# Patient Record
Sex: FEMALE | Race: BLACK OR AFRICAN AMERICAN | NOT HISPANIC OR LATINO | Employment: UNEMPLOYED | URBAN - METROPOLITAN AREA
[De-identification: names, ages, dates, MRNs, and addresses within clinical notes are randomized per-mention and may not be internally consistent; named-entity substitution may affect disease eponyms.]

---

## 2017-08-02 ENCOUNTER — HOSPITAL ENCOUNTER (EMERGENCY)
Facility: OTHER | Age: 52
Discharge: HOME | End: 2017-08-02
Attending: EMERGENCY MEDICINE
Payer: MEDICAID

## 2017-08-02 VITALS
BODY MASS INDEX: 24.75 KG/M2 | WEIGHT: 154 LBS | HEIGHT: 66 IN | TEMPERATURE: 99 F | OXYGEN SATURATION: 99 % | HEART RATE: 98 BPM | SYSTOLIC BLOOD PRESSURE: 134 MMHG | DIASTOLIC BLOOD PRESSURE: 68 MMHG | RESPIRATION RATE: 16 BRPM

## 2017-08-02 DIAGNOSIS — M54.2 CHRONIC NECK PAIN: Primary | ICD-10-CM

## 2017-08-02 DIAGNOSIS — M54.12 CERVICAL RADICULOPATHY: ICD-10-CM

## 2017-08-02 DIAGNOSIS — G89.29 CHRONIC NECK PAIN: Primary | ICD-10-CM

## 2017-08-02 PROCEDURE — 63600175 PHARM REV CODE 636 W HCPCS: Performed by: PHYSICIAN ASSISTANT

## 2017-08-02 PROCEDURE — 99283 EMERGENCY DEPT VISIT LOW MDM: CPT | Mod: 25

## 2017-08-02 PROCEDURE — 96372 THER/PROPH/DIAG INJ SC/IM: CPT

## 2017-08-02 RX ORDER — KETOROLAC TROMETHAMINE 30 MG/ML
30 INJECTION, SOLUTION INTRAMUSCULAR; INTRAVENOUS
Status: COMPLETED | OUTPATIENT
Start: 2017-08-02 | End: 2017-08-02

## 2017-08-02 RX ORDER — MELOXICAM 7.5 MG/1
7.5 TABLET ORAL DAILY
Qty: 10 TABLET | Refills: 0 | Status: SHIPPED | OUTPATIENT
Start: 2017-08-02 | End: 2017-08-12

## 2017-08-02 RX ADMIN — KETOROLAC TROMETHAMINE 30 MG: 30 INJECTION, SOLUTION INTRAMUSCULAR at 11:08

## 2017-08-03 NOTE — ED PROVIDER NOTES
"Encounter Date: 8/2/2017       History     Chief Complaint   Patient presents with    Neck Pain     Pt presents to ED with c/o neck pain that radiates down left arm that has been there for "a long time".      Patient is a 52 y.o. female with a past medical history of chronic pain, presenting to the emergency department with complaints of acute on chronic pain.  The patient reports that she has significant pain to the left side of her neck that radiates into her left arm.  She states that she's had this pain for a very long time.  She states as a 10/10.  She admits that she sees pain management regularly, and she takes Percocet for her symptoms.  She reports that she was supposed to have an appointment on 8/4/17, but that it was back to 8/16/17.  She reports that she does not feel that she can wait that long.  She states that she needs something for her pain now.  She denies new injury or trauma.      The history is provided by the patient.     Review of patient's allergies indicates:   Allergen Reactions    Orange juice Hives    Tomato (solanum lycopersicum) Hives     Past Medical History:   Diagnosis Date    Diabetes mellitus     Heart attack     Hypertension      Past Surgical History:   Procedure Laterality Date    APPENDECTOMY      stents      TUBAL LIGATION       History reviewed. No pertinent family history.  Social History   Substance Use Topics    Smoking status: Current Every Day Smoker     Packs/day: 0.50     Types: Cigarettes    Smokeless tobacco: Never Used    Alcohol use No     Review of Systems   Constitutional: Negative for activity change, appetite change, chills, fatigue and fever.   HENT: Negative for congestion, rhinorrhea and sore throat.    Eyes: Negative for photophobia and visual disturbance.   Respiratory: Negative for cough, shortness of breath and wheezing.    Cardiovascular: Negative for chest pain.   Gastrointestinal: Negative for abdominal pain, diarrhea, nausea and vomiting. "   Genitourinary: Negative for dysuria, hematuria and urgency.   Musculoskeletal: Positive for myalgias and neck pain. Negative for back pain.   Skin: Negative for color change and wound.   Neurological: Negative for weakness and headaches.   Psychiatric/Behavioral: Negative for agitation and confusion.       Physical Exam     Initial Vitals [08/02/17 2230]   BP Pulse Resp Temp SpO2   126/72 95 16 98.6 °F (37 °C) 99 %      MAP       90         Physical Exam    Nursing note and vitals reviewed.  Constitutional: Vital signs are normal. She appears well-developed and well-nourished. She is not diaphoretic. She is cooperative.  Non-toxic appearance. She does not have a sickly appearance. She does not appear ill. No distress.   Unaccompanied, -American female speaking in clear and full sentences, sitting comfortably in the recliner.  She is in no acute distress.  She ambulates without difficulty.   HENT:   Head: Normocephalic and atraumatic.   Right Ear: External ear normal.   Left Ear: External ear normal.   Nose: Nose normal.   Mouth/Throat: Oropharynx is clear and moist.   Eyes: Conjunctivae and EOM are normal.   Neck: Normal range of motion. Neck supple.   Cardiovascular: Normal rate, regular rhythm and normal heart sounds.   Pulmonary/Chest: Breath sounds normal. No respiratory distress. She has no wheezes.   Abdominal: Soft. Bowel sounds are normal. She exhibits no distension. There is no tenderness. There is no rebound and no guarding.   Musculoskeletal: Normal range of motion.   Tenderness to palpation of the left sided paraspinal muscles of cervical spine and trapezius and shoulder.  No midline tenderness, crepitus, step-off.  Normal range of motion, strength, sensation.  Normal sensation of bilateral upper extremities.   Neurological: She is alert and oriented to person, place, and time. GCS eye subscore is 4. GCS verbal subscore is 5. GCS motor subscore is 6.   Skin: Skin is warm.   Psychiatric: She has  a normal mood and affect. Her behavior is normal. Judgment and thought content normal.         ED Course   Procedures  Labs Reviewed - No data to display          Medical Decision Making:   Initial Assessment:   Urgent evaluation of a 52-year-old female with a past medical history of chronic pain, presenting to the emergency department with complaints of acute on chronic pain.  Patient is afebrile, nontoxic, hemodynamically stable, and neurovascularly intact.  Physical exam reveals tenderness to palpation left sided paraspinal muscles of the cervical spine with no midline tenderness, crepitus, step-off.  Ambulatory and weightbearing.  No focal neurological deficits or weaknesses.  ED Management:  Given the patient's signs and symptoms, I do not feel that further testing or imaging is warranted.  Patient's signs and symptoms are likely consistent with her chronic pain.  Will plan to administer Toradol.  I explained to the patient that I could not prescribe further narcotics for her chronic pain.  She'll be given a prescription for Motrin.  She stable for discharge home. The patient was instructed to follow up with a primary care provider in 2 days or to return to the emergency department for worsening symptoms. The treatment plan was discussed with the patient who demonstrated understanding and comfort with plan. The patient's history, physical exam, and plan of care was discussed with and agreed upon with my supervising physician.    Other:   I have discussed this case with another health care provider.       <> Summary of the Discussion: Dr. Gibbons  This note was created using Dragon Medical Dictation. There may be typographical errors secondary to dictation.                    ED Course     Clinical Impression:     1. Chronic neck pain    2. Cervical radiculopathy         Disposition:   Disposition: Discharged  Condition: Stable                        Theodora Mccoy PA-C  08/02/17 3485

## 2017-08-03 NOTE — ED TRIAGE NOTES
"Casie Salvador, a 52 y.o. female presents to the ED with complaints of two herniated discs in her neck that pt lives with chronic pain but tonight the pain is "worse" and the pt is unable to sleep and having no relief with home pain meds. Pt states pain starts in her neck and moves down the left side of her arm. Pt denies numbness or tingling, pulses palpable, pt denies NVD, denies fever or chills.      Chief Complaint   Patient presents with    Neck Pain     Pt presents to ED with c/o neck pain that radiates down left arm that has been there for "a long time".      Review of patient's allergies indicates:   Allergen Reactions    Orange juice Hives    Tomato (solanum lycopersicum) Hives     Past Medical History:   Diagnosis Date    Diabetes mellitus     Heart attack     Hypertension        "

## 2017-08-03 NOTE — ED NOTES
Patient escorted to registration desk with family present. Discharge paperwork discussed. Discussed changes in medications, new prescriptions were given and discussed. Patient instructed to follow up per physician recommendations. Patient and family verbalized understanding.

## 2018-02-03 ENCOUNTER — HOSPITAL ENCOUNTER (EMERGENCY)
Facility: OTHER | Age: 53
Discharge: HOME | End: 2018-02-03
Attending: EMERGENCY MEDICINE
Payer: MEDICAID

## 2018-02-03 VITALS
SYSTOLIC BLOOD PRESSURE: 139 MMHG | HEART RATE: 88 BPM | BODY MASS INDEX: 24.75 KG/M2 | RESPIRATION RATE: 14 BRPM | TEMPERATURE: 99 F | DIASTOLIC BLOOD PRESSURE: 72 MMHG | OXYGEN SATURATION: 99 % | WEIGHT: 154 LBS | HEIGHT: 66 IN

## 2018-02-03 DIAGNOSIS — R07.9 CHEST PAIN WITH MINIMAL RISK FOR CARDIAC ETIOLOGY: ICD-10-CM

## 2018-02-03 DIAGNOSIS — M79.602 LEFT ARM PAIN: ICD-10-CM

## 2018-02-03 DIAGNOSIS — L08.9 SKIN PUSTULE: ICD-10-CM

## 2018-02-03 DIAGNOSIS — M25.512 LEFT SHOULDER PAIN, UNSPECIFIED CHRONICITY: Primary | ICD-10-CM

## 2018-02-03 PROCEDURE — 93010 ELECTROCARDIOGRAM REPORT: CPT | Mod: ,,, | Performed by: INTERNAL MEDICINE

## 2018-02-03 PROCEDURE — 96372 THER/PROPH/DIAG INJ SC/IM: CPT | Mod: 59

## 2018-02-03 PROCEDURE — 63600175 PHARM REV CODE 636 W HCPCS: Performed by: EMERGENCY MEDICINE

## 2018-02-03 PROCEDURE — 10060 I&D ABSCESS SIMPLE/SINGLE: CPT

## 2018-02-03 PROCEDURE — 99284 EMERGENCY DEPT VISIT MOD MDM: CPT | Mod: 25

## 2018-02-03 PROCEDURE — 10021 FNA BX W/O IMG GDN 1ST LES: CPT

## 2018-02-03 PROCEDURE — 93005 ELECTROCARDIOGRAM TRACING: CPT

## 2018-02-03 RX ORDER — IBUPROFEN 600 MG/1
600 TABLET ORAL EVERY 6 HOURS PRN
Qty: 20 TABLET | Refills: 0 | OUTPATIENT
Start: 2018-02-03 | End: 2018-10-15

## 2018-02-03 RX ORDER — SULFAMETHOXAZOLE AND TRIMETHOPRIM 800; 160 MG/1; MG/1
1 TABLET ORAL 2 TIMES DAILY
Qty: 14 TABLET | Refills: 0 | Status: SHIPPED | OUTPATIENT
Start: 2018-02-03 | End: 2018-02-10

## 2018-02-03 RX ORDER — KETOROLAC TROMETHAMINE 30 MG/ML
15 INJECTION, SOLUTION INTRAMUSCULAR; INTRAVENOUS
Status: COMPLETED | OUTPATIENT
Start: 2018-02-03 | End: 2018-02-03

## 2018-02-03 RX ADMIN — KETOROLAC TROMETHAMINE 15 MG: 30 INJECTION, SOLUTION INTRAMUSCULAR at 11:02

## 2018-02-04 NOTE — ED TRIAGE NOTES
"Patient presents to ED with c/o non traumatic L shoulder pain x 1 month. Abscess to lower L leg. She also c/o R side chest pain that she describes as "something jumping in my chest".   "

## 2018-02-04 NOTE — ED PROVIDER NOTES
"Encounter Date: 2/3/2018    SCRIBE #1 NOTE: ITrina, am scribing for, and in the presence of, Dr. Whitt.       History     Chief Complaint   Patient presents with    multiple complaints     Patient c/o a "muscle twitching" to right chest wall;  Patient c/o a insect bite to left calf; and left shoulder pain that radiates down her arm.      Time seen by provider: 10:36 PM    This is a 53 y.o. female with DM, HTN, and hx of MI who presents with multiple complaints. She has a hx of chronic pain and reports intermittent L shoulder pain, beyond baseline. Pt reports that it is similar to a toothache and does not radiate down the arm. Pain is keeping her from sleeping and is not mitigated with any sleep position. No relief with Tylenol, Advil, or heat pad. She was last seen on 8/2/17 with similar symptoms. Pt has attempted to f/u with pain management but has not been able to yet. She denies any hx of injection for pain relief. Pt has two herniated discs in the neck. She denies any numbness, tingling, or weakness. No rash or wound to shoulder.    She reports intermittent "jumping"/"twitching" to the R chest wall x 4 days. Episodes last about 2 to 3 minutes. Pt denies any chest pain, palpitations, or leg swelling. No headache, dizziness, or weakness.     Pt also c/o bug bite to the L medial calf with pain. She denies any numbness, weakness, or tingling to the area. No fever, chills, or fatigue.      The history is provided by the patient.     Review of patient's allergies indicates:   Allergen Reactions    Orange juice Hives    Tomato (solanum lycopersicum) Hives     Past Medical History:   Diagnosis Date    Diabetes mellitus     Heart attack     Hypertension      Past Surgical History:   Procedure Laterality Date    APPENDECTOMY      stents      TUBAL LIGATION       History reviewed. No pertinent family history.  Social History   Substance Use Topics    Smoking status: Current Every Day Smoker     " Packs/day: 0.50     Types: Cigarettes    Smokeless tobacco: Never Used    Alcohol use No     Review of Systems   Constitutional: Negative for chills and fever.   HENT: Negative for congestion and sore throat.    Eyes: Negative for visual disturbance.   Respiratory: Negative for cough and shortness of breath.    Cardiovascular: Negative for chest pain, palpitations and leg swelling.   Gastrointestinal: Negative for abdominal pain, diarrhea and vomiting.   Genitourinary: Negative for decreased urine volume, dysuria and vaginal discharge.   Musculoskeletal: Negative for joint swelling, neck pain and neck stiffness.        Twitching sensation to R chest wall. L shoulder pain.   Skin: Negative for pallor.        Bug bite to L medial calf.   Neurological: Negative for weakness, numbness and headaches.   Psychiatric/Behavioral: Negative for confusion.       Physical Exam     Initial Vitals [02/03/18 2204]   BP Pulse Resp Temp SpO2   139/72 88 14 98.5 °F (36.9 °C) 99 %      MAP       94.33         Physical Exam    Nursing note and vitals reviewed.  Constitutional: She appears well-developed and well-nourished. No distress.   HENT:   Head: Normocephalic and atraumatic.   Mouth/Throat: Oropharynx is clear and moist.   Eyes: Conjunctivae and EOM are normal. Pupils are equal, round, and reactive to light.   Neck: Normal range of motion. Neck supple.   Cardiovascular: Normal rate, regular rhythm and normal heart sounds.   No murmur heard.  Pulmonary/Chest: Breath sounds normal. No respiratory distress. She has no wheezes. She has no rhonchi. She has no rales.   Abdominal: Soft. Bowel sounds are normal. There is no tenderness.   Musculoskeletal: Normal range of motion.   TTP of the L anterior shoulder. No erythema or warmth.   All other joints were aggressively palpated and ranged without tenderness or decreased ROM except as otherwise mentioned.  There is no midline tenderness of the cervical spine.    There is no midline  tenderness of the thoracic spine.    There is no midline tenderness of the lumbar spine.    There is no tenderness over the sacrum.   Neurological: She is alert and oriented to person, place, and time. She has normal strength. No cranial nerve deficit or sensory deficit.   Skin: Skin is warm and dry.   L medial calf there is a 1cm, circular area of erythema with a small central punctum. There is a small amount of induration.   Psychiatric: She has a normal mood and affect. Her behavior is normal.         ED Course   Abscess Aspiration  Date/Time: 2/5/2018 5:00 PM  Performed by: RICARDO DYSON  Authorized by: RICARDO DYSON     A time out verifies correct patient, procedure, equipment, support staff and site/side marked as required:   Procedure Details:     Site prepped with:  Alcohol    Location of Abscess #1:  Left medial calf/ankle    Size of needle #1:  18  Post-procedure:     Patient tolerance:  Patient tolerated the procedure well with no immediate complications     Pustule was unroofed with trace of pus released.      Labs Reviewed - No data to display  EKG Readings: (Independently Interpreted)   NSR at a rate of 80. No STEMI. No abnormal T waves.       X-Rays:   Independently Interpreted Readings:   Chest X-Ray: Normal heart size.  No acute abnormalities. No infiltrate or effusions.      Imaging Results          X-Ray Chest PA And Lateral (Final result)  Result time 02/03/18 23:08:08    Final result by Santhosh Pelaez MD (02/03/18 23:08:08)                 Impression:         No acute findings in the chest.  No significant change from prior.        Electronically signed by: SANTHOSH PELAEZ MD  Date:     02/03/18  Time:    23:08              Narrative:    Chest PA and Lateral    Indication:.    Comparison:December 10, 2012.    Findings:     Heart and lungs unchanged when allowing for differences in technique and positioning.                              Medical Decision Making:   Independently  "Interpreted Test(s):   I have ordered and independently interpreted X-rays - see prior notes.  I have ordered and independently interpreted EKG Reading(s) - see prior notes  Clinical Tests:   Radiological Study: Ordered and Reviewed  Medical Tests: Ordered and Reviewed  ED Management:  Emergent evaluation a 53-year-old female with multiple complaints.  She reported right sided "chest wall jumping".  This was evaluated with an EKG and chest x-ray which are both normal.  Additionally, she reports chronic left shoulder pain.  On exam there is no evidence of a septic joint.  I reviewed medical record and she had an x-ray last year, I do not think repeat imaging is indicated at this time.  I suspect arthritis.  Additional complaint was of a pustule to the left leg.  This was unroofed with a needle and patient tolerated it well.  Since there is some surrounding erythema we'll treat with antibiotics.  She is discharged in good condition and encouraged close follow-up with PCP or return for new or worsening symptoms.            Scribe Attestation:   Scribe #1: I performed the above scribed service and the documentation accurately describes the services I performed. I attest to the accuracy of the note.    Attending Attestation:           Physician Attestation for Scribe:  Physician Attestation Statement for Scribe #1: I, Dr. Whitt, reviewed documentation, as scribed by Trina Hough in my presence, and it is both accurate and complete.                 ED Course      Clinical Impression:     1. Left shoulder pain, unspecified chronicity    2. Left arm pain    3. Chest pain with minimal risk for cardiac etiology    4. Skin pustule                               Randi Whitt MD  02/05/18 3802    "

## 2018-10-09 ENCOUNTER — HOSPITAL ENCOUNTER (EMERGENCY)
Facility: OTHER | Age: 53
Discharge: HOME | End: 2018-10-09
Attending: EMERGENCY MEDICINE
Payer: MEDICAID

## 2018-10-09 VITALS
OXYGEN SATURATION: 99 % | DIASTOLIC BLOOD PRESSURE: 84 MMHG | WEIGHT: 174 LBS | TEMPERATURE: 99 F | HEART RATE: 83 BPM | BODY MASS INDEX: 27.97 KG/M2 | SYSTOLIC BLOOD PRESSURE: 182 MMHG | HEIGHT: 66 IN | RESPIRATION RATE: 18 BRPM

## 2018-10-09 DIAGNOSIS — S92.514A CLOSED NONDISPLACED FRACTURE OF PROXIMAL PHALANX OF LESSER TOE OF RIGHT FOOT, INITIAL ENCOUNTER: Primary | ICD-10-CM

## 2018-10-09 DIAGNOSIS — M79.674 TOE PAIN, RIGHT: ICD-10-CM

## 2018-10-09 PROCEDURE — 96372 THER/PROPH/DIAG INJ SC/IM: CPT

## 2018-10-09 PROCEDURE — 25000003 PHARM REV CODE 250: Performed by: PHYSICIAN ASSISTANT

## 2018-10-09 PROCEDURE — 63600175 PHARM REV CODE 636 W HCPCS: Performed by: PHYSICIAN ASSISTANT

## 2018-10-09 PROCEDURE — 99284 EMERGENCY DEPT VISIT MOD MDM: CPT | Mod: 25

## 2018-10-09 RX ORDER — KETOROLAC TROMETHAMINE 30 MG/ML
15 INJECTION, SOLUTION INTRAMUSCULAR; INTRAVENOUS
Status: COMPLETED | OUTPATIENT
Start: 2018-10-09 | End: 2018-10-09

## 2018-10-09 RX ORDER — DICLOFENAC SODIUM 75 MG/1
75 TABLET, DELAYED RELEASE ORAL 2 TIMES DAILY
Qty: 14 TABLET | Refills: 0 | OUTPATIENT
Start: 2018-10-09 | End: 2018-10-15

## 2018-10-09 RX ORDER — ACETAMINOPHEN 325 MG/1
650 TABLET ORAL
Status: COMPLETED | OUTPATIENT
Start: 2018-10-09 | End: 2018-10-09

## 2018-10-09 RX ORDER — IBUPROFEN 600 MG/1
600 TABLET ORAL
Status: DISCONTINUED | OUTPATIENT
Start: 2018-10-09 | End: 2018-10-09

## 2018-10-09 RX ORDER — DICLOFENAC SODIUM 75 MG/1
75 TABLET, DELAYED RELEASE ORAL 2 TIMES DAILY
Qty: 14 TABLET | Refills: 0 | Status: SHIPPED | OUTPATIENT
Start: 2018-10-09 | End: 2018-10-09 | Stop reason: SDUPTHER

## 2018-10-09 RX ADMIN — KETOROLAC TROMETHAMINE 15 MG: 30 INJECTION, SOLUTION INTRAMUSCULAR at 04:10

## 2018-10-09 RX ADMIN — ACETAMINOPHEN 650 MG: 325 TABLET ORAL at 04:10

## 2018-10-09 NOTE — ED PROVIDER NOTES
Encounter Date: 10/9/2018       History     Chief Complaint   Patient presents with    Toe Injury     Pt c/o right second toe pain after tripping on friday night.      52 y/o female with history of DM, HTN, CAD, presents to the ER with chief complaint of right second toe pain x 2 days.  Patient states that she tripped while walking out of a door 2 nights ago and hit her toe.  The pain did not start until the following day.  She has taken Aleve and ibuprofen without resolution of her pain. She notes swelling in the right 2nd toe and increased pain with walking.  She denies additional injuries or other complaints at this time.          Review of patient's allergies indicates:   Allergen Reactions    Orange juice Hives    Tomato (solanum lycopersicum) Hives     Past Medical History:   Diagnosis Date    Diabetes mellitus     Heart attack     Hypertension      Past Surgical History:   Procedure Laterality Date    APPENDECTOMY      stents      TUBAL LIGATION       History reviewed. No pertinent family history.  Social History     Tobacco Use    Smoking status: Current Every Day Smoker     Packs/day: 0.50     Types: Cigarettes    Smokeless tobacco: Never Used   Substance Use Topics    Alcohol use: No    Drug use: No     Review of Systems   Constitutional: Negative for chills and fever.   Respiratory: Negative for shortness of breath.    Cardiovascular: Negative for chest pain.   Gastrointestinal: Negative for nausea and vomiting.   Musculoskeletal: Positive for arthralgias and joint swelling. Negative for back pain.   Skin: Negative for color change and rash.   Neurological: Negative for weakness.   Hematological: Does not bruise/bleed easily.       Physical Exam     Initial Vitals [10/09/18 1440]   BP Pulse Resp Temp SpO2   (!) 167/79 90 18 99.1 °F (37.3 °C) 99 %      MAP       --         Physical Exam    Nursing note and vitals reviewed.  Constitutional: She appears well-developed and well-nourished.   HENT:    Head: Atraumatic.   Mouth/Throat: Oropharynx is clear and moist.   Eyes: Conjunctivae and EOM are normal. Pupils are equal, round, and reactive to light.   Neck: Normal range of motion. Neck supple.   Cardiovascular: Normal rate and regular rhythm.   Pulmonary/Chest: Breath sounds normal. No respiratory distress. She has no wheezes. She has no rhonchi. She has no rales.   Musculoskeletal:        Right foot: There is decreased range of motion (of 2nd toe), tenderness and swelling (mild to moderate swelling of entire second toe.  tenderness to distal end of toe.). There is normal capillary refill, no deformity and no laceration.   Neurological: She is alert and oriented to person, place, and time.   Skin: No rash noted.   Psychiatric: She has a normal mood and affect.         ED Course   Procedures  Labs Reviewed - No data to display       Imaging Results          X-Ray Toe 2 or More Views Right (Final result)  Result time 10/09/18 15:01:17    Final result by Lennox Sheppard MD (10/09/18 15:01:17)                 Impression:      2nd proximal phalanx acute, nondisplaced fracture as above.      Electronically signed by: Lennox Sheppard MD  Date:    10/09/2018  Time:    15:01             Narrative:    EXAMINATION:  XR TOE 2 OR MORE VIEWS RIGHT    CLINICAL HISTORY:  trauma;    TECHNIQUE:  AP, lateral and oblique views right 2nd digit    COMPARISON:  None    FINDINGS:  Bones are well mineralized.  There is suspected acute, nondisplaced fracture involving the distal metadiaphyseal junction of the 2nd proximal phalanx.  No dislocation or destructive osseous process.  Minimal degenerative change of the 1st MTP joint.  No subcutaneous emphysema or radiodense retained foreign body.                                       APC / Resident Notes:   Patient presents to the ER with chief complaint of right 2nd toe pain x2 days.  The patient reports that she tripped and injured her toe 3 days ago.  Patient is taking  NSAIDs 1-2 times  per day without resolution of her pain.  X-ray in the ER reveals 2nd proximal phalanx acute nondisplaced fracture without additional fracture.  Patient has no additional evidence of injury on exam.  I have helio taped the toe and provided a hard sole shoe for comfort.   I have prescribed Voltaren and she is instructed on R.I.C.E therapy.  Patient is advised on close follow-up with PCP within 1 week and is given ER return precautions.                   Clinical Impression:   The primary encounter diagnosis was Closed nondisplaced fracture of proximal phalanx of lesser toe of right foot, initial encounter. A diagnosis of Toe pain, right was also pertinent to this visit.                             MARISSA Olsen  10/09/18 [id number]

## 2018-10-09 NOTE — ED TRIAGE NOTES
"+toe pain. Pt states " I hit my second toe on my right foot Friday. It hurts so bad I can barley move it." mild swelling noted to right second toe.   "

## 2018-10-09 NOTE — DISCHARGE INSTRUCTIONS
Take tylenol 1000 mg every 8 hours for pain in addition to prescription for antiinflammatories (voltaren)

## 2018-10-15 ENCOUNTER — HOSPITAL ENCOUNTER (EMERGENCY)
Facility: OTHER | Age: 53
Discharge: HOME | End: 2018-10-15
Attending: EMERGENCY MEDICINE
Payer: MEDICAID

## 2018-10-15 VITALS
OXYGEN SATURATION: 99 % | RESPIRATION RATE: 18 BRPM | BODY MASS INDEX: 27.97 KG/M2 | HEART RATE: 82 BPM | TEMPERATURE: 99 F | HEIGHT: 66 IN | WEIGHT: 174 LBS | SYSTOLIC BLOOD PRESSURE: 164 MMHG | DIASTOLIC BLOOD PRESSURE: 74 MMHG

## 2018-10-15 DIAGNOSIS — M79.674 PAIN OF TOE OF RIGHT FOOT: Primary | ICD-10-CM

## 2018-10-15 PROCEDURE — 99283 EMERGENCY DEPT VISIT LOW MDM: CPT | Mod: 25

## 2018-10-15 RX ORDER — OXAPROZIN 600 MG/1
600 TABLET, FILM COATED ORAL 2 TIMES DAILY PRN
Qty: 20 TABLET | Refills: 0 | Status: SHIPPED | OUTPATIENT
Start: 2018-10-15 | End: 2022-02-20

## 2018-10-15 NOTE — ED PROVIDER NOTES
Encounter Date: 10/15/2018       History     Chief Complaint   Patient presents with    Toe Pain     Pt c/o pain to her right fourth and fifth toe since this am. She states she was seen last week and diagnosed with a fracture to the right second toe.      53-year-old female with history of diabetes, mi and hypertension presents to the emergency department with complaints of 4th and 5th toe pain on the right foot.  She states that she was seen here approximately 1 week ago for fracture of her right 2nd toe after a trip and injury. She reports pain currently a 9/10.  She admits that she was taking the diclofenac however she is currently out of this medication.  She states that now she is experiencing pain to the 4th and 5th digits which she was not initially experiencing.  She denies any redness, warmth, new trauma or injury. She states that she has been wearing the postop shoe for comfort measures.      The history is provided by the patient.     Review of patient's allergies indicates:   Allergen Reactions    Orange juice Hives    Tomato (solanum lycopersicum) Hives     Past Medical History:   Diagnosis Date    Diabetes mellitus     Heart attack     Hypertension      Past Surgical History:   Procedure Laterality Date    APPENDECTOMY      stents      TUBAL LIGATION       History reviewed. No pertinent family history.  Social History     Tobacco Use    Smoking status: Current Every Day Smoker     Packs/day: 0.50     Types: Cigarettes    Smokeless tobacco: Never Used   Substance Use Topics    Alcohol use: No    Drug use: No     Review of Systems   Constitutional: Negative for chills and fever.   Musculoskeletal: Positive for arthralgias and myalgias. Negative for back pain and joint swelling.   Skin: Negative for rash.   Neurological: Negative for weakness and numbness.   Hematological: Does not bruise/bleed easily.       Physical Exam     Initial Vitals [10/15/18 1043]   BP Pulse Resp Temp SpO2   (!)  164/74 82 18 98.8 °F (37.1 °C) 99 %      MAP       --         Physical Exam    Nursing note and vitals reviewed.  Constitutional: Vital signs are normal. She appears well-developed and well-nourished.  Non-toxic appearance. No distress.   HENT:   Head: Normocephalic and atraumatic.   Right Ear: External ear normal.   Left Ear: External ear normal.   Nose: Nose normal.   Eyes: Conjunctivae and lids are normal. No scleral icterus.   Neck: Phonation normal.   Cardiovascular: Intact distal pulses.   Abdominal: Normal appearance.   Musculoskeletal: Normal range of motion.        Feet:    No obvious deformities, moving all extremities, normal gait  Right foot-pain with reported tenderness palpation to the 4th and 5th digits as well as the 2nd digit.  No edema, ecchymosis, erythema or warmth.  No skin lesions. Patient does have on onychodystrophy to the nails concerning for onychomycosis.  Intact distal pulses with no sensory deficits.  Capillary refill less than 3 sec.    Neurological: She is alert and oriented to person, place, and time. She has normal strength. No sensory deficit.   Skin: Skin is warm, dry and intact. Capillary refill takes less than 2 seconds. No abrasion, no bruising, no ecchymosis, no laceration, no lesion, no rash and no abscess noted. No erythema.   Psychiatric: She has a normal mood and affect. Her speech is normal and behavior is normal. Judgment normal. Cognition and memory are normal.         ED Course   Procedures  Labs Reviewed - No data to display       Imaging Results          X-Ray Toe 2 or More Views Right (Final result)  Result time 10/15/18 11:29:07    Final result by Lynette Myrick MD (10/15/18 11:29:07)                 Impression:      Normal exam.      Electronically signed by: Lynette Myrick MD  Date:    10/15/2018  Time:    11:29             Narrative:    EXAMINATION:  XR TOE 2 OR MORE VIEWS RIGHT    CLINICAL HISTORY:  toe pain to 4th and 5th digits;    TECHNIQUE:  PA,  lateral and oblique radiographs of the 4th and 5th toes were submitted.    COMPARISON:  10/09/2018    FINDINGS:  The osseous structures, soft tissues and joint spaces are normal.                                 Medical Decision Making:   History:   Old Medical Records: I decided to obtain old medical records.  Initial Assessment:   53-year-old female with complaints consistent with toe pain on the right foot.  Afebrile neurovascularly intact.  She is alert and healthy nontoxic appearing.  She is in no apparent distress. No focal motor deficits.  Patient was seen here approximately 1 week ago and diagnosed with a fracture of the right 2nd toe.  She reports that she is now experiencing pain to the 4th and 5th toes.  There is no deformity, ecchymoses, edema or erythema.  No evidence of serious bacterial infection.  Low suspicion for fracture dislocation.  I did review old x-ray and the unable to visualize the 5th digit.  On the images that did show the 4th digit, there was no obvious evidence of acute fracture or dislocation.  Clinical Tests:   Radiological Study: Ordered and Reviewed  ED Management:  X-rays obtained with no evidence of fracture dislocation of the 4th or 5th digit.  Will discharge home with oxaprozin care instructions.  She is to follow up with her doctor in the next 48 hr or return for any worsening signs or symptoms.  Given information for [facility] if she does not have a primary care physician to follow up with.  She states understanding agrees this plan.  This is the extent of patient's complaints today.  Patient requesting helio-taped.  I do not think she will benefit from this but she is more than welcome to helio-tape if felt necessary/improved her comfort  This note was created using [employer] dictation.  There may be typographical errors secondary to dictation.                        Clinical Impression:     1. Pain of toe of right foot            Disposition:   Disposition:  Discharged  Condition: Stable                        Kelli Beasley PA-C  10/15/18 1148

## 2018-10-15 NOTE — ED NOTES
"Pt reports being here last week with fractured right 2nd toe. Back today with new onset pain 9/10 "it hurts bad" to right 4th and 5th toes that started this AM. NAD. No s/s infection. Ambulatory. Will cont to monitor.  "

## 2022-02-05 ENCOUNTER — HOSPITAL ENCOUNTER (EMERGENCY)
Facility: OTHER | Age: 57
Discharge: HOME | End: 2022-02-05
Attending: EMERGENCY MEDICINE
Payer: MEDICAID

## 2022-02-05 VITALS
HEART RATE: 76 BPM | DIASTOLIC BLOOD PRESSURE: 97 MMHG | SYSTOLIC BLOOD PRESSURE: 138 MMHG | BODY MASS INDEX: 30.05 KG/M2 | HEIGHT: 66 IN | OXYGEN SATURATION: 99 % | TEMPERATURE: 98 F | WEIGHT: 187 LBS | RESPIRATION RATE: 16 BRPM

## 2022-02-05 DIAGNOSIS — K59.00 CONSTIPATION, UNSPECIFIED CONSTIPATION TYPE: ICD-10-CM

## 2022-02-05 DIAGNOSIS — R10.9 ABDOMINAL PAIN: ICD-10-CM

## 2022-02-05 DIAGNOSIS — R10.84 GENERALIZED ABDOMINAL PAIN: Primary | ICD-10-CM

## 2022-02-05 LAB
ALBUMIN SERPL BCP-MCNC: 3.8 G/DL (ref 3.5–5.2)
ALP SERPL-CCNC: 148 U/L (ref 55–135)
ALT SERPL W/O P-5'-P-CCNC: 23 U/L (ref 10–44)
ANION GAP SERPL CALC-SCNC: 11 MMOL/L (ref 8–16)
AST SERPL-CCNC: 22 U/L (ref 10–40)
BASOPHILS # BLD AUTO: 0.07 K/UL (ref 0–0.2)
BASOPHILS NFR BLD: 0.4 % (ref 0–1.9)
BILIRUB SERPL-MCNC: 0.2 MG/DL (ref 0.1–1)
BUN SERPL-MCNC: 19 MG/DL (ref 6–20)
CALCIUM SERPL-MCNC: 9.3 MG/DL (ref 8.7–10.5)
CHLORIDE SERPL-SCNC: 103 MMOL/L (ref 95–110)
CO2 SERPL-SCNC: 21 MMOL/L (ref 23–29)
CREAT SERPL-MCNC: 1.4 MG/DL (ref 0.5–1.4)
DIFFERENTIAL METHOD: ABNORMAL
EOSINOPHIL # BLD AUTO: 0.7 K/UL (ref 0–0.5)
EOSINOPHIL NFR BLD: 3.8 % (ref 0–8)
ERYTHROCYTE [DISTWIDTH] IN BLOOD BY AUTOMATED COUNT: 15.6 % (ref 11.5–14.5)
EST. GFR  (AFRICAN AMERICAN): 48 ML/MIN/1.73 M^2
EST. GFR  (NON AFRICAN AMERICAN): 42 ML/MIN/1.73 M^2
GLUCOSE SERPL-MCNC: 98 MG/DL (ref 70–110)
HCT VFR BLD AUTO: 35.2 % (ref 37–48.5)
HGB BLD-MCNC: 11.2 G/DL (ref 12–16)
IMM GRANULOCYTES # BLD AUTO: 0.09 K/UL (ref 0–0.04)
IMM GRANULOCYTES NFR BLD AUTO: 0.5 % (ref 0–0.5)
LIPASE SERPL-CCNC: 15 U/L (ref 4–60)
LYMPHOCYTES # BLD AUTO: 3.8 K/UL (ref 1–4.8)
LYMPHOCYTES NFR BLD: 21 % (ref 18–48)
MCH RBC QN AUTO: 22.8 PG (ref 27–31)
MCHC RBC AUTO-ENTMCNC: 31.8 G/DL (ref 32–36)
MCV RBC AUTO: 72 FL (ref 82–98)
MONOCYTES # BLD AUTO: 1.1 K/UL (ref 0.3–1)
MONOCYTES NFR BLD: 6.3 % (ref 4–15)
NEUTROPHILS # BLD AUTO: 12.2 K/UL (ref 1.8–7.7)
NEUTROPHILS NFR BLD: 68 % (ref 38–73)
NRBC BLD-RTO: 0 /100 WBC
PLATELET # BLD AUTO: 386 K/UL (ref 150–450)
PMV BLD AUTO: 10 FL (ref 9.2–12.9)
POTASSIUM SERPL-SCNC: 5 MMOL/L (ref 3.5–5.1)
PROT SERPL-MCNC: 8 G/DL (ref 6–8.4)
RBC # BLD AUTO: 4.92 M/UL (ref 4–5.4)
SODIUM SERPL-SCNC: 135 MMOL/L (ref 136–145)
WBC # BLD AUTO: 17.92 K/UL (ref 3.9–12.7)

## 2022-02-05 PROCEDURE — 80053 COMPREHEN METABOLIC PANEL: CPT | Performed by: EMERGENCY MEDICINE

## 2022-02-05 PROCEDURE — 83690 ASSAY OF LIPASE: CPT | Performed by: EMERGENCY MEDICINE

## 2022-02-05 PROCEDURE — 63600175 PHARM REV CODE 636 W HCPCS: Performed by: EMERGENCY MEDICINE

## 2022-02-05 PROCEDURE — 96372 THER/PROPH/DIAG INJ SC/IM: CPT

## 2022-02-05 PROCEDURE — 99284 EMERGENCY DEPT VISIT MOD MDM: CPT | Mod: 25

## 2022-02-05 PROCEDURE — 85025 COMPLETE CBC W/AUTO DIFF WBC: CPT | Performed by: EMERGENCY MEDICINE

## 2022-02-05 RX ORDER — DOCUSATE SODIUM 100 MG/1
100 CAPSULE, LIQUID FILLED ORAL 2 TIMES DAILY
Qty: 60 CAPSULE | Refills: 0 | Status: ON HOLD | OUTPATIENT
Start: 2022-02-05

## 2022-02-05 RX ORDER — OXYCODONE AND ACETAMINOPHEN 5; 325 MG/1; MG/1
1 TABLET ORAL EVERY 4 HOURS PRN
Qty: 6 TABLET | Refills: 0 | Status: ON HOLD | OUTPATIENT
Start: 2022-02-05 | End: 2022-03-23 | Stop reason: HOSPADM

## 2022-02-05 RX ORDER — POLYETHYLENE GLYCOL 3350 17 G/17G
17 POWDER, FOR SOLUTION ORAL DAILY PRN
Qty: 119 G | Refills: 0 | Status: SHIPPED | OUTPATIENT
Start: 2022-02-05 | End: 2022-04-02 | Stop reason: HOSPADM

## 2022-02-05 RX ORDER — DICYCLOMINE HYDROCHLORIDE 10 MG/ML
20 INJECTION INTRAMUSCULAR
Status: COMPLETED | OUTPATIENT
Start: 2022-02-05 | End: 2022-02-05

## 2022-02-05 RX ADMIN — DICYCLOMINE HYDROCHLORIDE 20 MG: 20 INJECTION, SOLUTION INTRAMUSCULAR at 02:02

## 2022-02-05 NOTE — ED TRIAGE NOTES
Patient presents to ED with c/o intermittent mid abdominal pain x 2 days. She describes the pain as a stabbing pain that radiates to back. Denies N/V/D, constipation, dysuria or fever.

## 2022-02-05 NOTE — ED PROVIDER NOTES
Encounter Date: 2/5/2022    SCRIBE #1 NOTE: I, Esther Damian, am scribing for, and in the presence of, Dr. Urias.       History     Chief Complaint   Patient presents with    Abdominal Pain     Mid abdominal pain. Denies N/V/D, dysuria.      Time seen by provider: 1:26 AM    This is a 57 y.o. female with a hx of DM, HTN, and heart attack. SGHx stents and appendectomy. She presents with complaint of intermittent abdominal pain onset yesterday morning. The patient reports she can't sleep due to pain that occurs multiple times per day. No gallbladder, liver, or pancreas issues. She denies bleeding ulcers, fever, chills, nausea, vomiting, or urinary symptoms. She states having regular bowel movements. The patient has not taken anything for the pain. She mentions back pain and foot pain that is unrelated. She ran out of her pain pills and says she cannot wait for her next appointment to get them. She has an appointment scheduled with her PCP in 3 days at [facility].       The history is provided by the patient.     Review of patient's allergies indicates:   Allergen Reactions    Orange juice Hives    Tomato (solanum lycopersicum) Hives     Past Medical History:   Diagnosis Date    Diabetes mellitus     Heart attack     Hypertension      Past Surgical History:   Procedure Laterality Date    APPENDECTOMY      stents      TUBAL LIGATION       History reviewed. No pertinent family history.  Social History     Tobacco Use    Smoking status: Current Every Day Smoker     Packs/day: 0.50     Types: Cigarettes    Smokeless tobacco: Never Used   Substance Use Topics    Alcohol use: No    Drug use: No     Review of Systems   Constitutional: Negative for chills and fever.   HENT: Negative for sore throat.    Respiratory: Negative for shortness of breath.    Cardiovascular: Negative for chest pain.   Gastrointestinal: Positive for abdominal pain. Negative for blood in stool, diarrhea, nausea and vomiting.   Genitourinary:  Negative for decreased urine volume, difficulty urinating, dysuria, frequency and urgency.   Musculoskeletal: Positive for arthralgias and back pain.   Skin: Negative for rash.   Neurological: Negative for weakness.   Hematological: Does not bruise/bleed easily.       Physical Exam     Initial Vitals [02/05/22 0105]   BP Pulse Resp Temp SpO2   (!) 194/88 83 16 98.2 °F (36.8 °C) 98 %      MAP       --         Physical Exam    Nursing note and vitals reviewed.  Constitutional: She appears well-developed and well-nourished. She is cooperative.  Non-toxic appearance. No distress.   Overweight. Uncomfortable appearing.   HENT:   Head: Normocephalic and atraumatic.   Mouth/Throat: Oropharynx is clear and moist.   Moist mucous membranes.     Eyes: Conjunctivae and EOM are normal. Pupils are equal, round, and reactive to light.   No pallor or icterus.     Neck: Neck supple. No thyromegaly present.   Normal range of motion.   Full passive range of motion without pain.     Cardiovascular: Normal rate, regular rhythm, normal heart sounds and normal pulses.   Pulmonary/Chest: Effort normal and breath sounds normal. No respiratory distress.   Abdominal: Abdomen is soft. Bowel sounds are normal. She exhibits no distension. There is abdominal tenderness (mild, between epigastrium and umbilicus). There is no rebound, no guarding and negative Delcid's sign.   Musculoskeletal:         General: Normal range of motion.      Cervical back: Full passive range of motion without pain, normal range of motion and neck supple.     Neurological: She is alert and oriented to person, place, and time. She has normal strength. No cranial nerve deficit or sensory deficit. GCS score is 15. GCS eye subscore is 4. GCS verbal subscore is 5. GCS motor subscore is 6.   Skin: Skin is warm, dry and intact. No rash noted.   Psychiatric: She has a normal mood and affect. Her speech is normal and behavior is normal. Judgment and thought content normal.          ED Course   Procedures  Labs Reviewed   CBC W/ AUTO DIFFERENTIAL - Abnormal; Notable for the following components:       Result Value    WBC 17.92 (*)     Hemoglobin 11.2 (*)     Hematocrit 35.2 (*)     MCV 72 (*)     MCH 22.8 (*)     MCHC 31.8 (*)     RDW 15.6 (*)     Gran # (ANC) 12.2 (*)     Immature Grans (Abs) 0.09 (*)     Mono # 1.1 (*)     Eos # 0.7 (*)     All other components within normal limits   COMPREHENSIVE METABOLIC PANEL - Abnormal; Notable for the following components:    Sodium 135 (*)     CO2 21 (*)     Alkaline Phosphatase 148 (*)     eGFR if  48 (*)     eGFR if non  42 (*)     All other components within normal limits   LIPASE          Imaging Results          X-Ray Abdomen Flat And Erect (Final result)  Result time 02/05/22 02:51:22    Final result by Angus Stearns MD (02/05/22 02:51:22)                 Impression:      Abdominal findings as above.      Electronically signed by: Angus Stearns  Date:    02/05/2022  Time:    02:51             Narrative:    EXAMINATION:  XR ABDOMEN FLAT AND ERECT    CLINICAL HISTORY:  Unspecified abdominal pain    TECHNIQUE:  Flat and erect AP views of the abdomen were performed.    COMPARISON:  None    FINDINGS:  Abdominal radiographic examination is submitted, 3 radiographs are submitted.  Limited imaging of the lung bases demonstrates appearance that may relate to atelectasis and superimposed infiltrate.    There is appearance of mild prominence of the colon with stool, correlation for constipation is needed.  The bowel gas pattern is otherwise nonspecific.  Abnormal bowel distention is not otherwise seen.  Vascular stents are noted.  There are postoperative changes at the left groin noted.  Calcifications of the pelvis may represent phleboliths.  The osseous structures demonstrate chronic change.                              X-Rays:   Independently Interpreted Readings:   Abdomen:   Flat and Erect of Abdomen - No  free air under diaphragm.  No air fluid levels or signs of obstruction. Copious collated stool.     Medications   dicyclomine injection 20 mg (20 mg Intramuscular Given 2/5/22 0218)     Medical Decision Making:   History:   Old Medical Records: I decided to obtain old medical records.  Independently Interpreted Test(s):   I have ordered and independently interpreted X-rays - see prior notes.  Clinical Tests:   Radiological Study: Ordered and Reviewed  Patient presents complaining of abdominal pain since yesterday.  Denies prior similar episodes.  On exam she initially appeared uncomfortable, but did not have an acute abdomen.  Given Bentyl for pain.  Laboratory studies show nonspecific leukocytosis however patient is afebrile, clinical picture does not suggest an acute infectious process.  Laboratory studies show slight hyponatremia, otherwise normal electrolytes.  No laboratory evidence of biliary, pancreatic, hepatic disease.  Abdominal x-ray however shows significant constipation.  The patient does take chronic narcotics for foot pain.  States she did take 2 doses of a laxative earlier today with production of a bowel movement.  Advised patient to continue p.r.n. laxatives, to start daily stool softeners as long she is taking the narcotics.  Patient states she does not see her doctor for a few days and is requesting refill of narcotics, and review of the [place] pharmacy database shows it has been greater than a month since she last filled her 30 day prescription and therefore will give her a few day supply until she sees her physician early this week.  She is aware that narcotics are likely contributing to the constipation.  Understands return precautions.        Scribe Attestation:   Scribe #1: I performed the above scribed service and the documentation accurately describes the services I performed. I attest to the accuracy of the note.               Physician Attestation for scribe, I SASHA, reviewed  documentation as scribed in my presence, which is both accurate and complete.    Clinical Impression:     1. Generalized abdominal pain    2. Abdominal pain    3. Constipation, unspecified constipation type           ED Disposition Condition    Discharge Stable        ED Prescriptions     Medication Sig Dispense Start Date End Date Auth. Provider    polyethylene glycol (GLYCOLAX) 17 gram/dose powder Take 17 g by mouth daily as needed. 119 g 2/5/2022  Yasir Urias II, MD    docusate sodium (COLACE) 100 MG capsule Take 1 capsule (100 mg total) by mouth 2 (two) times daily. 60 capsule 2/5/2022  Yasir Urias II, MD    oxyCODONE-acetaminophen (PERCOCET) 5-325 mg per tablet Take 1 tablet by mouth every 4 (four) hours as needed for Pain. 6 tablet 2/5/2022  Yasir Urias II, MD        Follow-up Information     Follow up With Specialties Details Why Contact Info    Primary Care Clinic  Schedule an appointment as soon as possible for a visit in 5 days             Yaisr Urias II, MD  02/05/22 0644

## 2022-02-07 ENCOUNTER — HOSPITAL ENCOUNTER (EMERGENCY)
Facility: OTHER | Age: 57
Discharge: HOME | End: 2022-02-07
Attending: EMERGENCY MEDICINE
Payer: MEDICAID

## 2022-02-07 VITALS
DIASTOLIC BLOOD PRESSURE: 65 MMHG | RESPIRATION RATE: 18 BRPM | WEIGHT: 180 LBS | TEMPERATURE: 99 F | OXYGEN SATURATION: 95 % | BODY MASS INDEX: 28.93 KG/M2 | HEART RATE: 75 BPM | HEIGHT: 66 IN | SYSTOLIC BLOOD PRESSURE: 131 MMHG

## 2022-02-07 DIAGNOSIS — K86.2 PANCREATIC CYST: ICD-10-CM

## 2022-02-07 DIAGNOSIS — R19.7 DIARRHEA, UNSPECIFIED TYPE: ICD-10-CM

## 2022-02-07 DIAGNOSIS — Z01.84 COVID-19 VIRUS ANTIBODY NEGATIVE: ICD-10-CM

## 2022-02-07 DIAGNOSIS — R11.2 NON-INTRACTABLE VOMITING WITH NAUSEA, UNSPECIFIED VOMITING TYPE: ICD-10-CM

## 2022-02-07 DIAGNOSIS — N30.01 ACUTE CYSTITIS WITH HEMATURIA: ICD-10-CM

## 2022-02-07 DIAGNOSIS — N28.1 RENAL CYST: ICD-10-CM

## 2022-02-07 DIAGNOSIS — R10.84 GENERALIZED ABDOMINAL PAIN: Primary | ICD-10-CM

## 2022-02-07 DIAGNOSIS — R93.5 ABNORMAL CT OF THE ABDOMEN: ICD-10-CM

## 2022-02-07 LAB
ALBUMIN SERPL BCP-MCNC: 4.1 G/DL (ref 3.5–5.2)
ALP SERPL-CCNC: 148 U/L (ref 55–135)
ALT SERPL W/O P-5'-P-CCNC: 14 U/L (ref 10–44)
ANION GAP SERPL CALC-SCNC: 19 MMOL/L (ref 8–16)
ANION GAP SERPL CALC-SCNC: 9 MMOL/L (ref 8–16)
AST SERPL-CCNC: 28 U/L (ref 10–40)
BACTERIA #/AREA URNS HPF: ABNORMAL /HPF
BASOPHILS # BLD AUTO: 0.06 K/UL (ref 0–0.2)
BASOPHILS NFR BLD: 0.4 % (ref 0–1.9)
BILIRUB SERPL-MCNC: 0.4 MG/DL (ref 0.1–1)
BILIRUB UR QL STRIP: NEGATIVE
BUN SERPL-MCNC: 12 MG/DL (ref 6–20)
BUN SERPL-MCNC: 16 MG/DL (ref 6–20)
CALCIUM SERPL-MCNC: 8.1 MG/DL (ref 8.7–10.5)
CALCIUM SERPL-MCNC: 9.9 MG/DL (ref 8.7–10.5)
CHLORIDE SERPL-SCNC: 102 MMOL/L (ref 95–110)
CHLORIDE SERPL-SCNC: 98 MMOL/L (ref 95–110)
CLARITY UR: ABNORMAL
CO2 SERPL-SCNC: 17 MMOL/L (ref 23–29)
CO2 SERPL-SCNC: 22 MMOL/L (ref 23–29)
COLOR UR: YELLOW
CREAT SERPL-MCNC: 1 MG/DL (ref 0.5–1.4)
CREAT SERPL-MCNC: 1.2 MG/DL (ref 0.5–1.4)
CTP QC/QA: YES
DIFFERENTIAL METHOD: ABNORMAL
EOSINOPHIL # BLD AUTO: 0.2 K/UL (ref 0–0.5)
EOSINOPHIL NFR BLD: 0.9 % (ref 0–8)
ERYTHROCYTE [DISTWIDTH] IN BLOOD BY AUTOMATED COUNT: 14.5 % (ref 11.5–14.5)
EST. GFR  (AFRICAN AMERICAN): 58 ML/MIN/1.73 M^2
EST. GFR  (AFRICAN AMERICAN): >60 ML/MIN/1.73 M^2
EST. GFR  (NON AFRICAN AMERICAN): 51 ML/MIN/1.73 M^2
EST. GFR  (NON AFRICAN AMERICAN): >60 ML/MIN/1.73 M^2
GLUCOSE SERPL-MCNC: 140 MG/DL (ref 70–110)
GLUCOSE SERPL-MCNC: 164 MG/DL (ref 70–110)
GLUCOSE UR QL STRIP: NEGATIVE
HCT VFR BLD AUTO: 37.5 % (ref 37–48.5)
HCV AB SERPL QL IA: NEGATIVE
HGB BLD-MCNC: 12.1 G/DL (ref 12–16)
HGB UR QL STRIP: ABNORMAL
HIV 1+2 AB+HIV1 P24 AG SERPL QL IA: NEGATIVE
HYALINE CASTS #/AREA URNS LPF: 0 /LPF
IMM GRANULOCYTES # BLD AUTO: 0.06 K/UL (ref 0–0.04)
IMM GRANULOCYTES NFR BLD AUTO: 0.4 % (ref 0–0.5)
KETONES UR QL STRIP: NEGATIVE
LEUKOCYTE ESTERASE UR QL STRIP: ABNORMAL
LIPASE SERPL-CCNC: 8 U/L (ref 4–60)
LYMPHOCYTES # BLD AUTO: 3.5 K/UL (ref 1–4.8)
LYMPHOCYTES NFR BLD: 21.9 % (ref 18–48)
MCH RBC QN AUTO: 22.7 PG (ref 27–31)
MCHC RBC AUTO-ENTMCNC: 32.3 G/DL (ref 32–36)
MCV RBC AUTO: 70 FL (ref 82–98)
MICROSCOPIC COMMENT: ABNORMAL
MONOCYTES # BLD AUTO: 0.9 K/UL (ref 0.3–1)
MONOCYTES NFR BLD: 5.6 % (ref 4–15)
NEUTROPHILS # BLD AUTO: 11.3 K/UL (ref 1.8–7.7)
NEUTROPHILS NFR BLD: 70.8 % (ref 38–73)
NITRITE UR QL STRIP: POSITIVE
NRBC BLD-RTO: 0 /100 WBC
PH UR STRIP: 7 [PH] (ref 5–8)
PLATELET # BLD AUTO: 434 K/UL (ref 150–450)
PMV BLD AUTO: 11.2 FL (ref 9.2–12.9)
POCT GLUCOSE: 145 MG/DL (ref 70–110)
POTASSIUM SERPL-SCNC: 4.6 MMOL/L (ref 3.5–5.1)
POTASSIUM SERPL-SCNC: 5.7 MMOL/L (ref 3.5–5.1)
PROT SERPL-MCNC: 9.2 G/DL (ref 6–8.4)
PROT UR QL STRIP: ABNORMAL
RBC # BLD AUTO: 5.33 M/UL (ref 4–5.4)
RBC #/AREA URNS HPF: 15 /HPF (ref 0–4)
SARS-COV-2 RDRP RESP QL NAA+PROBE: NEGATIVE
SODIUM SERPL-SCNC: 133 MMOL/L (ref 136–145)
SODIUM SERPL-SCNC: 134 MMOL/L (ref 136–145)
SP GR UR STRIP: 1.02 (ref 1–1.03)
SQUAMOUS #/AREA URNS HPF: ABNORMAL /HPF
URN SPEC COLLECT METH UR: ABNORMAL
UROBILINOGEN UR STRIP-ACNC: NEGATIVE EU/DL
WBC # BLD AUTO: 15.97 K/UL (ref 3.9–12.7)
WBC #/AREA URNS HPF: 65 /HPF (ref 0–5)

## 2022-02-07 PROCEDURE — 87077 CULTURE AEROBIC IDENTIFY: CPT | Performed by: NURSE PRACTITIONER

## 2022-02-07 PROCEDURE — 96361 HYDRATE IV INFUSION ADD-ON: CPT

## 2022-02-07 PROCEDURE — 80048 BASIC METABOLIC PNL TOTAL CA: CPT | Performed by: NURSE PRACTITIONER

## 2022-02-07 PROCEDURE — 87186 SC STD MICRODIL/AGAR DIL: CPT | Performed by: NURSE PRACTITIONER

## 2022-02-07 PROCEDURE — 99285 EMERGENCY DEPT VISIT HI MDM: CPT | Mod: 25

## 2022-02-07 PROCEDURE — 87086 URINE CULTURE/COLONY COUNT: CPT | Performed by: NURSE PRACTITIONER

## 2022-02-07 PROCEDURE — 87088 URINE BACTERIA CULTURE: CPT | Performed by: NURSE PRACTITIONER

## 2022-02-07 PROCEDURE — 63600175 PHARM REV CODE 636 W HCPCS: Performed by: NURSE PRACTITIONER

## 2022-02-07 PROCEDURE — 81000 URINALYSIS NONAUTO W/SCOPE: CPT | Performed by: NURSE PRACTITIONER

## 2022-02-07 PROCEDURE — 96375 TX/PRO/DX INJ NEW DRUG ADDON: CPT

## 2022-02-07 PROCEDURE — 25000003 PHARM REV CODE 250: Performed by: NURSE PRACTITIONER

## 2022-02-07 PROCEDURE — 25500020 PHARM REV CODE 255: Performed by: EMERGENCY MEDICINE

## 2022-02-07 PROCEDURE — 96365 THER/PROPH/DIAG IV INF INIT: CPT | Mod: 59

## 2022-02-07 PROCEDURE — 86803 HEPATITIS C AB TEST: CPT | Performed by: NURSE PRACTITIONER

## 2022-02-07 PROCEDURE — 80053 COMPREHEN METABOLIC PANEL: CPT | Performed by: NURSE PRACTITIONER

## 2022-02-07 PROCEDURE — 83690 ASSAY OF LIPASE: CPT | Performed by: NURSE PRACTITIONER

## 2022-02-07 PROCEDURE — 82962 GLUCOSE BLOOD TEST: CPT

## 2022-02-07 PROCEDURE — U0002 COVID-19 LAB TEST NON-CDC: HCPCS | Performed by: NURSE PRACTITIONER

## 2022-02-07 PROCEDURE — 87389 HIV-1 AG W/HIV-1&-2 AB AG IA: CPT | Performed by: NURSE PRACTITIONER

## 2022-02-07 PROCEDURE — 85025 COMPLETE CBC W/AUTO DIFF WBC: CPT | Performed by: NURSE PRACTITIONER

## 2022-02-07 RX ORDER — HYOSCYAMINE SULFATE 0.5 MG/ML
0.5 INJECTION, SOLUTION SUBCUTANEOUS
Status: COMPLETED | OUTPATIENT
Start: 2022-02-07 | End: 2022-02-07

## 2022-02-07 RX ORDER — HYOSCYAMINE SULFATE 0.125 MG
125 TABLET ORAL EVERY 6 HOURS PRN
Qty: 30 TABLET | Refills: 0 | Status: SHIPPED | OUTPATIENT
Start: 2022-02-07 | End: 2022-04-16

## 2022-02-07 RX ORDER — ONDANSETRON 2 MG/ML
4 INJECTION INTRAMUSCULAR; INTRAVENOUS
Status: COMPLETED | OUTPATIENT
Start: 2022-02-07 | End: 2022-02-07

## 2022-02-07 RX ORDER — ONDANSETRON 4 MG/1
4 TABLET, ORALLY DISINTEGRATING ORAL EVERY 8 HOURS PRN
Qty: 20 TABLET | Refills: 0 | Status: SHIPPED | OUTPATIENT
Start: 2022-02-07 | End: 2022-10-27

## 2022-02-07 RX ORDER — CIPROFLOXACIN 500 MG/1
500 TABLET ORAL 2 TIMES DAILY
Qty: 14 TABLET | Refills: 0 | Status: SHIPPED | OUTPATIENT
Start: 2022-02-07 | End: 2022-02-14

## 2022-02-07 RX ORDER — LOPERAMIDE HYDROCHLORIDE 2 MG/1
2 CAPSULE ORAL 4 TIMES DAILY PRN
Qty: 12 CAPSULE | Refills: 0 | Status: SHIPPED | OUTPATIENT
Start: 2022-02-07 | End: 2022-02-17

## 2022-02-07 RX ORDER — FAMOTIDINE 10 MG/ML
20 INJECTION INTRAVENOUS
Status: COMPLETED | OUTPATIENT
Start: 2022-02-07 | End: 2022-02-07

## 2022-02-07 RX ORDER — OXYCODONE AND ACETAMINOPHEN 5; 325 MG/1; MG/1
1 TABLET ORAL
Status: COMPLETED | OUTPATIENT
Start: 2022-02-07 | End: 2022-02-07

## 2022-02-07 RX ADMIN — IOHEXOL 100 ML: 350 INJECTION, SOLUTION INTRAVENOUS at 12:02

## 2022-02-07 RX ADMIN — SODIUM CHLORIDE, SODIUM LACTATE, POTASSIUM CHLORIDE, AND CALCIUM CHLORIDE 1000 ML: .6; .31; .03; .02 INJECTION, SOLUTION INTRAVENOUS at 02:02

## 2022-02-07 RX ADMIN — HYOSCYAMINE SULFATE 0.5 MG: 0.5 INJECTION, SOLUTION SUBCUTANEOUS at 12:02

## 2022-02-07 RX ADMIN — CEFTRIAXONE 1 G: 1 INJECTION, SOLUTION INTRAVENOUS at 01:02

## 2022-02-07 RX ADMIN — ONDANSETRON 4 MG: 2 INJECTION INTRAMUSCULAR; INTRAVENOUS at 10:02

## 2022-02-07 RX ADMIN — OXYCODONE HYDROCHLORIDE AND ACETAMINOPHEN 1 TABLET: 5; 325 TABLET ORAL at 02:02

## 2022-02-07 RX ADMIN — SODIUM CHLORIDE 1000 ML: 0.9 INJECTION, SOLUTION INTRAVENOUS at 10:02

## 2022-02-07 RX ADMIN — FAMOTIDINE 20 MG: 10 INJECTION INTRAVENOUS at 10:02

## 2022-02-07 NOTE — ED PROVIDER NOTES
Source of History:  Patient    Chief complaint:  Abdominal Pain (Pt to ER with c/o abd pain with N/V. Pt seen of Saturday for same complaint )      HPI:  Casie Salvador is a 57 y.o. female with PMH of DM2, HTN, chronic pain, presenting with abdominal pain, nausea, vomiting and diarrhea.  Patient was seen 02/05/2022 and diagnosed with opioid induced constipation.  She was discharged with MiraLax and Colace and states that she has had 20 bowel movements from time of discharge until this morning.  She also reports nausea with vomiting and states she is not tolerating fluids or solids.  She denies urinary and vaginal symptoms.  Denies fever, chills, chest pain shortness of breath.    This is the extent to the patients complaints today here in the emergency department.    ROS: As per HPI and below:  General: No fever.  No chills.  Eyes: No visual changes.  ENT: No sore throat. No ear pain  Head: No headache.    Chest: No shortness of breath.  Cardiovascular: No chest pain.  Abdomen: +abdominal pain +nausea +vomiting +diarrhea  Genito-Urinary: No abnormal urination.  Neurologic: No focal weakness.  No numbness.  MSK: no back pain.  Integument: No rashes or lesions.  Hematologic: No easy bruising.  Endocrine: No excessive thirst or urination.    Review of patient's allergies indicates:   Allergen Reactions    Orange juice Hives    Tomato (solanum lycopersicum) Hives       PMH:  As per HPI and below:  Past Medical History:   Diagnosis Date    Diabetes mellitus     Heart attack     Hypertension      Past Surgical History:   Procedure Laterality Date    APPENDECTOMY      stents      TUBAL LIGATION         Social History     Tobacco Use    Smoking status: Current Every Day Smoker     Packs/day: 0.50     Types: Cigarettes    Smokeless tobacco: Never Used   Substance Use Topics    Alcohol use: No    Drug use: No       Physical Exam:    /65 (BP Location: Left arm, Patient Position: Lying)   Pulse  "75   Temp 98.5 °F (36.9 °C) (Oral)   Resp 18   Ht 5' 6" (1.676 m)   Wt 81.6 kg (180 lb)   SpO2 95%   Breastfeeding No   BMI 29.05 kg/m²   Nursing note and vital signs reviewed.  Appearance: No acute distress.  Eyes: No conjunctival injection.  ENT: Oropharynx clear.    Chest/ Respiratory: Clear to auscultation bilaterally.  Good air movement.  No wheezes.  No rhonchi. No rales. No accessory muscle use.  Cardiovascular: Regular rate and rhythm.  No murmurs. No gallops. No rubs.  Abdomen: Soft.  Mild generalized abdominal tenderness.  No focal findings.  Negative Delcid's sign.  No tenderness at McBurney's point.  No guarding.  No rebound.  Non peritoneal.  Musculoskeletal: Good range of motion all joints.  No deformities.  Neck supple.  No meningismus.  Skin: No rashes seen.  Good turgor.  No abrasions.  No ecchymoses.  Neurologic: Motor intact.  Sensation intact.  Cerebellar intact.  Cranial nerves intact.  Mental Status:  Alert and oriented x 3.  Appropriate, conversant    Labs that have been ordered have been independently reviewed and interpreted by myself.        Initial Impression/ Differential Dx:  Evaluation of 57-year-old female presenting with abdominal pain, nausea, vomiting and diarrhea.  Patient is afebrile not toxic appearing hypertensive but otherwise hemodynamically stable.  On exam she has mild generalized abdominal tenderness no focal findings.  I suspect patient is likely dehydrated secondary to gastrointestinal losses.  Plan for labs, IV fluids, antiemetics.  Will get CT abd/pelvis given multiple visits for this same complaint.       MDM:        ED Course as of 02/07/22 1715   Mon Feb 07, 2022   1007 POCT Glucose(!): 145 [CU]   1104 CBC W/ AUTO DIFFERENTIAL(!)  Leukocytosis noted, decreased from 2 days ago, stable H&H [CU]   1133 Lipase: 8 [CU]   1133 Comp. Metabolic Panel(!)  Mild hyponatremia, hyperkalemia, however specimen moderately hemolyzed falsely elevating potassium.  No blunted " chest pain shortness of breath.  Will obtain EKG.  CO2 of 17 with anion gap of 19, likely secondary to dehydration from gastrointestinal losses.  Decreased GFR, normal creatinine and BUN.  Mild elevation of alkaline phos [CU]   1313 Urinalysis, Reflex to Urine Culture Urine, Clean Catch(!)  Consistent with UTI, will treat. [CU]   1324 CT Abdomen Pelvis With Contrast(!)  Incidental finding of 6 mm hypodense lesion in the pancreatic tail, which could represent a cystic lesion or interdigitating fat and 1.4 cm intermediate density lesion in the lower pole right kidney, which could represent a hemorrhagic or proteinaceous cyst, however a solid renal neoplasm cannot be excluded.  Upper abdominal lymphadenopathy, of uncertain etiology. Will obtain rapid HIV and Hep C. Patchy ground-glass opacities in the left lower lobe. Patient has no respiratory complains. Will test for COVID and fluoroquinolone will cover for developing pneumonia as well as UTI.  1 g of ceftriaxone given here in the ED.  Will  patient to have MRI abdomen with and without intravenous contrast for further evaluation of the pancreatic and right renal lesions. [CU]   1328 At bedside to reassess patient and inform her of her workup.  She reports complete resolution of her abdominal pain.  She states she is hungry and she would like to eat.  Will p.o. challenge patient. [CU]   1417 SARS-CoV-2 RNA, Amplification, Qual: Negative [CU]   1454 2nd L of IVF infusing currently. Patient is tolerating PO fluids and solids and states that she wants to go home. Counseled patient that plan to repeat labs after 2nd liter finishing infusing.  [CU]   1700 Basic metabolic panel(!)  Electrolyte abnormalities have resolved.  Gap is closed.  CO2 improved. Will discharge patient home with PCP and GI follow-up.  Patient is amenable to this plan and discharged in good condition. Patient educated on on signs and symptoms to monitor for and when to return to ED. Patient  verbalized understanding agrees with treatment plan. All questions and concerns addressed.    [CU]      ED Course User Index  [CU] Brynn Quintero NP               Diagnostic Impression:    1. Generalized abdominal pain    2. Non-intractable vomiting with nausea, unspecified vomiting type    3. Diarrhea, unspecified type    4. Abnormal CT of the abdomen    5. Pancreatic cyst    6. Renal cyst    7. Acute cystitis with hematuria    8. COVID-19 virus antibody negative         ED Disposition Condition    Discharge Good          ED Prescriptions     Medication Sig Dispense Start Date End Date Auth. Provider    hyoscyamine (ANASPAZ,LEVSIN) 0.125 mg Tab Take 1 tablet (125 mcg total) by mouth every 6 (six) hours as needed (abdominal cramping). 30 tablet 2/7/2022 3/9/2022 Brynn Quintero NP    loperamide (IMODIUM) 2 mg capsule Take 1 capsule (2 mg total) by mouth 4 (four) times daily as needed for Diarrhea. 12 capsule 2/7/2022 2/17/2022 Brynn Quintero NP    ondansetron (ZOFRAN-ODT) 4 MG TbDL Take 1 tablet (4 mg total) by mouth every 8 (eight) hours as needed. 20 tablet 2/7/2022  Brynn Quintero NP    ciprofloxacin HCl (CIPRO) 500 MG tablet Take 1 tablet (500 mg total) by mouth 2 (two) times daily. for 7 days 14 tablet 2/7/2022 2/14/2022 Brynn Quintero NP        Follow-up Information     Follow up With Specialties Details Why Contact Info    [facility]-All Locations Gastroenterology Schedule an appointment as soon as possible for a visit   [address]  [address]  [facility] [address]  [phone]      [facility] Gastro[facility]ology Gastroenterology   [address] [facility] [address]  [phone]             Brynn Quintero NP  02/07/22 6510

## 2022-02-07 NOTE — ED NOTES
PO challenge completed. Pt tolerated the anita crackers and apple juice well. No nausea or vomiting noted.

## 2022-02-07 NOTE — ED NOTES
Pt unable to eat turkey sandwich, given anita crackers and apple juice. Will reassess in 15 minutes.

## 2022-02-07 NOTE — Clinical Note
"Casie Atkins "Casie Sheumer" [place] was seen and treated in our emergency department on 2/7/2022.  She may return to work on 02/09/2022.       If you have any questions or concerns, please don't hesitate to call.      Brynn Quintero NP"

## 2022-02-07 NOTE — DISCHARGE INSTRUCTIONS
RECOMMENDATIONS:  Given incidental finding of pancreatic and renal lesions, follow-up with MRI abdomen with and without intravenous contrast for further evaluation of the pancreatic and right renal lesions on nonemergent basis is recommended.  Please bring this up to your PCP appointment this week.

## 2022-02-10 LAB — BACTERIA UR CULT: ABNORMAL

## 2022-02-20 ENCOUNTER — HOSPITAL ENCOUNTER (INPATIENT)
Facility: HOSPITAL | Age: 57
LOS: 4 days | Discharge: HOME HEALTH | DRG: 377 | End: 2022-02-24
Attending: EMERGENCY MEDICINE | Admitting: EMERGENCY MEDICINE
Payer: MEDICAID

## 2022-02-20 DIAGNOSIS — I50.22 CHRONIC SYSTOLIC CONGESTIVE HEART FAILURE: ICD-10-CM

## 2022-02-20 DIAGNOSIS — M79.606 CHRONIC PAIN OF LOWER EXTREMITY, UNSPECIFIED LATERALITY: ICD-10-CM

## 2022-02-20 DIAGNOSIS — R07.9 CHEST PAIN: ICD-10-CM

## 2022-02-20 DIAGNOSIS — D64.9 SEVERE ANEMIA: Primary | ICD-10-CM

## 2022-02-20 DIAGNOSIS — R73.9 HYPERGLYCEMIA: ICD-10-CM

## 2022-02-20 DIAGNOSIS — D64.9 ANEMIA, UNSPECIFIED TYPE: ICD-10-CM

## 2022-02-20 DIAGNOSIS — D64.9 ANEMIA: ICD-10-CM

## 2022-02-20 DIAGNOSIS — K92.2 GASTROINTESTINAL HEMORRHAGE, UNSPECIFIED GASTROINTESTINAL HEMORRHAGE TYPE: ICD-10-CM

## 2022-02-20 DIAGNOSIS — I51.7 CARDIOMEGALY: ICD-10-CM

## 2022-02-20 DIAGNOSIS — R10.9 ABDOMINAL PAIN, UNSPECIFIED ABDOMINAL LOCATION: ICD-10-CM

## 2022-02-20 DIAGNOSIS — G89.29 CHRONIC PAIN OF LOWER EXTREMITY, UNSPECIFIED LATERALITY: ICD-10-CM

## 2022-02-20 DIAGNOSIS — I25.10 CORONARY ARTERY DISEASE, UNSPECIFIED VESSEL OR LESION TYPE, UNSPECIFIED WHETHER ANGINA PRESENT, UNSPECIFIED WHETHER NATIVE OR TRANSPLANTED HEART: ICD-10-CM

## 2022-02-20 DIAGNOSIS — R79.89 ELEVATED TROPONIN: ICD-10-CM

## 2022-02-20 PROBLEM — G47.00 INSOMNIA: Status: ACTIVE | Noted: 2022-02-20

## 2022-02-20 PROBLEM — D72.829 LEUKOCYTOSIS: Status: ACTIVE | Noted: 2022-02-20

## 2022-02-20 PROBLEM — T40.2X5A CONSTIPATION DUE TO OPIOID THERAPY: Status: ACTIVE | Noted: 2022-02-20

## 2022-02-20 PROBLEM — E11.9 DM (DIABETES MELLITUS), TYPE 2: Status: ACTIVE | Noted: 2022-02-20

## 2022-02-20 PROBLEM — K59.03 CONSTIPATION DUE TO OPIOID THERAPY: Status: ACTIVE | Noted: 2022-02-20

## 2022-02-20 PROBLEM — I10 HYPERTENSION: Status: ACTIVE | Noted: 2022-02-20

## 2022-02-20 PROBLEM — F32.A DEPRESSION: Status: ACTIVE | Noted: 2022-02-20

## 2022-02-20 LAB
ABO + RH BLD: NORMAL
ALBUMIN SERPL BCP-MCNC: 2.5 G/DL (ref 3.5–5.2)
ALP SERPL-CCNC: 69 U/L (ref 55–135)
ALT SERPL W/O P-5'-P-CCNC: 8 U/L (ref 10–44)
ANION GAP SERPL CALC-SCNC: 13 MMOL/L (ref 8–16)
ANISOCYTOSIS BLD QL SMEAR: ABNORMAL
ANISOCYTOSIS BLD QL SMEAR: SLIGHT
AST SERPL-CCNC: 8 U/L (ref 10–40)
BASO STIPL BLD QL SMEAR: ABNORMAL
BASOPHILS # BLD AUTO: 0.04 K/UL (ref 0–0.2)
BASOPHILS # BLD AUTO: 0.05 K/UL (ref 0–0.2)
BASOPHILS # BLD AUTO: 0.06 K/UL (ref 0–0.2)
BASOPHILS NFR BLD: 0.2 % (ref 0–1.9)
BILIRUB SERPL-MCNC: 0.2 MG/DL (ref 0.1–1)
BILIRUB UR QL STRIP: NEGATIVE
BLD GP AB SCN CELLS X3 SERPL QL: NORMAL
BLD PROD TYP BPU: NORMAL
BLOOD UNIT EXPIRATION DATE: NORMAL
BLOOD UNIT TYPE CODE: 9500
BLOOD UNIT TYPE: NORMAL
BNP SERPL-MCNC: 322 PG/ML (ref 0–99)
BUN SERPL-MCNC: 36 MG/DL (ref 6–20)
BURR CELLS BLD QL SMEAR: ABNORMAL
CALCIUM SERPL-MCNC: 8 MG/DL (ref 8.7–10.5)
CHLORIDE SERPL-SCNC: 106 MMOL/L (ref 95–110)
CLARITY UR REFRACT.AUTO: CLEAR
CO2 SERPL-SCNC: 17 MMOL/L (ref 23–29)
CODING SYSTEM: NORMAL
COLOR UR AUTO: YELLOW
CREAT SERPL-MCNC: 1.3 MG/DL (ref 0.5–1.4)
CRP SERPL-MCNC: 13.5 MG/L (ref 0–8.2)
CTP QC/QA: YES
DACRYOCYTES BLD QL SMEAR: ABNORMAL
DIFFERENTIAL METHOD: ABNORMAL
DISPENSE STATUS: NORMAL
EOSINOPHIL # BLD AUTO: 0 K/UL (ref 0–0.5)
EOSINOPHIL # BLD AUTO: 0.1 K/UL (ref 0–0.5)
EOSINOPHIL # BLD AUTO: 0.1 K/UL (ref 0–0.5)
EOSINOPHIL NFR BLD: 0.1 % (ref 0–8)
EOSINOPHIL NFR BLD: 0.3 % (ref 0–8)
EOSINOPHIL NFR BLD: 0.5 % (ref 0–8)
ERYTHROCYTE [DISTWIDTH] IN BLOOD BY AUTOMATED COUNT: 14.9 % (ref 11.5–14.5)
ERYTHROCYTE [DISTWIDTH] IN BLOOD BY AUTOMATED COUNT: 15.1 % (ref 11.5–14.5)
ERYTHROCYTE [DISTWIDTH] IN BLOOD BY AUTOMATED COUNT: 18.3 % (ref 11.5–14.5)
ERYTHROCYTE [SEDIMENTATION RATE] IN BLOOD BY WESTERGREN METHOD: 25 MM/HR (ref 0–36)
EST. GFR  (AFRICAN AMERICAN): 53 ML/MIN/1.73 M^2
EST. GFR  (NON AFRICAN AMERICAN): 46 ML/MIN/1.73 M^2
ESTIMATED AVG GLUCOSE: 120 MG/DL (ref 68–131)
FERRITIN SERPL-MCNC: 68 NG/ML (ref 20–300)
FOLATE SERPL-MCNC: 5.4 NG/ML (ref 4–24)
GLUCOSE SERPL-MCNC: 211 MG/DL (ref 70–110)
GLUCOSE UR QL STRIP: NEGATIVE
HAPTOGLOB SERPL-MCNC: 237 MG/DL (ref 30–250)
HBA1C MFR BLD: 5.8 % (ref 4–5.6)
HCT VFR BLD AUTO: 12 % (ref 37–48.5)
HCT VFR BLD AUTO: 12.2 % (ref 37–48.5)
HCT VFR BLD AUTO: 21.1 % (ref 37–48.5)
HGB BLD-MCNC: 3.7 G/DL (ref 12–16)
HGB BLD-MCNC: 3.8 G/DL (ref 12–16)
HGB BLD-MCNC: 6.6 G/DL (ref 12–16)
HGB UR QL STRIP: NEGATIVE
HYPOCHROMIA BLD QL SMEAR: ABNORMAL
HYPOCHROMIA BLD QL SMEAR: ABNORMAL
IMM GRANULOCYTES # BLD AUTO: 0.22 K/UL (ref 0–0.04)
IMM GRANULOCYTES # BLD AUTO: 0.29 K/UL (ref 0–0.04)
IMM GRANULOCYTES # BLD AUTO: 0.29 K/UL (ref 0–0.04)
IMM GRANULOCYTES NFR BLD AUTO: 0.9 % (ref 0–0.5)
IMM GRANULOCYTES NFR BLD AUTO: 1.1 % (ref 0–0.5)
IMM GRANULOCYTES NFR BLD AUTO: 1.2 % (ref 0–0.5)
INR PPP: 1 (ref 0.8–1.2)
IRON SERPL-MCNC: 104 UG/DL (ref 30–160)
KETONES UR QL STRIP: NEGATIVE
LACTATE SERPL-SCNC: 2.1 MMOL/L (ref 0.5–2.2)
LACTATE SERPL-SCNC: 2.9 MMOL/L (ref 0.5–2.2)
LDH SERPL L TO P-CCNC: 207 U/L (ref 110–260)
LEUKOCYTE ESTERASE UR QL STRIP: ABNORMAL
LIPASE SERPL-CCNC: 8 U/L (ref 4–60)
LYMPHOCYTES # BLD AUTO: 2.4 K/UL (ref 1–4.8)
LYMPHOCYTES # BLD AUTO: 2.7 K/UL (ref 1–4.8)
LYMPHOCYTES # BLD AUTO: 3 K/UL (ref 1–4.8)
LYMPHOCYTES NFR BLD: 10.7 % (ref 18–48)
LYMPHOCYTES NFR BLD: 11.6 % (ref 18–48)
LYMPHOCYTES NFR BLD: 9.6 % (ref 18–48)
MCH RBC QN AUTO: 23.6 PG (ref 27–31)
MCH RBC QN AUTO: 23.8 PG (ref 27–31)
MCH RBC QN AUTO: 25.3 PG (ref 27–31)
MCHC RBC AUTO-ENTMCNC: 30.8 G/DL (ref 32–36)
MCHC RBC AUTO-ENTMCNC: 31.1 G/DL (ref 32–36)
MCHC RBC AUTO-ENTMCNC: 31.3 G/DL (ref 32–36)
MCV RBC AUTO: 76 FL (ref 82–98)
MCV RBC AUTO: 76 FL (ref 82–98)
MCV RBC AUTO: 81 FL (ref 82–98)
MICROSCOPIC COMMENT: ABNORMAL
MONOCYTES # BLD AUTO: 0.8 K/UL (ref 0.3–1)
MONOCYTES # BLD AUTO: 0.9 K/UL (ref 0.3–1)
MONOCYTES # BLD AUTO: 1.4 K/UL (ref 0.3–1)
MONOCYTES NFR BLD: 3.8 % (ref 4–15)
MONOCYTES NFR BLD: 3.8 % (ref 4–15)
MONOCYTES NFR BLD: 4.5 % (ref 4–15)
NEUTROPHILS # BLD AUTO: 17.1 K/UL (ref 1.8–7.7)
NEUTROPHILS # BLD AUTO: 20.8 K/UL (ref 1.8–7.7)
NEUTROPHILS # BLD AUTO: 26.1 K/UL (ref 1.8–7.7)
NEUTROPHILS NFR BLD: 83 % (ref 38–73)
NEUTROPHILS NFR BLD: 83.6 % (ref 38–73)
NEUTROPHILS NFR BLD: 84.7 % (ref 38–73)
NITRITE UR QL STRIP: NEGATIVE
NRBC BLD-RTO: 0 /100 WBC
NUM UNITS TRANS PACKED RBC: NORMAL
OVALOCYTES BLD QL SMEAR: ABNORMAL
OVALOCYTES BLD QL SMEAR: ABNORMAL
PH UR STRIP: 5 [PH] (ref 5–8)
PLATELET # BLD AUTO: 348 K/UL (ref 150–450)
PLATELET # BLD AUTO: 368 K/UL (ref 150–450)
PLATELET # BLD AUTO: 382 K/UL (ref 150–450)
PLATELET BLD QL SMEAR: ABNORMAL
PLATELET BLD QL SMEAR: ABNORMAL
PMV BLD AUTO: 10.3 FL (ref 9.2–12.9)
PMV BLD AUTO: 10.4 FL (ref 9.2–12.9)
PMV BLD AUTO: 10.5 FL (ref 9.2–12.9)
POCT GLUCOSE: 222 MG/DL (ref 70–110)
POCT GLUCOSE: 244 MG/DL (ref 70–110)
POIKILOCYTOSIS BLD QL SMEAR: SLIGHT
POLYCHROMASIA BLD QL SMEAR: ABNORMAL
POLYCHROMASIA BLD QL SMEAR: ABNORMAL
POTASSIUM SERPL-SCNC: 4.5 MMOL/L (ref 3.5–5.1)
PROT SERPL-MCNC: 5.8 G/DL (ref 6–8.4)
PROT UR QL STRIP: NEGATIVE
PROTHROMBIN TIME: 10.7 SEC (ref 9–12.5)
RBC # BLD AUTO: 1.57 M/UL (ref 4–5.4)
RBC # BLD AUTO: 1.6 M/UL (ref 4–5.4)
RBC # BLD AUTO: 2.61 M/UL (ref 4–5.4)
RBC #/AREA URNS AUTO: 9 /HPF (ref 0–4)
RETICS/RBC NFR AUTO: 4.4 % (ref 0.5–2.5)
SARS-COV-2 RDRP RESP QL NAA+PROBE: NEGATIVE
SATURATED IRON: 46 % (ref 20–50)
SCHISTOCYTES BLD QL SMEAR: ABNORMAL
SCHISTOCYTES BLD QL SMEAR: PRESENT
SODIUM SERPL-SCNC: 136 MMOL/L (ref 136–145)
SP GR UR STRIP: >=1.03 (ref 1–1.03)
SQUAMOUS #/AREA URNS AUTO: 6 /HPF
TARGETS BLD QL SMEAR: ABNORMAL
TOTAL IRON BINDING CAPACITY: 228 UG/DL (ref 250–450)
TOXIC GRANULES BLD QL SMEAR: PRESENT
TRANS ERYTHROCYTES VOL PATIENT: NORMAL ML
TRANS ERYTHROCYTES VOL PATIENT: NORMAL ML
TRANSFERRIN SERPL-MCNC: 154 MG/DL (ref 200–375)
TROPONIN I SERPL DL<=0.01 NG/ML-MCNC: 0.07 NG/ML (ref 0–0.03)
TROPONIN I SERPL DL<=0.01 NG/ML-MCNC: 0.7 NG/ML (ref 0–0.03)
TSH SERPL DL<=0.005 MIU/L-ACNC: 2.9 UIU/ML (ref 0.4–4)
URN SPEC COLLECT METH UR: ABNORMAL
VIT B12 SERPL-MCNC: 247 PG/ML (ref 210–950)
WBC # BLD AUTO: 20.63 K/UL (ref 3.9–12.7)
WBC # BLD AUTO: 24.87 K/UL (ref 3.9–12.7)
WBC # BLD AUTO: 30.8 K/UL (ref 3.9–12.7)
WBC #/AREA URNS AUTO: 3 /HPF (ref 0–5)
YEAST UR QL AUTO: ABNORMAL

## 2022-02-20 PROCEDURE — 99291 CRITICAL CARE FIRST HOUR: CPT | Mod: 25

## 2022-02-20 PROCEDURE — 99223 PR INITIAL HOSPITAL CARE,LEVL III: ICD-10-PCS | Mod: ,,, | Performed by: STUDENT IN AN ORGANIZED HEALTH CARE EDUCATION/TRAINING PROGRAM

## 2022-02-20 PROCEDURE — 82728 ASSAY OF FERRITIN: CPT | Performed by: EMERGENCY MEDICINE

## 2022-02-20 PROCEDURE — U0002 COVID-19 LAB TEST NON-CDC: HCPCS | Performed by: EMERGENCY MEDICINE

## 2022-02-20 PROCEDURE — 84443 ASSAY THYROID STIM HORMONE: CPT | Performed by: STUDENT IN AN ORGANIZED HEALTH CARE EDUCATION/TRAINING PROGRAM

## 2022-02-20 PROCEDURE — 83036 HEMOGLOBIN GLYCOSYLATED A1C: CPT | Performed by: EMERGENCY MEDICINE

## 2022-02-20 PROCEDURE — 86850 RBC ANTIBODY SCREEN: CPT | Performed by: EMERGENCY MEDICINE

## 2022-02-20 PROCEDURE — P9016 RBC LEUKOCYTES REDUCED: HCPCS | Performed by: STUDENT IN AN ORGANIZED HEALTH CARE EDUCATION/TRAINING PROGRAM

## 2022-02-20 PROCEDURE — 93010 EKG 12-LEAD: ICD-10-PCS | Mod: ,,, | Performed by: INTERNAL MEDICINE

## 2022-02-20 PROCEDURE — 83615 LACTATE (LD) (LDH) ENZYME: CPT | Performed by: EMERGENCY MEDICINE

## 2022-02-20 PROCEDURE — 36415 COLL VENOUS BLD VENIPUNCTURE: CPT | Performed by: STUDENT IN AN ORGANIZED HEALTH CARE EDUCATION/TRAINING PROGRAM

## 2022-02-20 PROCEDURE — 83010 ASSAY OF HAPTOGLOBIN QUANT: CPT | Performed by: EMERGENCY MEDICINE

## 2022-02-20 PROCEDURE — C9113 INJ PANTOPRAZOLE SODIUM, VIA: HCPCS | Performed by: STUDENT IN AN ORGANIZED HEALTH CARE EDUCATION/TRAINING PROGRAM

## 2022-02-20 PROCEDURE — P9021 RED BLOOD CELLS UNIT: HCPCS | Performed by: EMERGENCY MEDICINE

## 2022-02-20 PROCEDURE — 80053 COMPREHEN METABOLIC PANEL: CPT | Performed by: EMERGENCY MEDICINE

## 2022-02-20 PROCEDURE — 25000003 PHARM REV CODE 250: Performed by: STUDENT IN AN ORGANIZED HEALTH CARE EDUCATION/TRAINING PROGRAM

## 2022-02-20 PROCEDURE — 99291 PR CRITICAL CARE, E/M 30-74 MINUTES: ICD-10-PCS | Mod: CS,,, | Performed by: EMERGENCY MEDICINE

## 2022-02-20 PROCEDURE — 36430 TRANSFUSION BLD/BLD COMPNT: CPT

## 2022-02-20 PROCEDURE — 99291 CRITICAL CARE FIRST HOUR: CPT | Mod: CS,,, | Performed by: EMERGENCY MEDICINE

## 2022-02-20 PROCEDURE — 85025 COMPLETE CBC W/AUTO DIFF WBC: CPT | Mod: 91 | Performed by: EMERGENCY MEDICINE

## 2022-02-20 PROCEDURE — 93010 ELECTROCARDIOGRAM REPORT: CPT | Mod: ,,, | Performed by: INTERNAL MEDICINE

## 2022-02-20 PROCEDURE — 84484 ASSAY OF TROPONIN QUANT: CPT | Mod: 91 | Performed by: STUDENT IN AN ORGANIZED HEALTH CARE EDUCATION/TRAINING PROGRAM

## 2022-02-20 PROCEDURE — 83605 ASSAY OF LACTIC ACID: CPT | Performed by: EMERGENCY MEDICINE

## 2022-02-20 PROCEDURE — 81001 URINALYSIS AUTO W/SCOPE: CPT | Performed by: STUDENT IN AN ORGANIZED HEALTH CARE EDUCATION/TRAINING PROGRAM

## 2022-02-20 PROCEDURE — 93005 ELECTROCARDIOGRAM TRACING: CPT

## 2022-02-20 PROCEDURE — 20600001 HC STEP DOWN PRIVATE ROOM

## 2022-02-20 PROCEDURE — 85610 PROTHROMBIN TIME: CPT | Performed by: STUDENT IN AN ORGANIZED HEALTH CARE EDUCATION/TRAINING PROGRAM

## 2022-02-20 PROCEDURE — 99223 1ST HOSP IP/OBS HIGH 75: CPT | Mod: ,,, | Performed by: STUDENT IN AN ORGANIZED HEALTH CARE EDUCATION/TRAINING PROGRAM

## 2022-02-20 PROCEDURE — C9399 UNCLASSIFIED DRUGS OR BIOLOG: HCPCS | Performed by: STUDENT IN AN ORGANIZED HEALTH CARE EDUCATION/TRAINING PROGRAM

## 2022-02-20 PROCEDURE — 86920 COMPATIBILITY TEST SPIN: CPT | Performed by: EMERGENCY MEDICINE

## 2022-02-20 PROCEDURE — 63600175 PHARM REV CODE 636 W HCPCS: Performed by: STUDENT IN AN ORGANIZED HEALTH CARE EDUCATION/TRAINING PROGRAM

## 2022-02-20 PROCEDURE — 86920 COMPATIBILITY TEST SPIN: CPT | Performed by: STUDENT IN AN ORGANIZED HEALTH CARE EDUCATION/TRAINING PROGRAM

## 2022-02-20 PROCEDURE — 85652 RBC SED RATE AUTOMATED: CPT | Performed by: EMERGENCY MEDICINE

## 2022-02-20 PROCEDURE — 25500020 PHARM REV CODE 255: Performed by: EMERGENCY MEDICINE

## 2022-02-20 PROCEDURE — 83690 ASSAY OF LIPASE: CPT | Performed by: EMERGENCY MEDICINE

## 2022-02-20 PROCEDURE — 80047 BASIC METABLC PNL IONIZED CA: CPT

## 2022-02-20 PROCEDURE — 87040 BLOOD CULTURE FOR BACTERIA: CPT | Performed by: EMERGENCY MEDICINE

## 2022-02-20 PROCEDURE — 85025 COMPLETE CBC W/AUTO DIFF WBC: CPT | Mod: 91 | Performed by: STUDENT IN AN ORGANIZED HEALTH CARE EDUCATION/TRAINING PROGRAM

## 2022-02-20 PROCEDURE — 82607 VITAMIN B-12: CPT | Performed by: EMERGENCY MEDICINE

## 2022-02-20 PROCEDURE — 82746 ASSAY OF FOLIC ACID SERUM: CPT | Performed by: EMERGENCY MEDICINE

## 2022-02-20 PROCEDURE — 84466 ASSAY OF TRANSFERRIN: CPT | Performed by: EMERGENCY MEDICINE

## 2022-02-20 PROCEDURE — 86140 C-REACTIVE PROTEIN: CPT | Performed by: EMERGENCY MEDICINE

## 2022-02-20 PROCEDURE — 85045 AUTOMATED RETICULOCYTE COUNT: CPT | Performed by: EMERGENCY MEDICINE

## 2022-02-20 PROCEDURE — 84484 ASSAY OF TROPONIN QUANT: CPT | Performed by: EMERGENCY MEDICINE

## 2022-02-20 PROCEDURE — 83880 ASSAY OF NATRIURETIC PEPTIDE: CPT | Performed by: EMERGENCY MEDICINE

## 2022-02-20 RX ORDER — LOPERAMIDE HCL 2 MG
2 TABLET ORAL 4 TIMES DAILY PRN
COMMUNITY
End: 2022-04-02 | Stop reason: HOSPADM

## 2022-02-20 RX ORDER — IBUPROFEN 200 MG
16 TABLET ORAL
Status: DISCONTINUED | OUTPATIENT
Start: 2022-02-20 | End: 2022-02-24 | Stop reason: HOSPADM

## 2022-02-20 RX ORDER — IBUPROFEN 200 MG
24 TABLET ORAL
Status: DISCONTINUED | OUTPATIENT
Start: 2022-02-20 | End: 2022-02-24 | Stop reason: HOSPADM

## 2022-02-20 RX ORDER — CITALOPRAM 20 MG/1
20 TABLET, FILM COATED ORAL DAILY
Status: DISCONTINUED | OUTPATIENT
Start: 2022-02-20 | End: 2022-02-24 | Stop reason: HOSPADM

## 2022-02-20 RX ORDER — OXYCODONE HYDROCHLORIDE 5 MG/1
5 TABLET ORAL EVERY 6 HOURS PRN
Status: DISCONTINUED | OUTPATIENT
Start: 2022-02-20 | End: 2022-02-24 | Stop reason: HOSPADM

## 2022-02-20 RX ORDER — ACETAMINOPHEN 325 MG/1
650 TABLET ORAL EVERY 4 HOURS PRN
Status: DISCONTINUED | OUTPATIENT
Start: 2022-02-20 | End: 2022-02-24 | Stop reason: HOSPADM

## 2022-02-20 RX ORDER — PROMETHAZINE HYDROCHLORIDE 25 MG/1
25 TABLET ORAL EVERY 6 HOURS PRN
Status: DISCONTINUED | OUTPATIENT
Start: 2022-02-20 | End: 2022-02-24 | Stop reason: HOSPADM

## 2022-02-20 RX ORDER — HYDRALAZINE HYDROCHLORIDE 50 MG/1
50 TABLET, FILM COATED ORAL DAILY PRN
Status: ON HOLD | COMMUNITY
End: 2022-04-03 | Stop reason: SDUPTHER

## 2022-02-20 RX ORDER — TALC
6 POWDER (GRAM) TOPICAL NIGHTLY PRN
Status: DISCONTINUED | OUTPATIENT
Start: 2022-02-20 | End: 2022-02-20

## 2022-02-20 RX ORDER — DOCUSATE SODIUM 100 MG/1
100 CAPSULE, LIQUID FILLED ORAL 2 TIMES DAILY
Status: DISCONTINUED | OUTPATIENT
Start: 2022-02-20 | End: 2022-02-21

## 2022-02-20 RX ORDER — GABAPENTIN 600 MG/1
600 TABLET ORAL 2 TIMES DAILY
Status: ON HOLD | COMMUNITY

## 2022-02-20 RX ORDER — FUROSEMIDE 20 MG/1
20 TABLET ORAL 2 TIMES DAILY
Status: ON HOLD | COMMUNITY
End: 2022-04-03 | Stop reason: SDUPTHER

## 2022-02-20 RX ORDER — MONTELUKAST SODIUM 10 MG/1
10 TABLET ORAL NIGHTLY
Status: ON HOLD | COMMUNITY
End: 2022-10-28 | Stop reason: HOSPADM

## 2022-02-20 RX ORDER — POLYETHYLENE GLYCOL 3350 17 G/17G
17 POWDER, FOR SOLUTION ORAL DAILY
Status: DISCONTINUED | OUTPATIENT
Start: 2022-02-20 | End: 2022-02-20

## 2022-02-20 RX ORDER — AMLODIPINE BESYLATE 10 MG/1
10 TABLET ORAL DAILY
Status: ON HOLD | COMMUNITY
End: 2022-04-03 | Stop reason: SDUPTHER

## 2022-02-20 RX ORDER — OMEPRAZOLE 20 MG/1
20 CAPSULE, DELAYED RELEASE ORAL DAILY
COMMUNITY
End: 2022-04-02

## 2022-02-20 RX ORDER — GLIPIZIDE 10 MG/1
10 TABLET ORAL
Status: ON HOLD | COMMUNITY
End: 2022-04-03 | Stop reason: SDUPTHER

## 2022-02-20 RX ORDER — BUSPIRONE HYDROCHLORIDE 10 MG/1
10 TABLET ORAL 2 TIMES DAILY
Status: ON HOLD | COMMUNITY
End: 2022-10-28 | Stop reason: HOSPADM

## 2022-02-20 RX ORDER — HYDRALAZINE HYDROCHLORIDE 25 MG/1
25 TABLET, FILM COATED ORAL EVERY 8 HOURS PRN
Status: DISCONTINUED | OUTPATIENT
Start: 2022-02-20 | End: 2022-02-24 | Stop reason: HOSPADM

## 2022-02-20 RX ORDER — ZOLPIDEM TARTRATE 10 MG/1
10 TABLET ORAL NIGHTLY PRN
Status: ON HOLD | COMMUNITY

## 2022-02-20 RX ORDER — DOXYCYCLINE 50 MG/1
100 CAPSULE ORAL
COMMUNITY
End: 2022-04-02 | Stop reason: ALTCHOICE

## 2022-02-20 RX ORDER — CITALOPRAM 20 MG/1
20 TABLET, FILM COATED ORAL DAILY
Status: ON HOLD | COMMUNITY
End: 2023-10-27 | Stop reason: HOSPADM

## 2022-02-20 RX ORDER — POTASSIUM CHLORIDE 750 MG/1
10 CAPSULE, EXTENDED RELEASE ORAL DAILY
Status: ON HOLD | COMMUNITY
End: 2022-04-03 | Stop reason: SDUPTHER

## 2022-02-20 RX ORDER — ATORVASTATIN CALCIUM 20 MG/1
40 TABLET, FILM COATED ORAL DAILY
Status: DISCONTINUED | OUTPATIENT
Start: 2022-02-20 | End: 2022-02-21

## 2022-02-20 RX ORDER — INSULIN ASPART 100 [IU]/ML
1-10 INJECTION, SOLUTION INTRAVENOUS; SUBCUTANEOUS
Status: DISCONTINUED | OUTPATIENT
Start: 2022-02-20 | End: 2022-02-24 | Stop reason: HOSPADM

## 2022-02-20 RX ORDER — SUCRALFATE 1 G/10ML
1 SUSPENSION ORAL EVERY 6 HOURS
Status: DISCONTINUED | OUTPATIENT
Start: 2022-02-20 | End: 2022-02-20

## 2022-02-20 RX ORDER — ONDANSETRON 8 MG/1
8 TABLET, ORALLY DISINTEGRATING ORAL EVERY 8 HOURS PRN
Status: DISCONTINUED | OUTPATIENT
Start: 2022-02-20 | End: 2022-02-24 | Stop reason: HOSPADM

## 2022-02-20 RX ORDER — MAG HYDROX/ALUMINUM HYD/SIMETH 200-200-20
30 SUSPENSION, ORAL (FINAL DOSE FORM) ORAL
Status: DISCONTINUED | OUTPATIENT
Start: 2022-02-20 | End: 2022-02-24 | Stop reason: HOSPADM

## 2022-02-20 RX ORDER — PANTOPRAZOLE SODIUM 40 MG/10ML
40 INJECTION, POWDER, LYOPHILIZED, FOR SOLUTION INTRAVENOUS 2 TIMES DAILY
Status: DISCONTINUED | OUTPATIENT
Start: 2022-02-20 | End: 2022-02-24

## 2022-02-20 RX ORDER — ZOLPIDEM TARTRATE 5 MG/1
5 TABLET ORAL NIGHTLY PRN
Status: DISCONTINUED | OUTPATIENT
Start: 2022-02-20 | End: 2022-02-24 | Stop reason: HOSPADM

## 2022-02-20 RX ORDER — BUSPIRONE HYDROCHLORIDE 5 MG/1
10 TABLET ORAL 2 TIMES DAILY
Status: DISCONTINUED | OUTPATIENT
Start: 2022-02-20 | End: 2022-02-24 | Stop reason: HOSPADM

## 2022-02-20 RX ORDER — SODIUM CHLORIDE 0.9 % (FLUSH) 0.9 %
3 SYRINGE (ML) INJECTION EVERY 12 HOURS PRN
Status: DISCONTINUED | OUTPATIENT
Start: 2022-02-20 | End: 2022-02-24 | Stop reason: HOSPADM

## 2022-02-20 RX ORDER — METOCLOPRAMIDE 10 MG/1
10 TABLET ORAL 2 TIMES DAILY
Status: ON HOLD | COMMUNITY
End: 2022-10-28 | Stop reason: HOSPADM

## 2022-02-20 RX ORDER — SENNOSIDES 8.6 MG/1
8.6 TABLET ORAL DAILY
Status: DISCONTINUED | OUTPATIENT
Start: 2022-02-20 | End: 2022-02-24 | Stop reason: HOSPADM

## 2022-02-20 RX ORDER — GABAPENTIN 300 MG/1
600 CAPSULE ORAL 2 TIMES DAILY
Status: DISCONTINUED | OUTPATIENT
Start: 2022-02-20 | End: 2022-02-24 | Stop reason: HOSPADM

## 2022-02-20 RX ORDER — POLYETHYLENE GLYCOL 3350 17 G/17G
17 POWDER, FOR SOLUTION ORAL 2 TIMES DAILY
Status: DISCONTINUED | OUTPATIENT
Start: 2022-02-20 | End: 2022-02-22

## 2022-02-20 RX ORDER — GLUCAGON 1 MG
1 KIT INJECTION
Status: DISCONTINUED | OUTPATIENT
Start: 2022-02-20 | End: 2022-02-24 | Stop reason: HOSPADM

## 2022-02-20 RX ORDER — HYDROCODONE BITARTRATE AND ACETAMINOPHEN 500; 5 MG/1; MG/1
TABLET ORAL
Status: DISCONTINUED | OUTPATIENT
Start: 2022-02-20 | End: 2022-02-24 | Stop reason: HOSPADM

## 2022-02-20 RX ORDER — METOPROLOL TARTRATE 50 MG/1
50 TABLET ORAL 2 TIMES DAILY
Status: ON HOLD | COMMUNITY
End: 2022-04-03 | Stop reason: SDUPTHER

## 2022-02-20 RX ADMIN — GABAPENTIN 600 MG: 300 CAPSULE ORAL at 08:02

## 2022-02-20 RX ADMIN — OXYCODONE 5 MG: 5 TABLET ORAL at 12:02

## 2022-02-20 RX ADMIN — DOCUSATE SODIUM 100 MG: 100 CAPSULE, LIQUID FILLED ORAL at 08:02

## 2022-02-20 RX ADMIN — PIPERACILLIN AND TAZOBACTAM 4.5 G: 4; .5 INJECTION, POWDER, LYOPHILIZED, FOR SOLUTION INTRAVENOUS; PARENTERAL at 04:02

## 2022-02-20 RX ADMIN — OXYCODONE 5 MG: 5 TABLET ORAL at 07:02

## 2022-02-20 RX ADMIN — SENNOSIDES 8.6 MG: 8.6 TABLET ORAL at 02:02

## 2022-02-20 RX ADMIN — BUSPIRONE HYDROCHLORIDE 10 MG: 5 TABLET ORAL at 08:02

## 2022-02-20 RX ADMIN — INSULIN ASPART 2 UNITS: 100 INJECTION, SOLUTION INTRAVENOUS; SUBCUTANEOUS at 10:02

## 2022-02-20 RX ADMIN — PIPERACILLIN AND TAZOBACTAM 4.5 G: 4; .5 INJECTION, POWDER, LYOPHILIZED, FOR SOLUTION INTRAVENOUS; PARENTERAL at 11:02

## 2022-02-20 RX ADMIN — ZOLPIDEM TARTRATE 5 MG: 5 TABLET ORAL at 07:02

## 2022-02-20 RX ADMIN — INSULIN DETEMIR 15 UNITS: 100 INJECTION, SOLUTION SUBCUTANEOUS at 08:02

## 2022-02-20 RX ADMIN — INSULIN ASPART 4 UNITS: 100 INJECTION, SOLUTION INTRAVENOUS; SUBCUTANEOUS at 05:02

## 2022-02-20 RX ADMIN — PANTOPRAZOLE SODIUM 40 MG: 40 INJECTION, POWDER, FOR SOLUTION INTRAVENOUS at 12:02

## 2022-02-20 RX ADMIN — IOHEXOL 100 ML: 350 INJECTION, SOLUTION INTRAVENOUS at 08:02

## 2022-02-20 RX ADMIN — PANTOPRAZOLE SODIUM 40 MG: 40 INJECTION, POWDER, FOR SOLUTION INTRAVENOUS at 08:02

## 2022-02-20 RX ADMIN — ATORVASTATIN CALCIUM 40 MG: 20 TABLET, FILM COATED ORAL at 12:02

## 2022-02-20 RX ADMIN — ONDANSETRON 8 MG: 8 TABLET, ORALLY DISINTEGRATING ORAL at 08:02

## 2022-02-20 RX ADMIN — CITALOPRAM HYDROBROMIDE 20 MG: 20 TABLET ORAL at 12:02

## 2022-02-20 RX ADMIN — ONDANSETRON 8 MG: 8 TABLET, ORALLY DISINTEGRATING ORAL at 12:02

## 2022-02-20 NOTE — ED TRIAGE NOTES
Casie Salvador, an 57 y.o. female presents to the ED via EMS from home.  C/o LUQ stabbing stomach pain and upper left back pain at 5/10 with onset at 1530 yesterday.      Chief Complaint   Patient presents with    Chest Pain     Patient reports chest pain for the past 12 hours. Patient has history of MI.      Review of patient's allergies indicates:   Allergen Reactions    Orange juice Hives    Tomato (solanum lycopersicum) Hives     Past Medical History:   Diagnosis Date    Diabetes mellitus     Heart attack     Hypertension

## 2022-02-20 NOTE — H&P
[facility] ([facility])  [facility] Medicine  History & Physical    Patient Name: Casie Salvador  MRN: [id number]  Patient Class: IP- Inpatient  Admission Date: 2/20/2022  Attending Physician: Aryan Roberts MD   Primary Care Provider: Primary Doctor No         Patient information was obtained from patient and ER records.     Subjective:     Principal Problem:Anemia    Chief Complaint:   Chief Complaint   Patient presents with    Chest Pain     Patient reports chest pain for the past 12 hours. Patient has history of MI.         HPI: Ms. Salvador is a 56 yo F with a h/o DM II, hypertension, chronic pain, CAD s/p PCI, and opioid induced constipation presents with abdominal pain found to severe anemia. Patient baseline Hgb is 12 on labs two weeks ago. She denies hematochezia, melena, hemoptysis, hematemesis, prior episodes of anemia, lightheadedness, dizziness, dyspnea, and visions changes. She does note chronic abdominal pain in LUQ that is dull. States pain has worsened and last BM over one week ago. Denies NSAID use, fever, chills, and sick contacts.    In the ED, PRIETO 88/51, HR 88, RR 21, and afebrile. Labs notable for Hgb 3.7, WBC 20, Lactate 2.9. CT A/P with contrast with no significant interval change since prior CT of the abdomen and pelvis 02/07/2022 or new acute intra-abdominal process. CXR with new cardiomegaly from prior in 2018. Patient was transfused 2 units of blood. Latrer in the ED, nursing reported coffee ground emesis shortly after patient shortly after drinking soda.      Past Medical History:   Diagnosis Date    Diabetes mellitus     Heart attack     Hypertension        Past Surgical History:   Procedure Laterality Date    APPENDECTOMY      stents      TUBAL LIGATION         Review of patient's allergies indicates:   Allergen Reactions    Orange juice Hives    Tomato (solanum lycopersicum) Hives       No current facility-administered medications on file prior to encounter.  "    Current Outpatient Medications on File Prior to Encounter   Medication Sig    amLODIPine (NORVASC) 10 MG tablet Take 10 mg by mouth once daily.    aspirin (ECOTRIN) 81 MG EC tablet Take 81 mg by mouth once daily.    atorvastatin (LIPITOR) 80 MG tablet Take 40 mg by mouth once daily.    blood sugar diagnostic Strp by Misc.(Non-Drug; Combo Route) route.    busPIRone (BUSPAR) 10 MG tablet Take 10 mg by mouth 2 (two) times daily.    citalopram (CELEXA) 20 MG tablet Take 20 mg by mouth once daily.    docusate sodium (COLACE) 100 MG capsule Take 1 capsule (100 mg total) by mouth 2 (two) times daily.    doxycycline (MONODOX) 50 MG Cap Take 100 mg by mouth every 12 (twelve) hours.    furosemide (LASIX) 20 MG tablet Take 20 mg by mouth 2 (two) times daily.    gabapentin (NEURONTIN) 600 MG tablet Take 600 mg by mouth 2 (two) times daily.    glipiZIDE (GLUCOTROL) 10 MG tablet Take 10 mg by mouth 2 (two) times daily before meals.    hydrALAZINE (APRESOLINE) 50 MG tablet Take 50 mg by mouth daily as needed.    hyoscyamine (ANASPAZ,LEVSIN) 0.125 mg Tab Take 1 tablet (125 mcg total) by mouth every 6 (six) hours as needed (abdominal cramping).    insulin glargine (LANTUS) 100 unit/mL injection Inject 50 Units into the skin every evening.    insulin needles, disposable, 31 x 3/16 " Ndle by Misc.(Non-Drug; Combo Route) route.    lancets 28 gauge Misc by Misc.(Non-Drug; Combo Route) route.    loperamide (IMODIUM A-D) 2 mg Tab Take 2 mg by mouth 4 (four) times daily as needed.    metformin (GLUCOPHAGE) 1000 MG tablet Take 1,000 mg by mouth 2 (two) times daily with meals.    metoclopramide HCl (REGLAN) 10 MG tablet Take 10 mg by mouth 2 (two) times a day.    metoprolol tartrate (LOPRESSOR) 50 MG tablet Take 50 mg by mouth 2 (two) times daily.    montelukast (SINGULAIR) 10 mg tablet Take 10 mg by mouth every evening.    omeprazole (PRILOSEC) 20 MG capsule Take 20 mg by mouth once daily.    ondansetron " (ZOFRAN-ODT) 4 MG TbDL Take 1 tablet (4 mg total) by mouth every 8 (eight) hours as needed.    oxyCODONE-acetaminophen (PERCOCET) 5-325 mg per tablet Take 1 tablet by mouth every 4 (four) hours as needed for Pain.    polyethylene glycol (GLYCOLAX) 17 gram/dose powder Take 17 g by mouth daily as needed.    potassium chloride (MICRO-K) 10 MEQ CpSR Take 10 mEq by mouth once daily.    zolpidem (AMBIEN) 10 mg Tab Take 5 mg by mouth nightly as needed.    [DISCONTINUED] clopidogrel (PLAVIX) 75 mg tablet Take 75 mg by mouth once daily.    [DISCONTINUED] lisinopril (PRINIVIL,ZESTRIL) 2.5 MG tablet Take 2.5 mg by mouth once daily.    [DISCONTINUED] oxaprozin (DAYPRO) 600 mg tablet Take 1 tablet (600 mg total) by mouth 2 (two) times daily as needed (pain).     Family History    None       Tobacco Use    Smoking status: Current Every Day Smoker     Packs/day: 0.50     Types: Cigarettes    Smokeless tobacco: Never Used   Substance and Sexual Activity    Alcohol use: No    Drug use: No    Sexual activity: Not on file     Review of Systems   Constitutional:  Negative for appetite change, chills, diaphoresis, fatigue and fever.   HENT:  Negative for rhinorrhea and sore throat.    Respiratory:  Negative for cough, chest tightness and shortness of breath.    Cardiovascular:  Negative for chest pain and palpitations.   Gastrointestinal:  Positive for abdominal distention, constipation and vomiting. Negative for abdominal pain, blood in stool, diarrhea and nausea.   Endocrine: Negative for cold intolerance.   Genitourinary:  Negative for dysuria and hematuria.   Musculoskeletal:  Positive for back pain. Negative for arthralgias and myalgias.   Skin:  Negative for rash and wound.   Neurological:  Negative for dizziness, weakness, numbness and headaches.   Psychiatric/Behavioral:  Negative for agitation.    Objective:     Vital Signs (Most Recent):  Temp: 99.6 °F (37.6 °C) (02/20/22 1150)  Pulse: 85 (02/20/22 1150)  Resp: 16  (02/20/22 1250)  BP: (!) 104/55 (02/20/22 1150)  SpO2: 95 % (02/20/22 1150)   Vital Signs (24h Range):  Temp:  [98 °F (36.7 °C)-99.6 °F (37.6 °C)] 99.6 °F (37.6 °C)  Pulse:  [] 85  Resp:  [15-26] 16  SpO2:  [95 %-100 %] 95 %  BP: ()/(40-57) 104/55     Weight: 84.5 kg (186 lb 4.6 oz)  Body mass index is 25.98 kg/m².    Physical Exam  Constitutional:       General: She is not in acute distress.     Appearance: Normal appearance.   HENT:      Head: Normocephalic and atraumatic.      Mouth/Throat:      Mouth: Mucous membranes are moist.   Eyes:      General: No scleral icterus.     Extraocular Movements: Extraocular movements intact.      Conjunctiva/sclera: Conjunctivae normal.   Cardiovascular:      Rate and Rhythm: Normal rate and regular rhythm.      Heart sounds: No murmur heard.    No gallop.   Pulmonary:      Effort: Pulmonary effort is normal. No respiratory distress.      Breath sounds: Normal breath sounds. No wheezing or rales.   Abdominal:      General: Abdomen is flat. Bowel sounds are normal.      Palpations: Abdomen is soft.      Tenderness: There is abdominal tenderness (LUQ). There is no guarding or rebound.   Musculoskeletal:         General: No swelling or tenderness.      Comments: Left BKA well healed.  Right TMA well healed.   Skin:     Findings: No rash.   Neurological:      General: No focal deficit present.      Mental Status: She is alert and oriented to person, place, and time. Mental status is at baseline.   Psychiatric:         Mood and Affect: Mood is anxious.         Behavior: Behavior normal.           Significant Labs: CBC:   Recent Labs   Lab 02/20/22  0410 02/20/22  0453   WBC 24.87* 20.63*   HGB 3.8* 3.7*   HCT 12.2* 12.0*    368     CMP:   Recent Labs   Lab 02/20/22  0410      K 4.5      CO2 17*   *   BUN 36*   CREATININE 1.3   CALCIUM 8.0*   PROT 5.8*   ALBUMIN 2.5*   BILITOT 0.2   ALKPHOS 69   AST 8*   ALT 8*   ANIONGAP 13   EGFRNONAA 46.0*      Cardiac Markers:   Recent Labs   Lab 02/20/22  0410   *     Coagulation: No results for input(s): PT, INR, APTT in the last 48 hours.  Lactic Acid:   Recent Labs   Lab 02/20/22  0625 02/20/22  0923   LACTATE 2.9* 2.1     Lipase:   Recent Labs   Lab 02/20/22  0410   LIPASE 8     Troponin:   Recent Labs   Lab 02/20/22  0410   TROPONINI 0.065*       Significant Imaging: I have reviewed all pertinent imaging results/findings within the past 24 hours.    Assessment/Plan:     * Anemia  - No evidence of overt blood loss PTA, but with coffee ground emesis in the ED and LUQ pain.  - Concern for blood loss given acute time period, but will evaluate other etiologies.   - Baseline Hgb 12  - CT A/P without bleeding noted  - Admission Hgb 3.7, MCV 76  - s/p 2U  RBCs  - No evidence of hemolysis on labs, or schistocytes   - Iron panel without overt ROGER. Normal B12/folate.  - Retic 4.4%, suggestive of hypoproliferation  - GI consult for concern for coffee ground emesis  - IV PPI  - Trend Hgb  - NPO     Leukocytosis  - WBC 20 -> 30 on admissions. No s/s of infection other than abdominal pain with unremarkable CTA A/P and CXR.  - Hypotension on admission  - Follow up blood culture  - UA  - Empiric Zosyn      Insomnia  - Continue home Ambien      Depression  - Continue home Buspar, Citalopram      Constipation due to opioid therapy  - Senna, Docusate    Chronic pain  - On Percocet 5 at home for back pain  - Oxycodone PRN    DM (diabetes mellitus), type 2  - Home: Glargine 50U qhs, Glipizide 10mg, Metformin 1000mg BID  - A1c 5.8%  - Currently NPO  - Determir 15U qhs  - SSI  - Hypoglycemia protocol  - Accuchecks   - Continue home gabapentin      CAD (coronary artery disease)  - h/o of CAD s/p PCI. Stated last PCI over 1 year ago.  - Holding ASA given concern for GIB  - New cardiomegaly on CXR  - Continue atorvastatin  - Holding metoprolol given anemia. Restart as anemia improves.      Hypertension  - Holding home Amlodipine  10mg, Lasix 20mg daily, Hydralazine 50mg BID  - PRN for SBP >160      VTE Risk Mitigation (From admission, onward)         Ordered     IP VTE LOW RISK PATIENT  Once         02/20/22 1022     Place sequential compression device  Until discontinued         02/20/22 1022                   Aryan Roberts MD  Department of Hospital Medicine   [facility] ([facility])

## 2022-02-20 NOTE — SUBJECTIVE & OBJECTIVE
"Past Medical History:   Diagnosis Date    Diabetes mellitus     Heart attack     Hypertension        Past Surgical History:   Procedure Laterality Date    APPENDECTOMY      stents      TUBAL LIGATION         Review of patient's allergies indicates:   Allergen Reactions    Orange juice Hives    Tomato (solanum lycopersicum) Hives       No current facility-administered medications on file prior to encounter.     Current Outpatient Medications on File Prior to Encounter   Medication Sig    amLODIPine (NORVASC) 10 MG tablet Take 10 mg by mouth once daily.    aspirin (ECOTRIN) 81 MG EC tablet Take 81 mg by mouth once daily.    atorvastatin (LIPITOR) 80 MG tablet Take 40 mg by mouth once daily.    blood sugar diagnostic Strp by Misc.(Non-Drug; Combo Route) route.    busPIRone (BUSPAR) 10 MG tablet Take 10 mg by mouth 2 (two) times daily.    citalopram (CELEXA) 20 MG tablet Take 20 mg by mouth once daily.    docusate sodium (COLACE) 100 MG capsule Take 1 capsule (100 mg total) by mouth 2 (two) times daily.    doxycycline (MONODOX) 50 MG Cap Take 100 mg by mouth every 12 (twelve) hours.    furosemide (LASIX) 20 MG tablet Take 20 mg by mouth 2 (two) times daily.    gabapentin (NEURONTIN) 600 MG tablet Take 600 mg by mouth 2 (two) times daily.    glipiZIDE (GLUCOTROL) 10 MG tablet Take 10 mg by mouth 2 (two) times daily before meals.    hydrALAZINE (APRESOLINE) 50 MG tablet Take 50 mg by mouth daily as needed.    hyoscyamine (ANASPAZ,LEVSIN) 0.125 mg Tab Take 1 tablet (125 mcg total) by mouth every 6 (six) hours as needed (abdominal cramping).    insulin glargine (LANTUS) 100 unit/mL injection Inject 50 Units into the skin every evening.    insulin needles, disposable, 31 x 3/16 " Ndle by Misc.(Non-Drug; Combo Route) route.    lancets 28 gauge Misc by Misc.(Non-Drug; Combo Route) route.    loperamide (IMODIUM A-D) 2 mg Tab Take 2 mg by mouth 4 (four) times daily as needed.    metformin (GLUCOPHAGE) 1000 MG tablet Take 1,000 " mg by mouth 2 (two) times daily with meals.    metoclopramide HCl (REGLAN) 10 MG tablet Take 10 mg by mouth 2 (two) times a day.    metoprolol tartrate (LOPRESSOR) 50 MG tablet Take 50 mg by mouth 2 (two) times daily.    montelukast (SINGULAIR) 10 mg tablet Take 10 mg by mouth every evening.    omeprazole (PRILOSEC) 20 MG capsule Take 20 mg by mouth once daily.    ondansetron (ZOFRAN-ODT) 4 MG TbDL Take 1 tablet (4 mg total) by mouth every 8 (eight) hours as needed.    oxyCODONE-acetaminophen (PERCOCET) 5-325 mg per tablet Take 1 tablet by mouth every 4 (four) hours as needed for Pain.    polyethylene glycol (GLYCOLAX) 17 gram/dose powder Take 17 g by mouth daily as needed.    potassium chloride (MICRO-K) 10 MEQ CpSR Take 10 mEq by mouth once daily.    zolpidem (AMBIEN) 10 mg Tab Take 5 mg by mouth nightly as needed.    [DISCONTINUED] clopidogrel (PLAVIX) 75 mg tablet Take 75 mg by mouth once daily.    [DISCONTINUED] lisinopril (PRINIVIL,ZESTRIL) 2.5 MG tablet Take 2.5 mg by mouth once daily.    [DISCONTINUED] oxaprozin (DAYPRO) 600 mg tablet Take 1 tablet (600 mg total) by mouth 2 (two) times daily as needed (pain).     Family History    None       Tobacco Use    Smoking status: Current Every Day Smoker     Packs/day: 0.50     Types: Cigarettes    Smokeless tobacco: Never Used   Substance and Sexual Activity    Alcohol use: No    Drug use: No    Sexual activity: Not on file     Review of Systems   Constitutional:  Negative for appetite change, chills, diaphoresis, fatigue and fever.   HENT:  Negative for rhinorrhea and sore throat.    Respiratory:  Negative for cough, chest tightness and shortness of breath.    Cardiovascular:  Negative for chest pain and palpitations.   Gastrointestinal:  Positive for abdominal distention, constipation and vomiting. Negative for abdominal pain, blood in stool, diarrhea and nausea.   Endocrine: Negative for cold intolerance.   Genitourinary:  Negative for dysuria and hematuria.    Musculoskeletal:  Positive for back pain. Negative for arthralgias and myalgias.   Skin:  Negative for rash and wound.   Neurological:  Negative for dizziness, weakness, numbness and headaches.   Psychiatric/Behavioral:  Negative for agitation.    Objective:     Vital Signs (Most Recent):  Temp: 99.6 °F (37.6 °C) (02/20/22 1150)  Pulse: 85 (02/20/22 1150)  Resp: 16 (02/20/22 1250)  BP: (!) 104/55 (02/20/22 1150)  SpO2: 95 % (02/20/22 1150)   Vital Signs (24h Range):  Temp:  [98 °F (36.7 °C)-99.6 °F (37.6 °C)] 99.6 °F (37.6 °C)  Pulse:  [] 85  Resp:  [15-26] 16  SpO2:  [95 %-100 %] 95 %  BP: ()/(40-57) 104/55     Weight: 84.5 kg (186 lb 4.6 oz)  Body mass index is 25.98 kg/m².    Physical Exam  Constitutional:       General: She is not in acute distress.     Appearance: Normal appearance.   HENT:      Head: Normocephalic and atraumatic.      Mouth/Throat:      Mouth: Mucous membranes are moist.   Eyes:      General: No scleral icterus.     Extraocular Movements: Extraocular movements intact.      Conjunctiva/sclera: Conjunctivae normal.   Cardiovascular:      Rate and Rhythm: Normal rate and regular rhythm.      Heart sounds: No murmur heard.    No gallop.   Pulmonary:      Effort: Pulmonary effort is normal. No respiratory distress.      Breath sounds: Normal breath sounds. No wheezing or rales.   Abdominal:      General: Abdomen is flat. Bowel sounds are normal.      Palpations: Abdomen is soft.      Tenderness: There is abdominal tenderness (LUQ). There is no guarding or rebound.   Musculoskeletal:         General: No swelling or tenderness.      Comments: Left BKA well healed.  Right TMA well healed.   Skin:     Findings: No rash.   Neurological:      General: No focal deficit present.      Mental Status: She is alert and oriented to person, place, and time. Mental status is at baseline.   Psychiatric:         Mood and Affect: Mood is anxious.         Behavior: Behavior normal.           Significant  Labs: CBC:   Recent Labs   Lab 02/20/22  0410 02/20/22  0453   WBC 24.87* 20.63*   HGB 3.8* 3.7*   HCT 12.2* 12.0*    368     CMP:   Recent Labs   Lab 02/20/22  0410      K 4.5      CO2 17*   *   BUN 36*   CREATININE 1.3   CALCIUM 8.0*   PROT 5.8*   ALBUMIN 2.5*   BILITOT 0.2   ALKPHOS 69   AST 8*   ALT 8*   ANIONGAP 13   EGFRNONAA 46.0*     Cardiac Markers:   Recent Labs   Lab 02/20/22  0410   *     Coagulation: No results for input(s): PT, INR, APTT in the last 48 hours.  Lactic Acid:   Recent Labs   Lab 02/20/22  0625 02/20/22  0923   LACTATE 2.9* 2.1     Lipase:   Recent Labs   Lab 02/20/22  0410   LIPASE 8     Troponin:   Recent Labs   Lab 02/20/22  0410   TROPONINI 0.065*       Significant Imaging: I have reviewed all pertinent imaging results/findings within the past 24 hours.

## 2022-02-20 NOTE — ED PROVIDER NOTES
History:  Casie Salvador is a 57 y.o. female who presents to the ED with Chest Pain (Patient reports chest pain for the past 12 hours. Patient has history of MI. )    Described as 57-year-old female with a history of insulin-dependent diabetes, hypertension, chronic pain, opioid induced constipation, CAD presenting to the emergency department with abdominal pain.  She reports she was seen for the same 2 days ago at [facility], but does not know what they said or did for her.  She reports she has had bowel movements 2 days ago, though still feels constipated with a decreased appetite.  She endorses a left upper quadrant tight type pain in her abdomen, constant, nonradiating, unchanged for the past 2 days.  She denies any nausea or vomiting.  She denies any chest pain.  She denies any fevers or chills.  She is requesting pain medication for her chronic leg pain because she just wants to go to sleep.    On chart review, patient had similar symptoms on 02/07/2022 due to her opioid induced constipation, generalized abdominal pain, and dehydration in the setting of vomiting and diarrhea.    Review of Systems: All systems reviewed and are negative except as per history of present illness.  Constitutional: Negative for fever.   HENT: Negative for congestion.    Respiratory: Negative for shortness of breath.    Cardiovascular: Negative for chest pain.   Gastrointestinal: + for abdominal pain, decreased appetite  Genitourinary: Negative for dysuria.   Musculoskeletal: Negative for myalgias. +chronic leg pain  Skin: Negative for rash.   Neurological: Negative for focal weakness.   Psychiatric/Behavioral: Negative for memory loss.     Medications:   Previous Medications    ASPIRIN (ECOTRIN) 81 MG EC TABLET    Take 81 mg by mouth once daily.    ATORVASTATIN (LIPITOR) 80 MG TABLET    Take 80 mg by mouth once daily.    BLOOD SUGAR DIAGNOSTIC STRP    by Misc.(Non-Drug; Combo Route) route.    CLOPIDOGREL (PLAVIX) 75 MG TABLET   "  Take 75 mg by mouth once daily.    DOCUSATE SODIUM (COLACE) 100 MG CAPSULE    Take 1 capsule (100 mg total) by mouth 2 (two) times daily.    HYOSCYAMINE (ANASPAZ,LEVSIN) 0.125 MG TAB    Take 1 tablet (125 mcg total) by mouth every 6 (six) hours as needed (abdominal cramping).    INSULIN GLARGINE (LANTUS) 100 UNIT/ML INJECTION    Inject 34 Units into the skin every evening.    INSULIN NEEDLES, DISPOSABLE, 31 X 3/16 " NDLE    by Misc.(Non-Drug; Combo Route) route.    LANCETS 28 GAUGE MISC    by Misc.(Non-Drug; Combo Route) route.    LISINOPRIL (PRINIVIL,ZESTRIL) 2.5 MG TABLET    Take 2.5 mg by mouth once daily.    METFORMIN (GLUCOPHAGE) 1000 MG TABLET    Take 1,000 mg by mouth 2 (two) times daily with meals.    ONDANSETRON (ZOFRAN-ODT) 4 MG TBDL    Take 1 tablet (4 mg total) by mouth every 8 (eight) hours as needed.    OXAPROZIN (DAYPRO) 600 MG TABLET    Take 1 tablet (600 mg total) by mouth 2 (two) times daily as needed (pain).    OXYCODONE-ACETAMINOPHEN (PERCOCET) 5-325 MG PER TABLET    Take 1 tablet by mouth every 4 (four) hours as needed for Pain.    POLYETHYLENE GLYCOL (GLYCOLAX) 17 GRAM/DOSE POWDER    Take 17 g by mouth daily as needed.       PMH:   Past Medical History:   Diagnosis Date    Diabetes mellitus     Heart attack     Hypertension      PSH:   Past Surgical History:   Procedure Laterality Date    APPENDECTOMY      stents      TUBAL LIGATION       Allergies: She is allergic to orange juice and tomato (solanum lycopersicum).  Social History: Marital Status: . She  reports that she has been smoking cigarettes. She has been smoking about 0.50 packs per day. She has never used smokeless tobacco.. She  reports no history of alcohol use..       Exam:  VITAL SIGNS:   Vitals:    02/20/22 0343 02/20/22 0405   BP: (!) 98/40 (!) 101/57   BP Location:  Left arm   Patient Position:  Lying   Pulse: 106    Resp: (!) 26    Temp: 98 °F (36.7 °C)    SpO2: 97%    Weight: 81.6 kg (180 lb)      Const: Awake " and alert, NAD, lying comfortably in bed  Head: Atraumatic  Eyes: Normal Conjunctiva  ENT: Normal External Ears, Nose and Mouth.  Neck: Full range of motion. No meningismus.  Resp: Normal respiratory effort, No distress  Cardio: Equal and intact distal pulses  Abd: Soft, non tender, non distended. No rebound or guarding, no masses  Skin: No petechiae or rashes  Ext: s/p R BKA, L partial foot amputation. No cyanosis, or edema  Neur: Awake and alert  Psych: Normal Mood and Affect    Data:  Results for orders placed or performed during the hospital encounter of 02/20/22   CBC auto differential   Result Value Ref Range    WBC 24.87 (H) 3.90 - 12.70 K/uL    RBC 1.60 (L) 4.00 - 5.40 M/uL    Hemoglobin 3.8 (LL) 12.0 - 16.0 g/dL    Hematocrit 12.2 (LL) 37.0 - 48.5 %    MCV 76 (L) 82 - 98 fL    MCH 23.8 (L) 27.0 - 31.0 pg    MCHC 31.1 (L) 32.0 - 36.0 g/dL    RDW 14.9 (H) 11.5 - 14.5 %    Platelets 382 150 - 450 K/uL    MPV 10.5 9.2 - 12.9 fL    Immature Granulocytes 1.2 (H) 0.0 - 0.5 %    Gran # (ANC) 20.8 (H) 1.8 - 7.7 K/uL    Immature Grans (Abs) 0.29 (H) 0.00 - 0.04 K/uL    Lymph # 2.7 1.0 - 4.8 K/uL    Mono # 0.9 0.3 - 1.0 K/uL    Eos # 0.1 0.0 - 0.5 K/uL    Baso # 0.04 0.00 - 0.20 K/uL    nRBC 0 0 /100 WBC    Gran % 83.6 (H) 38.0 - 73.0 %    Lymph % 10.7 (L) 18.0 - 48.0 %    Mono % 3.8 (L) 4.0 - 15.0 %    Eosinophil % 0.5 0.0 - 8.0 %    Basophil % 0.2 0.0 - 1.9 %    Platelet Estimate Increased (A)     Aniso Moderate     Poly Occasional     Hypo Occasional     Ovalocytes Occasional     [place] Cells Occasional     Basophilic Stippling Occasional     Differential Method Automated    Comprehensive metabolic panel   Result Value Ref Range    Sodium 136 136 - 145 mmol/L    Potassium 4.5 3.5 - 5.1 mmol/L    Chloride 106 95 - 110 mmol/L    CO2 17 (L) 23 - 29 mmol/L    Glucose 211 (H) 70 - 110 mg/dL    BUN 36 (H) 6 - 20 mg/dL    Creatinine 1.3 0.5 - 1.4 mg/dL    Calcium 8.0 (L) 8.7 - 10.5 mg/dL    Total Protein 5.8 (L) 6.0 - 8.4  g/dL    Albumin 2.5 (L) 3.5 - 5.2 g/dL    Total Bilirubin 0.2 0.1 - 1.0 mg/dL    Alkaline Phosphatase 69 55 - 135 U/L    AST 8 (L) 10 - 40 U/L    ALT 8 (L) 10 - 44 U/L    Anion Gap 13 8 - 16 mmol/L    eGFR if African American 53.0 (A) >60 mL/min/1.73 m^2    eGFR if non  46.0 (A) >60 mL/min/1.73 m^2   Troponin I #1   Result Value Ref Range    Troponin I 0.065 (H) 0.000 - 0.026 ng/mL   B-Type natriuretic peptide (BNP)   Result Value Ref Range     (H) 0 - 99 pg/mL   Lipase   Result Value Ref Range    Lipase 8 4 - 60 U/L   CBC auto differential   Result Value Ref Range    WBC 20.63 (H) 3.90 - 12.70 K/uL    RBC 1.57 (L) 4.00 - 5.40 M/uL    Hemoglobin 3.7 (LL) 12.0 - 16.0 g/dL    Hematocrit 12.0 (LL) 37.0 - 48.5 %    MCV 76 (L) 82 - 98 fL    MCH 23.6 (L) 27.0 - 31.0 pg    MCHC 30.8 (L) 32.0 - 36.0 g/dL    RDW 15.1 (H) 11.5 - 14.5 %    Platelets 368 150 - 450 K/uL    MPV 10.4 9.2 - 12.9 fL    Immature Granulocytes 1.1 (H) 0.0 - 0.5 %    Gran # (ANC) 17.1 (H) 1.8 - 7.7 K/uL    Immature Grans (Abs) 0.22 (H) 0.00 - 0.04 K/uL    Lymph # 2.4 1.0 - 4.8 K/uL    Mono # 0.8 0.3 - 1.0 K/uL    Eos # 0.1 0.0 - 0.5 K/uL    Baso # 0.05 0.00 - 0.20 K/uL    nRBC 0 0 /100 WBC    Gran % 83.0 (H) 38.0 - 73.0 %    Lymph % 11.6 (L) 18.0 - 48.0 %    Mono % 3.8 (L) 4.0 - 15.0 %    Eosinophil % 0.3 0.0 - 8.0 %    Basophil % 0.2 0.0 - 1.9 %    Differential Method Automated    POCT COVID-19 Rapid Screening   Result Value Ref Range    POC Rapid COVID Negative Negative     Acceptable Yes      Imaging Results           X-Ray Chest AP Portable (Final result)  Result time 02/20/22 04:33:53    Final result by Aleksey Harper MD (02/20/22 04:33:53)                 Impression:      No obvious evidence of an acute pulmonary process.    Development of cardiomegaly.    This report was flagged in Epic as abnormal.      Electronically signed by: Aleksey Harper  Date:    02/20/2022  Time:    04:33             Narrative:     EXAMINATION:  XR CHEST AP PORTABLE    CLINICAL HISTORY:  Chest Pain;    TECHNIQUE:  Single frontal view of the chest was performed.    COMPARISON:  February 3, 2018    FINDINGS:  Single view of the chest reveals cardiomegaly.  This represents a change from the previous examination.  The lungs however appear to be clear.  No indication of a consolidative process or pleural effusion.                              12-LEAD EKG INTERPRETATION BY ME:  Rate/Rhythm: Normal Sinus Rhythm with rate of 97 beats per minute  QRS, ST, T-waves: Mild ST depression inferior and lateral leads, no ST elevation  Impression: NSR, nonspecific ST changes     REPEAT 12-LEAD EKG INTERPRETATION BY ME:  Rate/Rhythm: Normal Sinus Rhythm with rate of 99 beats per minute  QRS, ST, T-waves: Mild ST depression inferior and lateral leads, no ST elevation  Impression: NSR, nonspecific ST changes, unchanged from prior    Labs & Imaging studies were reviewed independently by me.     Medical Decision Makin-year-old female presenting to the emergency department initially complained EMS about chest pain, though then denying this, reporting chronic abdominal pain and chronic leg pain, requesting pain medication.  Laboratory studies were obtained showing severe anemia, baseline hemoglobin 12, now 3.8.  Patient denies any bleeding episodes while at home, denies any GI bleed.  FOBT performed in the emergency department was negative.  Because of her severe acute anemia is unknown at this time. Her abdominal examination is relatively benign and she was sleeping comfortably throughout her stay in the emergency room.  Given her leukocytosis, CT imaging was ordered to evaluate for possible intra-abdominal pathology, though my suspicion is low.  Given her leukocytosis as well, septic protocol was initiated.  Lactic acid is pending.  She has no signs of acute infection.  Her tachycardia resolved and blood pressure remained stable in the ER.  I suspect her ST  depressions and slightly elevated troponin is related to her severe anemia, likely demand ischemia, less likely ACS.  She was given aspirin by EMS prior to arrival.  BNP is slightly elevated, and chest x-ray shows a new cardiomegaly, though she has no signs of respiratory distress at this time.  Lipase is normal, doubt pancreatitis.  UA is pending. Fast and aorta US negative. Patient signed out to oncoming provider pending CT abd/pelvis and admission.    Critical Care Time: 45 minutes  Treatments/Evaluations: Close monitoring and treatment of unstable vital signs, cardiorespiratory, and neurologic status, while maintaining tight balance of fluid, respiratory, and cardiac interventions. This time includes discussing the case with the patient and the patients family. This time does not include all procedures stated elsewhere in this record. This time also includes reviewing old records, labs and radiological studies. This time includes examining and re-examining the patient. Additionally, this time also includes arranging care with admitting and consulting physicians.     Clinical Impression:  1. Severe anemia    2. Chest pain    3. Elevated troponin    4. Abdominal pain, unspecified abdominal location    5. Chronic pain of lower extremity, unspecified laterality    6. Hyperglycemia                 Dionne Viveros MD  02/20/22 4371

## 2022-02-20 NOTE — NURSING
Patient admitedto room 804 . Oriented to room. Hosp med R notified of patients arrival. VSs and afebrile. Patein arrived with pure wick in place . Purewick in place . Physic an arrived at bedside to see the patient

## 2022-02-20 NOTE — ASSESSMENT & PLAN NOTE
- Home: Glargine 50U qhs, Glipizide 10mg, Metformin 1000mg BID  - A1c 5.8%  - Currently NPO  - Determir 15U qhs  - SSI  - Hypoglycemia protocol  - Accuchecks   - Continue home gabapentin

## 2022-02-20 NOTE — HPI
Ms. Salvador is a 56 yo F with a h/o DM II, hypertension, chronic pain, CAD s/p PCI, and opioid induced constipation presents with abdominal pain found to severe anemia. Patient baseline Hgb is 12 on labs two weeks ago. She denies hematochezia, melena, hemoptysis, hematemesis, prior episodes of anemia, lightheadedness, dizziness, dyspnea, and visions changes. She does note chronic abdominal pain in LUQ that is dull. States pain has worsened and last BM over one week ago. Denies NSAID use, fever, chills, and sick contacts.    In the ED, PRIETO 88/51, HR 88, RR 21, and afebrile. Labs notable for Hgb 3.7, WBC 20, Lactate 2.9. CT A/P with contrast with no significant interval change since prior CT of the abdomen and pelvis 02/07/2022 or new acute intra-abdominal process. CXR with new cardiomegaly from prior in 2018. Patient was transfused 2 units of blood. Latrer in the ED, nursing reported coffee ground emesis shortly after patient shortly after drinking soda.

## 2022-02-20 NOTE — ED NOTES
I-STAT Chem-8+ Results:   Value Reference Range   Sodium 136 136-145 mmol/L   Potassium  4.6 3.5-5.1 mmol/L   Chloride 106  mmol/L   Ionized Calcium 0.99 1.06-1.42 mmol/L   CO2 (measured) 17 23-29 mmol/L   Glucose 234  mg/dL   BUN 36 6-30 mg/dL   Creatinine 1.2 0.5-1.4 mg/dL   Hematocrit <15 36-54%

## 2022-02-20 NOTE — CONSULTS
[facility]  Gastroenterology  Consult Note    Patient Name: Casie Salvador  MRN: [id number]  Admission Date: 2/20/2022  Hospital Length of Stay: 0 days  Code Status: Full Code   Attending Provider: Aryan Roberts MD   Consulting Provider: Lisa Ga MD  Primary Care Physician: Primary Doctor No  Principal Problem:Anemia    Inpatient consult to Gastroenterology  Consult performed by: Lisa Ga MD  Consult ordered by: Aryan Roberts MD        Subjective:     HPI: Casie Salvador is a 57 y.o. female with history of DM II, hypertension, CAD s/p PCI who presents for chest and abdominal pain.  GI consulted for anemia.  The patient reports that she currently presents for left upper quadrant abdominal pain that recurred since her discharge from [place], as well as chest pain.  She describes the left upper quadrant pain as dull, nonradiating, started to get worse over the past few days since she has not had a bowel movement since last Saturday.  She was recently admitted at [place] for similar symptoms at which time it appears that she was diagnosed with constipation and had a bowel movement on Saturday after which she was discharged.  On admission, she was afebrile, tachycardic and hypotensive, Hgb noted to be 3.4, down from 12 2 weeks ago.  Leukocytosis up to 30,000. Trop rising to 0.66. Denies any melena, hematochezia, hematemesis.  Her last bowel movement was a week ago and was brown.  Has never had an EGD or colonoscopy.  She underwent a FOBT test in the ED that was negative.  CT abdomen pelvis with no acute findings.        Past Medical History:   Diagnosis Date    Coronary artery disease     Diabetes mellitus     Encounter for blood transfusion     Heart attack     Hypertension        Past Surgical History:   Procedure Laterality Date    APPENDECTOMY      stents      TUBAL LIGATION         History reviewed. No pertinent family history.    Social History  "    Socioeconomic History    Marital status:    Tobacco Use    Smoking status: Current Every Day Smoker     Packs/day: 0.50     Types: Cigarettes    Smokeless tobacco: Never Used   Substance and Sexual Activity    Alcohol use: No    Drug use: No       No current facility-administered medications on file prior to encounter.     Current Outpatient Medications on File Prior to Encounter   Medication Sig Dispense Refill    amLODIPine (NORVASC) 10 MG tablet Take 10 mg by mouth once daily.      aspirin (ECOTRIN) 81 MG EC tablet Take 81 mg by mouth once daily.      atorvastatin (LIPITOR) 80 MG tablet Take 40 mg by mouth once daily.      blood sugar diagnostic Strp by Misc.(Non-Drug; Combo Route) route.      busPIRone (BUSPAR) 10 MG tablet Take 10 mg by mouth 2 (two) times daily.      citalopram (CELEXA) 20 MG tablet Take 20 mg by mouth once daily.      docusate sodium (COLACE) 100 MG capsule Take 1 capsule (100 mg total) by mouth 2 (two) times daily. 60 capsule 0    doxycycline (MONODOX) 50 MG Cap Take 100 mg by mouth every 12 (twelve) hours.      furosemide (LASIX) 20 MG tablet Take 20 mg by mouth 2 (two) times daily.      gabapentin (NEURONTIN) 600 MG tablet Take 600 mg by mouth 2 (two) times daily.      glipiZIDE (GLUCOTROL) 10 MG tablet Take 10 mg by mouth 2 (two) times daily before meals.      hydrALAZINE (APRESOLINE) 50 MG tablet Take 50 mg by mouth daily as needed.      hyoscyamine (ANASPAZ,LEVSIN) 0.125 mg Tab Take 1 tablet (125 mcg total) by mouth every 6 (six) hours as needed (abdominal cramping). 30 tablet 0    insulin glargine (LANTUS) 100 unit/mL injection Inject 50 Units into the skin every evening.      insulin needles, disposable, 31 x 3/16 " Ndle by Misc.(Non-Drug; Combo Route) route.      lancets 28 gauge Misc by Misc.(Non-Drug; Combo Route) route.      loperamide (IMODIUM A-D) 2 mg Tab Take 2 mg by mouth 4 (four) times daily as needed.      metformin (GLUCOPHAGE) 1000 MG " tablet Take 1,000 mg by mouth 2 (two) times daily with meals.      metoclopramide HCl (REGLAN) 10 MG tablet Take 10 mg by mouth 2 (two) times a day.      metoprolol tartrate (LOPRESSOR) 50 MG tablet Take 50 mg by mouth 2 (two) times daily.      montelukast (SINGULAIR) 10 mg tablet Take 10 mg by mouth every evening.      omeprazole (PRILOSEC) 20 MG capsule Take 20 mg by mouth once daily.      ondansetron (ZOFRAN-ODT) 4 MG TbDL Take 1 tablet (4 mg total) by mouth every 8 (eight) hours as needed. 20 tablet 0    oxyCODONE-acetaminophen (PERCOCET) 5-325 mg per tablet Take 1 tablet by mouth every 4 (four) hours as needed for Pain. 6 tablet 0    polyethylene glycol (GLYCOLAX) 17 gram/dose powder Take 17 g by mouth daily as needed. 119 g 0    potassium chloride (MICRO-K) 10 MEQ CpSR Take 10 mEq by mouth once daily.      zolpidem (AMBIEN) 10 mg Tab Take 5 mg by mouth nightly as needed.      [DISCONTINUED] clopidogrel (PLAVIX) 75 mg tablet Take 75 mg by mouth once daily.      [DISCONTINUED] lisinopril (PRINIVIL,ZESTRIL) 2.5 MG tablet Take 2.5 mg by mouth once daily.      [DISCONTINUED] oxaprozin (DAYPRO) 600 mg tablet Take 1 tablet (600 mg total) by mouth 2 (two) times daily as needed (pain). 20 tablet 0       Review of patient's allergies indicates:   Allergen Reactions    Orange juice Hives    Tomato (solanum lycopersicum) Hives       Review of Systems   Constitutional: Negative.    HENT: Negative.    Eyes: Negative.    Respiratory: Negative.    Cardiovascular: Negative.    Gastrointestinal: Positive for abdominal pain and constipation. Negative for blood in stool, melena, nausea and vomiting.   Genitourinary: Negative.    Musculoskeletal: Negative.    Neurological: Negative.    Psychiatric/Behavioral: Negative.         Objective:     Vitals:    02/20/22 1430   BP: 110/60   Pulse: 80   Resp: 15   Temp: 98.7 °F (37.1 °C)         Constitutional:  not in acute distress and well developed  HENT: Head: Normal,  normocephalic, atraumatic.  Eyes: conjunctiva clear and sclera nonicteric  Cardiovascular: regular rate and rhythm  Respiratory: normal chest expansion & respiratory effort   and no accessory muscle use  GI: soft, non-tender, without masses or organomegaly  Musculoskeletal: no muscular tenderness noted  Skin: normal color  Neurological: alert, oriented x3  Psychiatric: mood and affect are within normal limits, pt is a good historian; no memory problems were noted    Significant Labs:  Recent Labs   Lab 02/20/22  0410 02/20/22  0453 02/20/22  1329   HGB 3.8* 3.7* 6.6*       Lab Results   Component Value Date    WBC 30.80 (H) 02/20/2022    HGB 6.6 (L) 02/20/2022    HCT 21.1 (L) 02/20/2022    MCV 81 (L) 02/20/2022     02/20/2022       Lab Results   Component Value Date     02/20/2022    K 4.5 02/20/2022     02/20/2022    CO2 17 (L) 02/20/2022    BUN 36 (H) 02/20/2022    CREATININE 1.3 02/20/2022    CALCIUM 8.0 (L) 02/20/2022    ANIONGAP 13 02/20/2022    ESTGFRAFRICA 53.0 (A) 02/20/2022    EGFRNONAA 46.0 (A) 02/20/2022       Lab Results   Component Value Date    ALT 8 (L) 02/20/2022    AST 8 (L) 02/20/2022    ALKPHOS 69 02/20/2022    BILITOT 0.2 02/20/2022       Lab Results   Component Value Date    INR 1.0 02/20/2022    INR 1.0 12/10/2012       Significant Imaging:  Reviewed pertinent radiology findings.       Assessment/Plan:     Casie Salvador is a 57 y.o. female with history of  DM II, hypertension, CAD s/p PCI who presents for chest and abdominal pain.  GI consulted for anemia.    Problem List:  1. Microcytic anemia  2. Abdominal pain  3. Leukocytosis    The patient has acute anemia with no iron deficiency and no overt GI bleeding.  Low suspicion for GI source of blood loss in the absence of overt bleeding, however we can investigate with EGD and colonoscopy once her leukocytosis and hypotension is investigated.  She would need extended bowel prep due to large stool burden, therefore  would recommend full liquid diet for now and MiraLax b.i.d.    Recommendations:  - transfuse to keep Hgb >7  - monitor for signs of over bleeding  - workup of leukocytosis and hypotension  - Full liquid diet, miralax bid  - workup of non-GI blood loss causes of anemia  - will consider EGD/colonoscopy sometime this week for anemia workup  - avoid narcotics    Thank you for involving us in the care of Casie Salvador. Please call with any additional questions, concerns or changes in the patient's clinical status. We will continue to follow.    Lisa Ga MD  Gastroenterology Fellow PGY V  [facility]

## 2022-02-20 NOTE — ED NOTES
Pt took a sip of her Coke Zero when she vomited. RN at bedside to assess. Pt reports feeling fine, just did not feel good after sipping. Dark brown vomitus with specks noted in. [facility] R paged.

## 2022-02-20 NOTE — ASSESSMENT & PLAN NOTE
- No evidence of overt blood loss PTA, but with coffee ground emesis in the ED and LUQ pain.  - Concern for blood loss given acute time period, but will evaluate other etiologies.   - Baseline Hgb 12  - CT A/P without bleeding noted  - Admission Hgb 3.7, MCV 76  - s/p 2U  RBCs  - No evidence of hemolysis on labs, or schistocytes   - Iron panel without overt ROGER. Normal B12/folate.  - Retic 4.4%, suggestive of hypoproliferation  - GI consult for concern for coffee ground emesis  - IV PPI  - Trend Hgb  - NPO

## 2022-02-20 NOTE — ASSESSMENT & PLAN NOTE
- h/o of CAD s/p PCI. Stated last PCI over 1 year ago.  - Holding ASA given concern for GIB  - New cardiomegaly on CXR  - Continue atorvastatin  - Holding metoprolol given anemia. Restart as anemia improves.

## 2022-02-20 NOTE — ED NOTES
Patient turned over to me by Dr. Viveros at 0615    Briefly:  57-year-old female presents with anemia without source.  Given 2 units of blood in the ED.  Admitted to Hospital Medicine pending further workup.  Blood pressure improved.  No acute distress or altered mental status.     Kishor Clements MD  02/20/22 0901

## 2022-02-20 NOTE — ASSESSMENT & PLAN NOTE
- WBC 20 -> 30 on admissions. No s/s of infection other than abdominal pain with unremarkable CTA A/P and CXR.  - Hypotension on admission  - Follow up blood culture  - UA  - Empiric Zosyn

## 2022-02-21 PROBLEM — I50.22 CHRONIC SYSTOLIC CONGESTIVE HEART FAILURE: Status: ACTIVE | Noted: 2022-02-21

## 2022-02-21 LAB
ALBUMIN SERPL BCP-MCNC: 2.8 G/DL (ref 3.5–5.2)
ALP SERPL-CCNC: 88 U/L (ref 55–135)
ALT SERPL W/O P-5'-P-CCNC: 12 U/L (ref 10–44)
ANION GAP SERPL CALC-SCNC: 11 MMOL/L (ref 8–16)
ASCENDING AORTA: 2.71 CM
AST SERPL-CCNC: 20 U/L (ref 10–40)
AV INDEX (PROSTH): 0.64
AV MEAN GRADIENT: 3 MMHG
AV PEAK GRADIENT: 4 MMHG
AV VALVE AREA: 1.94 CM2
AV VELOCITY RATIO: 0.67
BASOPHILS # BLD AUTO: 0.07 K/UL (ref 0–0.2)
BASOPHILS # BLD AUTO: 0.1 K/UL (ref 0–0.2)
BASOPHILS NFR BLD: 0.4 % (ref 0–1.9)
BASOPHILS NFR BLD: 0.5 % (ref 0–1.9)
BILIRUB SERPL-MCNC: 0.5 MG/DL (ref 0.1–1)
BSA FOR ECHO PROCEDURE: 2.06 M2
BUN SERPL-MCNC: 29 MG/DL (ref 6–20)
BUN SERPL-MCNC: 36 MG/DL (ref 6–30)
CALCIUM SERPL-MCNC: 8.5 MG/DL (ref 8.7–10.5)
CHLORIDE SERPL-SCNC: 106 MMOL/L (ref 95–110)
CHLORIDE SERPL-SCNC: 108 MMOL/L (ref 95–110)
CO2 SERPL-SCNC: 20 MMOL/L (ref 23–29)
CREAT SERPL-MCNC: 1.2 MG/DL (ref 0.5–1.4)
CREAT SERPL-MCNC: 1.3 MG/DL (ref 0.5–1.4)
CV ECHO LV RWT: 0.3 CM
DIFFERENTIAL METHOD: ABNORMAL
DIFFERENTIAL METHOD: ABNORMAL
DOP CALC AO PEAK VEL: 1.05 M/S
DOP CALC AO VTI: 21.67 CM
DOP CALC LVOT AREA: 3 CM2
DOP CALC LVOT DIAMETER: 1.97 CM
DOP CALC LVOT PEAK VEL: 0.7 M/S
DOP CALC LVOT STROKE VOLUME: 42.07 CM3
DOP CALCLVOT PEAK VEL VTI: 13.81 CM
E WAVE DECELERATION TIME: 187.15 MSEC
E/A RATIO: 1.07
E/E' RATIO: 18.36 M/S
ECHO LV POSTERIOR WALL: 0.76 CM (ref 0.6–1.1)
EJECTION FRACTION: 20 %
EOSINOPHIL # BLD AUTO: 0.1 K/UL (ref 0–0.5)
EOSINOPHIL # BLD AUTO: 0.2 K/UL (ref 0–0.5)
EOSINOPHIL NFR BLD: 0.5 % (ref 0–8)
EOSINOPHIL NFR BLD: 1.3 % (ref 0–8)
ERYTHROCYTE [DISTWIDTH] IN BLOOD BY AUTOMATED COUNT: 17.6 % (ref 11.5–14.5)
ERYTHROCYTE [DISTWIDTH] IN BLOOD BY AUTOMATED COUNT: 18.2 % (ref 11.5–14.5)
EST. GFR  (AFRICAN AMERICAN): 53 ML/MIN/1.73 M^2
EST. GFR  (NON AFRICAN AMERICAN): 46 ML/MIN/1.73 M^2
FRACTIONAL SHORTENING: 17 % (ref 28–44)
GLUCOSE SERPL-MCNC: 129 MG/DL (ref 70–110)
GLUCOSE SERPL-MCNC: 234 MG/DL (ref 70–110)
HCT VFR BLD AUTO: 22.2 % (ref 37–48.5)
HCT VFR BLD AUTO: 23.6 % (ref 37–48.5)
HCT VFR BLD CALC: <15 %PCV (ref 36–54)
HGB BLD-MCNC: 7.4 G/DL (ref 12–16)
HGB BLD-MCNC: 7.4 G/DL (ref 12–16)
IMM GRANULOCYTES # BLD AUTO: 0.15 K/UL (ref 0–0.04)
IMM GRANULOCYTES # BLD AUTO: 0.21 K/UL (ref 0–0.04)
IMM GRANULOCYTES NFR BLD AUTO: 0.9 % (ref 0–0.5)
IMM GRANULOCYTES NFR BLD AUTO: 1 % (ref 0–0.5)
INTERVENTRICULAR SEPTUM: 1.31 CM (ref 0.6–1.1)
LA MAJOR: 5.74 CM
LA MINOR: 5.31 CM
LA WIDTH: 3.93 CM
LEFT ATRIUM SIZE: 3.99 CM
LEFT ATRIUM VOLUME INDEX: 36 ML/M2
LEFT ATRIUM VOLUME: 73.53 CM3
LEFT INTERNAL DIMENSION IN SYSTOLE: 4.14 CM (ref 2.1–4)
LEFT VENTRICLE DIASTOLIC VOLUME INDEX: 57.7 ML/M2
LEFT VENTRICLE DIASTOLIC VOLUME: 117.71 ML
LEFT VENTRICLE MASS INDEX: 93 G/M2
LEFT VENTRICLE SYSTOLIC VOLUME INDEX: 37.2 ML/M2
LEFT VENTRICLE SYSTOLIC VOLUME: 75.8 ML
LEFT VENTRICULAR INTERNAL DIMENSION IN DIASTOLE: 4.99 CM (ref 3.5–6)
LEFT VENTRICULAR MASS: 190 G
LV LATERAL E/E' RATIO: 16.83 M/S
LV SEPTAL E/E' RATIO: 20.2 M/S
LYMPHOCYTES # BLD AUTO: 2.6 K/UL (ref 1–4.8)
LYMPHOCYTES # BLD AUTO: 3.2 K/UL (ref 1–4.8)
LYMPHOCYTES NFR BLD: 14.4 % (ref 18–48)
LYMPHOCYTES NFR BLD: 15.1 % (ref 18–48)
MAGNESIUM SERPL-MCNC: 2 MG/DL (ref 1.6–2.6)
MCH RBC QN AUTO: 26.1 PG (ref 27–31)
MCH RBC QN AUTO: 26.8 PG (ref 27–31)
MCHC RBC AUTO-ENTMCNC: 31.4 G/DL (ref 32–36)
MCHC RBC AUTO-ENTMCNC: 33.3 G/DL (ref 32–36)
MCV RBC AUTO: 80 FL (ref 82–98)
MCV RBC AUTO: 83 FL (ref 82–98)
MONOCYTES # BLD AUTO: 1.1 K/UL (ref 0.3–1)
MONOCYTES # BLD AUTO: 1.3 K/UL (ref 0.3–1)
MONOCYTES NFR BLD: 5.8 % (ref 4–15)
MONOCYTES NFR BLD: 6.5 % (ref 4–15)
MV PEAK A VEL: 0.94 M/S
MV PEAK E VEL: 1.01 M/S
MV STENOSIS PRESSURE HALF TIME: 54.27 MS
MV VALVE AREA P 1/2 METHOD: 4.05 CM2
NEUTROPHILS # BLD AUTO: 12.9 K/UL (ref 1.8–7.7)
NEUTROPHILS # BLD AUTO: 17.2 K/UL (ref 1.8–7.7)
NEUTROPHILS NFR BLD: 75.8 % (ref 38–73)
NEUTROPHILS NFR BLD: 77.8 % (ref 38–73)
NRBC BLD-RTO: 1 /100 WBC
NRBC BLD-RTO: 1 /100 WBC
PHOSPHATE SERPL-MCNC: 2.8 MG/DL (ref 2.7–4.5)
PLATELET # BLD AUTO: 322 K/UL (ref 150–450)
PLATELET # BLD AUTO: 347 K/UL (ref 150–450)
PMV BLD AUTO: 10.7 FL (ref 9.2–12.9)
PMV BLD AUTO: 9.8 FL (ref 9.2–12.9)
POC IONIZED CALCIUM: 0.99 MMOL/L (ref 1.06–1.42)
POC TCO2 (MEASURED): 17 MMOL/L (ref 23–29)
POCT GLUCOSE: 163 MG/DL (ref 70–110)
POCT GLUCOSE: 227 MG/DL (ref 70–110)
POCT GLUCOSE: 243 MG/DL (ref 70–110)
POCT GLUCOSE: 93 MG/DL (ref 70–110)
POTASSIUM BLD-SCNC: 4.6 MMOL/L (ref 3.5–5.1)
POTASSIUM SERPL-SCNC: 4.3 MMOL/L (ref 3.5–5.1)
PROT SERPL-MCNC: 6.3 G/DL (ref 6–8.4)
RA MAJOR: 4.81 CM
RA PRESSURE: 15 MMHG
RA WIDTH: 3.63 CM
RBC # BLD AUTO: 2.76 M/UL (ref 4–5.4)
RBC # BLD AUTO: 2.83 M/UL (ref 4–5.4)
RIGHT VENTRICULAR END-DIASTOLIC DIMENSION: 3.9 CM
RV TISSUE DOPPLER FREE WALL SYSTOLIC VELOCITY 1 (APICAL 4 CHAMBER VIEW): 10 CM/S
SAMPLE: ABNORMAL
SINUS: 2.98 CM
SODIUM BLD-SCNC: 136 MMOL/L (ref 136–145)
SODIUM SERPL-SCNC: 139 MMOL/L (ref 136–145)
STJ: 2.73 CM
TDI LATERAL: 0.06 M/S
TDI SEPTAL: 0.05 M/S
TDI: 0.06 M/S
TRICUSPID ANNULAR PLANE SYSTOLIC EXCURSION: 2.66 CM
TROPONIN I SERPL DL<=0.01 NG/ML-MCNC: 2.65 NG/ML (ref 0–0.03)
TROPONIN I SERPL DL<=0.01 NG/ML-MCNC: 2.8 NG/ML (ref 0–0.03)
TROPONIN I SERPL DL<=0.01 NG/ML-MCNC: 2.92 NG/ML (ref 0–0.03)
WBC # BLD AUTO: 16.96 K/UL (ref 3.9–12.7)
WBC # BLD AUTO: 22.06 K/UL (ref 3.9–12.7)

## 2022-02-21 PROCEDURE — 99233 PR SUBSEQUENT HOSPITAL CARE,LEVL III: ICD-10-PCS | Mod: ,,, | Performed by: STUDENT IN AN ORGANIZED HEALTH CARE EDUCATION/TRAINING PROGRAM

## 2022-02-21 PROCEDURE — 80053 COMPREHEN METABOLIC PANEL: CPT | Performed by: STUDENT IN AN ORGANIZED HEALTH CARE EDUCATION/TRAINING PROGRAM

## 2022-02-21 PROCEDURE — 85025 COMPLETE CBC W/AUTO DIFF WBC: CPT | Mod: 91 | Performed by: NURSE PRACTITIONER

## 2022-02-21 PROCEDURE — 85025 COMPLETE CBC W/AUTO DIFF WBC: CPT | Performed by: STUDENT IN AN ORGANIZED HEALTH CARE EDUCATION/TRAINING PROGRAM

## 2022-02-21 PROCEDURE — 36415 COLL VENOUS BLD VENIPUNCTURE: CPT | Performed by: NURSE PRACTITIONER

## 2022-02-21 PROCEDURE — 20600001 HC STEP DOWN PRIVATE ROOM

## 2022-02-21 PROCEDURE — 36415 COLL VENOUS BLD VENIPUNCTURE: CPT | Performed by: STUDENT IN AN ORGANIZED HEALTH CARE EDUCATION/TRAINING PROGRAM

## 2022-02-21 PROCEDURE — 25500020 PHARM REV CODE 255: Performed by: STUDENT IN AN ORGANIZED HEALTH CARE EDUCATION/TRAINING PROGRAM

## 2022-02-21 PROCEDURE — 83735 ASSAY OF MAGNESIUM: CPT | Performed by: STUDENT IN AN ORGANIZED HEALTH CARE EDUCATION/TRAINING PROGRAM

## 2022-02-21 PROCEDURE — 84100 ASSAY OF PHOSPHORUS: CPT | Performed by: STUDENT IN AN ORGANIZED HEALTH CARE EDUCATION/TRAINING PROGRAM

## 2022-02-21 PROCEDURE — 99222 1ST HOSP IP/OBS MODERATE 55: CPT | Mod: ,,, | Performed by: INTERNAL MEDICINE

## 2022-02-21 PROCEDURE — 84484 ASSAY OF TROPONIN QUANT: CPT | Performed by: STUDENT IN AN ORGANIZED HEALTH CARE EDUCATION/TRAINING PROGRAM

## 2022-02-21 PROCEDURE — 94761 N-INVAS EAR/PLS OXIMETRY MLT: CPT

## 2022-02-21 PROCEDURE — 93010 EKG 12-LEAD: ICD-10-PCS | Mod: ,,, | Performed by: INTERNAL MEDICINE

## 2022-02-21 PROCEDURE — 93005 ELECTROCARDIOGRAM TRACING: CPT

## 2022-02-21 PROCEDURE — 25000003 PHARM REV CODE 250: Performed by: STUDENT IN AN ORGANIZED HEALTH CARE EDUCATION/TRAINING PROGRAM

## 2022-02-21 PROCEDURE — 93010 ELECTROCARDIOGRAM REPORT: CPT | Mod: ,,, | Performed by: INTERNAL MEDICINE

## 2022-02-21 PROCEDURE — C9113 INJ PANTOPRAZOLE SODIUM, VIA: HCPCS | Performed by: STUDENT IN AN ORGANIZED HEALTH CARE EDUCATION/TRAINING PROGRAM

## 2022-02-21 PROCEDURE — 99222 PR INITIAL HOSPITAL CARE,LEVL II: ICD-10-PCS | Mod: ,,, | Performed by: INTERNAL MEDICINE

## 2022-02-21 PROCEDURE — 63600175 PHARM REV CODE 636 W HCPCS: Performed by: STUDENT IN AN ORGANIZED HEALTH CARE EDUCATION/TRAINING PROGRAM

## 2022-02-21 PROCEDURE — 99233 SBSQ HOSP IP/OBS HIGH 50: CPT | Mod: ,,, | Performed by: STUDENT IN AN ORGANIZED HEALTH CARE EDUCATION/TRAINING PROGRAM

## 2022-02-21 RX ORDER — FUROSEMIDE 10 MG/ML
40 INJECTION INTRAMUSCULAR; INTRAVENOUS DAILY
Status: DISCONTINUED | OUTPATIENT
Start: 2022-02-22 | End: 2022-02-24 | Stop reason: HOSPADM

## 2022-02-21 RX ORDER — METOPROLOL TARTRATE 25 MG/1
12.5 TABLET ORAL 2 TIMES DAILY
Status: DISCONTINUED | OUTPATIENT
Start: 2022-02-21 | End: 2022-02-24 | Stop reason: HOSPADM

## 2022-02-21 RX ORDER — LISINOPRIL 2.5 MG/1
2.5 TABLET ORAL DAILY
Status: DISCONTINUED | OUTPATIENT
Start: 2022-02-21 | End: 2022-02-24 | Stop reason: HOSPADM

## 2022-02-21 RX ORDER — DOCUSATE SODIUM 100 MG/1
200 CAPSULE, LIQUID FILLED ORAL 2 TIMES DAILY
Status: DISCONTINUED | OUTPATIENT
Start: 2022-02-21 | End: 2022-02-24 | Stop reason: HOSPADM

## 2022-02-21 RX ORDER — ATORVASTATIN CALCIUM 20 MG/1
80 TABLET, FILM COATED ORAL DAILY
Status: DISCONTINUED | OUTPATIENT
Start: 2022-02-22 | End: 2022-02-24 | Stop reason: HOSPADM

## 2022-02-21 RX ADMIN — ATORVASTATIN CALCIUM 40 MG: 20 TABLET, FILM COATED ORAL at 09:02

## 2022-02-21 RX ADMIN — METOPROLOL TARTRATE 12.5 MG: 25 TABLET, FILM COATED ORAL at 08:02

## 2022-02-21 RX ADMIN — CITALOPRAM HYDROBROMIDE 20 MG: 20 TABLET ORAL at 09:02

## 2022-02-21 RX ADMIN — PIPERACILLIN AND TAZOBACTAM 4.5 G: 4; .5 INJECTION, POWDER, LYOPHILIZED, FOR SOLUTION INTRAVENOUS; PARENTERAL at 09:02

## 2022-02-21 RX ADMIN — OXYCODONE 5 MG: 5 TABLET ORAL at 02:02

## 2022-02-21 RX ADMIN — ZOLPIDEM TARTRATE 5 MG: 5 TABLET ORAL at 08:02

## 2022-02-21 RX ADMIN — PIPERACILLIN AND TAZOBACTAM 4.5 G: 4; .5 INJECTION, POWDER, LYOPHILIZED, FOR SOLUTION INTRAVENOUS; PARENTERAL at 06:02

## 2022-02-21 RX ADMIN — DOCUSATE SODIUM 100 MG: 100 CAPSULE, LIQUID FILLED ORAL at 09:02

## 2022-02-21 RX ADMIN — PANTOPRAZOLE SODIUM 40 MG: 40 INJECTION, POWDER, FOR SOLUTION INTRAVENOUS at 09:02

## 2022-02-21 RX ADMIN — OXYCODONE 5 MG: 5 TABLET ORAL at 04:02

## 2022-02-21 RX ADMIN — LISINOPRIL 2.5 MG: 2.5 TABLET ORAL at 03:02

## 2022-02-21 RX ADMIN — DOCUSATE SODIUM 200 MG: 100 CAPSULE, LIQUID FILLED ORAL at 08:02

## 2022-02-21 RX ADMIN — PIPERACILLIN AND TAZOBACTAM 4.5 G: 4; .5 INJECTION, POWDER, LYOPHILIZED, FOR SOLUTION INTRAVENOUS; PARENTERAL at 03:02

## 2022-02-21 RX ADMIN — POLYETHYLENE GLYCOL 3350 17 G: 17 POWDER, FOR SOLUTION ORAL at 09:02

## 2022-02-21 RX ADMIN — INSULIN ASPART 4 UNITS: 100 INJECTION, SOLUTION INTRAVENOUS; SUBCUTANEOUS at 04:02

## 2022-02-21 RX ADMIN — HUMAN ALBUMIN MICROSPHERES AND PERFLUTREN 0.66 MG: 10; .22 INJECTION, SOLUTION INTRAVENOUS at 08:02

## 2022-02-21 RX ADMIN — PROMETHAZINE HYDROCHLORIDE 25 MG: 25 TABLET ORAL at 01:02

## 2022-02-21 RX ADMIN — GABAPENTIN 600 MG: 300 CAPSULE ORAL at 09:02

## 2022-02-21 RX ADMIN — GABAPENTIN 600 MG: 300 CAPSULE ORAL at 08:02

## 2022-02-21 RX ADMIN — PANTOPRAZOLE SODIUM 40 MG: 40 INJECTION, POWDER, FOR SOLUTION INTRAVENOUS at 08:02

## 2022-02-21 RX ADMIN — INSULIN DETEMIR 15 UNITS: 100 INJECTION, SOLUTION SUBCUTANEOUS at 09:02

## 2022-02-21 RX ADMIN — BUSPIRONE HYDROCHLORIDE 10 MG: 5 TABLET ORAL at 08:02

## 2022-02-21 RX ADMIN — SENNOSIDES 8.6 MG: 8.6 TABLET ORAL at 09:02

## 2022-02-21 NOTE — CARE UPDATE
Patient admitted with Hgb 3.7 and WBC 30K.     Troponin elevated to 2.9 and now downtrending. ECHO shows EF reduced to 20%, no prior for comparison. Current chest pain free.       Recommend IV diuresis and evaluation for elevated WBC. Patient will need anemia work up prior to angiogram. Currently on GDMT for CHF.  Will consider ischemic evaluation after infectious and anemia evaluation. Please call with questions.

## 2022-02-21 NOTE — PLAN OF CARE
Problem: Adult Inpatient Plan of Care  Goal: Plan of Care Review  Outcome: Ongoing, Progressing POC reviewed with patient and Aunt at bedside . Patient remains stable with VSS and afebrile this shift. She remains on Pipercillin for elevated WBC . Echo performed at bedside see note.  Heme sable today. No SS  of acute bleed. No BM so far today docusate in creased to BID and senna in place. Patietn refuses liquid medications . Physician aware .  Accu checks  ACHS . Bed remains in low locked position call light in place Continuing to monitor

## 2022-02-21 NOTE — PLAN OF CARE
Problem: Adult Inpatient Plan of Care  Goal: Plan of Care Review  Outcome: Ongoing, Progressing harley admitted from ED this shift . Vss and afebrile. Harley started on piperacillin and received one unit of PRBC and will receive second unit on pm shift . Harley has pure wick in place and working well. BSC in place and stool softeners given . Patient  refusing liquid medications. Bed is in low locked position call light is in place and personal belongings within reach

## 2022-02-21 NOTE — PLAN OF CARE
[facility] ([facility])  Initial Discharge Assessment       Primary Care Provider: Adonis Carey MD    Admission Diagnosis: Hyperglycemia [R73.9]  Elevated troponin [R77.8]  Chest pain [R07.9]  Severe anemia [D64.9]  Abdominal pain, unspecified abdominal location [R10.9]  Chronic pain of lower extremity, unspecified laterality [M79.606, G89.29]    Admission Date: 2/20/2022  Expected Discharge Date: 2/23/2022    Discharge Barriers Identified: None    Payor: MEDICAID / Plan: [employer] [facility] / Product Type: Managed Medicaid /     Extended Emergency Contact Information  Primary Emergency Contact: Mario Salvador  Address: [address]  Home Phone: [phone]  Mobile Phone: [phone]  Relation: Spouse  Secondary Emergency Contact: PerezLeonila  Mobile Phone: [phone]  Relation: Relative    Discharge Plan A: Home     SW met with pt to complete dc assessment. Pt reports living alone, was independent with mobility but required some assistance with ADLs. Pt receives PCS services, 22 hours per week. She has a wheelchair, BSC and shower chair. Pt denies HH/HD/Coumadin. Pts family should be able to provide transportation home at d/c.    Initial Assessment (most recent)     Adult Discharge Assessment - 02/21/22 1308        Discharge Assessment    Assessment Type Discharge Planning Assessment     Confirmed/corrected address, phone number and insurance Yes     Confirmed Demographics Correct on Facesheet     Source of Information patient     Communicated SOCO with patient/caregiver No     Lives With alone     Do you expect to return to your current living situation? Yes     Do you have help at home or someone to help you manage your care at home? Yes     Who are your caregiver(s) and their phone number(s)? Pt receives PCA services 22 hours per week     Prior to hospitilization cognitive status: Alert/Oriented     Current cognitive  status: Alert/Oriented     Walking or Climbing Stairs Difficulty none     Dressing/Bathing Difficulty bathing difficulty, assistance 1 person     Equipment Currently Used at Home bedside commode;shower chair;wheelchair     Readmission within 30 days? Yes     Patient currently being followed by outpatient case management? No     Do you currently have service(s) that help you manage your care at home? Yes     How Many hours does patient receive services 22     Name and Contact number of agency PCA services through [place] Medicaid     Is the pt/caregiver preference to resume services with current agency Yes     Do you take prescription medications? Yes     Do you have prescription coverage? Yes     Do you have any problems affording any of your prescribed medications? TBD     Is the patient taking medications as prescribed? yes     Who is going to help you get home at discharge? family     Are you on dialysis? No     Do you take coumadin? No     Discharge Plan A Home     DME Needed Upon Discharge  other (see comments)   TBD    Discharge Plan discussed with: Patient     Discharge Barriers Identified None               Kristal Campos LCSW  PRN

## 2022-02-22 ENCOUNTER — ANESTHESIA EVENT (OUTPATIENT)
Dept: ENDOSCOPY | Facility: HOSPITAL | Age: 57
DRG: 377 | End: 2022-02-22
Payer: MEDICAID

## 2022-02-22 LAB
ALBUMIN SERPL BCP-MCNC: 2.8 G/DL (ref 3.5–5.2)
ALP SERPL-CCNC: 112 U/L (ref 55–135)
ALT SERPL W/O P-5'-P-CCNC: 29 U/L (ref 10–44)
ANION GAP SERPL CALC-SCNC: 11 MMOL/L (ref 8–16)
ANISOCYTOSIS BLD QL SMEAR: SLIGHT
AST SERPL-CCNC: 33 U/L (ref 10–40)
BASO STIPL BLD QL SMEAR: ABNORMAL
BASOPHILS # BLD AUTO: 0.06 K/UL (ref 0–0.2)
BASOPHILS # BLD AUTO: 0.07 K/UL (ref 0–0.2)
BASOPHILS NFR BLD: 0.4 % (ref 0–1.9)
BASOPHILS NFR BLD: 0.4 % (ref 0–1.9)
BILIRUB SERPL-MCNC: 0.5 MG/DL (ref 0.1–1)
BLD PROD TYP BPU: NORMAL
BLOOD UNIT EXPIRATION DATE: NORMAL
BLOOD UNIT TYPE CODE: 9500
BLOOD UNIT TYPE: NORMAL
BUN SERPL-MCNC: 26 MG/DL (ref 6–20)
CALCIUM SERPL-MCNC: 8.2 MG/DL (ref 8.7–10.5)
CHLORIDE SERPL-SCNC: 106 MMOL/L (ref 95–110)
CO2 SERPL-SCNC: 22 MMOL/L (ref 23–29)
CODING SYSTEM: NORMAL
CREAT SERPL-MCNC: 1.6 MG/DL (ref 0.5–1.4)
DIFFERENTIAL METHOD: ABNORMAL
DIFFERENTIAL METHOD: ABNORMAL
DISPENSE STATUS: NORMAL
EOSINOPHIL # BLD AUTO: 0.1 K/UL (ref 0–0.5)
EOSINOPHIL # BLD AUTO: 0.2 K/UL (ref 0–0.5)
EOSINOPHIL NFR BLD: 0.8 % (ref 0–8)
EOSINOPHIL NFR BLD: 1.3 % (ref 0–8)
ERYTHROCYTE [DISTWIDTH] IN BLOOD BY AUTOMATED COUNT: 18.2 % (ref 11.5–14.5)
ERYTHROCYTE [DISTWIDTH] IN BLOOD BY AUTOMATED COUNT: 18.3 % (ref 11.5–14.5)
EST. GFR  (AFRICAN AMERICAN): 41.2 ML/MIN/1.73 M^2
EST. GFR  (NON AFRICAN AMERICAN): 35.8 ML/MIN/1.73 M^2
GIANT PLATELETS BLD QL SMEAR: PRESENT
GLUCOSE SERPL-MCNC: 128 MG/DL (ref 70–110)
HCT VFR BLD AUTO: 21.5 % (ref 37–48.5)
HCT VFR BLD AUTO: 21.7 % (ref 37–48.5)
HGB BLD-MCNC: 6.8 G/DL (ref 12–16)
HGB BLD-MCNC: 7.2 G/DL (ref 12–16)
HOWELL-JOLLY BOD BLD QL SMEAR: ABNORMAL
HYPOCHROMIA BLD QL SMEAR: ABNORMAL
IMM GRANULOCYTES # BLD AUTO: 0.15 K/UL (ref 0–0.04)
IMM GRANULOCYTES # BLD AUTO: 0.15 K/UL (ref 0–0.04)
IMM GRANULOCYTES NFR BLD AUTO: 0.9 % (ref 0–0.5)
IMM GRANULOCYTES NFR BLD AUTO: 1 % (ref 0–0.5)
LYMPHOCYTES # BLD AUTO: 2.1 K/UL (ref 1–4.8)
LYMPHOCYTES # BLD AUTO: 3.1 K/UL (ref 1–4.8)
LYMPHOCYTES NFR BLD: 13.4 % (ref 18–48)
LYMPHOCYTES NFR BLD: 20.8 % (ref 18–48)
MAGNESIUM SERPL-MCNC: 2.1 MG/DL (ref 1.6–2.6)
MCH RBC QN AUTO: 26.2 PG (ref 27–31)
MCH RBC QN AUTO: 26.2 PG (ref 27–31)
MCHC RBC AUTO-ENTMCNC: 31.6 G/DL (ref 32–36)
MCHC RBC AUTO-ENTMCNC: 33.2 G/DL (ref 32–36)
MCV RBC AUTO: 79 FL (ref 82–98)
MCV RBC AUTO: 83 FL (ref 82–98)
MONOCYTES # BLD AUTO: 1 K/UL (ref 0.3–1)
MONOCYTES # BLD AUTO: 1 K/UL (ref 0.3–1)
MONOCYTES NFR BLD: 6 % (ref 4–15)
MONOCYTES NFR BLD: 6.9 % (ref 4–15)
NEUTROPHILS # BLD AUTO: 10.5 K/UL (ref 1.8–7.7)
NEUTROPHILS # BLD AUTO: 12.5 K/UL (ref 1.8–7.7)
NEUTROPHILS NFR BLD: 69.6 % (ref 38–73)
NEUTROPHILS NFR BLD: 78.5 % (ref 38–73)
NRBC BLD-RTO: 1 /100 WBC
NRBC BLD-RTO: 1 /100 WBC
OVALOCYTES BLD QL SMEAR: ABNORMAL
PHOSPHATE SERPL-MCNC: 3.4 MG/DL (ref 2.7–4.5)
PLATELET # BLD AUTO: 300 K/UL (ref 150–450)
PLATELET # BLD AUTO: 311 K/UL (ref 150–450)
PLATELET BLD QL SMEAR: ABNORMAL
PMV BLD AUTO: 10.7 FL (ref 9.2–12.9)
PMV BLD AUTO: 11.1 FL (ref 9.2–12.9)
POCT GLUCOSE: 101 MG/DL (ref 70–110)
POCT GLUCOSE: 108 MG/DL (ref 70–110)
POCT GLUCOSE: <20 MG/DL (ref 70–110)
POIKILOCYTOSIS BLD QL SMEAR: SLIGHT
POLYCHROMASIA BLD QL SMEAR: ABNORMAL
POTASSIUM SERPL-SCNC: 4.2 MMOL/L (ref 3.5–5.1)
PROT SERPL-MCNC: 6.3 G/DL (ref 6–8.4)
RBC # BLD AUTO: 2.6 M/UL (ref 4–5.4)
RBC # BLD AUTO: 2.75 M/UL (ref 4–5.4)
SCHISTOCYTES BLD QL SMEAR: ABNORMAL
SODIUM SERPL-SCNC: 139 MMOL/L (ref 136–145)
TRANS ERYTHROCYTES VOL PATIENT: NORMAL ML
WBC # BLD AUTO: 15.08 K/UL (ref 3.9–12.7)
WBC # BLD AUTO: 15.95 K/UL (ref 3.9–12.7)

## 2022-02-22 PROCEDURE — 63600175 PHARM REV CODE 636 W HCPCS: Performed by: STUDENT IN AN ORGANIZED HEALTH CARE EDUCATION/TRAINING PROGRAM

## 2022-02-22 PROCEDURE — 99233 SBSQ HOSP IP/OBS HIGH 50: CPT | Mod: ,,, | Performed by: STUDENT IN AN ORGANIZED HEALTH CARE EDUCATION/TRAINING PROGRAM

## 2022-02-22 PROCEDURE — P9021 RED BLOOD CELLS UNIT: HCPCS | Performed by: STUDENT IN AN ORGANIZED HEALTH CARE EDUCATION/TRAINING PROGRAM

## 2022-02-22 PROCEDURE — C9113 INJ PANTOPRAZOLE SODIUM, VIA: HCPCS | Performed by: STUDENT IN AN ORGANIZED HEALTH CARE EDUCATION/TRAINING PROGRAM

## 2022-02-22 PROCEDURE — 80053 COMPREHEN METABOLIC PANEL: CPT | Performed by: STUDENT IN AN ORGANIZED HEALTH CARE EDUCATION/TRAINING PROGRAM

## 2022-02-22 PROCEDURE — 97165 OT EVAL LOW COMPLEX 30 MIN: CPT

## 2022-02-22 PROCEDURE — 84100 ASSAY OF PHOSPHORUS: CPT | Performed by: STUDENT IN AN ORGANIZED HEALTH CARE EDUCATION/TRAINING PROGRAM

## 2022-02-22 PROCEDURE — 97161 PT EVAL LOW COMPLEX 20 MIN: CPT

## 2022-02-22 PROCEDURE — 94761 N-INVAS EAR/PLS OXIMETRY MLT: CPT

## 2022-02-22 PROCEDURE — 83735 ASSAY OF MAGNESIUM: CPT | Performed by: STUDENT IN AN ORGANIZED HEALTH CARE EDUCATION/TRAINING PROGRAM

## 2022-02-22 PROCEDURE — 36430 TRANSFUSION BLD/BLD COMPNT: CPT

## 2022-02-22 PROCEDURE — 36415 COLL VENOUS BLD VENIPUNCTURE: CPT | Performed by: STUDENT IN AN ORGANIZED HEALTH CARE EDUCATION/TRAINING PROGRAM

## 2022-02-22 PROCEDURE — 85025 COMPLETE CBC W/AUTO DIFF WBC: CPT | Mod: 91 | Performed by: STUDENT IN AN ORGANIZED HEALTH CARE EDUCATION/TRAINING PROGRAM

## 2022-02-22 PROCEDURE — 20600001 HC STEP DOWN PRIVATE ROOM

## 2022-02-22 PROCEDURE — 99233 PR SUBSEQUENT HOSPITAL CARE,LEVL III: ICD-10-PCS | Mod: ,,, | Performed by: STUDENT IN AN ORGANIZED HEALTH CARE EDUCATION/TRAINING PROGRAM

## 2022-02-22 PROCEDURE — 25000003 PHARM REV CODE 250: Performed by: STUDENT IN AN ORGANIZED HEALTH CARE EDUCATION/TRAINING PROGRAM

## 2022-02-22 PROCEDURE — 99232 PR SUBSEQUENT HOSPITAL CARE,LEVL II: ICD-10-PCS | Mod: ,,, | Performed by: FAMILY MEDICINE

## 2022-02-22 PROCEDURE — 99232 SBSQ HOSP IP/OBS MODERATE 35: CPT | Mod: ,,, | Performed by: FAMILY MEDICINE

## 2022-02-22 PROCEDURE — 25000003 PHARM REV CODE 250: Performed by: FAMILY MEDICINE

## 2022-02-22 RX ORDER — POLYETHYLENE GLYCOL 3350 17 G/17G
119 POWDER, FOR SOLUTION ORAL ONCE
Status: COMPLETED | OUTPATIENT
Start: 2022-02-22 | End: 2022-02-22

## 2022-02-22 RX ADMIN — FUROSEMIDE 40 MG: 10 INJECTION INTRAMUSCULAR; INTRAVENOUS at 10:02

## 2022-02-22 RX ADMIN — PANTOPRAZOLE SODIUM 40 MG: 40 INJECTION, POWDER, FOR SOLUTION INTRAVENOUS at 09:02

## 2022-02-22 RX ADMIN — GABAPENTIN 600 MG: 300 CAPSULE ORAL at 07:02

## 2022-02-22 RX ADMIN — POLYETHYLENE GLYCOL 3350 119 G: 17 POWDER, FOR SOLUTION ORAL at 04:02

## 2022-02-22 RX ADMIN — METOPROLOL TARTRATE 12.5 MG: 25 TABLET, FILM COATED ORAL at 09:02

## 2022-02-22 RX ADMIN — PIPERACILLIN AND TAZOBACTAM 4.5 G: 4; .5 INJECTION, POWDER, LYOPHILIZED, FOR SOLUTION INTRAVENOUS; PARENTERAL at 10:02

## 2022-02-22 RX ADMIN — ATORVASTATIN CALCIUM 80 MG: 20 TABLET, FILM COATED ORAL at 09:02

## 2022-02-22 RX ADMIN — GABAPENTIN 600 MG: 300 CAPSULE ORAL at 09:02

## 2022-02-22 RX ADMIN — DOCUSATE SODIUM 200 MG: 100 CAPSULE, LIQUID FILLED ORAL at 09:02

## 2022-02-22 RX ADMIN — CITALOPRAM HYDROBROMIDE 20 MG: 20 TABLET ORAL at 09:02

## 2022-02-22 RX ADMIN — METOPROLOL TARTRATE 12.5 MG: 25 TABLET, FILM COATED ORAL at 07:02

## 2022-02-22 RX ADMIN — PANTOPRAZOLE SODIUM 40 MG: 40 INJECTION, POWDER, FOR SOLUTION INTRAVENOUS at 07:02

## 2022-02-22 RX ADMIN — LISINOPRIL 2.5 MG: 2.5 TABLET ORAL at 09:02

## 2022-02-22 RX ADMIN — OXYCODONE 5 MG: 5 TABLET ORAL at 10:02

## 2022-02-22 RX ADMIN — SENNOSIDES 8.6 MG: 8.6 TABLET ORAL at 09:02

## 2022-02-22 RX ADMIN — BUSPIRONE HYDROCHLORIDE 10 MG: 5 TABLET ORAL at 07:02

## 2022-02-22 RX ADMIN — DOCUSATE SODIUM 200 MG: 100 CAPSULE, LIQUID FILLED ORAL at 07:02

## 2022-02-22 RX ADMIN — OXYCODONE 5 MG: 5 TABLET ORAL at 07:02

## 2022-02-22 NOTE — PT/OT/SLP EVAL
Occupational Therapy  Co- Evaluation    Name: Casie Salvador  MRN: [id number]  Admitting Diagnosis:  Anemia  Recent Surgery: * No surgery found *    Co-treatment performed due to patient's multiple deficits requiring two skilled therapists  to appropriately and safely assess patient's strength and endurance while facilitating functional tasks in addition to accommodating for patient's activity tolerance.     Recommendations:     Discharge Recommendations: home health OT  Discharge Equipment Recommendations:  none  Barriers to discharge:  None    Assessment:     Casie Salvador is a 57 y.o. female with a medical diagnosis of Anemia.  She presents with deficits in mobility and self-care tasks on this date. Hb noted to be decreased on this date therefore pt. Limited to sitting EOB only. Pt. Cooperative and appears anxious to return to home environment. Performance deficits affecting function: weakness, impaired endurance, impaired self care skills, impaired functional mobilty, pain, decreased lower extremity function. Pt. Tolerated evaluation fairly and would benefit from continued OT services to maximize safety and I with ADL tasks.      Rehab Prognosis: Good; patient would benefit from acute skilled OT services to address these deficits and reach maximum level of function.       Plan:     Patient to be seen 3 x/week to address the above listed problems via self-care/home management, therapeutic activities, therapeutic exercises, neuromuscular re-education  · Plan of Care Expires: 03/24/22  · Plan of Care Reviewed with: patient    Subjective     Chief Complaint: Being early in the morning as well as wanting to go home today.   Patient/Family Comments/goals: to get back home     Occupational Profile:  Living Environment: Pt. Resides with her spouse in  house with no steps.  Pt. Has a tub shower with a seat.    Previous level of function: Pt. Reported on some days she doesn't need help and at other times  she does. Spouse does assist her as needed  Roles and Routines: caretaker of self at times, spouse   Equipment Used at Home:  bedside commode, wheelchair, shower chair  Assistance upon Discharge: spouse    Pain/Comfort:  Pain Rating 1: 7/10  Location - Side 1: Left  Location 1: foot  Pain Addressed 1: Reposition, Distraction  Pain Rating Post-Intervention 1: 7/10    Patients cultural, spiritual, Hindu conflicts given the current situation: no    Objective:     Communicated with: nurse prior to session.  Patient found supine with peripheral IV, PureWick, oxygen upon OT entry to room.    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)   Orthopedic Precautions:N/A   Braces:  (has prosthetic for LLE)  Respiratory Status: Nasal cannula, flow 2 L/min    Occupational Performance:    Bed Mobility:    · Patient completed Supine to Sit with stand by assistance   · Sit to supine : SBA    Functional Mobility/Transfers:  · Not tested on this date    Activities of Daily Living:  · Not tested    Cognitive/Visual Perceptual:  Cognitive/Psychosocial Skills:     -       Oriented to: Person, Place, Time and Situation   -       Follows Commands/attention:Follows multistep  commands  -       Communication: clear/fluent  -       Memory: No Deficits noted  -       Safety awareness/insight to disability: intact   -       Mood/Affect/Coping skills/emotional control: appeared to be a little frustrated  Visual/Perceptual:      -Intact .    Physical Exam:  Balance: -       sit: good  Postural examination/scapula alignment:    -       Rounded shoulders  -       Posterior pelvic tilt  Skin integrity: Visible skin intact  Upper Extremity Range of Motion:     -       Right Upper Extremity: WFL  -       Left Upper Extremity: WFL  Upper Extremity Strength:    -       Right Upper Extremity: WFL  -       Left Upper Extremity: WFL   Strength:    -       Right Upper Extremity: WFL  -       Left Upper Extremity: WFL    [facility] 6 Click ADL:  [facility]  Total Score: 21    Treatment & Education:  Pt. Educated on role of OT and POC  Education:    Patient left supine with all lines intact and call button in reach    GOALS:   Multidisciplinary Problems     Occupational Therapy Goals        Problem: Occupational Therapy Goal    Goal Priority Disciplines Outcome Interventions   Occupational Therapy Goal     OT, PT/OT     Description: Goals to be met by: 02-28-22     Patient will increase functional independence with ADLs by performing:    UE Dressing with Set-up Assistance.  LE Dressing with Supervision.  Grooming while seated with Set-up Assistance.  Supine to sit with Bland.  Stand pivot transfers with Contact Guard Assistance with prosthetic in place and RW  Toilet transfer to bedside commode with Contact Guard Assistance.                     History:     Past Medical History:   Diagnosis Date    Coronary artery disease     Diabetes mellitus     Encounter for blood transfusion     Heart attack     Hypertension          Past Surgical History:   Procedure Laterality Date    APPENDECTOMY      stents      TUBAL LIGATION         Time Tracking:     OT Date of Treatment: 02/22/22  OT Start Time: 0909  OT Stop Time: 0930  OT Total Time (min): 21 min    Billable Minutes:Evaluation 21 2/22/2022

## 2022-02-22 NOTE — PROGRESS NOTES
[facility] ([facility])  [facility] Medicine  Progress Note    Patient Name: Casie Salvador  MRN: [id number]  Patient Class: IP- Inpatient   Admission Date: 2/20/2022  Length of Stay: 2 days  Attending Physician: Aryan Roberts MD  Primary Care Provider: Adonis Carey MD        Subjective:     Principal Problem:Anemia        HPI:  Ms. Salvador is a 58 yo F with a h/o DM II, hypertension, chronic pain, CAD s/p PCI, and opioid induced constipation presents with abdominal pain found to severe anemia. Patient baseline Hgb is 12 on labs two weeks ago. She denies hematochezia, melena, hemoptysis, hematemesis, prior episodes of anemia, lightheadedness, dizziness, dyspnea, and visions changes. She does note chronic abdominal pain in LUQ that is dull. States pain has worsened and last BM over one week ago. Denies NSAID use, fever, chills, and sick contacts.    In the ED, PRIETO 88/51, HR 88, RR 21, and afebrile. Labs notable for Hgb 3.7, WBC 20, Lactate 2.9. CT A/P with contrast with no significant interval change since prior CT of the abdomen and pelvis 02/07/2022 or new acute intra-abdominal process. CXR with new cardiomegaly from prior in 2018. Patient was transfused 2 units of blood. Latrer in the ED, nursing reported coffee ground emesis shortly after patient shortly after drinking soda.      Overview/Hospital Course:  No notes on file    Interval History:   Overnight patient with melenic bowel movement. Hgb this morning 6.8 and transfused 1 unit RBC. Patient denies chest pain, abdominal pain, lightheadedness, and dizziness.       Review of Systems   Constitutional:  Negative for appetite change, chills, diaphoresis, fatigue and fever.   HENT:  Negative for rhinorrhea and sore throat.    Respiratory:  Negative for cough, chest tightness and shortness of breath.    Cardiovascular:  Negative for chest pain and palpitations.   Gastrointestinal:  Positive for blood in stool (melena). Negative for abdominal  distention, abdominal pain, constipation, diarrhea, nausea and vomiting.   Endocrine: Negative for cold intolerance.   Genitourinary:  Negative for dysuria and hematuria.   Musculoskeletal:  Positive for back pain. Negative for arthralgias and myalgias.   Skin:  Negative for rash and wound.   Neurological:  Negative for dizziness, weakness, numbness and headaches.   Psychiatric/Behavioral:  Negative for agitation.    Objective:     Vital Signs (Most Recent):  Temp: 98.3 °F (36.8 °C) (02/22/22 1151)  Pulse: (!) 58 (02/22/22 1401)  Resp: 18 (02/22/22 1151)  BP: (!) 101/59 (02/22/22 1151)  SpO2: 95 % (02/22/22 1151)   Vital Signs (24h Range):  Temp:  [98.3 °F (36.8 °C)-99.2 °F (37.3 °C)] 98.3 °F (36.8 °C)  Pulse:  [58-79] 58  Resp:  [14-19] 18  SpO2:  [68 %-97 %] 95 %  BP: (101-126)/(58-80) 101/59     Weight: 84.4 kg (186 lb)  Body mass index is 25.94 kg/m².    Intake/Output Summary (Last 24 hours) at 2/22/2022 1443  Last data filed at 2/22/2022 1400  Gross per 24 hour   Intake 360 ml   Output 1500 ml   Net -1140 ml      Physical Exam  Constitutional:       General: She is not in acute distress.     Appearance: Normal appearance. She is obese.   HENT:      Head: Normocephalic and atraumatic.      Mouth/Throat:      Mouth: Mucous membranes are moist.   Eyes:      General: No scleral icterus.     Extraocular Movements: Extraocular movements intact.      Conjunctiva/sclera: Conjunctivae normal.   Cardiovascular:      Rate and Rhythm: Normal rate and regular rhythm.      Heart sounds: No murmur heard.    No gallop.   Pulmonary:      Effort: Pulmonary effort is normal. No respiratory distress.      Breath sounds: Normal breath sounds. No wheezing or rales.   Abdominal:      General: Abdomen is flat. Bowel sounds are normal.      Palpations: Abdomen is soft.      Tenderness: There is no abdominal tenderness. There is no guarding or rebound.   Musculoskeletal:         General: No swelling or tenderness.      Comments: Left  BKA well healed.  Right TMA well healed.   Skin:     Findings: No rash.   Neurological:      General: No focal deficit present.      Mental Status: She is alert and oriented to person, place, and time. Mental status is at baseline.   Psychiatric:         Mood and Affect: Mood normal.         Behavior: Behavior normal.       Significant Labs: BMP:   Recent Labs   Lab 02/22/22  0445   *      K 4.2      CO2 22*   BUN 26*   CREATININE 1.6*   CALCIUM 8.2*   MG 2.1     CBC:   Recent Labs   Lab 02/21/22  0400 02/21/22  2103 02/22/22  0445   WBC 22.06* 16.96* 15.95*   HGB 7.4* 7.4* 6.8*   HCT 22.2* 23.6* 21.5*    322 311       Significant Imaging: I have reviewed all pertinent imaging results/findings within the past 24 hours.      Assessment/Plan:      * Anemia  - No evidence of overt blood loss PTA, but with coffee ground emesis in the ED and LUQ pain.  - Concern for blood loss given acute time period, but will evaluate other etiologies.   - Baseline Hgb 12  - CT A/P without bleeding noted  - Admission Hgb 3.7, MCV 76 (transfused 3 units)  - No evidence of hemolysis on labs, or schistocytes   - Iron panel without overt ROGER. Normal B12/folate.  - Retic 4.4%, suggestive of hypoproliferation  - Melena overnight on 2/21, Hgb 6.8. Transfused 1 unit  - GI consulted  -- Planning for endoscopy on 2/23  -- Bowel prep ordered  - IV PPI  - Trend Hgb    Leukocytosis  - WBC 20 -> 30 on admission. Improving.  - No s/s of infection other than abdominal pain with unremarkable CTA A/P and CXR.  - Hypotension on admission, now improved  - Blood culture NGTD  - UA unremarkable  - Empiric Zosyn, plan for 5 day course  - Leokocytosis improving      Chronic systolic congestive heart failure  - TTE: EF 20%, Grade II LV DD, elevated CVP [no prior]  - No c/o dyspnea  - Continue metoprolol, lisinopril  - Continue lasix 40mg IV daily    CAD (coronary artery disease)  - h/o of CAD s/p PCI. [facility]  - Holding ASA given concern for GIB  - Troponin peak at 2.925  - Cardiology consulted for ischemic evaluation in preparation for endoscopy with GI  -- Patient will need management of anemia prior to potential LHC  - Continue atorvastatin  - Continue metoprolol 12.5mg      Insomnia  - Continue home Ambien      Depression  - Continue home Buspar, Citalopram    Constipation due to opioid therapy  - Senna, Docusate    Chronic pain  - On Percocet 5 at home for back pain  - Oxycodone PRN    DM (diabetes mellitus), type 2  - Home: Glargine 50U qhs, Glipizide 10mg, Metformin 1000mg BID  - A1c 5.8%  - Determir 10U qhs  - SSI  - Hypoglycemia protocol  - Accuchecks   - Continue home gabapentin      Hypertension  - Holding home Amlodipine 10mg, Hydralazine 50mg BID  - PRN for SBP >160      VTE Risk Mitigation (From admission, onward)         Ordered     IP VTE LOW RISK PATIENT  Once         02/20/22 1022     Place sequential compression device  Until discontinued         02/20/22 1022                Discharge Planning   SOCO: 2/25/2022     Code Status: Full Code   Is the patient medically ready for discharge?: No    Reason for patient still in hospital (select all that apply): Patient trending condition, Laboratory test, Treatment, Consult recommendations, PT / OT recommendations and Pending disposition  Discharge Plan A: Home                  Aryan Roberts MD  Department of Hospital Medicine   [facility] ([facility])

## 2022-02-22 NOTE — ASSESSMENT & PLAN NOTE
Casie Salvador is a 57 y.o. female with history of  DM II, hypertension, CAD s/p PCI who presents for chest and abdominal pain.  GI consulted for anemia.  The patient has acute anemia with no iron deficiency and no overt GI bleeding.  Low suspicion for GI source of blood loss in the absence of overt bleeding, however we can investigate with EGD and colonoscopy once her leukocytosis and hypotension is investigated.  She would need extended bowel prep due to large stool burden, therefore would recommend full liquid diet for now and MiraLax b.i.d.    2/22: melena overnight.  Hgb drop to 6.8, plans to transfuse 1 unit PRBC.  After further discussion, said she is willing to attempt gatorade+miralax prep for tentative EGD/Colonoscopy tomorrow, 2/23. On BSA, leukocytosis improving.     Recommendations:  - clear liquid diet today, 2/22  - NPO at midnight  - plan for EGD/Colonoscopy tentatively tomorrow, 2/23  - Gatorade+ miralax bowel prep  - transfuse to keep Hgb >7  - monitor for signs of over bleeding  - avoid narcotics  - please notify GI of any acute changes in the patient's clinical status

## 2022-02-22 NOTE — SUBJECTIVE & OBJECTIVE
Interval History:   Overnight patient with melenic bowel movement. Hgb this morning 6.8 and transfused 1 unit RBC. Patient denies chest pain, abdominal pain, lightheadedness, and dizziness.       Review of Systems   Constitutional:  Negative for appetite change, chills, diaphoresis, fatigue and fever.   HENT:  Negative for rhinorrhea and sore throat.    Respiratory:  Negative for cough, chest tightness and shortness of breath.    Cardiovascular:  Negative for chest pain and palpitations.   Gastrointestinal:  Positive for blood in stool (melena). Negative for abdominal distention, abdominal pain, constipation, diarrhea, nausea and vomiting.   Endocrine: Negative for cold intolerance.   Genitourinary:  Negative for dysuria and hematuria.   Musculoskeletal:  Positive for back pain. Negative for arthralgias and myalgias.   Skin:  Negative for rash and wound.   Neurological:  Negative for dizziness, weakness, numbness and headaches.   Psychiatric/Behavioral:  Negative for agitation.    Objective:     Vital Signs (Most Recent):  Temp: 98.3 °F (36.8 °C) (02/22/22 1151)  Pulse: (!) 58 (02/22/22 1401)  Resp: 18 (02/22/22 1151)  BP: (!) 101/59 (02/22/22 1151)  SpO2: 95 % (02/22/22 1151)   Vital Signs (24h Range):  Temp:  [98.3 °F (36.8 °C)-99.2 °F (37.3 °C)] 98.3 °F (36.8 °C)  Pulse:  [58-79] 58  Resp:  [14-19] 18  SpO2:  [68 %-97 %] 95 %  BP: (101-126)/(58-80) 101/59     Weight: 84.4 kg (186 lb)  Body mass index is 25.94 kg/m².    Intake/Output Summary (Last 24 hours) at 2/22/2022 1443  Last data filed at 2/22/2022 1400  Gross per 24 hour   Intake 360 ml   Output 1500 ml   Net -1140 ml      Physical Exam  Constitutional:       General: She is not in acute distress.     Appearance: Normal appearance. She is obese.   HENT:      Head: Normocephalic and atraumatic.      Mouth/Throat:      Mouth: Mucous membranes are moist.   Eyes:      General: No scleral icterus.     Extraocular Movements: Extraocular movements intact.       Conjunctiva/sclera: Conjunctivae normal.   Cardiovascular:      Rate and Rhythm: Normal rate and regular rhythm.      Heart sounds: No murmur heard.    No gallop.   Pulmonary:      Effort: Pulmonary effort is normal. No respiratory distress.      Breath sounds: Normal breath sounds. No wheezing or rales.   Abdominal:      General: Abdomen is flat. Bowel sounds are normal.      Palpations: Abdomen is soft.      Tenderness: There is no abdominal tenderness. There is no guarding or rebound.   Musculoskeletal:         General: No swelling or tenderness.      Comments: Left BKA well healed.  Right TMA well healed.   Skin:     Findings: No rash.   Neurological:      General: No focal deficit present.      Mental Status: She is alert and oriented to person, place, and time. Mental status is at baseline.   Psychiatric:         Mood and Affect: Mood normal.         Behavior: Behavior normal.       Significant Labs: BMP:   Recent Labs   Lab 02/22/22  0445   *      K 4.2      CO2 22*   BUN 26*   CREATININE 1.6*   CALCIUM 8.2*   MG 2.1     CBC:   Recent Labs   Lab 02/21/22  0400 02/21/22  2103 02/22/22  0445   WBC 22.06* 16.96* 15.95*   HGB 7.4* 7.4* 6.8*   HCT 22.2* 23.6* 21.5*    322 311       Significant Imaging: I have reviewed all pertinent imaging results/findings within the past 24 hours.

## 2022-02-22 NOTE — PROGRESS NOTES
[facility] - Oncology ([facility])  Gastroenterology  Progress Note    Patient Name: Casie Salvador  MRN: [id number]  Admission Date: 2/20/2022  Hospital Length of Stay: 2 days  Code Status: Full Code   Attending Provider: Aryan Roberts MD  Consulting Provider: Radha Montoya DNP  Primary Care Physician: Adonis Carey MD  Principal Problem: Anemia      Subjective:   GI initially consulted on 2/20 for profound anemia in setting of chest pain.     Interval History: denies chest pain, denies abdominal pain.  Reports 1 black bowel movement overnight.  She states she wants to go home.  Hgb 6.8 this AM.  Last received 3 units PRBCs upon admission, planning for 1 more today.  Echo with EF 20%.  Cardiology not planning to proceed with intervention until anemia has been investigated.  Last troponin 2.803 on 2/21 @ 1556.  We discussed recommendations for EGD/colonoscopy and she states she is unable to tolerate liquid medications without vomiting.  Does not want to attempt GoLytely prep.    Review of Systems   Respiratory:  Negative for shortness of breath.    Cardiovascular:  Negative for chest pain.   Gastrointestinal:  Positive for blood in stool (black BM overnight). Negative for abdominal pain.   Objective:     Vital Signs (Most Recent):  Temp: 98.7 °F (37.1 °C) (02/22/22 0812)  Pulse: 67 (02/22/22 0812)  Resp: 17 (02/22/22 1013)  BP: (!) 121/58 (02/22/22 0812)  SpO2: (!) 92 % (02/22/22 0812)   Vital Signs (24h Range):  Temp:  [98.7 °F (37.1 °C)-99.7 °F (37.6 °C)] 98.7 °F (37.1 °C)  Pulse:  [67-79] 67  Resp:  [14-19] 17  SpO2:  [68 %-97 %] 92 %  BP: (115-126)/(58-80) 121/58     Weight: 84.4 kg (186 lb) (02/21/22 1847)  Body mass index is 25.94 kg/m².      Intake/Output Summary (Last 24 hours) at 2/22/2022 1122  Last data filed at 2/22/2022 1013  Gross per 24 hour   Intake --   Output 800 ml   Net -800 ml       Lines/Drains/Airways       Drain  Duration             Female External Urinary Catheter 02/20/22  1200 1 day              Peripheral Intravenous Line  Duration                  Peripheral IV - Single Lumen 02/22/22 0955 20 G;1 3/4 in Left Forearm <1 day                    Physical Exam  Vitals reviewed.   Constitutional:       General: She is not in acute distress.     Appearance: She is not ill-appearing.   HENT:      Head: Normocephalic and atraumatic.   Eyes:      Extraocular Movements: Extraocular movements intact.   Pulmonary:      Effort: Pulmonary effort is normal. No respiratory distress.   Skin:     General: Skin is warm and dry.      Capillary Refill: Capillary refill takes less than 2 seconds.   Neurological:      General: No focal deficit present.      Mental Status: She is alert and oriented to person, place, and time. Mental status is at baseline.   Psychiatric:         Mood and Affect: Mood normal.         Behavior: Behavior normal.         Thought Content: Thought content normal.       Significant Labs:  CBC:   Recent Labs   Lab 02/21/22  0400 02/21/22  2103 02/22/22  0445   WBC 22.06* 16.96* 15.95*   HGB 7.4* 7.4* 6.8*   HCT 22.2* 23.6* 21.5*    322 311     CMP:   Recent Labs   Lab 02/22/22  0445   *   CALCIUM 8.2*   ALBUMIN 2.8*   PROT 6.3      K 4.2   CO2 22*      BUN 26*   CREATININE 1.6*   ALKPHOS 112   ALT 29   AST 33   BILITOT 0.5     Coagulation:   Recent Labs   Lab 02/20/22  1329   INR 1.0     All pertinent lab results from the last 24 hours have been reviewed.      Significant Imaging:  Imaging results within the past 24 hours have been reviewed.    Assessment/Plan:     * Anemia  Casie Salvador is a 57 y.o. female with history of  DM II, hypertension, CAD s/p PCI who presents for chest and abdominal pain.  GI consulted for anemia.  The patient has acute anemia with no iron deficiency and no overt GI bleeding.  Low suspicion for GI source of blood loss in the absence of overt bleeding, however we can investigate with EGD and colonoscopy once her  leukocytosis and hypotension is investigated.  She would need extended bowel prep due to large stool burden, therefore would recommend full liquid diet for now and MiraLax b.i.d.    2/22: melena overnight.  Hgb drop to 6.8, plans to transfuse 1 unit PRBC.  After further discussion, said she is willing to attempt gatorade+miralax prep for tentative EGD/Colonoscopy tomorrow, 2/23. On BSA, leukocytosis improving.     Recommendations:  - clear liquid diet today, 2/22  - NPO at midnight  - plan for EGD/Colonoscopy tentatively tomorrow, 2/23  - Gatorade+ miralax bowel prep  - transfuse to keep Hgb >7  - monitor for signs of over bleeding  - avoid narcotics  - please notify GI of any acute changes in the patient's clinical status        Thank you for your consult. I will follow-up with patient. Please contact us if you have any additional questions.    Radha Montoya DNP  Gastroenterology  [facility] - Oncology ([facility])

## 2022-02-22 NOTE — HPI
Casie Salvador is a 57 y.o. female with history of DM II, hypertension, CAD s/p PCI who presents for chest and abdominal pain.  GI consulted for anemia.  The patient reports that she currently presents for left upper quadrant abdominal pain that recurred since her discharge from [facility], as well as chest pain.  She describes the left upper quadrant pain as dull, nonradiating, started to get worse over the past few days since she has not had a bowel movement since last Saturday.  She was recently admitted at [facility] for similar symptoms at which time it appears that she was diagnosed with constipation and had a bowel movement on Saturday after which she was discharged.  On admission, she was afebrile, tachycardic and hypotensive, Hgb noted to be 3.4, down from 12 2 weeks ago.  Leukocytosis up to 30,000. Trop rising to 0.66. Denies any melena, hematochezia, hematemesis.  Her last bowel movement was a week ago and was brown.  Has never had an EGD or colonoscopy.  She underwent a FOBT test in the ED that was negative.  CT abdomen pelvis with no acute findings.

## 2022-02-22 NOTE — ASSESSMENT & PLAN NOTE
- h/o of CAD s/p PCI. Stated last PCI over 3 years ago at [facility].  - Holding ASA given concern for GIB  - Troponin peak at 2.925  - Cardiology consulted for ischemic evaluation in preparation for endoscopy with GI  -- Patient will need management of anemia prior to potential LHC  - Continue atorvastatin  - Continue metoprolol 12.5mg

## 2022-02-22 NOTE — PT/OT/SLP EVAL
"Physical Therapy Evaluation    Patient Name:  Casie Salvador   MRN:  [id number]    Recommendations:     Discharge Recommendations:  home health PT   Discharge Equipment Recommendations: none   Barriers to discharge: None    Assessment:     Casie Salvador is a 57 y.o. female admitted with a medical diagnosis of Anemia.  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 Patient presented with increased drowsiness and decreased hemoglobin levels throughout session, leading to difficulty with participation in today's evaluation. She demonstrates ability to complete bed mobility with SBA, and sat EOB with supervision.    Rehab Prognosis: Good; patient would benefit from acute skilled PT services to address these deficits and reach maximum level of function.    Recent Surgery: * No surgery found *      Plan:     During this hospitalization, patient to be seen 3 x/week to address the identified rehab impairments via gait training, therapeutic activities and progress toward the following goals:    · Plan of Care Expires:  03/24/22    Subjective     Chief Complaint: "I don't always wear my prosthetic all day long."  Patient/Family Comments/goals: return home to [place]  Pain/Comfort:  · Pain Rating 1: 7/10  · Location - Side 1: Left  · Location - Orientation 1: generalized  · Location 1: foot  · Pain Addressed 1: Reposition, Distraction, Cessation of Activity  · Pain Rating Post-Intervention 1: 7/10    Patients cultural, spiritual, Confucianist conflicts given the current situation: no    Living Environment:  Patient lives in a [facility] with her  with 0 JULIA. There is a tub-shower in the bathroom with a shower chair and " grab bars inside.   Prior to admission, patients level of function was occasionally requiring assistance for ADLs. She ambulates with use of RW and R prosthetic for household distances. For longer distances, patient uses w/c for mobility. Equipment used at home: wheelchair, shower chair, bedside commode, walker, rolling.  DME owned (not currently used): none.  Upon discharge, patient will have assistance from .    Objective:   Therapy evaluation completed with Occupational Therapist to best establish plan of care for acute setting and to provide skilled treatment.  Communicated with nurse prior to session.  Patient found HOB elevated with bed alarm, PureWick, telemetry, oxygen, peripheral IV  upon PT entry to room.    General Precautions: Standard, fall (R BKA, L midfoot amputation)   Orthopedic Precautions:N/A   Braces:  (RLE prosthetic device)  Respiratory Status: Nasal cannula, flow 2 L/min    Exams:  · Cognitive Exam:  Patient is oriented to Person, Place and Situation  · Gross Motor Coordination:  WFL  · RLE ROM: WFL except inability to assess ankle PF/DF d/t R BKA  · RLE Strength: WFL except inability to assess ankle d/t R BKA  · LLE ROM: WFL except ankle not assessed d/t L midfoot amputation and increased pain  · LLE Strength: WFL except ankle not assessed d/t L midfoot amputation and increased pain    Functional Mobility:  · Bed Mobility:     · Scooting: stand by assistance and anteriorly toward EOB in seated, total x2 toward HOB using drawsheet method  · Supine to Sit: stand by assistance  · Sit to Supine: stand by assistance  · Transfers: not assessed d/t patient's increased drowsiness and low hemoglobin    · Gait: not assessed d/t patient's increased drowsiness and low hemoglobin  · Patient's prosthetic is in room on sofa, shoes are in closet  · Balance: sitting: supervision, UE support required at EOB    Therapeutic Activities and Exercises:   Patient educated on PT POC, call for assistance, bed  mobility, importance of EOB activity, safety awareness    AM-PAC 6 CLICK MOBILITY  Total Score:17     Patient left HOB elevated with all lines intact, call button in reach, bed alarm on and nurse notified.    GOALS:   Multidisciplinary Problems     Physical Therapy Goals        Problem: Physical Therapy Goal    Goal Priority Disciplines Outcome Goal Variances Interventions   Physical Therapy Goal     PT, PT/OT Ongoing, Progressing     Description: Goals to be met by: 2022     Patient will increase functional independence with mobility by performin. Supine to sit with Modified [place]  2. Sit to supine with Modified [place]  3. Sit to stand transfer with Modified [place] and using RW or LRAD  4. Bed to chair transfer with SBA using RW or LRAD  5. Gait  x 50 feet with Stand-by Assistance using Rolling Walker or LRAD.   6. Lower extremity exercise program x15 reps per handout, with assistance as needed                     History:     Past Medical History:   Diagnosis Date    Coronary artery disease     Diabetes mellitus     Encounter for blood transfusion     Heart attack     Hypertension        Past Surgical History:   Procedure Laterality Date    APPENDECTOMY      stents      TUBAL LIGATION         Time Tracking:     PT Received On: 22  PT Start Time: 909     PT Stop Time: 930  PT Total Time (min): 21 min     Billable Minutes: Evaluation 21      2022

## 2022-02-22 NOTE — SUBJECTIVE & OBJECTIVE
Subjective:   GI initially consulted on 2/20 for profound anemia in setting of chest pain.     Interval History: denies chest pain, denies abdominal pain.  Reports 1 black bowel movement overnight.  She states she wants to go home.  Hgb 6.8 this AM.  Last received 3 units PRBCs upon admission, planning for 1 more today.  Echo with EF 20%.  Cardiology not planning to proceed with intervention until anemia has been investigated.  Last troponin 2.803 on 2/21 @ 1556.  We discussed recommendations for EGD/colonoscopy and she states she is unable to tolerate liquid medications without vomiting.  Does not want to attempt GoLytely prep.    Review of Systems   Respiratory:  Negative for shortness of breath.    Cardiovascular:  Negative for chest pain.   Gastrointestinal:  Positive for blood in stool (black BM overnight). Negative for abdominal pain.   Objective:     Vital Signs (Most Recent):  Temp: 98.7 °F (37.1 °C) (02/22/22 0812)  Pulse: 67 (02/22/22 0812)  Resp: 17 (02/22/22 1013)  BP: (!) 121/58 (02/22/22 0812)  SpO2: (!) 92 % (02/22/22 0812)   Vital Signs (24h Range):  Temp:  [98.7 °F (37.1 °C)-99.7 °F (37.6 °C)] 98.7 °F (37.1 °C)  Pulse:  [67-79] 67  Resp:  [14-19] 17  SpO2:  [68 %-97 %] 92 %  BP: (115-126)/(58-80) 121/58     Weight: 84.4 kg (186 lb) (02/21/22 1847)  Body mass index is 25.94 kg/m².      Intake/Output Summary (Last 24 hours) at 2/22/2022 1122  Last data filed at 2/22/2022 1013  Gross per 24 hour   Intake --   Output 800 ml   Net -800 ml       Lines/Drains/Airways       Drain  Duration             Female External Urinary Catheter 02/20/22 1200 1 day              Peripheral Intravenous Line  Duration                  Peripheral IV - Single Lumen 02/22/22 0955 20 G;1 3/4 in Left Forearm <1 day                    Physical Exam  Vitals reviewed.   Constitutional:       General: She is not in acute distress.     Appearance: She is not ill-appearing.   HENT:      Head: Normocephalic and atraumatic.   Eyes:       Extraocular Movements: Extraocular movements intact.   Pulmonary:      Effort: Pulmonary effort is normal. No respiratory distress.   Skin:     General: Skin is warm and dry.      Capillary Refill: Capillary refill takes less than 2 seconds.   Neurological:      General: No focal deficit present.      Mental Status: She is alert and oriented to person, place, and time. Mental status is at baseline.   Psychiatric:         Mood and Affect: Mood normal.         Behavior: Behavior normal.         Thought Content: Thought content normal.       Significant Labs:  CBC:   Recent Labs   Lab 02/21/22  0400 02/21/22  2103 02/22/22  0445   WBC 22.06* 16.96* 15.95*   HGB 7.4* 7.4* 6.8*   HCT 22.2* 23.6* 21.5*    322 311     CMP:   Recent Labs   Lab 02/22/22  0445   *   CALCIUM 8.2*   ALBUMIN 2.8*   PROT 6.3      K 4.2   CO2 22*      BUN 26*   CREATININE 1.6*   ALKPHOS 112   ALT 29   AST 33   BILITOT 0.5     Coagulation:   Recent Labs   Lab 02/20/22  1329   INR 1.0     All pertinent lab results from the last 24 hours have been reviewed.      Significant Imaging:  Imaging results within the past 24 hours have been reviewed.

## 2022-02-22 NOTE — ASSESSMENT & PLAN NOTE
- h/o of CAD s/p PCI. Stated last PCI over 3 years ago at [facility].  - Holding ASA given concern for GIB  - Troponin peak at 2.925  - Cardiology consulted for ischemic evaluation in preparation for endoscopy with GI  -- Patient will need management of anemia prior to potential LHC  - Continue atorvastatin  - Continue metoprolol 12.5mg

## 2022-02-22 NOTE — ASSESSMENT & PLAN NOTE
- WBC 20 -> 30 on admission. Improving.  - No s/s of infection other than abdominal pain with unremarkable CTA A/P and CXR.  - Hypotension on admission, now improved  - Blood culture NGTD  - UA unremarkable  - Empiric Zosyn

## 2022-02-22 NOTE — ASSESSMENT & PLAN NOTE
- No evidence of overt blood loss PTA, but with coffee ground emesis in the ED and LUQ pain.  - Concern for blood loss given acute time period, but will evaluate other etiologies.   - Baseline Hgb 12  - CT A/P without bleeding noted  - Admission Hgb 3.7, MCV 76 (transfused 3 units)  - No evidence of hemolysis on labs, or schistocytes   - Iron panel without overt ROGER. Normal B12/folate.  - Retic 4.4%, suggestive of hypoproliferation  - Melena overnight on 2/21, Hgb 6.8. Transfused 1 unit  - GI consulted  -- Planning for endoscopy on 2/23  -- Bowel prep ordered  - IV PPI  - Trend Hgb

## 2022-02-22 NOTE — ASSESSMENT & PLAN NOTE
- No evidence of overt blood loss PTA, but with coffee ground emesis in the ED and LUQ pain.  - Concern for blood loss given acute time period, but will evaluate other etiologies.   - Baseline Hgb 12  - CT A/P without bleeding noted  - Admission Hgb 3.7, MCV 76  - s/p 2U  RBCs  - No evidence of hemolysis on labs, or schistocytes   - Iron panel without overt ROGER. Normal B12/folate.  - Retic 4.4%, suggestive of hypoproliferation  - GI consulted  -- Planning for endoscopy, but pt needing cardiac clearance  - IV PPI  - Trend Hgb

## 2022-02-22 NOTE — ASSESSMENT & PLAN NOTE
- Home: Glargine 50U qhs, Glipizide 10mg, Metformin 1000mg BID  - A1c 5.8%  - Determir 10U qhs  - SSI  - Hypoglycemia protocol  - Accuchecks   - Continue home gabapentin

## 2022-02-22 NOTE — PLAN OF CARE
Pt in bed resting. VSS and WNL. Pt HR remains in the 60-70 range. Pt alert and oriented x3 with some confusion and occasional delusions. Pt cooperative at times and others not cooperative with verbal aggression. Pt refused all liquid medications.States they will make her throw up she will not take them. Pt had a Large black tarry BM early in the shift roughly 9:00pm. Notified MD of findings MD ordered CBC in AM. Bed in lowest position, call bell within reach, Will continue to monitor.

## 2022-02-22 NOTE — ANESTHESIA PREPROCEDURE EVALUATION
[facility]  Anesthesia Pre-Operative Evaluation         Patient Name/: Casie Salvador, 1965  MRN: [id number]    SUBJECTIVE:     Pre-operative evaluation for Procedure(s) (LRB):  EGD (ESOPHAGOGASTRODUODENOSCOPY) (N/A)  COLONOSCOPY (N/A)     2022    Casie Salvador is a 57 y.o. female w/ a significant PMHx of hypertension, T2DM, depression, CAD s/p PCI, HFrEF (20%) and acute blood loss anemia.    Troponin elevated to 2.9 on admission and now downtrending. ECHO shows EF reduced to 20%, no prior for comparison. Current chest pain free. Ischemic workup to be performed after GI intervention.     Patient now presents for the above procedure(s).    Prev airway:   ETT 14; 5; Oral Standard; 7.5 Fr.; Cuffed; Pink tape; MAC; 3; Grade 1 View       LDA:        Peripheral IV - Single Lumen 22 0955 20 G;1 3/4 in Left Forearm (Active)   Site Assessment Clean;Dry;Intact;No redness;No swelling 22 1200   Line Status Blood return noted;Flushed;Saline locked 22 1013   Dressing Status Clean;Dry;Intact 22 1013   Dressing Intervention First dressing 22 1013   Site Change Due 22 1013   Number of days: 0       Female External Urinary Catheter 22 1200 (Active)   Skin no redness;no breakdown 22 1013   Tolerance no signs/symptoms of discomfort 22 1013   Suction Continuous suction at 40 mmHg 22 1013   Date of last wick change 22 1013   Time of last wick change 0800 22 1013   Output (mL) 700 mL 22 1400   Number of days: 2       Drips: None documented.      Patient Active Problem List   Diagnosis    Anemia    Hypertension    CAD (coronary artery disease)    DM (diabetes mellitus), type 2    Chronic pain    Constipation due to opioid therapy    Depression    Insomnia    Leukocytosis    Chronic systolic congestive heart failure       Review of patient's allergies indicates:   Allergen  Reactions    Orange juice Hives    Tomato (solanum lycopersicum) Hives       Current Inpatient Medications:    aluminum-magnesium hydroxide-simethicone  30 mL Oral QID (AC & HS)    atorvastatin  80 mg Oral Daily    busPIRone  10 mg Oral BID    citalopram  20 mg Oral Daily    docusate sodium  200 mg Oral BID    furosemide (LASIX) injection  40 mg Intravenous Daily    gabapentin  600 mg Oral BID    insulin detemir U-100  15 Units Subcutaneous QHS    lisinopriL  2.5 mg Oral Daily    metoprolol tartrate  12.5 mg Oral BID    pantoprazole  40 mg Intravenous BID    piperacillin-tazobactam (ZOSYN) IVPB  4.5 g Intravenous Q8H    polyethylene glycol  119 g Oral Once    polyethylene glycol  17 g Oral BID    senna  8.6 mg Oral Daily       No current facility-administered medications on file prior to encounter.     Current Outpatient Medications on File Prior to Encounter   Medication Sig Dispense Refill    amLODIPine (NORVASC) 10 MG tablet Take 10 mg by mouth once daily.      aspirin (ECOTRIN) 81 MG EC tablet Take 81 mg by mouth once daily.      atorvastatin (LIPITOR) 80 MG tablet Take 40 mg by mouth once daily.      blood sugar diagnostic Strp by Misc.(Non-Drug; Combo Route) route.      busPIRone (BUSPAR) 10 MG tablet Take 10 mg by mouth 2 (two) times daily.      citalopram (CELEXA) 20 MG tablet Take 20 mg by mouth once daily.      docusate sodium (COLACE) 100 MG capsule Take 1 capsule (100 mg total) by mouth 2 (two) times daily. 60 capsule 0    doxycycline (MONODOX) 50 MG Cap Take 100 mg by mouth every 12 (twelve) hours.      furosemide (LASIX) 20 MG tablet Take 20 mg by mouth 2 (two) times daily.      gabapentin (NEURONTIN) 600 MG tablet Take 600 mg by mouth 2 (two) times daily.      glipiZIDE (GLUCOTROL) 10 MG tablet Take 10 mg by mouth 2 (two) times daily before meals.      hydrALAZINE (APRESOLINE) 50 MG tablet Take 50 mg by mouth daily as needed.      hyoscyamine (ANASPAZ,LEVSIN) 0.125 mg Tab  "Take 1 tablet (125 mcg total) by mouth every 6 (six) hours as needed (abdominal cramping). 30 tablet 0    insulin glargine (LANTUS) 100 unit/mL injection Inject 50 Units into the skin every evening.      insulin needles, disposable, 31 x 3/16 " Ndle by Misc.(Non-Drug; Combo Route) route.      lancets 28 gauge Misc by Misc.(Non-Drug; Combo Route) route.      loperamide (IMODIUM A-D) 2 mg Tab Take 2 mg by mouth 4 (four) times daily as needed.      metformin (GLUCOPHAGE) 1000 MG tablet Take 1,000 mg by mouth 2 (two) times daily with meals.      metoclopramide HCl (REGLAN) 10 MG tablet Take 10 mg by mouth 2 (two) times a day.      metoprolol tartrate (LOPRESSOR) 50 MG tablet Take 50 mg by mouth 2 (two) times daily.      montelukast (SINGULAIR) 10 mg tablet Take 10 mg by mouth every evening.      omeprazole (PRILOSEC) 20 MG capsule Take 20 mg by mouth once daily.      ondansetron (ZOFRAN-ODT) 4 MG TbDL Take 1 tablet (4 mg total) by mouth every 8 (eight) hours as needed. 20 tablet 0    oxyCODONE-acetaminophen (PERCOCET) 5-325 mg per tablet Take 1 tablet by mouth every 4 (four) hours as needed for Pain. 6 tablet 0    polyethylene glycol (GLYCOLAX) 17 gram/dose powder Take 17 g by mouth daily as needed. 119 g 0    potassium chloride (MICRO-K) 10 MEQ CpSR Take 10 mEq by mouth once daily.      zolpidem (AMBIEN) 10 mg Tab Take 5 mg by mouth nightly as needed.         Past Surgical History:   Procedure Laterality Date    APPENDECTOMY      stents      TUBAL LIGATION         Social History:  Tobacco Use: High Risk    Smoking Tobacco Use: Current Every Day Smoker    Smokeless Tobacco Use: Never Used       Alcohol Use: Not on file       OBJECTIVE:     Vital Signs Range:  BMI Readings from Last 1 Encounters:   02/21/22 25.94 kg/m²       Temp:  [36.8 °C (98.3 °F)-37.1 °C (98.7 °F)]   Pulse:  [58-69]   Resp:  [14-18]   BP: (101-121)/(58-59)   SpO2:  [68 %-97 %]        Significant Labs:        Component Value " Date/Time    WBC 15.95 (H) 02/22/2022 0445    HGB 6.8 (L) 02/22/2022 0445    HCT 21.5 (L) 02/22/2022 0445    HCT <15 (L) 02/20/2022 0459     02/22/2022 0445     02/22/2022 0445    K 4.2 02/22/2022 0445     02/22/2022 0445    CO2 22 (L) 02/22/2022 0445     (H) 02/22/2022 0445    BUN 26 (H) 02/22/2022 0445    CREATININE 1.6 (H) 02/22/2022 0445    MG 2.1 02/22/2022 0445    PHOS 3.4 02/22/2022 0445    CALCIUM 8.2 (L) 02/22/2022 0445    ALBUMIN 2.8 (L) 02/22/2022 0445    PROT 6.3 02/22/2022 0445    ALKPHOS 112 02/22/2022 0445    BILITOT 0.5 02/22/2022 0445    AST 33 02/22/2022 0445    ALT 29 02/22/2022 0445    INR 1.0 02/20/2022 1329    HGBA1C 5.8 (H) 02/20/2022 0453        Please see Results Review for additional labs.     Diagnostic Studies: No relevant studies.    EKG:   Results for orders placed or performed during the hospital encounter of 02/20/22   EKG 12-lead    Collection Time: 02/21/22  7:37 AM    Narrative    Test Reason : R07.9,    Vent. Rate : 075 BPM     Atrial Rate : 075 BPM     P-R Int : 180 ms          QRS Dur : 102 ms      QT Int : 422 ms       P-R-T Axes : 057 -19 039 degrees     QTc Int : 471 ms    Normal sinus rhythm  Minimal voltage criteria for LVH, may be normal variant ( [place] product )  Nonspecific T wave abnormality  Prolonged QT  Abnormal ECG  When compared with ECG of 20-FEB-2022 04:00,  T wave inversion no longer evident in Inferior leads  Nonspecific T wave abnormality, worse in Anterior leads  Nonspecific T wave abnormality has replaced inverted T waves in Lateral  leads  Confirmed by Willie RAZA MD ([id number]) on 2/21/2022 10:23:20 AM    Referred By: [email or web address]   SELF           Confirmed By:Willie RAZA MD       ECHO:  Results for orders placed during the hospital encounter of 02/20/22    Echo Saline Bubble? No    Interpretation Summary  · The left ventricle is normal in size with severely decreased systolic function. The estimated ejection fraction is 20%.  · Normal  right ventricular size with normal right ventricular systolic function.  · Grade II left ventricular diastolic dysfunction.  · Mild left atrial enlargement.  · Elevated central venous pressure (15 mmHg).        ASSESSMENT/PLAN:         Pre-op Assessment    I have reviewed the Patient Summary Reports.     I have reviewed the Nursing Notes.    I have reviewed the Medications.     Review of Systems  Anesthesia Hx:  No problems with previous Anesthesia  Neg history of prior surgery. Denies Family Hx of Anesthesia complications.    Social:  Smoker    Hematology/Oncology:  Hematology Normal   Oncology Normal     EENT/Dental:EENT/Dental Normal   Cardiovascular:   Hypertension CAD   CHF    Pulmonary:  Pulmonary Normal  Denies COPD.  Denies Asthma.    Renal/:  Renal/ Normal  Denies Chronic Renal Disease.     Hepatic/GI:  Hepatic/GI Normal Denies Liver Disease.    Musculoskeletal:  Musculoskeletal Normal    Neurological:  Neurology Normal  Denies CVA. Denies Seizures.    Endocrine:   Diabetes    Psych:   Psychiatric History depression          Physical Exam  General: Well nourished and Cooperative    Airway:  Mallampati: II   Mouth Opening: Normal  TM Distance: Normal  Tongue: Normal  Neck ROM: Normal ROM    Dental:  Partial Dentures, Periodontal disease    Chest/Lungs:  Clear to auscultation, Normal Respiratory Rate    Heart:  Rate: Normal  Rhythm: Regular Rhythm        Anesthesia Plan  Type of Anesthesia, risks & benefits discussed:    Anesthesia Type: Gen ETT, MAC  Intra-op Monitoring Plan: Standard ASA Monitors  Post Op Pain Control Plan: multimodal analgesia and IV/PO Opioids PRN  Induction:  IV  Airway Plan: Direct, Post-Induction  Informed Consent: Informed consent signed with the Patient and all parties understand the risks and agree with anesthesia plan.  All questions answered.   ASA Score: 4  Day of Surgery Review of History & Physical: I have interviewed and examined the patient. I have reviewed the patient's  H&P dated: There are no significant changes.     Ready For Surgery From Anesthesia Perspective.     .

## 2022-02-22 NOTE — ASSESSMENT & PLAN NOTE
- TTE: EF 20%, Grade II LV DD, elevated CVP [no prior]  - No c/o dyspnea  - Continue metoprolol, lisinopril  - Starting lasix 40mg IV daily

## 2022-02-22 NOTE — PROGRESS NOTES
[facility] ([facility])  [facility]  Progress Note    Patient Name: Casie Salvador  MRN: [id number]  Patient Class: IP- Inpatient   Admission Date: 2/20/2022  Length of Stay: 1 days  Attending Physician: Aryan Roberts MD  Primary Care Provider: Adonis Carey MD        Subjective:     Principal Problem:Anemia        HPI:  Ms. Salvador is a 58 yo F with a h/o DM II, hypertension, chronic pain, CAD s/p PCI, and opioid induced constipation presents with abdominal pain found to severe anemia. Patient baseline Hgb is 12 on labs two weeks ago. She denies hematochezia, melena, hemoptysis, hematemesis, prior episodes of anemia, lightheadedness, dizziness, dyspnea, and visions changes. She does note chronic abdominal pain in LUQ that is dull. States pain has worsened and last BM over one week ago. Denies NSAID use, fever, chills, and sick contacts.    In the ED, PRIETO 88/51, HR 88, RR 21, and afebrile. Labs notable for Hgb 3.7, WBC 20, Lactate 2.9. CT A/P with contrast with no significant interval change since prior CT of the abdomen and pelvis 02/07/2022 or new acute intra-abdominal process. CXR with new cardiomegaly from prior in 2018. Patient was transfused 2 units of blood. Latrer in the ED, nursing reported coffee ground emesis shortly after patient shortly after drinking soda.      Overview/Hospital Course:  No notes on file    Interval History:   No events overnight. Hgb improved to 7.4 after 3 RBC transfusions on yesterday. Continues to deny chest pain, lightheadedness, melena, hematochezia, dyspnea, fevers, and hemoptysis. Yet to have BM during admission.        Review of Systems   Constitutional:  Negative for appetite change, chills, diaphoresis, fatigue and fever.   HENT:  Negative for rhinorrhea and sore throat.    Respiratory:  Negative for cough, chest tightness and shortness of breath.    Cardiovascular:  Negative for chest pain and palpitations.   Gastrointestinal:  Positive for abdominal  distention and constipation. Negative for abdominal pain, blood in stool, diarrhea, nausea and vomiting.   Endocrine: Negative for cold intolerance.   Genitourinary:  Negative for dysuria and hematuria.   Musculoskeletal:  Positive for back pain. Negative for arthralgias and myalgias.   Skin:  Negative for rash and wound.   Neurological:  Negative for dizziness, weakness, numbness and headaches.   Psychiatric/Behavioral:  Negative for agitation.    Objective:     Vital Signs (Most Recent):  Temp: 99.2 °F (37.3 °C) (02/21/22 1551)  Pulse: 78 (02/21/22 1551)  Resp: 16 (02/21/22 1649)  BP: 126/62 (02/21/22 1551)  SpO2: 96 % (02/21/22 1551) Vital Signs (24h Range):  Temp:  [98.4 °F (36.9 °C)-99.7 °F (37.6 °C)] 99.2 °F (37.3 °C)  Pulse:  [71-82] 78  Resp:  [16-20] 16  SpO2:  [93 %-98 %] 96 %  BP: (116-134)/(57-63) 126/62     Weight: 84.4 kg (186 lb)  Body mass index is 25.94 kg/m².    Intake/Output Summary (Last 24 hours) at 2/21/2022 1826  Last data filed at 2/20/2022 2342  Gross per 24 hour   Intake --   Output 400 ml   Net -400 ml      Physical Exam  Constitutional:       General: She is not in acute distress.     Appearance: Normal appearance. She is obese.   HENT:      Head: Normocephalic and atraumatic.      Mouth/Throat:      Mouth: Mucous membranes are moist.   Eyes:      General: No scleral icterus.     Extraocular Movements: Extraocular movements intact.      Conjunctiva/sclera: Conjunctivae normal.   Cardiovascular:      Rate and Rhythm: Normal rate and regular rhythm.      Heart sounds: No murmur heard.    No gallop.   Pulmonary:      Effort: Pulmonary effort is normal. No respiratory distress.      Breath sounds: Normal breath sounds. No wheezing or rales.   Abdominal:      General: Abdomen is flat. Bowel sounds are normal.      Palpations: Abdomen is soft.      Tenderness: There is no abdominal tenderness. There is no guarding or rebound.   Musculoskeletal:         General: No swelling or tenderness.       Comments: Left BKA well healed.  Right TMA well healed.   Skin:     Findings: No rash.   Neurological:      General: No focal deficit present.      Mental Status: She is alert and oriented to person, place, and time. Mental status is at baseline.   Psychiatric:         Mood and Affect: Mood normal.         Behavior: Behavior normal.       Significant Labs: BMP:   Recent Labs   Lab 02/21/22  0400   *      K 4.3      CO2 20*   BUN 29*   CREATININE 1.3   CALCIUM 8.5*   MG 2.0     CBC:   Recent Labs   Lab 02/20/22  0453 02/20/22  0459 02/20/22  1329 02/21/22  0400   WBC 20.63*  --  30.80* 22.06*   HGB 3.7*  --  6.6* 7.4*   HCT 12.0* <15* 21.1* 22.2*     --  348 347       Significant Imaging: I have reviewed all pertinent imaging results/findings within the past 24 hours.      Assessment/Plan:      * Anemia  - No evidence of overt blood loss PTA, but with coffee ground emesis in the ED and LUQ pain.  - Concern for blood loss given acute time period, but will evaluate other etiologies.   - Baseline Hgb 12  - CT A/P without bleeding noted  - Admission Hgb 3.7, MCV 76  - s/p 2U  RBCs  - No evidence of hemolysis on labs, or schistocytes   - Iron panel without overt ROGER. Normal B12/folate.  - Retic 4.4%, suggestive of hypoproliferation  - GI consulted  -- Planning for endoscopy, but pt needing cardiac clearance  - IV PPI  - Trend Hgb    Leukocytosis  - WBC 20 -> 30 on admission. Improving.  - No s/s of infection other than abdominal pain with unremarkable CTA A/P and CXR.  - Hypotension on admission, now improved  - Blood culture NGTD  - UA unremarkable  - Empiric Zosyn      Chronic systolic congestive heart failure  - TTE: EF 20%, Grade II LV DD, elevated CVP [no prior]  - No c/o dyspnea  - Continue metoprolol, lisinopril  - Starting lasix 40mg IV daily    CAD (coronary artery disease)  - h/o of CAD s/p PCI. Stated last PCI over 3 years ago at [facility].  - Holding ASA given concern for  GIB  - Troponin peak at 2.925  - Cardiology consulted for ischemic evaluation in preparation for endoscopy with GI  -- Patient will need management of anemia prior to potential LHC  - Continue atorvastatin  - Continue metoprolol 12.5mg      Insomnia  - Continue home Ambien      Depression  - Continue home Buspar, Citalopram      Constipation due to opioid therapy  - Senna, Docusate    Chronic pain  - On Percocet 5 at home for back pain  - Oxycodone PRN    DM (diabetes mellitus), type 2  - Home: Glargine 50U qhs, Glipizide 10mg, Metformin 1000mg BID  - A1c 5.8%  - Determir 15U qhs  - SSI  - Hypoglycemia protocol  - Accuchecks   - Continue home gabapentin      Hypertension  - Holding home Amlodipine 10mg, Hydralazine 50mg BID  - PRN for SBP >160      VTE Risk Mitigation (From admission, onward)         Ordered     IP VTE LOW RISK PATIENT  Once         02/20/22 1022     Place sequential compression device  Until discontinued         02/20/22 1022                Discharge Planning   SOCO: 2/23/2022     Code Status: Full Code   Is the patient medically ready for discharge?: No    Reason for patient still in hospital (select all that apply): Patient trending condition, Laboratory test, Treatment, Consult recommendations and Pending disposition  Discharge Plan A: Home                  Aryan Roberts MD  Department of Hospital Medicine   [facility] ([facility])

## 2022-02-22 NOTE — ASSESSMENT & PLAN NOTE
- TTE: EF 20%, Grade II LV DD, elevated CVP [no prior]  - No c/o dyspnea  - Continue metoprolol, lisinopril  - Continue lasix 40mg IV daily

## 2022-02-22 NOTE — SUBJECTIVE & OBJECTIVE
Interval History:   No events overnight. Hgb improved to 7.4 after 3 RBC transfusions on yesterday. Continues to deny chest pain, lightheadedness, melena, hematochezia, dyspnea, fevers, and hemoptysis. Yet to have BM during admission.        Review of Systems   Constitutional:  Negative for appetite change, chills, diaphoresis, fatigue and fever.   HENT:  Negative for rhinorrhea and sore throat.    Respiratory:  Negative for cough, chest tightness and shortness of breath.    Cardiovascular:  Negative for chest pain and palpitations.   Gastrointestinal:  Positive for abdominal distention and constipation. Negative for abdominal pain, blood in stool, diarrhea, nausea and vomiting.   Endocrine: Negative for cold intolerance.   Genitourinary:  Negative for dysuria and hematuria.   Musculoskeletal:  Positive for back pain. Negative for arthralgias and myalgias.   Skin:  Negative for rash and wound.   Neurological:  Negative for dizziness, weakness, numbness and headaches.   Psychiatric/Behavioral:  Negative for agitation.    Objective:     Vital Signs (Most Recent):  Temp: 99.2 °F (37.3 °C) (02/21/22 1551)  Pulse: 78 (02/21/22 1551)  Resp: 16 (02/21/22 1649)  BP: 126/62 (02/21/22 1551)  SpO2: 96 % (02/21/22 1551) Vital Signs (24h Range):  Temp:  [98.4 °F (36.9 °C)-99.7 °F (37.6 °C)] 99.2 °F (37.3 °C)  Pulse:  [71-82] 78  Resp:  [16-20] 16  SpO2:  [93 %-98 %] 96 %  BP: (116-134)/(57-63) 126/62     Weight: 84.4 kg (186 lb)  Body mass index is 25.94 kg/m².    Intake/Output Summary (Last 24 hours) at 2/21/2022 1826  Last data filed at 2/20/2022 2342  Gross per 24 hour   Intake --   Output 400 ml   Net -400 ml      Physical Exam  Constitutional:       General: She is not in acute distress.     Appearance: Normal appearance. She is obese.   HENT:      Head: Normocephalic and atraumatic.      Mouth/Throat:      Mouth: Mucous membranes are moist.   Eyes:      General: No scleral icterus.     Extraocular Movements: Extraocular  movements intact.      Conjunctiva/sclera: Conjunctivae normal.   Cardiovascular:      Rate and Rhythm: Normal rate and regular rhythm.      Heart sounds: No murmur heard.    No gallop.   Pulmonary:      Effort: Pulmonary effort is normal. No respiratory distress.      Breath sounds: Normal breath sounds. No wheezing or rales.   Abdominal:      General: Abdomen is flat. Bowel sounds are normal.      Palpations: Abdomen is soft.      Tenderness: There is no abdominal tenderness. There is no guarding or rebound.   Musculoskeletal:         General: No swelling or tenderness.      Comments: Left BKA well healed.  Right TMA well healed.   Skin:     Findings: No rash.   Neurological:      General: No focal deficit present.      Mental Status: She is alert and oriented to person, place, and time. Mental status is at baseline.   Psychiatric:         Mood and Affect: Mood normal.         Behavior: Behavior normal.       Significant Labs: BMP:   Recent Labs   Lab 02/21/22  0400   *      K 4.3      CO2 20*   BUN 29*   CREATININE 1.3   CALCIUM 8.5*   MG 2.0     CBC:   Recent Labs   Lab 02/20/22  0453 02/20/22  0459 02/20/22  1329 02/21/22  0400   WBC 20.63*  --  30.80* 22.06*   HGB 3.7*  --  6.6* 7.4*   HCT 12.0* <15* 21.1* 22.2*     --  348 347       Significant Imaging: I have reviewed all pertinent imaging results/findings within the past 24 hours.

## 2022-02-22 NOTE — ASSESSMENT & PLAN NOTE
- Home: Glargine 50U qhs, Glipizide 10mg, Metformin 1000mg BID  - A1c 5.8%  - Determir 15U qhs  - SSI  - Hypoglycemia protocol  - Accuchecks   - Continue home gabapentin

## 2022-02-22 NOTE — PLAN OF CARE
Problem: Physical Therapy Goal  Goal: Physical Therapy Goal  Description: Goals to be met by: 2022     Patient will increase functional independence with mobility by performin. Supine to sit with Modified Lea  2. Sit to supine with Modified Lea  3. Sit to stand transfer with Modified Lea and using RW or LRAD  4. Bed to chair transfer with SBA using RW or LRAD  5. Gait  x 50 feet with Stand-by Assistance using Rolling Walker or LRAD.   6. Lower extremity exercise program x15 reps per handout, with assistance as needed    PT EVAL: 2022

## 2022-02-22 NOTE — ASSESSMENT & PLAN NOTE
- WBC 20 -> 30 on admission. Improving.  - No s/s of infection other than abdominal pain with unremarkable CTA A/P and CXR.  - Hypotension on admission, now improved  - Blood culture NGTD  - UA unremarkable  - Empiric Zosyn, plan for 5 day course  - Leokocytosis improving

## 2022-02-23 ENCOUNTER — ANESTHESIA (OUTPATIENT)
Dept: ENDOSCOPY | Facility: HOSPITAL | Age: 57
DRG: 377 | End: 2022-02-23
Payer: MEDICAID

## 2022-02-23 DIAGNOSIS — K25.9 GASTRIC ULCER, UNSPECIFIED CHRONICITY, UNSPECIFIED WHETHER GASTRIC ULCER HEMORRHAGE OR PERFORATION PRESENT: Primary | ICD-10-CM

## 2022-02-23 LAB
ALBUMIN SERPL BCP-MCNC: 2.7 G/DL (ref 3.5–5.2)
ALP SERPL-CCNC: 113 U/L (ref 55–135)
ALT SERPL W/O P-5'-P-CCNC: 28 U/L (ref 10–44)
ANION GAP SERPL CALC-SCNC: 9 MMOL/L (ref 8–16)
AST SERPL-CCNC: 18 U/L (ref 10–40)
BASOPHILS # BLD AUTO: 0.05 K/UL (ref 0–0.2)
BASOPHILS # BLD AUTO: 0.05 K/UL (ref 0–0.2)
BASOPHILS NFR BLD: 0.3 % (ref 0–1.9)
BASOPHILS NFR BLD: 0.4 % (ref 0–1.9)
BILIRUB SERPL-MCNC: 0.7 MG/DL (ref 0.1–1)
BUN SERPL-MCNC: 20 MG/DL (ref 6–20)
CALCIUM SERPL-MCNC: 8.3 MG/DL (ref 8.7–10.5)
CHLORIDE SERPL-SCNC: 103 MMOL/L (ref 95–110)
CO2 SERPL-SCNC: 25 MMOL/L (ref 23–29)
CREAT SERPL-MCNC: 1.3 MG/DL (ref 0.5–1.4)
DIFFERENTIAL METHOD: ABNORMAL
DIFFERENTIAL METHOD: ABNORMAL
EOSINOPHIL # BLD AUTO: 0.3 K/UL (ref 0–0.5)
EOSINOPHIL # BLD AUTO: 0.3 K/UL (ref 0–0.5)
EOSINOPHIL NFR BLD: 1.6 % (ref 0–8)
EOSINOPHIL NFR BLD: 1.8 % (ref 0–8)
ERYTHROCYTE [DISTWIDTH] IN BLOOD BY AUTOMATED COUNT: 18.6 % (ref 11.5–14.5)
ERYTHROCYTE [DISTWIDTH] IN BLOOD BY AUTOMATED COUNT: 18.9 % (ref 11.5–14.5)
EST. GFR  (AFRICAN AMERICAN): 53 ML/MIN/1.73 M^2
EST. GFR  (NON AFRICAN AMERICAN): 46 ML/MIN/1.73 M^2
GLUCOSE SERPL-MCNC: 135 MG/DL (ref 70–110)
HCT VFR BLD AUTO: 24 % (ref 37–48.5)
HCT VFR BLD AUTO: 25.2 % (ref 37–48.5)
HGB BLD-MCNC: 7.6 G/DL (ref 12–16)
HGB BLD-MCNC: 7.7 G/DL (ref 12–16)
IMM GRANULOCYTES # BLD AUTO: 0.09 K/UL (ref 0–0.04)
IMM GRANULOCYTES # BLD AUTO: 0.11 K/UL (ref 0–0.04)
IMM GRANULOCYTES NFR BLD AUTO: 0.6 % (ref 0–0.5)
IMM GRANULOCYTES NFR BLD AUTO: 0.6 % (ref 0–0.5)
LYMPHOCYTES # BLD AUTO: 2.6 K/UL (ref 1–4.8)
LYMPHOCYTES # BLD AUTO: 3.5 K/UL (ref 1–4.8)
LYMPHOCYTES NFR BLD: 18.2 % (ref 18–48)
LYMPHOCYTES NFR BLD: 19 % (ref 18–48)
MAGNESIUM SERPL-MCNC: 2 MG/DL (ref 1.6–2.6)
MCH RBC QN AUTO: 26.3 PG (ref 27–31)
MCH RBC QN AUTO: 27 PG (ref 27–31)
MCHC RBC AUTO-ENTMCNC: 30.6 G/DL (ref 32–36)
MCHC RBC AUTO-ENTMCNC: 31.7 G/DL (ref 32–36)
MCV RBC AUTO: 85 FL (ref 82–98)
MCV RBC AUTO: 86 FL (ref 82–98)
MONOCYTES # BLD AUTO: 0.9 K/UL (ref 0.3–1)
MONOCYTES # BLD AUTO: 1.1 K/UL (ref 0.3–1)
MONOCYTES NFR BLD: 6 % (ref 4–15)
MONOCYTES NFR BLD: 6.6 % (ref 4–15)
NEUTROPHILS # BLD AUTO: 10.2 K/UL (ref 1.8–7.7)
NEUTROPHILS # BLD AUTO: 13.4 K/UL (ref 1.8–7.7)
NEUTROPHILS NFR BLD: 72.4 % (ref 38–73)
NEUTROPHILS NFR BLD: 72.5 % (ref 38–73)
NRBC BLD-RTO: 1 /100 WBC
NRBC BLD-RTO: 1 /100 WBC
PHOSPHATE SERPL-MCNC: 3.5 MG/DL (ref 2.7–4.5)
PLATELET # BLD AUTO: 279 K/UL (ref 150–450)
PLATELET # BLD AUTO: 315 K/UL (ref 150–450)
PMV BLD AUTO: 10.3 FL (ref 9.2–12.9)
PMV BLD AUTO: 9.8 FL (ref 9.2–12.9)
POCT GLUCOSE: 141 MG/DL (ref 70–110)
POCT GLUCOSE: 148 MG/DL (ref 70–110)
POCT GLUCOSE: 226 MG/DL (ref 70–110)
POCT GLUCOSE: 274 MG/DL (ref 70–110)
POCT GLUCOSE: >500 MG/DL (ref 70–110)
POTASSIUM SERPL-SCNC: 3.8 MMOL/L (ref 3.5–5.1)
PROT SERPL-MCNC: 6.4 G/DL (ref 6–8.4)
RBC # BLD AUTO: 2.81 M/UL (ref 4–5.4)
RBC # BLD AUTO: 2.93 M/UL (ref 4–5.4)
SODIUM SERPL-SCNC: 137 MMOL/L (ref 136–145)
WBC # BLD AUTO: 14.14 K/UL (ref 3.9–12.7)
WBC # BLD AUTO: 18.4 K/UL (ref 3.9–12.7)

## 2022-02-23 PROCEDURE — 85025 COMPLETE CBC W/AUTO DIFF WBC: CPT | Performed by: NURSE PRACTITIONER

## 2022-02-23 PROCEDURE — 36415 COLL VENOUS BLD VENIPUNCTURE: CPT | Performed by: STUDENT IN AN ORGANIZED HEALTH CARE EDUCATION/TRAINING PROGRAM

## 2022-02-23 PROCEDURE — D9220A PRA ANESTHESIA: ICD-10-PCS | Mod: ANES,,, | Performed by: ANESTHESIOLOGY

## 2022-02-23 PROCEDURE — 83735 ASSAY OF MAGNESIUM: CPT | Performed by: STUDENT IN AN ORGANIZED HEALTH CARE EDUCATION/TRAINING PROGRAM

## 2022-02-23 PROCEDURE — 97110 THERAPEUTIC EXERCISES: CPT

## 2022-02-23 PROCEDURE — 43239 PR EGD, FLEX, W/BIOPSY, SGL/MULTI: ICD-10-PCS | Mod: 51,,, | Performed by: INTERNAL MEDICINE

## 2022-02-23 PROCEDURE — 43239 EGD BIOPSY SINGLE/MULTIPLE: CPT | Mod: 51,,, | Performed by: INTERNAL MEDICINE

## 2022-02-23 PROCEDURE — 88342 IMHCHEM/IMCYTCHM 1ST ANTB: CPT | Mod: 26,,, | Performed by: PATHOLOGY

## 2022-02-23 PROCEDURE — D9220A PRA ANESTHESIA: Mod: ANES,,, | Performed by: ANESTHESIOLOGY

## 2022-02-23 PROCEDURE — 43239 EGD BIOPSY SINGLE/MULTIPLE: CPT | Performed by: INTERNAL MEDICINE

## 2022-02-23 PROCEDURE — 36415 COLL VENOUS BLD VENIPUNCTURE: CPT | Performed by: NURSE PRACTITIONER

## 2022-02-23 PROCEDURE — 45378 DIAGNOSTIC COLONOSCOPY: CPT | Mod: 74 | Performed by: INTERNAL MEDICINE

## 2022-02-23 PROCEDURE — 20600001 HC STEP DOWN PRIVATE ROOM

## 2022-02-23 PROCEDURE — 45378 PR COLONOSCOPY,DIAGNOSTIC: ICD-10-PCS | Mod: 52,,, | Performed by: INTERNAL MEDICINE

## 2022-02-23 PROCEDURE — 80053 COMPREHEN METABOLIC PANEL: CPT | Performed by: STUDENT IN AN ORGANIZED HEALTH CARE EDUCATION/TRAINING PROGRAM

## 2022-02-23 PROCEDURE — D9220A PRA ANESTHESIA: ICD-10-PCS | Mod: CRNA,,, | Performed by: NURSE ANESTHETIST, CERTIFIED REGISTERED

## 2022-02-23 PROCEDURE — 88341 IMHCHEM/IMCYTCHM EA ADD ANTB: CPT | Mod: 26,,, | Performed by: PATHOLOGY

## 2022-02-23 PROCEDURE — 88341 IMHCHEM/IMCYTCHM EA ADD ANTB: CPT | Performed by: PATHOLOGY

## 2022-02-23 PROCEDURE — 88341 PR IHC OR ICC EACH ADD'L SINGLE ANTIBODY  STAINPR: ICD-10-PCS | Mod: 26,,, | Performed by: PATHOLOGY

## 2022-02-23 PROCEDURE — 88342 IMHCHEM/IMCYTCHM 1ST ANTB: CPT | Performed by: PATHOLOGY

## 2022-02-23 PROCEDURE — 37000009 HC ANESTHESIA EA ADD 15 MINS: Performed by: INTERNAL MEDICINE

## 2022-02-23 PROCEDURE — 88342 CHG IMMUNOCYTOCHEMISTRY: ICD-10-PCS | Mod: 26,,, | Performed by: PATHOLOGY

## 2022-02-23 PROCEDURE — D9220A PRA ANESTHESIA: Mod: CRNA,,, | Performed by: NURSE ANESTHETIST, CERTIFIED REGISTERED

## 2022-02-23 PROCEDURE — 85025 COMPLETE CBC W/AUTO DIFF WBC: CPT | Mod: 91 | Performed by: STUDENT IN AN ORGANIZED HEALTH CARE EDUCATION/TRAINING PROGRAM

## 2022-02-23 PROCEDURE — 88305 TISSUE EXAM BY PATHOLOGIST: ICD-10-PCS | Mod: 26,,, | Performed by: PATHOLOGY

## 2022-02-23 PROCEDURE — 63600175 PHARM REV CODE 636 W HCPCS: Performed by: NURSE ANESTHETIST, CERTIFIED REGISTERED

## 2022-02-23 PROCEDURE — 88305 TISSUE EXAM BY PATHOLOGIST: CPT | Performed by: PATHOLOGY

## 2022-02-23 PROCEDURE — 94760 N-INVAS EAR/PLS OXIMETRY 1: CPT

## 2022-02-23 PROCEDURE — 88305 TISSUE EXAM BY PATHOLOGIST: CPT | Mod: 26,,, | Performed by: PATHOLOGY

## 2022-02-23 PROCEDURE — 63600175 PHARM REV CODE 636 W HCPCS: Performed by: STUDENT IN AN ORGANIZED HEALTH CARE EDUCATION/TRAINING PROGRAM

## 2022-02-23 PROCEDURE — 37000008 HC ANESTHESIA 1ST 15 MINUTES: Performed by: INTERNAL MEDICINE

## 2022-02-23 PROCEDURE — 99900035 HC TECH TIME PER 15 MIN (STAT)

## 2022-02-23 PROCEDURE — 99232 PR SUBSEQUENT HOSPITAL CARE,LEVL II: ICD-10-PCS | Mod: ,,, | Performed by: STUDENT IN AN ORGANIZED HEALTH CARE EDUCATION/TRAINING PROGRAM

## 2022-02-23 PROCEDURE — 45378 DIAGNOSTIC COLONOSCOPY: CPT | Mod: 52,,, | Performed by: INTERNAL MEDICINE

## 2022-02-23 PROCEDURE — 27201012 HC FORCEPS, HOT/COLD, DISP: Performed by: INTERNAL MEDICINE

## 2022-02-23 PROCEDURE — C9113 INJ PANTOPRAZOLE SODIUM, VIA: HCPCS | Performed by: STUDENT IN AN ORGANIZED HEALTH CARE EDUCATION/TRAINING PROGRAM

## 2022-02-23 PROCEDURE — 25000003 PHARM REV CODE 250: Performed by: STUDENT IN AN ORGANIZED HEALTH CARE EDUCATION/TRAINING PROGRAM

## 2022-02-23 PROCEDURE — 99232 SBSQ HOSP IP/OBS MODERATE 35: CPT | Mod: ,,, | Performed by: STUDENT IN AN ORGANIZED HEALTH CARE EDUCATION/TRAINING PROGRAM

## 2022-02-23 PROCEDURE — 25000003 PHARM REV CODE 250: Performed by: NURSE ANESTHETIST, CERTIFIED REGISTERED

## 2022-02-23 PROCEDURE — 82962 GLUCOSE BLOOD TEST: CPT | Performed by: INTERNAL MEDICINE

## 2022-02-23 PROCEDURE — 84100 ASSAY OF PHOSPHORUS: CPT | Performed by: STUDENT IN AN ORGANIZED HEALTH CARE EDUCATION/TRAINING PROGRAM

## 2022-02-23 PROCEDURE — 94761 N-INVAS EAR/PLS OXIMETRY MLT: CPT

## 2022-02-23 RX ORDER — FENTANYL CITRATE 50 UG/ML
INJECTION, SOLUTION INTRAMUSCULAR; INTRAVENOUS
Status: DISCONTINUED | OUTPATIENT
Start: 2022-02-23 | End: 2022-02-23

## 2022-02-23 RX ORDER — ETOMIDATE 2 MG/ML
INJECTION INTRAVENOUS
Status: DISCONTINUED | OUTPATIENT
Start: 2022-02-23 | End: 2022-02-23

## 2022-02-23 RX ORDER — PROPOFOL 10 MG/ML
VIAL (ML) INTRAVENOUS
Status: DISCONTINUED | OUTPATIENT
Start: 2022-02-23 | End: 2022-02-23

## 2022-02-23 RX ORDER — PROPOFOL 10 MG/ML
VIAL (ML) INTRAVENOUS CONTINUOUS PRN
Status: DISCONTINUED | OUTPATIENT
Start: 2022-02-23 | End: 2022-02-23

## 2022-02-23 RX ORDER — LIDOCAINE HYDROCHLORIDE 20 MG/ML
INJECTION, SOLUTION EPIDURAL; INFILTRATION; INTRACAUDAL; PERINEURAL
Status: DISCONTINUED | OUTPATIENT
Start: 2022-02-23 | End: 2022-02-23

## 2022-02-23 RX ORDER — SODIUM CHLORIDE 0.9 % (FLUSH) 0.9 %
3 SYRINGE (ML) INJECTION
Status: DISCONTINUED | OUTPATIENT
Start: 2022-02-23 | End: 2022-02-24 | Stop reason: HOSPADM

## 2022-02-23 RX ADMIN — CITALOPRAM HYDROBROMIDE 20 MG: 20 TABLET ORAL at 01:02

## 2022-02-23 RX ADMIN — LISINOPRIL 2.5 MG: 2.5 TABLET ORAL at 01:02

## 2022-02-23 RX ADMIN — BUSPIRONE HYDROCHLORIDE 10 MG: 5 TABLET ORAL at 01:02

## 2022-02-23 RX ADMIN — INSULIN ASPART 6 UNITS: 100 INJECTION, SOLUTION INTRAVENOUS; SUBCUTANEOUS at 05:02

## 2022-02-23 RX ADMIN — ZOLPIDEM TARTRATE 5 MG: 5 TABLET ORAL at 08:02

## 2022-02-23 RX ADMIN — PROPOFOL 50 MCG/KG/MIN: 10 INJECTION, EMULSION INTRAVENOUS at 11:02

## 2022-02-23 RX ADMIN — OXYCODONE 5 MG: 5 TABLET ORAL at 06:02

## 2022-02-23 RX ADMIN — PIPERACILLIN AND TAZOBACTAM 4.5 G: 4; .5 INJECTION, POWDER, LYOPHILIZED, FOR SOLUTION INTRAVENOUS; PARENTERAL at 09:02

## 2022-02-23 RX ADMIN — ETOMIDATE 6 MG: 2 INJECTION INTRAVENOUS at 11:02

## 2022-02-23 RX ADMIN — Medication 20 MG: at 11:02

## 2022-02-23 RX ADMIN — OXYCODONE 5 MG: 5 TABLET ORAL at 01:02

## 2022-02-23 RX ADMIN — GABAPENTIN 600 MG: 300 CAPSULE ORAL at 01:02

## 2022-02-23 RX ADMIN — GABAPENTIN 600 MG: 300 CAPSULE ORAL at 08:02

## 2022-02-23 RX ADMIN — ATORVASTATIN CALCIUM 80 MG: 20 TABLET, FILM COATED ORAL at 01:02

## 2022-02-23 RX ADMIN — PIPERACILLIN AND TAZOBACTAM 4.5 G: 4; .5 INJECTION, POWDER, LYOPHILIZED, FOR SOLUTION INTRAVENOUS; PARENTERAL at 06:02

## 2022-02-23 RX ADMIN — PIPERACILLIN AND TAZOBACTAM 4.5 G: 4; .5 INJECTION, POWDER, LYOPHILIZED, FOR SOLUTION INTRAVENOUS; PARENTERAL at 01:02

## 2022-02-23 RX ADMIN — PANTOPRAZOLE SODIUM 40 MG: 40 INJECTION, POWDER, FOR SOLUTION INTRAVENOUS at 09:02

## 2022-02-23 RX ADMIN — ETOMIDATE 10 MG: 2 INJECTION INTRAVENOUS at 11:02

## 2022-02-23 RX ADMIN — PANTOPRAZOLE SODIUM 40 MG: 40 INJECTION, POWDER, FOR SOLUTION INTRAVENOUS at 08:02

## 2022-02-23 RX ADMIN — LIDOCAINE HYDROCHLORIDE 50 MG: 20 INJECTION, SOLUTION EPIDURAL; INFILTRATION; INTRACAUDAL at 11:02

## 2022-02-23 RX ADMIN — OXYCODONE 5 MG: 5 TABLET ORAL at 08:02

## 2022-02-23 RX ADMIN — FENTANYL CITRATE 25 MCG: 50 INJECTION INTRAMUSCULAR; INTRAVENOUS at 11:02

## 2022-02-23 NOTE — HOSPITAL COURSE
GI consulted given degree on acute anemia. Patient with an episode of melena the night after admission with drop in hemoglobin requiring 1 unit RBC. Patient with upper and lower endoscopy on 2/23. EGD with two ulcers with stigmata of recent bleeding. Colonoscopy unable to be performed due to poor prep. Patient to continue on BID PPI for 12 weeks.    Cardiology consulted given troponin rising to 2.647, and peaking at 2.925. Cardiology recommended GI intervention before any potential LHC which would require procedural doses of heparin. TTE obtained revealed EF of 20%.     Patient with significant leukocytosis to 30 at admission with no obvious evidence of infection. UA unremarkable. Blood culture with no growth. CT A/P with no significant evidence of infection. CXR with no acute findings. Patient started on empiric Zosyn.   Report given to GINO Vazquez for transfer at this time.

## 2022-02-23 NOTE — PROVATION PATIENT INSTRUCTIONS
Discharge Summary/Instructions after an Endoscopic Procedure  Patient Name: Casie Salvador  Patient MRN: [id number]  Patient   YOB: 1965 Wednesday, February 23, 2022  Estefania Bryant MD  Dear patient,  As a result of recent federal legislation (The Federal Cures Act), you may   receive lab or pathology results from your procedure in your [facility]   account before your physician is able to contact you. Your physician or   their representative will relay the results to you with their   recommendations at their soonest availability.  Thank you,  RESTRICTIONS:  During your procedure today, you received medications for sedation.  These   medications may affect your judgment, balance and coordination.  Therefore,   for 24 hours, you have the following restrictions:   - DO NOT drive a car, operate machinery, make legal/financial decisions,   sign important papers or drink alcohol.    ACTIVITY:  Today: no heavy lifting, straining or running due to procedural   sedation/anesthesia.  The following day: return to full activity including work.  DIET:  Eat and drink normally unless instructed otherwise.     TREATMENT FOR COMMON SIDE EFFECTS:  - Mild abdominal pain, nausea, belching, bloating or excessive gas:  rest,   eat lightly and use a heating pad.  - Sore Throat: treat with throat lozenges and/or gargle with warm salt   water.  - Because air was used during the procedure, expelling large amounts of air   from your rectum or belching is normal.  - If a bowel prep was taken, you may not have a bowel movement for 1-3 days.    This is normal.  SYMPTOMS TO WATCH FOR AND REPORT TO YOUR PHYSICIAN:  1. Abdominal pain or bloating, other than gas cramps.  2. Chest pain.  3. Back pain.  4. Signs of infection such as: chills or fever occurring within 24 hours   after the procedure.  5. Rectal bleeding, which would show as bright red, maroon, or black stools.   (A tablespoon of blood from the rectum is not serious,  especially if   hemorrhoids are present.)  6. Vomiting.  7. Weakness or dizziness.  GO DIRECTLY TO THE NEAREST EMERGENCY ROOM IF YOU HAVE ANY OF THE FOLLOWING:      Difficulty breathing              Chills and/or fever over 101 F   Persistent vomiting and/or vomiting blood   Severe abdominal pain   Severe chest pain   Black, tarry stools   Bleeding- more than one tablespoon   Any other symptom or condition that you feel may need urgent attention  Your doctor recommends these additional instructions:  If any biopsies were taken, your doctors clinic will contact you in 1 to 2   weeks with any results.  - Return patient to [facility] for [facility] care.   - Resume previous diet.   - Continue present medications.   - Repeat colonoscopy in 8 weeks because the bowel preparation was   suboptimal.  Can be done at same time as repeat upper endoscopy.  Recommend   extended prep for her next colonoscopy.  - The findings and recommendations were discussed with the patient.  For questions, problems or results please call your physician - Estefania Bryant MD at Work:  [phone].  [facility], EMERGENCY ROOM PHONE NUMBER: [phone]  IF A COMPLICATION OR EMERGENCY SITUATION ARISES AND YOU ARE UNABLE TO REACH   YOUR PHYSICIAN - GO DIRECTLY TO THE EMERGENCY ROOM.  Estefania Bryant MD  2/23/2022 11:51:55 AM  This report has been verified and signed electronically.  Dear patient,  As a result of recent federal legislation (The Federal Cures Act), you may   receive lab or pathology results from your procedure in your [facility]   account before your physician is able to contact you. Your physician or   their representative will relay the results to you with their   recommendations at their soonest availability.  Thank you,  PROVATION

## 2022-02-23 NOTE — PT/OT/SLP PROGRESS
Physical Therapy Treatment    Patient Name:  Casie Salvador   MRN:  [id number]    Recommendations:     Discharge Recommendations:  home health PT   Discharge Equipment Recommendations: none   Barriers to discharge: None    Assessment:     Casie Salvador is a 57 y.o. female admitted with a medical diagnosis of Anemia.  She presents with the following impairments/functional limitations:  weakness, impaired functional mobilty, gait instability, impaired balance, decreased lower extremity function, pain, impaired endurance Patient returned from EGD. She c/o fatigue and declined functional mobility. She reports she is eager to return home and feels confident she will be able to perform functional mobility w/ use of RLE prosthesis and DME as prior. Her  can assist. Patient pleasant, performed BLE exercises, assisted w/ some self care..    Rehab Prognosis: Good; patient would benefit from acute skilled PT services to address these deficits and reach maximum level of function.    Recent Surgery: Procedure(s) (LRB):  EGD (ESOPHAGOGASTRODUODENOSCOPY) (N/A)  COLONOSCOPY (N/A) Day of Surgery    Plan:     During this hospitalization, patient to be seen 3 x/week to address the identified rehab impairments via gait training, therapeutic activities, therapeutic exercises and progress toward the following goals:    · Plan of Care Expires:  03/24/22    Subjective     Chief Complaint: fatigue after returning from EGD; I haven't eaten in days.  Patient/Family Comments/goals: return homr to PLOF as soon as possible  Pain/Comfort:  · Pain Rating 1:  (not rated)  · Location - Side 1: Left  · Location 1: foot  · Pain Addressed 1: Distraction  · Pain Rating Post-Intervention 1:  (not rated)      Objective:     Communicated with nurse prior to session.  Patient found supine with oxygen, peripheral IV, telemetry, PureWick upon PT entry to room. PCT assisting w/ wash up in bed.    General Precautions: Standard, fall    Orthopedic Precautions:N/A   Braces:  (RLE prosthesis)  Respiratory Status: Room air at time of entry to room, nurse arrived and added 2 LPM oxygen via nc and SpO2 91% (patient had recently returned from EGD)     Functional Mobility:  · Patient declined functional mobility; declined to sit EOB d/t c/o she was tired and cold. Declined to jerilyn RLE prosthesis for BKA and shoes d/t c/o fatigue      AM-PAC 6 CLICK MOBILITY  Turning over in bed (including adjusting bedclothes, sheets and blankets)?: 4  Sitting down on and standing up from a chair with arms (e.g., wheelchair, bedside commode, etc.): 3 (w/ prosthetic use and walker)  Moving from lying on back to sitting on the side of the bed?: 3  Moving to and from a bed to a chair (including a wheelchair)?: 3  Need to walk in hospital room?: 2  Climbing 3-5 steps with a railing?: 2  Basic Mobility Total Score: 17       Therapeutic Activities and Exercises:   patient performs 20-30x QS, GS, hip and knee flexon and exptension  Educated in importance of OOB activity; educated on LE exercises, PT POC  Patient assisted w/ self care and placement of purewick; rinsing dentures for patient to use.    Patient left HOB elevated with all lines intact, call button in reach, bed alarm on and nurse notified..    GOALS:   Multidisciplinary Problems     Physical Therapy Goals        Problem: Physical Therapy Goal    Goal Priority Disciplines Outcome Goal Variances Interventions   Physical Therapy Goal     PT, PT/OT Ongoing, Progressing     Description: Goals to be met by: 2022     Patient will increase functional independence with mobility by performin. Supine to sit with Modified [place]  2. Sit to supine with Modified [place]  3. Sit to stand transfer with Modified [place] and using RW or LRAD  4. Bed to chair transfer with SBA using RW or LRAD  5. Gait  x 50 feet with Stand-by Assistance using Rolling Walker or LRAD.   6. Lower extremity exercise program  x15 reps per handout, with assistance as needed                     Time Tracking:     PT Received On: 02/23/22  PT Start Time: 1447     PT Stop Time: 1507  PT Total Time (min): 20 min     Billable Minutes: Therapeutic Exercise 15    Treatment Type: Treatment  PT/PTA: PT     PTA Visit Number: 0     02/23/2022

## 2022-02-23 NOTE — PROGRESS NOTES
GI Post Procedure Treatment Plan    Interval history:  EGD and Colonoscopy completed.   EGD with large non-obstructing oozing gastric ulcer with a clean base at the incisura. Biopsied.  - Non-bleeding gastric ulcer with a clean ulcer base (Christopher Class III).  Colonoscopy with poor prep, procedure aborted.    Plan:  - advance diet as tolerated  - PPI p.o. b.i.d. for 12 weeks  - Await pathology results  - avoid NSAIDs  - repeat EGD in 8 weeks to check for healing  - repeat colonoscopy in 8 weeks for screening purposes  - if angiography is indicated urgently, can proceed but weighing risks and benefits since she might rebleed from the ulcer. Otherwise, would recommend waiting a month prior to cath to allow ulcer healing. Pathology from the ulcer would also help to rule out malignancy.  - we will sign off, please call with questions.    Lisa Ga MD  GI Fellow, PGY-5

## 2022-02-23 NOTE — H&P
Short Stay Endoscopy History and Physical    PCP - Adonis Carey MD    Procedure - EGD/Colonoscopy  ASA - per anesthesia  Mallampati - per anesthesia  History of Anesthesia problems - no  Family history Anesthesia problems -  no   Plan of anesthesia - MAC    HPI:  This is a 57 y.o. female here for evaluation of : anemia  ROS:  Constitutional: No fevers, chills, No weight loss  CV: No chest pain  Pulm: No cough, No shortness of breath  Ophtho: No vision changes  GI: see HPI  Derm: No rash    Medical History:  has a past medical history of Coronary artery disease, Diabetes mellitus, Encounter for blood transfusion, Heart attack, and Hypertension.    Surgical History:  has a past surgical history that includes stents; Tubal ligation; and Appendectomy.    Family History: family history is not on file.. Otherwise no colon cancer, inflammatory bowel disease, or GI malignancies.    Social History:  reports that she has been smoking cigarettes. She has been smoking about 0.50 packs per day. She has never used smokeless tobacco. She reports that she does not drink alcohol and does not use drugs.    Review of patient's allergies indicates:   Allergen Reactions    Orange juice Hives    Tomato (solanum lycopersicum) Hives       Medications:   Medications Prior to Admission   Medication Sig Dispense Refill Last Dose    amLODIPine (NORVASC) 10 MG tablet Take 10 mg by mouth once daily.       aspirin (ECOTRIN) 81 MG EC tablet Take 81 mg by mouth once daily.       atorvastatin (LIPITOR) 80 MG tablet Take 40 mg by mouth once daily.       blood sugar diagnostic Strp by Misc.(Non-Drug; Combo Route) route.       busPIRone (BUSPAR) 10 MG tablet Take 10 mg by mouth 2 (two) times daily.       citalopram (CELEXA) 20 MG tablet Take 20 mg by mouth once daily.       docusate sodium (COLACE) 100 MG capsule Take 1 capsule (100 mg total) by mouth 2 (two) times daily. 60 capsule 0     doxycycline (MONODOX) 50 MG Cap Take 100 mg by  "mouth every 12 (twelve) hours.       furosemide (LASIX) 20 MG tablet Take 20 mg by mouth 2 (two) times daily.       gabapentin (NEURONTIN) 600 MG tablet Take 600 mg by mouth 2 (two) times daily.       glipiZIDE (GLUCOTROL) 10 MG tablet Take 10 mg by mouth 2 (two) times daily before meals.       hydrALAZINE (APRESOLINE) 50 MG tablet Take 50 mg by mouth daily as needed.       hyoscyamine (ANASPAZ,LEVSIN) 0.125 mg Tab Take 1 tablet (125 mcg total) by mouth every 6 (six) hours as needed (abdominal cramping). 30 tablet 0     insulin glargine (LANTUS) 100 unit/mL injection Inject 50 Units into the skin every evening.       insulin needles, disposable, 31 x 3/16 " Ndle by Misc.(Non-Drug; Combo Route) route.       lancets 28 gauge Misc by Misc.(Non-Drug; Combo Route) route.       loperamide (IMODIUM A-D) 2 mg Tab Take 2 mg by mouth 4 (four) times daily as needed.       metformin (GLUCOPHAGE) 1000 MG tablet Take 1,000 mg by mouth 2 (two) times daily with meals.       metoclopramide HCl (REGLAN) 10 MG tablet Take 10 mg by mouth 2 (two) times a day.       metoprolol tartrate (LOPRESSOR) 50 MG tablet Take 50 mg by mouth 2 (two) times daily.       montelukast (SINGULAIR) 10 mg tablet Take 10 mg by mouth every evening.       omeprazole (PRILOSEC) 20 MG capsule Take 20 mg by mouth once daily.       ondansetron (ZOFRAN-ODT) 4 MG TbDL Take 1 tablet (4 mg total) by mouth every 8 (eight) hours as needed. 20 tablet 0     oxyCODONE-acetaminophen (PERCOCET) 5-325 mg per tablet Take 1 tablet by mouth every 4 (four) hours as needed for Pain. 6 tablet 0     polyethylene glycol (GLYCOLAX) 17 gram/dose powder Take 17 g by mouth daily as needed. 119 g 0     potassium chloride (MICRO-K) 10 MEQ CpSR Take 10 mEq by mouth once daily.       zolpidem (AMBIEN) 10 mg Tab Take 5 mg by mouth nightly as needed.          Physical Exam:    Vital Signs:   Vitals:    02/23/22 1020   BP: 130/64   Pulse: (!) 58   Resp: 16   Temp: 98.6 °F (37 " °C)       Gen: NAD, lying comfortably  HENT: NCAT, oropharynx clear  Eyes: anicteric sclerae, EOMI grossly  Neck: supple, no visible masses/goiter  Cardiac: RRR  Lungs: non-labored breathing  Abd: soft, NT/ND, normoactive BS  Ext: no LE edema, warm, well perfused  Skin: skin intact on exposed body parts, no visible rashes, lesions  Neuro: A&Ox4, neuro exam grossly intact, moves all extremities  Psych: appropriate mood, affect      Labs:  Lab Results   Component Value Date    WBC 14.14 (H) 02/23/2022    HGB 7.7 (L) 02/23/2022    HCT 25.2 (L) 02/23/2022     02/23/2022    ALT 28 02/23/2022    AST 18 02/23/2022     02/23/2022    K 3.8 02/23/2022     02/23/2022    CREATININE 1.3 02/23/2022    BUN 20 02/23/2022    CO2 25 02/23/2022    TSH 2.899 02/20/2022    INR 1.0 02/20/2022    HGBA1C 5.8 (H) 02/20/2022       Plan:  EGD and colonoscopy for anemia.    I have explained the risks and benefits of endoscopy procedures to the patient including but not limited to bleeding, perforation, infection, and death.  The patient was asked if they understand and allowed to ask any further questions to their satisfaction.    Estefnaia Bryant MD

## 2022-02-23 NOTE — TRANSFER OF CARE
"Anesthesia Transfer of Care Note    Patient: Casie Salvador    Procedure(s) Performed: Procedure(s) (LRB):  EGD (ESOPHAGOGASTRODUODENOSCOPY) (N/A)  COLONOSCOPY (N/A)    Patient location: GI    Anesthesia Type: general    Transport from OR: Transported from OR on room air with adequate spontaneous ventilation    Post pain: adequate analgesia    Post assessment: no apparent anesthetic complications and tolerated procedure well    Post vital signs: stable    Level of consciousness: awake, alert and oriented    Nausea/Vomiting: no nausea/vomiting    Complications: none    Transfer of care protocol was followed      Last vitals:   Visit Vitals  /64 (BP Location: Left arm, Patient Position: Sitting)   Pulse (!) 58   Temp 37 °C (98.6 °F) (Temporal)   Resp 16   Ht 5' 11" (1.803 m)   Wt 84.4 kg (186 lb)   SpO2 97%   Breastfeeding No   BMI 25.94 kg/m²     "

## 2022-02-23 NOTE — PLAN OF CARE
Pt in bed resting. VSS and WNL. Pt HR remains in the 50-60 range. Pt alert and oriented x3 with some confusion and occasional delusions. Pt cooperative at times and others not cooperative with verbal aggression. Pt refused all liquid medications.States they will make her throw up she will not take them. Pt had a Large black tarry BM early in the shift roughly 8:00pm. MD ordered STAT CBC. Bed in lowest position, call bell within reach, Will continue to monitor.

## 2022-02-23 NOTE — PROVATION PATIENT INSTRUCTIONS
Discharge Summary/Instructions after an Endoscopic Procedure  Patient Name: Casie Salvador  Patient MRN: [id number]  Patient   YOB: 1965 Wednesday, February 23, 2022  Estefania Bryant MD  Dear patient,  As a result of recent federal legislation (The Federal Cures Act), you may   receive lab or pathology results from your procedure in your [facility]   account before your physician is able to contact you. Your physician or   their representative will relay the results to you with their   recommendations at their soonest availability.  Thank you,  RESTRICTIONS:  During your procedure today, you received medications for sedation.  These   medications may affect your judgment, balance and coordination.  Therefore,   for 24 hours, you have the following restrictions:   - DO NOT drive a car, operate machinery, make legal/financial decisions,   sign important papers or drink alcohol.    ACTIVITY:  Today: no heavy lifting, straining or running due to procedural   sedation/anesthesia.  The following day: return to full activity including work.  DIET:  Eat and drink normally unless instructed otherwise.     TREATMENT FOR COMMON SIDE EFFECTS:  - Mild abdominal pain, nausea, belching, bloating or excessive gas:  rest,   eat lightly and use a heating pad.  - Sore Throat: treat with throat lozenges and/or gargle with warm salt   water.  - Because air was used during the procedure, expelling large amounts of air   from your rectum or belching is normal.  - If a bowel prep was taken, you may not have a bowel movement for 1-3 days.    This is normal.  SYMPTOMS TO WATCH FOR AND REPORT TO YOUR PHYSICIAN:  1. Abdominal pain or bloating, other than gas cramps.  2. Chest pain.  3. Back pain.  4. Signs of infection such as: chills or fever occurring within 24 hours   after the procedure.  5. Rectal bleeding, which would show as bright red, maroon, or black stools.   (A tablespoon of blood from the rectum is not serious,  especially if   hemorrhoids are present.)  6. Vomiting.  7. Weakness or dizziness.  GO DIRECTLY TO THE NEAREST EMERGENCY ROOM IF YOU HAVE ANY OF THE FOLLOWING:      Difficulty breathing              Chills and/or fever over 101 F   Persistent vomiting and/or vomiting blood   Severe abdominal pain   Severe chest pain   Black, tarry stools   Bleeding- more than one tablespoon   Any other symptom or condition that you feel may need urgent attention  Your doctor recommends these additional instructions:  If any biopsies were taken, your doctors clinic will contact you in 1 to 2   weeks with any results.  - Proceed to colonoscopy.  - Resume previous diet.   - Continue present medications.   - Use a proton pump inhibitor PO BID for 12 weeks.   - Await pathology results.   - Repeat upper endoscopy in 8 weeks to check healing.   - The findings and recommendations were discussed with the patient.  For questions, problems or results please call your physician - Estefania Bryant MD at Work:  [phone].  [facility], EMERGENCY ROOM PHONE NUMBER: [phone]  IF A COMPLICATION OR EMERGENCY SITUATION ARISES AND YOU ARE UNABLE TO REACH   YOUR PHYSICIAN - GO DIRECTLY TO THE EMERGENCY ROOM.  Estefania Bryant MD  2/23/2022 11:50:59 AM  This report has been verified and signed electronically.  Dear patient,  As a result of recent federal legislation (The Federal Cures Act), you may   receive lab or pathology results from your procedure in your [facility]   account before your physician is able to contact you. Your physician or   their representative will relay the results to you with their   recommendations at their soonest availability.  Thank you,  PROVATION

## 2022-02-23 NOTE — NURSING TRANSFER
Nursing Transfer Note      2/23/2022     Reason patient is being transferred: post procedure     Transfer To: 804    Transfer via stretcher    Transfer with cardiac monitoring    Transported by [email or web address]    Medicines sent: none     Any special needs or follow-up needed: routine     Chart send with patient: Yes    Notified: no family listed in chart     Patient reassessed at: 1200 on 2/23

## 2022-02-24 VITALS
WEIGHT: 186 LBS | TEMPERATURE: 99 F | OXYGEN SATURATION: 95 % | RESPIRATION RATE: 20 BRPM | DIASTOLIC BLOOD PRESSURE: 65 MMHG | SYSTOLIC BLOOD PRESSURE: 147 MMHG | HEIGHT: 71 IN | BODY MASS INDEX: 26.04 KG/M2 | HEART RATE: 63 BPM

## 2022-02-24 LAB
ALBUMIN SERPL BCP-MCNC: 2.6 G/DL (ref 3.5–5.2)
ALP SERPL-CCNC: 108 U/L (ref 55–135)
ALT SERPL W/O P-5'-P-CCNC: 21 U/L (ref 10–44)
ANION GAP SERPL CALC-SCNC: 8 MMOL/L (ref 8–16)
AST SERPL-CCNC: 12 U/L (ref 10–40)
BASOPHILS # BLD AUTO: 0.04 K/UL (ref 0–0.2)
BASOPHILS NFR BLD: 0.4 % (ref 0–1.9)
BILIRUB SERPL-MCNC: 0.6 MG/DL (ref 0.1–1)
BUN SERPL-MCNC: 13 MG/DL (ref 6–20)
CALCIUM SERPL-MCNC: 8.3 MG/DL (ref 8.7–10.5)
CHLORIDE SERPL-SCNC: 109 MMOL/L (ref 95–110)
CO2 SERPL-SCNC: 23 MMOL/L (ref 23–29)
CREAT SERPL-MCNC: 1.2 MG/DL (ref 0.5–1.4)
DIFFERENTIAL METHOD: ABNORMAL
EOSINOPHIL # BLD AUTO: 0.4 K/UL (ref 0–0.5)
EOSINOPHIL NFR BLD: 3.6 % (ref 0–8)
ERYTHROCYTE [DISTWIDTH] IN BLOOD BY AUTOMATED COUNT: 19 % (ref 11.5–14.5)
EST. GFR  (AFRICAN AMERICAN): 58.4 ML/MIN/1.73 M^2
EST. GFR  (NON AFRICAN AMERICAN): 50.6 ML/MIN/1.73 M^2
GLUCOSE SERPL-MCNC: 155 MG/DL (ref 70–110)
HCT VFR BLD AUTO: 26.4 % (ref 37–48.5)
HGB BLD-MCNC: 8.4 G/DL (ref 12–16)
IMM GRANULOCYTES # BLD AUTO: 0.06 K/UL (ref 0–0.04)
IMM GRANULOCYTES NFR BLD AUTO: 0.6 % (ref 0–0.5)
LYMPHOCYTES # BLD AUTO: 2.7 K/UL (ref 1–4.8)
LYMPHOCYTES NFR BLD: 24.9 % (ref 18–48)
MAGNESIUM SERPL-MCNC: 2 MG/DL (ref 1.6–2.6)
MCH RBC QN AUTO: 26.6 PG (ref 27–31)
MCHC RBC AUTO-ENTMCNC: 31.8 G/DL (ref 32–36)
MCV RBC AUTO: 84 FL (ref 82–98)
MONOCYTES # BLD AUTO: 0.7 K/UL (ref 0.3–1)
MONOCYTES NFR BLD: 6.7 % (ref 4–15)
NEUTROPHILS # BLD AUTO: 6.8 K/UL (ref 1.8–7.7)
NEUTROPHILS NFR BLD: 63.8 % (ref 38–73)
NRBC BLD-RTO: 0 /100 WBC
PHOSPHATE SERPL-MCNC: 3 MG/DL (ref 2.7–4.5)
PLATELET # BLD AUTO: 344 K/UL (ref 150–450)
PMV BLD AUTO: 10.2 FL (ref 9.2–12.9)
POCT GLUCOSE: 145 MG/DL (ref 70–110)
POTASSIUM SERPL-SCNC: 4 MMOL/L (ref 3.5–5.1)
PROT SERPL-MCNC: 6.1 G/DL (ref 6–8.4)
RBC # BLD AUTO: 3.16 M/UL (ref 4–5.4)
SODIUM SERPL-SCNC: 140 MMOL/L (ref 136–145)
WBC # BLD AUTO: 10.65 K/UL (ref 3.9–12.7)

## 2022-02-24 PROCEDURE — 99239 HOSP IP/OBS DSCHRG MGMT >30: CPT | Mod: ,,, | Performed by: STUDENT IN AN ORGANIZED HEALTH CARE EDUCATION/TRAINING PROGRAM

## 2022-02-24 PROCEDURE — 84100 ASSAY OF PHOSPHORUS: CPT | Performed by: STUDENT IN AN ORGANIZED HEALTH CARE EDUCATION/TRAINING PROGRAM

## 2022-02-24 PROCEDURE — 85025 COMPLETE CBC W/AUTO DIFF WBC: CPT | Performed by: STUDENT IN AN ORGANIZED HEALTH CARE EDUCATION/TRAINING PROGRAM

## 2022-02-24 PROCEDURE — 99239 PR HOSPITAL DISCHARGE DAY,>30 MIN: ICD-10-PCS | Mod: ,,, | Performed by: STUDENT IN AN ORGANIZED HEALTH CARE EDUCATION/TRAINING PROGRAM

## 2022-02-24 PROCEDURE — 25000003 PHARM REV CODE 250: Performed by: STUDENT IN AN ORGANIZED HEALTH CARE EDUCATION/TRAINING PROGRAM

## 2022-02-24 PROCEDURE — 36415 COLL VENOUS BLD VENIPUNCTURE: CPT | Performed by: STUDENT IN AN ORGANIZED HEALTH CARE EDUCATION/TRAINING PROGRAM

## 2022-02-24 PROCEDURE — 80053 COMPREHEN METABOLIC PANEL: CPT | Performed by: STUDENT IN AN ORGANIZED HEALTH CARE EDUCATION/TRAINING PROGRAM

## 2022-02-24 PROCEDURE — 63600175 PHARM REV CODE 636 W HCPCS: Performed by: STUDENT IN AN ORGANIZED HEALTH CARE EDUCATION/TRAINING PROGRAM

## 2022-02-24 PROCEDURE — 83735 ASSAY OF MAGNESIUM: CPT | Performed by: STUDENT IN AN ORGANIZED HEALTH CARE EDUCATION/TRAINING PROGRAM

## 2022-02-24 PROCEDURE — C9113 INJ PANTOPRAZOLE SODIUM, VIA: HCPCS | Performed by: STUDENT IN AN ORGANIZED HEALTH CARE EDUCATION/TRAINING PROGRAM

## 2022-02-24 RX ORDER — PANTOPRAZOLE SODIUM 40 MG/1
40 TABLET, DELAYED RELEASE ORAL
Status: DISCONTINUED | OUTPATIENT
Start: 2022-02-24 | End: 2022-02-24 | Stop reason: HOSPADM

## 2022-02-24 RX ORDER — LISINOPRIL 2.5 MG/1
2.5 TABLET ORAL DAILY
Qty: 90 TABLET | Refills: 3 | Status: ON HOLD | OUTPATIENT
Start: 2022-02-24 | End: 2022-04-03 | Stop reason: HOSPADM

## 2022-02-24 RX ORDER — CYANOCOBALAMIN 1000 UG/ML
1000 INJECTION, SOLUTION INTRAMUSCULAR; SUBCUTANEOUS ONCE
Status: DISCONTINUED | OUTPATIENT
Start: 2022-02-24 | End: 2022-02-24 | Stop reason: HOSPADM

## 2022-02-24 RX ADMIN — OXYCODONE 5 MG: 5 TABLET ORAL at 09:02

## 2022-02-24 RX ADMIN — CITALOPRAM HYDROBROMIDE 20 MG: 20 TABLET ORAL at 09:02

## 2022-02-24 RX ADMIN — PANTOPRAZOLE SODIUM 40 MG: 40 INJECTION, POWDER, FOR SOLUTION INTRAVENOUS at 09:02

## 2022-02-24 RX ADMIN — OXYCODONE 5 MG: 5 TABLET ORAL at 02:02

## 2022-02-24 RX ADMIN — SENNOSIDES 8.6 MG: 8.6 TABLET ORAL at 09:02

## 2022-02-24 RX ADMIN — ATORVASTATIN CALCIUM 80 MG: 20 TABLET, FILM COATED ORAL at 09:02

## 2022-02-24 RX ADMIN — DOCUSATE SODIUM 200 MG: 100 CAPSULE, LIQUID FILLED ORAL at 09:02

## 2022-02-24 RX ADMIN — PIPERACILLIN AND TAZOBACTAM 4.5 G: 4; .5 INJECTION, POWDER, LYOPHILIZED, FOR SOLUTION INTRAVENOUS; PARENTERAL at 06:02

## 2022-02-24 RX ADMIN — GABAPENTIN 600 MG: 300 CAPSULE ORAL at 09:02

## 2022-02-24 RX ADMIN — BUSPIRONE HYDROCHLORIDE 10 MG: 5 TABLET ORAL at 09:02

## 2022-02-24 RX ADMIN — FUROSEMIDE 40 MG: 10 INJECTION INTRAMUSCULAR; INTRAVENOUS at 09:02

## 2022-02-24 NOTE — PROGRESS NOTES
Pharmacist Intervention IV to PO Note    Casie Salvador is a 57 y.o. female being treated with IV medication pantoprazole    Patient Data:    Vital Signs (Most Recent):  Temp: 98.4 °F (36.9 °C) (02/24/22 0501)  Pulse: 83 (02/24/22 0900)  Resp: 18 (02/24/22 0901)  BP: (!) 105/58 (02/24/22 0900)  SpO2: 98 % (02/24/22 0613)   Vital Signs (72h Range):  Temp:  [97 °F (36.1 °C)-99.7 °F (37.6 °C)]   Pulse:  [55-83]   Resp:  [14-27]   BP: ()/(53-80)   SpO2:  [68 %-100 %]      CBC:  Recent Labs   Lab 02/23/22  0024 02/23/22  0539 02/24/22  0447   WBC 18.40* 14.14* 10.65   RBC 2.81* 2.93* 3.16*   HGB 7.6* 7.7* 8.4*   HCT 24.0* 25.2* 26.4*    315 344   MCV 85 86 84   MCH 27.0 26.3* 26.6*   MCHC 31.7* 30.6* 31.8*     CMP:     Recent Labs   Lab 02/22/22  0445 02/23/22  0539 02/24/22  0447   * 135* 155*   CALCIUM 8.2* 8.3* 8.3*   ALBUMIN 2.8* 2.7* 2.6*   PROT 6.3 6.4 6.1    137 140   K 4.2 3.8 4.0   CO2 22* 25 23    103 109   BUN 26* 20 13   CREATININE 1.6* 1.3 1.2   ALKPHOS 112 113 108   ALT 29 28 21   AST 33 18 12   BILITOT 0.5 0.7 0.6       Dietary Orders:  Diet Orders  Report           Diet diabetic [facility]; 1500 Calorie: Diabetic starting at 02/23 1245            Based on the following criteria, this patient qualifies for intravenous to oral conversion:  [x] The patients gastrointestinal tract is functioning (tolerating medications via oral or enteral route for 24 hours and tolerating food or enteral feeds for 24 hours).  [x] The patient is hemodynamically stable for 24 hours (heart rate <100 beats per minute, systolic blood pressure >99 mm Hg, and respiratory rate <20 breaths per minute).  [x] The patient shows clinical improvement (afebrile for at least 24 hours and white blood cell count downtrending or normalized). Additionally, the patient must be non-neutropenic (absolute neutrophil count >500 cells/mm3).  [x] For antimicrobials, the patient has received IV therapy for  at least 24 hours.    IV medication pantoprazole will be changed to oral medication pantoprazole    Pharmacist's Name: May Luna  Pharmacist's Extension: [phone]

## 2022-02-24 NOTE — SUBJECTIVE & OBJECTIVE
Interval History:   Prepped for colonoscopy overnight with dark colored stools. Hgb stable at 7.7 this AM. No complaints of chest pain, abdominal pain, and lightheadedness.      Review of Systems   Constitutional:  Negative for appetite change, chills, diaphoresis, fatigue and fever.   HENT:  Negative for rhinorrhea and sore throat.    Respiratory:  Negative for cough, chest tightness and shortness of breath.    Cardiovascular:  Negative for chest pain and palpitations.   Gastrointestinal:  Positive for blood in stool (melena). Negative for abdominal distention, abdominal pain, constipation, diarrhea, nausea and vomiting.   Endocrine: Negative for cold intolerance.   Genitourinary:  Negative for dysuria and hematuria.   Musculoskeletal:  Positive for back pain. Negative for arthralgias and myalgias.   Skin:  Negative for rash and wound.   Neurological:  Negative for dizziness, weakness, numbness and headaches.   Psychiatric/Behavioral:  Negative for agitation.    Objective:     Vital Signs (Most Recent):  Temp: 98.5 °F (36.9 °C) (02/23/22 1453)  Pulse: 64 (02/23/22 1858)  Resp: 18 (02/23/22 1453)  BP: (!) 116/58 (02/23/22 1453)  SpO2: (!) 94 % (02/23/22 1500)   Vital Signs (24h Range):  Temp:  [97 °F (36.1 °C)-98.6 °F (37 °C)] 98.5 °F (36.9 °C)  Pulse:  [55-73] 64  Resp:  [16-27] 18  SpO2:  [87 %-100 %] 94 %  BP: ()/(54-75) 116/58     Weight: 84.4 kg (186 lb)  Body mass index is 25.94 kg/m².    Intake/Output Summary (Last 24 hours) at 2/23/2022 1958  Last data filed at 2/23/2022 1809  Gross per 24 hour   Intake 560 ml   Output 600 ml   Net -40 ml      Physical Exam  Constitutional:       General: She is not in acute distress.     Appearance: Normal appearance. She is obese.   HENT:      Head: Normocephalic and atraumatic.      Mouth/Throat:      Mouth: Mucous membranes are moist.   Eyes:      General: No scleral icterus.     Extraocular Movements: Extraocular movements intact.      Conjunctiva/sclera:  Conjunctivae normal.   Cardiovascular:      Rate and Rhythm: Normal rate and regular rhythm.      Heart sounds: No murmur heard.    No gallop.   Pulmonary:      Effort: Pulmonary effort is normal. No respiratory distress.      Breath sounds: Normal breath sounds. No wheezing or rales.   Abdominal:      General: Abdomen is flat. Bowel sounds are normal.      Palpations: Abdomen is soft.      Tenderness: There is no abdominal tenderness. There is no guarding or rebound.   Musculoskeletal:         General: No swelling or tenderness.      Comments: Left BKA well healed.  Right TMA well healed.   Skin:     Findings: No rash.   Neurological:      General: No focal deficit present.      Mental Status: She is alert and oriented to person, place, and time. Mental status is at baseline.   Psychiatric:         Mood and Affect: Mood normal.         Behavior: Behavior normal.       Significant Labs: All pertinent labs within the past 24 hours have been reviewed.  BMP:   Recent Labs   Lab 02/23/22  0539   *      K 3.8      CO2 25   BUN 20   CREATININE 1.3   CALCIUM 8.3*   MG 2.0     CBC:   Recent Labs   Lab 02/22/22  1626 02/23/22  0024 02/23/22  0539   WBC 15.08* 18.40* 14.14*   HGB 7.2* 7.6* 7.7*   HCT 21.7* 24.0* 25.2*    279 315       Significant Imaging: I have reviewed all pertinent imaging results/findings within the past 24 hours.

## 2022-02-24 NOTE — ASSESSMENT & PLAN NOTE
- No evidence of overt blood loss PTA, but with coffee ground emesis in the ED and LUQ pain.  - Concern for blood loss given acute time period, but will evaluate other etiologies.   - Baseline Hgb 12  - CT A/P without bleeding noted  - Admission Hgb 3.7, MCV 76 (transfused 3 units)  - No evidence of hemolysis on labs, or schistocytes   - Iron panel without overt ROGER. Normal B12/folate.  - Retic 4.4%, suggestive of hypoproliferation  - Melena overnight on 2/21, Hgb 6.8. Transfused 1 unit  - GI consulted  -- EGD with two Christopher Class III ulcers with biopsies taken  -- Colonoscopy not performed due to poor prep  -- PPI BID for 12 weeks  -- Repeat EGD in 8 weeks along with reattempt of colonoscopy  -- Follow up pathology   - IV PPI BID  - Trend Hgb

## 2022-02-24 NOTE — PLAN OF CARE
Patient resting quietly in bed when CM rounded. No family present. Patient in agreement with plan to discharge home today, denied the need for assistance with transportation at time of discharge, & verbalized understanding regarding the hospital follow up appointment scheduled with NP Rika Carrillo on 3/7/2022 at 1030.    HH orders noted however patient does not have HH benefits with her tna Medicaid.     CM informed nurse Garrett ([id number]) of the discharge status.

## 2022-02-24 NOTE — PLAN OF CARE
SW faxed HH referrals via [employer] for review. SW will continue to follow.       02/24/22 1353   Post-Acute Status   Post-Acute Authorization Home Health   Home Health Status Referrals Sent     Yesenia Townsend LMSW   - [facility]  Ext. [phone]

## 2022-02-24 NOTE — NURSING
PT WITH DC TO HOME ORDERS, PIV DCED, PRESSURE DRSG APPLIED, DC INSTRUCTIONS REVIEWED WITH PT, VERB + UNDERSTANDING, PHARMACY DELIVERED MEDS TO ROOM, PT AWAITING FAMILY FOR TRANSPORT HOME

## 2022-02-24 NOTE — ASSESSMENT & PLAN NOTE
- h/o of CAD s/p PCI. Stated last PCI over 3 years ago at [facility].  - Holding ASA given concern for GIB  - Troponin peak at 2.925  - Cardiology consulted for ischemic evaluation in preparation for endoscopy with GI  -- Patient will need management of anemia prior to potential LHC  -- Reach out to cards for any additional management prior to discharge  - Continue atorvastatin  - Continue metoprolol 12.5mg

## 2022-02-24 NOTE — DISCHARGE SUMMARY
Den [facility] - Oncology ([facility])  [facility] Medicine  Discharge Summary      Patient Name: Casie Salvador  MRN: [id number]  Patient Class: IP- Inpatient  Admission Date: 2/20/2022  Hospital Length of Stay: 4 days  Discharge Date and Time:  02/24/2022 10:52 AM  Attending Physician: Chet Baca MD   Discharging Provider: Chet Baca MD  Primary Care Provider: Adonis Carey MD  [facility] Medicine Team: [facility] [place] HOSP MED R Chet Baca MD    HPI:   Ms. Salvador is a 56 yo F with a h/o DM II, hypertension, chronic pain, CAD s/p PCI, and opioid induced constipation presents with abdominal pain found to severe anemia. Patient baseline Hgb is 12 on labs two weeks ago. She denies hematochezia, melena, hemoptysis, hematemesis, prior episodes of anemia, lightheadedness, dizziness, dyspnea, and visions changes. She does note chronic abdominal pain in LUQ that is dull. States pain has worsened and last BM over one week ago. Denies NSAID use, fever, chills, and sick contacts.    In the ED, PRIETO 88/51, HR 88, RR 21, and afebrile. Labs notable for Hgb 3.7, WBC 20, Lactate 2.9. CT A/P with contrast with no significant interval change since prior CT of the abdomen and pelvis 02/07/2022 or new acute intra-abdominal process. CXR with new cardiomegaly from prior in 2018. Patient was transfused 2 units of blood. Latrer in the ED, nursing reported coffee ground emesis shortly after patient shortly after drinking soda.      Procedure(s) (LRB):  EGD (ESOPHAGOGASTRODUODENOSCOPY) (N/A)  COLONOSCOPY (N/A)      Hospital Course:   GI consulted given degree on acute anemia. Patient with an episode of melena the night after admission with drop in hemoglobin requiring 1 unit RBC. Patient with upper and lower endoscopy on 2/23. EGD with two ulcers with stigmata of recent bleeding. Colonoscopy unable to be performed due to poor prep. Patient to continue on BID PPI for 12 weeks.    Cardiology consulted given troponin rising to 2.647, and peaking at 2.925.  Cardiology recommended GI intervention before any potential LHC which would require procedural doses of heparin. TTE obtained revealed EF of 20%.     Patient with significant leukocytosis to 30 at admission with no obvious evidence of infection. UA unremarkable. Blood culture with no growth. CT A/P with no significant evidence of infection. CXR with no acute findings. Patient started on empiric Zosyn.     WBC# normalized, likely reactive from ulcer/bleed, will d/c abx. Hb went up today. Will give shot of B12, as appears deficient as well as lower end of Folate, will prescribe combined pill. Will request f/u with cardiology this coming Monday, as well as PCP and GI.        General: Negative for syncope, fatigue, fevers or chills.  HEENT: Negative for headaches, change in vision, difficulty swallowing.  Cardiac: Negative for chest pain, palpitations, orthopnea, or PND.  Pulmonary: Negative for dyspnea, cough, hemoptysis, or wheezing.  GI: Negative for abdominal distention, or pain. No N/V/C/D.  Heme/Lymphatic: Negative for night sweats, easy bruising, or LAD.  Endocrine: Negative for polyuria or Polydipsia.   MSK: Negative for claudication, arthralgias, or myalgias.  Vascular: Negative for claudication, calf pain with walking, or leg cramping.  Skin: Negative for cyanosis or rash.  Neuro: Negative for focal weakness or numbness.  Psych: Negative for depression, no abnormal thinking.    Vitals:    02/24/22 0613 02/24/22 0812 02/24/22 0900 02/24/22 0901   BP:   (!) 105/58    BP Location:       Patient Position:       Pulse:  (!) 56 83    Resp:    18   Temp:       TempSrc:       SpO2: 98%      Weight:       Height:         Physical Exam  Constitutional:       General: She is not in acute distress.     Appearance: Normal appearance. She is obese.   HENT:      Head: Normocephalic and atraumatic.      Mouth/Throat:      Mouth: Mucous membranes are moist.   Eyes:      General: No scleral icterus.     Extraocular Movements:  Extraocular movements intact.      Conjunctiva/sclera: Conjunctivae normal.   Cardiovascular:      Rate and Rhythm: Normal rate and regular rhythm.      Heart sounds: No murmur heard.    No gallop.   Pulmonary:      Effort: Pulmonary effort is normal. No respiratory distress.      Breath sounds: Normal breath sounds. No wheezing or rales.   Abdominal:      General: Abdomen is flat. Bowel sounds are normal.      Palpations: Abdomen is soft.      Tenderness: There is no abdominal tenderness. There is no guarding or rebound.   Musculoskeletal:         General: No swelling or tenderness.      Comments: Left BKA well healed.  Right TMA well healed.   Skin:     Findings: No rash.   Neurological:      General: No focal deficit present.      Mental Status: She is alert and oriented to person, place, and time. Mental status is at baseline.   Psychiatric:         Mood and Affect: Mood normal.         Behavior: Behavior normal.        Goals of Care Treatment Preferences:  Code Status: Full Code      Consults:   Consults (From admission, onward)        Status Ordering Provider     Inpatient consult to Midline team  Once        Provider:  (Not yet assigned)    Completed KELLEE MORALES     IP consult to case management  Once        Provider:  (Not yet assigned)    Acknowledged KELLEE MORALES     Inpatient consult to Gastroenterology  Once        Provider:  (Not yet assigned)    Completed KELLEE MORALES          No new Assessment & Plan notes have been filed under this hospital service since the last note was generated.  Service: Hospital Medicine    Final Active Diagnoses:    Diagnosis Date Noted POA    PRINCIPAL PROBLEM:  Anemia [D64.9] 02/20/2022 Yes    Chronic systolic congestive heart failure [I50.22] 02/21/2022 Yes    Hypertension [I10] 02/20/2022 Yes    CAD (coronary artery disease) [I25.10] 02/20/2022 Yes    DM (diabetes mellitus), type 2 [E11.9] 02/20/2022 Yes    Chronic pain [G89.29] 02/20/2022 Yes     Constipation due to opioid therapy [K59.03, T40.2X5A] 02/20/2022 Yes    Depression [F32.A] 02/20/2022 Yes    Insomnia [G47.00] 02/20/2022 Yes    Leukocytosis [D72.829] 02/20/2022 Yes      Problems Resolved During this Admission:       Discharged Condition: good    Disposition:     Follow Up:   Follow-up Information     Adonis Carey MD Follow up.    Specialty: Internal Medicine  Contact information:  [address] [place] [address] [id number] [phone]                       Patient Instructions:      Ambulatory referral/consult to Cardiology   Standing Status: Future   Referral Priority: Routine Referral Type: Consultation   Referral Reason: Specialty Services Required   Requested Specialty: Cardiology     Ambulatory referral/consult to Internal Medicine   Standing Status: Future   Referral Priority: Routine Referral Type: Consultation   Referral Reason: Specialty Services Required   Requested Specialty: Internal Medicine   Number of Visits Requested: 1     Ambulatory referral/consult to Gastroenterology   Standing Status: Future   Referral Priority: Routine Referral Type: Consultation   Referral Reason: Specialty Services Required   Requested Specialty: Gastroenterology       Significant Diagnostic Studies:     Recent Results (from the past 100 hour(s))   Lactic acid, plasma #2    Collection Time: 02/20/22  9:23 AM   Result Value Ref Range    Lactate (Lactic Acid) 2.1 0.5 - 2.2 mmol/L   CBC auto differential    Collection Time: 02/20/22  1:29 PM   Result Value Ref Range    WBC 30.80 (H) 3.90 - 12.70 K/uL    RBC 2.61 (L) 4.00 - 5.40 M/uL    Hemoglobin 6.6 (L) 12.0 - 16.0 g/dL    Hematocrit 21.1 (L) 37.0 - 48.5 %    MCV 81 (L) 82 - 98 fL    MCH 25.3 (L) 27.0 - 31.0 pg    MCHC 31.3 (L) 32.0 - 36.0 g/dL    RDW 18.3 (H) 11.5 - 14.5 %    Platelets 348 150 - 450 K/uL    MPV 10.3 9.2 - 12.9 fL    Immature Granulocytes 0.9 (H) 0.0 - 0.5 %    Gran # (ANC) 26.1 (H) 1.8 - 7.7 K/uL    Immature Grans (Abs) 0.29 (H) 0.00 -  0.04 K/uL    Lymph # 3.0 1.0 - 4.8 K/uL    Mono # 1.4 (H) 0.3 - 1.0 K/uL    Eos # 0.0 0.0 - 0.5 K/uL    Baso # 0.06 0.00 - 0.20 K/uL    nRBC 0 0 /100 WBC    Gran % 84.7 (H) 38.0 - 73.0 %    Lymph % 9.6 (L) 18.0 - 48.0 %    Mono % 4.5 4.0 - 15.0 %    Eosinophil % 0.1 0.0 - 8.0 %    Basophil % 0.2 0.0 - 1.9 %    Platelet Estimate Appears normal     Aniso Slight     Poik Slight     Poly Occasional     Hypo Occasional     Ovalocytes Occasional     Target Cells CANCELED     Tear Drop Cells Occasional     Schistocytes Present     Toxic Granulation Present     Fragmented Cells Occasional     Differential Method Automated    TSH    Collection Time: 02/20/22  1:29 PM   Result Value Ref Range    TSH 2.899 0.400 - 4.000 uIU/mL   Troponin I    Collection Time: 02/20/22  1:29 PM   Result Value Ref Range    Troponin I 0.699 (H) 0.000 - 0.026 ng/mL   Protime-INR    Collection Time: 02/20/22  1:29 PM   Result Value Ref Range    Prothrombin Time 10.7 9.0 - 12.5 sec    INR 1.0 0.8 - 1.2   Urinalysis, Reflex to Urine Culture Urine, Clean Catch    Collection Time: 02/20/22  2:15 PM    Specimen: Urine   Result Value Ref Range    Specimen UA Urine, Clean Catch     Color, UA Yellow Yellow, Straw, Cristy    Appearance, UA Clear Clear    pH, UA 5.0 5.0 - 8.0    Specific Gravity, UA >=1.030 (A) 1.005 - 1.030    Protein, UA Negative Negative    Glucose, UA Negative Negative    Ketones, UA Negative Negative    Bilirubin (UA) Negative Negative    Occult Blood UA Negative Negative    Nitrite, UA Negative Negative    Leukocytes, UA Trace (A) Negative   Urinalysis Microscopic    Collection Time: 02/20/22  2:15 PM   Result Value Ref Range    RBC, UA 9 (H) 0 - 4 /hpf    WBC, UA 3 0 - 5 /hpf    Yeast, UA Occasional (A) None    Squam Epithel, UA 6 /hpf    Microscopic Comment SEE COMMENT    POCT glucose    Collection Time: 02/20/22  4:58 PM   Result Value Ref Range    POCT Glucose 222 (H) 70 - 110 mg/dL   POCT glucose    Collection Time: 02/20/22 10:51  PM   Result Value Ref Range    POCT Glucose 244 (H) 70 - 110 mg/dL   Comprehensive Metabolic Panel (CMP)    Collection Time: 02/21/22  4:00 AM   Result Value Ref Range    Sodium 139 136 - 145 mmol/L    Potassium 4.3 3.5 - 5.1 mmol/L    Chloride 108 95 - 110 mmol/L    CO2 20 (L) 23 - 29 mmol/L    Glucose 129 (H) 70 - 110 mg/dL    BUN 29 (H) 6 - 20 mg/dL    Creatinine 1.3 0.5 - 1.4 mg/dL    Calcium 8.5 (L) 8.7 - 10.5 mg/dL    Total Protein 6.3 6.0 - 8.4 g/dL    Albumin 2.8 (L) 3.5 - 5.2 g/dL    Total Bilirubin 0.5 0.1 - 1.0 mg/dL    Alkaline Phosphatase 88 55 - 135 U/L    AST 20 10 - 40 U/L    ALT 12 10 - 44 U/L    Anion Gap 11 8 - 16 mmol/L    eGFR if African American 53.0 (A) >60 mL/min/1.73 m^2    eGFR if non  46.0 (A) >60 mL/min/1.73 m^2   Magnesium    Collection Time: 02/21/22  4:00 AM   Result Value Ref Range    Magnesium 2.0 1.6 - 2.6 mg/dL   Phosphorus    Collection Time: 02/21/22  4:00 AM   Result Value Ref Range    Phosphorus 2.8 2.7 - 4.5 mg/dL   CBC with Automated Differential    Collection Time: 02/21/22  4:00 AM   Result Value Ref Range    WBC 22.06 (H) 3.90 - 12.70 K/uL    RBC 2.76 (L) 4.00 - 5.40 M/uL    Hemoglobin 7.4 (L) 12.0 - 16.0 g/dL    Hematocrit 22.2 (L) 37.0 - 48.5 %    MCV 80 (L) 82 - 98 fL    MCH 26.8 (L) 27.0 - 31.0 pg    MCHC 33.3 32.0 - 36.0 g/dL    RDW 17.6 (H) 11.5 - 14.5 %    Platelets 347 150 - 450 K/uL    MPV 10.7 9.2 - 12.9 fL    Immature Granulocytes 1.0 (H) 0.0 - 0.5 %    Gran # (ANC) 17.2 (H) 1.8 - 7.7 K/uL    Immature Grans (Abs) 0.21 (H) 0.00 - 0.04 K/uL    Lymph # 3.2 1.0 - 4.8 K/uL    Mono # 1.3 (H) 0.3 - 1.0 K/uL    Eos # 0.1 0.0 - 0.5 K/uL    Baso # 0.10 0.00 - 0.20 K/uL    nRBC 1 (A) 0 /100 WBC    Gran % 77.8 (H) 38.0 - 73.0 %    Lymph % 14.4 (L) 18.0 - 48.0 %    Mono % 5.8 4.0 - 15.0 %    Eosinophil % 0.5 0.0 - 8.0 %    Basophil % 0.5 0.0 - 1.9 %    Differential Method Automated    Troponin I    Collection Time: 02/21/22  4:00 AM   Result Value Ref Range     Troponin I 2.647 (H) 0.000 - 0.026 ng/mL   POCT glucose    Collection Time: 02/21/22  8:22 AM   Result Value Ref Range    POCT Glucose 93 70 - 110 mg/dL   Echo Saline Bubble? No    Collection Time: 02/21/22  9:30 AM   Result Value Ref Range    Ascending aorta 2.71 cm    STJ 2.73 cm    AV mean gradient 3 mmHg    Ao peak danny 1.05 m/s    Ao VTI 21.67 cm    IVS 1.31 (A) 0.6 - 1.1 cm    LA size 3.99 cm    Left Atrium Major Axis 5.74 cm    Left Atrium Minor Axis 5.31 cm    LVIDd 4.99 3.5 - 6.0 cm    LVIDs 4.14 (A) 2.1 - 4.0 cm    LVOT diameter 1.97 cm    LVOT peak VTI 13.81 cm    Posterior Wall 0.76 0.6 - 1.1 cm    MV Peak A Danny 0.94 m/s    E wave deceleration time 187.15 msec    MV Peak E Danny 1.01 m/s    RA Major Axis 4.81 cm    RA Width 3.63 cm    RVDD 3.90 cm    Sinus 2.98 cm    TAPSE 2.66 cm    TDI LATERAL 0.06 m/s    TDI SEPTAL 0.05 m/s    LA WIDTH 3.93 cm    MV stenosis pressure 1/2 time 54.27 ms    LV Diastolic Volume 117.71 mL    LV Systolic Volume 75.80 mL    LVOT peak danny 0.70 m/s    LV LATERAL E/E' RATIO 16.83 m/s    LV SEPTAL E/E' RATIO 20.20 m/s    FS 17 %    LA volume 73.53 cm3    LV mass 190.00 g    Left Ventricle Relative Wall Thickness 0.30 cm    AV valve area 1.94 cm2    AV Velocity Ratio 0.67     AV index (prosthetic) 0.64     MV valve area p 1/2 method 4.05 cm2    E/A ratio 1.07     Mean e' 0.06 m/s    LVOT area 3.0 cm2    LVOT stroke volume 42.07 cm3    AV peak gradient 4 mmHg    E/E' ratio 18.36 m/s    LV Systolic Volume Index 37.2 mL/m2    LV Diastolic Volume Index 57.70 mL/m2    LA Volume Index 36.0 mL/m2    LV Mass Index 93 g/m2    BSA 2.06 m2    Right Atrial Pressure (from IVC) 15 mmHg    EF 20 %    RV S' 10 cm/s   Troponin I    Collection Time: 02/21/22 10:02 AM   Result Value Ref Range    Troponin I 2.925 (H) 0.000 - 0.026 ng/mL   POCT glucose    Collection Time: 02/21/22 12:23 PM   Result Value Ref Range    POCT Glucose 163 (H) 70 - 110 mg/dL   Troponin I    Collection Time: 02/21/22  4:15  PM   Result Value Ref Range    Troponin I 2.803 (H) 0.000 - 0.026 ng/mL   POCT glucose    Collection Time: 02/21/22  4:37 PM   Result Value Ref Range    POCT Glucose 243 (H) 70 - 110 mg/dL   CBC Auto Differential    Collection Time: 02/21/22  9:03 PM   Result Value Ref Range    WBC 16.96 (H) 3.90 - 12.70 K/uL    RBC 2.83 (L) 4.00 - 5.40 M/uL    Hemoglobin 7.4 (L) 12.0 - 16.0 g/dL    Hematocrit 23.6 (L) 37.0 - 48.5 %    MCV 83 82 - 98 fL    MCH 26.1 (L) 27.0 - 31.0 pg    MCHC 31.4 (L) 32.0 - 36.0 g/dL    RDW 18.2 (H) 11.5 - 14.5 %    Platelets 322 150 - 450 K/uL    MPV 9.8 9.2 - 12.9 fL    Immature Granulocytes 0.9 (H) 0.0 - 0.5 %    Gran # (ANC) 12.9 (H) 1.8 - 7.7 K/uL    Immature Grans (Abs) 0.15 (H) 0.00 - 0.04 K/uL    Lymph # 2.6 1.0 - 4.8 K/uL    Mono # 1.1 (H) 0.3 - 1.0 K/uL    Eos # 0.2 0.0 - 0.5 K/uL    Baso # 0.07 0.00 - 0.20 K/uL    nRBC 1 (A) 0 /100 WBC    Gran % 75.8 (H) 38.0 - 73.0 %    Lymph % 15.1 (L) 18.0 - 48.0 %    Mono % 6.5 4.0 - 15.0 %    Eosinophil % 1.3 0.0 - 8.0 %    Basophil % 0.4 0.0 - 1.9 %    Differential Method Automated    POCT glucose    Collection Time: 02/21/22  9:13 PM   Result Value Ref Range    POCT Glucose 227 (H) 70 - 110 mg/dL   Comprehensive Metabolic Panel (CMP)    Collection Time: 02/22/22  4:45 AM   Result Value Ref Range    Sodium 139 136 - 145 mmol/L    Potassium 4.2 3.5 - 5.1 mmol/L    Chloride 106 95 - 110 mmol/L    CO2 22 (L) 23 - 29 mmol/L    Glucose 128 (H) 70 - 110 mg/dL    BUN 26 (H) 6 - 20 mg/dL    Creatinine 1.6 (H) 0.5 - 1.4 mg/dL    Calcium 8.2 (L) 8.7 - 10.5 mg/dL    Total Protein 6.3 6.0 - 8.4 g/dL    Albumin 2.8 (L) 3.5 - 5.2 g/dL    Total Bilirubin 0.5 0.1 - 1.0 mg/dL    Alkaline Phosphatase 112 55 - 135 U/L    AST 33 10 - 40 U/L    ALT 29 10 - 44 U/L    Anion Gap 11 8 - 16 mmol/L    eGFR if African American 41.2 (A) >60 mL/min/1.73 m^2    eGFR if non  35.8 (A) >60 mL/min/1.73 m^2   Magnesium    Collection Time: 02/22/22  4:45 AM   Result  Value Ref Range    Magnesium 2.1 1.6 - 2.6 mg/dL   Phosphorus    Collection Time: 02/22/22  4:45 AM   Result Value Ref Range    Phosphorus 3.4 2.7 - 4.5 mg/dL   CBC with Automated Differential    Collection Time: 02/22/22  4:45 AM   Result Value Ref Range    WBC 15.95 (H) 3.90 - 12.70 K/uL    RBC 2.60 (L) 4.00 - 5.40 M/uL    Hemoglobin 6.8 (L) 12.0 - 16.0 g/dL    Hematocrit 21.5 (L) 37.0 - 48.5 %    MCV 83 82 - 98 fL    MCH 26.2 (L) 27.0 - 31.0 pg    MCHC 31.6 (L) 32.0 - 36.0 g/dL    RDW 18.3 (H) 11.5 - 14.5 %    Platelets 311 150 - 450 K/uL    MPV 10.7 9.2 - 12.9 fL    Immature Granulocytes 0.9 (H) 0.0 - 0.5 %    Gran # (ANC) 12.5 (H) 1.8 - 7.7 K/uL    Immature Grans (Abs) 0.15 (H) 0.00 - 0.04 K/uL    Lymph # 2.1 1.0 - 4.8 K/uL    Mono # 1.0 0.3 - 1.0 K/uL    Eos # 0.1 0.0 - 0.5 K/uL    Baso # 0.07 0.00 - 0.20 K/uL    nRBC 1 (A) 0 /100 WBC    Gran % 78.5 (H) 38.0 - 73.0 %    Lymph % 13.4 (L) 18.0 - 48.0 %    Mono % 6.0 4.0 - 15.0 %    Eosinophil % 0.8 0.0 - 8.0 %    Basophil % 0.4 0.0 - 1.9 %    Differential Method Automated    POCT glucose    Collection Time: 02/22/22  8:01 AM   Result Value Ref Range    POCT Glucose <20 (L) 70 - 110 mg/dL   POCT glucose    Collection Time: 02/22/22  8:02 AM   Result Value Ref Range    POCT Glucose 101 70 - 110 mg/dL   POCT glucose    Collection Time: 02/22/22 12:09 PM   Result Value Ref Range    POCT Glucose 108 70 - 110 mg/dL   CBC auto differential    Collection Time: 02/22/22  4:26 PM   Result Value Ref Range    WBC 15.08 (H) 3.90 - 12.70 K/uL    RBC 2.75 (L) 4.00 - 5.40 M/uL    Hemoglobin 7.2 (L) 12.0 - 16.0 g/dL    Hematocrit 21.7 (L) 37.0 - 48.5 %    MCV 79 (L) 82 - 98 fL    MCH 26.2 (L) 27.0 - 31.0 pg    MCHC 33.2 32.0 - 36.0 g/dL    RDW 18.2 (H) 11.5 - 14.5 %    Platelets 300 150 - 450 K/uL    MPV 11.1 9.2 - 12.9 fL    Immature Granulocytes 1.0 (H) 0.0 - 0.5 %    Gran # (ANC) 10.5 (H) 1.8 - 7.7 K/uL    Immature Grans (Abs) 0.15 (H) 0.00 - 0.04 K/uL    Lymph # 3.1 1.0 -  4.8 K/uL    Mono # 1.0 0.3 - 1.0 K/uL    Eos # 0.2 0.0 - 0.5 K/uL    Baso # 0.06 0.00 - 0.20 K/uL    nRBC 1 (A) 0 /100 WBC    Gran % 69.6 38.0 - 73.0 %    Lymph % 20.8 18.0 - 48.0 %    Mono % 6.9 4.0 - 15.0 %    Eosinophil % 1.3 0.0 - 8.0 %    Basophil % 0.4 0.0 - 1.9 %    Platelet Estimate SEE COMMENT     Aniso Slight     Poik Slight     Poly Occasional     Hypo Occasional     Ovalocytes Occasional     Basophilic Stippling Occasional     Castañeda-Jolly Bodies Occasional     Large/Giant Platelets Present     Fragmented Cells Occasional     Differential Method Automated    CBC Auto Differential    Collection Time: 02/23/22 12:24 AM   Result Value Ref Range    WBC 18.40 (H) 3.90 - 12.70 K/uL    RBC 2.81 (L) 4.00 - 5.40 M/uL    Hemoglobin 7.6 (L) 12.0 - 16.0 g/dL    Hematocrit 24.0 (L) 37.0 - 48.5 %    MCV 85 82 - 98 fL    MCH 27.0 27.0 - 31.0 pg    MCHC 31.7 (L) 32.0 - 36.0 g/dL    RDW 18.6 (H) 11.5 - 14.5 %    Platelets 279 150 - 450 K/uL    MPV 9.8 9.2 - 12.9 fL    Immature Granulocytes 0.6 (H) 0.0 - 0.5 %    Gran # (ANC) 13.4 (H) 1.8 - 7.7 K/uL    Immature Grans (Abs) 0.11 (H) 0.00 - 0.04 K/uL    Lymph # 3.5 1.0 - 4.8 K/uL    Mono # 1.1 (H) 0.3 - 1.0 K/uL    Eos # 0.3 0.0 - 0.5 K/uL    Baso # 0.05 0.00 - 0.20 K/uL    nRBC 1 (A) 0 /100 WBC    Gran % 72.5 38.0 - 73.0 %    Lymph % 19.0 18.0 - 48.0 %    Mono % 6.0 4.0 - 15.0 %    Eosinophil % 1.6 0.0 - 8.0 %    Basophil % 0.3 0.0 - 1.9 %    Differential Method Automated    Comprehensive Metabolic Panel (CMP)    Collection Time: 02/23/22  5:39 AM   Result Value Ref Range    Sodium 137 136 - 145 mmol/L    Potassium 3.8 3.5 - 5.1 mmol/L    Chloride 103 95 - 110 mmol/L    CO2 25 23 - 29 mmol/L    Glucose 135 (H) 70 - 110 mg/dL    BUN 20 6 - 20 mg/dL    Creatinine 1.3 0.5 - 1.4 mg/dL    Calcium 8.3 (L) 8.7 - 10.5 mg/dL    Total Protein 6.4 6.0 - 8.4 g/dL    Albumin 2.7 (L) 3.5 - 5.2 g/dL    Total Bilirubin 0.7 0.1 - 1.0 mg/dL    Alkaline Phosphatase 113 55 - 135 U/L    AST 18  10 - 40 U/L    ALT 28 10 - 44 U/L    Anion Gap 9 8 - 16 mmol/L    eGFR if African American 53.0 (A) >60 mL/min/1.73 m^2    eGFR if non  46.0 (A) >60 mL/min/1.73 m^2   Magnesium    Collection Time: 02/23/22  5:39 AM   Result Value Ref Range    Magnesium 2.0 1.6 - 2.6 mg/dL   Phosphorus    Collection Time: 02/23/22  5:39 AM   Result Value Ref Range    Phosphorus 3.5 2.7 - 4.5 mg/dL   CBC with Automated Differential    Collection Time: 02/23/22  5:39 AM   Result Value Ref Range    WBC 14.14 (H) 3.90 - 12.70 K/uL    RBC 2.93 (L) 4.00 - 5.40 M/uL    Hemoglobin 7.7 (L) 12.0 - 16.0 g/dL    Hematocrit 25.2 (L) 37.0 - 48.5 %    MCV 86 82 - 98 fL    MCH 26.3 (L) 27.0 - 31.0 pg    MCHC 30.6 (L) 32.0 - 36.0 g/dL    RDW 18.9 (H) 11.5 - 14.5 %    Platelets 315 150 - 450 K/uL    MPV 10.3 9.2 - 12.9 fL    Immature Granulocytes 0.6 (H) 0.0 - 0.5 %    Gran # (ANC) 10.2 (H) 1.8 - 7.7 K/uL    Immature Grans (Abs) 0.09 (H) 0.00 - 0.04 K/uL    Lymph # 2.6 1.0 - 4.8 K/uL    Mono # 0.9 0.3 - 1.0 K/uL    Eos # 0.3 0.0 - 0.5 K/uL    Baso # 0.05 0.00 - 0.20 K/uL    nRBC 1 (A) 0 /100 WBC    Gran % 72.4 38.0 - 73.0 %    Lymph % 18.2 18.0 - 48.0 %    Mono % 6.6 4.0 - 15.0 %    Eosinophil % 1.8 0.0 - 8.0 %    Basophil % 0.4 0.0 - 1.9 %    Differential Method Automated    POCT glucose    Collection Time: 02/23/22 10:25 AM   Result Value Ref Range    POCT Glucose 141 (H) 70 - 110 mg/dL   POCT glucose    Collection Time: 02/23/22 12:05 PM   Result Value Ref Range    POCT Glucose 148 (H) 70 - 110 mg/dL   POCT glucose    Collection Time: 02/23/22  5:24 PM   Result Value Ref Range    POCT Glucose >500 (H) 70 - 110 mg/dL   POCT glucose    Collection Time: 02/23/22  5:27 PM   Result Value Ref Range    POCT Glucose 274 (H) 70 - 110 mg/dL   POCT glucose    Collection Time: 02/23/22  8:46 PM   Result Value Ref Range    POCT Glucose 226 (H) 70 - 110 mg/dL   Comprehensive Metabolic Panel (CMP)    Collection Time: 02/24/22  4:47 AM   Result  Value Ref Range    Sodium 140 136 - 145 mmol/L    Potassium 4.0 3.5 - 5.1 mmol/L    Chloride 109 95 - 110 mmol/L    CO2 23 23 - 29 mmol/L    Glucose 155 (H) 70 - 110 mg/dL    BUN 13 6 - 20 mg/dL    Creatinine 1.2 0.5 - 1.4 mg/dL    Calcium 8.3 (L) 8.7 - 10.5 mg/dL    Total Protein 6.1 6.0 - 8.4 g/dL    Albumin 2.6 (L) 3.5 - 5.2 g/dL    Total Bilirubin 0.6 0.1 - 1.0 mg/dL    Alkaline Phosphatase 108 55 - 135 U/L    AST 12 10 - 40 U/L    ALT 21 10 - 44 U/L    Anion Gap 8 8 - 16 mmol/L    eGFR if African American 58.4 (A) >60 mL/min/1.73 m^2    eGFR if non African American 50.6 (A) >60 mL/min/1.73 m^2   Magnesium    Collection Time: 02/24/22  4:47 AM   Result Value Ref Range    Magnesium 2.0 1.6 - 2.6 mg/dL   Phosphorus    Collection Time: 02/24/22  4:47 AM   Result Value Ref Range    Phosphorus 3.0 2.7 - 4.5 mg/dL   CBC with Automated Differential    Collection Time: 02/24/22  4:47 AM   Result Value Ref Range    WBC 10.65 3.90 - 12.70 K/uL    RBC 3.16 (L) 4.00 - 5.40 M/uL    Hemoglobin 8.4 (L) 12.0 - 16.0 g/dL    Hematocrit 26.4 (L) 37.0 - 48.5 %    MCV 84 82 - 98 fL    MCH 26.6 (L) 27.0 - 31.0 pg    MCHC 31.8 (L) 32.0 - 36.0 g/dL    RDW 19.0 (H) 11.5 - 14.5 %    Platelets 344 150 - 450 K/uL    MPV 10.2 9.2 - 12.9 fL    Immature Granulocytes 0.6 (H) 0.0 - 0.5 %    Gran # (ANC) 6.8 1.8 - 7.7 K/uL    Immature Grans (Abs) 0.06 (H) 0.00 - 0.04 K/uL    Lymph # 2.7 1.0 - 4.8 K/uL    Mono # 0.7 0.3 - 1.0 K/uL    Eos # 0.4 0.0 - 0.5 K/uL    Baso # 0.04 0.00 - 0.20 K/uL    nRBC 0 0 /100 WBC    Gran % 63.8 38.0 - 73.0 %    Lymph % 24.9 18.0 - 48.0 %    Mono % 6.7 4.0 - 15.0 %    Eosinophil % 3.6 0.0 - 8.0 %    Basophil % 0.4 0.0 - 1.9 %    Differential Method Automated    POCT glucose    Collection Time: 02/24/22  8:26 AM   Result Value Ref Range    POCT Glucose 145 (H) 70 - 110 mg/dL       Microbiology Results (last 7 days)     Procedure Component Value Units Date/Time    Blood culture x two cultures. Draw prior to  antibiotics. [[id number]] Collected: 02/20/22 0625    Order Status: Completed Specimen: Blood from Peripheral, Hand, Left Updated: 02/23/22 1212     Blood Culture, Routine No Growth to date      No Growth to date      No Growth to date      No Growth to date    Narrative:      Aerobic and anaerobic    Blood culture x two cultures. Draw prior to antibiotics. [[id number]] Collected: 02/20/22 0626    Order Status: Completed Specimen: Blood from Peripheral, Antecubital, Left Updated: 02/23/22 1212     Blood Culture, Routine No Growth to date      No Growth to date      No Growth to date      No Growth to date    Narrative:      Aerobic and anaerobic          Imaging Results          CT Abdomen Pelvis With Contrast (Final result)  Result time 02/20/22 08:48:14    Final result by Tiki Ding MD (02/20/22 08:48:14)                 Impression:      1.  No significant interval change since prior CT of the abdomen and pelvis 02/07/2022. no new acute intra-abdominal process.  2.  Other stable findings as described above.      Electronically signed by: Tiki Ding  Date:    02/20/2022  Time:    08:48             Narrative:    EXAMINATION:  CT ABDOMEN PELVIS WITH CONTRAST    CLINICAL HISTORY:  Abdominal pain, acute, nonlocalized;    TECHNIQUE:  Low dose axial images, sagittal and coronal reformations were obtained from the lung bases to the pubic symphysis following the IV administration of 100 mL of Omnipaque 350 no oral    COMPARISON:  CT of the abdomen and pelvis with intravenous contrast 02/07/2022    FINDINGS:  There is partial imaging of the lung bases.  There is a mildly dilated left ventricular chamber.  There is calcific disease of the coronary arteries, and there appears to be a stent in the proximal left anterior descending coronary artery.  There is calcific and non calcific plaquing of the thoracic aorta.  Patchy granular opacities in the lower lung zones may reflect subsegmental atelectasis/compressive  changes.    The liver, spleen, gallbladder, and adrenal glands demonstrate no new abnormalities.    There is mild atrophy of the pancreas, but no duct dilatation.  The previously described subcentimeter hypodense lesion in the pancreatic tail is not as well seen on the current examination due to increased motion.    There is symmetric renal enhancement.  A few bilateral renal low-density lesions are not changed and have been previously described.  No calcifications are seen in either kidney, either ureter or within the urinary bladder.  There is mild right renal cortical scarring.    There is atheromatous disease of the abdominal aorta.  There is moderate stenosis of this proximal superimposed mesenteric artery due to noncalcified atheroma.  The origin of the right renal artery is not well seen due to atheromatous, however the distal vessel opacifies normally and renal enhancement appears symmetric.  There is moderate stenosis of the infrarenal abdominal aorta due to calcified noncalcified atheroma.    There are postsurgical changes in the left iliac region, possibly related to previous vascular access.    The uterus and adnexa appear atrophic, but commensurate with the age of patient.  Previous small presumed fibroid is not as well seen.  The urinary bladder is well distended and otherwise appears unremarkable.    There is stool seen throughout the ascending, transverse, and descending colon, correlate with possible constipation.    There is a mildly enlarged periportal lymph node, to 1.5 cm in short axis, which is been previously described.    Bones appear unremarkable for age of patient.    No evidence of free air, organized fluid collection, or ascites.                                X-Ray Chest AP Portable (Final result)  Result time 02/20/22 04:33:53    Final result by Aleksey Harper MD (02/20/22 04:33:53)                 Impression:      No obvious evidence of an acute pulmonary process.    Development of  cardiomegaly.    This report was flagged in Epic as abnormal.      Electronically signed by: Aleksey Harper  Date:    02/20/2022  Time:    04:33             Narrative:    EXAMINATION:  XR CHEST AP PORTABLE    CLINICAL HISTORY:  Chest Pain;    TECHNIQUE:  Single frontal view of the chest was performed.    COMPARISON:  February 3, 2018    FINDINGS:  Single view of the chest reveals cardiomegaly.  This represents a change from the previous examination.  The lungs however appear to be clear.  No indication of a consolidative process or pleural effusion.                                    Pending Diagnostic Studies:     Procedure Component Value Units Date/Time    Specimen to Pathology, Surgery Gastrointestinal tract [[id number]] Collected: 02/23/22 1155    Order Status: Sent Lab Status: In process Updated: 02/23/22 1720    Specimen to Pathology, Surgery Gastrointestinal tract [[id number]] Collected: 02/23/22 1141    Order Status: Sent Lab Status: In process Updated: 02/23/22 1142    Urinalysis, Reflex to Urine Culture Urine, Clean Catch [[id number]] Collected: 02/20/22 0611    Order Status: Sent Lab Status: In process Updated: 02/20/22 0612    Specimen: Urine          Medications:  Reconciled Home Medications:      Medication List      START taking these medications    folic acid-vit B6-vit B12 2.5-25-2 mg 2.5-25-2 mg Tab  Commonly known as: FOLBIC or Equiv  Take 1 tablet by mouth once daily.        CONTINUE taking these medications    amLODIPine 10 MG tablet  Commonly known as: NORVASC  Take 10 mg by mouth once daily.     aspirin 81 MG EC tablet  Commonly known as: ECOTRIN  Take 81 mg by mouth once daily.     atorvastatin 80 MG tablet  Commonly known as: LIPITOR  Take 40 mg by mouth once daily.     blood sugar diagnostic Strp  by Misc.(Non-Drug; Combo Route) route.     busPIRone 10 MG tablet  Commonly known as: BUSPAR  Take 10 mg by mouth 2 (two) times daily.     citalopram 20 MG tablet  Commonly known as: CeleXA  Take 20  mg by mouth once daily.     docusate sodium 100 MG capsule  Commonly known as: COLACE  Take 1 capsule (100 mg total) by mouth 2 (two) times daily.     doxycycline 50 MG Cap  Commonly known as: MONODOX  Take 100 mg by mouth every 12 (twelve) hours.     furosemide 20 MG tablet  Commonly known as: LASIX  Take 20 mg by mouth 2 (two) times daily.     gabapentin 600 MG tablet  Commonly known as: NEURONTIN  Take 600 mg by mouth 2 (two) times daily.     glipiZIDE 10 MG tablet  Commonly known as: GLUCOTROL  Take 10 mg by mouth 2 (two) times daily before meals.     hydrALAZINE 50 MG tablet  Commonly known as: APRESOLINE  Take 50 mg by mouth daily as needed.     hyoscyamine 0.125 mg Tab  Commonly known as: ANASPAZ,LEVSIN  Take 1 tablet (125 mcg total) by mouth every 6 (six) hours as needed (abdominal cramping).     insulin glargine 100 unit/mL injection  Commonly known as: Lantus  Inject 50 Units into the skin every evening.     lancets 28 gauge Misc  by Misc.(Non-Drug; Combo Route) route.     lisinopriL 2.5 MG tablet  Commonly known as: PRINIVIL,ZESTRIL  Take 1 tablet (2.5 mg total) by mouth once daily.     loperamide 2 mg Tab  Commonly known as: IMODIUM A-D  Take 2 mg by mouth 4 (four) times daily as needed.     metFORMIN 1000 MG tablet  Commonly known as: GLUCOPHAGE  Take 1,000 mg by mouth 2 (two) times daily with meals.     metoclopramide HCl 10 MG tablet  Commonly known as: REGLAN  Take 10 mg by mouth 2 (two) times a day.     metoprolol tartrate 50 MG tablet  Commonly known as: LOPRESSOR  Take 50 mg by mouth 2 (two) times daily.     montelukast 10 mg tablet  Commonly known as: SINGULAIR  Take 10 mg by mouth every evening.     omeprazole 20 MG capsule  Commonly known as: PRILOSEC  Take 20 mg by mouth once daily.     ondansetron 4 MG Tbdl  Commonly known as: ZOFRAN-ODT  Take 1 tablet (4 mg total) by mouth every 8 (eight) hours as needed.     oxyCODONE-acetaminophen 5-325 mg per tablet  Commonly known as: PERCOCET  Take 1  "tablet by mouth every 4 (four) hours as needed for Pain.     pen needle, diabetic 31 gauge x 3/16" Ndle  by Misc.(Non-Drug; Combo Route) route.     polyethylene glycol 17 gram/dose powder  Commonly known as: GLYCOLAX  Take 17 g by mouth daily as needed.     potassium chloride 10 MEQ Cpsr  Commonly known as: MICRO-K  Take 10 mEq by mouth once daily.     zolpidem 10 mg Tab  Commonly known as: AMBIEN  Take 5 mg by mouth nightly as needed.        STOP taking these medications    clopidogreL 75 mg tablet  Commonly known as: PLAVIX     oxaprozin 600 mg tablet  Commonly known as: DAYPRO            Indwelling Lines/Drains at time of discharge:   Lines/Drains/Airways     None                 Time spent on the discharge of patient: 35 minutes         Chet Baca MD  Department of Hospital Medicine  [facility] ([facility])  "

## 2022-02-24 NOTE — PLAN OF CARE
Side rails up x2; call bell in place; bed in lowest, locked position; skid proof socks on; no evidence of skin breakdown; care plan explained to patient; pt remains free of injury.  Pt returned from endoscopy without incident. Tolerated po, voids per purwick, liquid black BM x 2, pt turns self and repositions self frequently. Telemetry monitoring in progress HR SR in afternoon. Early am HR was bradycardia, metoprolol held. CBG monitored Insulin coverage given. Zosyn given as scheduled. C/o pain oxy IR given x 1, 2L NC in progress, VSS and afebrile.

## 2022-02-24 NOTE — PLAN OF CARE
[facility])  Discharge Final Note    Primary Care Provider: EILEEN Guillaume    Expected Discharge Date: 2/24/2022    Final Discharge Note (most recent)       Final Note - 02/24/22 1515          Final Note    Assessment Type Final Discharge Note (P)      Anticipated Discharge Disposition Home or Self Care (P)                      Important Message from Medicare             Contact Info       EILEEN Guillaume   Specialty: Family Medicine   Relationship: PCP - General    [address] [facility] [address]   Phone: [phone]       Next Steps: Follow up on 3/7/2022    Instructions: at 10:30 AM; hospital follow up appointment

## 2022-02-24 NOTE — PLAN OF CARE
[facility])      HOME HEALTH ORDERS  FACE TO FACE ENCOUNTER    Patient Name: Casie Salvador  YOB: 1965    PCP: Adonis Carey MD   PCP Address: [address] [place] [address]  PCP Phone Number: [phone]  PCP Fax: [phone]    Encounter Date: 2/20/22    Admit to Home Health    Diagnoses:  Active Hospital Problems    Diagnosis  POA    *Anemia [D64.9]  Yes     Priority: 1 - High    Leukocytosis [D72.829]  Yes     Priority: 2     Chronic systolic congestive heart failure [I50.22]  Unknown     Priority: 3     CAD (coronary artery disease) [I25.10]  Yes     Priority: 3     DM (diabetes mellitus), type 2 [E11.9]  Yes     Priority: 5     Hypertension [I10]  Yes     Priority: 6     Chronic pain [G89.29]  Yes    Constipation due to opioid therapy [K59.03, T40.2X5A]  Yes    Depression [F32.A]  Yes    Insomnia [G47.00]  Yes      Resolved Hospital Problems   No resolved problems to display.       Follow Up Appointments:  No future appointments.    Allergies:  Review of patient's allergies indicates:   Allergen Reactions    Orange juice Hives    Tomato (solanum lycopersicum) Hives       Medications: Review discharge medications with patient and family and provide education.    Current Facility-Administered Medications   Medication Dose Route Frequency Provider Last Rate Last Admin    0.9%  NaCl infusion (for blood administration)   Intravenous Q24H PRN Dionne Viveros MD        acetaminophen tablet 650 mg  650 mg Oral Q4H PRN Aryan Roberts MD        aluminum-magnesium hydroxide-simethicone 200-200-20 mg/5 mL suspension 30 mL  30 mL Oral QID (AC & HS) Aryan Roberts MD        atorvastatin tablet 80 mg  80 mg Oral Daily Aryan Roberts MD   80 mg at 02/23/22 1302    busPIRone tablet 10 mg  10 mg Oral BID Aryan Roberts MD   10 mg at 02/23/22 1302    citalopram tablet 20 mg  20 mg Oral Daily Aryan Roberts MD   20 mg at 02/23/22  1301    dextrose 10% bolus 125 mL  12.5 g Intravenous PRN Aryan Roberts MD        dextrose 10% bolus 250 mL  25 g Intravenous PRN Aryan Roberts MD        docusate sodium capsule 200 mg  200 mg Oral BID Aryan Roberts MD   200 mg at 02/22/22 1922    furosemide injection 40 mg  40 mg Intravenous Daily Aryan Roberts MD   40 mg at 02/22/22 1000    gabapentin capsule 600 mg  600 mg Oral BID Aryan Roberts MD   600 mg at 02/23/22 1302    glucagon (human recombinant) injection 1 mg  1 mg Intramuscular PRN Aryan Roberts MD        glucose chewable tablet 16 g  16 g Oral PRN Aryan Roberts MD        glucose chewable tablet 24 g  24 g Oral PRN Aryan Roberts MD        hydrALAZINE tablet 25 mg  25 mg Oral Q8H PRN Aryan Roberts MD        insulin aspart U-100 pen 1-10 Units  1-10 Units Subcutaneous QID (AC + HS) PRN Aryan Roberts MD   6 Units at 02/23/22 1729    insulin detemir U-100 pen 10 Units  10 Units Subcutaneous QHS Aryan Roberts MD        lisinopriL tablet 2.5 mg  2.5 mg Oral Daily Aryan Roberts MD   2.5 mg at 02/23/22 1310    metoprolol tartrate (LOPRESSOR) split tablet 12.5 mg  12.5 mg Oral BID Aryan Roberts MD   12.5 mg at 02/22/22 1922    ondansetron disintegrating tablet 8 mg  8 mg Oral Q8H PRN Aryan Roberts MD   8 mg at 02/20/22 2007    oxyCODONE immediate release tablet 5 mg  5 mg Oral Q6H PRN Aryan Roberts MD   5 mg at 02/23/22 1301    pantoprazole injection 40 mg  40 mg Intravenous BID Aryan Roberts MD   40 mg at 02/23/22 0915    piperacillin-tazobactam 4.5 g in sodium chloride 0.9% 100 mL IVPB (ready to mix system)  4.5 g Intravenous Q8H Aryan Roberts MD   Stopped at 02/23/22 1716    promethazine tablet 25 mg  25 mg Oral Q6H PRN Aryan Roberts MD   25 mg at 02/21/22 0109    senna tablet 8.6 mg  8.6 mg Oral Daily Aryan Roberts MD   8.6 mg at 02/22/22 0914    sodium chloride 0.9% flush 3 mL  3 mL  Intravenous Q12H PRN Aryna Roberts MD        sodium chloride 0.9% flush 3 mL  3 mL Intravenous PRN Santiago Mcgarry MD        zolpidem tablet 5 mg  5 mg Oral Nightly PRN Aryan Roberts MD   5 mg at 02/21/22 2043     Current Discharge Medication List      START taking these medications    Details   folic acid-vit B6-vit B12 2.5-25-2 mg (FOLBIC OR EQUIV) 2.5-25-2 mg Tab Take 1 tablet by mouth once daily.  Qty: 90 tablet, Refills: 3         CONTINUE these medications which have CHANGED    Details   lisinopriL (PRINIVIL,ZESTRIL) 2.5 MG tablet Take 1 tablet (2.5 mg total) by mouth once daily.  Qty: 90 tablet, Refills: 3    Comments: .         CONTINUE these medications which have NOT CHANGED    Details   amLODIPine (NORVASC) 10 MG tablet Take 10 mg by mouth once daily.      aspirin (ECOTRIN) 81 MG EC tablet Take 81 mg by mouth once daily.      atorvastatin (LIPITOR) 80 MG tablet Take 40 mg by mouth once daily.      blood sugar diagnostic Strp by Misc.(Non-Drug; Combo Route) route.      busPIRone (BUSPAR) 10 MG tablet Take 10 mg by mouth 2 (two) times daily.      citalopram (CELEXA) 20 MG tablet Take 20 mg by mouth once daily.      docusate sodium (COLACE) 100 MG capsule Take 1 capsule (100 mg total) by mouth 2 (two) times daily.  Qty: 60 capsule, Refills: 0      doxycycline (MONODOX) 50 MG Cap Take 100 mg by mouth every 12 (twelve) hours.      furosemide (LASIX) 20 MG tablet Take 20 mg by mouth 2 (two) times daily.      gabapentin (NEURONTIN) 600 MG tablet Take 600 mg by mouth 2 (two) times daily.      glipiZIDE (GLUCOTROL) 10 MG tablet Take 10 mg by mouth 2 (two) times daily before meals.      hydrALAZINE (APRESOLINE) 50 MG tablet Take 50 mg by mouth daily as needed.      hyoscyamine (ANASPAZ,LEVSIN) 0.125 mg Tab Take 1 tablet (125 mcg total) by mouth every 6 (six) hours as needed (abdominal cramping).  Qty: 30 tablet, Refills: 0      insulin glargine (LANTUS) 100 unit/mL injection Inject 50 Units into the  "skin every evening.      insulin needles, disposable, 31 x 3/16 " Ndle by Misc.(Non-Drug; Combo Route) route.      lancets 28 gauge Misc by Misc.(Non-Drug; Combo Route) route.      loperamide (IMODIUM A-D) 2 mg Tab Take 2 mg by mouth 4 (four) times daily as needed.      metformin (GLUCOPHAGE) 1000 MG tablet Take 1,000 mg by mouth 2 (two) times daily with meals.      metoclopramide HCl (REGLAN) 10 MG tablet Take 10 mg by mouth 2 (two) times a day.      metoprolol tartrate (LOPRESSOR) 50 MG tablet Take 50 mg by mouth 2 (two) times daily.      montelukast (SINGULAIR) 10 mg tablet Take 10 mg by mouth every evening.      omeprazole (PRILOSEC) 20 MG capsule Take 20 mg by mouth once daily.      ondansetron (ZOFRAN-ODT) 4 MG TbDL Take 1 tablet (4 mg total) by mouth every 8 (eight) hours as needed.  Qty: 20 tablet, Refills: 0      oxyCODONE-acetaminophen (PERCOCET) 5-325 mg per tablet Take 1 tablet by mouth every 4 (four) hours as needed for Pain.  Qty: 6 tablet, Refills: 0    Comments: Quantity prescribed more than 7 day supply? No      polyethylene glycol (GLYCOLAX) 17 gram/dose powder Take 17 g by mouth daily as needed.  Qty: 119 g, Refills: 0      potassium chloride (MICRO-K) 10 MEQ CpSR Take 10 mEq by mouth once daily.      zolpidem (AMBIEN) 10 mg Tab Take 5 mg by mouth nightly as needed.         STOP taking these medications       clopidogrel (PLAVIX) 75 mg tablet Comments:   Reason for Stopping:         oxaprozin (DAYPRO) 600 mg tablet Comments:   Reason for Stopping:                 I have seen and examined this patient within the last 30 days. My clinical findings that support the need for the home health skilled services and home bound status are the following:no   Weakness/numbness causing balance and gait disturbance due to Heart Failure and Weakness/Debility making it taxing to leave home.     Diet:   cardiac diet    Labs:  Report Lab results to PCP.    Referrals/ Consults  Physical Therapy to evaluate and " treat. Evaluate for home safety and equipment needs; Establish/upgrade home exercise program. Perform / instruct on therapeutic exercises, gait training, transfer training, and Range of Motion.  Occupational Therapy to evaluate and treat. Evaluate home environment for safety and equipment needs. Perform/Instruct on transfers, ADL training, ROM, and therapeutic exercises.    Activities:   activity as tolerated    Nursing:   Agency to admit patient within 24 hours of hospital discharge unless specified on physician order or at patient request    SN to complete comprehensive assessment including routine vital signs. Instruct on disease process and s/s of complications to report to MD. Review/verify medication list sent home with the patient at time of discharge  and instruct patient/caregiver as needed. Frequency may be adjusted depending on start of care date.     Skilled nurse to perform up to 3 visits PRN for symptoms related to diagnosis    Notify MD if SBP > 160 or < 90; DBP > 90 or < 50; HR > 120 or < 50; Temp > 101; O2 < 88%; Other:   n/a    Ok to schedule additional visits based on staff availability and patient request on consecutive days within the home health episode.    When multiple disciplines ordered:    Start of Care occurs on Sunday - Wednesday schedule remaining discipline evaluations as ordered on separate consecutive days following the start of care.    Thursday SOC -schedule subsequent evaluations Friday and Monday the following week.     Friday - Saturday SOC - schedule subsequent discipline evaluations on consecutive days starting Monday of the following week.    For all post-discharge communication and subsequent orders please contact patient's primary care physician. If unable to reach primary care physician or do not receive response within 30 minutes, please contact [facility] for clinical staff order clarification    Miscellaneous   Heart Failure:      SN to instruct on the following:    Instruct  on the definition of CHF.   Instruct on the signs/sympoms of CHF to be reported.   Instruct on and monitor daily weights.   Instruct on factors that cause exacerbation.   Instruct on action, dose, schedule, and side effects of medications.   Instruct on diet as prescribed.   Instruct on activity allowed.   Instruct on life-style modifications for life long management of CHF   SN to assess compliance with daily weights, diet, medications, fluid retention,    safety precautions, activities permitted and life-style modifications.   Additional 1-2 SN visits per week as needed for signs and symptoms     of CHF exacerbation.      For Weight Gain > 2-3 lbs in 1 day or 4-6 lbs over 1 week notify PCP:       Home Health Aide:  Physical Therapy Three times weekly and Occupational Therapy Three times weekly    Wound Care Orders  no    I certify that this patient is confined to her home and needs physical therapy and occupational therapy.

## 2022-02-24 NOTE — PROGRESS NOTES
[facility] - Oncology ([facility])  [facility] Medicine  Progress Note    Patient Name: Casie Salvador  MRN: [id number]  Patient Class: IP- Inpatient   Admission Date: 2/20/2022  Length of Stay: 3 days  Attending Physician: Aryan Roberts MD  Primary Care Provider: Adonis Carey MD        Subjective:     Principal Problem:Anemia        HPI:  Ms. Salvador is a 56 yo F with a h/o DM II, hypertension, chronic pain, CAD s/p PCI, and opioid induced constipation presents with abdominal pain found to severe anemia. Patient baseline Hgb is 12 on labs two weeks ago. She denies hematochezia, melena, hemoptysis, hematemesis, prior episodes of anemia, lightheadedness, dizziness, dyspnea, and visions changes. She does note chronic abdominal pain in LUQ that is dull. States pain has worsened and last BM over one week ago. Denies NSAID use, fever, chills, and sick contacts.    In the ED, PRIETO 88/51, HR 88, RR 21, and afebrile. Labs notable for Hgb 3.7, WBC 20, Lactate 2.9. CT A/P with contrast with no significant interval change since prior CT of the abdomen and pelvis 02/07/2022 or new acute intra-abdominal process. CXR with new cardiomegaly from prior in 2018. Patient was transfused 2 units of blood. Latrer in the ED, nursing reported coffee ground emesis shortly after patient shortly after drinking soda.      Overview/Hospital Course:  GI consulted given degree on acute anemia. Patient with an episode of melena the night after admission with drop in hemoglobin requiring 1 unit RBC. Patient with upper and lower endoscopy on 2/23    Cardiology consulted given troponin rising to 2.647, and peaking at 2.925. Cardiology recommended GI intervention before any potential LHC which would require procedural doses of heparin. TTE obtained revealed EF of 20%.    Patient with significant leukocytosis to 30 at admission with no obvious evidence of infection. UA unremarkable. Blood culture with no growth. CT A/P with no significant evidence  of infection. CXR with no acute findings. Patient started on empiric Zosyn.      Interval History:   Prepped for colonoscopy overnight with dark colored stools. Hgb stable at 7.7 this AM. No complaints of chest pain, abdominal pain, and lightheadedness.      Review of Systems   Constitutional:  Negative for appetite change, chills, diaphoresis, fatigue and fever.   HENT:  Negative for rhinorrhea and sore throat.    Respiratory:  Negative for cough, chest tightness and shortness of breath.    Cardiovascular:  Negative for chest pain and palpitations.   Gastrointestinal:  Positive for blood in stool (melena). Negative for abdominal distention, abdominal pain, constipation, diarrhea, nausea and vomiting.   Endocrine: Negative for cold intolerance.   Genitourinary:  Negative for dysuria and hematuria.   Musculoskeletal:  Positive for back pain. Negative for arthralgias and myalgias.   Skin:  Negative for rash and wound.   Neurological:  Negative for dizziness, weakness, numbness and headaches.   Psychiatric/Behavioral:  Negative for agitation.    Objective:     Vital Signs (Most Recent):  Temp: 98.5 °F (36.9 °C) (02/23/22 1453)  Pulse: 64 (02/23/22 1858)  Resp: 18 (02/23/22 1453)  BP: (!) 116/58 (02/23/22 1453)  SpO2: (!) 94 % (02/23/22 1500)   Vital Signs (24h Range):  Temp:  [97 °F (36.1 °C)-98.6 °F (37 °C)] 98.5 °F (36.9 °C)  Pulse:  [55-73] 64  Resp:  [16-27] 18  SpO2:  [87 %-100 %] 94 %  BP: ()/(54-75) 116/58     Weight: 84.4 kg (186 lb)  Body mass index is 25.94 kg/m².    Intake/Output Summary (Last 24 hours) at 2/23/2022 1958  Last data filed at 2/23/2022 1809  Gross per 24 hour   Intake 560 ml   Output 600 ml   Net -40 ml      Physical Exam  Constitutional:       General: She is not in acute distress.     Appearance: Normal appearance. She is obese.   HENT:      Head: Normocephalic and atraumatic.      Mouth/Throat:      Mouth: Mucous membranes are moist.   Eyes:      General: No scleral icterus.      Extraocular Movements: Extraocular movements intact.      Conjunctiva/sclera: Conjunctivae normal.   Cardiovascular:      Rate and Rhythm: Normal rate and regular rhythm.      Heart sounds: No murmur heard.    No gallop.   Pulmonary:      Effort: Pulmonary effort is normal. No respiratory distress.      Breath sounds: Normal breath sounds. No wheezing or rales.   Abdominal:      General: Abdomen is flat. Bowel sounds are normal.      Palpations: Abdomen is soft.      Tenderness: There is no abdominal tenderness. There is no guarding or rebound.   Musculoskeletal:         General: No swelling or tenderness.      Comments: Left BKA well healed.  Right TMA well healed.   Skin:     Findings: No rash.   Neurological:      General: No focal deficit present.      Mental Status: She is alert and oriented to person, place, and time. Mental status is at baseline.   Psychiatric:         Mood and Affect: Mood normal.         Behavior: Behavior normal.       Significant Labs: All pertinent labs within the past 24 hours have been reviewed.  BMP:   Recent Labs   Lab 02/23/22  0539   *      K 3.8      CO2 25   BUN 20   CREATININE 1.3   CALCIUM 8.3*   MG 2.0     CBC:   Recent Labs   Lab 02/22/22  1626 02/23/22  0024 02/23/22  0539   WBC 15.08* 18.40* 14.14*   HGB 7.2* 7.6* 7.7*   HCT 21.7* 24.0* 25.2*    279 315       Significant Imaging: I have reviewed all pertinent imaging results/findings within the past 24 hours.      Assessment/Plan:      * Anemia  - No evidence of overt blood loss PTA, but with coffee ground emesis in the ED and LUQ pain.  - Concern for blood loss given acute time period, but will evaluate other etiologies.   - Baseline Hgb 12  - CT A/P without bleeding noted  - Admission Hgb 3.7, MCV 76 (transfused 3 units)  - No evidence of hemolysis on labs, or schistocytes   - Iron panel without overt ROGER. Normal B12/folate.  - Retic 4.4%, suggestive of hypoproliferation  - Melena overnight on  2/21, Hgb 6.8. Transfused 1 unit  - GI consulted  -- EGD with two Christopher Class III ulcers with biopsies taken  -- Colonoscopy not performed due to poor prep  -- PPI BID for 12 weeks  -- Repeat EGD in 8 weeks along with reattempt of colonoscopy  -- Follow up pathology   - IV PPI BID  - Trend Hgb    Leukocytosis  - WBC 20 -> 30 on admission.  - No s/s of infection other than abdominal pain with unremarkable CTA A/P and CXR.  - Hypotension on admission, now improved  - Blood culture NGTD  - UA unremarkable  - Empiric Zosyn, plan for 5 day course of abx  - Leokocytosis improving      Chronic systolic congestive heart failure  - TTE: EF 20%, Grade II LV DD, elevated CVP [no prior]  - No c/o dyspnea  - Continue metoprolol, lisinopril  - Continue lasix 40mg IV daily  -- Will need discharge with home lasix    CAD (coronary artery disease)  - h/o of CAD s/p PCI. Stated last PCI over 3 years ago at [facility].  - Holding ASA given concern for GIB  - Troponin peak at 2.925  - Cardiology consulted for ischemic evaluation in preparation for endoscopy with GI  -- Patient will need management of anemia prior to potential LHC  -- Reach out to cards for any additional management prior to discharge  - Continue atorvastatin  - Continue metoprolol 12.5mg      DM (diabetes mellitus), type 2  - Home: Glargine 50U qhs, Glipizide 10mg, Metformin 1000mg BID  - A1c 5.8%  - Determir 10U qhs  - SSI  - Hypoglycemia protocol  - Accuchecks   - Continue home gabapentin      Hypertension  - Holding home Amlodipine 10mg, Hydralazine 50mg BID  - PRN for SBP >160    Insomnia  - Continue home Ambien      Depression  - Continue home Buspar, Citalopram    Constipation due to opioid therapy  - Senna, Docusate  - Patient does not liquid medications (d/c home with senna)    Chronic pain  - On Percocet 5 at home for back pain  - Oxycodone PRN      VTE Risk Mitigation (From admission, onward)         Ordered     IP VTE LOW RISK PATIENT  Once          02/20/22 1022     Place sequential compression device  Until discontinued         02/20/22 1022                Discharge Planning   OSCO: 2/25/2022     Code Status: Full Code   Is the patient medically ready for discharge?: No    Reason for patient still in hospital (select all that apply): Patient trending condition, Laboratory test, Treatment, Consult recommendations, PT / OT recommendations and Pending disposition  Discharge Plan A: Home                  Aryan Roberts MD  Department of Hospital Medicine   [facility] - Oncology ([facility])

## 2022-02-24 NOTE — NURSING
REPORT REC., CARE ASSUMED, VSS, NAD, REPORTS PAIN, WILL MEDICATE AT TIME INTERVAL FOR MED., PT AWARE, VERB + UNDERSTANDING, DENIES SOB, FULL ASSESSMENT PER FLOW SHEET, SAFETY MAINTAINED, WILL MONITOR.

## 2022-02-24 NOTE — ASSESSMENT & PLAN NOTE
- TTE: EF 20%, Grade II LV DD, elevated CVP [no prior]  - No c/o dyspnea  - Continue metoprolol, lisinopril  - Continue lasix 40mg IV daily  -- Will need discharge with home lasix

## 2022-02-24 NOTE — ANESTHESIA POSTPROCEDURE EVALUATION
Anesthesia Post Evaluation    Patient: Casie Salvador    Procedure(s) Performed: Procedure(s) (LRB):  EGD (ESOPHAGOGASTRODUODENOSCOPY) (N/A)  COLONOSCOPY (N/A)    Final Anesthesia Type: general      Patient location during evaluation: PACU  Patient participation: Yes- Able to Participate  Level of consciousness: awake and alert  Post-procedure vital signs: reviewed and stable  Pain management: adequate  Airway patency: patent    PONV status at discharge: No PONV  Anesthetic complications: no      Cardiovascular status: blood pressure returned to baseline  Respiratory status: unassisted  Hydration status: euvolemic  Follow-up not needed.          Vitals Value Taken Time   /67 02/23/22 1217   Temp 36.6 °C (97.9 °F) 02/23/22 1200   Pulse 62 02/23/22 1219   Resp 21 02/23/22 1219   SpO2 100 % 02/23/22 1219   Vitals shown include unvalidated device data.      No case tracking events are documented in the log.      Pain/Basil Score: Pain Rating Prior to Med Admin: 6 (2/24/2022  2:23 AM)  Pain Rating Post Med Admin: 0 (2/23/2022  9:38 PM)  Basil Score: 9 (2/23/2022 12:15 PM)

## 2022-02-24 NOTE — NURSING
AWARE OF DC ORDERS, STATED WILL NOTIFY , INCONT URINE AT THIS TIME, INCONT CARE PROVIDED, MIKE WELL, SAFETY MEASURES INTACT

## 2022-02-24 NOTE — ASSESSMENT & PLAN NOTE
- WBC 20 -> 30 on admission.  - No s/s of infection other than abdominal pain with unremarkable CTA A/P and CXR.  - Hypotension on admission, now improved  - Blood culture NGTD  - UA unremarkable  - Empiric Zosyn, plan for 5 day course of abx  - Leokocytosis improving

## 2022-02-25 ENCOUNTER — PATIENT OUTREACH (OUTPATIENT)
Dept: ADMINISTRATIVE | Facility: CLINIC | Age: 57
End: 2022-02-25
Payer: MEDICAID

## 2022-02-25 LAB
BACTERIA BLD CULT: NORMAL
BACTERIA BLD CULT: NORMAL

## 2022-02-25 NOTE — PROGRESS NOTES
C3 nurse spoke with Casie Salvador for a TCC post hospital discharge follow up call. The patient has a scheduled HOSFU appointment with EILEEN Guillaume on 3/7/2022 @ 1030.

## 2022-02-25 NOTE — PT/OT/SLP DISCHARGE
Occupational Therapy Discharge Summary    Casie Salvador  MRN: [id number]   Principal Problem: Anemia      Patient Discharged from acute Occupational Therapy on 02-24-22.  Please refer to prior OT note dated 02-22-22 for functional status.    Assessment:      Patient appropriate for care in another setting.    Objective:     GOALS:   Multidisciplinary Problems     Occupational Therapy Goals        Problem: Occupational Therapy Goal    Goal Priority Disciplines Outcome Interventions   Occupational Therapy Goal     OT, PT/OT     Description: Goals to be met by: 02-28-22     Patient will increase functional independence with ADLs by performing:    UE Dressing with Set-up Assistance.  LE Dressing with Supervision.  Grooming while seated with Set-up Assistance.  Supine to sit with [place].  Stand pivot transfers with Contact Guard Assistance with prosthetic in place and RW  Toilet transfer to bedside commode with Contact Guard Assistance.                     Reasons for Discontinuation of Therapy Services  Transfer to alternate level of care.      Plan:     Patient Discharged to: Home with Home Health Service    2/25/2022

## 2022-03-02 NOTE — PHYSICIAN QUERY
PT Name: Casie Salvador  MR #: [id number]     DOCUMENTATION CLARIFICATION      CDS: Jethro Catalan RN CCDS               Contact information: [email or web address]   This form is a permanent document in the medical record.    Query Date: March 2, 2022    By submitting this query, we are merely seeking further clarification of documentation to reflect the severity of illness of your patient. Please utilize your independent clinical judgment when addressing the question(s) below.     The Medical Record contains the following:   Indicators   Supporting Clinical Findings Location in Medical Record     x Chest Pain, Angina presents to the ED with Chest Pain (Patient reports chest pain for the past 12 hours. Patient has history of MI.) 2/20/2022 ED provider notes     x Coronary Artery Disease CAD 2/20/2022 ED provider notes     x EKG Normal sinus rhythm   ST and T wave abnormality, consider inferolateral ischemia   Abnormal ECG   When compared with ECG of 03-FEB-2018 22:23,   Non-specific change in ST segment in Inferior leads   Non-specific change in ST segment in Lateral leads   T wave inversion now evident in Inferior leads   T wave inversion now evident in Lateral leads    Normal sinus rhythm   Minimal voltage criteria for LVH, may be normal variant ( [place] product )   Nonspecific T wave abnormality   Prolonged QT   Abnormal ECG   When compared with ECG of 20-FEB-2022 04:00,   T wave inversion no longer evident in Inferior leads   Nonspecific T wave abnormality, worse in Anterior leads   Nonspecific T wave abnormality has replaced inverted T waves in Lateral   Leads 2/20/2022 EKG                  2/21/2022 EKG       x Troponin  02/20/22 04:10 02/20/22 13:29 02/21/22 04:00 02/21/22 10:02 02/21/22 16:15   Troponin I 0.065 (H)  0.699 (H)  2.647 (H) 2.925 (H)  2.803 (H)     Lab values     x Echo Results The left ventricle is normal in size with severely decreased systolic function. The estimated ejection fraction  is 20%.  Normal right ventricular size with normal right ventricular systolic function.  Grade II left ventricular diastolic dysfunction.  Mild left atrial enlargement.  Elevated central venous pressure (15 mmHg). 2/21/2022 Echo report    Angiography       x Documentation of acute cardiac condition Elevated troponin   I suspect her ST depressions and slightly elevated troponin is related to her severe anemia, likely demand ischemia, less likely ACS. 2/20/2022 ED provider notes     x Medication/Treatment She was given aspirin by EMS prior to arrival.  Holding ASA given concern for GIB  Continue atorvastatin  Holding metoprolol given anemia. Restart as anemia improves.  if angiography is indicated urgently, can proceed but weighing risks and benefits since she might rebleed from the ulcer. Otherwise, would recommend waiting a month prior to cath to allow ulcer healing.   Will request f/u with cardiology this coming Monday 2/20/2022 ED provider notes  02/20/2022 H&P      2/23/2022 GI progress notes      2/24/2022 DC summary     x Other severe anemia, baseline hemoglobin 12, now 3.8.  severe acute anemia     acute blood loss anemia    Cardiology consulted given troponin rising to 2.647, and peaking at 2.925. Cardiology recommended GI intervention before any potential LHC which would require procedural doses of heparin. TTE obtained revealed EF of 20%. 2/20/2022 ED provider notes      2/22/2022 Anesthesia Pre-Operative Evaluation  2/24/2022 DC summary      Provider, please clarify the diagnosis related to the above documentation:    [   ] NSTEMI   [   ] NSTEMI/Myocardial Infarction Type 2 due to (please specify): __________   [   ] Demand Ischemia   [   ] Elevated troponin only   [   ] Other Cardiac Diagnosis (please specify): _______________   [   ] Clinically Undetermined         Please document in your progress notes daily for the duration of treatment until resolved, and include in your discharge summary.  Form No.  [id number]

## 2022-03-02 NOTE — PHYSICIAN QUERY
PT Name: Casie Salvador  MR #: [id number]     DOCUMENTATION CLARIFICATION      CDS: Jethro Catalan RN CCDS               Contact information: [email or web address]   This form is a permanent document in the medical record.    Query Date: March 2, 2022    By submitting this query, we are merely seeking further clarification of documentation to reflect the severity of illness of your patient. Please utilize your independent clinical judgment when addressing the question(s) below.     The Medical Record contains the following:   Indicators   Supporting Clinical Findings Location in Medical Record     x Gastrointestinal Ulcer Documented EGD with two ulcers with stigmata of recent bleeding.  2/24/2022 DC summary     x EGD/Colonscopy Findings EGD with large non-obstructing oozing gastric ulcer with a clean base at the incisura. Biopsied.  - Non-bleeding gastric ulcer with a clean ulcer base (Christopher Class III).  Colonoscopy with poor prep, procedure aborted. 2/23/2022 GI progress notes       x Pathology Findings Final Pathologic Diagnosis Gastric ulcer (biopsy):   - Ulcer bed, acute gastritis, fibrino-purulent exudate,   reactive/regenerative changes   - Pending additional studies will be issued in a supplemental report  2/23/2022 Pathology report    Radiology Findings       x Treatment/Medication pantoprazole 40 mg IV BID  Plan:  - advance diet as tolerated  - PPI p.o. b.i.d. for 12 weeks  - avoid NSAIDs  - repeat EGD in 8 weeks to check for healing  - repeat colonoscopy in 8 weeks for screening purposes 2/20-2/24/2022 Medication  2/23/2022 GI progress notes     x Other severe anemia, baseline hemoglobin 12, now 3.8.  severe acute anemia 2/20/2022 ED provider notes        Please further specify the acuity of the  Gastric ulcer:    [  x ] Acute   [   ] Chronic   [   ] Unspecified   [   ] Other (please specify): ___________   [   ] Clinically Undetermined         Please document in your progress notes daily for the  duration of treatment until resolved, and include in your discharge summary.  Form No. [id number]

## 2022-03-02 NOTE — PHYSICIAN QUERY
PT Name: Casie Salvador  MR #: [id number]    DOCUMENTATION CLARIFICATION      CDS: Jethro Catalan RN CCDS               Contact information: [email or web address]     This form is a permanent document in the medical record.      Query Date: March 2, 2022    By submitting this query, we are merely seeking further clarification of documentation. Please utilize your independent clinical judgment when addressing the question(s) below.    The Medical Record contains the following:   Indicators  Supporting Clinical Findings Location in Medical Record    x Anemia documented severe anemia, baseline hemoglobin 12, now 3.8.  severe acute anemia    Anemia  - No evidence of overt blood loss PTA, but with coffee ground emesis in the ED and LUQ pain.  - Concern for blood loss given acute time period, but will evaluate other etiologies.    The patient has acute anemia with no iron deficiency    acute blood loss anemia 2/20/2022 ED provider notes      02/20/2022 H&P            2/20/2022 GI consult    2/22/2022 Anesthesia Pre-Operative Evaluation    x H&H  02/20/22 04:10 02/20/22 04:53 02/21/22 04:00 02/24/22 04:47   Hemoglobin 3.8 (LL)  3.7 (LL)  7.4 (L) 8.4 (L)   Hematocrit 12.2 (LL)  12.0 (LL) 22.2 (L) 26.4 (L)    Lab values    x BP                    HR SBP /DBP 40-57, HR  2/19/2022 Vitals    x GI bleeding documented Later in the ED, nursing reported coffee ground emesis shortly after patient shortly after drinking soda.  melena overnight.  Hgb drop to 6.8  EGD with large non-obstructing oozing gastric ulcer with a clean base at the incisura. Biopsied.  - Non-bleeding gastric ulcer with a clean ulcer base (Christopher Class III). 02/20/2022 H&P    2/22/2022 GI progress notes  2/23/2022 GI progress notes    Acute bleeding (Non GI site)      x Transfusion(s) Given 2 units of blood in the ED.  3 RBC transfusions on yesterday    plans to transfuse 1 unit PRBC 2/20/2022 ED notes  2/21/2022 Hospital Medicine progress  notes  2/22/2022 GI progress notes     Acute/Chronic illness      x Treatments Holding ASA given concern for GIB  pantoprazole 40 mg IV BID  Plan:  - advance diet as tolerated  - PPI p.o. b.i.d. for 12 weeks  - Await pathology results  - avoid NSAIDs  - repeat EGD in 8 weeks to check for healing  Will give shot of B12, as appears deficient as well as lower end of Folate, will prescribe combined pill. 02/20/2022 H&P  2/20-2/24/2022 Medication  2/23/2022 GI progress notes            2/24/2022 DC summary    x Other - No evidence of hemolysis on labs, or schistocytes   - Iron panel without overt ROGER. Normal B12/folate.  - Retic 4.4%, suggestive of hypoproliferation     02/20/22 04:10   Iron 104   TIBC 228 (L)   Saturated Iron 46   Transferrin 154 (L)   Ferritin 68      02/20/22 06:25   Folate 5.4   Vitamin B-12 247   Haptoglobin 237      02/20/22 04:53   Sed Rate 25   Retic 4.4 (H)      02/20/2022 H&P      Lab values     Provider, please clarify the diagnosis of Anemia associated with above clinical findings. Chet all that apply.    [ x  ] Acute blood loss anemia    [   ] Iron deficiency anemia    [   ] Pernicious anemia    [   ] Anemia, unspecified    [   ] Other Hematological Diagnosis (please specify): _________________   [   ] Clinically Undetermined              Please document in your progress notes daily for the duration of treatment, until resolved, and include in your discharge summary.    Form No. [id number]

## 2022-03-04 NOTE — PHYSICIAN QUERY
PT Name: Casie Salvador  MR #: [id number]     DOCUMENTATION CLARIFICATION      CDS: Jethro Catalan RN CCDS               Contact information: [email or web address]   This form is a permanent document in the medical record.    Query Date: March 4, 2022    By submitting this query, we are merely seeking further clarification of documentation to reflect the severity of illness of your patient. Please utilize your independent clinical judgment when addressing the question(s) below.     The Medical Record contains the following:   Indicators   Supporting Clinical Findings Location in Medical Record     x Chest Pain, Angina presents to the ED with Chest Pain (Patient reports chest pain for the past 12 hours. Patient has history of MI.) 2/20/2022 ED provider notes     x Coronary Artery Disease CAD 2/20/2022 ED provider notes     x EKG Normal sinus rhythm   ST and T wave abnormality, consider inferolateral ischemia   Abnormal ECG   When compared with ECG of 03-FEB-2018 22:23,   Non-specific change in ST segment in Inferior leads   Non-specific change in ST segment in Lateral leads   T wave inversion now evident in Inferior leads   T wave inversion now evident in Lateral leads    Normal sinus rhythm   Minimal voltage criteria for LVH, may be normal variant ( [place] product )   Nonspecific T wave abnormality   Prolonged QT   Abnormal ECG   When compared with ECG of 20-FEB-2022 04:00,   T wave inversion no longer evident in Inferior leads   Nonspecific T wave abnormality, worse in Anterior leads   Nonspecific T wave abnormality has replaced inverted T waves in Lateral   Leads 2/20/2022 EKG                  2/21/2022 EKG       x Troponin  02/20/22 04:10 02/20/22 13:29 02/21/22 04:00 02/21/22 10:02 02/21/22 16:15   Troponin I 0.065 (H)  0.699 (H)  2.647 (H) 2.925 (H)  2.803 (H)     Lab values     x Echo Results The left ventricle is normal in size with severely decreased systolic function. The estimated ejection fraction  is 20%.  Normal right ventricular size with normal right ventricular systolic function.  Grade II left ventricular diastolic dysfunction.  Mild left atrial enlargement.  Elevated central venous pressure (15 mmHg). 2/21/2022 Echo report    Angiography       x Documentation of acute cardiac condition Elevated troponin   I suspect her ST depressions and slightly elevated troponin is related to her severe anemia, likely demand ischemia, less likely ACS. 2/20/2022 ED provider notes     x Medication/Treatment She was given aspirin by EMS prior to arrival.  Holding ASA given concern for GIB  Continue atorvastatin  Holding metoprolol given anemia. Restart as anemia improves.  if angiography is indicated urgently, can proceed but weighing risks and benefits since she might rebleed from the ulcer. Otherwise, would recommend waiting a month prior to cath to allow ulcer healing.   Will request f/u with cardiology this coming Monday 2/20/2022 ED provider notes  02/20/2022 H&P      2/23/2022 GI progress notes      2/24/2022 DC summary     x Other severe anemia, baseline hemoglobin 12, now 3.8.  severe acute anemia     acute blood loss anemia    Cardiology consulted given troponin rising to 2.647, and peaking at 2.925. Cardiology recommended GI intervention before any potential LHC which would require procedural doses of heparin. TTE obtained revealed EF of 20%. 2/20/2022 ED provider notes      2/22/2022 Anesthesia Pre-Operative Evaluation  2/24/2022 DC summary      Provider, please clarify the diagnosis related to the above documentation:    [   ] NSTEMI   [ x  ] NSTEMI/Myocardial Infarction Type 2 due to (please specify): ___anemia____   [   ] Demand Ischemia   [   ] Elevated troponin only   [   ] Other Cardiac Diagnosis (please specify): _______________   [   ] Clinically Undetermined         Please document in your progress notes daily for the duration of treatment until resolved, and include in your discharge summary.  Form  No. [id number]

## 2022-03-07 ENCOUNTER — OFFICE VISIT (OUTPATIENT)
Dept: PRIMARY CARE CLINIC | Facility: CLINIC | Age: 57
End: 2022-03-07
Payer: MEDICAID

## 2022-03-07 VITALS
HEART RATE: 88 BPM | OXYGEN SATURATION: 96 % | DIASTOLIC BLOOD PRESSURE: 60 MMHG | WEIGHT: 185.44 LBS | HEIGHT: 71 IN | SYSTOLIC BLOOD PRESSURE: 134 MMHG | BODY MASS INDEX: 25.96 KG/M2 | TEMPERATURE: 98 F

## 2022-03-07 DIAGNOSIS — Z95.5 HISTORY OF HEART ARTERY STENT: ICD-10-CM

## 2022-03-07 DIAGNOSIS — D51.9 ANEMIA DUE TO VITAMIN B12 DEFICIENCY, UNSPECIFIED B12 DEFICIENCY TYPE: ICD-10-CM

## 2022-03-07 DIAGNOSIS — E11.59 TYPE 2 DIABETES MELLITUS WITH OTHER CIRCULATORY COMPLICATION, WITH LONG-TERM CURRENT USE OF INSULIN: ICD-10-CM

## 2022-03-07 DIAGNOSIS — Z09 HOSPITAL DISCHARGE FOLLOW-UP: Primary | ICD-10-CM

## 2022-03-07 DIAGNOSIS — Z79.4 TYPE 2 DIABETES MELLITUS WITH OTHER CIRCULATORY COMPLICATION, WITH LONG-TERM CURRENT USE OF INSULIN: ICD-10-CM

## 2022-03-07 DIAGNOSIS — I25.10 CORONARY ARTERY DISEASE, UNSPECIFIED VESSEL OR LESION TYPE, UNSPECIFIED WHETHER ANGINA PRESENT, UNSPECIFIED WHETHER NATIVE OR TRANSPLANTED HEART: ICD-10-CM

## 2022-03-07 PROCEDURE — 3044F HG A1C LEVEL LT 7.0%: CPT | Mod: CPTII,,, | Performed by: NURSE PRACTITIONER

## 2022-03-07 PROCEDURE — 4010F PR ACE/ARB THEARPY RXD/TAKEN: ICD-10-PCS | Mod: CPTII,,, | Performed by: NURSE PRACTITIONER

## 2022-03-07 PROCEDURE — 3008F BODY MASS INDEX DOCD: CPT | Mod: CPTII,,, | Performed by: NURSE PRACTITIONER

## 2022-03-07 PROCEDURE — 99215 OFFICE O/P EST HI 40 MIN: CPT | Mod: PBBFAC,PN | Performed by: NURSE PRACTITIONER

## 2022-03-07 PROCEDURE — 1111F DSCHRG MED/CURRENT MED MERGE: CPT | Mod: CPTII,,, | Performed by: NURSE PRACTITIONER

## 2022-03-07 PROCEDURE — 4010F ACE/ARB THERAPY RXD/TAKEN: CPT | Mod: CPTII,,, | Performed by: NURSE PRACTITIONER

## 2022-03-07 PROCEDURE — 1160F PR REVIEW ALL MEDS BY PRESCRIBER/CLIN PHARMACIST DOCUMENTED: ICD-10-PCS | Mod: CPTII,,, | Performed by: NURSE PRACTITIONER

## 2022-03-07 PROCEDURE — 3075F PR MOST RECENT SYSTOLIC BLOOD PRESS GE 130-139MM HG: ICD-10-PCS | Mod: CPTII,,, | Performed by: NURSE PRACTITIONER

## 2022-03-07 PROCEDURE — 99999 PR PBB SHADOW E&M-EST. PATIENT-LVL V: ICD-10-PCS | Mod: PBBFAC,,, | Performed by: NURSE PRACTITIONER

## 2022-03-07 PROCEDURE — 1111F PR DISCHARGE MEDS RECONCILED W/ CURRENT OUTPATIENT MED LIST: ICD-10-PCS | Mod: CPTII,,, | Performed by: NURSE PRACTITIONER

## 2022-03-07 PROCEDURE — 99203 OFFICE O/P NEW LOW 30 MIN: CPT | Mod: S$PBB,,, | Performed by: NURSE PRACTITIONER

## 2022-03-07 PROCEDURE — 99999 PR PBB SHADOW E&M-EST. PATIENT-LVL V: CPT | Mod: PBBFAC,,, | Performed by: NURSE PRACTITIONER

## 2022-03-07 PROCEDURE — 3044F PR MOST RECENT HEMOGLOBIN A1C LEVEL <7.0%: ICD-10-PCS | Mod: CPTII,,, | Performed by: NURSE PRACTITIONER

## 2022-03-07 PROCEDURE — 99203 PR OFFICE/OUTPT VISIT, NEW, LEVL III, 30-44 MIN: ICD-10-PCS | Mod: S$PBB,,, | Performed by: NURSE PRACTITIONER

## 2022-03-07 PROCEDURE — 1159F PR MEDICATION LIST DOCUMENTED IN MEDICAL RECORD: ICD-10-PCS | Mod: CPTII,,, | Performed by: NURSE PRACTITIONER

## 2022-03-07 PROCEDURE — 3008F PR BODY MASS INDEX (BMI) DOCUMENTED: ICD-10-PCS | Mod: CPTII,,, | Performed by: NURSE PRACTITIONER

## 2022-03-07 PROCEDURE — 3075F SYST BP GE 130 - 139MM HG: CPT | Mod: CPTII,,, | Performed by: NURSE PRACTITIONER

## 2022-03-07 PROCEDURE — 1160F RVW MEDS BY RX/DR IN RCRD: CPT | Mod: CPTII,,, | Performed by: NURSE PRACTITIONER

## 2022-03-07 PROCEDURE — 3078F DIAST BP <80 MM HG: CPT | Mod: CPTII,,, | Performed by: NURSE PRACTITIONER

## 2022-03-07 PROCEDURE — 1159F MED LIST DOCD IN RCRD: CPT | Mod: CPTII,,, | Performed by: NURSE PRACTITIONER

## 2022-03-07 PROCEDURE — 3078F PR MOST RECENT DIASTOLIC BLOOD PRESSURE < 80 MM HG: ICD-10-PCS | Mod: CPTII,,, | Performed by: NURSE PRACTITIONER

## 2022-03-07 RX ORDER — MELOXICAM 7.5 MG/1
7.5 TABLET ORAL DAILY
COMMUNITY
Start: 2022-02-08 | End: 2022-04-02 | Stop reason: HOSPADM

## 2022-03-07 RX ORDER — CLOPIDOGREL BISULFATE 75 MG/1
75 TABLET ORAL DAILY
Status: ON HOLD | COMMUNITY
Start: 2022-02-21 | End: 2022-04-16 | Stop reason: HOSPADM

## 2022-03-07 RX ORDER — AMOXICILLIN AND CLAVULANATE POTASSIUM 875; 125 MG/1; MG/1
TABLET, FILM COATED ORAL
Status: ON HOLD | COMMUNITY
Start: 2022-02-14 | End: 2022-03-23 | Stop reason: HOSPADM

## 2022-03-07 RX ORDER — AZITHROMYCIN 250 MG/1
TABLET, FILM COATED ORAL
COMMUNITY
Start: 2021-09-16 | End: 2022-04-02 | Stop reason: ALTCHOICE

## 2022-03-07 RX ORDER — IPRATROPIUM BROMIDE 17 UG/1
2 AEROSOL, METERED RESPIRATORY (INHALATION) DAILY
Status: ON HOLD | COMMUNITY
Start: 2022-01-06 | End: 2023-10-27 | Stop reason: HOSPADM

## 2022-03-07 RX ORDER — FERROUS SULFATE 325(65) MG
325 TABLET ORAL 2 TIMES DAILY
Status: ON HOLD | COMMUNITY

## 2022-03-07 RX ORDER — NORTRIPTYLINE HYDROCHLORIDE 50 MG/1
CAPSULE ORAL
Status: ON HOLD | COMMUNITY
Start: 2021-10-11 | End: 2022-04-14

## 2022-03-07 RX ORDER — PROMETHAZINE HYDROCHLORIDE 6.25 MG/5ML
SYRUP ORAL
Status: ON HOLD | COMMUNITY
Start: 2022-01-31 | End: 2023-10-27 | Stop reason: HOSPADM

## 2022-03-07 RX ORDER — CARISOPRODOL 350 MG/1
350 TABLET ORAL DAILY
Status: ON HOLD | COMMUNITY
Start: 2022-02-08 | End: 2023-10-27 | Stop reason: HOSPADM

## 2022-03-07 RX ORDER — ERGOCALCIFEROL 1.25 MG/1
50000 CAPSULE ORAL
Status: ON HOLD | COMMUNITY
Start: 2022-03-05

## 2022-03-07 RX ORDER — LOSARTAN POTASSIUM 50 MG/1
50 TABLET ORAL DAILY
Status: ON HOLD | COMMUNITY
Start: 2022-01-06 | End: 2022-04-03 | Stop reason: SDUPTHER

## 2022-03-07 NOTE — PROGRESS NOTES
Subjective:       Patient ID: Casie Salvador is a 57 y.o. female.    Chief Complaint: Hospital Follow Up     HPI     Ms. Salvador is a 57 year old female patient that presents to clinic for hospital discharge follow up. Past medical history of hypertension, coronary artery disease, chronic systolic congestive heart failure, anemia, type 2 diabetes mellitus, and myocardial infarction. Past surgical history of tubal ligation, appendectomy, angioplasty, right BKA. PCP is Dr. Carey, she is new to me. Recent hospitalization from 2/20-2/24/2022 for severe anemia. On admission hemoglobin was 3.8 secondary to hematemesis and melena. Chart review shows EGD showed Christopher Class III ulcers with biopsies taken. Patient was started on B12 and iron supplement for anemia. Patient previously on aspirin and plavix. Plavix discontinued on admission.    Type 2 Diabetes Mellitus--reports blood glucose range at home is low 50's. Currently adherent with Lantus 50 units nightly, glipizide 10 mg bid with meals, and metformin 1000 mg bid. Advised patient to discuss this hypoglycemic episodes with her PCP at her f/u visit tomorrow.     ROS as listed.   Past Medical History:   Diagnosis Date    Coronary artery disease     Diabetes mellitus     Encounter for blood transfusion     Heart attack     Hypertension       Past Surgical History:   Procedure Laterality Date    APPENDECTOMY      COLONOSCOPY N/A 2/23/2022    Procedure: COLONOSCOPY;  Surgeon: Estefania Bryant MD;  Location: [address]);  Service: Endoscopy;  Laterality: N/A;  anemia, melena    ESOPHAGOGASTRODUODENOSCOPY N/A 2/23/2022    Procedure: EGD (ESOPHAGOGASTRODUODENOSCOPY);  Surgeon: Estefania Bryant MD;  Location: [address]);  Service: Endoscopy;  Laterality: N/A;  anemia    stents      TUBAL LIGATION        History reviewed. No pertinent family history.   Review of patient's allergies indicates:   Allergen Reactions    Orange juice Hives     "Tomato (solanum lycopersicum) Hives     Review of Systems   Cardiovascular: Negative for chest pain, palpitations and leg swelling.   Gastrointestinal: Positive for constipation.   Musculoskeletal: Positive for gait problem.       Objective:       Vitals:    03/07/22 1029   BP: 134/60   BP Location: Left arm   Patient Position: Sitting   BP Method: Large (Automatic)   Pulse: 88   Temp: 98.3 °F (36.8 °C)   TempSrc: Oral   SpO2: 96%   Weight: 84.1 kg (185 lb 6.5 oz)   Height: 5' 11" (1.803 m)        Physical Exam  Vitals and nursing note reviewed.   Constitutional:       General: She is not in acute distress.     Appearance: Normal appearance. She is not toxic-appearing.   HENT:      Head: Normocephalic and atraumatic.      Mouth/Throat:      Comments: Mask in place  Eyes:      Conjunctiva/sclera: Conjunctivae normal.      Pupils: Pupils are equal, round, and reactive to light.   Cardiovascular:      Rate and Rhythm: Normal rate and regular rhythm.      Heart sounds: Normal heart sounds. No murmur heard.    No gallop.   Pulmonary:      Effort: Pulmonary effort is normal.      Breath sounds: Normal breath sounds. No wheezing.   Abdominal:      General: Bowel sounds are normal.      Palpations: Abdomen is soft.   Musculoskeletal:      Right Lower Extremity: Right leg is amputated below knee.   Skin:     General: Skin is warm and dry.      Capillary Refill: Capillary refill takes less than 2 seconds.      Findings: No lesion.   Neurological:      Mental Status: She is alert and oriented to person, place, and time.      Gait: Gait normal.   Psychiatric:         Mood and Affect: Mood normal.         Behavior: Behavior normal.         Lab Results   Component Value Date    WBC 10.65 02/24/2022    HGB 8.4 (L) 02/24/2022    HCT 26.4 (L) 02/24/2022     02/24/2022    ALT 21 02/24/2022    AST 12 02/24/2022     02/24/2022    K 4.0 02/24/2022     02/24/2022    CREATININE 1.2 02/24/2022    BUN 13 02/24/2022    CO2 " 23 2022    TSH 2.899 2022    INR 1.0 2022    HGBA1C 5.8 (H) 2022      Assessment:       1. Hospital discharge follow-up    2. History of heart artery stent    3. Anemia due to vitamin B12 deficiency, unspecified B12 deficiency type    4. Coronary artery disease, unspecified vessel or lesion type, unspecified whether angina present, unspecified whether native or transplanted heart    5. Type 2 diabetes mellitus with other circulatory complication, with long-term current use of insulin        Plan:       Hospital discharge follow-up  Patient stable since discharge. Patient would like to remain with Dr. Carey as PCP. She is scheduled to follow up with PCP tomorrow.     History of heart artery stent  -     Ambulatory referral/consult to Cardiology; Future; Expected date: 2022    Anemia due to vitamin B12 deficiency, unspecified B12 deficiency type  Continue folic acid-vit B12-vit 6 supplement daily.   Discontinue plavix for now until seen by primary care doctor as stated on hospital discharge.     Coronary artery disease, unspecified vessel or lesion type, unspecified whether angina present, unspecified whether native or transplanted heart  -     Ambulatory referral/consult to Cardiology; Future; Expected date: 2022    Type 2 diabetes mellitus with other circulatory complication, with long-term current use of insulin  Monitor for signs of hypoglycemia. recommending reducing lantus to 25 units nightly. Will defer to PCP at this time.     Medication List with Changes/Refills   Current Medications    AMLODIPINE (NORVASC) 10 MG TABLET    Take 10 mg by mouth once daily.    AMOXICILLIN-CLAVULANATE 875-125MG (AUGMENTIN) 875-125 MG PER TABLET    SMARTSI Tablet(s) By Mouth Every 12 Hours    ASPIRIN (ECOTRIN) 81 MG EC TABLET    Take 81 mg by mouth once daily.    ATORVASTATIN (LIPITOR) 80 MG TABLET    Take 40 mg by mouth once daily.    ATROVENT HFA 17 MCG/ACTUATION INHALER    SMARTSI  "Puff(s) By Mouth Twice Daily    AZITHROMYCIN (Z-ADRY) 250 MG TABLET    Take by mouth.    BLOOD SUGAR DIAGNOSTIC STRP    by Misc.(Non-Drug; Combo Route) route.    BUSPIRONE (BUSPAR) 10 MG TABLET    Take 10 mg by mouth 2 (two) times daily.    CARISOPRODOL (SOMA) 350 MG TABLET    Take 350 mg by mouth daily as needed.    CITALOPRAM (CELEXA) 20 MG TABLET    Take 20 mg by mouth once daily.    CLOPIDOGREL (PLAVIX) 75 MG TABLET    Take 75 mg by mouth once daily.    DOCUSATE SODIUM (COLACE) 100 MG CAPSULE    Take 1 capsule (100 mg total) by mouth 2 (two) times daily.    DOXYCYCLINE (MONODOX) 50 MG CAP    Take 100 mg by mouth every 12 (twelve) hours.    ERGOCALCIFEROL (ERGOCALCIFEROL) 50,000 UNIT CAP    Take 50,000 Units by mouth every 7 days.    FERROUS SULFATE (FEOSOL) 325 MG (65 MG IRON) TAB TABLET    Take 325 mg by mouth.    FOLIC ACID-VIT B6-VIT B12 2.5-25-2 MG (FOLBIC OR EQUIV) 2.5-25-2 MG TAB    Take 1 tablet by mouth once daily.    FUROSEMIDE (LASIX) 20 MG TABLET    Take 20 mg by mouth 2 (two) times daily.    GABAPENTIN (NEURONTIN) 600 MG TABLET    Take 600 mg by mouth 2 (two) times daily.    GLIPIZIDE (GLUCOTROL) 10 MG TABLET    Take 10 mg by mouth 2 (two) times daily before meals.    HYDRALAZINE (APRESOLINE) 50 MG TABLET    Take 50 mg by mouth daily as needed.    HYOSCYAMINE (ANASPAZ,LEVSIN) 0.125 MG TAB    Take 1 tablet (125 mcg total) by mouth every 6 (six) hours as needed (abdominal cramping).    INSULIN GLARGINE (LANTUS) 100 UNIT/ML INJECTION    Inject 50 Units into the skin every evening.    INSULIN NEEDLES, DISPOSABLE, 31 X 3/16 " NDLE    by Misc.(Non-Drug; Combo Route) route.    LANCETS 28 GAUGE MISC    by Misc.(Non-Drug; Combo Route) route.    LISINOPRIL (PRINIVIL,ZESTRIL) 2.5 MG TABLET    Take 1 tablet (2.5 mg total) by mouth once daily.    LOPERAMIDE (IMODIUM A-D) 2 MG TAB    Take 2 mg by mouth 4 (four) times daily as needed.    LOSARTAN (COZAAR) 50 MG TABLET    Take 50 mg by mouth once daily.    MELOXICAM " (MOBIC) 7.5 MG TABLET    Take 7.5 mg by mouth once daily.    METFORMIN (GLUCOPHAGE) 1000 MG TABLET    Take 1,000 mg by mouth 2 (two) times daily with meals.    METOCLOPRAMIDE HCL (REGLAN) 10 MG TABLET    Take 10 mg by mouth 2 (two) times a day.    METOPROLOL TARTRATE (LOPRESSOR) 50 MG TABLET    Take 50 mg by mouth 2 (two) times daily.    MONTELUKAST (SINGULAIR) 10 MG TABLET    Take 10 mg by mouth every evening.    NORTRIPTYLINE (PAMELOR) 50 MG CAPSULE    SMARTSI Capsule(s) By Mouth Every Evening    OMEPRAZOLE (PRILOSEC) 20 MG CAPSULE    Take 20 mg by mouth once daily.    ONDANSETRON (ZOFRAN-ODT) 4 MG TBDL    Take 1 tablet (4 mg total) by mouth every 8 (eight) hours as needed.    OXYCODONE-ACETAMINOPHEN (PERCOCET) 5-325 MG PER TABLET    Take 1 tablet by mouth every 4 (four) hours as needed for Pain.    POLYETHYLENE GLYCOL (GLYCOLAX) 17 GRAM/DOSE POWDER    Take 17 g by mouth daily as needed.    POTASSIUM CHLORIDE (MICRO-K) 10 MEQ CPSR    Take 10 mEq by mouth once daily.    PROMETHAZINE (PHENERGAN) 6.25 MG/5 ML SYRUP    TAKE 10 mls BY MOUTH EVERY 6 HOURS AS NEEDED    ZOLPIDEM (AMBIEN) 10 MG TAB    Take 5 mg by mouth nightly as needed.       Follow up in about 1 day (around 3/8/2022) for with PCP, Dr. Shah .     Rika Carrillo, DNP, APRN, FNP-C

## 2022-03-08 LAB
FINAL PATHOLOGIC DIAGNOSIS: NORMAL
GROSS: NORMAL
Lab: NORMAL
SUPPLEMENTAL DIAGNOSIS: NORMAL

## 2022-03-09 ENCOUNTER — TELEPHONE (OUTPATIENT)
Dept: ENDOSCOPY | Facility: HOSPITAL | Age: 57
End: 2022-03-09
Payer: MEDICAID

## 2022-03-09 ENCOUNTER — TELEPHONE (OUTPATIENT)
Dept: GASTROENTEROLOGY | Facility: CLINIC | Age: 57
End: 2022-03-09
Payer: MEDICAID

## 2022-03-09 NOTE — TELEPHONE ENCOUNTER
----- Message from Estefania Bryant MD sent at 3/9/2022  2:19 PM CST -----  Please let Ms. Jason know that the biopsies of her stomach ulcer performed during her recent EGD procedure did not show any concerning findings.  We will see her in a few months for a repeat EGD and colonoscopy.  The schedulers will call her soon to schedule.

## 2022-03-09 NOTE — TELEPHONE ENCOUNTER
An order was placed by you for an EGD and colonoscopy on 2/23/22 to be done in 8 weeks.  Patient has a cardiac history and has an appointment in June 2022.  She has never been followed by Cardiology before and needs to be cleared prior to procedure.  Is she ok to wait until she is cleared by Cardiology at her scheduled appointment?  Please advise.  Thank you.    Elissa

## 2022-03-10 NOTE — TELEPHONE ENCOUNTER
Patient contacted and informed of recommendation.   Stated understanding.  Main line phone number provided to call back after Cardiology appointment.

## 2022-03-14 ENCOUNTER — HOSPITAL ENCOUNTER (INPATIENT)
Facility: HOSPITAL | Age: 57
LOS: 9 days | Discharge: HOME | DRG: 326 | End: 2022-03-23
Attending: EMERGENCY MEDICINE | Admitting: SURGERY
Payer: MEDICAID

## 2022-03-14 ENCOUNTER — ANESTHESIA EVENT (OUTPATIENT)
Dept: SURGERY | Facility: HOSPITAL | Age: 57
DRG: 326 | End: 2022-03-14
Payer: MEDICAID

## 2022-03-14 ENCOUNTER — ANESTHESIA (OUTPATIENT)
Dept: SURGERY | Facility: HOSPITAL | Age: 57
DRG: 326 | End: 2022-03-14
Payer: MEDICAID

## 2022-03-14 DIAGNOSIS — D72.829 LEUKOCYTOSIS, UNSPECIFIED TYPE: ICD-10-CM

## 2022-03-14 DIAGNOSIS — R07.9 CHEST PAIN: ICD-10-CM

## 2022-03-14 DIAGNOSIS — R19.8 PERFORATED VISCUS: Primary | ICD-10-CM

## 2022-03-14 DIAGNOSIS — Z79.4 TYPE 2 DIABETES MELLITUS WITH OTHER SPECIFIED COMPLICATION, WITH LONG-TERM CURRENT USE OF INSULIN: ICD-10-CM

## 2022-03-14 DIAGNOSIS — E11.69 TYPE 2 DIABETES MELLITUS WITH OTHER SPECIFIED COMPLICATION, WITH LONG-TERM CURRENT USE OF INSULIN: ICD-10-CM

## 2022-03-14 DIAGNOSIS — E87.1 HYPONATREMIA: ICD-10-CM

## 2022-03-14 DIAGNOSIS — Z78.9 ON TOTAL PARENTERAL NUTRITION (TPN): ICD-10-CM

## 2022-03-14 LAB
ABO + RH BLD: NORMAL
ALBUMIN SERPL BCP-MCNC: 3.3 G/DL (ref 3.5–5.2)
ALP SERPL-CCNC: 142 U/L (ref 55–135)
ALT SERPL W/O P-5'-P-CCNC: 17 U/L (ref 10–44)
ANION GAP SERPL CALC-SCNC: 10 MMOL/L (ref 8–16)
ANION GAP SERPL CALC-SCNC: 14 MMOL/L (ref 8–16)
AST SERPL-CCNC: 14 U/L (ref 10–40)
BASOPHILS # BLD AUTO: 0.05 K/UL (ref 0–0.2)
BASOPHILS NFR BLD: 0.2 % (ref 0–1.9)
BILIRUB SERPL-MCNC: 0.3 MG/DL (ref 0.1–1)
BLD GP AB SCN CELLS X3 SERPL QL: NORMAL
BUN SERPL-MCNC: 20 MG/DL (ref 6–20)
BUN SERPL-MCNC: 24 MG/DL (ref 6–20)
CALCIUM SERPL-MCNC: 9 MG/DL (ref 8.7–10.5)
CALCIUM SERPL-MCNC: 9.5 MG/DL (ref 8.7–10.5)
CHLORIDE SERPL-SCNC: 93 MMOL/L (ref 95–110)
CHLORIDE SERPL-SCNC: 96 MMOL/L (ref 95–110)
CO2 SERPL-SCNC: 21 MMOL/L (ref 23–29)
CO2 SERPL-SCNC: 22 MMOL/L (ref 23–29)
CREAT SERPL-MCNC: 1.9 MG/DL (ref 0.5–1.4)
CREAT SERPL-MCNC: 1.9 MG/DL (ref 0.5–1.4)
CTP QC/QA: YES
DIFFERENTIAL METHOD: ABNORMAL
EOSINOPHIL # BLD AUTO: 0.4 K/UL (ref 0–0.5)
EOSINOPHIL NFR BLD: 1.7 % (ref 0–8)
ERYTHROCYTE [DISTWIDTH] IN BLOOD BY AUTOMATED COUNT: 16.9 % (ref 11.5–14.5)
EST. GFR  (AFRICAN AMERICAN): 33.5 ML/MIN/1.73 M^2
EST. GFR  (AFRICAN AMERICAN): 33.5 ML/MIN/1.73 M^2
EST. GFR  (NON AFRICAN AMERICAN): 29.1 ML/MIN/1.73 M^2
EST. GFR  (NON AFRICAN AMERICAN): 29.1 ML/MIN/1.73 M^2
GLUCOSE SERPL-MCNC: 100 MG/DL (ref 70–110)
GLUCOSE SERPL-MCNC: 63 MG/DL (ref 70–110)
HCT VFR BLD AUTO: 36.4 % (ref 37–48.5)
HGB BLD-MCNC: 12 G/DL (ref 12–16)
IMM GRANULOCYTES # BLD AUTO: 0.09 K/UL (ref 0–0.04)
IMM GRANULOCYTES NFR BLD AUTO: 0.4 % (ref 0–0.5)
LACTATE SERPL-SCNC: 0.8 MMOL/L (ref 0.5–2.2)
LIPASE SERPL-CCNC: 11 U/L (ref 4–60)
LYMPHOCYTES # BLD AUTO: 3.9 K/UL (ref 1–4.8)
LYMPHOCYTES NFR BLD: 17.4 % (ref 18–48)
MAGNESIUM SERPL-MCNC: 1.8 MG/DL (ref 1.6–2.6)
MCH RBC QN AUTO: 26 PG (ref 27–31)
MCHC RBC AUTO-ENTMCNC: 33 G/DL (ref 32–36)
MCV RBC AUTO: 79 FL (ref 82–98)
MONOCYTES # BLD AUTO: 1.1 K/UL (ref 0.3–1)
MONOCYTES NFR BLD: 4.8 % (ref 4–15)
NEUTROPHILS # BLD AUTO: 16.8 K/UL (ref 1.8–7.7)
NEUTROPHILS NFR BLD: 75.5 % (ref 38–73)
NRBC BLD-RTO: 0 /100 WBC
PHOSPHATE SERPL-MCNC: 3.9 MG/DL (ref 2.7–4.5)
PLATELET # BLD AUTO: 433 K/UL (ref 150–450)
PMV BLD AUTO: 10.7 FL (ref 9.2–12.9)
POCT GLUCOSE: 105 MG/DL (ref 70–110)
POCT GLUCOSE: 120 MG/DL (ref 70–110)
POCT GLUCOSE: 54 MG/DL (ref 70–110)
POCT GLUCOSE: 59 MG/DL (ref 70–110)
POTASSIUM SERPL-SCNC: 4.3 MMOL/L (ref 3.5–5.1)
POTASSIUM SERPL-SCNC: 4.9 MMOL/L (ref 3.5–5.1)
PROT SERPL-MCNC: 8.2 G/DL (ref 6–8.4)
RBC # BLD AUTO: 4.62 M/UL (ref 4–5.4)
SARS-COV-2 RDRP RESP QL NAA+PROBE: NEGATIVE
SODIUM SERPL-SCNC: 128 MMOL/L (ref 136–145)
SODIUM SERPL-SCNC: 128 MMOL/L (ref 136–145)
TROPONIN I SERPL DL<=0.01 NG/ML-MCNC: <0.006 NG/ML (ref 0–0.03)
WBC # BLD AUTO: 22.3 K/UL (ref 3.9–12.7)

## 2022-03-14 PROCEDURE — 93005 ELECTROCARDIOGRAM TRACING: CPT

## 2022-03-14 PROCEDURE — 63600175 PHARM REV CODE 636 W HCPCS: Performed by: STUDENT IN AN ORGANIZED HEALTH CARE EDUCATION/TRAINING PROGRAM

## 2022-03-14 PROCEDURE — U0002 COVID-19 LAB TEST NON-CDC: HCPCS | Performed by: EMERGENCY MEDICINE

## 2022-03-14 PROCEDURE — 25000003 PHARM REV CODE 250: Performed by: SURGERY

## 2022-03-14 PROCEDURE — 25000003 PHARM REV CODE 250: Performed by: STUDENT IN AN ORGANIZED HEALTH CARE EDUCATION/TRAINING PROGRAM

## 2022-03-14 PROCEDURE — 83605 ASSAY OF LACTIC ACID: CPT | Performed by: EMERGENCY MEDICINE

## 2022-03-14 PROCEDURE — 51702 INSERT TEMP BLADDER CATH: CPT

## 2022-03-14 PROCEDURE — 88305 TISSUE EXAM BY PATHOLOGIST: CPT | Mod: 26,,, | Performed by: PATHOLOGY

## 2022-03-14 PROCEDURE — D9220A PRA ANESTHESIA: ICD-10-PCS | Mod: ANES,,, | Performed by: ANESTHESIOLOGY

## 2022-03-14 PROCEDURE — 87040 BLOOD CULTURE FOR BACTERIA: CPT | Mod: 59 | Performed by: EMERGENCY MEDICINE

## 2022-03-14 PROCEDURE — 99285 EMERGENCY DEPT VISIT HI MDM: CPT | Mod: 25

## 2022-03-14 PROCEDURE — 86901 BLOOD TYPING SEROLOGIC RH(D): CPT | Performed by: EMERGENCY MEDICINE

## 2022-03-14 PROCEDURE — 63600175 PHARM REV CODE 636 W HCPCS: Performed by: EMERGENCY MEDICINE

## 2022-03-14 PROCEDURE — 99223 PR INITIAL HOSPITAL CARE,LEVL III: ICD-10-PCS | Mod: 57,,, | Performed by: SURGERY

## 2022-03-14 PROCEDURE — 37000009 HC ANESTHESIA EA ADD 15 MINS: Performed by: SURGERY

## 2022-03-14 PROCEDURE — 20000000 HC ICU ROOM

## 2022-03-14 PROCEDURE — 36000707: Performed by: SURGERY

## 2022-03-14 PROCEDURE — 25000003 PHARM REV CODE 250: Performed by: EMERGENCY MEDICINE

## 2022-03-14 PROCEDURE — 99285 PR EMERGENCY DEPT VISIT,LEVEL V: ICD-10-PCS | Mod: ,,, | Performed by: EMERGENCY MEDICINE

## 2022-03-14 PROCEDURE — 85025 COMPLETE CBC W/AUTO DIFF WBC: CPT | Performed by: EMERGENCY MEDICINE

## 2022-03-14 PROCEDURE — 84484 ASSAY OF TROPONIN QUANT: CPT | Performed by: EMERGENCY MEDICINE

## 2022-03-14 PROCEDURE — 36000706: Performed by: SURGERY

## 2022-03-14 PROCEDURE — 88305 TISSUE EXAM BY PATHOLOGIST: ICD-10-PCS | Mod: 26,,, | Performed by: PATHOLOGY

## 2022-03-14 PROCEDURE — 99223 1ST HOSP IP/OBS HIGH 75: CPT | Mod: 57,,, | Performed by: SURGERY

## 2022-03-14 PROCEDURE — 83735 ASSAY OF MAGNESIUM: CPT | Performed by: STUDENT IN AN ORGANIZED HEALTH CARE EDUCATION/TRAINING PROGRAM

## 2022-03-14 PROCEDURE — D9220A PRA ANESTHESIA: Mod: CRNA,,, | Performed by: STUDENT IN AN ORGANIZED HEALTH CARE EDUCATION/TRAINING PROGRAM

## 2022-03-14 PROCEDURE — D9220A PRA ANESTHESIA: ICD-10-PCS | Mod: CRNA,,, | Performed by: STUDENT IN AN ORGANIZED HEALTH CARE EDUCATION/TRAINING PROGRAM

## 2022-03-14 PROCEDURE — 25500020 PHARM REV CODE 255: Performed by: EMERGENCY MEDICINE

## 2022-03-14 PROCEDURE — 43840 PR REPAIR, PERF DUOD/GAST ULC-WND/INJ: ICD-10-PCS | Mod: ,,, | Performed by: SURGERY

## 2022-03-14 PROCEDURE — 99285 EMERGENCY DEPT VISIT HI MDM: CPT | Mod: ,,, | Performed by: EMERGENCY MEDICINE

## 2022-03-14 PROCEDURE — 96365 THER/PROPH/DIAG IV INF INIT: CPT

## 2022-03-14 PROCEDURE — 37000008 HC ANESTHESIA 1ST 15 MINUTES: Performed by: SURGERY

## 2022-03-14 PROCEDURE — 88342 CHG IMMUNOCYTOCHEMISTRY: ICD-10-PCS | Mod: 26,,, | Performed by: PATHOLOGY

## 2022-03-14 PROCEDURE — 43840 GSTRRPHY SUTR DUOL/GSTR ULCR: CPT | Mod: ,,, | Performed by: SURGERY

## 2022-03-14 PROCEDURE — 88342 IMHCHEM/IMCYTCHM 1ST ANTB: CPT | Performed by: PATHOLOGY

## 2022-03-14 PROCEDURE — 80053 COMPREHEN METABOLIC PANEL: CPT | Performed by: EMERGENCY MEDICINE

## 2022-03-14 PROCEDURE — D9220A PRA ANESTHESIA: Mod: ANES,,, | Performed by: ANESTHESIOLOGY

## 2022-03-14 PROCEDURE — 88342 IMHCHEM/IMCYTCHM 1ST ANTB: CPT | Mod: 26,,, | Performed by: PATHOLOGY

## 2022-03-14 PROCEDURE — 84100 ASSAY OF PHOSPHORUS: CPT | Performed by: STUDENT IN AN ORGANIZED HEALTH CARE EDUCATION/TRAINING PROGRAM

## 2022-03-14 PROCEDURE — 94640 AIRWAY INHALATION TREATMENT: CPT

## 2022-03-14 PROCEDURE — 63600175 PHARM REV CODE 636 W HCPCS: Performed by: NURSE ANESTHETIST, CERTIFIED REGISTERED

## 2022-03-14 PROCEDURE — 99900035 HC TECH TIME PER 15 MIN (STAT)

## 2022-03-14 PROCEDURE — 96376 TX/PRO/DX INJ SAME DRUG ADON: CPT

## 2022-03-14 PROCEDURE — 25000003 PHARM REV CODE 250: Performed by: NURSE ANESTHETIST, CERTIFIED REGISTERED

## 2022-03-14 PROCEDURE — C9113 INJ PANTOPRAZOLE SODIUM, VIA: HCPCS | Performed by: STUDENT IN AN ORGANIZED HEALTH CARE EDUCATION/TRAINING PROGRAM

## 2022-03-14 PROCEDURE — 93010 ELECTROCARDIOGRAM REPORT: CPT | Mod: ,,, | Performed by: INTERNAL MEDICINE

## 2022-03-14 PROCEDURE — 96375 TX/PRO/DX INJ NEW DRUG ADDON: CPT

## 2022-03-14 PROCEDURE — 93010 EKG 12-LEAD: ICD-10-PCS | Mod: ,,, | Performed by: INTERNAL MEDICINE

## 2022-03-14 PROCEDURE — 80048 BASIC METABOLIC PNL TOTAL CA: CPT | Mod: XB | Performed by: STUDENT IN AN ORGANIZED HEALTH CARE EDUCATION/TRAINING PROGRAM

## 2022-03-14 PROCEDURE — 88305 TISSUE EXAM BY PATHOLOGIST: CPT | Performed by: PATHOLOGY

## 2022-03-14 PROCEDURE — 83690 ASSAY OF LIPASE: CPT | Performed by: EMERGENCY MEDICINE

## 2022-03-14 PROCEDURE — 25000242 PHARM REV CODE 250 ALT 637 W/ HCPCS: Performed by: STUDENT IN AN ORGANIZED HEALTH CARE EDUCATION/TRAINING PROGRAM

## 2022-03-14 RX ORDER — MAGNESIUM SULFATE HEPTAHYDRATE 40 MG/ML
4 INJECTION, SOLUTION INTRAVENOUS
Status: DISCONTINUED | OUTPATIENT
Start: 2022-03-14 | End: 2022-03-14

## 2022-03-14 RX ORDER — HYDROMORPHONE HYDROCHLORIDE 2 MG/ML
INJECTION, SOLUTION INTRAMUSCULAR; INTRAVENOUS; SUBCUTANEOUS
Status: DISCONTINUED | OUTPATIENT
Start: 2022-03-14 | End: 2022-03-14

## 2022-03-14 RX ORDER — ONDANSETRON 8 MG/1
8 TABLET, ORALLY DISINTEGRATING ORAL EVERY 8 HOURS PRN
Status: DISCONTINUED | OUTPATIENT
Start: 2022-03-14 | End: 2022-03-23 | Stop reason: HOSPADM

## 2022-03-14 RX ORDER — HYDROMORPHONE HYDROCHLORIDE 1 MG/ML
0.5 INJECTION, SOLUTION INTRAMUSCULAR; INTRAVENOUS; SUBCUTANEOUS EVERY 6 HOURS PRN
Status: DISCONTINUED | OUTPATIENT
Start: 2022-03-14 | End: 2022-03-14

## 2022-03-14 RX ORDER — PROPOFOL 10 MG/ML
VIAL (ML) INTRAVENOUS
Status: DISCONTINUED | OUTPATIENT
Start: 2022-03-14 | End: 2022-03-14

## 2022-03-14 RX ORDER — INSULIN ASPART 100 [IU]/ML
1-10 INJECTION, SOLUTION INTRAVENOUS; SUBCUTANEOUS EVERY 6 HOURS PRN
Status: DISCONTINUED | OUTPATIENT
Start: 2022-03-14 | End: 2022-03-20

## 2022-03-14 RX ORDER — TALC
6 POWDER (GRAM) TOPICAL NIGHTLY PRN
Status: DISCONTINUED | OUTPATIENT
Start: 2022-03-14 | End: 2022-03-14

## 2022-03-14 RX ORDER — FLUCONAZOLE 2 MG/ML
200 INJECTION, SOLUTION INTRAVENOUS ONCE
Status: COMPLETED | OUTPATIENT
Start: 2022-03-14 | End: 2022-03-14

## 2022-03-14 RX ORDER — ETOMIDATE 2 MG/ML
INJECTION INTRAVENOUS
Status: DISCONTINUED | OUTPATIENT
Start: 2022-03-14 | End: 2022-03-14

## 2022-03-14 RX ORDER — ACETAMINOPHEN 325 MG/1
650 TABLET ORAL EVERY 8 HOURS PRN
Status: DISCONTINUED | OUTPATIENT
Start: 2022-03-14 | End: 2022-03-14

## 2022-03-14 RX ORDER — SODIUM CHLORIDE, SODIUM LACTATE, POTASSIUM CHLORIDE, CALCIUM CHLORIDE 600; 310; 30; 20 MG/100ML; MG/100ML; MG/100ML; MG/100ML
INJECTION, SOLUTION INTRAVENOUS CONTINUOUS
Status: DISCONTINUED | OUTPATIENT
Start: 2022-03-14 | End: 2022-03-18

## 2022-03-14 RX ORDER — MORPHINE SULFATE 4 MG/ML
4 INJECTION, SOLUTION INTRAMUSCULAR; INTRAVENOUS
Status: COMPLETED | OUTPATIENT
Start: 2022-03-14 | End: 2022-03-14

## 2022-03-14 RX ORDER — POTASSIUM CHLORIDE 7.45 MG/ML
40 INJECTION INTRAVENOUS
Status: DISCONTINUED | OUTPATIENT
Start: 2022-03-14 | End: 2022-03-14

## 2022-03-14 RX ORDER — HYDROMORPHONE HYDROCHLORIDE 1 MG/ML
1 INJECTION, SOLUTION INTRAMUSCULAR; INTRAVENOUS; SUBCUTANEOUS EVERY 4 HOURS PRN
Status: DISCONTINUED | OUTPATIENT
Start: 2022-03-14 | End: 2022-03-14

## 2022-03-14 RX ORDER — NEOSTIGMINE METHYLSULFATE 0.5 MG/ML
INJECTION, SOLUTION INTRAVENOUS
Status: DISCONTINUED | OUTPATIENT
Start: 2022-03-14 | End: 2022-03-14

## 2022-03-14 RX ORDER — SUCCINYLCHOLINE CHLORIDE 20 MG/ML
INJECTION INTRAMUSCULAR; INTRAVENOUS
Status: DISCONTINUED | OUTPATIENT
Start: 2022-03-14 | End: 2022-03-14

## 2022-03-14 RX ORDER — GLUCAGON 1 MG
1 KIT INJECTION
Status: DISCONTINUED | OUTPATIENT
Start: 2022-03-14 | End: 2022-03-20

## 2022-03-14 RX ORDER — METOPROLOL TARTRATE 1 MG/ML
5 INJECTION, SOLUTION INTRAVENOUS EVERY 6 HOURS
Status: DISCONTINUED | OUTPATIENT
Start: 2022-03-14 | End: 2022-03-22

## 2022-03-14 RX ORDER — CEFAZOLIN SODIUM/WATER 2 G/20 ML
SYRINGE (ML) INTRAVENOUS
Status: DISCONTINUED | OUTPATIENT
Start: 2022-03-14 | End: 2022-03-14

## 2022-03-14 RX ORDER — FENTANYL CITRATE 50 UG/ML
25 INJECTION, SOLUTION INTRAMUSCULAR; INTRAVENOUS
Status: DISCONTINUED | OUTPATIENT
Start: 2022-03-14 | End: 2022-03-14

## 2022-03-14 RX ORDER — HYDROMORPHONE HYDROCHLORIDE 1 MG/ML
0.5 INJECTION, SOLUTION INTRAMUSCULAR; INTRAVENOUS; SUBCUTANEOUS ONCE
Status: COMPLETED | OUTPATIENT
Start: 2022-03-14 | End: 2022-03-14

## 2022-03-14 RX ORDER — POTASSIUM CHLORIDE 7.45 MG/ML
80 INJECTION INTRAVENOUS
Status: DISCONTINUED | OUTPATIENT
Start: 2022-03-14 | End: 2022-03-14

## 2022-03-14 RX ORDER — SODIUM CHLORIDE 0.9 % (FLUSH) 0.9 %
3 SYRINGE (ML) INJECTION EVERY 4 HOURS PRN
Status: CANCELLED | OUTPATIENT
Start: 2022-03-14

## 2022-03-14 RX ORDER — HALOPERIDOL 5 MG/ML
0.5 INJECTION INTRAMUSCULAR EVERY 10 MIN PRN
Status: CANCELLED | OUTPATIENT
Start: 2022-03-14

## 2022-03-14 RX ORDER — MUPIROCIN 20 MG/G
OINTMENT TOPICAL 2 TIMES DAILY
Status: COMPLETED | OUTPATIENT
Start: 2022-03-14 | End: 2022-03-18

## 2022-03-14 RX ORDER — SODIUM CHLORIDE 0.9 % (FLUSH) 0.9 %
10 SYRINGE (ML) INJECTION
Status: DISCONTINUED | OUTPATIENT
Start: 2022-03-14 | End: 2022-03-23 | Stop reason: HOSPADM

## 2022-03-14 RX ORDER — ACETAMINOPHEN 650 MG/1
650 SUPPOSITORY RECTAL EVERY 6 HOURS PRN
Status: DISCONTINUED | OUTPATIENT
Start: 2022-03-14 | End: 2022-03-15

## 2022-03-14 RX ORDER — PANTOPRAZOLE SODIUM 40 MG/10ML
40 INJECTION, POWDER, LYOPHILIZED, FOR SOLUTION INTRAVENOUS 2 TIMES DAILY
Status: DISCONTINUED | OUTPATIENT
Start: 2022-03-14 | End: 2022-03-23

## 2022-03-14 RX ORDER — LIDOCAINE HYDROCHLORIDE 10 MG/ML
1 INJECTION, SOLUTION EPIDURAL; INFILTRATION; INTRACAUDAL; PERINEURAL ONCE
Status: DISCONTINUED | OUTPATIENT
Start: 2022-03-14 | End: 2022-03-23 | Stop reason: HOSPADM

## 2022-03-14 RX ORDER — LIDOCAINE HYDROCHLORIDE 20 MG/ML
INJECTION, SOLUTION EPIDURAL; INFILTRATION; INTRACAUDAL; PERINEURAL
Status: DISCONTINUED | OUTPATIENT
Start: 2022-03-14 | End: 2022-03-14

## 2022-03-14 RX ORDER — FENTANYL CITRATE 50 UG/ML
25 INJECTION, SOLUTION INTRAMUSCULAR; INTRAVENOUS
Status: DISCONTINUED | OUTPATIENT
Start: 2022-03-15 | End: 2022-03-15

## 2022-03-14 RX ORDER — ONDANSETRON 2 MG/ML
4 INJECTION INTRAMUSCULAR; INTRAVENOUS EVERY 8 HOURS PRN
Status: DISCONTINUED | OUTPATIENT
Start: 2022-03-14 | End: 2022-03-23 | Stop reason: HOSPADM

## 2022-03-14 RX ORDER — MORPHINE SULFATE 2 MG/ML
2 INJECTION, SOLUTION INTRAMUSCULAR; INTRAVENOUS
Status: DISCONTINUED | OUTPATIENT
Start: 2022-03-14 | End: 2022-03-14

## 2022-03-14 RX ORDER — DEXMEDETOMIDINE HYDROCHLORIDE 100 UG/ML
INJECTION, SOLUTION INTRAVENOUS
Status: DISCONTINUED | OUTPATIENT
Start: 2022-03-14 | End: 2022-03-14

## 2022-03-14 RX ORDER — FENTANYL CITRATE 50 UG/ML
INJECTION, SOLUTION INTRAMUSCULAR; INTRAVENOUS
Status: DISCONTINUED | OUTPATIENT
Start: 2022-03-14 | End: 2022-03-14

## 2022-03-14 RX ORDER — HYDROMORPHONE HYDROCHLORIDE 1 MG/ML
0.2 INJECTION, SOLUTION INTRAMUSCULAR; INTRAVENOUS; SUBCUTANEOUS EVERY 5 MIN PRN
Status: CANCELLED | OUTPATIENT
Start: 2022-03-14

## 2022-03-14 RX ORDER — SODIUM CHLORIDE 9 MG/ML
INJECTION, SOLUTION INTRAVENOUS
Status: DISCONTINUED | OUTPATIENT
Start: 2022-03-14 | End: 2022-03-23 | Stop reason: HOSPADM

## 2022-03-14 RX ORDER — MIDAZOLAM HYDROCHLORIDE 1 MG/ML
INJECTION, SOLUTION INTRAMUSCULAR; INTRAVENOUS
Status: DISCONTINUED | OUTPATIENT
Start: 2022-03-14 | End: 2022-03-14

## 2022-03-14 RX ORDER — HYDROMORPHONE HYDROCHLORIDE 1 MG/ML
0.2 INJECTION, SOLUTION INTRAMUSCULAR; INTRAVENOUS; SUBCUTANEOUS
Status: DISCONTINUED | OUTPATIENT
Start: 2022-03-14 | End: 2022-03-14

## 2022-03-14 RX ORDER — HYDROMORPHONE HYDROCHLORIDE 1 MG/ML
0.5 INJECTION, SOLUTION INTRAMUSCULAR; INTRAVENOUS; SUBCUTANEOUS EVERY 4 HOURS PRN
Status: DISCONTINUED | OUTPATIENT
Start: 2022-03-14 | End: 2022-03-15

## 2022-03-14 RX ORDER — PHENYLEPHRINE HCL IN 0.9% NACL 1 MG/10 ML
SYRINGE (ML) INTRAVENOUS
Status: DISCONTINUED | OUTPATIENT
Start: 2022-03-14 | End: 2022-03-14

## 2022-03-14 RX ORDER — POTASSIUM CHLORIDE 7.45 MG/ML
60 INJECTION INTRAVENOUS
Status: DISCONTINUED | OUTPATIENT
Start: 2022-03-14 | End: 2022-03-14

## 2022-03-14 RX ORDER — ACETAMINOPHEN 10 MG/ML
1000 INJECTION, SOLUTION INTRAVENOUS EVERY 8 HOURS
Status: CANCELLED | OUTPATIENT
Start: 2022-03-14 | End: 2022-03-15

## 2022-03-14 RX ORDER — ENOXAPARIN SODIUM 100 MG/ML
40 INJECTION SUBCUTANEOUS EVERY 24 HOURS
Status: DISCONTINUED | OUTPATIENT
Start: 2022-03-14 | End: 2022-03-15

## 2022-03-14 RX ORDER — HYDROMORPHONE HYDROCHLORIDE 1 MG/ML
0.5 INJECTION, SOLUTION INTRAMUSCULAR; INTRAVENOUS; SUBCUTANEOUS EVERY 4 HOURS PRN
Status: DISCONTINUED | OUTPATIENT
Start: 2022-03-14 | End: 2022-03-14

## 2022-03-14 RX ORDER — ROCURONIUM BROMIDE 10 MG/ML
INJECTION, SOLUTION INTRAVENOUS
Status: DISCONTINUED | OUTPATIENT
Start: 2022-03-14 | End: 2022-03-14

## 2022-03-14 RX ADMIN — ROCURONIUM BROMIDE 45 MG: 10 INJECTION, SOLUTION INTRAVENOUS at 06:03

## 2022-03-14 RX ADMIN — PIPERACILLIN AND TAZOBACTAM 4.5 G: 4; .5 INJECTION, POWDER, LYOPHILIZED, FOR SOLUTION INTRAVENOUS; PARENTERAL at 04:03

## 2022-03-14 RX ADMIN — GLYCOPYRROLATE 0.6 MG: 0.2 INJECTION, SOLUTION INTRAMUSCULAR; INTRAVITREAL at 07:03

## 2022-03-14 RX ADMIN — SODIUM CHLORIDE: 0.9 INJECTION, SOLUTION INTRAVENOUS at 09:03

## 2022-03-14 RX ADMIN — Medication 100 MCG: at 06:03

## 2022-03-14 RX ADMIN — SODIUM CHLORIDE, SODIUM LACTATE, POTASSIUM CHLORIDE, AND CALCIUM CHLORIDE: .6; .31; .03; .02 INJECTION, SOLUTION INTRAVENOUS at 08:03

## 2022-03-14 RX ADMIN — METOROPROLOL TARTRATE 5 MG: 5 INJECTION, SOLUTION INTRAVENOUS at 06:03

## 2022-03-14 RX ADMIN — NEOSTIGMINE METHYLSULFATE 5 MG: 0.5 INJECTION INTRAVENOUS at 07:03

## 2022-03-14 RX ADMIN — PIPERACILLIN AND TAZOBACTAM 4.5 G: 4; .5 INJECTION, POWDER, LYOPHILIZED, FOR SOLUTION INTRAVENOUS; PARENTERAL at 12:03

## 2022-03-14 RX ADMIN — HYDROMORPHONE HYDROCHLORIDE 0.5 MG: 1 INJECTION, SOLUTION INTRAMUSCULAR; INTRAVENOUS; SUBCUTANEOUS at 06:03

## 2022-03-14 RX ADMIN — DEXTROSE 125 ML: 10 SOLUTION INTRAVENOUS at 06:03

## 2022-03-14 RX ADMIN — METHOCARBAMOL 500 MG: 100 INJECTION, SOLUTION INTRAMUSCULAR; INTRAVENOUS at 10:03

## 2022-03-14 RX ADMIN — SUCCINYLCHOLINE CHLORIDE 140 MG: 20 INJECTION, SOLUTION INTRAMUSCULAR; INTRAVENOUS; PARENTERAL at 06:03

## 2022-03-14 RX ADMIN — HYDROMORPHONE HYDROCHLORIDE 0.4 MG: 2 INJECTION INTRAMUSCULAR; INTRAVENOUS; SUBCUTANEOUS at 07:03

## 2022-03-14 RX ADMIN — IOHEXOL 100 ML: 350 INJECTION, SOLUTION INTRAVENOUS at 02:03

## 2022-03-14 RX ADMIN — PIPERACILLIN AND TAZOBACTAM 4.5 G: 4; .5 INJECTION, POWDER, LYOPHILIZED, FOR SOLUTION INTRAVENOUS; PARENTERAL at 09:03

## 2022-03-14 RX ADMIN — SODIUM CHLORIDE 500 ML: 0.9 INJECTION, SOLUTION INTRAVENOUS at 04:03

## 2022-03-14 RX ADMIN — Medication 2 G: at 06:03

## 2022-03-14 RX ADMIN — FENTANYL CITRATE 50 MCG: 50 INJECTION INTRAMUSCULAR; INTRAVENOUS at 06:03

## 2022-03-14 RX ADMIN — ETOMIDATE 8 MG: 2 INJECTION INTRAVENOUS at 06:03

## 2022-03-14 RX ADMIN — MUPIROCIN: 20 OINTMENT TOPICAL at 08:03

## 2022-03-14 RX ADMIN — IPRATROPIUM BROMIDE 2 PUFF: 17 AEROSOL, METERED RESPIRATORY (INHALATION) at 08:03

## 2022-03-14 RX ADMIN — FENTANYL CITRATE 25 MCG: 50 INJECTION INTRAMUSCULAR; INTRAVENOUS at 06:03

## 2022-03-14 RX ADMIN — LIDOCAINE HYDROCHLORIDE 100 MG: 20 INJECTION, SOLUTION EPIDURAL; INFILTRATION; INTRACAUDAL at 06:03

## 2022-03-14 RX ADMIN — ROCURONIUM BROMIDE 5 MG: 10 INJECTION, SOLUTION INTRAVENOUS at 06:03

## 2022-03-14 RX ADMIN — MORPHINE SULFATE 4 MG: 4 INJECTION INTRAVENOUS at 04:03

## 2022-03-14 RX ADMIN — HYDROMORPHONE HYDROCHLORIDE 0.5 MG: 1 INJECTION, SOLUTION INTRAMUSCULAR; INTRAVENOUS; SUBCUTANEOUS at 08:03

## 2022-03-14 RX ADMIN — ENOXAPARIN SODIUM 40 MG: 100 INJECTION SUBCUTANEOUS at 06:03

## 2022-03-14 RX ADMIN — MIDAZOLAM 0.5 MG: 1 INJECTION INTRAMUSCULAR; INTRAVENOUS at 06:03

## 2022-03-14 RX ADMIN — DEXMEDETOMIDINE HYDROCHLORIDE 8 MCG: 100 INJECTION, SOLUTION, CONCENTRATE INTRAVENOUS at 07:03

## 2022-03-14 RX ADMIN — SODIUM CHLORIDE, SODIUM LACTATE, POTASSIUM CHLORIDE, AND CALCIUM CHLORIDE: .6; .31; .03; .02 INJECTION, SOLUTION INTRAVENOUS at 11:03

## 2022-03-14 RX ADMIN — METOROPROLOL TARTRATE 5 MG: 5 INJECTION, SOLUTION INTRAVENOUS at 11:03

## 2022-03-14 RX ADMIN — SODIUM CHLORIDE, SODIUM GLUCONATE, SODIUM ACETATE, POTASSIUM CHLORIDE, MAGNESIUM CHLORIDE, SODIUM PHOSPHATE, DIBASIC, AND POTASSIUM PHOSPHATE: .53; .5; .37; .037; .03; .012; .00082 INJECTION, SOLUTION INTRAVENOUS at 06:03

## 2022-03-14 RX ADMIN — MORPHINE SULFATE 4 MG: 4 INJECTION INTRAVENOUS at 02:03

## 2022-03-14 RX ADMIN — PANTOPRAZOLE SODIUM 40 MG: 40 INJECTION, POWDER, FOR SOLUTION INTRAVENOUS at 09:03

## 2022-03-14 RX ADMIN — PANTOPRAZOLE SODIUM 40 MG: 40 INJECTION, POWDER, FOR SOLUTION INTRAVENOUS at 10:03

## 2022-03-14 RX ADMIN — PROPOFOL 50 MG: 10 INJECTION, EMULSION INTRAVENOUS at 06:03

## 2022-03-14 RX ADMIN — MUPIROCIN: 20 OINTMENT TOPICAL at 09:03

## 2022-03-14 RX ADMIN — FLUCONAZOLE 200 MG: 2 INJECTION, SOLUTION INTRAVENOUS at 11:03

## 2022-03-14 NOTE — OP NOTE
[facility]  Surgery Department  Operative Note    SUMMARY     Patient: Casie Salvador  Date: 3/14/2022  MRN: [id number]    Date of Procedure: 3/14/2022     Procedure: Procedure(s) (LRB):  LAPAROTOMY, EXPLORATORY, repair gastric ulcer (N/A)     Surgeon(s) and Role:     * Lennox Smith MD - Primary     * Ab Bang MD - Resident - Assisting        Pre-Operative Diagnosis: Perforated viscus [R19.8]    Post-Operative Diagnosis: Post-Op Diagnosis Codes:     * Perforated viscus [R19.8]    Anesthesia: General    Indications for Procedure:   Casie Salvador is a 57 y.o. female presented to ED with acute abd pain. Workup revealed free peritoneal air and fluid concerning for perforated ulcer. We recommended emergent surgery, and her and her  agreed to proceed.    Procedure in Detail:   After consent was obtained, the patient was taken to the operating room and general anesthesia was initiated successfully. The patient was positioned supine. A garcia was placed. Her abd was prepped and draped in the normal sterile fashion. Pre-operative antibiotics were administered. Time out was performed and all present were in agreement. We began the procedure by making an upper midline incision. Upon entry into the peritoneum, gross contamination was noted of gastric contents, and a 1 cm gastric ulcer was noted on the lesser curvature just proximal to the pylorus. The stomach was firm around this ulcer, and biopsies were taken from the edges. The ulcer was then closed using 2-0 silks, and come together fairly easily. The omentum was then tacked over the closed ulcer using the tails of the sutures. The abdomen was irrigated copiously in all four quadrants until clear. Hemostasis was achieved. The NGT was confirmed to be in good position. The fascia was closed with looped PDS. The subcutaneous space was irrigated. Skin was closed with staples. Sterile dressings applied.     All counts were reported as  correct. The procedure was completed.  I was present and scrubbed throughout the procedure.    The patient was aroused from general anesthesia and the endotracheal tube was removed in the operating room. The patient was taken to the SICU in stable condition.    Drains:     Estimated Blood Loss (EBL): * No values recorded between 3/14/2022  6:38 AM and 3/14/2022  7:35 AM *    Total IV Fluids:     Complications: No    Specimens:   Specimen (24h ago, onward)                 Start     Ordered    03/14/22 0703  Specimen to Pathology, Surgery General Surgery  Once        Comments: Pre-op Diagnosis: Perforated viscus [R19.8]    Procedure(s):  LAPAROTOMY, EXPLORATORY     Number of specimens: 1    Name of specimens: 1- Gastric ulcer biopsy- permanent   References:    Click here for ordering Quick Tip   Question Answer Comment   Procedure Type: General Surgery    Specimen Class: Complex case/Special    Release to patient Immediate        03/14/22 0703                    Condition: Good    Disposition: ICU - extubated and stable.    Attestation: Dr. Smith was present and scrubbed for the entire procedure.      Ab Bang MD  General Surgery Resident, PGY-5  Pager [phone]  3/14/2022 8:04 AM

## 2022-03-14 NOTE — ED PROVIDER NOTES
Encounter Date: 3/14/2022       History     Chief Complaint   Patient presents with    Abdominal Pain     Pt reports abd pain, told EMS she is having an AAA. Pt moaning in pain     57-year-old female presenting abdominal pain.    PMH:  CAD, DM type 2, hypertension, depression, CHF    Context:  The patient states beginning last evening she developed left upper abdominal pain.  She also endorses left upper back pain.  She is concern for an ulcer.  Onset:  Acute   Location:  Left upper quadrant  Duration:  Since last evening    Modifiers:  Patient took oxycodone at home which provided some relief  Associated Symptoms:  Denies nausea or vomiting    The history is provided by the patient and medical records. No  was used.     Review of patient's allergies indicates:   Allergen Reactions    Orange juice Hives    Tomato (solanum lycopersicum) Hives     Past Medical History:   Diagnosis Date    Coronary artery disease     Diabetes mellitus     Encounter for blood transfusion     Heart attack     Hypertension      Past Surgical History:   Procedure Laterality Date    APPENDECTOMY      COLONOSCOPY N/A 2/23/2022    Procedure: COLONOSCOPY;  Surgeon: Estefania Bryant MD;  Location: [address]);  Service: Endoscopy;  Laterality: N/A;  anemia, melena    ESOPHAGOGASTRODUODENOSCOPY N/A 2/23/2022    Procedure: EGD (ESOPHAGOGASTRODUODENOSCOPY);  Surgeon: Estefania Bryant MD;  Location: [address]);  Service: Endoscopy;  Laterality: N/A;  anemia    stents      TUBAL LIGATION       History reviewed. No pertinent family history.  Social History     Tobacco Use    Smoking status: Current Every Day Smoker     Packs/day: 0.50     Types: Cigarettes    Smokeless tobacco: Never Used   Substance Use Topics    Alcohol use: No    Drug use: No     Review of Systems   Constitutional: Negative for fever.   Eyes: Negative for redness.   Respiratory: Negative for shortness of breath.     Cardiovascular: Negative for chest pain.   Gastrointestinal: Positive for abdominal pain.   Musculoskeletal: Positive for back pain (upper ).   Skin: Negative for rash.   Allergic/Immunologic: Positive for food allergies.   Neurological: Negative for weakness and numbness.   Hematological: Does not bruise/bleed easily.       Physical Exam     Initial Vitals [03/14/22 0121]   BP Pulse Resp Temp SpO2   (!) 139/110 80 18 98.3 °F (36.8 °C) 98 %      MAP       --         Physical Exam    Nursing note and vitals reviewed.  Constitutional: She is not diaphoretic. No distress.   Appears uncomfortable secondary to pain   HENT:   Head: Normocephalic and atraumatic.   Eyes: Right eye exhibits no discharge. Left eye exhibits no discharge.   Neck: Neck supple. No tracheal deviation present.   Cardiovascular: Normal rate and regular rhythm.   Pulmonary/Chest: Breath sounds normal. No respiratory distress.           Abdominal: Abdomen is soft. There is abdominal tenderness (worse in LUQ, also diffusely ).   Musculoskeletal:      Cervical back: Neck supple.      Comments: Right BKA     Neurological: She is alert and oriented to person, place, and time.   Skin: Skin is warm. No rash noted.   Psychiatric: She has a normal mood and affect. Her behavior is normal.         ED Course   Procedures  Labs Reviewed   CBC W/ AUTO DIFFERENTIAL - Abnormal; Notable for the following components:       Result Value    WBC 22.30 (*)     Hematocrit 36.4 (*)     MCV 79 (*)     MCH 26.0 (*)     RDW 16.9 (*)     Gran # (ANC) 16.8 (*)     Immature Grans (Abs) 0.09 (*)     Mono # 1.1 (*)     Gran % 75.5 (*)     Lymph % 17.4 (*)     All other components within normal limits   COMPREHENSIVE METABOLIC PANEL - Abnormal; Notable for the following components:    Sodium 128 (*)     Chloride 93 (*)     CO2 21 (*)     Creatinine 1.9 (*)     Albumin 3.3 (*)     Alkaline Phosphatase 142 (*)     eGFR if  33.5 (*)     eGFR if non   29.1 (*)     All other components within normal limits   CULTURE, BLOOD   CULTURE, BLOOD   TROPONIN I   LACTIC ACID, PLASMA   LIPASE   LIPASE    Narrative:     ADD ON LIPASE PER DR GUICHO BUCK/ORDER# [id number] @ 3:02AM 3/14/2022   SARS-COV-2 RDRP GENE    Narrative:     This test utilizes isothermal nucleic acid amplification   technology to detect the SARS-CoV-2 RdRp nucleic acid segment.   The analytical sensitivity (limit of detection) is 125 genome   equivalents/mL.   A POSITIVE result implies infection with the SARS-CoV-2 virus;   the patient is presumed to be contagious.     A NEGATIVE result means that SARS-CoV-2 nucleic acids are not   present above the limit of detection. A NEGATIVE result should be   treated as presumptive. It does not rule out the possibility of   COVID-19 and should not be the sole basis for treatment decisions.   If COVID-19 is strongly suspected based on clinical and exposure   history, re-testing using an alternate molecular assay should be   considered.   This test is only for use under the Food and Drug   Administration s Emergency Use Authorization (EUA).   Commercial kits are provided by [employer].   Performance characteristics of the EUA have been independently   verified by [facility] Department of   Pathology and Laboratory Medicine.   _________________________________________________________________   The authorized Fact Sheet for Healthcare Providers and the authorized Fact   Sheet for Patients of the ID NOW COVID-19 are available on the FDA   website:     https://www.fda.gov/media/593359/download  https://www.fda.gov/media/946039/download           TYPE & SCREEN     EKG Readings: (Independently Interpreted)   Initial Reading: No STEMI. Rhythm: Normal Sinus Rhythm. Heart Rate: 90. Ectopy: No Ectopy. Clinical Impression: Normal Sinus Rhythm       Imaging Results           CT Chest Abdomen Pelvis With Contrast (Final result)  Result time 03/14/22 03:39:52     Final result by Santhosh Pelaez MD (03/14/22 03:39:52)                 Impression:      Intraperitoneal free air just superior to the liver with gastro-duodenal wall thickening and hyperenhancement with surrounding free fluid.  Findings are favored to reflect a perforated gastroduodenal ulcer.  Recommend surgical consultation.    No aortic dissection or aneurysm.  Calcified and atheromatous plaque involving the abdominal aorta.    Moderate stool burden suggestive of constipation in the appropriate clinical setting.    Patchy bilateral peripheral reticulations and ground-glass attenuation, similar to prior, and favored to relate to chronic changes/atelectasis.  Correlate for superimposed infectious/noninfectious inflammatory process.    Additional findings in the body of the report.    This report was flagged in Epic as abnormal.    Findings were discussed by Aleksey Samaniego MD with Maverick Dacosta at 03:34 on3/14/2022.    Electronically signed by resident: Aleksey Samaniego  Date:    03/14/2022  Time:    02:38    Electronically signed by: Santhosh Pelaez MD  Date:    03/14/2022  Time:    03:39             Narrative:    EXAMINATION:  CT CHEST ABDOMEN PELVIS WITH CONTRAST (XPD)    CLINICAL HISTORY:  Aortic atherosclerosis;    TECHNIQUE:  Axial images of the chest, abdomen, and pelvis were acquired  after the use of 100 cc [id number] IV contrast. Oral contrast was not administered. Coronal and sagittal reconstructions were also obtained    COMPARISON:  CT abdomen pelvis 02/20/2022, 02/07/2022.    Chest radiograph 02/20/2022.    FINDINGS:  Thoracic soft tissues: Normal thyroid. Both breasts are present.    Aorta: Thoracic aorta is normal in caliber and contour with no significant calcific atherosclerosis.    Heart: Heart is mildly enlarged in size.  No pericardial effusion.  Multi-vessel calcific coronary atherosclerosis noting a stent within the LAD.    Radha/Mediastinum: No significant mediastinal, hilar, or axillary  lymphadenopathy    Lungs: Trachea and bronchi are patent.  Respiratory motion limits evaluation of lung parenchyma.  Lungs demonstrate patchy bilateral peripheral reticulations and ground-glass attenuation similar to partially imaged lung bases on recent abdominal CT.    Liver: Normal in size and contour.  Fatty infiltration at the falciform ligament.  Portal vein, splenic vein, and SMV are all patent.    Gallbladder: No calcified gallstones.    Bile Ducts: No evidence of dilated ducts.    Pancreas: No mass or peripancreatic fat stranding.    Spleen: Unremarkable.    Stomach and duodenum: There are few scattered foci of intraperitoneal free air, most prominently above the liver.  The stomach is distended with ingested material.  There is gastric and duodenal wall thickening with associated hyperenhancement.  There is intra-abdominal free fluid surrounding the stomach and duodenum.    Adrenals: No obvious lesions.    Kidneys/ Ureters: Normal in size and location. Normal enhancement.  Bilateral nonenhancing renal hypodensities, likely cysts.  No hydronephrosis or nephrolithiasis. No ureteral dilatation.    Bladder: No evidence of wall thickening.    Reproductive organs: Uterus is present.  No adnexal lesions.  Trace pelvic free fluid.    Bowel/Mesentery: No obstruction.  No inflammation.  Moderate stool burden suggestive of constipation in the appropriate clinical setting.    Lymph nodes: No lymphadenapathy.    Abdominal wall:  Unremarkable.    Vasculature: No aneurysm. There is calcified and atheromatous plaque involving the aortoiliac vasculature.  No focal occlusion.  No dissection.  Origins of the celiac access SMA and FUNMILAYO are all patent.  Left renal artery origin is patent.  There is calcific atherosclerosis with moderate narrowing of the origin of the right renal artery.    Bones: Moderate degenerative changes particularly of the spine.  No acute fracture. No suspicious osseous lesions.                                  Medications   morphine injection 2 mg (0 mg Intravenous Hold 3/14/22 0445)   metoprolol injection 5 mg (has no administration in time range)   lactated ringers infusion (has no administration in time range)   LIDOcaine (PF) 10 mg/ml (1%) injection 10 mg (has no administration in time range)   sodium chloride 0.9% flush 10 mL (has no administration in time range)   ondansetron disintegrating tablet 8 mg (has no administration in time range)   melatonin tablet 6 mg (has no administration in time range)   acetaminophen tablet 650 mg (has no administration in time range)   enoxaparin injection 40 mg (has no administration in time range)   morphine injection 4 mg (4 mg Intravenous Given 3/14/22 0201)   iohexoL (OMNIPAQUE 350) injection 100 mL (100 mLs Intravenous Given 3/14/22 0231)   sodium chloride 0.9% bolus 500 mL (0 mLs Intravenous Stopped 3/14/22 0504)   piperacillin-tazobactam 4.5 g in sodium chloride 0.9% 100 mL IVPB (ready to mix system) (0 g Intravenous Stopped 3/14/22 0434)   morphine injection 4 mg (4 mg Intravenous Given 3/14/22 0419)     Medical Decision Making:   History:   Old Medical Records: I decided to obtain old medical records.  Old Records Summarized: other records.       <> Summary of Records: Recent EGD:     - Normal esophagus.                          - Non-obstructing oozing gastric ulcer with a                          clean ulcer base (Christopher Class III) overlying the                          entire incisura. Biopsied.                          - Non-bleeding gastric ulcer with a clean ulcer                          base (Christopher Class III).                          - Normal examined duodenum.   Initial Assessment:   57-year-old female presenting with abdominal pain.  Appears uncomfortable on arrival, afebrile.  Plan for pain control, lab evaluation, CT imaging to rule out emergent pathology given her exam.  Differential Diagnosis:   Including, but not limited to:  Gastric  "ulcer  Perforated viscus  Peptic ulcer disease  ACS  Independently Interpreted Test(s):   I have ordered and independently interpreted EKG Reading(s) - see prior notes  Clinical Tests:   Lab Tests: Reviewed and Ordered  Radiological Study: Ordered and Reviewed  Medical Tests: Reviewed and Ordered  Sepsis Perfusion Assessment: "I attest a sepsis perfusion exam was performed within 6 hours of sepsis, severe sepsis, or septic shock presentation, following fluid resuscitation."  ED Management:  I discussed the CT read with Radiology, remarkable for intraperitoneal free air and likely perforated gastro duodenal ulcer.    Labs notable for hyponatremia, for which she received modest IV fluids.  Leukocytosis noted.  Covered with Zosyn.    I discussed the case with General surgery, patient will require admission for operative intervention.  Shoulder pain likely referred.  Class A to OR.   Other:   I have discussed this case with another health care provider.             ED Course as of 03/14/22 0538   Mon Mar 14, 2022   0258 WBC(!): 22.30  Leukocytosis  [AB]   0258 Sodium(!): 128  Hyponatremia  [AB]   0258 Troponin I: <0.006  Less likely ACS [AB]   0336 Radiology called - perforated gastric ulcer.   Antibiotics ordered.  Gen surg paged.  [AB]      ED Course User Index  [AB] Maverick Dacosta MD             Clinical Impression:   Final diagnoses:  [R07.9] Chest pain  [R19.8] Perforated viscus (Primary)  [D72.829] Leukocytosis, unspecified type  [E87.1] Hyponatremia          ED Disposition Condition    Admit               Maverick Dacosta MD  03/14/22 0538    "

## 2022-03-14 NOTE — ASSESSMENT & PLAN NOTE
Patient is 56 yo F with perforated viscus, likely gastric or duodenal perforated ulcer    Admit to general surgery  With patient's hx of cardiac disease (CHF 20% and CAD) will admit to SICU post operatively  To OR Class A for ex lap, possible jejunostomy and/or gastrostomy tubes  Discussed with  as well over the phone, who gave consent  IV abx  IVF  Metop 5 q6h IV at this time (on home metop)

## 2022-03-14 NOTE — HOSPITAL COURSE
3/14: POD 0 s/p ex-lap with Aron patch. Arrived extubated off sedation  3/15: NAEO. HDS without support.

## 2022-03-14 NOTE — TRANSFER OF CARE
Anesthesia Transfer of Care Note    Patient: Casie Salvador    Procedure(s) Performed: Procedure(s) (LRB):  LAPAROTOMY, EXPLORATORY, repair gastric ulcer (N/A)    Patient location: ICU    Anesthesia Type: general    Transport from OR: Continuous ECG monitoring in transport. Continuous SpO2 monitoring in transport. Transported from OR on 6-10 L/min O2 by face mask with adequate spontaneous ventilation    Post pain: adequate analgesia    Post assessment: no apparent anesthetic complications and tolerated procedure well    Post vital signs: stable    Level of consciousness: responds to stimulation    Nausea/Vomiting: no nausea/vomiting    Complications: none    Transfer of care protocol was followed      Last vitals:   Visit Vitals  BP (!) 107/57   Pulse 101   Temp 36.8 °C (98.3 °F) (Oral)   Resp 20   SpO2 99%   Breastfeeding No

## 2022-03-14 NOTE — SUBJECTIVE & OBJECTIVE
No current facility-administered medications on file prior to encounter.     Current Outpatient Medications on File Prior to Encounter   Medication Sig    amLODIPine (NORVASC) 10 MG tablet Take 10 mg by mouth once daily.    amoxicillin-clavulanate 875-125mg (AUGMENTIN) 875-125 mg per tablet SMARTSI Tablet(s) By Mouth Every 12 Hours    aspirin (ECOTRIN) 81 MG EC tablet Take 81 mg by mouth once daily.    atorvastatin (LIPITOR) 80 MG tablet Take 40 mg by mouth once daily.    ATROVENT HFA 17 mcg/actuation inhaler SMARTSI Puff(s) By Mouth Twice Daily    azithromycin (Z-ADRY) 250 MG tablet Take by mouth.    blood sugar diagnostic Strp by Misc.(Non-Drug; Combo Route) route.    busPIRone (BUSPAR) 10 MG tablet Take 10 mg by mouth 2 (two) times daily.    carisoprodoL (SOMA) 350 MG tablet Take 350 mg by mouth daily as needed.    citalopram (CELEXA) 20 MG tablet Take 20 mg by mouth once daily.    clopidogreL (PLAVIX) 75 mg tablet Take 75 mg by mouth once daily.    docusate sodium (COLACE) 100 MG capsule Take 1 capsule (100 mg total) by mouth 2 (two) times daily.    doxycycline (MONODOX) 50 MG Cap Take 100 mg by mouth every 12 (twelve) hours.    ergocalciferol (ERGOCALCIFEROL) 50,000 unit Cap Take 50,000 Units by mouth every 7 days.    ferrous sulfate (FEOSOL) 325 mg (65 mg iron) Tab tablet Take 325 mg by mouth.    folic acid-vit B6-vit B12 2.5-25-2 mg (FOLBIC OR EQUIV) 2.5-25-2 mg Tab Take 1 tablet by mouth once daily.    furosemide (LASIX) 20 MG tablet Take 20 mg by mouth 2 (two) times daily.    gabapentin (NEURONTIN) 600 MG tablet Take 600 mg by mouth 2 (two) times daily.    glipiZIDE (GLUCOTROL) 10 MG tablet Take 10 mg by mouth 2 (two) times daily before meals.    hydrALAZINE (APRESOLINE) 50 MG tablet Take 50 mg by mouth daily as needed.    hyoscyamine (ANASPAZ,LEVSIN) 0.125 mg Tab Take 1 tablet (125 mcg total) by mouth every 6 (six) hours as needed (abdominal cramping).    insulin glargine (LANTUS) 100 unit/mL  "injection Inject 50 Units into the skin every evening.    insulin needles, disposable, 31 x 3/16 " Ndle by Misc.(Non-Drug; Combo Route) route.    lancets 28 gauge Misc by Misc.(Non-Drug; Combo Route) route.    lisinopriL (PRINIVIL,ZESTRIL) 2.5 MG tablet Take 1 tablet (2.5 mg total) by mouth once daily.    loperamide (IMODIUM A-D) 2 mg Tab Take 2 mg by mouth 4 (four) times daily as needed.    losartan (COZAAR) 50 MG tablet Take 50 mg by mouth once daily.    meloxicam (MOBIC) 7.5 MG tablet Take 7.5 mg by mouth once daily.    metformin (GLUCOPHAGE) 1000 MG tablet Take 1,000 mg by mouth 2 (two) times daily with meals.    metoclopramide HCl (REGLAN) 10 MG tablet Take 10 mg by mouth 2 (two) times a day.    metoprolol tartrate (LOPRESSOR) 50 MG tablet Take 50 mg by mouth 2 (two) times daily.    montelukast (SINGULAIR) 10 mg tablet Take 10 mg by mouth every evening.    nortriptyline (PAMELOR) 50 MG capsule SMARTSI Capsule(s) By Mouth Every Evening    omeprazole (PRILOSEC) 20 MG capsule Take 20 mg by mouth once daily.    ondansetron (ZOFRAN-ODT) 4 MG TbDL Take 1 tablet (4 mg total) by mouth every 8 (eight) hours as needed.    oxyCODONE-acetaminophen (PERCOCET) 5-325 mg per tablet Take 1 tablet by mouth every 4 (four) hours as needed for Pain.    polyethylene glycol (GLYCOLAX) 17 gram/dose powder Take 17 g by mouth daily as needed.    potassium chloride (MICRO-K) 10 MEQ CpSR Take 10 mEq by mouth once daily.    promethazine (PHENERGAN) 6.25 mg/5 mL syrup TAKE 10 mls BY MOUTH EVERY 6 HOURS AS NEEDED    zolpidem (AMBIEN) 10 mg Tab Take 5 mg by mouth nightly as needed.       Review of patient's allergies indicates:   Allergen Reactions    Orange juice Hives    Tomato (solanum lycopersicum) Hives       Past Medical History:   Diagnosis Date    Coronary artery disease     Diabetes mellitus     Encounter for blood transfusion     Heart attack     Hypertension      Past Surgical History:   Procedure Laterality Date    APPENDECTOMY "      COLONOSCOPY N/A 2/23/2022    Procedure: COLONOSCOPY;  Surgeon: Estefania Bryant MD;  Location: [facility] ([address] [place] [address]);  Service: Endoscopy;  Laterality: N/A;  anemia, melena    ESOPHAGOGASTRODUODENOSCOPY N/A 2/23/2022    Procedure: EGD (ESOPHAGOGASTRODUODENOSCOPY);  Surgeon: Estefania Bryant MD;  Location: [address]);  Service: Endoscopy;  Laterality: N/A;  anemia    stents      TUBAL LIGATION       Family History    None       Tobacco Use    Smoking status: Current Every Day Smoker     Packs/day: 0.50     Types: Cigarettes    Smokeless tobacco: Never Used   Substance and Sexual Activity    Alcohol use: No    Drug use: No    Sexual activity: Not on file     Review of Systems   Constitutional:  Negative for chills and fever.   HENT:  Negative for sore throat and trouble swallowing.    Eyes:  Negative for discharge and redness.   Respiratory:  Negative for chest tightness and shortness of breath.    Cardiovascular:  Negative for chest pain.   Gastrointestinal:  Positive for abdominal distention and abdominal pain. Negative for constipation and diarrhea.   Genitourinary:  Negative for dysuria.   Musculoskeletal:  Negative for back pain and neck pain.   Skin:  Negative for color change.   Allergic/Immunologic: Negative for immunocompromised state.   Neurological:  Negative for dizziness and headaches.   Hematological:  Negative for adenopathy.   Objective:     Vital Signs (Most Recent):  Temp: 98.3 °F (36.8 °C) (03/14/22 0121)  Pulse: 93 (03/14/22 0400)  Resp: 20 (03/14/22 0419)  BP: (!) 143/66 (03/14/22 0400)  SpO2: 99 % (03/14/22 0400)   Vital Signs (24h Range):  Temp:  [98.3 °F (36.8 °C)] 98.3 °F (36.8 °C)  Pulse:  [80-95] 93  Resp:  [18-20] 20  SpO2:  [96 %-99 %] 99 %  BP: (127-143)/() 143/66        There is no height or weight on file to calculate BMI.    Physical Exam  Vitals and nursing note reviewed.   Constitutional:       General: She is in acute distress.      Appearance: She is  well-developed. She is not diaphoretic.      Comments: Patient somewhat somnolent   HENT:      Head: Normocephalic and atraumatic.   Eyes:      Pupils: Pupils are equal, round, and reactive to light.   Cardiovascular:      Rate and Rhythm: Normal rate and regular rhythm.   Pulmonary:      Effort: Pulmonary effort is normal. No respiratory distress.   Abdominal:      General: There is no distension.      Tenderness: There is abdominal tenderness. There is guarding.      Comments: Abd diffusely tender with voluntary guarding in bilateral upper quandrants   Musculoskeletal:         General: Normal range of motion.      Cervical back: Normal range of motion and neck supple.   Skin:     General: Skin is warm and dry.   Neurological:      General: No focal deficit present.      Mental Status: She is oriented to person, place, and time.   Psychiatric:         Mood and Affect: Mood normal.         Behavior: Behavior normal.         Thought Content: Thought content normal.         Judgment: Judgment normal.       Significant Labs:  CBC:   Recent Labs   Lab 03/14/22  0204   WBC 22.30*   RBC 4.62   HGB 12.0   HCT 36.4*      MCV 79*   MCH 26.0*   MCHC 33.0     CMP:   Recent Labs   Lab 03/14/22  0204      CALCIUM 9.5   ALBUMIN 3.3*   PROT 8.2   *   K 4.3   CO2 21*   CL 93*   BUN 20   CREATININE 1.9*   ALKPHOS 142*   ALT 17   AST 14   BILITOT 0.3       Significant Diagnostics:  I have reviewed all pertinent imaging results/findings within the past 24 hours.  CT with few scattered foci of free air in upper abd and free fluid noted around her stomach and duodenum

## 2022-03-14 NOTE — SUBJECTIVE & OBJECTIVE
Interval History/Significant Events:   Pt seen/examined post-op. No intraoperative events. Pt extubated off sedation on oxy-mask.    Follow-up For: Procedure(s) (LRB):  LAPAROTOMY, EXPLORATORY, repair gastric ulcer (N/A)    Post-Operative Day: Day of Surgery    Objective:     Vital Signs (Most Recent):  Temp: 99.7 °F (37.6 °C) (03/14/22 1130)  Pulse: 81 (03/14/22 1200)  Resp: (!) 21 (03/14/22 1200)  BP: (!) 103/59 (03/14/22 1200)  SpO2: (!) 93 % (03/14/22 1200)   Vital Signs (24h Range):  Temp:  [98.2 °F (36.8 °C)-99.7 °F (37.6 °C)] 99.7 °F (37.6 °C)  Pulse:  [] 81  Resp:  [14-28] 21  SpO2:  [69 %-100 %] 93 %  BP: (102-143)/() 103/59  Arterial Line BP: (105-123)/(48-56) 106/54     Weight: 84.1 kg (185 lb 6.5 oz)  Body mass index is 25.86 kg/m².      Intake/Output Summary (Last 24 hours) at 3/14/2022 1244  Last data filed at 3/14/2022 1000  Gross per 24 hour   Intake 1289.85 ml   Output 360 ml   Net 929.85 ml       Physical Exam  Constitutional:       General: She is not in acute distress.     Appearance: She is not toxic-appearing.      Comments: Somnolent   HENT:      Mouth/Throat:      Mouth: Mucous membranes are moist.   Cardiovascular:      Rate and Rhythm: Normal rate and regular rhythm.   Pulmonary:      Effort: Pulmonary effort is normal.      Breath sounds: Normal breath sounds.   Abdominal:      General: There is no distension.      Palpations: Abdomen is soft.      Comments: Midline incision w/ ABD. No obvious drainage   Musculoskeletal:      Cervical back: Neck supple.   Skin:     General: Skin is warm and dry.   Neurological:      General: No focal deficit present.       Vents:       Lines/Drains/Airways       Drain  Duration                  NG/OG Tube 03/14/22 0630 Right nostril <1 day         Urethral Catheter 03/14/22 0630 Straight-tip <1 day              Peripheral Intravenous Line  Duration                  Peripheral IV - Single Lumen 03/14/22 0145 20 G Right Antecubital <1 day          Peripheral IV - Single Lumen 03/14/22 0655 18 G Left Antecubital <1 day                    Significant Labs:    CBC/Anemia Profile:  Recent Labs   Lab 03/14/22  0204   WBC 22.30*   HGB 12.0   HCT 36.4*      MCV 79*   RDW 16.9*        Chemistries:  Recent Labs   Lab 03/14/22  0204   *   K 4.3   CL 93*   CO2 21*   BUN 20   CREATININE 1.9*   CALCIUM 9.5   ALBUMIN 3.3*   PROT 8.2   BILITOT 0.3   ALKPHOS 142*   ALT 17   AST 14       All pertinent labs within the past 24 hours have been reviewed.    Significant Imaging:  I have reviewed all pertinent imaging results/findings within the past 24 hours.  I have reviewed and interpreted all pertinent imaging results/findings within the past 24 hours.

## 2022-03-14 NOTE — PLAN OF CARE
[facility] - [facility] Intensive Care  Initial Discharge Assessment       Primary Care Provider: No primary care provider on file.    Admission Diagnosis: Hyponatremia [E87.1]  Perforated viscus [R19.8]  Chest pain [R07.9]  Leukocytosis, unspecified type [D72.829]    Admission Date: 3/14/2022  Expected Discharge Date: 3/18/2022         Payor: MEDICAID / Plan: [email or web address] [facility] / Product Type: Managed Medicaid /     Extended Emergency Contact Information  Primary Emergency Contact: Mario Salvador  Address: [address] [place] [id number] [facility]  Home Phone: [phone]  Mobile Phone: [phone]  Relation: Spouse  Secondary Emergency Contact: Leonila Perez  Mobile Phone: [phone]  Relation: Relative    Discharge Plan A: Home with family  Discharge Plan B: Home Health      [id number] - [place], [place] - [address]  [address]  [facility] [id number]  Phone: [phone] Fax: [phone]      Initial Assessment (most recent)     Adult Discharge Assessment - 03/14/22 1043        Discharge Assessment    Assessment Type Discharge Planning Assessment     Confirmed/corrected address, phone number and insurance Yes     Confirmed Demographics Correct on Facesheet     Source of Information family     Communicated SOCO with patient/caregiver Date not available/Unable to determine     Reason For Admission Perforated viscus     Lives With spouse     Do you expect to return to your current living situation? Yes     Do you have help at home or someone to help you manage your care at home? Yes     Who are your caregiver(s) and their phone number(s)? Mario Salvador [phone]     Home Layout Able to live on 1st floor     Equipment Currently Used at Home walker, rolling     Readmission within 30 days? Yes     Do you currently have service(s) that help you manage your care at home? Yes     Name and Contact number of agency The patient's   reported that he does not know the name of the company but the agency comes out three times a week.     Do you take prescription medications? Yes     Is the patient taking medications as prescribed? yes     Who is going to help you get home at discharge? Mario Salvador     How do you get to doctors appointments? family or friend will provide     Are you on dialysis? No     Do you take coumadin? No     Discharge Plan A Home with family     Discharge Plan B Home Health     DME Needed Upon Discharge  other (see comments)   TBD    Discharge Plan discussed with: Spouse/sig other     Name(s) and Number(s) Mario Salvador        Relationship/Environment    Name(s) of Who Lives With Patient Mario [place]                 Readmission Assessment (most recent)     Readmission Assessment - 03/14/22 1100        Readmission    Why were you hospitalized in the last 30 days? YES     Why were you readmitted? Alarmed about signs/symptoms                This SW met with patient's  (Mario Salvador)  bedside to complete DPA. Questions answered / contact numbers provided.  Use PREFERRED PHARMACY / BEDSIDE DELIVERY for any necessary medications at time of discharge. The patient  is independent with all ADLs - does use walker, is not on HD, BTs or home oxygen. The patient's  will be assisting with help upon discharge. The patient's  will be providing transportation home. Hospital follow up will be scheduled with PCP. Will continue to follow for course of hospitalization.     Aleena Peryr LMSW  Case Management [facility]  Ext: [phone]

## 2022-03-14 NOTE — CONSULTS
[facility] - Emergency Dept  General Surgery  Consult Note    Patient Name: Casie Salvador  MRN: [id number]  Code Status: Full Code  Admission Date: 3/14/2022  Hospital Length of Stay: 0 days  Attending Physician: Maverick Dacosta MD  Primary Care Provider: No primary care provider on file.    Patient information was obtained from patient, spouse/SO, past medical records and ER records.     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 She has been noticing the pain worsening on and off over the past 6 weeks, but attributed it to a GI bug. She denies any fevers or chills, N/V, hx of PUD, or hx of NSAID use. In ED her WBC was 22 and CT showed free air and fluid in her upper abd around her stomach and duodenum. General Surgery was consulted.    Of note, patient is somewhat somnolent.    Denies and hx of abd surgery  Has been holding her plavix still since her recent admission for a GI bleed      No current facility-administered medications on file prior to encounter.     Current Outpatient Medications on File Prior to Encounter   Medication Sig    amLODIPine (NORVASC) 10 MG tablet Take 10 mg by mouth once daily.    amoxicillin-clavulanate 875-125mg (AUGMENTIN) 875-125 mg per tablet SMARTSI Tablet(s) By Mouth Every 12 Hours    aspirin (ECOTRIN) 81 MG EC tablet Take 81 mg by mouth once daily.    atorvastatin (LIPITOR) 80 MG tablet Take 40 mg by mouth once daily.    ATROVENT HFA 17 mcg/actuation inhaler SMARTSI Puff(s) By Mouth Twice Daily    azithromycin (Z-ADRY) 250 MG tablet Take by mouth.    blood sugar diagnostic Strp  "by Misc.(Non-Drug; Combo Route) route.    busPIRone (BUSPAR) 10 MG tablet Take 10 mg by mouth 2 (two) times daily.    carisoprodoL (SOMA) 350 MG tablet Take 350 mg by mouth daily as needed.    citalopram (CELEXA) 20 MG tablet Take 20 mg by mouth once daily.    clopidogreL (PLAVIX) 75 mg tablet Take 75 mg by mouth once daily.    docusate sodium (COLACE) 100 MG capsule Take 1 capsule (100 mg total) by mouth 2 (two) times daily.    doxycycline (MONODOX) 50 MG Cap Take 100 mg by mouth every 12 (twelve) hours.    ergocalciferol (ERGOCALCIFEROL) 50,000 unit Cap Take 50,000 Units by mouth every 7 days.    ferrous sulfate (FEOSOL) 325 mg (65 mg iron) Tab tablet Take 325 mg by mouth.    folic acid-vit B6-vit B12 2.5-25-2 mg (FOLBIC OR EQUIV) 2.5-25-2 mg Tab Take 1 tablet by mouth once daily.    furosemide (LASIX) 20 MG tablet Take 20 mg by mouth 2 (two) times daily.    gabapentin (NEURONTIN) 600 MG tablet Take 600 mg by mouth 2 (two) times daily.    glipiZIDE (GLUCOTROL) 10 MG tablet Take 10 mg by mouth 2 (two) times daily before meals.    hydrALAZINE (APRESOLINE) 50 MG tablet Take 50 mg by mouth daily as needed.    hyoscyamine (ANASPAZ,LEVSIN) 0.125 mg Tab Take 1 tablet (125 mcg total) by mouth every 6 (six) hours as needed (abdominal cramping).    insulin glargine (LANTUS) 100 unit/mL injection Inject 50 Units into the skin every evening.    insulin needles, disposable, 31 x 3/16 " Ndle by Misc.(Non-Drug; Combo Route) route.    lancets 28 gauge Misc by Misc.(Non-Drug; Combo Route) route.    lisinopriL (PRINIVIL,ZESTRIL) 2.5 MG tablet Take 1 tablet (2.5 mg total) by mouth once daily.    loperamide (IMODIUM A-D) 2 mg Tab Take 2 mg by mouth 4 (four) times daily as needed.    losartan (COZAAR) 50 MG tablet Take 50 mg by mouth once daily.    meloxicam (MOBIC) 7.5 MG tablet Take 7.5 mg by mouth once daily.    metformin (GLUCOPHAGE) 1000 MG tablet Take 1,000 mg by mouth 2 (two) times daily with meals.    " metoclopramide HCl (REGLAN) 10 MG tablet Take 10 mg by mouth 2 (two) times a day.    metoprolol tartrate (LOPRESSOR) 50 MG tablet Take 50 mg by mouth 2 (two) times daily.    montelukast (SINGULAIR) 10 mg tablet Take 10 mg by mouth every evening.    nortriptyline (PAMELOR) 50 MG capsule SMARTSI Capsule(s) By Mouth Every Evening    omeprazole (PRILOSEC) 20 MG capsule Take 20 mg by mouth once daily.    ondansetron (ZOFRAN-ODT) 4 MG TbDL Take 1 tablet (4 mg total) by mouth every 8 (eight) hours as needed.    oxyCODONE-acetaminophen (PERCOCET) 5-325 mg per tablet Take 1 tablet by mouth every 4 (four) hours as needed for Pain.    polyethylene glycol (GLYCOLAX) 17 gram/dose powder Take 17 g by mouth daily as needed.    potassium chloride (MICRO-K) 10 MEQ CpSR Take 10 mEq by mouth once daily.    promethazine (PHENERGAN) 6.25 mg/5 mL syrup TAKE 10 mls BY MOUTH EVERY 6 HOURS AS NEEDED    zolpidem (AMBIEN) 10 mg Tab Take 5 mg by mouth nightly as needed.       Review of patient's allergies indicates:   Allergen Reactions    Orange juice Hives    Tomato (solanum lycopersicum) Hives       Past Medical History:   Diagnosis Date    Coronary artery disease     Diabetes mellitus     Encounter for blood transfusion     Heart attack     Hypertension      Past Surgical History:   Procedure Laterality Date    APPENDECTOMY      COLONOSCOPY N/A 2022    Procedure: COLONOSCOPY;  Surgeon: Estefania Bryant MD;  Location: [address]);  Service: Endoscopy;  Laterality: N/A;  anemia, melena    ESOPHAGOGASTRODUODENOSCOPY N/A 2022    Procedure: EGD (ESOPHAGOGASTRODUODENOSCOPY);  Surgeon: Estefania Bryant MD;  Location: [address]);  Service: Endoscopy;  Laterality: N/A;  anemia    stents      TUBAL LIGATION       Family History    None       Tobacco Use    Smoking status: Current Every Day Smoker     Packs/day: 0.50     Types: Cigarettes    Smokeless tobacco: Never Used   Substance and Sexual  Activity    Alcohol use: No    Drug use: No    Sexual activity: Not on file     Review of Systems   Constitutional:  Negative for chills and fever.   HENT:  Negative for sore throat and trouble swallowing.    Eyes:  Negative for discharge and redness.   Respiratory:  Negative for chest tightness and shortness of breath.    Cardiovascular:  Negative for chest pain.   Gastrointestinal:  Positive for abdominal distention and abdominal pain. Negative for constipation and diarrhea.   Genitourinary:  Negative for dysuria.   Musculoskeletal:  Negative for back pain and neck pain.   Skin:  Negative for color change.   Allergic/Immunologic: Negative for immunocompromised state.   Neurological:  Negative for dizziness and headaches.   Hematological:  Negative for adenopathy.   Objective:     Vital Signs (Most Recent):  Temp: 98.3 °F (36.8 °C) (03/14/22 0121)  Pulse: 93 (03/14/22 0400)  Resp: 20 (03/14/22 0419)  BP: (!) 143/66 (03/14/22 0400)  SpO2: 99 % (03/14/22 0400)   Vital Signs (24h Range):  Temp:  [98.3 °F (36.8 °C)] 98.3 °F (36.8 °C)  Pulse:  [80-95] 93  Resp:  [18-20] 20  SpO2:  [96 %-99 %] 99 %  BP: (127-143)/() 143/66        There is no height or weight on file to calculate BMI.    Physical Exam  Vitals and nursing note reviewed.   Constitutional:       General: She is in acute distress.      Appearance: She is well-developed. She is not diaphoretic.      Comments: Patient somewhat somnolent   HENT:      Head: Normocephalic and atraumatic.   Eyes:      Pupils: Pupils are equal, round, and reactive to light.   Cardiovascular:      Rate and Rhythm: Normal rate and regular rhythm.   Pulmonary:      Effort: Pulmonary effort is normal. No respiratory distress.   Abdominal:      General: There is no distension.      Tenderness: There is abdominal tenderness. There is guarding.      Comments: Abd diffusely tender with voluntary guarding in bilateral upper quandrants   Musculoskeletal:         General: Normal range  of motion.      Cervical back: Normal range of motion and neck supple.   Skin:     General: Skin is warm and dry.   Neurological:      General: No focal deficit present.      Mental Status: She is oriented to person, place, and time.   Psychiatric:         Mood and Affect: Mood normal.         Behavior: Behavior normal.         Thought Content: Thought content normal.         Judgment: Judgment normal.       Significant Labs:  CBC:   Recent Labs   Lab 03/14/22  0204   WBC 22.30*   RBC 4.62   HGB 12.0   HCT 36.4*      MCV 79*   MCH 26.0*   MCHC 33.0     CMP:   Recent Labs   Lab 03/14/22  0204      CALCIUM 9.5   ALBUMIN 3.3*   PROT 8.2   *   K 4.3   CO2 21*   CL 93*   BUN 20   CREATININE 1.9*   ALKPHOS 142*   ALT 17   AST 14   BILITOT 0.3       Significant Diagnostics:  I have reviewed all pertinent imaging results/findings within the past 24 hours.  CT with few scattered foci of free air in upper abd and free fluid noted around her stomach and duodenum      Assessment/Plan:     * Perforated viscus  Patient is 58 yo F with perforated viscus, likely gastric or duodenal perforated ulcer    Admit to general surgery  With patient's hx of cardiac disease (CHF 20% and CAD) will admit to SICU post operatively  To OR Class A for ex lap, possible jejunostomy and/or gastrostomy tubes  Discussed with  as well over the phone, who gave consent  IV abx  IVF  Metop 5 q6h IV at this time (on home metop)      VTE Risk Mitigation (From admission, onward)         Ordered     enoxaparin injection 40 mg  Daily         03/14/22 0505     IP VTE HIGH RISK PATIENT  Once         03/14/22 0505     Place sequential compression device  Until discontinued         03/14/22 0505     Place TITO hose  Until discontinued         03/14/22 0505                Thank you for your consult. I will follow-up with patient. Please contact us if you have any additional questions.    Ab Bang MD  General Surgery  [facility]  Emergency Dept

## 2022-03-14 NOTE — PLAN OF CARE
Problem: Adult Inpatient Plan of Care  Goal: Plan of Care Review  Outcome: Ongoing, Progressing  Goal: Patient-Specific Goal (Individualized)  Outcome: Ongoing, Progressing  Goal: Absence of Hospital-Acquired Illness or Injury  Outcome: Ongoing, Progressing  Intervention: Identify and Manage Fall Risk  Flowsheets (Taken 3/14/2022 7420)  Safety Promotion/Fall Prevention:   assistive device/personal item within reach   bed alarm set   side rails raised x 2  Goal: Optimal Comfort and Wellbeing  Outcome: Ongoing, Progressing  Goal: Readiness for Transition of Care  Outcome: Ongoing, Progressing     Problem: Infection  Goal: Absence of Infection Signs and Symptoms  Outcome: Ongoing, Progressing     Problem: Diabetes Comorbidity  Goal: Blood Glucose Level Within Targeted Range  Outcome: Ongoing, Progressing     Problem: Fall Injury Risk  Goal: Absence of Fall and Fall-Related Injury  Outcome: Ongoing, Progressing     Problem: Skin Injury Risk Increased  Goal: Skin Health and Integrity  Outcome: Ongoing, Progressing

## 2022-03-14 NOTE — ANESTHESIA PREPROCEDURE EVALUATION
[facility]  Anesthesia Pre-Operative Evaluation         Patient Name: Casie Salvador  YOB: 1965  MRN: [id number]    SUBJECTIVE:     Pre-operative evaluation for Procedure(s) (LRB):  LAPAROTOMY, EXPLORATORY (N/A)     2022    Casie Salvador is a 57 y.o. female w/ a significant PMHx of HTN, IDDM, CAD (s/p PCI), PVD (s/p right BKA), CHF (EF 20%).  Pt presented with abdominal pain, nausea/vomiting, with concern for perforated viscus.  NPO since 8 PM.    Patient now presents for the above procedure(s).      LDA:        Peripheral IV - Single Lumen 22 0145 20 G Right Antecubital (Active)   Site Assessment Clean;Dry;Intact;No redness;No swelling 22 0145   Line Status Blood return noted;Flushed 22 0145   Number of days: 0       Prev airway: None documented.    Drips:    lactated ringers         Patient Active Problem List   Diagnosis    Anemia    Hypertension    CAD (coronary artery disease)    DM (diabetes mellitus), type 2    Chronic pain    Constipation due to opioid therapy    Depression    Insomnia    Leukocytosis    Chronic systolic congestive heart failure    Perforated viscus       Review of patient's allergies indicates:   Allergen Reactions    Orange juice Hives    Tomato (solanum lycopersicum) Hives       Current Inpatient Medications:   enoxaparin  40 mg Subcutaneous Daily    LIDOcaine (PF) 10 mg/ml (1%)  1 mL Other Once    metoprolol  5 mg Intravenous Q6H    morphine  2 mg Intravenous ED 1 Time       No current facility-administered medications on file prior to encounter.     Current Outpatient Medications on File Prior to Encounter   Medication Sig Dispense Refill    amLODIPine (NORVASC) 10 MG tablet Take 10 mg by mouth once daily.      amoxicillin-clavulanate 875-125mg (AUGMENTIN) 875-125 mg per tablet SMARTSI Tablet(s) By Mouth Every 12 Hours      aspirin (ECOTRIN) 81 MG EC tablet Take 81 mg by mouth once daily.    "   atorvastatin (LIPITOR) 80 MG tablet Take 40 mg by mouth once daily.      ATROVENT HFA 17 mcg/actuation inhaler SMARTSI Puff(s) By Mouth Twice Daily      azithromycin (Z-ADRY) 250 MG tablet Take by mouth.      blood sugar diagnostic Strp by Misc.(Non-Drug; Combo Route) route.      busPIRone (BUSPAR) 10 MG tablet Take 10 mg by mouth 2 (two) times daily.      carisoprodoL (SOMA) 350 MG tablet Take 350 mg by mouth daily as needed.      citalopram (CELEXA) 20 MG tablet Take 20 mg by mouth once daily.      clopidogreL (PLAVIX) 75 mg tablet Take 75 mg by mouth once daily.      docusate sodium (COLACE) 100 MG capsule Take 1 capsule (100 mg total) by mouth 2 (two) times daily. 60 capsule 0    doxycycline (MONODOX) 50 MG Cap Take 100 mg by mouth every 12 (twelve) hours.      ergocalciferol (ERGOCALCIFEROL) 50,000 unit Cap Take 50,000 Units by mouth every 7 days.      ferrous sulfate (FEOSOL) 325 mg (65 mg iron) Tab tablet Take 325 mg by mouth.      folic acid-vit B6-vit B12 2.5-25-2 mg (FOLBIC OR EQUIV) 2.5-25-2 mg Tab Take 1 tablet by mouth once daily. 90 tablet 3    furosemide (LASIX) 20 MG tablet Take 20 mg by mouth 2 (two) times daily.      gabapentin (NEURONTIN) 600 MG tablet Take 600 mg by mouth 2 (two) times daily.      glipiZIDE (GLUCOTROL) 10 MG tablet Take 10 mg by mouth 2 (two) times daily before meals.      hydrALAZINE (APRESOLINE) 50 MG tablet Take 50 mg by mouth daily as needed.      hyoscyamine (ANASPAZ,LEVSIN) 0.125 mg Tab Take 1 tablet (125 mcg total) by mouth every 6 (six) hours as needed (abdominal cramping). 30 tablet 0    insulin glargine (LANTUS) 100 unit/mL injection Inject 50 Units into the skin every evening.      insulin needles, disposable, 31 x 3/16 " Ndle by Misc.(Non-Drug; Combo Route) route.      lancets 28 gauge Misc by Misc.(Non-Drug; Combo Route) route.      lisinopriL (PRINIVIL,ZESTRIL) 2.5 MG tablet Take 1 tablet (2.5 mg total) by mouth once daily. 90 tablet 3    " loperamide (IMODIUM A-D) 2 mg Tab Take 2 mg by mouth 4 (four) times daily as needed.      losartan (COZAAR) 50 MG tablet Take 50 mg by mouth once daily.      meloxicam (MOBIC) 7.5 MG tablet Take 7.5 mg by mouth once daily.      metformin (GLUCOPHAGE) 1000 MG tablet Take 1,000 mg by mouth 2 (two) times daily with meals.      metoclopramide HCl (REGLAN) 10 MG tablet Take 10 mg by mouth 2 (two) times a day.      metoprolol tartrate (LOPRESSOR) 50 MG tablet Take 50 mg by mouth 2 (two) times daily.      montelukast (SINGULAIR) 10 mg tablet Take 10 mg by mouth every evening.      nortriptyline (PAMELOR) 50 MG capsule SMARTSI Capsule(s) By Mouth Every Evening      omeprazole (PRILOSEC) 20 MG capsule Take 20 mg by mouth once daily.      ondansetron (ZOFRAN-ODT) 4 MG TbDL Take 1 tablet (4 mg total) by mouth every 8 (eight) hours as needed. 20 tablet 0    oxyCODONE-acetaminophen (PERCOCET) 5-325 mg per tablet Take 1 tablet by mouth every 4 (four) hours as needed for Pain. 6 tablet 0    polyethylene glycol (GLYCOLAX) 17 gram/dose powder Take 17 g by mouth daily as needed. 119 g 0    potassium chloride (MICRO-K) 10 MEQ CpSR Take 10 mEq by mouth once daily.      promethazine (PHENERGAN) 6.25 mg/5 mL syrup TAKE 10 mls BY MOUTH EVERY 6 HOURS AS NEEDED      zolpidem (AMBIEN) 10 mg Tab Take 5 mg by mouth nightly as needed.         Past Surgical History:   Procedure Laterality Date    APPENDECTOMY      COLONOSCOPY N/A 2022    Procedure: COLONOSCOPY;  Surgeon: Estefania Bryant MD;  Location: [facility] ([address] [place] [address]);  Service: Endoscopy;  Laterality: N/A;  anemia, melena    ESOPHAGOGASTRODUODENOSCOPY N/A 2022    Procedure: EGD (ESOPHAGOGASTRODUODENOSCOPY);  Surgeon: Estefania Bryant MD;  [address]  Service: Endoscopy;  Laterality: N/A;  anemia    stents      TUBAL LIGATION         Social History     Socioeconomic History    Marital status:    Tobacco Use    Smoking status:  Current Every Day Smoker     Packs/day: 0.50     Types: Cigarettes    Smokeless tobacco: Never Used   Substance and Sexual Activity    Alcohol use: No    Drug use: No       OBJECTIVE:     Vital Signs Range (Last 24H):  Temp:  [36.8 °C (98.3 °F)]   Pulse:  [80-95]   Resp:  [18-20]   BP: (127-143)/()   SpO2:  [96 %-99 %]       Significant Labs:  Lab Results   Component Value Date    WBC 22.30 (H) 03/14/2022    HGB 12.0 03/14/2022    HCT 36.4 (L) 03/14/2022     03/14/2022    ALT 17 03/14/2022    AST 14 03/14/2022     (L) 03/14/2022    K 4.3 03/14/2022    CL 93 (L) 03/14/2022    CREATININE 1.9 (H) 03/14/2022    BUN 20 03/14/2022    CO2 21 (L) 03/14/2022    TSH 2.899 02/20/2022    INR 1.0 02/20/2022    HGBA1C 5.8 (H) 02/20/2022       Diagnostic Studies: No relevant studies.    EKG:   Results for orders placed or performed during the hospital encounter of 02/20/22   EKG 12-lead    Collection Time: 02/21/22  7:37 AM    Narrative    Test Reason : R07.9,    Vent. Rate : 075 BPM     Atrial Rate : 075 BPM     P-R Int : 180 ms          QRS Dur : 102 ms      QT Int : 422 ms       P-R-T Axes : 057 -19 039 degrees     QTc Int : 471 ms    Normal sinus rhythm  Minimal voltage criteria for LVH, may be normal variant ( Jeffry product )  Nonspecific T wave abnormality  Prolonged QT  Abnormal ECG  When compared with ECG of 20-FEB-2022 04:00,  T wave inversion no longer evident in Inferior leads  Nonspecific T wave abnormality, worse in Anterior leads  Nonspecific T wave abnormality has replaced inverted T waves in Lateral  leads  Confirmed by Willie RAZA MD ([id number]) on 2/21/2022 10:23:20 AM    Referred By: [email or web address]   SELF           Confirmed By:Willie RAZA MD       2D ECHO:  TTE:  Results for orders placed or performed during the hospital encounter of 02/20/22   Echo Saline Bubble? No   Result Value Ref Range    Ascending aorta 2.71 cm    STJ 2.73 cm    AV mean gradient 3 mmHg    Ao peak henna 1.05 m/s    Ao VTI 21.67  cm    IVS 1.31 (A) 0.6 - 1.1 cm    LA size 3.99 cm    Left Atrium Major Axis 5.74 cm    Left Atrium Minor Axis 5.31 cm    LVIDd 4.99 3.5 - 6.0 cm    LVIDs 4.14 (A) 2.1 - 4.0 cm    LVOT diameter 1.97 cm    LVOT peak VTI 13.81 cm    Posterior Wall 0.76 0.6 - 1.1 cm    MV Peak A Danny 0.94 m/s    E wave deceleration time 187.15 msec    MV Peak E Danny 1.01 m/s    RA Major Axis 4.81 cm    RA Width 3.63 cm    RVDD 3.90 cm    Sinus 2.98 cm    TAPSE 2.66 cm    TDI LATERAL 0.06 m/s    TDI SEPTAL 0.05 m/s    LA WIDTH 3.93 cm    MV stenosis pressure 1/2 time 54.27 ms    LV Diastolic Volume 117.71 mL    LV Systolic Volume 75.80 mL    LVOT peak danny 0.70 m/s    LV LATERAL E/E' RATIO 16.83 m/s    LV SEPTAL E/E' RATIO 20.20 m/s    FS 17 %    LA volume 73.53 cm3    LV mass 190.00 g    Left Ventricle Relative Wall Thickness 0.30 cm    AV valve area 1.94 cm2    AV Velocity Ratio 0.67     AV index (prosthetic) 0.64     MV valve area p 1/2 method 4.05 cm2    E/A ratio 1.07     Mean e' 0.06 m/s    LVOT area 3.0 cm2    LVOT stroke volume 42.07 cm3    AV peak gradient 4 mmHg    E/E' ratio 18.36 m/s    LV Systolic Volume Index 37.2 mL/m2    LV Diastolic Volume Index 57.70 mL/m2    LA Volume Index 36.0 mL/m2    LV Mass Index 93 g/m2    BSA 2.06 m2    Right Atrial Pressure (from IVC) 15 mmHg    EF 20 %    RV S' 10 cm/s    Narrative    · The left ventricle is normal in size with severely decreased systolic   function. The estimated ejection fraction is 20%.  · Normal right ventricular size with normal right ventricular systolic   function.  · Grade II left ventricular diastolic dysfunction.  · Mild left atrial enlargement.  · Elevated central venous pressure (15 mmHg).          FABIO:  No results found for this or any previous visit.    ASSESSMENT/PLAN:                                                                                                                  03/14/2022  Casie Salvador is a 57 y.o., female.      Pre-op Assessment    I  have reviewed the Patient Summary Reports.     I have reviewed the Nursing Notes. I have reviewed the NPO Status.   I have reviewed the Medications.     Review of Systems  Anesthesia Hx:  No problems with previous Anesthesia  Neg history of prior surgery. Denies Family Hx of Anesthesia complications.    Social:  Smoker    Hematology/Oncology:  Hematology Normal   Oncology Normal     EENT/Dental:EENT/Dental Normal   Cardiovascular:   Hypertension CAD   CHF    Pulmonary:  Pulmonary Normal  Denies COPD.  Denies Asthma.    Renal/:  Renal/ Normal  Denies Chronic Renal Disease.     Hepatic/GI:  Hepatic/GI Normal Denies Liver Disease.    Musculoskeletal:  Musculoskeletal Normal    Neurological:  Neurology Normal  Denies CVA. Denies Seizures.    Endocrine:   Diabetes, type 2, using insulin    Psych:   Psychiatric History depression          Physical Exam  General: Lethargic    Airway:  Mallampati: III   Mouth Opening: Normal  TM Distance: Normal  Tongue: Normal  Neck ROM: Normal ROM    Dental:  Partial Dentures, Periodontal disease    Heart:  Rate: Tachycardia  Rhythm: Regular Rhythm    Abdomen:  Tenderness        Anesthesia Plan  Type of Anesthesia, risks & benefits discussed:    Anesthesia Type: Gen ETT  Intra-op Monitoring Plan: Standard ASA Monitors and Art Line  Post Op Pain Control Plan: multimodal analgesia  Induction:  IV  Airway Plan: , Post-Induction  Informed Consent: Informed consent signed with the Patient representative and all parties understand the risks and agree with anesthesia plan.  All questions answered.   ASA Score: 4 Emergent  Day of Surgery Review of History & Physical: H&P Update referred to the surgeon/provider.    Ready For Surgery From Anesthesia Perspective.     .

## 2022-03-14 NOTE — ED TRIAGE NOTES
Pt brought to ED via EMs from home. Pt c/o severe LUQ pain that radiates to her back. Pt states that pain started earlier in the evening and has gotten worse. Pt describes pain as stabbing pain and rates as 10 out of 10. Pt denies N/V/D and denies blood in stool.

## 2022-03-14 NOTE — ASSESSMENT & PLAN NOTE
57 y.o. female with PMHx of  HTN, CAD s/p stents to LAD and OM2 (on DAPT), HFrEF (20%), PAD s/p L TMA and R BKA (on AC), previous GI bleed 2/2022 with known ulcers that presented to [facility] for abdominal pain. Imaging significant for intraperitoneal free air. Patient taken to the OR for Class A ex-lap. Pt now s/p ex-lap arrived extubated off sedation      Neuro/Psych:   -- Sedation: None  -- Pain: PRN dilaudid, tylenol             Cards:   -- H/o CAD s/p stent   - Holding ASA/Plavix until 3/15   - Metoprolol q6h  -- HFrEF EF 20%. Does not appear volume overloaded at this time   - CTM I/O  -- PVD   - ASA/Plavix as above      Pulm:   -- COPD   - home albuterol  -- Goal O2 sat > 90%   - Wean as able   - IS  -- Tobacco use   - Can add nicotine patch if needed      Renal:  -- Keep garcia for strict I/O  -- BUN/Cr stable      FEN / GI:   -- Protonix IV  -- Strict NPO 2/2 UGIB  -- Replace lytes as needed  -- Nutrition: Strict NPO      ID:   -- Tm: afebrile; WBC 22. Expected given pathology/surgery  -- Abx Zosyn x4d + Fluconazole x1      Heme/Onc:   -- H/H stable 12/36  -- Daily CBC      Endo:   -- Gluc goal 140-180  -- SSI      PPx:   Feeding: NPO  Analgesia/Sedation: Robaxin/Tylenol/Dilaudid, No sedation  Thromboembolic prevention: Lovenox starting 3/14  HOB >30: Y  Stress Ulcer ppx: Protonix  Glucose control: Critical care goal 140-180 g/dl, ISS    Lines/Drains/Airway: PIV x2, Garcia     Dispo/Code Status/Palliative:   -- SICU / Full Code

## 2022-03-14 NOTE — ANESTHESIA PROCEDURE NOTES
Intubation    Date/Time: 3/14/2022 6:26 AM  Performed by: Lizzy Manjarrez CRNA  Authorized by: Jean Whittington MD     Intubation:     Induction:  Intravenous    Intubated:  Postinduction    Mask Ventilation:  N/a    Attempts:  1    Attempted By:  Student    Method of Intubation:  Direct    Blade:  Whitt 2    Laryngeal View Grade: Grade I - full view of cords      Difficult Airway Encountered?: No      Complications:  None    Airway Device:  Oral endotracheal tube    Airway Device Size:  7.0    Style/Cuff Inflation:  Cuffed (inflated to minimal occlusive pressure)    Tube secured:  22    Secured at:  The lips    Placement Verified By:  Capnometry    Complicating Factors:  None    Findings Post-Intubation:  BS equal bilateral and atraumatic/condition of teeth unchanged

## 2022-03-14 NOTE — PROGRESS NOTES
[facility]  Critical Care - Surgery  Progress Note    Patient Name: Casie aSlvador  MRN: [id number]  Admission Date: 3/14/2022  Hospital Length of Stay: 0 days  Code Status: Full Code  Attending Provider: Lennox Smith MD  Primary Care Provider: No primary care provider on file.   Principal Problem: Perforated viscus    Subjective:     Hospital/ICU Course:  3/14: POD 0 s/p ex-lap with Aron patch. Arrived extubated off sedation      Interval History/Significant Events:   Pt seen/examined post-op. No intraoperative events. Pt extubated off sedation on oxy-mask.    Follow-up For: Procedure(s) (LRB):  LAPAROTOMY, EXPLORATORY, repair gastric ulcer (N/A)    Post-Operative Day: Day of Surgery    Objective:     Vital Signs (Most Recent):  Temp: 99.7 °F (37.6 °C) (03/14/22 1130)  Pulse: 81 (03/14/22 1200)  Resp: (!) 21 (03/14/22 1200)  BP: (!) 103/59 (03/14/22 1200)  SpO2: (!) 93 % (03/14/22 1200)   Vital Signs (24h Range):  Temp:  [98.2 °F (36.8 °C)-99.7 °F (37.6 °C)] 99.7 °F (37.6 °C)  Pulse:  [] 81  Resp:  [14-28] 21  SpO2:  [69 %-100 %] 93 %  BP: (102-143)/() 103/59  Arterial Line BP: (105-123)/(48-56) 106/54     Weight: 84.1 kg (185 lb 6.5 oz)  Body mass index is 25.86 kg/m².      Intake/Output Summary (Last 24 hours) at 3/14/2022 1244  Last data filed at 3/14/2022 1000  Gross per 24 hour   Intake 1289.85 ml   Output 360 ml   Net 929.85 ml       Physical Exam  Constitutional:       General: She is not in acute distress.     Appearance: She is not toxic-appearing.      Comments: Somnolent   HENT:      Mouth/Throat:      Mouth: Mucous membranes are moist.   Cardiovascular:      Rate and Rhythm: Normal rate and regular rhythm.   Pulmonary:      Effort: Pulmonary effort is normal.      Breath sounds: Normal breath sounds.   Abdominal:      General: There is no distension.      Palpations: Abdomen is soft.      Comments: Midline incision w/ ABD. No obvious drainage    Musculoskeletal:      Cervical back: Neck supple.   Skin:     General: Skin is warm and dry.   Neurological:      General: No focal deficit present.       Vents:       Lines/Drains/Airways       Drain  Duration                  NG/OG Tube 03/14/22 0630 Right nostril <1 day         Urethral Catheter 03/14/22 0630 Straight-tip <1 day              Peripheral Intravenous Line  Duration                  Peripheral IV - Single Lumen 03/14/22 0145 20 G Right Antecubital <1 day         Peripheral IV - Single Lumen 03/14/22 0655 18 G Left Antecubital <1 day                    Significant Labs:    CBC/Anemia Profile:  Recent Labs   Lab 03/14/22  0204   WBC 22.30*   HGB 12.0   HCT 36.4*      MCV 79*   RDW 16.9*        Chemistries:  Recent Labs   Lab 03/14/22  0204   *   K 4.3   CL 93*   CO2 21*   BUN 20   CREATININE 1.9*   CALCIUM 9.5   ALBUMIN 3.3*   PROT 8.2   BILITOT 0.3   ALKPHOS 142*   ALT 17   AST 14       All pertinent labs within the past 24 hours have been reviewed.    Significant Imaging:  I have reviewed all pertinent imaging results/findings within the past 24 hours.  I have reviewed and interpreted all pertinent imaging results/findings within the past 24 hours.    Assessment/Plan:     * Perforated viscus  57 y.o. female with PMHx of  HTN, CAD s/p stents to LAD and OM2 (on DAPT), HFrEF (20%), PAD s/p L TMA and R BKA (on AC), previous GI bleed 2/2022 with known ulcers that presented to [facility] for abdominal pain. Imaging significant for intraperitoneal free air. Patient taken to the OR for Class A ex-lap. Pt now s/p ex-lap arrived extubated off sedation      Neuro/Psych:   -- Sedation: None  -- Pain: PRN dilaudid, tylenol             Cards:   -- H/o CAD s/p stent   - Holding ASA/Plavix until 3/15   - Metoprolol q6h  -- HFrEF EF 20%. Does not appear volume overloaded at this time   - CTM I/O  -- PVD   - ASA/Plavix as above      Pulm:   -- COPD   - home albuterol  -- Goal O2 sat > 90%   - Wean as able    - IS  -- Tobacco use   - Can add nicotine patch if needed      Renal:  -- Keep garcia for strict I/O  -- BUN/Cr stable      FEN / GI:   -- Protonix IV  -- Strict NPO 2/2 UGIB  -- Replace lytes as needed  -- Nutrition: Strict NPO      ID:   -- Tm: afebrile; WBC 22. Expected given pathology/surgery  -- Abx Zosyn x4d + Fluconazole x1      Heme/Onc:   -- H/H stable 12/36  -- Daily CBC      Endo:   -- Gluc goal 140-180  -- SSI      PPx:   Feeding: NPO  Analgesia/Sedation: Robaxin/Tylenol/Dilaudid, No sedation  Thromboembolic prevention: Lovenox starting 3/14  HOB >30: Y  Stress Ulcer ppx: Protonix  Glucose control: Critical care goal 140-180 g/dl, ISS    Lines/Drains/Airway: PIV x2, Garcia     Dispo/Code Status/Palliative:   -- SICU / Full Code            Critical care was time spent personally by me on the following activities: development of treatment plan with patient or surrogate and bedside caregivers, discussions with consultants, evaluation of patient's response to treatment, examination of patient, ordering and performing treatments and interventions, ordering and review of laboratory studies, ordering and review of radiographic studies, pulse oximetry, re-evaluation of patient's condition.  This critical care time did not overlap with that of any other provider or involve time for any procedures.     Jatinder Shirley MD  Critical Care - Surgery  [facility]

## 2022-03-14 NOTE — HPI
Casie Salvador is a 57 y.o. female with PMHx of  HTN, CAD s/p stents to LAD and OM2 (on DAPT), HFrEF (20%), PAD s/p L TMA and R BKA (on AC), previous GI bleed 2/2022 with known ulcers that presented to [facility] for abdominal pain. Imaging significant for intraperitoneal free air. Patient taken to the OR for Class A ex-lap.    Per pt's , pt has had 3+ heart attacks over the past 10 years requiring PCI. Most recent was 5 years ago. States she has 4 or 5 stents. Amputations were around 3 years ago. Fills her medication as prescribed.

## 2022-03-14 NOTE — HPI
Patient is a 56 yo f with hx of CAD (hx of stents a few years ago), CHF (EF 20%), tobacco abuse, HTN, and right BKA (2/2 diabetes) who presented to ED with acute worsening of her abd pain. She has been noticing the pain worsening on and off over the past 6 weeks, but attributed it to a GI bug. She denies any fevers or chills, N/V, hx of PUD, or hx of NSAID use. In ED her WBC was 22 and CT showed free air and fluid in her upper abd around her stomach and duodenum. General Surgery was consulted.    Of note, patient is somewhat somnolent.    Denies and hx of abd surgery  Has been holding her plavix still since her recent admission for a GI bleed

## 2022-03-15 LAB
ANION GAP SERPL CALC-SCNC: 12 MMOL/L (ref 8–16)
BASOPHILS # BLD AUTO: 0.08 K/UL (ref 0–0.2)
BASOPHILS NFR BLD: 0.2 % (ref 0–1.9)
BUN SERPL-MCNC: 26 MG/DL (ref 6–20)
CALCIUM SERPL-MCNC: 9 MG/DL (ref 8.7–10.5)
CHLORIDE SERPL-SCNC: 95 MMOL/L (ref 95–110)
CO2 SERPL-SCNC: 22 MMOL/L (ref 23–29)
CREAT SERPL-MCNC: 2 MG/DL (ref 0.5–1.4)
DIFFERENTIAL METHOD: ABNORMAL
EOSINOPHIL # BLD AUTO: 0.1 K/UL (ref 0–0.5)
EOSINOPHIL NFR BLD: 0.1 % (ref 0–8)
ERYTHROCYTE [DISTWIDTH] IN BLOOD BY AUTOMATED COUNT: 17.3 % (ref 11.5–14.5)
EST. GFR  (AFRICAN AMERICAN): 31.5 ML/MIN/1.73 M^2
EST. GFR  (NON AFRICAN AMERICAN): 27.3 ML/MIN/1.73 M^2
GLUCOSE SERPL-MCNC: 80 MG/DL (ref 70–110)
HCT VFR BLD AUTO: 29.4 % (ref 37–48.5)
HGB BLD-MCNC: 9.5 G/DL (ref 12–16)
IMM GRANULOCYTES # BLD AUTO: 0.29 K/UL (ref 0–0.04)
IMM GRANULOCYTES NFR BLD AUTO: 0.8 % (ref 0–0.5)
LYMPHOCYTES # BLD AUTO: 1.8 K/UL (ref 1–4.8)
LYMPHOCYTES NFR BLD: 5 % (ref 18–48)
MAGNESIUM SERPL-MCNC: 1.8 MG/DL (ref 1.6–2.6)
MCH RBC QN AUTO: 25.4 PG (ref 27–31)
MCHC RBC AUTO-ENTMCNC: 32.3 G/DL (ref 32–36)
MCV RBC AUTO: 79 FL (ref 82–98)
MONOCYTES # BLD AUTO: 1.1 K/UL (ref 0.3–1)
MONOCYTES NFR BLD: 3.1 % (ref 4–15)
NEUTROPHILS # BLD AUTO: 32.9 K/UL (ref 1.8–7.7)
NEUTROPHILS NFR BLD: 90.8 % (ref 38–73)
NRBC BLD-RTO: 0 /100 WBC
PLATELET # BLD AUTO: 330 K/UL (ref 150–450)
PMV BLD AUTO: 11.1 FL (ref 9.2–12.9)
POCT GLUCOSE: 107 MG/DL (ref 70–110)
POCT GLUCOSE: 114 MG/DL (ref 70–110)
POCT GLUCOSE: 56 MG/DL (ref 70–110)
POCT GLUCOSE: 67 MG/DL (ref 70–110)
POCT GLUCOSE: 70 MG/DL (ref 70–110)
POCT GLUCOSE: 77 MG/DL (ref 70–110)
POCT GLUCOSE: 84 MG/DL (ref 70–110)
POCT GLUCOSE: 86 MG/DL (ref 70–110)
POCT GLUCOSE: 92 MG/DL (ref 70–110)
POCT GLUCOSE: 95 MG/DL (ref 70–110)
POCT GLUCOSE: 99 MG/DL (ref 70–110)
POTASSIUM SERPL-SCNC: 4.7 MMOL/L (ref 3.5–5.1)
RBC # BLD AUTO: 3.74 M/UL (ref 4–5.4)
SODIUM SERPL-SCNC: 129 MMOL/L (ref 136–145)
WBC # BLD AUTO: 36.23 K/UL (ref 3.9–12.7)

## 2022-03-15 PROCEDURE — 63600175 PHARM REV CODE 636 W HCPCS

## 2022-03-15 PROCEDURE — 63600175 PHARM REV CODE 636 W HCPCS: Performed by: STUDENT IN AN ORGANIZED HEALTH CARE EDUCATION/TRAINING PROGRAM

## 2022-03-15 PROCEDURE — 25000003 PHARM REV CODE 250: Performed by: STUDENT IN AN ORGANIZED HEALTH CARE EDUCATION/TRAINING PROGRAM

## 2022-03-15 PROCEDURE — 20000000 HC ICU ROOM

## 2022-03-15 PROCEDURE — 94640 AIRWAY INHALATION TREATMENT: CPT

## 2022-03-15 PROCEDURE — 63600175 PHARM REV CODE 636 W HCPCS: Performed by: SURGERY

## 2022-03-15 PROCEDURE — 80048 BASIC METABOLIC PNL TOTAL CA: CPT | Performed by: STUDENT IN AN ORGANIZED HEALTH CARE EDUCATION/TRAINING PROGRAM

## 2022-03-15 PROCEDURE — 99233 SBSQ HOSP IP/OBS HIGH 50: CPT | Mod: 24,,, | Performed by: STUDENT IN AN ORGANIZED HEALTH CARE EDUCATION/TRAINING PROGRAM

## 2022-03-15 PROCEDURE — 99900035 HC TECH TIME PER 15 MIN (STAT)

## 2022-03-15 PROCEDURE — 83735 ASSAY OF MAGNESIUM: CPT | Performed by: STUDENT IN AN ORGANIZED HEALTH CARE EDUCATION/TRAINING PROGRAM

## 2022-03-15 PROCEDURE — C9113 INJ PANTOPRAZOLE SODIUM, VIA: HCPCS | Performed by: STUDENT IN AN ORGANIZED HEALTH CARE EDUCATION/TRAINING PROGRAM

## 2022-03-15 PROCEDURE — 25000003 PHARM REV CODE 250: Performed by: SURGERY

## 2022-03-15 PROCEDURE — 27000221 HC OXYGEN, UP TO 24 HOURS

## 2022-03-15 PROCEDURE — 94761 N-INVAS EAR/PLS OXIMETRY MLT: CPT

## 2022-03-15 PROCEDURE — 99233 PR SUBSEQUENT HOSPITAL CARE,LEVL III: ICD-10-PCS | Mod: 24,,, | Performed by: STUDENT IN AN ORGANIZED HEALTH CARE EDUCATION/TRAINING PROGRAM

## 2022-03-15 PROCEDURE — 85025 COMPLETE CBC W/AUTO DIFF WBC: CPT | Performed by: STUDENT IN AN ORGANIZED HEALTH CARE EDUCATION/TRAINING PROGRAM

## 2022-03-15 RX ORDER — MAGNESIUM SULFATE HEPTAHYDRATE 40 MG/ML
2 INJECTION, SOLUTION INTRAVENOUS ONCE
Status: COMPLETED | OUTPATIENT
Start: 2022-03-15 | End: 2022-03-15

## 2022-03-15 RX ORDER — HEPARIN SODIUM 5000 [USP'U]/ML
5000 INJECTION, SOLUTION INTRAVENOUS; SUBCUTANEOUS EVERY 8 HOURS
Status: DISCONTINUED | OUTPATIENT
Start: 2022-03-15 | End: 2022-03-15

## 2022-03-15 RX ORDER — HYDROMORPHONE HYDROCHLORIDE 1 MG/ML
0.2 INJECTION, SOLUTION INTRAMUSCULAR; INTRAVENOUS; SUBCUTANEOUS EVERY 4 HOURS PRN
Status: DISCONTINUED | OUTPATIENT
Start: 2022-03-15 | End: 2022-03-16

## 2022-03-15 RX ORDER — ACETAMINOPHEN 650 MG/1
650 SUPPOSITORY RECTAL EVERY 6 HOURS PRN
Status: DISCONTINUED | OUTPATIENT
Start: 2022-03-15 | End: 2022-03-16

## 2022-03-15 RX ORDER — ACETAMINOPHEN 10 MG/ML
1000 INJECTION, SOLUTION INTRAVENOUS ONCE
Status: COMPLETED | OUTPATIENT
Start: 2022-03-15 | End: 2022-03-15

## 2022-03-15 RX ORDER — HEPARIN SODIUM 5000 [USP'U]/ML
5000 INJECTION, SOLUTION INTRAVENOUS; SUBCUTANEOUS EVERY 8 HOURS
Status: DISCONTINUED | OUTPATIENT
Start: 2022-03-15 | End: 2022-03-22

## 2022-03-15 RX ORDER — FUROSEMIDE 10 MG/ML
40 INJECTION INTRAMUSCULAR; INTRAVENOUS ONCE
Status: COMPLETED | OUTPATIENT
Start: 2022-03-15 | End: 2022-03-15

## 2022-03-15 RX ADMIN — HYDROMORPHONE HYDROCHLORIDE 0.2 MG: 1 INJECTION, SOLUTION INTRAMUSCULAR; INTRAVENOUS; SUBCUTANEOUS at 06:03

## 2022-03-15 RX ADMIN — IPRATROPIUM BROMIDE 2 PUFF: 17 AEROSOL, METERED RESPIRATORY (INHALATION) at 07:03

## 2022-03-15 RX ADMIN — PIPERACILLIN AND TAZOBACTAM 4.5 G: 4; .5 INJECTION, POWDER, LYOPHILIZED, FOR SOLUTION INTRAVENOUS; PARENTERAL at 04:03

## 2022-03-15 RX ADMIN — METHOCARBAMOL 500 MG: 100 INJECTION, SOLUTION INTRAMUSCULAR; INTRAVENOUS at 05:03

## 2022-03-15 RX ADMIN — HYDROMORPHONE HYDROCHLORIDE 0.5 MG: 1 INJECTION, SOLUTION INTRAMUSCULAR; INTRAVENOUS; SUBCUTANEOUS at 08:03

## 2022-03-15 RX ADMIN — ACETAMINOPHEN 1000 MG: 10 INJECTION, SOLUTION INTRAVENOUS at 10:03

## 2022-03-15 RX ADMIN — PANTOPRAZOLE SODIUM 40 MG: 40 INJECTION, POWDER, FOR SOLUTION INTRAVENOUS at 09:03

## 2022-03-15 RX ADMIN — PIPERACILLIN AND TAZOBACTAM 4.5 G: 4; .5 INJECTION, POWDER, LYOPHILIZED, FOR SOLUTION INTRAVENOUS; PARENTERAL at 01:03

## 2022-03-15 RX ADMIN — HYDROMORPHONE HYDROCHLORIDE 0.2 MG: 1 INJECTION, SOLUTION INTRAMUSCULAR; INTRAVENOUS; SUBCUTANEOUS at 09:03

## 2022-03-15 RX ADMIN — MAGNESIUM SULFATE IN WATER 2 G: 40 INJECTION, SOLUTION INTRAVENOUS at 05:03

## 2022-03-15 RX ADMIN — METHOCARBAMOL 500 MG: 100 INJECTION, SOLUTION INTRAMUSCULAR; INTRAVENOUS at 02:03

## 2022-03-15 RX ADMIN — DEXTROSE 125 ML: 10 SOLUTION INTRAVENOUS at 08:03

## 2022-03-15 RX ADMIN — HEPARIN SODIUM 5000 UNITS: 5000 INJECTION INTRAVENOUS; SUBCUTANEOUS at 02:03

## 2022-03-15 RX ADMIN — MUPIROCIN: 20 OINTMENT TOPICAL at 09:03

## 2022-03-15 RX ADMIN — HEPARIN SODIUM 5000 UNITS: 5000 INJECTION INTRAVENOUS; SUBCUTANEOUS at 09:03

## 2022-03-15 RX ADMIN — METOROPROLOL TARTRATE 5 MG: 5 INJECTION, SOLUTION INTRAVENOUS at 05:03

## 2022-03-15 RX ADMIN — PANTOPRAZOLE SODIUM 40 MG: 40 INJECTION, POWDER, FOR SOLUTION INTRAVENOUS at 08:03

## 2022-03-15 RX ADMIN — DEXTROSE 125 ML: 10 SOLUTION INTRAVENOUS at 12:03

## 2022-03-15 RX ADMIN — PIPERACILLIN AND TAZOBACTAM 4.5 G: 4; .5 INJECTION, POWDER, LYOPHILIZED, FOR SOLUTION INTRAVENOUS; PARENTERAL at 09:03

## 2022-03-15 RX ADMIN — HYDROMORPHONE HYDROCHLORIDE 0.5 MG: 1 INJECTION, SOLUTION INTRAMUSCULAR; INTRAVENOUS; SUBCUTANEOUS at 04:03

## 2022-03-15 RX ADMIN — MUPIROCIN: 20 OINTMENT TOPICAL at 08:03

## 2022-03-15 RX ADMIN — FUROSEMIDE 40 MG: 10 INJECTION, SOLUTION INTRAMUSCULAR; INTRAVENOUS at 10:03

## 2022-03-15 NOTE — ASSESSMENT & PLAN NOTE
57 y.o. female with PMHx of  HTN, CAD s/p stents to LAD and OM2 (on DAPT), HFrEF (20%), PAD s/p L TMA and R BKA (on AC), previous GI bleed 2/2022 with known ulcers that presented to [facility] for abdominal pain. Imaging significant for intraperitoneal free air. Patient taken to the OR for Class A ex-lap. Pt now s/p ex-lap w/ Aron patch arrived extubated off sedation. HDS.      Neuro/Psych:   -- Sedation: None  -- Pain: Robaxin, PRN dilaudid, fentanyl             Cards:  HDS  -- H/o CAD s/p stent   - Holding ASA/Plavix. Will discuss starting 3/15   - Metoprolol q6h  -- HFrEF EF 20%. Does not appear volume overloaded at this time   - CTM I/O  -- PVD   - ASA/Plavix as above      Pulm:   -- COPD   - home albuterol  -- Goal O2 sat > 90%. Maintaining on room air.   - IS  -- Tobacco use   - Can add nicotine patch if needed      Renal:  -- BRENDA: Stable  -- Keep garcia for strict I/O  -- BUN/Cr 26/2 from 24/1.9. Arrive w/ Cr ~1.3      FEN / GI:   -- Protonix IV  -- Strict NPO 2/2 UGIB  -- Replace lytes as needed      ID:   -- Tm: afebrile; WBC 22 -> 36. Expect some of this is post-op + hemoconcentration  -- Abx Zosyn x4d + Fluconazole x1  -- Daily CBC      Heme/Onc:   -- H/H 9.5/29 from 12/36. No signs of active bleeding  -- Daily CBC      Endo:   -- Gluc goal 140-180  -- SSI      PPx:   Feeding: NPO  Analgesia/Sedation: Robaxin//Dilaudid, No sedation  Thromboembolic prevention: Lovenox starting 3/14  HOB >30: Y  Stress Ulcer ppx: Protonix  Glucose control: Critical care goal 140-180 g/dl, ISS    Lines/Drains/Airway: PIV x2, Garcia, NGT     Dispo/Code Status/Palliative:   -- Will discuss step down with primary team / Full Code

## 2022-03-15 NOTE — ANESTHESIA POSTPROCEDURE EVALUATION
Anesthesia Post Evaluation    Patient: Casie Salvador    Procedure(s) Performed: Procedure(s) (LRB):  LAPAROTOMY, EXPLORATORY, repair gastric ulcer (N/A)    Final Anesthesia Type: general      Patient location during evaluation: PACU  Patient participation: Yes- Able to Participate  Level of consciousness: awake  Post-procedure vital signs: reviewed and stable  Pain management: adequate  Airway patency: patent    PONV status at discharge: No PONV  Anesthetic complications: no      Cardiovascular status: blood pressure returned to baseline  Respiratory status: unassisted  Hydration status: euvolemic  Follow-up not needed.          Vitals Value Taken Time   /58 03/15/22 1201   Temp 37.3 °C (99.1 °F) 03/15/22 1101   Pulse 86 03/15/22 1232   Resp 28 03/15/22 1232   SpO2 96 % 03/15/22 1232   Vitals shown include unvalidated device data.      No case tracking events are documented in the log.      Pain/Basil Score: Pain Rating Prior to Med Admin: 0 (3/15/2022 10:23 AM)  Pain Rating Post Med Admin: 0 (3/14/2022  9:10 AM)

## 2022-03-15 NOTE — ASSESSMENT & PLAN NOTE
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 Clinically stable    - NPO  - NGT to LIWS  - Metop 5 q6h IV at this time (on home metop)  - Continue empiric antimicrobials (zosyn, fluc)  - Recommend mIVF while NPO, gentle hydration for elevated Cr   - Dc Barron garcia art line  - PT/OT  - OOB, ambulate  - RT, IS, inhalation treatments, wean O2 as tolerated  - Remainder of care per SICU, appreciate assistance    Dispo: okay for stepdown to floor

## 2022-03-15 NOTE — PLAN OF CARE
Problem: Adult Inpatient Plan of Care  Goal: Plan of Care Review  Outcome: Ongoing, Progressing  Goal: Patient-Specific Goal (Individualized)  Outcome: Ongoing, Progressing  Goal: Absence of Hospital-Acquired Illness or Injury  Outcome: Ongoing, Progressing  Intervention: Identify and Manage Fall Risk  Flowsheets (Taken 3/15/2022 1855)  Safety Promotion/Fall Prevention:   assistive device/personal item within reach   bed alarm set   side rails raised x 2   room near unit station   pulse ox   lighting adjusted  Intervention: Prevent Skin Injury  Flowsheets (Taken 3/15/2022 1855)  Skin Protection:   adhesive use limited   skin-to-device areas padded   skin-to-skin areas padded   tubing/devices free from skin contact  Intervention: Prevent and Manage VTE (Venous Thromboembolism) Risk  Flowsheets (Taken 3/15/2022 1855)  Activity Management: Rolling - L1  VTE Prevention/Management:   remove, assess skin, and reapply sequential compression device   bleeding precations maintained   bleeding risk assessed  Range of Motion: active ROM (range of motion) encouraged  Intervention: Prevent Infection  Flowsheets (Taken 3/15/2022 1855)  Infection Prevention:   cohorting utilized   environmental surveillance performed   equipment surfaces disinfected   hand hygiene promoted   personal protective equipment utilized   rest/sleep promoted  Goal: Optimal Comfort and Wellbeing  Outcome: Ongoing, Progressing  Intervention: Monitor Pain and Promote Comfort  Flowsheets (Taken 3/15/2022 1855)  Pain Management Interventions:   around-the-clock dosing utilized   awakened for pain meds per patient request   biofeedback utilized   breathing exercises utilized   care clustered  Intervention: Provide Person-Centered Care  Flowsheets (Taken 3/15/2022 1855)  Trust Relationship/Rapport:   care explained   reassurance provided   choices provided   emotional support provided   empathic listening provided   questions answered   questions  encouraged  Goal: Readiness for Transition of Care  Outcome: Ongoing, Progressing     Problem: Infection  Goal: Absence of Infection Signs and Symptoms  Outcome: Ongoing, Progressing  Intervention: Prevent or Manage Infection  Flowsheets (Taken 3/15/2022 1855)  Fever Reduction/Comfort Measures:   lightweight bedding   lightweight clothing  Infection Management: aseptic technique maintained     Problem: Diabetes Comorbidity  Goal: Blood Glucose Level Within Targeted Range  Outcome: Ongoing, Progressing     Problem: Fall Injury Risk  Goal: Absence of Fall and Fall-Related Injury  Outcome: Ongoing, Progressing  Intervention: Identify and Manage Contributors  Flowsheets (Taken 3/15/2022 1855)  Self-Care Promotion:   independence encouraged   BADL personal routines maintained   BADL personal objects within reach   safe use of adaptive equipment encouraged  Medication Review/Management: medications reviewed     Problem: Skin Injury Risk Increased  Goal: Skin Health and Integrity  Outcome: Ongoing, Progressing  Intervention: Optimize Skin Protection  Flowsheets (Taken 3/15/2022 1855)  Pressure Reduction Techniques:   sit time limited to 2 hours   weight shift assistance provided   frequent weight shift encouraged  Skin Protection:   adhesive use limited   skin-to-device areas padded   skin-to-skin areas padded   tubing/devices free from skin contact  Head of Bed (HOB) Positioning: HOB at 30-45 degrees

## 2022-03-15 NOTE — PROGRESS NOTES
[facility]  General Surgery  Progress Note    Subjective:     History of Present Illness:  Patient is a 56 yo f with hx of CAD (hx of stents a few years ago), CHF (EF 20%), tobacco abuse, HTN, and right BKA (2/2 diabetes) who presented to ED with acute worsening of her abd pain. She has been noticing the pain worsening on and off over the past 6 weeks, but attributed it to a GI bug. She denies any fevers or chills, N/V, hx of PUD, or hx of NSAID use. In ED her WBC was 22 and CT showed free air and fluid in her upper abd around her stomach and duodenum. General Surgery was consulted.    Of note, patient is somewhat somnolent.    Denies and hx of abd surgery  Has been holding her plavix still since her recent admission for a GI bleed      Post-Op Info:  Procedure(s) (LRB):  LAPAROTOMY, EXPLORATORY, repair gastric ulcer (N/A)   1 Day Post-Op     Interval History: NAEON. Afebrile. HDS. Pain is controlled. Reprots cough. 765cc UOP in 24 hours. 350cc otu NGT. No pressors.     Medications:  Continuous Infusions:   lactated ringers 100 mL/hr at 03/15/22 0500     Scheduled Meds:   enoxaparin  40 mg Subcutaneous Daily    ipratropium  2 puff Inhalation BID    LIDOcaine (PF) 10 mg/ml (1%)  1 mL Other Once    methocarbamol (ROBAXIN) IVPB  500 mg Intravenous Q8H    metoprolol  5 mg Intravenous Q6H    mupirocin   Nasal BID    pantoprazole  40 mg Intravenous BID    piperacillin-tazobactam (ZOSYN) IVPB  4.5 g Intravenous Q8H     PRN Meds:sodium chloride 0.9%, acetaminophen, dextrose 10%, glucagon (human recombinant), HYDROmorphone, insulin aspart U-100, ondansetron, ondansetron, sodium chloride 0.9%     Review of patient's allergies indicates:   Allergen Reactions    Orange juice Hives    Tomato (solanum lycopersicum) Hives     Objective:     Vital Signs (Most Recent):  Temp: 99.3 °F (37.4 °C) (03/15/22 0300)  Pulse: 73 (03/15/22 0645)  Resp: 15 (03/15/22 0645)  BP: (!) 111/58 (03/15/22  0630)  SpO2: 96 % (03/15/22 0645)   Vital Signs (24h Range):  Temp:  [98.2 °F (36.8 °C)-99.7 °F (37.6 °C)] 99.3 °F (37.4 °C)  Pulse:  [73-92] 73  Resp:  [14-33] 15  SpO2:  [69 %-100 %] 96 %  BP: (102-126)/(55-81) 111/58  Arterial Line BP: (105-168)/(45-76) 113/45     Weight: 77 kg (169 lb 12.1 oz)  Body mass index is 23.68 kg/m².    Intake/Output - Last 3 Shifts         03/13 0700  03/14 0659 03/14 0700  03/15 0659 03/15 0700  03/16 0659    I.V. (mL/kg)  1558.2 (20.2)     IV Piggyback  1986.9     Total Intake(mL/kg)  3545.2 (46)     Urine (mL/kg/hr) 250 785 (0.4)     Drains  350     Total Output 250 1135     Net -250 +2410.2                    Physical Exam  Vitals and nursing note reviewed.   Constitutional:       General: She is not in acute distress.     Appearance: She is not diaphoretic.      Comments: 2L O2 via NC  NGT to LIWS   HENT:      Head: Normocephalic and atraumatic.      Mouth/Throat:      Mouth: Mucous membranes are moist.      Pharynx: Oropharynx is clear.   Eyes:      Extraocular Movements: Extraocular movements intact.      Conjunctiva/sclera: Conjunctivae normal.   Cardiovascular:      Rate and Rhythm: Normal rate.   Pulmonary:      Effort: Pulmonary effort is normal. No respiratory distress.   Abdominal:      General: There is no distension.      Palpations: Abdomen is soft.      Tenderness: There is no guarding or rebound.      Comments: Midline incision covered with island dressing. No active bleeding or drainage noted. Appropriately TTP   Musculoskeletal:         General: Deformity present.   Skin:     General: Skin is warm and dry.   Neurological:      Mental Status: She is alert and oriented to person, place, and time.       Significant Labs:  I have reviewed all pertinent lab results within the past 24 hours.  CBC:   Recent Labs   Lab 03/15/22  0240   WBC 36.23*   RBC 3.74*   HGB 9.5*   HCT 29.4*      MCV 79*   MCH 25.4*   MCHC 32.3     CMP:   Recent Labs   Lab 03/14/22  0200  03/14/22  1644 03/15/22  0240      < > 80   CALCIUM 9.5   < > 9.0   ALBUMIN 3.3*  --   --    PROT 8.2  --   --    *   < > 129*   K 4.3   < > 4.7   CO2 21*   < > 22*   CL 93*   < > 95   BUN 20   < > 26*   CREATININE 1.9*   < > 2.0*   ALKPHOS 142*  --   --    ALT 17  --   --    AST 14  --   --    BILITOT 0.3  --   --     < > = values in this interval not displayed.       Significant Diagnostics:  I have reviewed all pertinent imaging results/findings within the past 24 hours.    Assessment/Plan:     * Perforated viscus  Patient is 56 yo F with h/o CAD (hx of stents a few years ago), CHF (EF 20%), tobacco abuse, HTN, and right BKA (2/2 diabetes) presenting with  perforated viscus s/p ex lap for gastric ulcer repair 3/14. Clinically stable    - NPO  - NGT to LIWS  - Metop 5 q6h IV at this time (on home metop)  - Continue empiric antimicrobials (zosyn, fluc)  - Recommend mIVF while NPO, gentle hydration for elevated Cr   - Dc garcia, Dc art line  - Strict I&Os  - PRN pain and nausea control  - BID PPI  - PT/OT  - OOB, ambulate  - RT, IS, inhalation treatments, wean O2 as tolerated  - Remainder of care per SICU, appreciate assistance    Dispo: okay for stepdown to floor        Arnaud Dyer PAClintC  General Surgery  [facility]

## 2022-03-15 NOTE — PROGRESS NOTES
[facility]  Critical Care - Surgery  Progress Note    Patient Name: Casie Salvador  MRN: [id number]  Admission Date: 3/14/2022  Hospital Length of Stay: 1 days  Code Status: Full Code  Attending Provider: Lennox Smith MD  Primary Care Provider: No primary care provider on file.   Principal Problem: Perforated viscus    Subjective:     Hospital/ICU Course:  3/14: POD 0 s/p ex-lap with Aron patch. Arrived extubated off sedation  3/15: NAEO. HDS without support.       Interval History/Significant Events:   NAEO. Pt with no complaints. Rates pain 4-5/10.     Denies fevers, chills, nausea, vomiting, chest pain, shortness of breath    Follow-up For: Procedure(s) (LRB):  LAPAROTOMY, EXPLORATORY, repair gastric ulcer (N/A)    Post-Operative Day: 1 Day Post-Op    Objective:     Vital Signs (Most Recent):  Temp: 99.3 °F (37.4 °C) (03/15/22 0300)  Pulse: 81 (03/15/22 0400)  Resp: 16 (03/15/22 0400)  BP: 117/60 (03/15/22 0400)  SpO2: 96 % (03/15/22 0400) Vital Signs (24h Range):  Temp:  [98.2 °F (36.8 °C)-99.7 °F (37.6 °C)] 99.3 °F (37.4 °C)  Pulse:  [] 81  Resp:  [14-33] 16  SpO2:  [69 %-100 %] 96 %  BP: (102-130)/(55-81) 117/60  Arterial Line BP: (105-168)/(48-76) 120/51     Weight: 77 kg (169 lb 12.1 oz)  Body mass index is 23.68 kg/m².      Intake/Output Summary (Last 24 hours) at 3/15/2022 0427  Last data filed at 3/15/2022 0300  Gross per 24 hour   Intake 3185.07 ml   Output 1170 ml   Net 2015.07 ml       Physical Exam  Constitutional:       General: She is not in acute distress.  Eyes:      Extraocular Movements: Extraocular movements intact.      Pupils: Pupils are equal, round, and reactive to light.   Cardiovascular:      Rate and Rhythm: Normal rate and regular rhythm.   Pulmonary:      Effort: Pulmonary effort is normal.   Abdominal:      General: Abdomen is flat.      Palpations: Abdomen is soft.      Comments: Midline incision without evidence of drainage  NGT to PARVEEN    Musculoskeletal:         General: Normal range of motion.      Cervical back: Normal range of motion.   Skin:     General: Skin is warm and dry.   Neurological:      General: No focal deficit present.      Mental Status: She is alert and oriented to person, place, and time.   Psychiatric:         Mood and Affect: Mood normal.       Lines/Drains/Airways       Drain  Duration                  NG/OG Tube 03/14/22 0630 Right nostril <1 day         Urethral Catheter 03/14/22 0630 Straight-tip <1 day              Peripheral Intravenous Line  Duration                  Peripheral IV - Single Lumen 03/14/22 0145 20 G Right Antecubital 1 day         Peripheral IV - Single Lumen 03/14/22 0655 18 G Left Antecubital <1 day                    Significant Labs:    CBC/Anemia Profile:  Recent Labs   Lab 03/14/22  0204 03/15/22  0240   WBC 22.30* 36.23*   HGB 12.0 9.5*   HCT 36.4* 29.4*    330   MCV 79* 79*   RDW 16.9* 17.3*        Chemistries:  Recent Labs   Lab 03/14/22  0204 03/14/22  1644 03/15/22  0240   * 128* 129*   K 4.3 4.9 4.7   CL 93* 96 95   CO2 21* 22* 22*   BUN 20 24* 26*   CREATININE 1.9* 1.9* 2.0*   CALCIUM 9.5 9.0 9.0   ALBUMIN 3.3*  --   --    PROT 8.2  --   --    BILITOT 0.3  --   --    ALKPHOS 142*  --   --    ALT 17  --   --    AST 14  --   --    MG  --  1.8 1.8   PHOS  --  3.9  --        All pertinent labs within the past 24 hours have been reviewed.    Significant Imaging:  I have reviewed all pertinent imaging results/findings within the past 24 hours.  I have reviewed and interpreted all pertinent imaging results/findings within the past 24 hours.    Assessment/Plan:     * Perforated viscus  57 y.o. female with PMHx of  HTN, CAD s/p stents to LAD and OM2 (on DAPT), HFrEF (20%), PAD s/p L TMA and R BKA (on AC), previous GI bleed 2/2022 with known ulcers that presented to [facility] [place] for abdominal pain. Imaging significant for intraperitoneal free air. Patient taken to the OR for Class A ex-lap. Pt now  s/p ex-lap w/ Aron patch arrived extubated off sedation. HDS.      Neuro/Psych:   -- Sedation: None  -- Pain: Robaxin, PRN dilaudid, fentanyl             Cards:  HDS  -- H/o CAD s/p stent   - Holding ASA/Plavix. Will discuss starting 3/15   - Metoprolol q6h  -- HFrEF EF 20%. Does not appear volume overloaded at this time   - CTM I/O  -- PVD   - ASA/Plavix as above      Pulm:   -- COPD   - home albuterol  -- Goal O2 sat > 90%. Maintaining on room air.   - IS  -- Tobacco use   - Can add nicotine patch if needed      Renal:  -- BRENDA: Stable  -- Keep garcia for strict I/O  -- BUN/Cr 26/2 from 24/1.9. Arrive w/ Cr ~1.3      FEN / GI:   -- Protonix IV  -- Strict NPO 2/2 UGIB  -- Replace lytes as needed      ID:   -- Tm: afebrile; WBC 22 -> 36. Expect some of this is post-op + hemoconcentration  -- Abx Zosyn x4d + Fluconazole x1  -- Daily CBC      Heme/Onc:   -- H/H 9.5/29 from 12/36. No signs of active bleeding  -- Daily CBC      Endo:   -- Gluc goal 140-180  -- SSI      PPx:   Feeding: NPO  Analgesia/Sedation: Robaxin//Dilaudid, No sedation  Thromboembolic prevention: Lovenox starting 3/14  HOB >30: Y  Stress Ulcer ppx: Protonix  Glucose control: Critical care goal 140-180 g/dl, ISS    Lines/Drains/Airway: Mayda Mcdonald NGT     Dispo/Code Status/Palliative:   -- Will discuss step down with primary team / Full Code         Critical care was time spent personally by me on the following activities: development of treatment plan with patient or surrogate and bedside caregivers, discussions with consultants, evaluation of patient's response to treatment, examination of patient, ordering and performing treatments and interventions, ordering and review of laboratory studies, ordering and review of radiographic studies, pulse oximetry, re-evaluation of patient's condition.  This critical care time did not overlap with that of any other provider or involve time for any procedures.     Jatinder Shirley MD  Critical  Care - Surgery  [facility] - [facility] Intensive Care

## 2022-03-15 NOTE — PT/OT/SLP PROGRESS
Occupational Therapy      Patient Name:  Casie Salvador   MRN:  [id number]    Patient not seen today. RN hold. Pt just received dilaudid and is unable to actively participate in therapy at this time. Will follow up 3/16    3/15/2022

## 2022-03-15 NOTE — PLAN OF CARE
Tmax 99.3f. NSR rate 70-80s. MAPs maintained >65 mmHg. Soft pressures when asleep, team aware. SBP maintained < 180 mmHg. SpO2 maintained  92% on 2L NC. Mild hypoglycemia this shift, D10 bolus administered with adequate effect. MIVF maintained. Lytes replaced PRN. Pain management per orders. AAO x 4 and follows commands. Strict NPO. NGT output 350 cc/shift. [place]  cc/shift, average 40 cc/hr. I/O net positive 980 cc this shift. Daily labs. Accuchecks Q4. Healed skin breakdown noted to folds. No current skin breakdown noted. Turned Q2. POC reviewed with pt. Questions and concerns addressed.

## 2022-03-15 NOTE — PT/OT/SLP PROGRESS
Physical Therapy      Patient Name:  Casie Salvador   MRN:  [id number]    Patient not seen today. RN hold. Pt just received dilaudid and is unable to actively participate in therapy at this time due to affect on cognition. Will follow-up when appropriate.

## 2022-03-15 NOTE — SUBJECTIVE & OBJECTIVE
Interval History: NAEON. Afebrile. HDS. Pain is controlled. Reprots cough. 765cc UOP in 24 hours. 350cc otu NGT. No pressors.     Medications:  Continuous Infusions:   lactated ringers 100 mL/hr at 03/15/22 0500     Scheduled Meds:   enoxaparin  40 mg Subcutaneous Daily    ipratropium  2 puff Inhalation BID    LIDOcaine (PF) 10 mg/ml (1%)  1 mL Other Once    methocarbamol (ROBAXIN) IVPB  500 mg Intravenous Q8H    metoprolol  5 mg Intravenous Q6H    mupirocin   Nasal BID    pantoprazole  40 mg Intravenous BID    piperacillin-tazobactam (ZOSYN) IVPB  4.5 g Intravenous Q8H     PRN Meds:sodium chloride 0.9%, acetaminophen, dextrose 10%, glucagon (human recombinant), HYDROmorphone, insulin aspart U-100, ondansetron, ondansetron, sodium chloride 0.9%     Review of patient's allergies indicates:   Allergen Reactions    Orange juice Hives    Tomato (solanum lycopersicum) Hives     Objective:     Vital Signs (Most Recent):  Temp: 99.3 °F (37.4 °C) (03/15/22 0300)  Pulse: 73 (03/15/22 0645)  Resp: 15 (03/15/22 0645)  BP: (!) 111/58 (03/15/22 0630)  SpO2: 96 % (03/15/22 0645)   Vital Signs (24h Range):  Temp:  [98.2 °F (36.8 °C)-99.7 °F (37.6 °C)] 99.3 °F (37.4 °C)  Pulse:  [73-92] 73  Resp:  [14-33] 15  SpO2:  [69 %-100 %] 96 %  BP: (102-126)/(55-81) 111/58  Arterial Line BP: (105-168)/(45-76) 113/45     Weight: 77 kg (169 lb 12.1 oz)  Body mass index is 23.68 kg/m².    Intake/Output - Last 3 Shifts         03/13 0700  03/14 0659 03/14 0700  03/15 0659 03/15 0700  03/16 0659    I.V. (mL/kg)  1558.2 (20.2)     IV Piggyback  1986.9     Total Intake(mL/kg)  3545.2 (46)     Urine (mL/kg/hr) 250 785 (0.4)     Drains  350     Total Output 250 1135     Net -250 +2410.2                    Physical Exam  Vitals and nursing note reviewed.   Constitutional:       General: She is not in acute distress.     Appearance: She is not diaphoretic.      Comments: 2L O2 via NC  NGT to LIWS   HENT:      Head: Normocephalic and atraumatic.       Mouth/Throat:      Mouth: Mucous membranes are moist.      Pharynx: Oropharynx is clear.   Eyes:      Extraocular Movements: Extraocular movements intact.      Conjunctiva/sclera: Conjunctivae normal.   Cardiovascular:      Rate and Rhythm: Normal rate.   Pulmonary:      Effort: Pulmonary effort is normal. No respiratory distress.   Abdominal:      General: There is no distension.      Palpations: Abdomen is soft.      Tenderness: There is no guarding or rebound.      Comments: Midline incision covered with island dressing. No active bleeding or drainage noted. Appropriately TTP   Musculoskeletal:         General: Deformity present.   Skin:     General: Skin is warm and dry.   Neurological:      Mental Status: She is alert and oriented to person, place, and time.       Significant Labs:  I have reviewed all pertinent lab results within the past 24 hours.  CBC:   Recent Labs   Lab 03/15/22  0240   WBC 36.23*   RBC 3.74*   HGB 9.5*   HCT 29.4*      MCV 79*   MCH 25.4*   MCHC 32.3     CMP:   Recent Labs   Lab 03/14/22  0204 03/14/22  1644 03/15/22  0240      < > 80   CALCIUM 9.5   < > 9.0   ALBUMIN 3.3*  --   --    PROT 8.2  --   --    *   < > 129*   K 4.3   < > 4.7   CO2 21*   < > 22*   CL 93*   < > 95   BUN 20   < > 26*   CREATININE 1.9*   < > 2.0*   ALKPHOS 142*  --   --    ALT 17  --   --    AST 14  --   --    BILITOT 0.3  --   --     < > = values in this interval not displayed.       Significant Diagnostics:  I have reviewed all pertinent imaging results/findings within the past 24 hours.

## 2022-03-15 NOTE — SUBJECTIVE & OBJECTIVE
Interval History/Significant Events:   NAEO. Pt with no complaints. Rates pain 4-5/10.     Denies fevers, chills, nausea, vomiting, chest pain, shortness of breath    Follow-up For: Procedure(s) (LRB):  LAPAROTOMY, EXPLORATORY, repair gastric ulcer (N/A)    Post-Operative Day: 1 Day Post-Op    Objective:     Vital Signs (Most Recent):  Temp: 99.3 °F (37.4 °C) (03/15/22 0300)  Pulse: 81 (03/15/22 0400)  Resp: 16 (03/15/22 0400)  BP: 117/60 (03/15/22 0400)  SpO2: 96 % (03/15/22 0400) Vital Signs (24h Range):  Temp:  [98.2 °F (36.8 °C)-99.7 °F (37.6 °C)] 99.3 °F (37.4 °C)  Pulse:  [] 81  Resp:  [14-33] 16  SpO2:  [69 %-100 %] 96 %  BP: (102-130)/(55-81) 117/60  Arterial Line BP: (105-168)/(48-76) 120/51     Weight: 77 kg (169 lb 12.1 oz)  Body mass index is 23.68 kg/m².      Intake/Output Summary (Last 24 hours) at 3/15/2022 0427  Last data filed at 3/15/2022 0300  Gross per 24 hour   Intake 3185.07 ml   Output 1170 ml   Net 2015.07 ml       Physical Exam  Constitutional:       General: She is not in acute distress.  Eyes:      Extraocular Movements: Extraocular movements intact.      Pupils: Pupils are equal, round, and reactive to light.   Cardiovascular:      Rate and Rhythm: Normal rate and regular rhythm.   Pulmonary:      Effort: Pulmonary effort is normal.   Abdominal:      General: Abdomen is flat.      Palpations: Abdomen is soft.      Comments: Midline incision without evidence of drainage  NGT to LIWS   Musculoskeletal:         General: Normal range of motion.      Cervical back: Normal range of motion.   Skin:     General: Skin is warm and dry.   Neurological:      General: No focal deficit present.      Mental Status: She is alert and oriented to person, place, and time.   Psychiatric:         Mood and Affect: Mood normal.       Lines/Drains/Airways       Drain  Duration                  NG/OG Tube 03/14/22 0630 Right nostril <1 day         Urethral Catheter 03/14/22 0630 Straight-tip <1 day               Peripheral Intravenous Line  Duration                  Peripheral IV - Single Lumen 03/14/22 0145 20 G Right Antecubital 1 day         Peripheral IV - Single Lumen 03/14/22 0655 18 G Left Antecubital <1 day                    Significant Labs:    CBC/Anemia Profile:  Recent Labs   Lab 03/14/22  0204 03/15/22  0240   WBC 22.30* 36.23*   HGB 12.0 9.5*   HCT 36.4* 29.4*    330   MCV 79* 79*   RDW 16.9* 17.3*        Chemistries:  Recent Labs   Lab 03/14/22  0204 03/14/22  1644 03/15/22  0240   * 128* 129*   K 4.3 4.9 4.7   CL 93* 96 95   CO2 21* 22* 22*   BUN 20 24* 26*   CREATININE 1.9* 1.9* 2.0*   CALCIUM 9.5 9.0 9.0   ALBUMIN 3.3*  --   --    PROT 8.2  --   --    BILITOT 0.3  --   --    ALKPHOS 142*  --   --    ALT 17  --   --    AST 14  --   --    MG  --  1.8 1.8   PHOS  --  3.9  --        All pertinent labs within the past 24 hours have been reviewed.    Significant Imaging:  I have reviewed all pertinent imaging results/findings within the past 24 hours.  I have reviewed and interpreted all pertinent imaging results/findings within the past 24 hours.

## 2022-03-16 LAB
ANION GAP SERPL CALC-SCNC: 14 MMOL/L (ref 8–16)
ANISOCYTOSIS BLD QL SMEAR: SLIGHT
BASOPHILS # BLD AUTO: 0.13 K/UL (ref 0–0.2)
BASOPHILS NFR BLD: 0.3 % (ref 0–1.9)
BILIRUB UR QL STRIP: NEGATIVE
BUN SERPL-MCNC: 24 MG/DL (ref 6–20)
BURR CELLS BLD QL SMEAR: ABNORMAL
CALCIUM SERPL-MCNC: 9.4 MG/DL (ref 8.7–10.5)
CHLORIDE SERPL-SCNC: 98 MMOL/L (ref 95–110)
CLARITY UR REFRACT.AUTO: ABNORMAL
CO2 SERPL-SCNC: 20 MMOL/L (ref 23–29)
COLOR UR AUTO: YELLOW
CREAT SERPL-MCNC: 1.6 MG/DL (ref 0.5–1.4)
DIFFERENTIAL METHOD: ABNORMAL
EOSINOPHIL # BLD AUTO: 0.4 K/UL (ref 0–0.5)
EOSINOPHIL NFR BLD: 1 % (ref 0–8)
ERYTHROCYTE [DISTWIDTH] IN BLOOD BY AUTOMATED COUNT: 18.4 % (ref 11.5–14.5)
EST. GFR  (AFRICAN AMERICAN): 41.2 ML/MIN/1.73 M^2
EST. GFR  (NON AFRICAN AMERICAN): 35.8 ML/MIN/1.73 M^2
GLUCOSE SERPL-MCNC: 54 MG/DL (ref 70–110)
GLUCOSE UR QL STRIP: NEGATIVE
HCT VFR BLD AUTO: 29.7 % (ref 37–48.5)
HGB BLD-MCNC: 9.8 G/DL (ref 12–16)
HGB UR QL STRIP: ABNORMAL
HYPOCHROMIA BLD QL SMEAR: ABNORMAL
IMM GRANULOCYTES # BLD AUTO: 0.44 K/UL (ref 0–0.04)
IMM GRANULOCYTES NFR BLD AUTO: 1.2 % (ref 0–0.5)
KETONES UR QL STRIP: ABNORMAL
LEUKOCYTE ESTERASE UR QL STRIP: NEGATIVE
LYMPHOCYTES # BLD AUTO: 1.2 K/UL (ref 1–4.8)
LYMPHOCYTES NFR BLD: 3.3 % (ref 18–48)
MAGNESIUM SERPL-MCNC: 2.2 MG/DL (ref 1.6–2.6)
MCH RBC QN AUTO: 25.5 PG (ref 27–31)
MCHC RBC AUTO-ENTMCNC: 33 G/DL (ref 32–36)
MCV RBC AUTO: 77 FL (ref 82–98)
MICROSCOPIC COMMENT: ABNORMAL
MONOCYTES # BLD AUTO: 1.1 K/UL (ref 0.3–1)
MONOCYTES NFR BLD: 3 % (ref 4–15)
NEUTROPHILS # BLD AUTO: 33.9 K/UL (ref 1.8–7.7)
NEUTROPHILS NFR BLD: 91.2 % (ref 38–73)
NITRITE UR QL STRIP: NEGATIVE
NRBC BLD-RTO: 0 /100 WBC
OVALOCYTES BLD QL SMEAR: ABNORMAL
PH UR STRIP: 5 [PH] (ref 5–8)
PLATELET # BLD AUTO: 376 K/UL (ref 150–450)
PMV BLD AUTO: 11.3 FL (ref 9.2–12.9)
POCT GLUCOSE: 101 MG/DL (ref 70–110)
POCT GLUCOSE: 84 MG/DL (ref 70–110)
POCT GLUCOSE: 96 MG/DL (ref 70–110)
POIKILOCYTOSIS BLD QL SMEAR: SLIGHT
POLYCHROMASIA BLD QL SMEAR: ABNORMAL
POTASSIUM SERPL-SCNC: 4.3 MMOL/L (ref 3.5–5.1)
PROT UR QL STRIP: NEGATIVE
RBC # BLD AUTO: 3.85 M/UL (ref 4–5.4)
RBC #/AREA URNS AUTO: 5 /HPF (ref 0–4)
SCHISTOCYTES BLD QL SMEAR: ABNORMAL
SODIUM SERPL-SCNC: 132 MMOL/L (ref 136–145)
SP GR UR STRIP: 1.02 (ref 1–1.03)
SQUAMOUS #/AREA URNS AUTO: 5 /HPF
URN SPEC COLLECT METH UR: ABNORMAL
WBC # BLD AUTO: 37.16 K/UL (ref 3.9–12.7)
WBC #/AREA URNS AUTO: 1 /HPF (ref 0–5)

## 2022-03-16 PROCEDURE — 97166 OT EVAL MOD COMPLEX 45 MIN: CPT

## 2022-03-16 PROCEDURE — 63600175 PHARM REV CODE 636 W HCPCS: Performed by: STUDENT IN AN ORGANIZED HEALTH CARE EDUCATION/TRAINING PROGRAM

## 2022-03-16 PROCEDURE — 94640 AIRWAY INHALATION TREATMENT: CPT

## 2022-03-16 PROCEDURE — 94799 UNLISTED PULMONARY SVC/PX: CPT

## 2022-03-16 PROCEDURE — 27000221 HC OXYGEN, UP TO 24 HOURS

## 2022-03-16 PROCEDURE — 83735 ASSAY OF MAGNESIUM: CPT | Performed by: STUDENT IN AN ORGANIZED HEALTH CARE EDUCATION/TRAINING PROGRAM

## 2022-03-16 PROCEDURE — 25000003 PHARM REV CODE 250: Performed by: STUDENT IN AN ORGANIZED HEALTH CARE EDUCATION/TRAINING PROGRAM

## 2022-03-16 PROCEDURE — 36415 COLL VENOUS BLD VENIPUNCTURE: CPT | Performed by: STUDENT IN AN ORGANIZED HEALTH CARE EDUCATION/TRAINING PROGRAM

## 2022-03-16 PROCEDURE — 97535 SELF CARE MNGMENT TRAINING: CPT

## 2022-03-16 PROCEDURE — 99900035 HC TECH TIME PER 15 MIN (STAT)

## 2022-03-16 PROCEDURE — 97161 PT EVAL LOW COMPLEX 20 MIN: CPT

## 2022-03-16 PROCEDURE — C9113 INJ PANTOPRAZOLE SODIUM, VIA: HCPCS | Performed by: STUDENT IN AN ORGANIZED HEALTH CARE EDUCATION/TRAINING PROGRAM

## 2022-03-16 PROCEDURE — 85025 COMPLETE CBC W/AUTO DIFF WBC: CPT | Performed by: STUDENT IN AN ORGANIZED HEALTH CARE EDUCATION/TRAINING PROGRAM

## 2022-03-16 PROCEDURE — 63600175 PHARM REV CODE 636 W HCPCS: Performed by: SURGERY

## 2022-03-16 PROCEDURE — 97110 THERAPEUTIC EXERCISES: CPT

## 2022-03-16 PROCEDURE — 25000003 PHARM REV CODE 250: Performed by: SURGERY

## 2022-03-16 PROCEDURE — 81001 URINALYSIS AUTO W/SCOPE: CPT | Performed by: STUDENT IN AN ORGANIZED HEALTH CARE EDUCATION/TRAINING PROGRAM

## 2022-03-16 PROCEDURE — 20600001 HC STEP DOWN PRIVATE ROOM

## 2022-03-16 PROCEDURE — 80048 BASIC METABOLIC PNL TOTAL CA: CPT | Performed by: STUDENT IN AN ORGANIZED HEALTH CARE EDUCATION/TRAINING PROGRAM

## 2022-03-16 PROCEDURE — 94761 N-INVAS EAR/PLS OXIMETRY MLT: CPT

## 2022-03-16 RX ORDER — ACETAMINOPHEN 10 MG/ML
1000 INJECTION, SOLUTION INTRAVENOUS EVERY 8 HOURS
Status: COMPLETED | OUTPATIENT
Start: 2022-03-16 | End: 2022-03-17

## 2022-03-16 RX ORDER — HYDROMORPHONE HYDROCHLORIDE 1 MG/ML
0.5 INJECTION, SOLUTION INTRAMUSCULAR; INTRAVENOUS; SUBCUTANEOUS EVERY 4 HOURS PRN
Status: DISCONTINUED | OUTPATIENT
Start: 2022-03-16 | End: 2022-03-22

## 2022-03-16 RX ORDER — HYDROMORPHONE HYDROCHLORIDE 1 MG/ML
0.5 INJECTION, SOLUTION INTRAMUSCULAR; INTRAVENOUS; SUBCUTANEOUS ONCE
Status: COMPLETED | OUTPATIENT
Start: 2022-03-16 | End: 2022-03-16

## 2022-03-16 RX ADMIN — IPRATROPIUM BROMIDE 2 PUFF: 17 AEROSOL, METERED RESPIRATORY (INHALATION) at 07:03

## 2022-03-16 RX ADMIN — PANTOPRAZOLE SODIUM 40 MG: 40 INJECTION, POWDER, FOR SOLUTION INTRAVENOUS at 09:03

## 2022-03-16 RX ADMIN — METOROPROLOL TARTRATE 5 MG: 5 INJECTION, SOLUTION INTRAVENOUS at 01:03

## 2022-03-16 RX ADMIN — METOROPROLOL TARTRATE 5 MG: 5 INJECTION, SOLUTION INTRAVENOUS at 05:03

## 2022-03-16 RX ADMIN — HEPARIN SODIUM 5000 UNITS: 5000 INJECTION INTRAVENOUS; SUBCUTANEOUS at 05:03

## 2022-03-16 RX ADMIN — ACETAMINOPHEN 1000 MG: 10 INJECTION, SOLUTION INTRAVENOUS at 09:03

## 2022-03-16 RX ADMIN — HYDROMORPHONE HYDROCHLORIDE 0.2 MG: 1 INJECTION, SOLUTION INTRAMUSCULAR; INTRAVENOUS; SUBCUTANEOUS at 02:03

## 2022-03-16 RX ADMIN — IPRATROPIUM BROMIDE 2 PUFF: 17 AEROSOL, METERED RESPIRATORY (INHALATION) at 08:03

## 2022-03-16 RX ADMIN — HYDROMORPHONE HYDROCHLORIDE 0.5 MG: 1 INJECTION, SOLUTION INTRAMUSCULAR; INTRAVENOUS; SUBCUTANEOUS at 06:03

## 2022-03-16 RX ADMIN — HYDROMORPHONE HYDROCHLORIDE 0.5 MG: 1 INJECTION, SOLUTION INTRAMUSCULAR; INTRAVENOUS; SUBCUTANEOUS at 11:03

## 2022-03-16 RX ADMIN — HEPARIN SODIUM 5000 UNITS: 5000 INJECTION INTRAVENOUS; SUBCUTANEOUS at 09:03

## 2022-03-16 RX ADMIN — ACETAMINOPHEN 1000 MG: 10 INJECTION, SOLUTION INTRAVENOUS at 01:03

## 2022-03-16 RX ADMIN — MUPIROCIN: 20 OINTMENT TOPICAL at 09:03

## 2022-03-16 RX ADMIN — HEPARIN SODIUM 5000 UNITS: 5000 INJECTION INTRAVENOUS; SUBCUTANEOUS at 01:03

## 2022-03-16 RX ADMIN — HYDROMORPHONE HYDROCHLORIDE 0.5 MG: 1 INJECTION, SOLUTION INTRAMUSCULAR; INTRAVENOUS; SUBCUTANEOUS at 09:03

## 2022-03-16 RX ADMIN — HYDROMORPHONE HYDROCHLORIDE 0.5 MG: 1 INJECTION, SOLUTION INTRAMUSCULAR; INTRAVENOUS; SUBCUTANEOUS at 05:03

## 2022-03-16 RX ADMIN — PIPERACILLIN AND TAZOBACTAM 4.5 G: 4; .5 INJECTION, POWDER, LYOPHILIZED, FOR SOLUTION INTRAVENOUS; PARENTERAL at 04:03

## 2022-03-16 RX ADMIN — PIPERACILLIN AND TAZOBACTAM 4.5 G: 4; .5 INJECTION, POWDER, LYOPHILIZED, FOR SOLUTION INTRAVENOUS; PARENTERAL at 09:03

## 2022-03-16 RX ADMIN — PIPERACILLIN AND TAZOBACTAM 4.5 G: 4; .5 INJECTION, POWDER, LYOPHILIZED, FOR SOLUTION INTRAVENOUS; PARENTERAL at 11:03

## 2022-03-16 RX ADMIN — METOROPROLOL TARTRATE 5 MG: 5 INJECTION, SOLUTION INTRAVENOUS at 11:03

## 2022-03-16 RX ADMIN — HYDROMORPHONE HYDROCHLORIDE 0.5 MG: 1 INJECTION, SOLUTION INTRAMUSCULAR; INTRAVENOUS; SUBCUTANEOUS at 01:03

## 2022-03-16 RX ADMIN — SODIUM CHLORIDE, SODIUM LACTATE, POTASSIUM CHLORIDE, AND CALCIUM CHLORIDE: .6; .31; .03; .02 INJECTION, SOLUTION INTRAVENOUS at 11:03

## 2022-03-16 NOTE — SUBJECTIVE & OBJECTIVE
Interval History: NAEON. Afebrile. HDS. Stepped down to GISSU overnight. Reports pain this morning, not is controlled with current regimen. Adequate UOP. No new symptoms or concerns this morning.  All questions answered.       Medications:  Continuous Infusions:   lactated ringers 50 mL/hr at 03/16/22 0000     Scheduled Meds:   heparin (porcine)  5,000 Units Subcutaneous Q8H    ipratropium  2 puff Inhalation BID    LIDOcaine (PF) 10 mg/ml (1%)  1 mL Other Once    metoprolol  5 mg Intravenous Q6H    mupirocin   Nasal BID    pantoprazole  40 mg Intravenous BID    piperacillin-tazobactam (ZOSYN) IVPB  4.5 g Intravenous Q8H     PRN Meds:sodium chloride 0.9%, acetaminophen, dextrose 10%, glucagon (human recombinant), HYDROmorphone, insulin aspart U-100, ondansetron, ondansetron, sodium chloride 0.9%     Review of patient's allergies indicates:   Allergen Reactions    Orange juice Hives    Tomato (solanum lycopersicum) Hives     Objective:     Vital Signs (Most Recent):  Temp: 98 °F (36.7 °C) (03/16/22 0428)  Pulse: 80 (03/16/22 0708)  Resp: 18 (03/16/22 0612)  BP: 138/64 (03/16/22 0428)  SpO2: 95 % (03/16/22 0428) Vital Signs (24h Range):  Temp:  [98 °F (36.7 °C)-99.1 °F (37.3 °C)] 98 °F (36.7 °C)  Pulse:  [69-89] 80  Resp:  [10-35] 18  SpO2:  [87 %-99 %] 95 %  BP: (104-151)/(55-89) 138/64  Arterial Line BP: (114-136)/(45-55) 125/51     Weight: 77 kg (169 lb 12.1 oz)  Body mass index is 23.68 kg/m².    Intake/Output - Last 3 Shifts         03/14 0700  03/15 0659 03/15 0700  03/16 0659 03/16 0700 03/17 0659    I.V. (mL/kg) 1558.2 (20.2) 1191.9 (15.5)     IV Piggyback 1986.9 553.6     Total Intake(mL/kg) 3545.2 (46) 1745.5 (22.7)     Urine (mL/kg/hr) 785 (0.4) 675 (0.4)     Drains 350 350     Total Output 1135 1025     Net +2410.2 +720.5            Urine Occurrence  2 x             Physical Exam  Vitals and nursing note reviewed.   Constitutional:       General: She is not in acute distress.     Appearance: She is not  diaphoretic.      Comments: 3L O2 via NC  NGT to PARVEEN   HENT:      Head: Normocephalic and atraumatic.      Mouth/Throat:      Mouth: Mucous membranes are moist.      Pharynx: Oropharynx is clear.   Eyes:      Extraocular Movements: Extraocular movements intact.      Conjunctiva/sclera: Conjunctivae normal.   Cardiovascular:      Rate and Rhythm: Normal rate.   Pulmonary:      Effort: Pulmonary effort is normal. No respiratory distress.   Abdominal:      General: There is no distension.      Palpations: Abdomen is soft.      Tenderness: There is no guarding or rebound.      Comments: Incision is clean, dry, and intact without drainage, erythema, or increased warmth. Appropriately TTP.   Musculoskeletal:         General: No deformity.      Cervical back: Normal range of motion.   Skin:     General: Skin is warm and dry.   Neurological:      Mental Status: She is alert and oriented to person, place, and time.       Significant Labs:  I have reviewed all pertinent lab results within the past 24 hours.  CBC:   Recent Labs   Lab 03/16/22  0324   WBC 37.16*   RBC 3.85*   HGB 9.8*   HCT 29.7*      MCV 77*   MCH 25.5*   MCHC 33.0     CMP:   Recent Labs   Lab 03/14/22  0204 03/14/22  1644 03/16/22  0324      < > 54*   CALCIUM 9.5   < > 9.4   ALBUMIN 3.3*  --   --    PROT 8.2  --   --    *   < > 132*   K 4.3   < > 4.3   CO2 21*   < > 20*   CL 93*   < > 98   BUN 20   < > 24*   CREATININE 1.9*   < > 1.6*   ALKPHOS 142*  --   --    ALT 17  --   --    AST 14  --   --    BILITOT 0.3  --   --     < > = values in this interval not displayed.       Significant Diagnostics:  I have reviewed all pertinent imaging results/findings within the past 24 hours.

## 2022-03-16 NOTE — ASSESSMENT & PLAN NOTE
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 Clinically stable    - NPO  - NGT to LIWS  - Metop 5 q6h IV at this time (on home metop)  - Continue empiric antimicrobials (zosyn). WBC 36 > 37. Will continue to trend daily  - UA  - IVF while NPO, gentle hydration for elevated Cr   - PRN pain and nausea control. Dilaudid dose increased this morning, scheduled tylenol added  - Daily labs  - Replete lytes PRN  - DVT ppx (SCDs and heparin)  - GI ppx (PPI)  - PT/OT  - OOB, ambulate  - RT, IS, inhalation treatments, wean O2 as tolerated    Dispo: not yet medically stable for dc

## 2022-03-16 NOTE — PT/OT/SLP EVAL
Occupational Therapy   Co-Evaluation/Treatment    Name: Casie Salvador  MRN: [id number]  Admitting Diagnosis:  Perforated viscus  Recent Surgery: Procedure(s) (LRB):  LAPAROTOMY, EXPLORATORY, repair gastric ulcer (N/A) 2 Days Post-Op    Recommendations:     Discharge Recommendations: home health OT  Discharge Equipment Recommendations:  none  Barriers to discharge:  None    Assessment:     Casie Salvador is a 57 y.o. female with a medical diagnosis of Perforated viscus.  She presents with abdominal pain with transitional movements. Pt tolerated session well. She is currently requiring increased assistance for functional tasks. Performance deficits affecting function: weakness, impaired endurance, impaired self care skills, impaired functional mobilty, gait instability, impaired balance, decreased lower extremity function, pain, impaired cardiopulmonary response to activity.  Pt would benefit from skilled OT services in order to maximize independence with ADLs and facilitate safe discharge. Pt would benefit from home health OT once medically stable for discharge.     Rehab Prognosis: Good; patient would benefit from acute skilled OT services to address these deficits and reach maximum level of function.       Plan:     Patient to be seen 3 x/week to address the above listed problems via self-care/home management, therapeutic activities, therapeutic exercises, neuromuscular re-education  · Plan of Care Expires: 04/15/22  · Plan of Care Reviewed with: patient, son    Subjective     Chief Complaint: abdominal pain  Patient/Family Comments/goals: to return home    Occupational Profile:  Living Environment: Pt and her  live in [facility] with 0 JULIA She has a tub/shower combo with a shower chair  Previous level of function: PRN assist from  for ADLs and transfers. 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  Patient found HOB elevated with NG tube, oxygen, peripheral IV, telemetry, PureWick upon OT entry to room. Pt's son at bedside.    General Precautions: Standard, fall   Orthopedic Precautions:N/A   Braces: N/A  Respiratory Status: Nasal cannula, flow 3 L/min    Occupational Performance:    Bed Mobility:    · Patient completed Scooting/Bridging with contact guard assistance  · Patient completed Supine to Sit with contact guard assistance  · Patient completed Sit to Supine with contact guard assistance    Functional Mobility/Transfers:  · Patient completed Sit <> Stand Transfer with contact guard assistance and minimum assistance  with  hand-held assist    · Trial 1 EOB with min A and HHA  · Trial 2 EOB with CGA and HHA  · Functional Mobility: Pt able to complete ~4 side steps R towards HOB with CGA and HHA    Activities of Daily Living:  · Grooming: stand by assistance to perform facial hygiene with HOB elevated  · Lower Body Dressing: moderate assistance to don L sock and tennis shoe; min A to don R LE prosthesis  · Toileting: purewick replaced upon OT exit     Cognitive/Visual Perceptual:  Cognitive/Psychosocial Skills:     -       Oriented to: Person, Place, Time and Situation   -       Follows Commands/attention:Follows multistep  commands  -       Communication: clear/fluent  -       Memory: No Deficits noted  -       Safety awareness/insight to  disability: intact   -       Mood/Affect/Coping skills/emotional control: Appropriate to situation    Physical Exam:  Sensation: -       Intact  Upper Extremity Range of Motion:     -       Right Upper Extremity: WFL  -       Left Upper Extremity: WFL  Upper Extremity Strength:    -       Right Upper Extremity: WFL  -       Left Upper Extremity: WFL   Strength:    -       Right Upper Extremity: WFL  -       Left Upper Extremity: WFL  Fine Motor Coordination:    -       Intact    AMPAC 6 Click ADL:  AMPAC Total Score: 17    Treatment & Education:  -Therapist provided facilitation and instruction of proper body mechanics, energy conservation, and fall prevention strategies during tasks listed above.  -Pt educated on role of OT, POC and goals for therapy  -Pt educated on importance of OOB activities with staff member assistance and sitting OOB majority of the day.   -Pt verbalized understanding. Pt expressed no further concerns/questions  -Whiteboard updated  Evaluation completed with PT to best establish plan of care for acute setting.   Education:    Patient left HOB elevated with all lines intact, call button in reach and son present    GOALS:   Multidisciplinary Problems     Occupational Therapy Goals        Problem: Occupational Therapy Goal    Goal Priority Disciplines Outcome Interventions   Occupational Therapy Goal     OT, PT/OT Ongoing, Progressing    Description: Goals to be met by: 3/30/22     Patient will increase functional independence with ADLs by performing:    Feeding with [place].  UE Dressing with [place].  LE Dressing with Stand-by Assistance.  Grooming while seated with Supervision.  Toileting from bedside commode with Supervision for hygiene and clothing management.   Toilet transfer to bedside commode with Supervision.                     History:     Past Medical History:   Diagnosis Date    Coronary artery disease     Diabetes mellitus     Encounter for blood transfusion      Heart attack     Hypertension        Past Surgical History:   Procedure Laterality Date    APPENDECTOMY      COLONOSCOPY N/A 2/23/2022    Procedure: COLONOSCOPY;  Surgeon: Estefania Bryant MD;  Location: [address]);  Service: Endoscopy;  Laterality: N/A;  anemia, melena    ESOPHAGOGASTRODUODENOSCOPY N/A 2/23/2022    Procedure: EGD (ESOPHAGOGASTRODUODENOSCOPY);  Surgeon: Estefania Bryant MD;  Location: [address]);  Service: Endoscopy;  Laterality: N/A;  anemia    stents      TUBAL LIGATION         Time Tracking:     OT Date of Treatment: 03/16/22  OT Start Time: 0924  OT Stop Time: 0951  OT Total Time (min): 27 min    Billable Minutes:Evaluation 17  Self Care/Home Management 10    3/16/2022

## 2022-03-16 NOTE — PLAN OF CARE
POC established and functional mobility goals were created to help pt return to PLOF. Will be reassessed as appropriate to measure pt progress.    Problem: Physical Therapy Goal  Goal: Physical Therapy Goal  Description: Goals to be met by: 3/30/22     Patient will increase functional independence with mobility by performin. Supine to sit with [place]  2. Sit to supine with [place]  3. Sit to stand transfer with Modified [place] with RW and R LE prosthetic donned  4. Bed to chair transfer with Modified [place] using RW  5. Gait  x 30 feet with Stand-by Assistance using RW and R LE prosthetic.   6. Lower extremity exercise program x15 reps per handout, with independence    Outcome: Ongoing, Progressing

## 2022-03-16 NOTE — PT/OT/SLP EVAL
Physical Therapy Co-Evaluation and Co-Treatment with OT    Patient Name:  Casie Salvador   MRN:  [id number]    Recent Surgery: Procedure(s) (LRB):  LAPAROTOMY, EXPLORATORY, repair gastric ulcer (N/A) 2 Days Post-Op    *Co-treatment with OT due to suspected patient complexity and need for two skilled therapists to ensure safe mobilization.    Recommendations:     Discharge Recommendations:  home health PT   Discharge Equipment Recommendations: none   Barriers to discharge: None    Highest Level of Mobility: STS  Assistance Required: Min(A)-CGA with HHA    Assessment:     Casie Salvador is a 57 y.o. female admitted with a medical diagnosis of Perforated viscus. She presents with the following impairments/functional limitations:  weakness, impaired balance, impaired endurance, pain, impaired sensation, impaired self care skills, impaired functional mobilty, gait instability, decreased lower extremity function, impaired cardiopulmonary response to activity    Pt met with HOB elevated, son present and agreeable to PT session. Pt reports her PLOF is mod(I) for functional mobility using R LE prosthetic and RW. She primarily uses her w/c for longer distances and is able to propel independently. She is currently limited by above listed deficits and now requires CGA-min(A) for STS with HHA.  Pt participates very well in session and is motivated to return to PLOF    Pt would benefit from continued skilled acute PT 3X/wk to address above listed functional deficits, provide patient/caregiver education, reduce fall risk, and maximize (I) and safety with functional mobility. After hospital discharge, pt would benefit from HHPT to maximize rehab potential.      Rehab Prognosis: Good; patient would benefit from acute skilled PT services to address these deficits and reach maximum level of function.      Plan:     During this hospitalization, patient to be seen 3 x/week to address the identified rehab impairments via  gait training, therapeutic activities, therapeutic exercises and progress toward the following goals:    · Plan of Care Expires:  04/16/22    This plan of care has been discussed with the patient/caregiver, who was included in its development and is in agreement with the identified goals and treatment plan.     Subjective     Communicated with RN prior to session.  Patient agreeable to participate.     Chief Complaint: S/p ex lap  Patient/Family Comments/goals: To return home    Pain/Comfort:  · Pain Rating 1: 6/10  · Location - Orientation 1: lower  · Location 1: abdomen  · Pain Addressed 1: Distraction, Reposition  · Pain Rating Post-Intervention 1: 6/10    Patients cultural, spiritual, Christian conflicts given the current situation: no    Patient's living environment is as follows:  Living Environment: Pt lives with spouse in [facility] with 0 JULIA. Bathroom set-up: tub/shower combo  Prior Level of Function: Pt reports her PLOF is mod(I) for functional mobility using R LE prosthetic and RW. She primarily uses her w/c for longer distances and is able to propel independently.   DME used: walker, rolling, wheelchair, bedside commode, rollator, shower chair (R LE prosthetic)  DME owned (not currently used): none  Upon discharge, patient will have assistance from: Spouse and 2 adult children that live close-by     Objective:     Patient found HOB elevated with NG tube, oxygen, peripheral IV, telemetry, PureWick  upon PT entry to room.    General Precautions: Standard, fall   Orthopedic Precautions:N/A   Braces: N/A   BP (!) 121/59 (BP Location: Left arm, Patient Position: Lying)   Pulse 89   Temp 99.5 °F (37.5 °C) (Oral)   Resp 18   Wt 77 kg (169 lb 12.1 oz)   SpO2 (S) (!) 92%   Breastfeeding No   BMI 23.68 kg/m²   Oxygen Device: nasal cannula      Exams:     Cognition:  o Patient is oriented to Person, Place, Time, Situation, follows commands 100% of the time     Edema: None present     Postural  examination/scapula alignment: Rounded shoulder and Head forward     Lower Extremity Range of Motion:  o Right Lower Extremity: WNL  o Left Lower Extremity: WNL     Lower Extremity Strength:       Iliopsoas Quadriceps Knee  Flexion (HS) Tibialis  anterior Gastro- cnemius EHL   R LE 4/5 4/5 NT NT NT NT   L LE 4/5 4+/5 4+/5 NT NT NT       Sensation:   o Light touch sensation: Intact BLEs    Functional Mobility:    Bed Mobility:  · Supine to Sit: Contact Guard Assistance on R side of bed  · Sit to Supine: Contact Guard Assistance  · Scooting anteriorly to EOB to plant feet on floor: Contact Guard Assistance    Transfers:   · Sit to Stand Transfer: Contact Guard Assistance and Minimal Assistance  from EOB with HHAx2  · x2 trials  · Pt donned R LE prosthetic with min(A)             Gait:  · Patient received gait training 3 lateral steps to the R with Contact Guard Assistance and HHAx2  · Gait Assessment: occasional unsteady gait, decreased step length and flexed posture  · Gait Pattern Observed: Step-to gait pattern  · Comments: All lines remained intact throughout ambulation trial. PT provided tactile cues for hip extension, scapular retraction, and improved upright posture    Balance:  · Static Sit:   · Stand-By Assist at EOB  · Normal: Patient able to maintain steady balance without handhold support.  · Static Stand:   · Contact-Guard Assist with Hand-held assist x 2  · Good: Patient able to maintain balance without handhold support, limited postural sway  · Dynamic Stand:  · Contact-Guard Assist with Hand-held assist x 2      Therapeutic Activities/Exercises      Patient assisted with functional mobility as noted above   Discussed d/c rec for HHPT, pt agreeable   Patient educated on the importance of early mobility to prevent functional decline during hospital stay   Patient was instructed to utilize staff assistance for mobility/transfers.  o Patient is appropriate to transfer with min(A)/CGA and RN/PCT  assist   Patient educated on PT POC and role of PT in acute care   White board updated to include patient's safest level of mobility with staff assistance, RN also updated    AM-PAC 6 CLICK MOBILITY  Turning over in bed (including adjusting bedclothes, sheets and blankets)?: 4  Sitting down on and standing up from a chair with arms (e.g., wheelchair, bedside commode, etc.): 3  Moving from lying on back to sitting on the side of the bed?: 3  Moving to and from a bed to a chair (including a wheelchair)?: 3  Need to walk in hospital room?: 3  Climbing 3-5 steps with a railing?: 1  Basic Mobility Total Score: 17      Patient left HOB elevated with all lines intact, call button in reach, RN notified and son present.      History/Goals:     PAST MEDICAL HISTORY:  Past Medical History:   Diagnosis Date    Coronary artery disease     Diabetes mellitus     Encounter for blood transfusion     Heart attack     Hypertension        Past Surgical History:   Procedure Laterality Date    APPENDECTOMY      COLONOSCOPY N/A 2022    Procedure: COLONOSCOPY;  Surgeon: Estefania Bryant MD;  Location: [facility] ([address] [place] [address]);  Service: Endoscopy;  Laterality: N/A;  anemia, melena    ESOPHAGOGASTRODUODENOSCOPY N/A 2022    Procedure: EGD (ESOPHAGOGASTRODUODENOSCOPY);  Surgeon: Estefania Bryant MD;  Location: [address]);  Service: Endoscopy;  Laterality: N/A;  anemia    stents      TUBAL LIGATION         GOALS:   Multidisciplinary Problems     Physical Therapy Goals        Problem: Physical Therapy Goal    Goal Priority Disciplines Outcome Goal Variances Interventions   Physical Therapy Goal     PT, PT/OT Ongoing, Progressing     Description: Goals to be met by: 3/30/22     Patient will increase functional independence with mobility by performin. Supine to sit with [place]  2. Sit to supine with [place]  3. Sit to stand transfer with Modified [place] with RW and R LE prosthetic donned  4. Bed  to chair transfer with Modified [place] using RW  5. Gait  x 30 feet with Stand-by Assistance using RW and R LE prosthetic.   6. Lower extremity exercise program x15 reps per handout, with independence                     Time Tracking:     PT Received On: 03/16/22  PT Start Time: 0924     PT Stop Time: 0951  PT Total Time (min): 27 min     Billable Minutes: Evaluation 15 and Therapeutic Exercise 12      Deepa Vann, PT  03/16/2022  Pager# [phone]

## 2022-03-16 NOTE — PHYSICIAN QUERY
PT Name: Casie Salvador  MR #: [id number]     Documentation Clarification      CDS: Malgorzata ARIAS RN  Contact information: [email or web address]    This form is a permanent document in the medical record.     Query Date: March 16, 2022    By submitting this query, we are merely seeking further clarification of documentation. Please utilize your independent clinical judgment when addressing the question(s) below.    The Medical Record reflects the following:    Supporting Clinical Findings Location in Medical Record   57 y.o female to ED with abdominal pain.  Peritonitis on exam.  Leukocytosis.  CT per ED with free intraperitoneal air.  Images reviewed.  Concerning for perforated gastric ulcer.     Intraperitoneal free air just superior to the liver with gastro-duodenal wall thickening and hyperenhancement with surrounding free fluid.  Findings are favored to reflect a perforated gastroduodenal ulcer.     Procedure: Procedure(s) (LRB):  LAPAROTOMY, EXPLORATORY, repair gastric ulcer (N/A)     Upon entry into the peritoneum, gross contamination was noted of gastric contents, and a 1 cm gastric ulcer was noted on the lesser just proximal to the pylorus. The stomach was firm around this ulcer, and biopsies were taken from the edges. The ulcer was then closed using 2-0 silks, and come together fairly easily. The omentum was then tacked over the closed ulcer using the tails of the sutures. The abd was irrigated copiously in all four quadrants until clear.   Consults Gen Surg 3/14/2022        CT Chest Abd 3/14/2022         Op Note 3/14/2022      Op Note 3/14/2022                                                                            Provider, please provide diagnosis or diagnoses associated with above clinical findings.    [ x  ] Generalized peritonitis    [   ] Other (please specify): ____________   [  ] Clinically undetermined

## 2022-03-16 NOTE — NURSING TRANSFER
Nursing Transfer Note      3/16/2022     Reason patient is being transferred: [place]  Transfer TRANSFER TO [facility] 1007/FROM:SICU [address]    Transfer via Bed  Transfer withAll personal belongings, cell phone and prosthetic leg    Transported by REKHA Sanchez, accompanied by 3 PCT's    Medicines sent:LR @ 50ml/hr, Zosyn infusing at 25 ml/hr    Any special needs or follow-up needed:None    Chart send with patient: YES     Notified: REKHA Jade who will be taking over care,

## 2022-03-16 NOTE — PHYSICIAN QUERY
PT Name: Casie Salvador  MR #: [id number]     DOCUMENTATION CLARIFICATION      CDS: Malgorzata ARIAS RN  Contact information: [email or web address]    This form is a permanent document in the medical record.    Query Date: March 16, 2022    By submitting this query, we are merely seeking further clarification of documentation to reflect the severity of illness of your patient. Please utilize your independent clinical judgment when addressing the question(s) below.     The Medical Record contains the following:   Indicators   Supporting Clinical Findings Location in Medical Record   X Gastrointestinal Ulcer Documented Upon entry into the peritoneum, gross contamination was noted of gastric contents, and a 1 cm gastric ulcer was noted on the lesser just proximal to the pylorus. The stomach was firm around this ulcer, and biopsies were taken from the edges. The ulcer was then closed using 2-0 silks, and come together fairly easily. The omentum was then tacked over the closed ulcer using the tails of the sutures. The abd was irrigated copiously in all four quadrants until clear.    * Perforated viscus  57 y.o. female with PMHx of  HTN, CAD s/p stents to LAD and OM2 (on DAPT), HFrEF (20%), PAD s/p L TMA and R BKA (on AC), previous GI bleed 2/2022 with known ulcers that presented to [facility] for abdominal pain. Imaging significant for intraperitoneal free air. Patient taken to the OR for Class A ex-lap.  Op Note 3/14/2022                PN CCS 3/14/2022      EGD/Colonscopy Findings      Pathology Findings     X Radiology Findings Intraperitoneal free air just superior to the liver with gastro-duodenal wall thickening and hyperenhancement with surrounding free fluid.  Findings are favored to reflect a perforated gastroduodenal ulcer.  CT Abd Pelvis 3/14/2022  ED Provider Note   X Treatment/Medication Procedure: Procedure(s) (LRB):  LAPAROTOMY, EXPLORATORY, repair gastric ulcer (N/A)    Op Note 3/14/2022    Other         Please further specify the acuity of the  __Gastric___ ulcer:    [ x  ] Acute   [   ] Chronic   [   ] Unspecified   [   ] Other (please specify): ___________   [   ] Clinically Undetermined         Please document in your progress notes daily for the duration of treatment until resolved, and include in your discharge summary.  Form No. [id number]

## 2022-03-16 NOTE — NURSING
Patient arrived to the unit from SICU via bed. Patient is alert and oriented x 4, able to make needs known.

## 2022-03-16 NOTE — PROGRESS NOTES
[facility]  General Surgery  Progress Note    Subjective:     History of Present Illness:  Patient is a 58 yo f with hx of CAD (hx of stents a few years ago), CHF (EF 20%), tobacco abuse, HTN, and right BKA (2/2 diabetes) who presented to ED with acute worsening of her abd pain. She has been noticing the pain worsening on and off over the past 6 weeks, but attributed it to a GI bug. She denies any fevers or chills, N/V, hx of PUD, or hx of NSAID use. In ED her WBC was 22 and CT showed free air and fluid in her upper abd around her stomach and duodenum. General Surgery was consulted.    Of note, patient is somewhat somnolent.    Denies and hx of abd surgery  Has been holding her plavix still since her recent admission for a GI bleed      Post-Op Info:  Procedure(s) (LRB):  LAPAROTOMY, EXPLORATORY, repair gastric ulcer (N/A)   2 Days Post-Op     Interval History: NAEON. Afebrile. HDS. Stepped down to [facility] overnight. Reports pain this morning, not is controlled with current regimen. Adequate UOP. No new symptoms or concerns this morning.  All questions answered.       Medications:  Continuous Infusions:   lactated ringers 50 mL/hr at 03/16/22 0000     Scheduled Meds:   heparin (porcine)  5,000 Units Subcutaneous Q8H    ipratropium  2 puff Inhalation BID    LIDOcaine (PF) 10 mg/ml (1%)  1 mL Other Once    metoprolol  5 mg Intravenous Q6H    mupirocin   Nasal BID    pantoprazole  40 mg Intravenous BID    piperacillin-tazobactam (ZOSYN) IVPB  4.5 g Intravenous Q8H     PRN Meds:sodium chloride 0.9%, acetaminophen, dextrose 10%, glucagon (human recombinant), HYDROmorphone, insulin aspart U-100, ondansetron, ondansetron, sodium chloride 0.9%     Review of patient's allergies indicates:   Allergen Reactions    Orange juice Hives    Tomato (solanum lycopersicum) Hives     Objective:     Vital Signs (Most Recent):  Temp: 98 °F (36.7 °C) (03/16/22 0428)  Pulse: 80 (03/16/22 0708)  Resp: 18 (03/16/22 0612)  BP:  138/64 (03/16/22 0428)  SpO2: 95 % (03/16/22 0428) Vital Signs (24h Range):  Temp:  [98 °F (36.7 °C)-99.1 °F (37.3 °C)] 98 °F (36.7 °C)  Pulse:  [69-89] 80  Resp:  [10-35] 18  SpO2:  [87 %-99 %] 95 %  BP: (104-151)/(55-89) 138/64  Arterial Line BP: (114-136)/(45-55) 125/51     Weight: 77 kg (169 lb 12.1 oz)  Body mass index is 23.68 kg/m².    Intake/Output - Last 3 Shifts         03/14 0700  03/15 0659 03/15 0700  03/16 0659 03/16 0700  03/17 0659    I.V. (mL/kg) 1558.2 (20.2) 1191.9 (15.5)     IV Piggyback 1986.9 553.6     Total Intake(mL/kg) 3545.2 (46) 1745.5 (22.7)     Urine (mL/kg/hr) 785 (0.4) 675 (0.4)     Drains 350 350     Total Output 1135 1025     Net +2410.2 +720.5            Urine Occurrence  2 x             Physical Exam  Vitals and nursing note reviewed.   Constitutional:       General: She is not in acute distress.     Appearance: She is not diaphoretic.      Comments: 3L O2 via NC  NGT to LIWS   HENT:      Head: Normocephalic and atraumatic.      Mouth/Throat:      Mouth: Mucous membranes are moist.      Pharynx: Oropharynx is clear.   Eyes:      Extraocular Movements: Extraocular movements intact.      Conjunctiva/sclera: Conjunctivae normal.   Cardiovascular:      Rate and Rhythm: Normal rate.   Pulmonary:      Effort: Pulmonary effort is normal. No respiratory distress.   Abdominal:      General: There is no distension.      Palpations: Abdomen is soft.      Tenderness: There is no guarding or rebound.      Comments: Incision is clean, dry, and intact without drainage, erythema, or increased warmth. Appropriately TTP.   Musculoskeletal:         General: No deformity.      Cervical back: Normal range of motion.   Skin:     General: Skin is warm and dry.   Neurological:      Mental Status: She is alert and oriented to person, place, and time.       Significant Labs:  I have reviewed all pertinent lab results within the past 24 hours.  CBC:   Recent Labs   Lab 03/16/22  0324   WBC 37.16*   RBC 3.85*    HGB 9.8*   HCT 29.7*      MCV 77*   MCH 25.5*   MCHC 33.0     CMP:   Recent Labs   Lab 03/14/22  0204 03/14/22  1644 03/16/22  0324      < > 54*   CALCIUM 9.5   < > 9.4   ALBUMIN 3.3*  --   --    PROT 8.2  --   --    *   < > 132*   K 4.3   < > 4.3   CO2 21*   < > 20*   CL 93*   < > 98   BUN 20   < > 24*   CREATININE 1.9*   < > 1.6*   ALKPHOS 142*  --   --    ALT 17  --   --    AST 14  --   --    BILITOT 0.3  --   --     < > = values in this interval not displayed.       Significant Diagnostics:  I have reviewed all pertinent imaging results/findings within the past 24 hours.    Assessment/Plan:     * Perforated viscus  Patient is 58 yo F with h/o CAD (hx of stents a few years ago), CHF (EF 20%), tobacco abuse, HTN, and right BKA (2/2 diabetes) presenting with  perforated viscus s/p ex lap for gastric ulcer repair 3/14. Clinically stable    - NPO  - NGT to LIWS  - Metop 5 q6h IV at this time (on home metop)  - Continue empiric antimicrobials (zosyn). WBC 36 > 37. Will continue to trend daily  - UA  - IVF while NPO, gentle hydration for elevated Cr   - PRN pain and nausea control. Dilaudid dose increased this morning, scheduled tylenol added  - Daily labs  - Replete lytes PRN  - DVT ppx (SCDs and heparin)  - GI ppx (PPI)  - PT/OT  - OOB, ambulate  - RT, IS, inhalation treatments, wean O2 as tolerated    Dispo: not yet medically stable for dc        PRAMOD WickC  General Surgery  [facility]

## 2022-03-16 NOTE — PLAN OF CARE
Problem: Occupational Therapy Goal  Goal: Occupational Therapy Goal  Description: Goals to be met by: 3/30/22     Patient will increase functional independence with ADLs by performing:    Feeding with [place].  UE Dressing with [place].  LE Dressing with Stand-by Assistance.  Grooming while seated with Supervision.  Toileting from bedside commode with Supervision for hygiene and clothing management.   Toilet transfer to bedside commode with Supervision.    Outcome: Ongoing, Progressing     Evaluation complete-see note for details. Goals and POC established.    NETTIE De La Vega/L  3/16/2022   Pager #: [phone]

## 2022-03-17 LAB
ANION GAP SERPL CALC-SCNC: 10 MMOL/L (ref 8–16)
ANISOCYTOSIS BLD QL SMEAR: SLIGHT
BASOPHILS # BLD AUTO: 0.05 K/UL (ref 0–0.2)
BASOPHILS NFR BLD: 0.2 % (ref 0–1.9)
BUN SERPL-MCNC: 20 MG/DL (ref 6–20)
BURR CELLS BLD QL SMEAR: ABNORMAL
CALCIUM SERPL-MCNC: 9 MG/DL (ref 8.7–10.5)
CHLORIDE SERPL-SCNC: 100 MMOL/L (ref 95–110)
CO2 SERPL-SCNC: 23 MMOL/L (ref 23–29)
CREAT SERPL-MCNC: 1.2 MG/DL (ref 0.5–1.4)
DIFFERENTIAL METHOD: ABNORMAL
EOSINOPHIL # BLD AUTO: 0.7 K/UL (ref 0–0.5)
EOSINOPHIL NFR BLD: 2.1 % (ref 0–8)
ERYTHROCYTE [DISTWIDTH] IN BLOOD BY AUTOMATED COUNT: 18.6 % (ref 11.5–14.5)
EST. GFR  (AFRICAN AMERICAN): 58.4 ML/MIN/1.73 M^2
EST. GFR  (NON AFRICAN AMERICAN): 50.6 ML/MIN/1.73 M^2
GLUCOSE SERPL-MCNC: 84 MG/DL (ref 70–110)
HCT VFR BLD AUTO: 27.1 % (ref 37–48.5)
HGB BLD-MCNC: 8.8 G/DL (ref 12–16)
HYPOCHROMIA BLD QL SMEAR: ABNORMAL
IMM GRANULOCYTES # BLD AUTO: 0.26 K/UL (ref 0–0.04)
IMM GRANULOCYTES NFR BLD AUTO: 0.8 % (ref 0–0.5)
LYMPHOCYTES # BLD AUTO: 1.8 K/UL (ref 1–4.8)
LYMPHOCYTES NFR BLD: 5.6 % (ref 18–48)
MAGNESIUM SERPL-MCNC: 2.1 MG/DL (ref 1.6–2.6)
MCH RBC QN AUTO: 25.1 PG (ref 27–31)
MCHC RBC AUTO-ENTMCNC: 32.5 G/DL (ref 32–36)
MCV RBC AUTO: 77 FL (ref 82–98)
MONOCYTES # BLD AUTO: 1.1 K/UL (ref 0.3–1)
MONOCYTES NFR BLD: 3.6 % (ref 4–15)
NEUTROPHILS # BLD AUTO: 27.4 K/UL (ref 1.8–7.7)
NEUTROPHILS NFR BLD: 87.7 % (ref 38–73)
NRBC BLD-RTO: 0 /100 WBC
OVALOCYTES BLD QL SMEAR: ABNORMAL
PLATELET # BLD AUTO: 392 K/UL (ref 150–450)
PMV BLD AUTO: 11.1 FL (ref 9.2–12.9)
POCT GLUCOSE: 83 MG/DL (ref 70–110)
POCT GLUCOSE: 90 MG/DL (ref 70–110)
POCT GLUCOSE: 91 MG/DL (ref 70–110)
POCT GLUCOSE: 93 MG/DL (ref 70–110)
POCT GLUCOSE: 94 MG/DL (ref 70–110)
POIKILOCYTOSIS BLD QL SMEAR: SLIGHT
POLYCHROMASIA BLD QL SMEAR: ABNORMAL
POTASSIUM SERPL-SCNC: 4.4 MMOL/L (ref 3.5–5.1)
RBC # BLD AUTO: 3.5 M/UL (ref 4–5.4)
SODIUM SERPL-SCNC: 133 MMOL/L (ref 136–145)
WBC # BLD AUTO: 31.26 K/UL (ref 3.9–12.7)

## 2022-03-17 PROCEDURE — 25000003 PHARM REV CODE 250: Performed by: SURGERY

## 2022-03-17 PROCEDURE — A4216 STERILE WATER/SALINE, 10 ML: HCPCS | Performed by: SURGERY

## 2022-03-17 PROCEDURE — 63600175 PHARM REV CODE 636 W HCPCS: Performed by: STUDENT IN AN ORGANIZED HEALTH CARE EDUCATION/TRAINING PROGRAM

## 2022-03-17 PROCEDURE — 94761 N-INVAS EAR/PLS OXIMETRY MLT: CPT

## 2022-03-17 PROCEDURE — 85025 COMPLETE CBC W/AUTO DIFF WBC: CPT | Performed by: STUDENT IN AN ORGANIZED HEALTH CARE EDUCATION/TRAINING PROGRAM

## 2022-03-17 PROCEDURE — 63600175 PHARM REV CODE 636 W HCPCS: Performed by: SURGERY

## 2022-03-17 PROCEDURE — 83735 ASSAY OF MAGNESIUM: CPT | Performed by: STUDENT IN AN ORGANIZED HEALTH CARE EDUCATION/TRAINING PROGRAM

## 2022-03-17 PROCEDURE — 36573 INSJ PICC RS&I 5 YR+: CPT

## 2022-03-17 PROCEDURE — 63600175 PHARM REV CODE 636 W HCPCS

## 2022-03-17 PROCEDURE — 80048 BASIC METABOLIC PNL TOTAL CA: CPT | Performed by: STUDENT IN AN ORGANIZED HEALTH CARE EDUCATION/TRAINING PROGRAM

## 2022-03-17 PROCEDURE — 25000003 PHARM REV CODE 250: Performed by: STUDENT IN AN ORGANIZED HEALTH CARE EDUCATION/TRAINING PROGRAM

## 2022-03-17 PROCEDURE — 20600001 HC STEP DOWN PRIVATE ROOM

## 2022-03-17 PROCEDURE — 27000221 HC OXYGEN, UP TO 24 HOURS

## 2022-03-17 PROCEDURE — 25500020 PHARM REV CODE 255: Performed by: SURGERY

## 2022-03-17 PROCEDURE — 94799 UNLISTED PULMONARY SVC/PX: CPT

## 2022-03-17 PROCEDURE — 36415 COLL VENOUS BLD VENIPUNCTURE: CPT | Performed by: STUDENT IN AN ORGANIZED HEALTH CARE EDUCATION/TRAINING PROGRAM

## 2022-03-17 PROCEDURE — C1751 CATH, INF, PER/CENT/MIDLINE: HCPCS

## 2022-03-17 PROCEDURE — C9113 INJ PANTOPRAZOLE SODIUM, VIA: HCPCS | Performed by: STUDENT IN AN ORGANIZED HEALTH CARE EDUCATION/TRAINING PROGRAM

## 2022-03-17 PROCEDURE — 76937 US GUIDE VASCULAR ACCESS: CPT

## 2022-03-17 PROCEDURE — 94640 AIRWAY INHALATION TREATMENT: CPT

## 2022-03-17 PROCEDURE — 99900035 HC TECH TIME PER 15 MIN (STAT)

## 2022-03-17 RX ORDER — ACETAMINOPHEN 10 MG/ML
1000 INJECTION, SOLUTION INTRAVENOUS EVERY 8 HOURS
Status: COMPLETED | OUTPATIENT
Start: 2022-03-17 | End: 2022-03-18

## 2022-03-17 RX ORDER — TALC
6 POWDER (GRAM) TOPICAL NIGHTLY PRN
Status: DISCONTINUED | OUTPATIENT
Start: 2022-03-17 | End: 2022-03-17

## 2022-03-17 RX ORDER — SODIUM CHLORIDE 0.9 % (FLUSH) 0.9 %
10 SYRINGE (ML) INJECTION
Status: DISCONTINUED | OUTPATIENT
Start: 2022-03-17 | End: 2022-03-23 | Stop reason: HOSPADM

## 2022-03-17 RX ORDER — SODIUM CHLORIDE 0.9 % (FLUSH) 0.9 %
10 SYRINGE (ML) INJECTION EVERY 6 HOURS
Status: DISCONTINUED | OUTPATIENT
Start: 2022-03-17 | End: 2022-03-23 | Stop reason: HOSPADM

## 2022-03-17 RX ORDER — DIPHENHYDRAMINE HYDROCHLORIDE 50 MG/ML
25 INJECTION INTRAMUSCULAR; INTRAVENOUS ONCE AS NEEDED
Status: COMPLETED | OUTPATIENT
Start: 2022-03-17 | End: 2022-03-17

## 2022-03-17 RX ADMIN — Medication 10 ML: at 06:03

## 2022-03-17 RX ADMIN — METHOCARBAMOL 500 MG: 100 INJECTION, SOLUTION INTRAMUSCULAR; INTRAVENOUS at 10:03

## 2022-03-17 RX ADMIN — ACETAMINOPHEN 1000 MG: 10 INJECTION, SOLUTION INTRAVENOUS at 05:03

## 2022-03-17 RX ADMIN — METOROPROLOL TARTRATE 5 MG: 5 INJECTION, SOLUTION INTRAVENOUS at 12:03

## 2022-03-17 RX ADMIN — PIPERACILLIN AND TAZOBACTAM 4.5 G: 4; .5 INJECTION, POWDER, LYOPHILIZED, FOR SOLUTION INTRAVENOUS; PARENTERAL at 08:03

## 2022-03-17 RX ADMIN — METOROPROLOL TARTRATE 5 MG: 5 INJECTION, SOLUTION INTRAVENOUS at 05:03

## 2022-03-17 RX ADMIN — DIPHENHYDRAMINE HYDROCHLORIDE 25 MG: 50 INJECTION INTRAMUSCULAR; INTRAVENOUS at 07:03

## 2022-03-17 RX ADMIN — HYDROMORPHONE HYDROCHLORIDE 0.5 MG: 1 INJECTION, SOLUTION INTRAMUSCULAR; INTRAVENOUS; SUBCUTANEOUS at 12:03

## 2022-03-17 RX ADMIN — IOHEXOL 150 ML: 350 INJECTION, SOLUTION INTRAVENOUS at 11:03

## 2022-03-17 RX ADMIN — SODIUM CHLORIDE, SODIUM LACTATE, POTASSIUM CHLORIDE, AND CALCIUM CHLORIDE: .6; .31; .03; .02 INJECTION, SOLUTION INTRAVENOUS at 08:03

## 2022-03-17 RX ADMIN — IPRATROPIUM BROMIDE 2 PUFF: 17 AEROSOL, METERED RESPIRATORY (INHALATION) at 07:03

## 2022-03-17 RX ADMIN — ACETAMINOPHEN 1000 MG: 10 INJECTION, SOLUTION INTRAVENOUS at 02:03

## 2022-03-17 RX ADMIN — PIPERACILLIN AND TAZOBACTAM 4.5 G: 4; .5 INJECTION, POWDER, LYOPHILIZED, FOR SOLUTION INTRAVENOUS; PARENTERAL at 12:03

## 2022-03-17 RX ADMIN — HYDROMORPHONE HYDROCHLORIDE 0.5 MG: 1 INJECTION, SOLUTION INTRAMUSCULAR; INTRAVENOUS; SUBCUTANEOUS at 01:03

## 2022-03-17 RX ADMIN — HYDROMORPHONE HYDROCHLORIDE 0.5 MG: 1 INJECTION, SOLUTION INTRAMUSCULAR; INTRAVENOUS; SUBCUTANEOUS at 07:03

## 2022-03-17 RX ADMIN — PIPERACILLIN AND TAZOBACTAM 4.5 G: 4; .5 INJECTION, POWDER, LYOPHILIZED, FOR SOLUTION INTRAVENOUS; PARENTERAL at 05:03

## 2022-03-17 RX ADMIN — IPRATROPIUM BROMIDE 2 PUFF: 17 AEROSOL, METERED RESPIRATORY (INHALATION) at 08:03

## 2022-03-17 RX ADMIN — MUPIROCIN: 20 OINTMENT TOPICAL at 10:03

## 2022-03-17 RX ADMIN — METHOCARBAMOL 500 MG: 100 INJECTION, SOLUTION INTRAMUSCULAR; INTRAVENOUS at 02:03

## 2022-03-17 RX ADMIN — HEPARIN SODIUM 5000 UNITS: 5000 INJECTION INTRAVENOUS; SUBCUTANEOUS at 10:03

## 2022-03-17 RX ADMIN — ACETAMINOPHEN 1000 MG: 10 INJECTION, SOLUTION INTRAVENOUS at 10:03

## 2022-03-17 RX ADMIN — PANTOPRAZOLE SODIUM 40 MG: 40 INJECTION, POWDER, FOR SOLUTION INTRAVENOUS at 10:03

## 2022-03-17 RX ADMIN — PANTOPRAZOLE SODIUM 40 MG: 40 INJECTION, POWDER, FOR SOLUTION INTRAVENOUS at 08:03

## 2022-03-17 RX ADMIN — HEPARIN SODIUM 5000 UNITS: 5000 INJECTION INTRAVENOUS; SUBCUTANEOUS at 05:03

## 2022-03-17 RX ADMIN — HEPARIN SODIUM 5000 UNITS: 5000 INJECTION INTRAVENOUS; SUBCUTANEOUS at 02:03

## 2022-03-17 RX ADMIN — HYDROMORPHONE HYDROCHLORIDE 0.5 MG: 1 INJECTION, SOLUTION INTRAMUSCULAR; INTRAVENOUS; SUBCUTANEOUS at 04:03

## 2022-03-17 RX ADMIN — MUPIROCIN: 20 OINTMENT TOPICAL at 08:03

## 2022-03-17 RX ADMIN — Medication 10 ML: at 05:03

## 2022-03-17 NOTE — ASSESSMENT & PLAN NOTE
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 Clinically stable    - NPO  - NGT to LIWS  - UGI today  - Metop 5 q6h IV at this time (on home metop)  - Continue empiric antimicrobials (zosyn). WBC 37 > 31. Will continue to trend daily  - IVF while NPO  - PRN pain and nausea control. Scheduled tylenol   - Daily labs  - Replete lytes PRN  - DVT ppx (SCDs and heparin)  - GI ppx (PPI)  - PT/OT  - OOB, ambulate  - RT, IS, inhalation treatments, wean O2 as tolerated    Dispo: not yet medically stable for dc

## 2022-03-17 NOTE — SUBJECTIVE & OBJECTIVE
Interval History: NAEON. Afebrile. HDS. Pain is better controlled this morning. NGT with 250cc out overnight. Adequate UOP. No new symptoms or concerns this morning.  All questions answered.   WBC 37 > 31  Cr 1.6 > 1.2      Medications:  Continuous Infusions:   lactated ringers 100 mL/hr at 03/16/22 1113     Scheduled Meds:   heparin (porcine)  5,000 Units Subcutaneous Q8H    ipratropium  2 puff Inhalation BID    LIDOcaine (PF) 10 mg/ml (1%)  1 mL Other Once    metoprolol  5 mg Intravenous Q6H    mupirocin   Nasal BID    pantoprazole  40 mg Intravenous BID    piperacillin-tazobactam (ZOSYN) IVPB  4.5 g Intravenous Q8H     PRN Meds:sodium chloride 0.9%, dextrose 10%, glucagon (human recombinant), HYDROmorphone, insulin aspart U-100, ondansetron, ondansetron, sodium chloride 0.9%     Review of patient's allergies indicates:   Allergen Reactions    Orange juice Hives    Tomato (solanum lycopersicum) Hives     Objective:     Vital Signs (Most Recent):  Temp: 98.7 °F (37.1 °C) (03/17/22 0726)  Pulse: 71 (03/17/22 0726)  Resp: 18 (03/17/22 0726)  BP: (!) 122/58 (03/17/22 0726)  SpO2: 97 % (03/17/22 0726) Vital Signs (24h Range):  Temp:  [97.9 °F (36.6 °C)-99.6 °F (37.6 °C)] 98.7 °F (37.1 °C)  Pulse:  [71-89] 71  Resp:  [16-20] 18  SpO2:  [89 %-97 %] 97 %  BP: (116-138)/(56-65) 122/58     Weight: 77 kg (169 lb 12.1 oz)  Body mass index is 23.68 kg/m².    Intake/Output - Last 3 Shifts         03/15 0700  03/16 0659 03/16 0700 03/17 0659 03/17 0700 03/18 0659    P.O.  0     I.V. (mL/kg) 1191.9 (15.5)      IV Piggyback 553.6      Total Intake(mL/kg) 1745.5 (22.7) 0 (0)     Urine (mL/kg/hr) 675 (0.4) 775 (0.4)     Drains 350 650     Other  0     Stool  0     Total Output 1025 1425     Net +720.5 -1425            Urine Occurrence 2 x      Stool Occurrence  0 x             Physical Exam  Vitals and nursing note reviewed.   Constitutional:       General: She is not in acute distress.     Appearance: She is not diaphoretic.       Comments: 3L O2 via NC  NGT to PARVEEN   HENT:      Head: Normocephalic and atraumatic.      Mouth/Throat:      Mouth: Mucous membranes are moist.      Pharynx: Oropharynx is clear.   Eyes:      Extraocular Movements: Extraocular movements intact.      Conjunctiva/sclera: Conjunctivae normal.   Cardiovascular:      Rate and Rhythm: Normal rate.   Pulmonary:      Effort: Pulmonary effort is normal. No respiratory distress.   Abdominal:      General: There is no distension.      Palpations: Abdomen is soft.      Tenderness: There is no guarding or rebound.      Comments: Incision is clean, dry, and intact without drainage, erythema, or increased warmth. Appropriately TTP.   Musculoskeletal:         General: No deformity.      Cervical back: Normal range of motion.   Skin:     General: Skin is warm and dry.   Neurological:      Mental Status: She is alert and oriented to person, place, and time.       Significant Labs:  I have reviewed all pertinent lab results within the past 24 hours.  CBC:   Recent Labs   Lab 03/17/22  0308   WBC 31.26*   RBC 3.50*   HGB 8.8*   HCT 27.1*      MCV 77*   MCH 25.1*   MCHC 32.5       CMP:   Recent Labs   Lab 03/14/22  0204 03/14/22  1644 03/17/22  0308      < > 84   CALCIUM 9.5   < > 9.0   ALBUMIN 3.3*  --   --    PROT 8.2  --   --    *   < > 133*   K 4.3   < > 4.4   CO2 21*   < > 23   CL 93*   < > 100   BUN 20   < > 20   CREATININE 1.9*   < > 1.2   ALKPHOS 142*  --   --    ALT 17  --   --    AST 14  --   --    BILITOT 0.3  --   --     < > = values in this interval not displayed.         Significant Diagnostics:  I have reviewed all pertinent imaging results/findings within the past 24 hours.

## 2022-03-17 NOTE — PLAN OF CARE
POC reviewed with pt:     - AAOx4, VSS, on 2L O2, no SOB  - Tele NSR  - NG to low intermittent suction, NPO  - Midline incision BREE, edges approximated.   - PICC line inserted to JUDIT.  - BS controlled.  - Pain controlled with PRN medication     Bed locked in lowest position, call bell within reach. All needs met, no complaints at this time. Frequent rounding for safety.

## 2022-03-17 NOTE — PROGRESS NOTES
[facility]  General Surgery  Progress Note    Subjective:     History of Present Illness:  Patient is a 56 yo f with hx of CAD (hx of stents a few years ago), CHF (EF 20%), tobacco abuse, HTN, and right BKA (2/2 diabetes) who presented to ED with acute worsening of her abd pain. She has been noticing the pain worsening on and off over the past 6 weeks, but attributed it to a GI bug. She denies any fevers or chills, N/V, hx of PUD, or hx of NSAID use. In ED her WBC was 22 and CT showed free air and fluid in her upper abd around her stomach and duodenum. General Surgery was consulted.    Of note, patient is somewhat somnolent.    Denies and hx of abd surgery  Has been holding her plavix still since her recent admission for a GI bleed      Post-Op Info:  Procedure(s) (LRB):  LAPAROTOMY, EXPLORATORY, repair gastric ulcer (N/A)   3 Days Post-Op     Interval History: NAEON. Afebrile. HDS. Pain is better controlled this morning. NGT with 250cc out overnight. Adequate UOP. No new symptoms or concerns this morning.  All questions answered.   WBC 37 > 31  Cr 1.6 > 1.2      Medications:  Continuous Infusions:   lactated ringers 100 mL/hr at 03/16/22 1113     Scheduled Meds:   heparin (porcine)  5,000 Units Subcutaneous Q8H    ipratropium  2 puff Inhalation BID    LIDOcaine (PF) 10 mg/ml (1%)  1 mL Other Once    metoprolol  5 mg Intravenous Q6H    mupirocin   Nasal BID    pantoprazole  40 mg Intravenous BID    piperacillin-tazobactam (ZOSYN) IVPB  4.5 g Intravenous Q8H     PRN Meds:sodium chloride 0.9%, dextrose 10%, glucagon (human recombinant), HYDROmorphone, insulin aspart U-100, ondansetron, ondansetron, sodium chloride 0.9%     Review of patient's allergies indicates:   Allergen Reactions    Orange juice Hives    Tomato (solanum lycopersicum) Hives     Objective:     Vital Signs (Most Recent):  Temp: 98.7 °F (37.1 °C) (03/17/22 0726)  Pulse: 71 (03/17/22 0726)  Resp: 18 (03/17/22 0726)  BP: (!) 122/58  (03/17/22 0726)  SpO2: 97 % (03/17/22 0726) Vital Signs (24h Range):  Temp:  [97.9 °F (36.6 °C)-99.6 °F (37.6 °C)] 98.7 °F (37.1 °C)  Pulse:  [71-89] 71  Resp:  [16-20] 18  SpO2:  [89 %-97 %] 97 %  BP: (116-138)/(56-65) 122/58     Weight: 77 kg (169 lb 12.1 oz)  Body mass index is 23.68 kg/m².    Intake/Output - Last 3 Shifts         03/15 0700  03/16 0659 03/16 0700 03/17 0659 03/17 0700 03/18 0659    P.O.  0     I.V. (mL/kg) 1191.9 (15.5)      IV Piggyback 553.6      Total Intake(mL/kg) 1745.5 (22.7) 0 (0)     Urine (mL/kg/hr) 675 (0.4) 775 (0.4)     Drains 350 650     Other  0     Stool  0     Total Output 1025 1425     Net +720.5 -1425            Urine Occurrence 2 x      Stool Occurrence  0 x             Physical Exam  Vitals and nursing note reviewed.   Constitutional:       General: She is not in acute distress.     Appearance: She is not diaphoretic.      Comments: 3L O2 via NC  NGT to LIWS   HENT:      Head: Normocephalic and atraumatic.      Mouth/Throat:      Mouth: Mucous membranes are moist.      Pharynx: Oropharynx is clear.   Eyes:      Extraocular Movements: Extraocular movements intact.      Conjunctiva/sclera: Conjunctivae normal.   Cardiovascular:      Rate and Rhythm: Normal rate.   Pulmonary:      Effort: Pulmonary effort is normal. No respiratory distress.   Abdominal:      General: There is no distension.      Palpations: Abdomen is soft.      Tenderness: There is no guarding or rebound.      Comments: Incision is clean, dry, and intact without drainage, erythema, or increased warmth. Appropriately TTP.   Musculoskeletal:         General: No deformity.      Cervical back: Normal range of motion.   Skin:     General: Skin is warm and dry.   Neurological:      Mental Status: She is alert and oriented to person, place, and time.       Significant Labs:  I have reviewed all pertinent lab results within the past 24 hours.  CBC:   Recent Labs   Lab 03/17/22  0308   WBC 31.26*   RBC 3.50*   HGB  8.8*   HCT 27.1*      MCV 77*   MCH 25.1*   MCHC 32.5       CMP:   Recent Labs   Lab 03/14/22  0204 03/14/22  1644 03/17/22  0308      < > 84   CALCIUM 9.5   < > 9.0   ALBUMIN 3.3*  --   --    PROT 8.2  --   --    *   < > 133*   K 4.3   < > 4.4   CO2 21*   < > 23   CL 93*   < > 100   BUN 20   < > 20   CREATININE 1.9*   < > 1.2   ALKPHOS 142*  --   --    ALT 17  --   --    AST 14  --   --    BILITOT 0.3  --   --     < > = values in this interval not displayed.         Significant Diagnostics:  I have reviewed all pertinent imaging results/findings within the past 24 hours.    Assessment/Plan:     * Perforated viscus  Patient is 56 yo F with h/o CAD (hx of stents a few years ago), CHF (EF 20%), tobacco abuse, HTN, and right BKA (2/2 diabetes) presenting with  perforated viscus s/p ex lap for gastric ulcer repair 3/14. Clinically stable    - NPO  - NGT to LIWS  - UGI today  - Metop 5 q6h IV at this time (on home metop)  - Continue empiric antimicrobials (zosyn). WBC 37 > 31. Will continue to trend daily  - IVF while NPO  - PRN pain and nausea control. Scheduled tylenol   - Daily labs  - Replete lytes PRN  - DVT ppx (SCDs and heparin)  - GI ppx (PPI)  - PT/OT  - OOB, ambulate  - RT, IS, inhalation treatments, wean O2 as tolerated    Dispo: not yet medically stable for dc        Arnaud Dyer PA-C  General Surgery  [facility]

## 2022-03-17 NOTE — PT/OT/SLP PROGRESS
Physical Therapy      Patient Name:  Casie Salvador   MRN:  [id number]    Patient not seen today secondary to pt refusal. Pt states she is still feeling nauseous after her fluoroscopy. In addition, she wishes to visit with her company and hold PT session until tomorrow. Will follow-up as scheduled.

## 2022-03-17 NOTE — PLAN OF CARE
[facility]  Discharge Reassessment      Expected Discharge Date: 3/19/2022    Reassessment (most recent)     Discharge Reassessment - 03/17/22 1237        Discharge Reassessment    Assessment Type Discharge Planning Reassessment     Discharge Plan A Home Health     Discharge Plan B Home with family     DME Needed Upon Discharge  walker, rolling     Discharge Barriers Identified None     Why the patient remains in the hospital Requires continued medical care               Lisa Rothman RN, CM   Ext: [phone]

## 2022-03-17 NOTE — CONSULTS
D/L PICC placed in R basilic vein, 35cm in length with 0cm exposed. Arm circumference 36cm. Lot#[id number]

## 2022-03-17 NOTE — PROCEDURES
Casie Salvador is a 57 y.o. female patient.    Temp: 98.3 °F (36.8 °C) (03/17/22 1213)  Pulse: 71 (03/17/22 1521)  Resp: 18 (03/17/22 1213)  BP: (!) 163/72 (03/17/22 1228)  SpO2: (!) 92 % (03/17/22 1213)  Weight: 77 kg (169 lb 12.1 oz) (03/15/22 1313)    PICC  Date/Time: 3/17/2022 3:30 PM  Performed by: Mayra Santos, RN  Assisting provider: Catarina Baeza LPN  Consent Done: Yes  Time out: Immediately prior to procedure a time out was called to verify the correct patient, procedure, equipment, support staff and site/side marked as required  Indications: med administration and vascular access  Anesthesia: local infiltration  Local anesthetic: lidocaine 1% without epinephrine  Anesthetic Total (mL): 2  Preparation: skin prepped with ChloraPrep  Skin prep agent dried: skin prep agent completely dried prior to procedure  Sterile barriers: all five maximum sterile barriers used - cap, mask, sterile gown, sterile gloves, and large sterile sheet  Hand hygiene: hand hygiene performed prior to central venous catheter insertion  Location details: right basilic  Catheter type: double lumen  Catheter size: 5 Fr  Catheter Length: 35cm    Ultrasound guidance: yes  Vessel Caliber: medium and patent, compressibility normal  Vascular Doppler: not done  Needle advanced into vessel with real time Ultrasound guidance.  Guidewire confirmed in vessel.  Image recorded and saved.  Sterile sheath used.  Number of attempts: 1  Post-procedure: blood return through all ports, chlorhexidine patch and sterile dressing applied  Technical procedures used: 3CG  Specimens: No  Implants: No  Assessment: placement verified by x-ray  Complications: none          Name CATARINA BAEZA LPN  3/17/2022

## 2022-03-17 NOTE — PLAN OF CARE
[facility]      HOME HEALTH ORDERS  FACE TO FACE ENCOUNTER    Patient Name: Casie Salvador  YOB: 1965    PCP: No primary care provider on file.   PCP Address: No primary physician on file.  PCP Phone Number: None  PCP Fax: None    Encounter Date: 3/14/22    Admit to Home Health    Diagnoses:  Active Hospital Problems    Diagnosis  POA    *Perforated viscus [R19.8]  Yes      Resolved Hospital Problems   No resolved problems to display.       Follow Up Appointments:  Future Appointments   Date Time Provider Department Center   6/1/2022 11:00 AM Seferino Yoo MD [facility]       Allergies:  Review of patient's allergies indicates:   Allergen Reactions    Orange juice Hives    Tomato (solanum lycopersicum) Hives       Medications: Review discharge medications with patient and family and provide education.    Current Facility-Administered Medications   Medication Dose Route Frequency Provider Last Rate Last Admin    0.9%  NaCl infusion   Intravenous PRN Jatinder Shirley MD   Stopped at 03/15/22 2129    acetaminophen 1,000 mg/100 mL (10 mg/mL) injection 1,000 mg  1,000 mg Intravenous Q8H Susan Deleon MD        dextrose 10% bolus 125 mL  12.5 g Intravenous PRN Lennox Smith MD   Stopped at 03/15/22 0851    glucagon (human recombinant) injection 1 mg  1 mg Intramuscular PRN Jatinder Shirley MD        heparin (porcine) injection 5,000 Units  5,000 Units Subcutaneous Q8H Lennox Smith MD   5,000 Units at 03/17/22 0505    HYDROmorphone injection 0.5 mg  0.5 mg Intravenous Q4H PRN Jean Andre MD   0.5 mg at 03/17/22 1213    insulin aspart U-100 pen 1-10 Units  1-10 Units Subcutaneous Q6H PRN Jatinder Shirley MD        ipratropium inhaler 2 puff  2 puff Inhalation BID Jatinder Shirley MD   2 puff at 03/17/22 0732    lactated ringers infusion   Intravenous Continuous Susan Deleon  mL/hr at 03/17/22 0847 New Bag at 03/17/22 0847     LIDOcaine (PF) 10 mg/ml (1%) injection 10 mg  1 mL Other Once Ab Bang MD        methocarbamoL (ROBAXIN) 500 mg in dextrose 5 % 100 mL IVPB  500 mg Intravenous Q8H Susan Deleon MD        metoprolol injection 5 mg  5 mg Intravenous Q6H Ab Bang MD   5 mg at 22 1213    mupirocin 2 % ointment   Nasal BID Lennox Smith MD   Given at 22 0840    ondansetron disintegrating tablet 8 mg  8 mg Oral Q8H PRN Ab Bang MD        ondansetron injection 4 mg  4 mg Intravenous Q8H PRN Jatinder Shirley MD        pantoprazole injection 40 mg  40 mg Intravenous BID Jatinder Shirley MD   40 mg at 22 0840    piperacillin-tazobactam 4.5 g in sodium chloride 0.9% 100 mL IVPB (ready to mix system)  4.5 g Intravenous Q8H Keyur Boles MD 25 mL/hr at 22 1214 4.5 g at 22 1214    sodium chloride 0.9% flush 10 mL  10 mL Intravenous PRN Ab Bang MD         Current Discharge Medication List      CONTINUE these medications which have NOT CHANGED    Details   amLODIPine (NORVASC) 10 MG tablet Take 10 mg by mouth once daily.      amoxicillin-clavulanate 875-125mg (AUGMENTIN) 875-125 mg per tablet SMARTSI Tablet(s) By Mouth Every 12 Hours      aspirin (ECOTRIN) 81 MG EC tablet Take 81 mg by mouth once daily.      atorvastatin (LIPITOR) 80 MG tablet Take 40 mg by mouth once daily.      ATROVENT HFA 17 mcg/actuation inhaler SMARTSI Puff(s) By Mouth Twice Daily      azithromycin (Z-ADRY) 250 MG tablet Take by mouth.      blood sugar diagnostic Strp by Misc.(Non-Drug; Combo Route) route.      busPIRone (BUSPAR) 10 MG tablet Take 10 mg by mouth 2 (two) times daily.      carisoprodoL (SOMA) 350 MG tablet Take 350 mg by mouth daily as needed.      citalopram (CELEXA) 20 MG tablet Take 20 mg by mouth once daily.      clopidogreL (PLAVIX) 75 mg tablet Take 75 mg by mouth once daily.      docusate sodium (COLACE) 100 MG capsule Take 1 capsule (100 mg total) by mouth 2  "(two) times daily.  Qty: 60 capsule, Refills: 0      doxycycline (MONODOX) 50 MG Cap Take 100 mg by mouth every 12 (twelve) hours.      ergocalciferol (ERGOCALCIFEROL) 50,000 unit Cap Take 50,000 Units by mouth every 7 days.      ferrous sulfate (FEOSOL) 325 mg (65 mg iron) Tab tablet Take 325 mg by mouth.      folic acid-vit B6-vit B12 2.5-25-2 mg (FOLBIC OR EQUIV) 2.5-25-2 mg Tab Take 1 tablet by mouth once daily.  Qty: 90 tablet, Refills: 3      furosemide (LASIX) 20 MG tablet Take 20 mg by mouth 2 (two) times daily.      gabapentin (NEURONTIN) 600 MG tablet Take 600 mg by mouth 2 (two) times daily.      glipiZIDE (GLUCOTROL) 10 MG tablet Take 10 mg by mouth 2 (two) times daily before meals.      hydrALAZINE (APRESOLINE) 50 MG tablet Take 50 mg by mouth daily as needed.      hyoscyamine (ANASPAZ,LEVSIN) 0.125 mg Tab Take 1 tablet (125 mcg total) by mouth every 6 (six) hours as needed (abdominal cramping).  Qty: 30 tablet, Refills: 0      insulin glargine (LANTUS) 100 unit/mL injection Inject 50 Units into the skin every evening.      insulin needles, disposable, 31 x 3/16 " Ndle by Misc.(Non-Drug; Combo Route) route.      lancets 28 gauge Misc by Misc.(Non-Drug; Combo Route) route.      lisinopriL (PRINIVIL,ZESTRIL) 2.5 MG tablet Take 1 tablet (2.5 mg total) by mouth once daily.  Qty: 90 tablet, Refills: 3    Comments: .      loperamide (IMODIUM A-D) 2 mg Tab Take 2 mg by mouth 4 (four) times daily as needed.      losartan (COZAAR) 50 MG tablet Take 50 mg by mouth once daily.      meloxicam (MOBIC) 7.5 MG tablet Take 7.5 mg by mouth once daily.      metformin (GLUCOPHAGE) 1000 MG tablet Take 1,000 mg by mouth 2 (two) times daily with meals.      metoclopramide HCl (REGLAN) 10 MG tablet Take 10 mg by mouth 2 (two) times a day.      metoprolol tartrate (LOPRESSOR) 50 MG tablet Take 50 mg by mouth 2 (two) times daily.      montelukast (SINGULAIR) 10 mg tablet Take 10 mg by mouth every evening.      nortriptyline " (PAMELOR) 50 MG capsule SMARTSI Capsule(s) By Mouth Every Evening      omeprazole (PRILOSEC) 20 MG capsule Take 20 mg by mouth once daily.      ondansetron (ZOFRAN-ODT) 4 MG TbDL Take 1 tablet (4 mg total) by mouth every 8 (eight) hours as needed.  Qty: 20 tablet, Refills: 0      oxyCODONE-acetaminophen (PERCOCET) 5-325 mg per tablet Take 1 tablet by mouth every 4 (four) hours as needed for Pain.  Qty: 6 tablet, Refills: 0    Comments: Quantity prescribed more than 7 day supply? No      polyethylene glycol (GLYCOLAX) 17 gram/dose powder Take 17 g by mouth daily as needed.  Qty: 119 g, Refills: 0      potassium chloride (MICRO-K) 10 MEQ CpSR Take 10 mEq by mouth once daily.      promethazine (PHENERGAN) 6.25 mg/5 mL syrup TAKE 10 mls BY MOUTH EVERY 6 HOURS AS NEEDED      zolpidem (AMBIEN) 10 mg Tab Take 5 mg by mouth nightly as needed.               I have seen and examined this patient within the last 30 days. My clinical findings that support the need for the home health skilled services and home bound status are the following:no   Weakness/numbness causing balance and gait disturbance due to Weakness/Debility and Surgery making it taxing to leave home.     Diet:   TBD on discharge    Referrals/ Consults  Physical Therapy to evaluate and treat. Evaluate for home safety and equipment needs; Establish/upgrade home exercise program. Perform / instruct on therapeutic exercises, gait training, transfer training, and Range of Motion.  Occupational Therapy to evaluate and treat. Evaluate home environment for safety and equipment needs. Perform/Instruct on transfers, ADL training, ROM, and therapeutic exercises.    Activities:   activity as tolerated, ambulate in house , no driving while on analgesics, no strenuous exercise for 8 weeks post op and no heavy lifting for 8 weeks post op    Nursing:   Agency to admit patient within 24 hours of hospital discharge unless specified on physician order or at patient  request    SN to complete comprehensive assessment including routine vital signs. Instruct on disease process and s/s of complications to report to MD. Review/verify medication list sent home with the patient at time of discharge  and instruct patient/caregiver as needed. Frequency may be adjusted depending on start of care date.     Skilled nurse to perform up to 3 visits PRN for symptoms related to diagnosis    Notify MD if SBP > 160 or < 90; DBP > 90 or < 50; HR > 120 or < 50; Temp > 101; O2 < 88%    Ok to schedule additional visits based on staff availability and patient request on consecutive days within the home health episode.    When multiple disciplines ordered:    Start of Care occurs on Sunday - Wednesday schedule remaining discipline evaluations as ordered on separate consecutive days following the start of care.    Thursday SOC -schedule subsequent evaluations Friday and Monday the following week.     Friday - Saturday SOC - schedule subsequent discipline evaluations on consecutive days starting Monday of the following week.    Miscellaneous   Diabetic Care:   SN to perform and educate Diabetic management with blood glucose monitoring:, Fingerstick blood sugar a.m. and p.m. and Report CBG < 60 or > 350 to physician.  Heart Failure:      SN to instruct on the following:    Instruct on the definition of CHF.   Instruct on the signs/sympoms of CHF to be reported.   Instruct on and monitor daily weights.   Instruct on factors that cause exacerbation.   Instruct on action, dose, schedule, and side effects of medications.   Instruct on diet as prescribed.   Instruct on activity allowed.   Instruct on life-style modifications for life long management of CHF   SN to assess compliance with daily weights, diet, medications, fluid retention,    safety precautions, activities permitted and life-style modifications.   Additional 1-2 SN visits per week as needed for signs and symptoms     of CHF  exacerbation.      For Weight Gain > 2-3 lbs in 1 day or 4-6 lbs over 1 week notify PCP  Home Health Aide:  Nursing Twice weekly, Physical Therapy Three times weekly and Occupational Therapy Three times weekly    Wound Care Orders  yes:  Surgical Wound:  Location: abdomen  Keep clean and dry. Monitor for skin changes and signs of infection including but not limited to erythema, drainage, increased warmth.       I certify that this patient is confined to her home and needs intermittent skilled nursing care, physical therapy and occupational therapy.

## 2022-03-18 LAB
ANION GAP SERPL CALC-SCNC: 9 MMOL/L (ref 8–16)
BASOPHILS # BLD AUTO: 0.03 K/UL (ref 0–0.2)
BASOPHILS NFR BLD: 0.1 % (ref 0–1.9)
BUN SERPL-MCNC: 14 MG/DL (ref 6–20)
CALCIUM SERPL-MCNC: 9 MG/DL (ref 8.7–10.5)
CHLORIDE SERPL-SCNC: 100 MMOL/L (ref 95–110)
CO2 SERPL-SCNC: 26 MMOL/L (ref 23–29)
CREAT SERPL-MCNC: 0.8 MG/DL (ref 0.5–1.4)
DIFFERENTIAL METHOD: ABNORMAL
EOSINOPHIL # BLD AUTO: 0.8 K/UL (ref 0–0.5)
EOSINOPHIL NFR BLD: 4 % (ref 0–8)
ERYTHROCYTE [DISTWIDTH] IN BLOOD BY AUTOMATED COUNT: 18.6 % (ref 11.5–14.5)
EST. GFR  (AFRICAN AMERICAN): >60 ML/MIN/1.73 M^2
EST. GFR  (NON AFRICAN AMERICAN): >60 ML/MIN/1.73 M^2
GLUCOSE SERPL-MCNC: 93 MG/DL (ref 70–110)
HCT VFR BLD AUTO: 25.2 % (ref 37–48.5)
HGB BLD-MCNC: 8.2 G/DL (ref 12–16)
IMM GRANULOCYTES # BLD AUTO: 0.11 K/UL (ref 0–0.04)
IMM GRANULOCYTES NFR BLD AUTO: 0.5 % (ref 0–0.5)
LYMPHOCYTES # BLD AUTO: 2.2 K/UL (ref 1–4.8)
LYMPHOCYTES NFR BLD: 11 % (ref 18–48)
MAGNESIUM SERPL-MCNC: 2 MG/DL (ref 1.6–2.6)
MCH RBC QN AUTO: 25.2 PG (ref 27–31)
MCHC RBC AUTO-ENTMCNC: 32.5 G/DL (ref 32–36)
MCV RBC AUTO: 78 FL (ref 82–98)
MONOCYTES # BLD AUTO: 1.1 K/UL (ref 0.3–1)
MONOCYTES NFR BLD: 5.5 % (ref 4–15)
NEUTROPHILS # BLD AUTO: 16 K/UL (ref 1.8–7.7)
NEUTROPHILS NFR BLD: 78.9 % (ref 38–73)
NRBC BLD-RTO: 0 /100 WBC
PLATELET # BLD AUTO: 364 K/UL (ref 150–450)
PMV BLD AUTO: 10.1 FL (ref 9.2–12.9)
POTASSIUM SERPL-SCNC: 4.1 MMOL/L (ref 3.5–5.1)
RBC # BLD AUTO: 3.25 M/UL (ref 4–5.4)
SODIUM SERPL-SCNC: 135 MMOL/L (ref 136–145)
WBC # BLD AUTO: 20.23 K/UL (ref 3.9–12.7)

## 2022-03-18 PROCEDURE — 63600175 PHARM REV CODE 636 W HCPCS: Performed by: SURGERY

## 2022-03-18 PROCEDURE — 20600001 HC STEP DOWN PRIVATE ROOM

## 2022-03-18 PROCEDURE — 63600175 PHARM REV CODE 636 W HCPCS: Performed by: STUDENT IN AN ORGANIZED HEALTH CARE EDUCATION/TRAINING PROGRAM

## 2022-03-18 PROCEDURE — A4216 STERILE WATER/SALINE, 10 ML: HCPCS | Performed by: SURGERY

## 2022-03-18 PROCEDURE — 97116 GAIT TRAINING THERAPY: CPT | Mod: CQ

## 2022-03-18 PROCEDURE — 97530 THERAPEUTIC ACTIVITIES: CPT | Mod: CQ

## 2022-03-18 PROCEDURE — 25000003 PHARM REV CODE 250: Performed by: STUDENT IN AN ORGANIZED HEALTH CARE EDUCATION/TRAINING PROGRAM

## 2022-03-18 PROCEDURE — 25000003 PHARM REV CODE 250: Performed by: SURGERY

## 2022-03-18 PROCEDURE — 83735 ASSAY OF MAGNESIUM: CPT | Performed by: STUDENT IN AN ORGANIZED HEALTH CARE EDUCATION/TRAINING PROGRAM

## 2022-03-18 PROCEDURE — 94640 AIRWAY INHALATION TREATMENT: CPT

## 2022-03-18 PROCEDURE — 85025 COMPLETE CBC W/AUTO DIFF WBC: CPT | Performed by: STUDENT IN AN ORGANIZED HEALTH CARE EDUCATION/TRAINING PROGRAM

## 2022-03-18 PROCEDURE — C9113 INJ PANTOPRAZOLE SODIUM, VIA: HCPCS | Performed by: STUDENT IN AN ORGANIZED HEALTH CARE EDUCATION/TRAINING PROGRAM

## 2022-03-18 PROCEDURE — 99900035 HC TECH TIME PER 15 MIN (STAT)

## 2022-03-18 PROCEDURE — 27000221 HC OXYGEN, UP TO 24 HOURS

## 2022-03-18 PROCEDURE — 36415 COLL VENOUS BLD VENIPUNCTURE: CPT | Performed by: STUDENT IN AN ORGANIZED HEALTH CARE EDUCATION/TRAINING PROGRAM

## 2022-03-18 PROCEDURE — 94761 N-INVAS EAR/PLS OXIMETRY MLT: CPT

## 2022-03-18 PROCEDURE — B4185 PARENTERAL SOL 10 GM LIPIDS: HCPCS | Performed by: STUDENT IN AN ORGANIZED HEALTH CARE EDUCATION/TRAINING PROGRAM

## 2022-03-18 PROCEDURE — 80048 BASIC METABOLIC PNL TOTAL CA: CPT | Performed by: STUDENT IN AN ORGANIZED HEALTH CARE EDUCATION/TRAINING PROGRAM

## 2022-03-18 PROCEDURE — A4217 STERILE WATER/SALINE, 500 ML: HCPCS | Performed by: STUDENT IN AN ORGANIZED HEALTH CARE EDUCATION/TRAINING PROGRAM

## 2022-03-18 RX ORDER — TALC
6 POWDER (GRAM) TOPICAL NIGHTLY PRN
Status: DISCONTINUED | OUTPATIENT
Start: 2022-03-18 | End: 2022-03-23 | Stop reason: HOSPADM

## 2022-03-18 RX ADMIN — Medication 10 ML: at 05:03

## 2022-03-18 RX ADMIN — METOROPROLOL TARTRATE 5 MG: 5 INJECTION, SOLUTION INTRAVENOUS at 05:03

## 2022-03-18 RX ADMIN — HEPARIN SODIUM 5000 UNITS: 5000 INJECTION INTRAVENOUS; SUBCUTANEOUS at 04:03

## 2022-03-18 RX ADMIN — MAGNESIUM SULFATE HEPTAHYDRATE: 500 INJECTION, SOLUTION INTRAMUSCULAR; INTRAVENOUS at 10:03

## 2022-03-18 RX ADMIN — METHOCARBAMOL 500 MG: 100 INJECTION, SOLUTION INTRAMUSCULAR; INTRAVENOUS at 04:03

## 2022-03-18 RX ADMIN — IPRATROPIUM BROMIDE 2 PUFF: 17 AEROSOL, METERED RESPIRATORY (INHALATION) at 08:03

## 2022-03-18 RX ADMIN — HYDROMORPHONE HYDROCHLORIDE 0.5 MG: 1 INJECTION, SOLUTION INTRAMUSCULAR; INTRAVENOUS; SUBCUTANEOUS at 12:03

## 2022-03-18 RX ADMIN — SOYBEAN OIL 250 ML: 20 INJECTION, SOLUTION INTRAVENOUS at 10:03

## 2022-03-18 RX ADMIN — HYDROMORPHONE HYDROCHLORIDE 0.5 MG: 1 INJECTION, SOLUTION INTRAMUSCULAR; INTRAVENOUS; SUBCUTANEOUS at 08:03

## 2022-03-18 RX ADMIN — SODIUM CHLORIDE, SODIUM LACTATE, POTASSIUM CHLORIDE, AND CALCIUM CHLORIDE: .6; .31; .03; .02 INJECTION, SOLUTION INTRAVENOUS at 08:03

## 2022-03-18 RX ADMIN — METHOCARBAMOL 500 MG: 100 INJECTION, SOLUTION INTRAMUSCULAR; INTRAVENOUS at 06:03

## 2022-03-18 RX ADMIN — MUPIROCIN: 20 OINTMENT TOPICAL at 08:03

## 2022-03-18 RX ADMIN — PANTOPRAZOLE SODIUM 40 MG: 40 INJECTION, POWDER, FOR SOLUTION INTRAVENOUS at 08:03

## 2022-03-18 RX ADMIN — Medication 10 ML: at 12:03

## 2022-03-18 RX ADMIN — METOROPROLOL TARTRATE 5 MG: 5 INJECTION, SOLUTION INTRAVENOUS at 12:03

## 2022-03-18 RX ADMIN — METHOCARBAMOL 500 MG: 100 INJECTION, SOLUTION INTRAMUSCULAR; INTRAVENOUS at 10:03

## 2022-03-18 RX ADMIN — Medication 10 ML: at 06:03

## 2022-03-18 RX ADMIN — HYDROMORPHONE HYDROCHLORIDE 0.5 MG: 1 INJECTION, SOLUTION INTRAMUSCULAR; INTRAVENOUS; SUBCUTANEOUS at 04:03

## 2022-03-18 RX ADMIN — HEPARIN SODIUM 5000 UNITS: 5000 INJECTION INTRAVENOUS; SUBCUTANEOUS at 05:03

## 2022-03-18 RX ADMIN — MUPIROCIN: 20 OINTMENT TOPICAL at 09:03

## 2022-03-18 RX ADMIN — METOROPROLOL TARTRATE 5 MG: 5 INJECTION, SOLUTION INTRAVENOUS at 11:03

## 2022-03-18 RX ADMIN — ACETAMINOPHEN 1000 MG: 10 INJECTION, SOLUTION INTRAVENOUS at 05:03

## 2022-03-18 RX ADMIN — IPRATROPIUM BROMIDE 2 PUFF: 17 AEROSOL, METERED RESPIRATORY (INHALATION) at 01:03

## 2022-03-18 RX ADMIN — Medication 6 MG: at 08:03

## 2022-03-18 RX ADMIN — HEPARIN SODIUM 5000 UNITS: 5000 INJECTION INTRAVENOUS; SUBCUTANEOUS at 08:03

## 2022-03-18 RX ADMIN — PIPERACILLIN AND TAZOBACTAM 4.5 G: 4; .5 INJECTION, POWDER, LYOPHILIZED, FOR SOLUTION INTRAVENOUS; PARENTERAL at 04:03

## 2022-03-18 NOTE — SUBJECTIVE & OBJECTIVE
Interval History: NAEON. Afebrile. HDS. UGI yesterday with small contained leak. PICC placed. Pain is better controlled this morning. NGT with 300cc out overnight. Adequate UOP. No new symptoms or concerns this morning.  All questions answered.   WBC 31 > 20  Cr 1.2 > 0.8      Medications:  Continuous Infusions:   lactated ringers Stopped (03/17/22 2230)    TPN ADULT CENTRAL LINE CUSTOM       Scheduled Meds:   fat emulsion 20%  250 mL Intravenous Daily    heparin (porcine)  5,000 Units Subcutaneous Q8H    ipratropium  2 puff Inhalation BID    LIDOcaine (PF) 10 mg/ml (1%)  1 mL Other Once    methocarbamol (ROBAXIN) IVPB  500 mg Intravenous Q8H    metoprolol  5 mg Intravenous Q6H    mupirocin   Nasal BID    pantoprazole  40 mg Intravenous BID    piperacillin-tazobactam (ZOSYN) IVPB  4.5 g Intravenous Q8H    sodium chloride 0.9%  10 mL Intravenous Q6H     PRN Meds:sodium chloride 0.9%, dextrose 10%, glucagon (human recombinant), HYDROmorphone, insulin aspart U-100, ondansetron, ondansetron, sodium chloride 0.9%, Flushing PICC Protocol **AND** sodium chloride 0.9% **AND** sodium chloride 0.9%     Review of patient's allergies indicates:   Allergen Reactions    Orange juice Hives    Tomato (solanum lycopersicum) Hives     Objective:     Vital Signs (Most Recent):  Temp: 97.4 °F (36.3 °C) (03/18/22 0417)  Pulse: 63 (03/18/22 0417)  Resp: 20 (03/18/22 0417)  BP: (!) 147/67 (03/18/22 0417)  SpO2: 96 % (03/18/22 0417) Vital Signs (24h Range):  Temp:  [97.4 °F (36.3 °C)-98.5 °F (36.9 °C)] 97.4 °F (36.3 °C)  Pulse:  [60-78] 63  Resp:  [18-20] 20  SpO2:  [92 %-97 %] 96 %  BP: (143-178)/(67-80) 147/67     Weight: 77 kg (169 lb 12.1 oz)  Body mass index is 23.68 kg/m².    Intake/Output - Last 3 Shifts         03/16 0700 03/17 0659 03/17 0700 03/18 0659 03/18 0700 03/19 0659    P.O. 0      I.V. (mL/kg)  1229.7 (16)     IV Piggyback  1034.8     Total Intake(mL/kg) 0 (0) 2264.5 (29.4)     Urine (mL/kg/hr) 775 (0.4) 1050 (0.6)      Drains 650 800     Other 0      Stool 0 0     Total Output 1425 1850     Net -1425 +414.5            Stool Occurrence 0 x 0 x             Physical Exam  Vitals and nursing note reviewed.   Constitutional:       General: She is not in acute distress.     Appearance: She is not diaphoretic.      Comments: 3L O2 via NC  NGT to PARVEEN   HENT:      Head: Normocephalic and atraumatic.      Mouth/Throat:      Mouth: Mucous membranes are moist.      Pharynx: Oropharynx is clear.   Eyes:      Extraocular Movements: Extraocular movements intact.      Conjunctiva/sclera: Conjunctivae normal.   Cardiovascular:      Rate and Rhythm: Normal rate.   Pulmonary:      Effort: Pulmonary effort is normal. No respiratory distress.   Abdominal:      General: There is no distension.      Palpations: Abdomen is soft.      Tenderness: There is no guarding or rebound.      Comments: Incision is clean, dry, and intact without drainage, erythema, or increased warmth. Appropriately TTP.   Musculoskeletal:         General: No deformity.      Cervical back: Normal range of motion.   Skin:     General: Skin is warm and dry.   Neurological:      Mental Status: She is alert and oriented to person, place, and time.       Significant Labs:  I have reviewed all pertinent lab results within the past 24 hours.  CBC:   Recent Labs   Lab 03/18/22  0253   WBC 20.23*   RBC 3.25*   HGB 8.2*   HCT 25.2*      MCV 78*   MCH 25.2*   MCHC 32.5       CMP:   Recent Labs   Lab 03/14/22  0204 03/14/22  1644 03/18/22  0253      < > 93   CALCIUM 9.5   < > 9.0   ALBUMIN 3.3*  --   --    PROT 8.2  --   --    *   < > 135*   K 4.3   < > 4.1   CO2 21*   < > 26   CL 93*   < > 100   BUN 20   < > 14   CREATININE 1.9*   < > 0.8   ALKPHOS 142*  --   --    ALT 17  --   --    AST 14  --   --    BILITOT 0.3  --   --     < > = values in this interval not displayed.         Significant Diagnostics:  I have reviewed all pertinent imaging results/findings within the  past 24 hours.

## 2022-03-18 NOTE — ASSESSMENT & PLAN NOTE
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 Clinically stable    - NPO  - NGT to LIWS  - PICC placed 3/17  - TPN ordered  - UGI 3/17 with small contained leak at lesser curvature of stomach. Plan for conservative management with NPO, bowel rest, TPN and repeat study early next week for re-evaluation   - Metop 5 q6h IV at this time (on home metop)  - Continue empiric antimicrobials (zosyn). WBC continues to downtrend. Will continue to trend daily  - IVF while NPO  - PRN pain and nausea control  - Scheduled multimodal pain control  - Daily labs  - Replete lytes PRN  - DVT ppx (SCDs and heparin)  - GI ppx (PPI)  - PT/OT  - OOB, ambulate  - RT, IS, inhalation treatments, wean O2 as tolerated    Dispo: not yet medically stable for dc

## 2022-03-18 NOTE — PROGRESS NOTES
[facility]  General Surgery  Progress Note    Subjective:     History of Present Illness:  Patient is a 56 yo f with hx of CAD (hx of stents a few years ago), CHF (EF 20%), tobacco abuse, HTN, and right BKA (2/2 diabetes) who presented to ED with acute worsening of her abd pain. She has been noticing the pain worsening on and off over the past 6 weeks, but attributed it to a GI bug. She denies any fevers or chills, N/V, hx of PUD, or hx of NSAID use. In ED her WBC was 22 and CT showed free air and fluid in her upper abd around her stomach and duodenum. General Surgery was consulted.    Of note, patient is somewhat somnolent.    Denies and hx of abd surgery  Has been holding her plavix still since her recent admission for a GI bleed      Post-Op Info:  Procedure(s) (LRB):  LAPAROTOMY, EXPLORATORY, repair gastric ulcer (N/A)   4 Days Post-Op     Interval History: NAEON. Afebrile. HDS. UGI yesterday with small contained leak. PICC placed. Pain is better controlled this morning. NGT with 300cc out overnight. Adequate UOP. No new symptoms or concerns this morning.  All questions answered.   WBC 31 > 20  Cr 1.2 > 0.8      Medications:  Continuous Infusions:   lactated ringers Stopped (03/17/22 2230)    TPN ADULT CENTRAL LINE CUSTOM       Scheduled Meds:   fat emulsion 20%  250 mL Intravenous Daily    heparin (porcine)  5,000 Units Subcutaneous Q8H    ipratropium  2 puff Inhalation BID    LIDOcaine (PF) 10 mg/ml (1%)  1 mL Other Once    methocarbamol (ROBAXIN) IVPB  500 mg Intravenous Q8H    metoprolol  5 mg Intravenous Q6H    mupirocin   Nasal BID    pantoprazole  40 mg Intravenous BID    piperacillin-tazobactam (ZOSYN) IVPB  4.5 g Intravenous Q8H    sodium chloride 0.9%  10 mL Intravenous Q6H     PRN Meds:sodium chloride 0.9%, dextrose 10%, glucagon (human recombinant), HYDROmorphone, insulin aspart U-100, ondansetron, ondansetron, sodium chloride 0.9%, Flushing PICC Protocol **AND** sodium chloride  0.9% **AND** sodium chloride 0.9%     Review of patient's allergies indicates:   Allergen Reactions    Orange juice Hives    Tomato (solanum lycopersicum) Hives     Objective:     Vital Signs (Most Recent):  Temp: 97.4 °F (36.3 °C) (03/18/22 0417)  Pulse: 63 (03/18/22 0417)  Resp: 20 (03/18/22 0417)  BP: (!) 147/67 (03/18/22 0417)  SpO2: 96 % (03/18/22 0417) Vital Signs (24h Range):  Temp:  [97.4 °F (36.3 °C)-98.5 °F (36.9 °C)] 97.4 °F (36.3 °C)  Pulse:  [60-78] 63  Resp:  [18-20] 20  SpO2:  [92 %-97 %] 96 %  BP: (143-178)/(67-80) 147/67     Weight: 77 kg (169 lb 12.1 oz)  Body mass index is 23.68 kg/m².    Intake/Output - Last 3 Shifts         03/16 0700  03/17 0659 03/17 0700  03/18 0659 03/18 0700  03/19 0659    P.O. 0      I.V. (mL/kg)  1229.7 (16)     IV Piggyback  1034.8     Total Intake(mL/kg) 0 (0) 2264.5 (29.4)     Urine (mL/kg/hr) 775 (0.4) 1050 (0.6)     Drains 650 800     Other 0      Stool 0 0     Total Output 1425 1850     Net -1425 +414.5            Stool Occurrence 0 x 0 x             Physical Exam  Vitals and nursing note reviewed.   Constitutional:       General: She is not in acute distress.     Appearance: She is not diaphoretic.      Comments: 3L O2 via NC  NGT to LIWS   HENT:      Head: Normocephalic and atraumatic.      Mouth/Throat:      Mouth: Mucous membranes are moist.      Pharynx: Oropharynx is clear.   Eyes:      Extraocular Movements: Extraocular movements intact.      Conjunctiva/sclera: Conjunctivae normal.   Cardiovascular:      Rate and Rhythm: Normal rate.   Pulmonary:      Effort: Pulmonary effort is normal. No respiratory distress.   Abdominal:      General: There is no distension.      Palpations: Abdomen is soft.      Tenderness: There is no guarding or rebound.      Comments: Incision is clean, dry, and intact without drainage, erythema, or increased warmth. Appropriately TTP.   Musculoskeletal:         General: No deformity.      Cervical back: Normal range of motion.    Skin:     General: Skin is warm and dry.   Neurological:      Mental Status: She is alert and oriented to person, place, and time.       Significant Labs:  I have reviewed all pertinent lab results within the past 24 hours.  CBC:   Recent Labs   Lab 03/18/22  0253   WBC 20.23*   RBC 3.25*   HGB 8.2*   HCT 25.2*      MCV 78*   MCH 25.2*   MCHC 32.5       CMP:   Recent Labs   Lab 03/14/22  0204 03/14/22  1644 03/18/22  0253      < > 93   CALCIUM 9.5   < > 9.0   ALBUMIN 3.3*  --   --    PROT 8.2  --   --    *   < > 135*   K 4.3   < > 4.1   CO2 21*   < > 26   CL 93*   < > 100   BUN 20   < > 14   CREATININE 1.9*   < > 0.8   ALKPHOS 142*  --   --    ALT 17  --   --    AST 14  --   --    BILITOT 0.3  --   --     < > = values in this interval not displayed.         Significant Diagnostics:  I have reviewed all pertinent imaging results/findings within the past 24 hours.    Assessment/Plan:     * Perforated viscus  Patient is 58 yo F with h/o CAD (hx of stents a few years ago), CHF (EF 20%), tobacco abuse, HTN, and right BKA (2/2 diabetes) presenting with  perforated viscus s/p ex lap for gastric ulcer repair 3/14. Clinically stable    - NPO  - NGT to LIWS  - PICC placed 3/17  - TPN ordered  - UGI 3/17 with small contained leak at lesser curvature of stomach. Plan for conservative management with NPO, bowel rest, TPN and repeat study early next week for re-evaluation   - Metop 5 q6h IV at this time (on home metop)  - Continue empiric antimicrobials (zosyn). WBC continues to downtrend. Will continue to trend daily  - IVF while NPO  - PRN pain and nausea control  - Scheduled multimodal pain control  - Daily labs  - Replete lytes PRN  - DVT ppx (SCDs and heparin)  - GI ppx (PPI)  - PT/OT  - OOB, ambulate  - RT, IS, inhalation treatments, wean O2 as tolerated    Dispo: not yet medically stable for dc        Arnaud Dyer PA-C  General Surgery  [facility]

## 2022-03-18 NOTE — PROGRESS NOTES
[facility]  Adult Nutrition  Progress Note    SUMMARY       Recommendations    1) Start TPN: 260g D, 90g AA + IL 250ml @ 20% (to provide 1744kcal, 90g protein, GIR 2.34)    2) as medically able advance diet to Diabetic + cardiac    Goals: 1) pt receiving % of EEN/EPN by RD f/u  Nutrition Goal Status: new  Communication of RD Recs:  (POC)    Assessment and Plan    Nutrition Problem  Severe protein malnutrition  Malnutrition in the context of acute illness/injury     Related to (etiology):   Perforated viscus, NPO     Signs and Symptoms (as evidenced by):   Energy intake <50% of estimated energy requirement for 1 month  Muscle mass depletion: knee, thigh  Weight loss 9% x 11 days     Interventions (treatment strategy):  Collaboration with other providers     Nutrition Diagnosis Status:   NEW    Malnutrition Assessment    Weight Loss (Malnutrition): greater than 5% in 1 month  Energy Intake (Malnutrition): less than or equal to 50% for greater than or equal to 1 month   Orbital Region (Subcutaneous Fat Loss): well nourished  Upper Arm Region (Subcutaneous Fat Loss): well nourished   [place] Region (Muscle Loss): well nourished  Clavicle Bone Region (Muscle Loss): well nourished  Clavicle and Acromion Bone Region (Muscle Loss): well nourished  Patellar Region (Muscle Loss): mild depletion  Anterior Thigh Region (Muscle Loss): mild depletion  Posterior Calf Region (Muscle Loss): well nourished   Edema (Fluid Accumulation): 0-->no edema present   Subcutaneous Fat Loss (Final Summary): well nourished  Muscle Loss Evaluation (Final Summary): mild protein-calorie malnutrition    Severe Weight Loss (Malnutrition): greater than 5% in 1 month    Reason for Assessment    Reason For Assessment: new TPN  Diagnosis:  (perforated viscus)  Relevant Medical History: DM, HTN, heart attack, CAD, CHF, BKA R  Interdisciplinary Rounds: did not attend    General Information Comments: Pt is a 56y/o female who was admitted with  "perforated viscus.  Pt is currently NPO x 5 days.  Pt stated hasn't eaten food x 1 month.  Pt has been nauseated with some vomiting.  Ex lap. 3/14 found perforated viscus.  LBM 3/13.  NG/OG tube place for drainage 3/14. UBW 180lbs.  Noted pt has lost 16lbs x 11days.  Pt states that she is hungry now.  NFPE done 3/18, mild wasting found in thigh and knee.      Nutrition Discharge Planning: pending medical course    Nutrition Risk Screen    Nutrition Risk Screen: no indicators present    Nutrition/Diet History    Patient Reported Diet/Restrictions/Preferences: general  Typical Food/Fluid Intake: 3 meals  Spiritual, Cultural Beliefs, Christianity Practices, Values that Affect Care: no  Food Allergies:  (orange juice, tomato)  Factors Affecting Nutritional Intake: NPO, nausea/vomiting    Anthropometrics    Temp: 97.5 °F (36.4 °C)  Height: 5' 11" (180.3 cm)  Height (inches): 71 in  Weight: 77 kg (169 lb 12.1 oz)  Weight (lb): 169.76 lb  Ideal Body Weight (IBW), Female: 155 lb  % Ideal Body Weight, Female (lb): 109.52 %  BMI (Calculated): 23.7  BMI Grade: 18.5-24.9 - normal  Weight Loss: unintentional  Usual Body Weight (UBW), k.81 kg  Weight Change Amount: 11 lb  % Usual Body Weight: 94.32  % Weight Change From Usual Weight: -5.88 %  Amputation %: 5.9  Total Amputation %: 5.9  Amputation Ideal Body Weight (IBW), Female (lb): 149.1 lb  Amputee BMI (kg/m2): 25.23 kg/m2       Lab/Procedures/Meds    Pertinent Labs Reviewed: reviewed  Pertinent Labs Comments: LOW: sodium 135   HIGH: A1c 5.8    Pertinent Medications Reviewed: reviewed  Pertinent Medications Comments: heparin, pantoprazole, insulin    Estimated/Assessed Needs    Weight Used For Calorie Calculations: 77 kg (169 lb 12.1 oz)  Energy Calorie Requirements (kcal): 1747  Energy Need Method: [place] Jeor (1.2   CHF)  Protein Requirements: 77-92 (1-1.2)  Weight Used For Protein Calculations: 77 kg (169 lb 12.1 oz)  Fluid Requirements (mL): 1500 or per MD  Estimated " Fluid Requirement Method: other (see comments) (CHF)  RDA Method (mL): 1747  CHO Requirement: 218      Nutrition Prescription Ordered    Current Diet Order: NPO    Evaluation of Received Nutrient/Fluid Intake    I/O: +1.870L since admit  Energy Calories Required: not meeting needs  Protein Required: not meeting needs  Fluid Required: meeting needs  Tolerance: other (see comments) (NPO)  % Intake of Estimated Energy Needs: 0%  % Meal Intake: NPO    Nutrition Risk    Level of Risk/Frequency of Follow-up:  (1x weekly)     Monitor and Evaluation    Food and Nutrient Intake: energy intake, parenteral nutrition intake  Food and Nutrient Adminstration: enteral and parenteral nutrition administration  Anthropometric Measurements: weight, weight change, body mass index  Biochemical Data, Medical Tests and Procedures: electrolyte and renal panel, gastrointestinal profile, glucose/endocrine profile, inflammatory profile, lipid profile  Nutrition-Focused Physical Findings: overall appearance, extremities, muscles and bones     Nutrition Follow-Up    RD Follow-up?: Yes

## 2022-03-18 NOTE — PT/OT/SLP PROGRESS
Occupational Therapy      Patient Name:  Casie Salvador   MRN:  [id number]    Patient not seen today. Chart review performed. Pt remains appropriate for OT services. Will follow-up as scheduled.     3/18/2022

## 2022-03-18 NOTE — PT/OT/SLP PROGRESS
"Physical Therapy Treatment    Patient Name:  Casie Salvador   MRN:  [id number]  Admitting Diagnosis: Perforated viscus  Recent Surgery: Procedure(s) (LRB):  LAPAROTOMY, EXPLORATORY, repair gastric ulcer (N/A) 4 Days Post-Op    Recommendations:     Discharge Recommendations:  home health PT   Discharge Equipment Recommendations: none   Barriers to discharge: None    Plan:     During this hospitalization, patient to be seen 3 x/week to address the above listed problems via gait training, therapeutic activities, therapeutic exercises  · Plan of Care Expires:  04/16/22   Plan of Care Reviewed with: patient    This Plan of care has been discussed with the patient who was involved in its development and understands and is in agreement with the identified goals and treatment plan    Subjective     Communicated with nurse (Maki) prior to session.     Patient comments: "I want something for pain"  "I'm only going to walk from here (bed) to there (wall) and back"  Pain/Comfort:  · Pain Rating 1:  (no rating provided)  · Location - Orientation 1: generalized  · Location 1: abdomen  · Pain Addressed 1: Reposition, Distraction, Cessation of Activity, Nurse notified  · Pain Rating Post-Intervention 1:  (no rating provided)    Objective:     2 attempts for tx session 2* family visiting in am and pt requesting for PTA to return later (11:33 am)    Patient found with: oxygen, PureWick, telemetry, NG tube (connected to wall suction)    Patient found sup in bed with HOB elevated upon PT entry to room, agreeable to treatment.  NO family present in the room.    RESPIRATORY STATUS:   via NC    General Precautions: Standard, fall   Orthopedic Precautions:N/A   Braces: N/A       BED MOBILITY (vc's for hand placement sequencing of task):        Rolling to the R:  NT.       Rolling to the L:  NT.        Sup > sit at the EOB:  SBA for safety, exiting on the R side, HOB elevated.       Sit > sup:  SBA for safety with HOB elavated.    "                    SITTING AT THE EDGE OF THE BED    Assistance Level Required: sup for safety with B/no UE support   Postural deviations noted: flexed trunk   Encouraged: upright posture               PTA assisted pt with donning shoe to L foot and prosthesis to R residual limb        TRANSFERS  (vc's for hand placement, sequencing of task and safety)   Patient completed Sit <> Stand Transfer from EOB with CGA for safety with rolling walker x1 trial(s)   Patient completed Stand <> Sit Transfer to EOB with CGA for safety with rolling walker      GAIT: within room   Patient ambulated: 16ft with prosthesis to R residual limb   Patient required: CGA for balance and safety   Patient used:  Rolling Walker   Gait Pattern observed: reciprocal gait   Gait Deviation(s): decreased step length, decreased stride length and FFP    Comments: vc's for upright posture, placement of AD and safety      EDUCATION  Patient provided with daily orientation and goals of this PT session. They were educated to call for assistance and to transfer with hospital staff only.  Also, pt was educated on the effects of prolonged immobility and the importance of performing OOB activity and exercises to promote healing and reduce recovery time    Whiteboard updated with correct mobility information. RN/PCT notified.  Pt safe to transfer OOB/BTB and amb short distance with RN/PCT: Use RW with min A.    Patient left HOB elevated, with  all lines intact, call button in reach and nurse notified    AM-PAC 6 CLICK MOBILITY  Turning over in bed (including adjusting bedclothes, sheets and blankets)?: 4  Sitting down on and standing up from a chair with arms (e.g., wheelchair, bedside commode, etc.): 3  Moving from lying on back to sitting on the side of the bed?: 3  Moving to and from a bed to a chair (including a wheelchair)?: 3  Need to walk in hospital room?: 3  Climbing 3-5 steps with a railing?: 1  Basic Mobility Total Score: 17     Assessment:      Casie Salvador is a 57 y.o. female admitted with a medical diagnosis of Perforated viscus.  She presents with the following impairments/functional limitations:  weakness, impaired endurance, impaired self care skills, impaired functional mobilty, gait instability, impaired balance, decreased upper extremity function, decreased lower extremity function, decreased safety awareness, pain, decreased ROM, impaired skin. requiring light assistance and verbal cues for bed mob, transfers and gait to prevent falls due to weakness, pain, fatigue.     Pt will cont to benefit from skilled PT intervention to address deficits and improve functional mobility.     Rehab Prognosis:  Good; patient would benefit from acute skilled PT services to address these deficits and reach maximum level of function.      GOALS:   Multidisciplinary Problems     Physical Therapy Goals        Problem: Physical Therapy Goal    Goal Priority Disciplines Outcome Goal Variances Interventions   Physical Therapy Goal     PT, PT/OT Ongoing, Progressing     Description: Goals to be met by: 3/30/22     Patient will increase functional independence with mobility by performin. Supine to sit with [place]  2. Sit to supine with [place]  3. Sit to stand transfer with Modified [place] with RW and R LE prosthetic donned  4. Bed to chair transfer with Modified [place] using RW  5. Gait  x 30 feet with Stand-by Assistance using RW and R LE prosthetic.   6. Lower extremity exercise program x15 reps per handout, with independence                     Time Tracking:     PT Received On: 22  PT Start Time: 1428     PT Stop Time: 1527  PT Total Time (min): 59 min     Billable Minutes: Gait Training 10 and Therapeutic Activity 49    Treatment Type: Treatment  PT/PTA: PTA     PTA Visit Number: 1       BRIGIDO Wesley.  Pager [phone]    3/18/2022    .

## 2022-03-19 LAB
ANION GAP SERPL CALC-SCNC: 9 MMOL/L (ref 8–16)
ANISOCYTOSIS BLD QL SMEAR: SLIGHT
BACTERIA BLD CULT: NORMAL
BACTERIA BLD CULT: NORMAL
BASOPHILS # BLD AUTO: 0.04 K/UL (ref 0–0.2)
BASOPHILS NFR BLD: 0.2 % (ref 0–1.9)
BUN SERPL-MCNC: 9 MG/DL (ref 6–20)
CALCIUM SERPL-MCNC: 9.4 MG/DL (ref 8.7–10.5)
CHLORIDE SERPL-SCNC: 99 MMOL/L (ref 95–110)
CO2 SERPL-SCNC: 25 MMOL/L (ref 23–29)
CREAT SERPL-MCNC: 0.8 MG/DL (ref 0.5–1.4)
DIFFERENTIAL METHOD: ABNORMAL
EOSINOPHIL # BLD AUTO: 0.8 K/UL (ref 0–0.5)
EOSINOPHIL NFR BLD: 4.2 % (ref 0–8)
ERYTHROCYTE [DISTWIDTH] IN BLOOD BY AUTOMATED COUNT: 18.2 % (ref 11.5–14.5)
EST. GFR  (AFRICAN AMERICAN): >60 ML/MIN/1.73 M^2
EST. GFR  (NON AFRICAN AMERICAN): >60 ML/MIN/1.73 M^2
GLUCOSE SERPL-MCNC: 265 MG/DL (ref 70–110)
HCT VFR BLD AUTO: 28.8 % (ref 37–48.5)
HGB BLD-MCNC: 9.3 G/DL (ref 12–16)
HYPOCHROMIA BLD QL SMEAR: ABNORMAL
IMM GRANULOCYTES # BLD AUTO: 0.15 K/UL (ref 0–0.04)
IMM GRANULOCYTES NFR BLD AUTO: 0.8 % (ref 0–0.5)
LYMPHOCYTES # BLD AUTO: 3.3 K/UL (ref 1–4.8)
LYMPHOCYTES NFR BLD: 17.2 % (ref 18–48)
MAGNESIUM SERPL-MCNC: 1.7 MG/DL (ref 1.6–2.6)
MCH RBC QN AUTO: 25.4 PG (ref 27–31)
MCHC RBC AUTO-ENTMCNC: 32.3 G/DL (ref 32–36)
MCV RBC AUTO: 79 FL (ref 82–98)
MONOCYTES # BLD AUTO: 1.1 K/UL (ref 0.3–1)
MONOCYTES NFR BLD: 5.5 % (ref 4–15)
NEUTROPHILS # BLD AUTO: 13.7 K/UL (ref 1.8–7.7)
NEUTROPHILS NFR BLD: 72.1 % (ref 38–73)
NRBC BLD-RTO: 0 /100 WBC
PHOSPHATE SERPL-MCNC: 2.3 MG/DL (ref 2.7–4.5)
PLATELET # BLD AUTO: 409 K/UL (ref 150–450)
PLATELET BLD QL SMEAR: ABNORMAL
PMV BLD AUTO: 10.8 FL (ref 9.2–12.9)
POLYCHROMASIA BLD QL SMEAR: ABNORMAL
POTASSIUM SERPL-SCNC: 4.2 MMOL/L (ref 3.5–5.1)
RBC # BLD AUTO: 3.66 M/UL (ref 4–5.4)
SODIUM SERPL-SCNC: 133 MMOL/L (ref 136–145)
TARGETS BLD QL SMEAR: ABNORMAL
TRIGL SERPL-MCNC: 230 MG/DL (ref 30–150)
WBC # BLD AUTO: 19 K/UL (ref 3.9–12.7)

## 2022-03-19 PROCEDURE — 20600001 HC STEP DOWN PRIVATE ROOM

## 2022-03-19 PROCEDURE — 36415 COLL VENOUS BLD VENIPUNCTURE: CPT | Performed by: STUDENT IN AN ORGANIZED HEALTH CARE EDUCATION/TRAINING PROGRAM

## 2022-03-19 PROCEDURE — A4217 STERILE WATER/SALINE, 500 ML: HCPCS | Performed by: STUDENT IN AN ORGANIZED HEALTH CARE EDUCATION/TRAINING PROGRAM

## 2022-03-19 PROCEDURE — 83735 ASSAY OF MAGNESIUM: CPT | Performed by: STUDENT IN AN ORGANIZED HEALTH CARE EDUCATION/TRAINING PROGRAM

## 2022-03-19 PROCEDURE — 94640 AIRWAY INHALATION TREATMENT: CPT

## 2022-03-19 PROCEDURE — 85025 COMPLETE CBC W/AUTO DIFF WBC: CPT | Performed by: STUDENT IN AN ORGANIZED HEALTH CARE EDUCATION/TRAINING PROGRAM

## 2022-03-19 PROCEDURE — 25000003 PHARM REV CODE 250: Performed by: SURGERY

## 2022-03-19 PROCEDURE — 84100 ASSAY OF PHOSPHORUS: CPT | Performed by: STUDENT IN AN ORGANIZED HEALTH CARE EDUCATION/TRAINING PROGRAM

## 2022-03-19 PROCEDURE — B4185 PARENTERAL SOL 10 GM LIPIDS: HCPCS | Performed by: STUDENT IN AN ORGANIZED HEALTH CARE EDUCATION/TRAINING PROGRAM

## 2022-03-19 PROCEDURE — 25000003 PHARM REV CODE 250: Performed by: STUDENT IN AN ORGANIZED HEALTH CARE EDUCATION/TRAINING PROGRAM

## 2022-03-19 PROCEDURE — C9113 INJ PANTOPRAZOLE SODIUM, VIA: HCPCS | Performed by: STUDENT IN AN ORGANIZED HEALTH CARE EDUCATION/TRAINING PROGRAM

## 2022-03-19 PROCEDURE — 84478 ASSAY OF TRIGLYCERIDES: CPT | Performed by: STUDENT IN AN ORGANIZED HEALTH CARE EDUCATION/TRAINING PROGRAM

## 2022-03-19 PROCEDURE — 63600175 PHARM REV CODE 636 W HCPCS: Performed by: STUDENT IN AN ORGANIZED HEALTH CARE EDUCATION/TRAINING PROGRAM

## 2022-03-19 PROCEDURE — 63600175 PHARM REV CODE 636 W HCPCS: Performed by: SURGERY

## 2022-03-19 PROCEDURE — 80048 BASIC METABOLIC PNL TOTAL CA: CPT | Performed by: STUDENT IN AN ORGANIZED HEALTH CARE EDUCATION/TRAINING PROGRAM

## 2022-03-19 PROCEDURE — A4216 STERILE WATER/SALINE, 10 ML: HCPCS | Performed by: SURGERY

## 2022-03-19 PROCEDURE — 27000221 HC OXYGEN, UP TO 24 HOURS

## 2022-03-19 PROCEDURE — 99900035 HC TECH TIME PER 15 MIN (STAT)

## 2022-03-19 PROCEDURE — 94761 N-INVAS EAR/PLS OXIMETRY MLT: CPT

## 2022-03-19 RX ADMIN — HYDROMORPHONE HYDROCHLORIDE 0.5 MG: 1 INJECTION, SOLUTION INTRAMUSCULAR; INTRAVENOUS; SUBCUTANEOUS at 08:03

## 2022-03-19 RX ADMIN — Medication 6 MG: at 09:03

## 2022-03-19 RX ADMIN — Medication 10 ML: at 12:03

## 2022-03-19 RX ADMIN — IPRATROPIUM BROMIDE 2 PUFF: 17 AEROSOL, METERED RESPIRATORY (INHALATION) at 08:03

## 2022-03-19 RX ADMIN — HYDROMORPHONE HYDROCHLORIDE 0.5 MG: 1 INJECTION, SOLUTION INTRAMUSCULAR; INTRAVENOUS; SUBCUTANEOUS at 12:03

## 2022-03-19 RX ADMIN — HEPARIN SODIUM 5000 UNITS: 5000 INJECTION INTRAVENOUS; SUBCUTANEOUS at 09:03

## 2022-03-19 RX ADMIN — METOROPROLOL TARTRATE 5 MG: 5 INJECTION, SOLUTION INTRAVENOUS at 05:03

## 2022-03-19 RX ADMIN — PANTOPRAZOLE SODIUM 40 MG: 40 INJECTION, POWDER, FOR SOLUTION INTRAVENOUS at 08:03

## 2022-03-19 RX ADMIN — Medication 10 ML: at 05:03

## 2022-03-19 RX ADMIN — HYDROMORPHONE HYDROCHLORIDE 0.5 MG: 1 INJECTION, SOLUTION INTRAMUSCULAR; INTRAVENOUS; SUBCUTANEOUS at 04:03

## 2022-03-19 RX ADMIN — SOYBEAN OIL 250 ML: 20 INJECTION, SOLUTION INTRAVENOUS at 09:03

## 2022-03-19 RX ADMIN — HEPARIN SODIUM 5000 UNITS: 5000 INJECTION INTRAVENOUS; SUBCUTANEOUS at 05:03

## 2022-03-19 RX ADMIN — HEPARIN SODIUM 5000 UNITS: 5000 INJECTION INTRAVENOUS; SUBCUTANEOUS at 01:03

## 2022-03-19 RX ADMIN — METHOCARBAMOL 500 MG: 100 INJECTION, SOLUTION INTRAMUSCULAR; INTRAVENOUS at 05:03

## 2022-03-19 RX ADMIN — METHOCARBAMOL 500 MG: 100 INJECTION, SOLUTION INTRAMUSCULAR; INTRAVENOUS at 01:03

## 2022-03-19 RX ADMIN — METOROPROLOL TARTRATE 5 MG: 5 INJECTION, SOLUTION INTRAVENOUS at 12:03

## 2022-03-19 RX ADMIN — METHOCARBAMOL 500 MG: 100 INJECTION, SOLUTION INTRAMUSCULAR; INTRAVENOUS at 09:03

## 2022-03-19 RX ADMIN — MAGNESIUM SULFATE HEPTAHYDRATE: 500 INJECTION, SOLUTION INTRAMUSCULAR; INTRAVENOUS at 09:03

## 2022-03-19 NOTE — ASSESSMENT & PLAN NOTE
Patient is 58 yo F with h/o CAD (hx of stents a few years ago), CHF (EF 20%), tobacco abuse, HTN, and right BKA (2/2 diabetes) presenting with  perforated viscus s/p ex lap for gastric ulcer repair 3/14. Clinically stable    - NPO  - NGT to LIWS  - PICC placed 3/17  - TPN ordered  - UGI 3/17 with small contained leak at lesser curvature of stomach. Plan for conservative management with NPO, bowel rest, TPN and repeat study early next week for re-evaluation   - Metop 5 q6h IV at this time (on home metop)  - Continue empiric antimicrobials (zosyn). WBC continues to downtrend. Will continue to trend daily  - IVF while NPO  - PRN pain and nausea control  - Scheduled multimodal pain control  - Daily labs  - Replete lytes PRN  - DVT ppx (SCDs and heparin)  - GI ppx (PPI)  - PT/OT  - OOB, ambulate  - RT, IS, inhalation treatments, wean O2 as tolerated    Dispo: not yet medically stable for dc

## 2022-03-19 NOTE — PROGRESS NOTES
[facility]  General Surgery  Progress Note    Subjective:     History of Present Illness:  Patient is a 58 yo f with hx of CAD (hx of stents a few years ago), CHF (EF 20%), tobacco abuse, HTN, and right BKA (2/2 diabetes) who presented to ED with acute worsening of her abd pain. She has been noticing the pain worsening on and off over the past 6 weeks, but attributed it to a GI bug. She denies any fevers or chills, N/V, hx of PUD, or hx of NSAID use. In ED her WBC was 22 and CT showed free air and fluid in her upper abd around her stomach and duodenum. General Surgery was consulted.    Of note, patient is somewhat somnolent.    Denies and hx of abd surgery  Has been holding her plavix still since her recent admission for a GI bleed      Post-Op Info:  Procedure(s) (LRB):  LAPAROTOMY, EXPLORATORY, repair gastric ulcer (N/A)   5 Days Post-Op     Interval History: NAEON. Afebrile. HDS. Pain controlled. NGT with 600cc out overnight. Plan for repeat UGI next week    Medications:  Continuous Infusions:   TPN ADULT CENTRAL LINE CUSTOM 60 mL/hr at 03/18/22 2210    TPN ADULT CENTRAL LINE CUSTOM       Scheduled Meds:   fat emulsion 20%  250 mL Intravenous Daily    fat emulsion 20%  250 mL Intravenous Daily    heparin (porcine)  5,000 Units Subcutaneous Q8H    ipratropium  2 puff Inhalation BID    LIDOcaine (PF) 10 mg/ml (1%)  1 mL Other Once    methocarbamol (ROBAXIN) IVPB  500 mg Intravenous Q8H    metoprolol  5 mg Intravenous Q6H    pantoprazole  40 mg Intravenous BID    sodium chloride 0.9%  10 mL Intravenous Q6H     PRN Meds:sodium chloride 0.9%, dextrose 10%, glucagon (human recombinant), HYDROmorphone, insulin aspart U-100, melatonin, ondansetron, ondansetron, sodium chloride 0.9%, Flushing PICC Protocol **AND** sodium chloride 0.9% **AND** sodium chloride 0.9%     Review of patient's allergies indicates:   Allergen Reactions    Orange juice Hives    Tomato (solanum lycopersicum) Hives     Objective:      Vital Signs (Most Recent):  Temp: 98.1 °F (36.7 °C) (03/19/22 0354)  Pulse: 70 (03/19/22 0354)  Resp: 18 (03/19/22 0830)  BP: (!) 166/71 (03/19/22 0354)  SpO2: 96 % (03/19/22 0354) Vital Signs (24h Range):  Temp:  [98 °F (36.7 °C)-98.4 °F (36.9 °C)] 98.1 °F (36.7 °C)  Pulse:  [63-77] 70  Resp:  [17-22] 18  SpO2:  [94 %-98 %] 96 %  BP: (112-166)/(63-72) 166/71     Weight: 77 kg (169 lb 12.1 oz)  Body mass index is 23.68 kg/m².    Intake/Output - Last 3 Shifts         03/17 0700  03/18 0659 03/18 0700  03/19 0659 03/19 0700  03/20 0659    P.O.       I.V. (mL/kg) 1229.7 (16)      IV Piggyback 1034.8      Total Intake(mL/kg) 2264.5 (29.4)      Urine (mL/kg/hr) 1050 (0.6) 350 (0.2)     Drains 800 600     Other       Stool 0      Total Output 1850 950     Net +414.5 -950            Stool Occurrence 0 x              Physical Exam  Vitals and nursing note reviewed.   Constitutional:       General: She is not in acute distress.     Appearance: She is not diaphoretic.      Comments: 3L O2 via NC  NGT to LIWS   HENT:      Head: Normocephalic and atraumatic.      Mouth/Throat:      Mouth: Mucous membranes are moist.      Pharynx: Oropharynx is clear.   Eyes:      Extraocular Movements: Extraocular movements intact.      Conjunctiva/sclera: Conjunctivae normal.   Cardiovascular:      Rate and Rhythm: Normal rate.   Pulmonary:      Effort: Pulmonary effort is normal. No respiratory distress.   Abdominal:      General: There is no distension.      Palpations: Abdomen is soft.      Tenderness: There is no guarding or rebound.      Comments: Incision is clean, dry, and intact without drainage, erythema, or increased warmth. Appropriately TTP.   Musculoskeletal:         General: No deformity.      Cervical back: Normal range of motion.   Skin:     General: Skin is warm and dry.   Neurological:      Mental Status: She is alert and oriented to person, place, and time.       Significant Labs:  I have reviewed all pertinent lab  results within the past 24 hours.  CBC:   Recent Labs   Lab 03/19/22  0346   WBC 19.00*   RBC 3.66*   HGB 9.3*   HCT 28.8*      MCV 79*   MCH 25.4*   MCHC 32.3       CMP:   Recent Labs   Lab 03/14/22  0204 03/14/22  1644 03/19/22  0346      < > 265*   CALCIUM 9.5   < > 9.4   ALBUMIN 3.3*  --   --    PROT 8.2  --   --    *   < > 133*   K 4.3   < > 4.2   CO2 21*   < > 25   CL 93*   < > 99   BUN 20   < > 9   CREATININE 1.9*   < > 0.8   ALKPHOS 142*  --   --    ALT 17  --   --    AST 14  --   --    BILITOT 0.3  --   --     < > = values in this interval not displayed.         Significant Diagnostics:  I have reviewed all pertinent imaging results/findings within the past 24 hours.    Assessment/Plan:     * Perforated viscus  Patient is 58 yo F with h/o CAD (hx of stents a few years ago), CHF (EF 20%), tobacco abuse, HTN, and right BKA (2/2 diabetes) presenting with  perforated viscus s/p ex lap for gastric ulcer repair 3/14. Clinically stable    - NPO  - NGT to LIWS  - PICC placed 3/17  - TPN ordered  - UGI 3/17 with small contained leak at lesser curvature of stomach. Plan for conservative management with NPO, bowel rest, TPN and repeat study early next week for re-evaluation   - Metop 5 q6h IV at this time (on home metop)  - Continue empiric antimicrobials (zosyn). WBC continues to downtrend. Will continue to trend daily  - IVF while NPO  - PRN pain and nausea control  - Scheduled multimodal pain control  - Daily labs  - Replete lytes PRN  - DVT ppx (SCDs and heparin)  - GI ppx (PPI)  - PT/OT  - OOB, ambulate  - RT, IS, inhalation treatments, wean O2 as tolerated    Dispo: not yet medically stable for dc        Willem Young MD  General Surgery  [facility]

## 2022-03-19 NOTE — PLAN OF CARE
Patient experienced a lot of abdominal pain tonight that was managed by prn pain medication. Her NG remains to suction. TPN and lipids in progress. No complaints of nausea or vomiting. Staples to mid abdominal incision incision site remains dry and intact.    Problem: Adult Inpatient Plan of Care  Goal: Plan of Care Review  Outcome: Ongoing, Progressing     Problem: Infection  Goal: Absence of Infection Signs and Symptoms  Outcome: Ongoing, Progressing     Problem: Fall Injury Risk  Goal: Absence of Fall and Fall-Related Injury  Outcome: Ongoing, Progressing

## 2022-03-20 PROBLEM — Z78.9 ON TOTAL PARENTERAL NUTRITION (TPN): Status: ACTIVE | Noted: 2022-03-20

## 2022-03-20 LAB
ANION GAP SERPL CALC-SCNC: 10 MMOL/L (ref 8–16)
ANISOCYTOSIS BLD QL SMEAR: SLIGHT
BASOPHILS # BLD AUTO: 0.11 K/UL (ref 0–0.2)
BASOPHILS NFR BLD: 0.5 % (ref 0–1.9)
BUN SERPL-MCNC: 12 MG/DL (ref 6–20)
CALCIUM SERPL-MCNC: 9.2 MG/DL (ref 8.7–10.5)
CHLORIDE SERPL-SCNC: 98 MMOL/L (ref 95–110)
CO2 SERPL-SCNC: 26 MMOL/L (ref 23–29)
CREAT SERPL-MCNC: 1 MG/DL (ref 0.5–1.4)
DIFFERENTIAL METHOD: ABNORMAL
EOSINOPHIL # BLD AUTO: 0.7 K/UL (ref 0–0.5)
EOSINOPHIL NFR BLD: 2.9 % (ref 0–8)
ERYTHROCYTE [DISTWIDTH] IN BLOOD BY AUTOMATED COUNT: 18.3 % (ref 11.5–14.5)
EST. GFR  (AFRICAN AMERICAN): >60 ML/MIN/1.73 M^2
EST. GFR  (NON AFRICAN AMERICAN): >60 ML/MIN/1.73 M^2
GLUCOSE SERPL-MCNC: 490 MG/DL (ref 70–110)
HCT VFR BLD AUTO: 28.6 % (ref 37–48.5)
HGB BLD-MCNC: 9.4 G/DL (ref 12–16)
HYPOCHROMIA BLD QL SMEAR: ABNORMAL
IMM GRANULOCYTES # BLD AUTO: 0.28 K/UL (ref 0–0.04)
IMM GRANULOCYTES NFR BLD AUTO: 1.2 % (ref 0–0.5)
LYMPHOCYTES # BLD AUTO: 4.4 K/UL (ref 1–4.8)
LYMPHOCYTES NFR BLD: 18.2 % (ref 18–48)
MAGNESIUM SERPL-MCNC: 1.9 MG/DL (ref 1.6–2.6)
MCH RBC QN AUTO: 25.2 PG (ref 27–31)
MCHC RBC AUTO-ENTMCNC: 32.9 G/DL (ref 32–36)
MCV RBC AUTO: 77 FL (ref 82–98)
MONOCYTES # BLD AUTO: 1.2 K/UL (ref 0.3–1)
MONOCYTES NFR BLD: 5 % (ref 4–15)
NEUTROPHILS # BLD AUTO: 17.5 K/UL (ref 1.8–7.7)
NEUTROPHILS NFR BLD: 72.2 % (ref 38–73)
NRBC BLD-RTO: 0 /100 WBC
OVALOCYTES BLD QL SMEAR: ABNORMAL
PHOSPHATE SERPL-MCNC: 2.6 MG/DL (ref 2.7–4.5)
PLATELET # BLD AUTO: 434 K/UL (ref 150–450)
PLATELET BLD QL SMEAR: ABNORMAL
PMV BLD AUTO: 10.7 FL (ref 9.2–12.9)
POCT GLUCOSE: 213 MG/DL (ref 70–110)
POCT GLUCOSE: 227 MG/DL (ref 70–110)
POCT GLUCOSE: 261 MG/DL (ref 70–110)
POCT GLUCOSE: 266 MG/DL (ref 70–110)
POCT GLUCOSE: 286 MG/DL (ref 70–110)
POCT GLUCOSE: 289 MG/DL (ref 70–110)
POCT GLUCOSE: 312 MG/DL (ref 70–110)
POCT GLUCOSE: 320 MG/DL (ref 70–110)
POCT GLUCOSE: 410 MG/DL (ref 70–110)
POCT GLUCOSE: 430 MG/DL (ref 70–110)
POCT GLUCOSE: 437 MG/DL (ref 70–110)
POCT GLUCOSE: 443 MG/DL (ref 70–110)
POCT GLUCOSE: 460 MG/DL (ref 70–110)
POCT GLUCOSE: 484 MG/DL (ref 70–110)
POIKILOCYTOSIS BLD QL SMEAR: SLIGHT
POLYCHROMASIA BLD QL SMEAR: ABNORMAL
POTASSIUM SERPL-SCNC: 4 MMOL/L (ref 3.5–5.1)
RBC # BLD AUTO: 3.73 M/UL (ref 4–5.4)
SODIUM SERPL-SCNC: 134 MMOL/L (ref 136–145)
WBC # BLD AUTO: 24.23 K/UL (ref 3.9–12.7)

## 2022-03-20 PROCEDURE — 80048 BASIC METABOLIC PNL TOTAL CA: CPT | Performed by: STUDENT IN AN ORGANIZED HEALTH CARE EDUCATION/TRAINING PROGRAM

## 2022-03-20 PROCEDURE — 99223 1ST HOSP IP/OBS HIGH 75: CPT | Mod: ,,, | Performed by: NURSE PRACTITIONER

## 2022-03-20 PROCEDURE — 25000003 PHARM REV CODE 250: Performed by: STUDENT IN AN ORGANIZED HEALTH CARE EDUCATION/TRAINING PROGRAM

## 2022-03-20 PROCEDURE — 94640 AIRWAY INHALATION TREATMENT: CPT

## 2022-03-20 PROCEDURE — B4185 PARENTERAL SOL 10 GM LIPIDS: HCPCS | Performed by: STUDENT IN AN ORGANIZED HEALTH CARE EDUCATION/TRAINING PROGRAM

## 2022-03-20 PROCEDURE — 63600175 PHARM REV CODE 636 W HCPCS: Performed by: STUDENT IN AN ORGANIZED HEALTH CARE EDUCATION/TRAINING PROGRAM

## 2022-03-20 PROCEDURE — 85025 COMPLETE CBC W/AUTO DIFF WBC: CPT | Performed by: STUDENT IN AN ORGANIZED HEALTH CARE EDUCATION/TRAINING PROGRAM

## 2022-03-20 PROCEDURE — 99223 PR INITIAL HOSPITAL CARE,LEVL III: ICD-10-PCS | Mod: ,,, | Performed by: NURSE PRACTITIONER

## 2022-03-20 PROCEDURE — 63600175 PHARM REV CODE 636 W HCPCS: Performed by: NURSE PRACTITIONER

## 2022-03-20 PROCEDURE — 83735 ASSAY OF MAGNESIUM: CPT | Performed by: STUDENT IN AN ORGANIZED HEALTH CARE EDUCATION/TRAINING PROGRAM

## 2022-03-20 PROCEDURE — 25000003 PHARM REV CODE 250: Performed by: NURSE PRACTITIONER

## 2022-03-20 PROCEDURE — 25000003 PHARM REV CODE 250: Performed by: SURGERY

## 2022-03-20 PROCEDURE — 20600001 HC STEP DOWN PRIVATE ROOM

## 2022-03-20 PROCEDURE — C9113 INJ PANTOPRAZOLE SODIUM, VIA: HCPCS | Performed by: STUDENT IN AN ORGANIZED HEALTH CARE EDUCATION/TRAINING PROGRAM

## 2022-03-20 PROCEDURE — A4216 STERILE WATER/SALINE, 10 ML: HCPCS | Performed by: SURGERY

## 2022-03-20 PROCEDURE — C9399 UNCLASSIFIED DRUGS OR BIOLOG: HCPCS | Performed by: NURSE PRACTITIONER

## 2022-03-20 PROCEDURE — A4217 STERILE WATER/SALINE, 500 ML: HCPCS | Performed by: STUDENT IN AN ORGANIZED HEALTH CARE EDUCATION/TRAINING PROGRAM

## 2022-03-20 PROCEDURE — 99900035 HC TECH TIME PER 15 MIN (STAT)

## 2022-03-20 PROCEDURE — 84100 ASSAY OF PHOSPHORUS: CPT | Performed by: STUDENT IN AN ORGANIZED HEALTH CARE EDUCATION/TRAINING PROGRAM

## 2022-03-20 PROCEDURE — 63600175 PHARM REV CODE 636 W HCPCS: Performed by: SURGERY

## 2022-03-20 PROCEDURE — 27000221 HC OXYGEN, UP TO 24 HOURS

## 2022-03-20 PROCEDURE — 94761 N-INVAS EAR/PLS OXIMETRY MLT: CPT

## 2022-03-20 RX ORDER — INSULIN ASPART 100 [IU]/ML
1-10 INJECTION, SOLUTION INTRAVENOUS; SUBCUTANEOUS EVERY 6 HOURS PRN
Status: DISCONTINUED | OUTPATIENT
Start: 2022-03-20 | End: 2022-03-20

## 2022-03-20 RX ORDER — INSULIN ASPART 100 [IU]/ML
4 INJECTION, SOLUTION INTRAVENOUS; SUBCUTANEOUS
Status: DISCONTINUED | OUTPATIENT
Start: 2022-03-20 | End: 2022-03-20

## 2022-03-20 RX ORDER — INSULIN ASPART 100 [IU]/ML
1-10 INJECTION, SOLUTION INTRAVENOUS; SUBCUTANEOUS EVERY 4 HOURS PRN
Status: DISCONTINUED | OUTPATIENT
Start: 2022-03-20 | End: 2022-03-20

## 2022-03-20 RX ORDER — DEXTROSE MONOHYDRATE 100 MG/ML
INJECTION, SOLUTION INTRAVENOUS CONTINUOUS PRN
Status: DISCONTINUED | OUTPATIENT
Start: 2022-03-20 | End: 2022-03-20

## 2022-03-20 RX ORDER — GLUCAGON 1 MG
1 KIT INJECTION
Status: DISCONTINUED | OUTPATIENT
Start: 2022-03-20 | End: 2022-03-20

## 2022-03-20 RX ADMIN — HYDROMORPHONE HYDROCHLORIDE 0.5 MG: 1 INJECTION, SOLUTION INTRAMUSCULAR; INTRAVENOUS; SUBCUTANEOUS at 05:03

## 2022-03-20 RX ADMIN — INSULIN HUMAN 1.5 UNITS/HR: 1 INJECTION, SOLUTION INTRAVENOUS at 03:03

## 2022-03-20 RX ADMIN — SOYBEAN OIL 250 ML: 20 INJECTION, SOLUTION INTRAVENOUS at 09:03

## 2022-03-20 RX ADMIN — Medication 10 ML: at 05:03

## 2022-03-20 RX ADMIN — METOROPROLOL TARTRATE 5 MG: 5 INJECTION, SOLUTION INTRAVENOUS at 12:03

## 2022-03-20 RX ADMIN — METHOCARBAMOL 500 MG: 100 INJECTION, SOLUTION INTRAMUSCULAR; INTRAVENOUS at 01:03

## 2022-03-20 RX ADMIN — INSULIN ASPART 4 UNITS: 100 INJECTION, SOLUTION INTRAVENOUS; SUBCUTANEOUS at 12:03

## 2022-03-20 RX ADMIN — PIPERACILLIN SODIUM AND TAZOBACTAM SODIUM 4.5 G: 4; .5 INJECTION, POWDER, FOR SOLUTION INTRAVENOUS at 09:03

## 2022-03-20 RX ADMIN — METHOCARBAMOL 500 MG: 100 INJECTION, SOLUTION INTRAMUSCULAR; INTRAVENOUS at 06:03

## 2022-03-20 RX ADMIN — INSULIN HUMAN 2.1 UNITS/HR: 1 INJECTION, SOLUTION INTRAVENOUS at 04:03

## 2022-03-20 RX ADMIN — INSULIN HUMAN 2.7 UNITS/HR: 1 INJECTION, SOLUTION INTRAVENOUS at 06:03

## 2022-03-20 RX ADMIN — METOROPROLOL TARTRATE 5 MG: 5 INJECTION, SOLUTION INTRAVENOUS at 05:03

## 2022-03-20 RX ADMIN — HYDROMORPHONE HYDROCHLORIDE 0.5 MG: 1 INJECTION, SOLUTION INTRAMUSCULAR; INTRAVENOUS; SUBCUTANEOUS at 09:03

## 2022-03-20 RX ADMIN — HYDROMORPHONE HYDROCHLORIDE 0.5 MG: 1 INJECTION, SOLUTION INTRAMUSCULAR; INTRAVENOUS; SUBCUTANEOUS at 12:03

## 2022-03-20 RX ADMIN — INSULIN HUMAN 3 UNITS/HR: 1 INJECTION, SOLUTION INTRAVENOUS at 01:03

## 2022-03-20 RX ADMIN — Medication 10 ML: at 12:03

## 2022-03-20 RX ADMIN — PIPERACILLIN SODIUM AND TAZOBACTAM SODIUM 4.5 G: 4; .5 INJECTION, POWDER, FOR SOLUTION INTRAVENOUS at 05:03

## 2022-03-20 RX ADMIN — METHOCARBAMOL 500 MG: 100 INJECTION, SOLUTION INTRAMUSCULAR; INTRAVENOUS at 10:03

## 2022-03-20 RX ADMIN — INSULIN DETEMIR 22 UNITS: 100 INJECTION, SOLUTION SUBCUTANEOUS at 09:03

## 2022-03-20 RX ADMIN — HYDROMORPHONE HYDROCHLORIDE 0.5 MG: 1 INJECTION, SOLUTION INTRAMUSCULAR; INTRAVENOUS; SUBCUTANEOUS at 04:03

## 2022-03-20 RX ADMIN — HYDROMORPHONE HYDROCHLORIDE 0.5 MG: 1 INJECTION, SOLUTION INTRAMUSCULAR; INTRAVENOUS; SUBCUTANEOUS at 08:03

## 2022-03-20 RX ADMIN — MAGNESIUM SULFATE HEPTAHYDRATE: 500 INJECTION, SOLUTION INTRAMUSCULAR; INTRAVENOUS at 09:03

## 2022-03-20 RX ADMIN — PANTOPRAZOLE SODIUM 40 MG: 40 INJECTION, POWDER, FOR SOLUTION INTRAVENOUS at 09:03

## 2022-03-20 RX ADMIN — HEPARIN SODIUM 5000 UNITS: 5000 INJECTION INTRAVENOUS; SUBCUTANEOUS at 09:03

## 2022-03-20 RX ADMIN — INSULIN ASPART 10 UNITS: 100 INJECTION, SOLUTION INTRAVENOUS; SUBCUTANEOUS at 12:03

## 2022-03-20 RX ADMIN — HEPARIN SODIUM 5000 UNITS: 5000 INJECTION INTRAVENOUS; SUBCUTANEOUS at 01:03

## 2022-03-20 RX ADMIN — HYDROMORPHONE HYDROCHLORIDE 0.5 MG: 1 INJECTION, SOLUTION INTRAMUSCULAR; INTRAVENOUS; SUBCUTANEOUS at 01:03

## 2022-03-20 RX ADMIN — IPRATROPIUM BROMIDE 2 PUFF: 17 AEROSOL, METERED RESPIRATORY (INHALATION) at 09:03

## 2022-03-20 RX ADMIN — INSULIN ASPART 10 UNITS: 100 INJECTION, SOLUTION INTRAVENOUS; SUBCUTANEOUS at 06:03

## 2022-03-20 RX ADMIN — HEPARIN SODIUM 5000 UNITS: 5000 INJECTION INTRAVENOUS; SUBCUTANEOUS at 05:03

## 2022-03-20 RX ADMIN — PANTOPRAZOLE SODIUM 40 MG: 40 INJECTION, POWDER, FOR SOLUTION INTRAVENOUS at 08:03

## 2022-03-20 NOTE — PROGRESS NOTES
[facility]  General Surgery  Progress Note    Subjective:     History of Present Illness:  Patient is a 58 yo f with hx of CAD (hx of stents a few years ago), CHF (EF 20%), tobacco abuse, HTN, and right BKA (2/2 diabetes) who presented to ED with acute worsening of her abd pain. She has been noticing the pain worsening on and off over the past 6 weeks, but attributed it to a GI bug. She denies any fevers or chills, N/V, hx of PUD, or hx of NSAID use. In ED her WBC was 22 and CT showed free air and fluid in her upper abd around her stomach and duodenum. General Surgery was consulted.    Of note, patient is somewhat somnolent.    Denies and hx of abd surgery  Has been holding her plavix still since her recent admission for a GI bleed      Post-Op Info:  Procedure(s) (LRB):  LAPAROTOMY, EXPLORATORY, repair gastric ulcer (N/A)   6 Days Post-Op     Interval History: NAEON. Afebrile. HDS. Pain controlled. NGT with 500cc out overnight. Plan for repeat UGI next week. Passing gas.     Medications:  Continuous Infusions:   TPN ADULT CENTRAL LINE CUSTOM 60 mL/hr at 03/19/22 2150     Scheduled Meds:   fat emulsion 20%  250 mL Intravenous Daily    heparin (porcine)  5,000 Units Subcutaneous Q8H    ipratropium  2 puff Inhalation BID    LIDOcaine (PF) 10 mg/ml (1%)  1 mL Other Once    methocarbamol (ROBAXIN) IVPB  500 mg Intravenous Q8H    metoprolol  5 mg Intravenous Q6H    pantoprazole  40 mg Intravenous BID    sodium chloride 0.9%  10 mL Intravenous Q6H     PRN Meds:sodium chloride 0.9%, dextrose 10%, dextrose 10%, glucagon (human recombinant), glucagon (human recombinant), HYDROmorphone, insulin aspart U-100, insulin aspart U-100, melatonin, ondansetron, ondansetron, sodium chloride 0.9%, Flushing PICC Protocol **AND** sodium chloride 0.9% **AND** sodium chloride 0.9%     Review of patient's allergies indicates:   Allergen Reactions    Orange juice Hives    Tomato (solanum lycopersicum) Hives     Objective:      Vital Signs (Most Recent):  Temp: 98.7 °F (37.1 °C) (03/20/22 0727)  Pulse: 77 (03/20/22 0727)  Resp: 19 (03/20/22 0727)  BP: (!) 163/74 (03/20/22 0727)  SpO2: 96 % (03/20/22 0727) Vital Signs (24h Range):  Temp:  [96.9 °F (36.1 °C)-98.7 °F (37.1 °C)] 98.7 °F (37.1 °C)  Pulse:  [62-77] 77  Resp:  [16-20] 19  SpO2:  [93 %-99 %] 96 %  BP: (143-174)/(66-82) 163/74     Weight: 83 kg (182 lb 15.7 oz)  Body mass index is 25.52 kg/m².    Intake/Output - Last 3 Shifts         03/18 0700  03/19 0659 03/19 0700 03/20 0659 03/20 0700 03/21 0659    I.V. (mL/kg)       IV Piggyback       Total Intake(mL/kg)       Urine (mL/kg/hr) 1550 (0.8) 1050 (0.5)     Drains 600 500     Stool       Total Output 2150 1550     Net -2150 -1550            Urine Occurrence  3 x             Physical Exam  Vitals and nursing note reviewed.   Constitutional:       General: She is not in acute distress.     Appearance: She is not diaphoretic.      Comments: 3L O2 via NC  NGT to LIWS   HENT:      Head: Normocephalic and atraumatic.      Mouth/Throat:      Mouth: Mucous membranes are moist.      Pharynx: Oropharynx is clear.   Eyes:      Extraocular Movements: Extraocular movements intact.      Conjunctiva/sclera: Conjunctivae normal.   Cardiovascular:      Rate and Rhythm: Normal rate.   Pulmonary:      Effort: Pulmonary effort is normal. No respiratory distress.   Abdominal:      General: There is no distension.      Palpations: Abdomen is soft.      Tenderness: There is no guarding or rebound.      Comments: Incision is clean, dry, and intact without drainage, erythema, or increased warmth. Appropriately TTP.   Musculoskeletal:         General: No deformity.      Cervical back: Normal range of motion.   Skin:     General: Skin is warm and dry.   Neurological:      Mental Status: She is alert and oriented to person, place, and time.       Significant Labs:  I have reviewed all pertinent lab results within the past 24 hours.  CBC:   Recent Labs    Lab 03/20/22  0443   WBC 24.23*   RBC 3.73*   HGB 9.4*   HCT 28.6*      MCV 77*   MCH 25.2*   MCHC 32.9       CMP:   Recent Labs   Lab 03/14/22  0204 03/14/22  1644 03/20/22  0443      < > 490*   CALCIUM 9.5   < > 9.2   ALBUMIN 3.3*  --   --    PROT 8.2  --   --    *   < > 134*   K 4.3   < > 4.0   CO2 21*   < > 26   CL 93*   < > 98   BUN 20   < > 12   CREATININE 1.9*   < > 1.0   ALKPHOS 142*  --   --    ALT 17  --   --    AST 14  --   --    BILITOT 0.3  --   --     < > = values in this interval not displayed.         Significant Diagnostics:  I have reviewed all pertinent imaging results/findings within the past 24 hours.    Assessment/Plan:     * Perforated viscus  Patient is 56 yo F with h/o CAD (hx of stents a few years ago), CHF (EF 20%), tobacco abuse, HTN, and right BKA (2/2 diabetes) presenting with  perforated viscus s/p ex lap for gastric ulcer repair 3/14. Clinically stable    - NPO  - NGT to LIWS  - PICC placed 3/17  - TPN ordered  - UGI 3/17 with small contained leak at lesser curvature of stomach. Plan for conservative management with NPO, bowel rest, TPN and repeat study early next week for re-evaluation   - Metop 5 q6h IV at this time (on home metop)  - Continue empiric antimicrobials (zosyn). Will continue to trend WBC daily  - IVF while NPO  - PRN pain and nausea control  - Scheduled multimodal pain control  - Daily labs  - Replete lytes PRN  - DVT ppx (SCDs and heparin)  - GI ppx (PPI)  - PT/OT  - OOB, ambulate  - RT, IS, inhalation treatments, wean O2 as tolerated    Dispo: not yet medically stable for dc        Willem Young MD  General Surgery  [facility]

## 2022-03-20 NOTE — HPI
Reason for Consult: Management of T2DM, Hyperglycemia     Surgical Procedure and Date: Gastric Ulcer Repair- 3/18/2022    Diabetes diagnosis year: 2008    Home Diabetes Medications: Metformin 1000 mg QD; Lantus 50 units h.s.; Glipizide 5 mg QD.     How often checking glucose at home? One   BG readings on regimen: 's  Hypoglycemia on the regimen?  No  Missed doses on regimen?  No    Diabetes Complications include:     Hyperglycemia    Complicating diabetes co morbidities:   TPN    Hemoglobin A1C   Date Value Ref Range Status   02/20/2022 5.8 (H) 4.0 - 5.6 % Final     Comment:     ADA Screening Guidelines:  5.7-6.4%  Consistent with prediabetes  >or=6.5%  Consistent with diabetes    High levels of fetal hemoglobin interfere with the HbA1C  assay. Heterozygous hemoglobin variants (HbS, HgC, etc)do  not significantly interfere with this assay.   However, presence of multiple variants may affect accuracy.           HPI: Patient is a 56 yo f with hx of CAD (hx of stents a few years ago), CHF (EF 20%), tobacco abuse, HTN, and right BKA (2/2 diabetes) who presented to ED with acute worsening of her abd pain. She has been noticing the pain worsening on and off over the past 6 weeks, but attributed it to a GI bug. She denies any fevers or chills, N/V, hx of PUD, or hx of NSAID use. In ED her WBC was 22 and CT showed free air and fluid in her upper abd around her stomach and duodenum. Endocrine consulted to manage hyperglycemia and type 2 diabetes.

## 2022-03-20 NOTE — SUBJECTIVE & OBJECTIVE
Interval History: NAEON. Afebrile. HDS. Pain controlled. NGT with 500cc out overnight. Plan for repeat UGI next week. Passing gas.     Medications:  Continuous Infusions:   TPN ADULT CENTRAL LINE CUSTOM 60 mL/hr at 03/19/22 2150     Scheduled Meds:   fat emulsion 20%  250 mL Intravenous Daily    heparin (porcine)  5,000 Units Subcutaneous Q8H    ipratropium  2 puff Inhalation BID    LIDOcaine (PF) 10 mg/ml (1%)  1 mL Other Once    methocarbamol (ROBAXIN) IVPB  500 mg Intravenous Q8H    metoprolol  5 mg Intravenous Q6H    pantoprazole  40 mg Intravenous BID    sodium chloride 0.9%  10 mL Intravenous Q6H     PRN Meds:sodium chloride 0.9%, dextrose 10%, dextrose 10%, glucagon (human recombinant), glucagon (human recombinant), HYDROmorphone, insulin aspart U-100, insulin aspart U-100, melatonin, ondansetron, ondansetron, sodium chloride 0.9%, Flushing PICC Protocol **AND** sodium chloride 0.9% **AND** sodium chloride 0.9%     Review of patient's allergies indicates:   Allergen Reactions    Orange juice Hives    Tomato (solanum lycopersicum) Hives     Objective:     Vital Signs (Most Recent):  Temp: 98.7 °F (37.1 °C) (03/20/22 0727)  Pulse: 77 (03/20/22 0727)  Resp: 19 (03/20/22 0727)  BP: (!) 163/74 (03/20/22 0727)  SpO2: 96 % (03/20/22 0727) Vital Signs (24h Range):  Temp:  [96.9 °F (36.1 °C)-98.7 °F (37.1 °C)] 98.7 °F (37.1 °C)  Pulse:  [62-77] 77  Resp:  [16-20] 19  SpO2:  [93 %-99 %] 96 %  BP: (143-174)/(66-82) 163/74     Weight: 83 kg (182 lb 15.7 oz)  Body mass index is 25.52 kg/m².    Intake/Output - Last 3 Shifts         03/18 0700  03/19 0659 03/19 0700  03/20 0659 03/20 0700  03/21 0659    I.V. (mL/kg)       IV Piggyback       Total Intake(mL/kg)       Urine (mL/kg/hr) 1550 (0.8) 1050 (0.5)     Drains 600 500     Stool       Total Output 2150 1550     Net -2150 -1550            Urine Occurrence  3 x             Physical Exam  Vitals and nursing note reviewed.   Constitutional:       General: She is not in  acute distress.     Appearance: She is not diaphoretic.      Comments: 3L O2 via NC  NGT to PARVEEN   HENT:      Head: Normocephalic and atraumatic.      Mouth/Throat:      Mouth: Mucous membranes are moist.      Pharynx: Oropharynx is clear.   Eyes:      Extraocular Movements: Extraocular movements intact.      Conjunctiva/sclera: Conjunctivae normal.   Cardiovascular:      Rate and Rhythm: Normal rate.   Pulmonary:      Effort: Pulmonary effort is normal. No respiratory distress.   Abdominal:      General: There is no distension.      Palpations: Abdomen is soft.      Tenderness: There is no guarding or rebound.      Comments: Incision is clean, dry, and intact without drainage, erythema, or increased warmth. Appropriately TTP.   Musculoskeletal:         General: No deformity.      Cervical back: Normal range of motion.   Skin:     General: Skin is warm and dry.   Neurological:      Mental Status: She is alert and oriented to person, place, and time.       Significant Labs:  I have reviewed all pertinent lab results within the past 24 hours.  CBC:   Recent Labs   Lab 03/20/22  0443   WBC 24.23*   RBC 3.73*   HGB 9.4*   HCT 28.6*      MCV 77*   MCH 25.2*   MCHC 32.9       CMP:   Recent Labs   Lab 03/14/22  0204 03/14/22  1644 03/20/22  0443      < > 490*   CALCIUM 9.5   < > 9.2   ALBUMIN 3.3*  --   --    PROT 8.2  --   --    *   < > 134*   K 4.3   < > 4.0   CO2 21*   < > 26   CL 93*   < > 98   BUN 20   < > 12   CREATININE 1.9*   < > 1.0   ALKPHOS 142*  --   --    ALT 17  --   --    AST 14  --   --    BILITOT 0.3  --   --     < > = values in this interval not displayed.         Significant Diagnostics:  I have reviewed all pertinent imaging results/findings within the past 24 hours.

## 2022-03-20 NOTE — NURSING
Patient in bed with  at bedside. NGT still connected to wall suction. Medicated as per MAR and every 4 hours with PRN pain medications. Nursing care continues

## 2022-03-20 NOTE — ASSESSMENT & PLAN NOTE
Endocrinology consulted for BG management.   BG goal 140-180        - IIP  - Requires intensive BG monitoring.   - BG checks q1hr    - Notify endocrine if patient becomes hypokalemic (K < 3.3) and/or is not responding to replacement.   - Notify endocrine if patient requiring > 20 units/hr.      ** Please notify Endocrine for any change and/or advance in diet**  ** Please call Endocrine for any BG related issues **    Discharge Planning:   TBD. Please notify endocrinology prior to discharge.

## 2022-03-20 NOTE — SUBJECTIVE & OBJECTIVE
Interval HPI:   Overnight events: BG excursions overnight; due to starting TPN. Patient on the [facility] in room 1007/1007 A. Blood glucose worsening. BG above goal on current insulin regimen (IIP). Steroid use- None. 6 Days Post-Op  Renal function- Normal   Vasopressors-  None       Diet NPO  TPN ADULT CENTRAL LINE CUSTOM  TPN ADULT CENTRAL LINE CUSTOM     Eating:   NPO  Nausea: No  Hypoglycemia and intervention: No  Fever: No  TPN and/or TF: Yes  If yes, type of TF/TPN and rate: TPN @ 60 ml/hr    PMH, PSH, FH, SH updated and reviewed     ROS:  Review of Systems  Constitutional: Negative for weight changes.  Eyes: Negative for visual disturbance.  Respiratory: Negative for cough.   Cardiovascular: Negative for chest pain.  Gastrointestinal: Positive for nausea.  Endocrine: Negative for polyuria, polydipsia.  Musculoskeletal: Negative for back pain.  Skin: Negative for rash.  Neurological: Negative for syncope.  Psychiatric/Behavioral: Negative for depression.    Current Medications and/or Treatments Impacting Glycemic Control  Immunotherapy:    Immunosuppressants       None          Steroids:   Hormones (From admission, onward)                Start     Stop Route Frequency Ordered    03/18/22 0844  melatonin tablet 6 mg         -- Oral Nightly PRN 03/18/22 0744          Pressors:    Autonomic Drugs (From admission, onward)                None          Hyperglycemia/Diabetes Medications:   Antihyperglycemics (From admission, onward)                Start     Stop Route Frequency Ordered    03/20/22 1400  insulin regular in 0.9 % NaCl 100 unit/100 mL (1 unit/mL) infusion        Question:  Enter initial dose from Infusion Protocol Chart (Units/hr):  Answer:  3    -- IV Continuous 03/20/22 1249             PHYSICAL EXAMINATION:  Vitals:    03/20/22 1209   BP: 139/65   Pulse: 71   Resp: 20   Temp: 98.9 °F (37.2 °C)     Body mass index is 25.52 kg/m².    Physical Exam  Constitutional: Well developed, well nourished,  NAD.  ENT: External ears no masses with nose patent; normal hearing.  Neck: Supple; trachea midline.  Cardiovascular: Normal heart sounds, no LE edema. DP +2 bilaterally.  Lungs: Normal effort; lungs anterior bilaterally clear to auscultation.  Abdomen: Soft, no masses, no hernias.  MS: No clubbing or cyanosis of nails noted; unable to assess gait.  Skin: No rashes, lesions, or ulcers; no nodules. Injection sites are ok. No lipo hypertropthy or atrophy.  Psychiatric: Good judgement and insight; normal mood and affect.  Neurological: Cranial nerves are grossly intact.   Foot: Nails in good condition, no amputations noted.

## 2022-03-20 NOTE — PLAN OF CARE
A&Ox4. VVS on 1L NC. Adequate UOP per purwick. Q2 turns in place with full assist. ML incision with staples, partial thickness wound to buttocks with foam. R. BKA with prosthesis at bedside. L. Foot partially amputated. NG tube to right nare @ LIWS. IV insulin @ 3.5 with Q1 accuchecks maintained. NPO with TPN @ 60 and lipids. Pain managed with prn medications. Pt currently resting comfortably, bed in low position, call light in reach. WCTM.

## 2022-03-20 NOTE — CARE UPDATE
Care Update:    BG excursions; not responding to SubQ insulin. Starting IIP due BG excursions in the presence of TPN.    POCT Glucose   Date Value Ref Range Status   03/20/2022 460 (HH) 70 - 110 mg/dL Final   03/20/2022 430 (H) 70 - 110 mg/dL Final   03/20/2022 443 (H) 70 - 110 mg/dL Final   03/20/2022 484 (HH) 70 - 110 mg/dL Final   03/17/2022 83 70 - 110 mg/dL Final     Hemoglobin A1C   Date Value Ref Range Status   02/20/2022 5.8 (H) 4.0 - 5.6 % Final     Comment:     ADA Screening Guidelines:  5.7-6.4%  Consistent with prediabetes  >or=6.5%  Consistent with diabetes    High levels of fetal hemoglobin interfere with the HbA1C  assay. Heterozygous hemoglobin variants (HbS, HgC, etc)do  not significantly interfere with this assay.   However, presence of multiple variants may affect accuracy.       Endocrinology consulted for BG management.   BG goal 140-180    - D/C SubQ orders  - IIP  - Requires intensive BG monitoring.   - BG checks q1hr    - Notify endocrine if patient becomes hypokalemic (K < 3.3) and/or is not responding to replacement.   - Notify endocrine if patient requiring > 20 units/hr.    ** Please notify Endocrine for any change and/or advance in diet**  ** Please call Endocrine for any BG related issues **    Discharge Planning:   TBD. Please notify endocrinology prior to discharge.      Landen Javier DNP, FNP-C  Department of Endocrinology  Inpatient Glycemic Management

## 2022-03-20 NOTE — CONSULTS
[facility]  Endocrinology  Diabetes Consult Note    Consult Requested by: Lennox Smith MD   Reason for admit: Perforated viscus    HISTORY OF PRESENT ILLNESS:  Reason for Consult: Management of T2DM, Hyperglycemia     Surgical Procedure and Date: Gastric Ulcer Repair- 3/18/2022    Diabetes diagnosis year: 2008    Home Diabetes Medications: Metformin 1000 mg QD; Lantus 50 units h.s.; Glipizide 5 mg QD.     How often checking glucose at home? One   BG readings on regimen: 's  Hypoglycemia on the regimen?  No  Missed doses on regimen?  No    Diabetes Complications include:     Hyperglycemia    Complicating diabetes co morbidities:   TPN    Hemoglobin A1C   Date Value Ref Range Status   02/20/2022 5.8 (H) 4.0 - 5.6 % Final     Comment:     ADA Screening Guidelines:  5.7-6.4%  Consistent with prediabetes  >or=6.5%  Consistent with diabetes    High levels of fetal hemoglobin interfere with the HbA1C  assay. Heterozygous hemoglobin variants (HbS, HgC, etc)do  not significantly interfere with this assay.   However, presence of multiple variants may affect accuracy.           HPI: Patient is a 56 yo f with hx of CAD (hx of stents a few years ago), CHF (EF 20%), tobacco abuse, HTN, and right BKA (2/2 diabetes) who presented to ED with acute worsening of her abd pain. She has been noticing the pain worsening on and off over the past 6 weeks, but attributed it to a GI bug. She denies any fevers or chills, N/V, hx of PUD, or hx of NSAID use. In ED her WBC was 22 and CT showed free air and fluid in her upper abd around her stomach and duodenum. Endocrine consulted to manage hyperglycemia and type 2 diabetes.             Interval HPI:   Overnight events: BG excursions overnight; due to starting TPN. Patient on the [facility] in room 1007/1007 A. Blood glucose worsening. BG above goal on current insulin regimen (IIP). Steroid use- None. 6 Days Post-Op  Renal function- Normal   Vasopressors-  None       Diet NPO  TPN  ADULT CENTRAL LINE CUSTOM  TPN ADULT CENTRAL LINE CUSTOM     Eating:   NPO  Nausea: No  Hypoglycemia and intervention: No  Fever: No  TPN and/or TF: Yes  If yes, type of TF/TPN and rate: TPN @ 60 ml/hr    PMH, PSH, FH, SH updated and reviewed     ROS:  Review of Systems  Constitutional: Negative for weight changes.  Eyes: Negative for visual disturbance.  Respiratory: Negative for cough.   Cardiovascular: Negative for chest pain.  Gastrointestinal: Positive for nausea.  Endocrine: Negative for polyuria, polydipsia.  Musculoskeletal: Negative for back pain.  Skin: Negative for rash.  Neurological: Negative for syncope.  Psychiatric/Behavioral: Negative for depression.    Current Medications and/or Treatments Impacting Glycemic Control  Immunotherapy:    Immunosuppressants       None          Steroids:   Hormones (From admission, onward)                Start     Stop Route Frequency Ordered    03/18/22 0844  melatonin tablet 6 mg         -- Oral Nightly PRN 03/18/22 0744          Pressors:    Autonomic Drugs (From admission, onward)                None          Hyperglycemia/Diabetes Medications:   Antihyperglycemics (From admission, onward)                Start     Stop Route Frequency Ordered    03/20/22 1400  insulin regular in 0.9 % NaCl 100 unit/100 mL (1 unit/mL) infusion        Question:  Enter initial dose from Infusion Protocol Chart (Units/hr):  Answer:  3    -- IV Continuous 03/20/22 1249             PHYSICAL EXAMINATION:  Vitals:    03/20/22 1209   BP: 139/65   Pulse: 71   Resp: 20   Temp: 98.9 °F (37.2 °C)     Body mass index is 25.52 kg/m².    Physical Exam  Constitutional: Well developed, well nourished, NAD.  ENT: External ears no masses with nose patent; normal hearing.  Neck: Supple; trachea midline.  Cardiovascular: Normal heart sounds, no LE edema. DP +2 bilaterally.  Lungs: Normal effort; lungs anterior bilaterally clear to auscultation.  Abdomen: Soft, no masses, no hernias.  MS: No clubbing or  cyanosis of nails noted; unable to assess gait.  Skin: No rashes, lesions, or ulcers; no nodules. Injection sites are ok. No lipo hypertropthy or atrophy.  Psychiatric: Good judgement and insight; normal mood and affect.  Neurological: Cranial nerves are grossly intact.   Foot: Nails in good condition, no amputations noted.        Labs Reviewed and Include   Recent Labs   Lab 03/20/22  0443   *   CALCIUM 9.2   *   K 4.0   CO2 26   CL 98   BUN 12   CREATININE 1.0     Lab Results   Component Value Date    WBC 24.23 (H) 03/20/2022    HGB 9.4 (L) 03/20/2022    HCT 28.6 (L) 03/20/2022    MCV 77 (L) 03/20/2022     03/20/2022     No results for input(s): TSH, FREET4 in the last 168 hours.  Lab Results   Component Value Date    HGBA1C 5.8 (H) 02/20/2022       Nutritional status:   Body mass index is 25.52 kg/m².  Lab Results   Component Value Date    ALBUMIN 3.3 (L) 03/14/2022    ALBUMIN 2.6 (L) 02/24/2022    ALBUMIN 2.7 (L) 02/23/2022     No results found for: PREALBUMIN    Estimated Creatinine Clearance: 70.2 mL/min (based on SCr of 1 mg/dL).    Accu-Checks  Recent Labs     03/17/22  1557 03/20/22  0544 03/20/22  0731 03/20/22  1208 03/20/22  1210   POCTGLUCOSE 83 484* 443* 430* 460*        ASSESSMENT and PLAN    * Perforated viscus  Managed per primary team  Avoid hypoglycemia      DM (diabetes mellitus), type 2  Endocrinology consulted for BG management.   BG goal 140-180        - IIP  - Requires intensive BG monitoring.   - BG checks q1hr    - Notify endocrine if patient becomes hypokalemic (K < 3.3) and/or is not responding to replacement.   - Notify endocrine if patient requiring > 20 units/hr.      ** Please notify Endocrine for any change and/or advance in diet**  ** Please call Endocrine for any BG related issues **    Discharge Planning:   TBD. Please notify endocrinology prior to discharge.        On total parenteral nutrition (TPN)  Contributing to hyperglycemia.           Plan discussed  with patient, family, and RN at bedside.      Landen Javier, DNP, FNP  Endocrinology  [facility]

## 2022-03-20 NOTE — ASSESSMENT & PLAN NOTE
Patient is 58 yo F with h/o CAD (hx of stents a few years ago), CHF (EF 20%), tobacco abuse, HTN, and right BKA (2/2 diabetes) presenting with  perforated viscus s/p ex lap for gastric ulcer repair 3/14. Clinically stable    - NPO  - NGT to LIWS  - PICC placed 3/17  - TPN ordered  - UGI 3/17 with small contained leak at lesser curvature of stomach. Plan for conservative management with NPO, bowel rest, TPN and repeat study early next week for re-evaluation   - Metop 5 q6h IV at this time (on home metop)  - Continue empiric antimicrobials (zosyn). Will continue to trend WBC daily  - IVF while NPO  - PRN pain and nausea control  - Scheduled multimodal pain control  - Daily labs  - Replete lytes PRN  - DVT ppx (SCDs and heparin)  - GI ppx (PPI)  - PT/OT  - OOB, ambulate  - RT, IS, inhalation treatments, wean O2 as tolerated    Dispo: not yet medically stable for dc

## 2022-03-21 LAB
ANION GAP SERPL CALC-SCNC: 10 MMOL/L (ref 8–16)
ANISOCYTOSIS BLD QL SMEAR: SLIGHT
BASOPHILS # BLD AUTO: 0.09 K/UL (ref 0–0.2)
BASOPHILS NFR BLD: 0.3 % (ref 0–1.9)
BUN SERPL-MCNC: 15 MG/DL (ref 6–20)
BURR CELLS BLD QL SMEAR: ABNORMAL
CALCIUM SERPL-MCNC: 9.3 MG/DL (ref 8.7–10.5)
CHLORIDE SERPL-SCNC: 100 MMOL/L (ref 95–110)
CO2 SERPL-SCNC: 27 MMOL/L (ref 23–29)
CREAT SERPL-MCNC: 0.9 MG/DL (ref 0.5–1.4)
DIFFERENTIAL METHOD: ABNORMAL
EOSINOPHIL # BLD AUTO: 1.1 K/UL (ref 0–0.5)
EOSINOPHIL NFR BLD: 4 % (ref 0–8)
ERYTHROCYTE [DISTWIDTH] IN BLOOD BY AUTOMATED COUNT: 17.7 % (ref 11.5–14.5)
EST. GFR  (AFRICAN AMERICAN): >60 ML/MIN/1.73 M^2
EST. GFR  (NON AFRICAN AMERICAN): >60 ML/MIN/1.73 M^2
FINAL PATHOLOGIC DIAGNOSIS: NORMAL
GLUCOSE SERPL-MCNC: 201 MG/DL (ref 70–110)
GROSS: NORMAL
HCT VFR BLD AUTO: 26.8 % (ref 37–48.5)
HGB BLD-MCNC: 8.7 G/DL (ref 12–16)
HYPOCHROMIA BLD QL SMEAR: ABNORMAL
IMM GRANULOCYTES # BLD AUTO: 0.41 K/UL (ref 0–0.04)
IMM GRANULOCYTES NFR BLD AUTO: 1.4 % (ref 0–0.5)
LYMPHOCYTES # BLD AUTO: 4.1 K/UL (ref 1–4.8)
LYMPHOCYTES NFR BLD: 14.5 % (ref 18–48)
Lab: NORMAL
MAGNESIUM SERPL-MCNC: 2 MG/DL (ref 1.6–2.6)
MCH RBC QN AUTO: 24.9 PG (ref 27–31)
MCHC RBC AUTO-ENTMCNC: 32.5 G/DL (ref 32–36)
MCV RBC AUTO: 77 FL (ref 82–98)
MICROSCOPIC EXAM: NORMAL
MONOCYTES # BLD AUTO: 1.4 K/UL (ref 0.3–1)
MONOCYTES NFR BLD: 4.7 % (ref 4–15)
NEUTROPHILS # BLD AUTO: 21.4 K/UL (ref 1.8–7.7)
NEUTROPHILS NFR BLD: 75.1 % (ref 38–73)
NRBC BLD-RTO: 0 /100 WBC
OVALOCYTES BLD QL SMEAR: ABNORMAL
PHOSPHATE SERPL-MCNC: 3.4 MG/DL (ref 2.7–4.5)
PLATELET # BLD AUTO: 394 K/UL (ref 150–450)
PLATELET BLD QL SMEAR: ABNORMAL
PMV BLD AUTO: 10.7 FL (ref 9.2–12.9)
POCT GLUCOSE: 106 MG/DL (ref 70–110)
POCT GLUCOSE: 136 MG/DL (ref 70–110)
POCT GLUCOSE: 148 MG/DL (ref 70–110)
POCT GLUCOSE: 156 MG/DL (ref 70–110)
POCT GLUCOSE: 181 MG/DL (ref 70–110)
POCT GLUCOSE: 188 MG/DL (ref 70–110)
POCT GLUCOSE: 194 MG/DL (ref 70–110)
POCT GLUCOSE: 210 MG/DL (ref 70–110)
POCT GLUCOSE: 219 MG/DL (ref 70–110)
POCT GLUCOSE: 222 MG/DL (ref 70–110)
POCT GLUCOSE: 224 MG/DL (ref 70–110)
POCT GLUCOSE: 232 MG/DL (ref 70–110)
POCT GLUCOSE: 242 MG/DL (ref 70–110)
POIKILOCYTOSIS BLD QL SMEAR: SLIGHT
POLYCHROMASIA BLD QL SMEAR: ABNORMAL
POTASSIUM SERPL-SCNC: 3.8 MMOL/L (ref 3.5–5.1)
RBC # BLD AUTO: 3.49 M/UL (ref 4–5.4)
SODIUM SERPL-SCNC: 137 MMOL/L (ref 136–145)
TARGETS BLD QL SMEAR: ABNORMAL
WBC # BLD AUTO: 28.53 K/UL (ref 3.9–12.7)

## 2022-03-21 PROCEDURE — 94761 N-INVAS EAR/PLS OXIMETRY MLT: CPT

## 2022-03-21 PROCEDURE — 20600001 HC STEP DOWN PRIVATE ROOM

## 2022-03-21 PROCEDURE — 25000003 PHARM REV CODE 250: Performed by: STUDENT IN AN ORGANIZED HEALTH CARE EDUCATION/TRAINING PROGRAM

## 2022-03-21 PROCEDURE — 63600175 PHARM REV CODE 636 W HCPCS: Performed by: STUDENT IN AN ORGANIZED HEALTH CARE EDUCATION/TRAINING PROGRAM

## 2022-03-21 PROCEDURE — 25500020 PHARM REV CODE 255: Performed by: SURGERY

## 2022-03-21 PROCEDURE — 84100 ASSAY OF PHOSPHORUS: CPT | Performed by: STUDENT IN AN ORGANIZED HEALTH CARE EDUCATION/TRAINING PROGRAM

## 2022-03-21 PROCEDURE — 25000003 PHARM REV CODE 250: Performed by: SURGERY

## 2022-03-21 PROCEDURE — 99900035 HC TECH TIME PER 15 MIN (STAT)

## 2022-03-21 PROCEDURE — 97110 THERAPEUTIC EXERCISES: CPT | Mod: CQ

## 2022-03-21 PROCEDURE — 94640 AIRWAY INHALATION TREATMENT: CPT

## 2022-03-21 PROCEDURE — 83735 ASSAY OF MAGNESIUM: CPT | Performed by: STUDENT IN AN ORGANIZED HEALTH CARE EDUCATION/TRAINING PROGRAM

## 2022-03-21 PROCEDURE — 63600175 PHARM REV CODE 636 W HCPCS: Performed by: NURSE PRACTITIONER

## 2022-03-21 PROCEDURE — C9113 INJ PANTOPRAZOLE SODIUM, VIA: HCPCS | Performed by: STUDENT IN AN ORGANIZED HEALTH CARE EDUCATION/TRAINING PROGRAM

## 2022-03-21 PROCEDURE — 99232 SBSQ HOSP IP/OBS MODERATE 35: CPT | Mod: ,,, | Performed by: NURSE PRACTITIONER

## 2022-03-21 PROCEDURE — B4185 PARENTERAL SOL 10 GM LIPIDS: HCPCS | Performed by: STUDENT IN AN ORGANIZED HEALTH CARE EDUCATION/TRAINING PROGRAM

## 2022-03-21 PROCEDURE — A4217 STERILE WATER/SALINE, 500 ML: HCPCS | Performed by: STUDENT IN AN ORGANIZED HEALTH CARE EDUCATION/TRAINING PROGRAM

## 2022-03-21 PROCEDURE — A4216 STERILE WATER/SALINE, 10 ML: HCPCS | Performed by: SURGERY

## 2022-03-21 PROCEDURE — 63600175 PHARM REV CODE 636 W HCPCS: Performed by: SURGERY

## 2022-03-21 PROCEDURE — 25000003 PHARM REV CODE 250: Performed by: NURSE PRACTITIONER

## 2022-03-21 PROCEDURE — 97535 SELF CARE MNGMENT TRAINING: CPT

## 2022-03-21 PROCEDURE — 97530 THERAPEUTIC ACTIVITIES: CPT

## 2022-03-21 PROCEDURE — 97116 GAIT TRAINING THERAPY: CPT | Mod: CQ

## 2022-03-21 PROCEDURE — 27000221 HC OXYGEN, UP TO 24 HOURS

## 2022-03-21 PROCEDURE — 80048 BASIC METABOLIC PNL TOTAL CA: CPT | Performed by: STUDENT IN AN ORGANIZED HEALTH CARE EDUCATION/TRAINING PROGRAM

## 2022-03-21 PROCEDURE — 99232 PR SUBSEQUENT HOSPITAL CARE,LEVL II: ICD-10-PCS | Mod: ,,, | Performed by: NURSE PRACTITIONER

## 2022-03-21 PROCEDURE — 85025 COMPLETE CBC W/AUTO DIFF WBC: CPT | Performed by: STUDENT IN AN ORGANIZED HEALTH CARE EDUCATION/TRAINING PROGRAM

## 2022-03-21 RX ORDER — IBUPROFEN 200 MG
16 TABLET ORAL
Status: DISCONTINUED | OUTPATIENT
Start: 2022-03-21 | End: 2022-03-23

## 2022-03-21 RX ORDER — IBUPROFEN 200 MG
24 TABLET ORAL
Status: DISCONTINUED | OUTPATIENT
Start: 2022-03-21 | End: 2022-03-23

## 2022-03-21 RX ORDER — INSULIN ASPART 100 [IU]/ML
0-10 INJECTION, SOLUTION INTRAVENOUS; SUBCUTANEOUS
Status: DISCONTINUED | OUTPATIENT
Start: 2022-03-21 | End: 2022-03-23

## 2022-03-21 RX ORDER — GLUCAGON 1 MG
1 KIT INJECTION
Status: DISCONTINUED | OUTPATIENT
Start: 2022-03-21 | End: 2022-03-23

## 2022-03-21 RX ADMIN — HYDROMORPHONE HYDROCHLORIDE 0.5 MG: 1 INJECTION, SOLUTION INTRAMUSCULAR; INTRAVENOUS; SUBCUTANEOUS at 05:03

## 2022-03-21 RX ADMIN — IPRATROPIUM BROMIDE 2 PUFF: 17 AEROSOL, METERED RESPIRATORY (INHALATION) at 08:03

## 2022-03-21 RX ADMIN — Medication 6 MG: at 09:03

## 2022-03-21 RX ADMIN — METOROPROLOL TARTRATE 5 MG: 5 INJECTION, SOLUTION INTRAVENOUS at 11:03

## 2022-03-21 RX ADMIN — HYDROMORPHONE HYDROCHLORIDE 0.5 MG: 1 INJECTION, SOLUTION INTRAMUSCULAR; INTRAVENOUS; SUBCUTANEOUS at 09:03

## 2022-03-21 RX ADMIN — HEPARIN SODIUM 5000 UNITS: 5000 INJECTION INTRAVENOUS; SUBCUTANEOUS at 09:03

## 2022-03-21 RX ADMIN — HYDROMORPHONE HYDROCHLORIDE 0.5 MG: 1 INJECTION, SOLUTION INTRAMUSCULAR; INTRAVENOUS; SUBCUTANEOUS at 01:03

## 2022-03-21 RX ADMIN — METOROPROLOL TARTRATE 5 MG: 5 INJECTION, SOLUTION INTRAVENOUS at 06:03

## 2022-03-21 RX ADMIN — Medication 10 ML: at 08:03

## 2022-03-21 RX ADMIN — Medication 10 ML: at 11:03

## 2022-03-21 RX ADMIN — IOHEXOL 125 ML: 350 INJECTION, SOLUTION INTRAVENOUS at 03:03

## 2022-03-21 RX ADMIN — METHOCARBAMOL 500 MG: 100 INJECTION, SOLUTION INTRAMUSCULAR; INTRAVENOUS at 06:03

## 2022-03-21 RX ADMIN — HEPARIN SODIUM 5000 UNITS: 5000 INJECTION INTRAVENOUS; SUBCUTANEOUS at 01:03

## 2022-03-21 RX ADMIN — INSULIN HUMAN 2.3 UNITS/HR: 1 INJECTION, SOLUTION INTRAVENOUS at 05:03

## 2022-03-21 RX ADMIN — PANTOPRAZOLE SODIUM 40 MG: 40 INJECTION, POWDER, FOR SOLUTION INTRAVENOUS at 08:03

## 2022-03-21 RX ADMIN — PIPERACILLIN SODIUM AND TAZOBACTAM SODIUM 4.5 G: 4; .5 INJECTION, POWDER, FOR SOLUTION INTRAVENOUS at 02:03

## 2022-03-21 RX ADMIN — Medication 10 ML: at 12:03

## 2022-03-21 RX ADMIN — METHOCARBAMOL 500 MG: 100 INJECTION, SOLUTION INTRAMUSCULAR; INTRAVENOUS at 09:03

## 2022-03-21 RX ADMIN — PIPERACILLIN SODIUM AND TAZOBACTAM SODIUM 4.5 G: 4; .5 INJECTION, POWDER, FOR SOLUTION INTRAVENOUS at 09:03

## 2022-03-21 RX ADMIN — PIPERACILLIN SODIUM AND TAZOBACTAM SODIUM 4.5 G: 4; .5 INJECTION, POWDER, FOR SOLUTION INTRAVENOUS at 05:03

## 2022-03-21 RX ADMIN — INSULIN ASPART 2 UNITS: 100 INJECTION, SOLUTION INTRAVENOUS; SUBCUTANEOUS at 08:03

## 2022-03-21 RX ADMIN — MAGNESIUM SULFATE HEPTAHYDRATE: 500 INJECTION, SOLUTION INTRAMUSCULAR; INTRAVENOUS at 11:03

## 2022-03-21 RX ADMIN — Medication 10 ML: at 05:03

## 2022-03-21 RX ADMIN — INSULIN HUMAN 6 UNITS/HR: 1 INJECTION, SOLUTION INTRAVENOUS at 08:03

## 2022-03-21 RX ADMIN — METOROPROLOL TARTRATE 5 MG: 5 INJECTION, SOLUTION INTRAVENOUS at 05:03

## 2022-03-21 RX ADMIN — INSULIN HUMAN 4 UNITS/HR: 1 INJECTION, SOLUTION INTRAVENOUS at 09:03

## 2022-03-21 RX ADMIN — HYDROMORPHONE HYDROCHLORIDE 0.5 MG: 1 INJECTION, SOLUTION INTRAMUSCULAR; INTRAVENOUS; SUBCUTANEOUS at 06:03

## 2022-03-21 RX ADMIN — HYDROMORPHONE HYDROCHLORIDE 0.5 MG: 1 INJECTION, SOLUTION INTRAMUSCULAR; INTRAVENOUS; SUBCUTANEOUS at 02:03

## 2022-03-21 RX ADMIN — HEPARIN SODIUM 5000 UNITS: 5000 INJECTION INTRAVENOUS; SUBCUTANEOUS at 06:03

## 2022-03-21 RX ADMIN — INSULIN HUMAN 2.9 UNITS/HR: 1 INJECTION, SOLUTION INTRAVENOUS at 05:03

## 2022-03-21 RX ADMIN — PANTOPRAZOLE SODIUM 40 MG: 40 INJECTION, POWDER, FOR SOLUTION INTRAVENOUS at 09:03

## 2022-03-21 RX ADMIN — INSULIN HUMAN 4 UNITS/HR: 1 INJECTION, SOLUTION INTRAVENOUS at 07:03

## 2022-03-21 RX ADMIN — SOYBEAN OIL 250 ML: 20 INJECTION, SOLUTION INTRAVENOUS at 11:03

## 2022-03-21 RX ADMIN — METHOCARBAMOL 500 MG: 100 INJECTION, SOLUTION INTRAMUSCULAR; INTRAVENOUS at 01:03

## 2022-03-21 NOTE — PT/OT/SLP PROGRESS
Occupational Therapy   Treatment    Name: Casie Salvador  MRN: [id number]  Admitting Diagnosis:  Perforated viscus  7 Days Post-Op    Recommendations:     Discharge Recommendations: home health OT, home health PT  Discharge Equipment Recommendations:  none  Barriers to discharge:  None    Assessment:     Casie Salvador is a 57 y.o. female with a medical diagnosis of Perforated viscus.  She presents with c/o of fatigue. Pt tolerated session well, agreeable to performing EOB ADLs. She is progressing towards OT goals. Performance deficits affecting function are weakness, impaired endurance, impaired self care skills, impaired functional mobilty, gait instability, impaired balance, decreased lower extremity function, decreased upper extremity function, decreased safety awareness, pain, decreased ROM, impaired skin, impaired cardiopulmonary response to activity. Pt would benefit from skilled OT services in order to maximize independence with ADLs and facilitate safe discharge. Pt would benefit from home health OT once medically stable for discharge.     Rehab Prognosis:  Good; patient would benefit from acute skilled OT services to address these deficits and reach maximum level of function.       Plan:     Patient to be seen 3 x/week to address the above listed problems via self-care/home management, therapeutic activities, therapeutic exercises, neuromuscular re-education  · Plan of Care Expires: 04/15/22  · Plan of Care Reviewed with: patient    Subjective     Pain/Comfort:  · Pain Rating 1:  (unrated abdominal pain and headache)    Objective:     Communicated with: RN prior to session.  Patient found HOB elevated with oxygen, PureWick, telemetry, NG tube upon OT entry to room.    General Precautions: Standard, fall   Orthopedic Precautions:N/A   Braces: N/A  Respiratory Status: Nasal cannula, flow 2 L/min     Occupational Performance:     Bed Mobility:    · Patient completed Scooting/Bridging with stand  by assistance  · Patient completed Supine to Sit with stand by assistance and with side rail  · Patient completed Sit to Supine with stand by assistance     Functional Mobility/Transfers:  · Pt declined to participate    Activities of Daily Living:  · Grooming: stand by assistance to perform oral care, facial hygine, and don deoderant seated unsupported EOB  · Toileting: total assistance to void via purewick in supine      AMPAC 6 Click ADL: 18    Treatment & Education:  -Therapist provided facilitation and instruction of proper body mechanics, energy conservation, and fall prevention strategies during tasks listed above.  -Pt educated on role of OT, POC and goals for therapy  -Pt sat EOB x15 minutes with SBA  -Pt educated on importance of OOB activities with staff member assistance and sitting OOB majority of the day.   -Pt verbalized understanding. Pt expressed no further concerns/questions  -Whiteboard updated    Patient left HOB elevated with all lines intact, call button in reach and RN notifiedEducation:      GOALS:   Multidisciplinary Problems     Occupational Therapy Goals        Problem: Occupational Therapy Goal    Goal Priority Disciplines Outcome Interventions   Occupational Therapy Goal     OT, PT/OT Ongoing, Progressing    Description: Goals to be met by: 3/30/22     Patient will increase functional independence with ADLs by performing:    Feeding with Woods.  UE Dressing with [place].  LE Dressing with Stand-by Assistance.  Grooming while seated with Supervision.  Toileting from bedside commode with Supervision for hygiene and clothing management.   Toilet transfer to bedside commode with Supervision.                     Time Tracking:     OT Date of Treatment: 03/21/22  OT Start Time: 1017  OT Stop Time: 1047  OT Total Time (min): 30 min    Billable Minutes:Self Care/Home Management 30    OT/BREE: OT          3/21/2022

## 2022-03-21 NOTE — PROGRESS NOTES
Den King - [facility]  General Surgery  Progress Note    Subjective:     History of Present Illness:  Patient is a 58 yo f with hx of CAD (hx of stents a few years ago), CHF (EF 20%), tobacco abuse, HTN, and right BKA (2/2 diabetes) who presented to ED with acute worsening of her abd pain. She has been noticing the pain worsening on and off over the past 6 weeks, but attributed it to a GI bug. She denies any fevers or chills, N/V, hx of PUD, or hx of NSAID use. In ED her WBC was 22 and CT showed free air and fluid in her upper abd around her stomach and duodenum. General Surgery was consulted.    Of note, patient is somewhat somnolent.    Denies and hx of abd surgery  Has been holding her plavix still since her recent admission for a GI bleed      Post-Op Info:  Procedure(s) (LRB):  LAPAROTOMY, EXPLORATORY, repair gastric ulcer (N/A)   7 Days Post-Op     Interval History: NAEON. Afebrile. HDS. Pain controlled. Pain frustrate with extended hospital course. NGT with 425cc out overnight. Had BM yesterday. Started on insulin gtt by endocrine yesterday  Medications:  Continuous Infusions:   insulin regular 1 units/mL infusion orderable (DKA) 4 Units/hr (03/21/22 0729)    TPN ADULT CENTRAL LINE CUSTOM Stopped (03/21/22 0201)     Scheduled Meds:   fat emulsion 20%  250 mL Intravenous Daily    heparin (porcine)  5,000 Units Subcutaneous Q8H    ipratropium  2 puff Inhalation BID    LIDOcaine (PF) 10 mg/ml (1%)  1 mL Other Once    methocarbamol (ROBAXIN) IVPB  500 mg Intravenous Q8H    metoprolol  5 mg Intravenous Q6H    pantoprazole  40 mg Intravenous BID    piperacillin-tazobactam (ZOSYN) IVPB  4.5 g Intravenous Q8H    sodium chloride 0.9%  10 mL Intravenous Q6H     PRN Meds:sodium chloride 0.9%, dextrose 10%, dextrose 10%, HYDROmorphone, melatonin, ondansetron, ondansetron, sodium chloride 0.9%, Flushing PICC Protocol **AND** sodium chloride 0.9% **AND** sodium chloride 0.9%     Review of patient's allergies  indicates:   Allergen Reactions    Orange juice Hives    Tomato (solanum lycopersicum) Hives     Objective:     Vital Signs (Most Recent):  Temp: 98.8 °F (37.1 °C) (03/21/22 0704)  Pulse: 64 (03/21/22 0704)  Resp: 17 (03/21/22 0704)  BP: (!) 116/55 (03/21/22 0704)  SpO2: 98 % (03/21/22 0704) Vital Signs (24h Range):  Temp:  [98.3 °F (36.8 °C)-99.5 °F (37.5 °C)] 98.8 °F (37.1 °C)  Pulse:  [64-79] 64  Resp:  [16-20] 17  SpO2:  [96 %-98 %] 98 %  BP: (116-154)/(55-70) 116/55     Weight: 83 kg (182 lb 15.7 oz)  Body mass index is 25.52 kg/m².    Intake/Output - Last 3 Shifts         03/19 0700 03/20 0659 03/20 0700 03/21 0659 03/21 0700 03/22 0659    I.V. (mL/kg)   53.9 (0.6)    NG/GT  60     IV Piggyback   1383    TPN   3983.5    Total Intake(mL/kg)  60 (0.7) 5420.4 (65.3)    Urine (mL/kg/hr) 1050 (0.5) 500 (0.3) 200 (3.7)    Emesis/NG output  0     Drains 500 425     Other  0     Stool  0     Blood  0     Total Output 1550 925 200    Net -1550 -865 +5220.4           Urine Occurrence 3 x 4 x 1 x    Stool Occurrence  1 x     Emesis Occurrence  0 x             Physical Exam  Vitals and nursing note reviewed.   Constitutional:       General: She is not in acute distress.     Appearance: She is not diaphoretic.      Comments: 3L O2 via NC  NGT to LIWS   HENT:      Head: Normocephalic and atraumatic.      Mouth/Throat:      Mouth: Mucous membranes are moist.      Pharynx: Oropharynx is clear.   Eyes:      Extraocular Movements: Extraocular movements intact.      Conjunctiva/sclera: Conjunctivae normal.   Cardiovascular:      Rate and Rhythm: Normal rate.   Pulmonary:      Effort: Pulmonary effort is normal. No respiratory distress.   Abdominal:      General: There is no distension.      Palpations: Abdomen is soft.      Tenderness: There is no guarding or rebound.      Comments: Incision is clean, dry, and intact without drainage, erythema, or increased warmth. Appropriately TTP.   Musculoskeletal:         General: No  deformity.      Cervical back: Normal range of motion.   Skin:     General: Skin is warm and dry.   Neurological:      Mental Status: She is alert and oriented to person, place, and time.       Significant Labs:  I have reviewed all pertinent lab results within the past 24 hours.  CBC:   Recent Labs   Lab 03/21/22  0418   WBC 28.53*   RBC 3.49*   HGB 8.7*   HCT 26.8*      MCV 77*   MCH 24.9*   MCHC 32.5       CMP:   Recent Labs   Lab 03/21/22  0418   *   CALCIUM 9.3      K 3.8   CO2 27      BUN 15   CREATININE 0.9         Significant Diagnostics:  I have reviewed all pertinent imaging results/findings within the past 24 hours.    Assessment/Plan:     * Perforated viscus  Patient is 56 yo F with h/o CAD (hx of stents a few years ago), CHF (EF 20%), tobacco abuse, HTN, and right BKA (2/2 diabetes) presenting with  perforated viscus s/p ex lap for gastric ulcer repair 3/14. Clinically stable. UGI 3/17 with small contained leak at lesser curvature of stomach.    - Plan for CT scan with PO and IV contrast today to evaluate small contained leak at the lesser curve of the stomach  - NPO  - NGT to LIWS  - PICC placed 3/17  - TPN re-ordered  - Metop 5 q6h IV at this time (on home metop)  - Continue empiric antimicrobials (zosyn). Will continue to trend WBC daily  - IVF while NPO  - PRN pain and nausea control  - Scheduled multimodal pain control  - Daily labs  - Replete lytes PRN  - DVT ppx (SCDs and heparin)  - GI ppx (PPI)  - PT/OT  - OOB, ambulate  - RT, IS, inhalation treatments, wean O2 as tolerated    Dispo: not yet medically stable for dc        Mayra Macias MD  General Surgery  [facility]

## 2022-03-21 NOTE — SUBJECTIVE & OBJECTIVE
"Interval HPI:   Overnight events: No acute events overnight. Patient on the GIS in room 1007/1007 A. Blood glucose improving. BG above goal on current insulin regimen (IIP). Steroid use- None. 7 Days Post-Op  Renal function- Normal   Vasopressors-  None       Diet NPO  TPN ADULT CENTRAL LINE CUSTOM  TPN ADULT CENTRAL LINE CUSTOM     Eating:   NPO  Nausea: No  Hypoglycemia and intervention: No  Fever: No  TPN and/or TF: Yes  If yes, type of TF/TPN and rate: TPN @ 60 ml/hr    BP (!) 116/55 (BP Location: Left arm, Patient Position: Lying)   Pulse 67   Temp 98.8 °F (37.1 °C) (Oral)   Resp 17   Ht 5' 11" (1.803 m)   Wt 83 kg (182 lb 15.7 oz)   SpO2 97%   Breastfeeding No   BMI 25.52 kg/m²     Labs Reviewed and Include    Recent Labs   Lab 03/21/22  0418   *   CALCIUM 9.3      K 3.8   CO2 27      BUN 15   CREATININE 0.9     Lab Results   Component Value Date    WBC 28.53 (H) 03/21/2022    HGB 8.7 (L) 03/21/2022    HCT 26.8 (L) 03/21/2022    MCV 77 (L) 03/21/2022     03/21/2022     No results for input(s): TSH, FREET4 in the last 168 hours.  Lab Results   Component Value Date    HGBA1C 5.8 (H) 02/20/2022       Nutritional status:   Body mass index is 25.52 kg/m².  Lab Results   Component Value Date    ALBUMIN 3.3 (L) 03/14/2022    ALBUMIN 2.6 (L) 02/24/2022    ALBUMIN 2.7 (L) 02/23/2022     No results found for: PREALBUMIN    Estimated Creatinine Clearance: 78 mL/min (based on SCr of 0.9 mg/dL).    Accu-Checks  Recent Labs     03/20/22  2121 03/20/22  2219 03/20/22  2344 03/21/22  0100 03/21/22  0210 03/21/22  0403 03/21/22  0617 03/21/22  0707 03/21/22  0807 03/21/22  0905   POCTGLUCOSE 213* 227* 261* 219* 232* 242* 210* 222* 224* 194*       Current Medications and/or Treatments Impacting Glycemic Control  Immunotherapy:    Immunosuppressants       None          Steroids:   Hormones (From admission, onward)                Start     Stop Route Frequency Ordered    03/18/22 0844  " melatonin tablet 6 mg         -- Oral Nightly PRN 03/18/22 0744          Pressors:    Autonomic Drugs (From admission, onward)                None          Hyperglycemia/Diabetes Medications:   Antihyperglycemics (From admission, onward)                Start     Stop Route Frequency Ordered    03/21/22 1030  insulin regular in 0.9 % NaCl 100 unit/100 mL (1 unit/mL) infusion        Question:  Enter initial dose (Units/hr):  Answer:  4    -- IV Continuous 03/21/22 0919    03/21/22 1019  insulin aspart U-100 pen 0-10 Units         -- SubQ As needed (PRN) 03/21/22 0919

## 2022-03-21 NOTE — PLAN OF CARE
A&Ox4. VVS on 1L NC. Adequate UOP per purwick. Q2 turns in place with full assist. ML incision with staples, Redness to buttocks with foam. R. BKA with prosthesis at bedside. L. Foot partially amputated. NG tube to right nare @ LIWS. IV insulin @ 3.4 with Q2 accuchecks maintained. Pt @ CT this shift. NPO with TPN @ 60 and lipids. Pain managed with prn medications. Pt currently resting comfortably, bed in low position, call light in reach. WCTM.

## 2022-03-21 NOTE — PT/OT/SLP PROGRESS
"Physical Therapy Treatment    Patient Name:  Casie Salvador   MRN:  [id number]  Admitting Diagnosis: Perforated viscus  Recent Surgery: Procedure(s) (LRB):  LAPAROTOMY, EXPLORATORY, repair gastric ulcer (N/A) 7 Days Post-Op    Recommendations:     Discharge Recommendations:  home health PT   Discharge Equipment Recommendations: none   Barriers to discharge: None    Plan:     During this hospitalization, patient to be seen 3 x/week to address the above listed problems via gait training, therapeutic activities, therapeutic exercises  · Plan of Care Expires:  04/16/22   Plan of Care Reviewed with: patient    This Plan of care has been discussed with the patient who was involved in its development and understands and is in agreement with the identified goals and treatment plan    Subjective     Communicated with nurse (Francoise) prior to session.     Patient comments: "I already work with the other therapy"  Pain/Comfort:  · Pain Rating 1: 0/10  · Pain Rating Post-Intervention 1: 0/10    Objective:     Patient found with: oxygen, PureWick, peripheral IV, telemetry, NG tube (connected to wall suction)    Patient found R side lying in bed with HOB upon PT entry to room, agreeable to treatment.  Family member present in the room.    RESPIRATORY STATUS:   2 LPM via NC    General Precautions: Standard, fall   Orthopedic Precautions:N/A   Braces:   none      BED MOBILITY (vc's for hand placement sequencing of task):        Rolling to the R:  NT.       Rolling to the L:  NT.        Sup > sit at the EOB:  SBA for safety, exiting on the R side, HOB elevated at 30* angle.       Sit > sup:  SBA for safety.       Scooting hips to EOB with SBA                SITTING AT THE EDGE OF THE BED    Assistance Level Required: close sup for safety with B UE support   Postural deviations noted: flexed trunk   Encouraged: upright posture, L foot in contact with floor                  PTA assisted pt with donning tennis shoe to L foot, " prosthesis to R residual limb while at the EOB    TRANSFERS  (vc's for hand placement, sequencing of task and safety)   Patient completed Sit <> Stand Transfer from EOB with SBA for safety with rolling walker x1 trial(s)   Patient completed Stand <> Sit Transfer to EOB with SBA for safety with rolling walker      GAIT: within room, PTA manages IV pole   Patient ambulated: 50ft with prosthesis to R residual limb in place   Patient required: CGA for balance and safety   Patient used:  Rolling Walker   Gait Pattern observed: reciprocal gait   Gait Deviation(s): increased sylvester, decreased step length, decreased stride length and FFP    Comments: vc's for decreased sylvester, placement of AD, directional guidance and safety    EDUCATION  Patient provided with daily orientation and goals of this PT session. They were educated to call for assistance and to transfer with hospital staff only.  Also, pt was educated on the effects of prolonged immobility and the importance of performing OOB activity and exercises to promote healing and reduce recovery time    Whiteboard updated with correct mobility information. RN/PCT notified.  Pt safe to transfer OOB/BTB and amb short distances with RN/PCT: Use RW with min A.    Patient left HOB elevated, with  all lines intact, call button in reach, nurse notified and family member present    AM-PAC 6 CLICK MOBILITY  Turning over in bed (including adjusting bedclothes, sheets and blankets)?: 4  Sitting down on and standing up from a chair with arms (e.g., wheelchair, bedside commode, etc.): 3  Moving from lying on back to sitting on the side of the bed?: 3  Moving to and from a bed to a chair (including a wheelchair)?: 3  Need to walk in hospital room?: 3  Climbing 3-5 steps with a railing?: 1  Basic Mobility Total Score: 17     Assessment:     Casie Salvador is a 57 y.o. female admitted with a medical diagnosis of Perforated viscus.  She presents with the following  impairments/functional limitations:  weakness, impaired endurance, impaired self care skills, impaired functional mobilty, gait instability, impaired balance, decreased upper extremity function, decreased lower extremity function, decreased safety awareness, decreased ROM. requiring light assistance and verbal cues for bed mob, transfers, standing and gait to prevent falls due to weakness, FFP, impulsivity.    Pt remains motivated to participate in PT session and will cont to benefit from skilled PT intervention.      Rehab Prognosis:  Good; patient would benefit from acute skilled PT services to address these deficits and reach maximum level of function.      GOALS:   Multidisciplinary Problems     Physical Therapy Goals        Problem: Physical Therapy Goal    Goal Priority Disciplines Outcome Goal Variances Interventions   Physical Therapy Goal     PT, PT/OT Ongoing, Progressing     Description: Goals to be met by: 3/30/22     Patient will increase functional independence with mobility by performin. Supine to sit with [place]  2. Sit to supine with [place]  3. Sit to stand transfer with Modified [place] with RW and R LE prosthetic donned  4. Bed to chair transfer with Modified [place] using RW  5. Gait  x 30 feet with Stand-by Assistance using RW and R LE prosthetic.   6. Lower extremity exercise program x15 reps per handout, with independence                     Time Tracking:     PT Received On: 22  PT Start Time: 1339     PT Stop Time: 1409  PT Total Time (min): 30 min     Billable Minutes: Gait Training 15 and Therapeutic Activity 15    Treatment Type: Treatment  PT/PTA: PTA     PTA Visit Number: 2       BRIGIDO Wesley.  Pager [phone]    3/21/2022    .

## 2022-03-21 NOTE — PROGRESS NOTES
Den King - [facility]  Endocrinology  Progress Note    Admit Date: 3/14/2022     Reason for Consult: Management of T2DM, Hyperglycemia     Surgical Procedure and Date: Gastric Ulcer Repair- 3/18/2022    Diabetes diagnosis year: 2008    Home Diabetes Medications: Metformin 1000 mg QD; Lantus 50 units h.s.; Glipizide 5 mg QD.     How often checking glucose at home? One   BG readings on regimen: 's  Hypoglycemia on the regimen?  No  Missed doses on regimen?  No    Diabetes Complications include:     Hyperglycemia    Complicating diabetes co morbidities:   TPN    Hemoglobin A1C   Date Value Ref Range Status   02/20/2022 5.8 (H) 4.0 - 5.6 % Final     Comment:     ADA Screening Guidelines:  5.7-6.4%  Consistent with prediabetes  >or=6.5%  Consistent with diabetes    High levels of fetal hemoglobin interfere with the HbA1C  assay. Heterozygous hemoglobin variants (HbS, HgC, etc)do  not significantly interfere with this assay.   However, presence of multiple variants may affect accuracy.           HPI: Patient is a 58 yo f with hx of CAD (hx of stents a few years ago), CHF (EF 20%), tobacco abuse, HTN, and right BKA (2/2 diabetes) who presented to ED with acute worsening of her abd pain. She has been noticing the pain worsening on and off over the past 6 weeks, but attributed it to a GI bug. She denies any fevers or chills, N/V, hx of PUD, or hx of NSAID use. In ED her WBC was 22 and CT showed free air and fluid in her upper abd around her stomach and duodenum. Endocrine consulted to manage hyperglycemia and type 2 diabetes.             Interval HPI:   Overnight events: No acute events overnight. Patient on the [facility] in room 1007/1007 A. Blood glucose improving. BG above goal on current insulin regimen (IIP). Steroid use- None. 7 Days Post-Op  Renal function- Normal   Vasopressors-  None       Diet NPO  TPN ADULT CENTRAL LINE CUSTOM  TPN ADULT CENTRAL LINE CUSTOM     Eating:   NPO  Nausea: No  Hypoglycemia and  "intervention: No  Fever: No  TPN and/or TF: Yes  If yes, type of TF/TPN and rate: TPN @ 60 ml/hr    BP (!) 116/55 (BP Location: Left arm, Patient Position: Lying)   Pulse 67   Temp 98.8 °F (37.1 °C) (Oral)   Resp 17   Ht 5' 11" (1.803 m)   Wt 83 kg (182 lb 15.7 oz)   SpO2 97%   Breastfeeding No   BMI 25.52 kg/m²     Labs Reviewed and Include    Recent Labs   Lab 03/21/22  0418   *   CALCIUM 9.3      K 3.8   CO2 27      BUN 15   CREATININE 0.9     Lab Results   Component Value Date    WBC 28.53 (H) 03/21/2022    HGB 8.7 (L) 03/21/2022    HCT 26.8 (L) 03/21/2022    MCV 77 (L) 03/21/2022     03/21/2022     No results for input(s): TSH, FREET4 in the last 168 hours.  Lab Results   Component Value Date    HGBA1C 5.8 (H) 02/20/2022       Nutritional status:   Body mass index is 25.52 kg/m².  Lab Results   Component Value Date    ALBUMIN 3.3 (L) 03/14/2022    ALBUMIN 2.6 (L) 02/24/2022    ALBUMIN 2.7 (L) 02/23/2022     No results found for: PREALBUMIN    Estimated Creatinine Clearance: 78 mL/min (based on SCr of 0.9 mg/dL).    Accu-Checks  Recent Labs     03/20/22  2121 03/20/22  2219 03/20/22  2344 03/21/22  0100 03/21/22  0210 03/21/22  0403 03/21/22  0617 03/21/22  0707 03/21/22  0807 03/21/22  0905   POCTGLUCOSE 213* 227* 261* 219* 232* 242* 210* 222* 224* 194*       Current Medications and/or Treatments Impacting Glycemic Control  Immunotherapy:    Immunosuppressants       None          Steroids:   Hormones (From admission, onward)                Start     Stop Route Frequency Ordered    03/18/22 0844  melatonin tablet 6 mg         -- Oral Nightly PRN 03/18/22 0744          Pressors:    Autonomic Drugs (From admission, onward)                None          Hyperglycemia/Diabetes Medications:   Antihyperglycemics (From admission, onward)                Start     Stop Route Frequency Ordered    03/21/22 1030  insulin regular in 0.9 % NaCl 100 unit/100 mL (1 unit/mL) infusion      "   Question:  Enter initial dose (Units/hr):  Answer:  4    -- IV Continuous 03/21/22 0919    03/21/22 1019  insulin aspart U-100 pen 0-10 Units         -- SubQ As needed (PRN) 03/21/22 0919            ASSESSMENT and PLAN    * Perforated viscus  Managed per primary team  Avoid hypoglycemia      DM (diabetes mellitus), type 2  Endocrinology consulted for BG management.   BG goal 140-180      - D/C IIP  - Transition drip at 4 units/hrwith step-down parameters (based on needs while on IIP).  - Novolog (aspart) insulin MDC (150/25) SSI Units SQ prn for BG excursions.  - BG checks q2hr      ** Please notify Endocrine for any change and/or advance in diet**  ** Please call Endocrine for any BG related issues **    Discharge Planning:   TBD. Please notify endocrinology prior to discharge.\      On total parenteral nutrition (TPN)  Contributing to hyperglycemia.            Landen Javier, DNP, FNP  Endocrinology  [facility]

## 2022-03-21 NOTE — ASSESSMENT & PLAN NOTE
Endocrinology consulted for BG management.   BG goal 140-180        - Transition drip at 4 units/hrwith step-down parameters (based on needs while on IIP).  - Novolog (aspart) insulin MDC (150/25) SSI Units SQ prn for BG excursions.  - BG checks q2hr      ** Please notify Endocrine for any change and/or advance in diet**  ** Please call Endocrine for any BG related issues **    Discharge Planning:   TBD. Please notify endocrinology prior to discharge.\

## 2022-03-21 NOTE — PLAN OF CARE
"Den ALFARO  Discharge Reassessment    Expected Discharge Date: 3/22/2022    Per progress note 3/21: "Interval History: NAEON. Afebrile. HDS. Pain controlled. Pain frustrate with extended hospital course. NGT with 425cc out overnight. Had BM yesterday. Started on insulin gtt by endocrine yesterday"    Reassessment (most recent)     Discharge Reassessment - 03/21/22 1458        Discharge Reassessment    Assessment Type Discharge Planning Reassessment     Discharge Plan A Home Health     Discharge Plan B Home     DME Needed Upon Discharge  walker, rolling     Discharge Barriers Identified None     Why the patient remains in the hospital Requires continued medical care               Lisa Rothman RN, CM   Ext: [phone]    "

## 2022-03-21 NOTE — PLAN OF CARE
Problem: Adult Inpatient Plan of Care  Goal: Absence of Hospital-Acquired Illness or Injury  Outcome: Ongoing, Progressing     Problem: Adult Inpatient Plan of Care  Goal: Optimal Comfort and Wellbeing  Outcome: Ongoing, Progressing     Problem: Adult Inpatient Plan of Care  Goal: Readiness for Transition of Care  Outcome: Ongoing, Progressing     Problem: Infection  Goal: Absence of Infection Signs and Symptoms  Outcome: Ongoing, Progressing

## 2022-03-21 NOTE — SUBJECTIVE & OBJECTIVE
Interval History: NAEON. Afebrile. HDS. Pain controlled. Pain frustrate with extended hospital course. NGT with 425cc out overnight. Had BM yesterday. Started on insulin gtt by endocrine yesterday  Medications:  Continuous Infusions:   insulin regular 1 units/mL infusion orderable (DKA) 4 Units/hr (03/21/22 0729)    TPN ADULT CENTRAL LINE CUSTOM Stopped (03/21/22 0201)     Scheduled Meds:   fat emulsion 20%  250 mL Intravenous Daily    heparin (porcine)  5,000 Units Subcutaneous Q8H    ipratropium  2 puff Inhalation BID    LIDOcaine (PF) 10 mg/ml (1%)  1 mL Other Once    methocarbamol (ROBAXIN) IVPB  500 mg Intravenous Q8H    metoprolol  5 mg Intravenous Q6H    pantoprazole  40 mg Intravenous BID    piperacillin-tazobactam (ZOSYN) IVPB  4.5 g Intravenous Q8H    sodium chloride 0.9%  10 mL Intravenous Q6H     PRN Meds:sodium chloride 0.9%, dextrose 10%, dextrose 10%, HYDROmorphone, melatonin, ondansetron, ondansetron, sodium chloride 0.9%, Flushing PICC Protocol **AND** sodium chloride 0.9% **AND** sodium chloride 0.9%     Review of patient's allergies indicates:   Allergen Reactions    Orange juice Hives    Tomato (solanum lycopersicum) Hives     Objective:     Vital Signs (Most Recent):  Temp: 98.8 °F (37.1 °C) (03/21/22 0704)  Pulse: 64 (03/21/22 0704)  Resp: 17 (03/21/22 0704)  BP: (!) 116/55 (03/21/22 0704)  SpO2: 98 % (03/21/22 0704) Vital Signs (24h Range):  Temp:  [98.3 °F (36.8 °C)-99.5 °F (37.5 °C)] 98.8 °F (37.1 °C)  Pulse:  [64-79] 64  Resp:  [16-20] 17  SpO2:  [96 %-98 %] 98 %  BP: (116-154)/(55-70) 116/55     Weight: 83 kg (182 lb 15.7 oz)  Body mass index is 25.52 kg/m².    Intake/Output - Last 3 Shifts         03/19 0700  03/20 0659 03/20 0700 03/21 0659 03/21 0700 03/22 0659    I.V. (mL/kg)   53.9 (0.6)    NG/GT  60     IV Piggyback   1383    TPN   3983.5    Total Intake(mL/kg)  60 (0.7) 5420.4 (65.3)    Urine (mL/kg/hr) 1050 (0.5) 500 (0.3) 200 (3.7)    Emesis/NG output  0     Drains 500 425      Other  0     Stool  0     Blood  0     Total Output 1550 925 200    Net -1550 -865 +5220.4           Urine Occurrence 3 x 4 x 1 x    Stool Occurrence  1 x     Emesis Occurrence  0 x             Physical Exam  Vitals and nursing note reviewed.   Constitutional:       General: She is not in acute distress.     Appearance: She is not diaphoretic.      Comments: 3L O2 via NC  NGT to PARVEEN   HENT:      Head: Normocephalic and atraumatic.      Mouth/Throat:      Mouth: Mucous membranes are moist.      Pharynx: Oropharynx is clear.   Eyes:      Extraocular Movements: Extraocular movements intact.      Conjunctiva/sclera: Conjunctivae normal.   Cardiovascular:      Rate and Rhythm: Normal rate.   Pulmonary:      Effort: Pulmonary effort is normal. No respiratory distress.   Abdominal:      General: There is no distension.      Palpations: Abdomen is soft.      Tenderness: There is no guarding or rebound.      Comments: Incision is clean, dry, and intact without drainage, erythema, or increased warmth. Appropriately TTP.   Musculoskeletal:         General: No deformity.      Cervical back: Normal range of motion.   Skin:     General: Skin is warm and dry.   Neurological:      Mental Status: She is alert and oriented to person, place, and time.       Significant Labs:  I have reviewed all pertinent lab results within the past 24 hours.  CBC:   Recent Labs   Lab 03/21/22  0418   WBC 28.53*   RBC 3.49*   HGB 8.7*   HCT 26.8*      MCV 77*   MCH 24.9*   MCHC 32.5       CMP:   Recent Labs   Lab 03/21/22  0418   *   CALCIUM 9.3      K 3.8   CO2 27      BUN 15   CREATININE 0.9         Significant Diagnostics:  I have reviewed all pertinent imaging results/findings within the past 24 hours.

## 2022-03-21 NOTE — ASSESSMENT & PLAN NOTE
Patient is 58 yo F with h/o CAD (hx of stents a few years ago), CHF (EF 20%), tobacco abuse, HTN, and right BKA (2/2 diabetes) presenting with  perforated viscus s/p ex lap for gastric ulcer repair 3/14. Clinically stable. UGI 3/17 with small contained leak at lesser curvature of stomach.    - Plan for CT scan with PO and IV contrast today to evaluate small contained leak at the lesser curve of the stomach  - NPO  - NGT to LIWS  - PICC placed 3/17  - TPN re-ordered  - Metop 5 q6h IV at this time (on home metop)  - Continue empiric antimicrobials (zosyn). Will continue to trend WBC daily  - IVF while NPO  - PRN pain and nausea control  - Scheduled multimodal pain control  - Daily labs  - Replete lytes PRN  - DVT ppx (SCDs and heparin)  - GI ppx (PPI)  - PT/OT  - OOB, ambulate  - RT, IS, inhalation treatments, wean O2 as tolerated    Dispo: not yet medically stable for dc

## 2022-03-22 LAB
ANION GAP SERPL CALC-SCNC: 10 MMOL/L (ref 8–16)
BASOPHILS # BLD AUTO: 0.06 K/UL (ref 0–0.2)
BASOPHILS NFR BLD: 0.3 % (ref 0–1.9)
BUN SERPL-MCNC: 16 MG/DL (ref 6–20)
CALCIUM SERPL-MCNC: 9.2 MG/DL (ref 8.7–10.5)
CHLORIDE SERPL-SCNC: 102 MMOL/L (ref 95–110)
CO2 SERPL-SCNC: 27 MMOL/L (ref 23–29)
CREAT SERPL-MCNC: 0.8 MG/DL (ref 0.5–1.4)
DIFFERENTIAL METHOD: ABNORMAL
EOSINOPHIL # BLD AUTO: 1.3 K/UL (ref 0–0.5)
EOSINOPHIL NFR BLD: 5.9 % (ref 0–8)
ERYTHROCYTE [DISTWIDTH] IN BLOOD BY AUTOMATED COUNT: 18.1 % (ref 11.5–14.5)
EST. GFR  (AFRICAN AMERICAN): >60 ML/MIN/1.73 M^2
EST. GFR  (NON AFRICAN AMERICAN): >60 ML/MIN/1.73 M^2
GLUCOSE SERPL-MCNC: 106 MG/DL (ref 70–110)
HCT VFR BLD AUTO: 25.9 % (ref 37–48.5)
HGB BLD-MCNC: 8.4 G/DL (ref 12–16)
IMM GRANULOCYTES # BLD AUTO: 0.29 K/UL (ref 0–0.04)
IMM GRANULOCYTES NFR BLD AUTO: 1.3 % (ref 0–0.5)
LYMPHOCYTES # BLD AUTO: 4 K/UL (ref 1–4.8)
LYMPHOCYTES NFR BLD: 18.3 % (ref 18–48)
MAGNESIUM SERPL-MCNC: 2 MG/DL (ref 1.6–2.6)
MCH RBC QN AUTO: 25.3 PG (ref 27–31)
MCHC RBC AUTO-ENTMCNC: 32.4 G/DL (ref 32–36)
MCV RBC AUTO: 78 FL (ref 82–98)
MONOCYTES # BLD AUTO: 1 K/UL (ref 0.3–1)
MONOCYTES NFR BLD: 4.6 % (ref 4–15)
NEUTROPHILS # BLD AUTO: 15 K/UL (ref 1.8–7.7)
NEUTROPHILS NFR BLD: 69.6 % (ref 38–73)
NRBC BLD-RTO: 0 /100 WBC
PHOSPHATE SERPL-MCNC: 4.7 MG/DL (ref 2.7–4.5)
PLATELET # BLD AUTO: 455 K/UL (ref 150–450)
PMV BLD AUTO: 10.1 FL (ref 9.2–12.9)
POCT GLUCOSE: 143 MG/DL (ref 70–110)
POCT GLUCOSE: 156 MG/DL (ref 70–110)
POCT GLUCOSE: 156 MG/DL (ref 70–110)
POCT GLUCOSE: 166 MG/DL (ref 70–110)
POCT GLUCOSE: 167 MG/DL (ref 70–110)
POCT GLUCOSE: 184 MG/DL (ref 70–110)
POCT GLUCOSE: 225 MG/DL (ref 70–110)
POCT GLUCOSE: 252 MG/DL (ref 70–110)
POCT GLUCOSE: 276 MG/DL (ref 70–110)
POCT GLUCOSE: 298 MG/DL (ref 70–110)
POTASSIUM SERPL-SCNC: 3.4 MMOL/L (ref 3.5–5.1)
RBC # BLD AUTO: 3.32 M/UL (ref 4–5.4)
SODIUM SERPL-SCNC: 139 MMOL/L (ref 136–145)
WBC # BLD AUTO: 21.62 K/UL (ref 3.9–12.7)

## 2022-03-22 PROCEDURE — 63600175 PHARM REV CODE 636 W HCPCS: Performed by: STUDENT IN AN ORGANIZED HEALTH CARE EDUCATION/TRAINING PROGRAM

## 2022-03-22 PROCEDURE — 94640 AIRWAY INHALATION TREATMENT: CPT

## 2022-03-22 PROCEDURE — 25000003 PHARM REV CODE 250: Performed by: STUDENT IN AN ORGANIZED HEALTH CARE EDUCATION/TRAINING PROGRAM

## 2022-03-22 PROCEDURE — 85025 COMPLETE CBC W/AUTO DIFF WBC: CPT | Performed by: STUDENT IN AN ORGANIZED HEALTH CARE EDUCATION/TRAINING PROGRAM

## 2022-03-22 PROCEDURE — 83735 ASSAY OF MAGNESIUM: CPT | Performed by: STUDENT IN AN ORGANIZED HEALTH CARE EDUCATION/TRAINING PROGRAM

## 2022-03-22 PROCEDURE — C9113 INJ PANTOPRAZOLE SODIUM, VIA: HCPCS | Performed by: STUDENT IN AN ORGANIZED HEALTH CARE EDUCATION/TRAINING PROGRAM

## 2022-03-22 PROCEDURE — B4185 PARENTERAL SOL 10 GM LIPIDS: HCPCS | Performed by: STUDENT IN AN ORGANIZED HEALTH CARE EDUCATION/TRAINING PROGRAM

## 2022-03-22 PROCEDURE — 84100 ASSAY OF PHOSPHORUS: CPT | Performed by: STUDENT IN AN ORGANIZED HEALTH CARE EDUCATION/TRAINING PROGRAM

## 2022-03-22 PROCEDURE — 94761 N-INVAS EAR/PLS OXIMETRY MLT: CPT

## 2022-03-22 PROCEDURE — 25000003 PHARM REV CODE 250: Performed by: SURGERY

## 2022-03-22 PROCEDURE — 80048 BASIC METABOLIC PNL TOTAL CA: CPT | Performed by: STUDENT IN AN ORGANIZED HEALTH CARE EDUCATION/TRAINING PROGRAM

## 2022-03-22 PROCEDURE — A4216 STERILE WATER/SALINE, 10 ML: HCPCS | Performed by: SURGERY

## 2022-03-22 PROCEDURE — 63600175 PHARM REV CODE 636 W HCPCS: Performed by: SURGERY

## 2022-03-22 PROCEDURE — A4217 STERILE WATER/SALINE, 500 ML: HCPCS | Performed by: STUDENT IN AN ORGANIZED HEALTH CARE EDUCATION/TRAINING PROGRAM

## 2022-03-22 PROCEDURE — 20600001 HC STEP DOWN PRIVATE ROOM

## 2022-03-22 RX ORDER — ENOXAPARIN SODIUM 100 MG/ML
40 INJECTION SUBCUTANEOUS EVERY 24 HOURS
Status: DISCONTINUED | OUTPATIENT
Start: 2022-03-22 | End: 2022-03-23 | Stop reason: HOSPADM

## 2022-03-22 RX ORDER — OXYCODONE HYDROCHLORIDE 10 MG/1
10 TABLET ORAL EVERY 4 HOURS PRN
Status: DISCONTINUED | OUTPATIENT
Start: 2022-03-22 | End: 2022-03-23 | Stop reason: HOSPADM

## 2022-03-22 RX ORDER — FUROSEMIDE 10 MG/ML
20 INJECTION INTRAMUSCULAR; INTRAVENOUS ONCE
Status: COMPLETED | OUTPATIENT
Start: 2022-03-22 | End: 2022-03-22

## 2022-03-22 RX ORDER — OXYCODONE HYDROCHLORIDE 5 MG/1
5 TABLET ORAL EVERY 4 HOURS PRN
Status: DISCONTINUED | OUTPATIENT
Start: 2022-03-22 | End: 2022-03-23 | Stop reason: HOSPADM

## 2022-03-22 RX ORDER — ATORVASTATIN CALCIUM 20 MG/1
40 TABLET, FILM COATED ORAL DAILY
Status: DISCONTINUED | OUTPATIENT
Start: 2022-03-22 | End: 2022-03-23 | Stop reason: HOSPADM

## 2022-03-22 RX ORDER — BUSPIRONE HYDROCHLORIDE 10 MG/1
10 TABLET ORAL 2 TIMES DAILY
Status: DISCONTINUED | OUTPATIENT
Start: 2022-03-22 | End: 2022-03-23 | Stop reason: HOSPADM

## 2022-03-22 RX ORDER — METOPROLOL TARTRATE 50 MG/1
50 TABLET ORAL 2 TIMES DAILY
Status: DISCONTINUED | OUTPATIENT
Start: 2022-03-22 | End: 2022-03-23 | Stop reason: HOSPADM

## 2022-03-22 RX ORDER — OXYCODONE HYDROCHLORIDE 5 MG/1
5 TABLET ORAL EVERY 6 HOURS PRN
Status: DISCONTINUED | OUTPATIENT
Start: 2022-03-22 | End: 2022-03-22

## 2022-03-22 RX ORDER — CITALOPRAM 20 MG/1
20 TABLET, FILM COATED ORAL DAILY
Status: DISCONTINUED | OUTPATIENT
Start: 2022-03-22 | End: 2022-03-23 | Stop reason: HOSPADM

## 2022-03-22 RX ORDER — GABAPENTIN 300 MG/1
300 CAPSULE ORAL 2 TIMES DAILY
Status: DISCONTINUED | OUTPATIENT
Start: 2022-03-22 | End: 2022-03-23 | Stop reason: HOSPADM

## 2022-03-22 RX ORDER — ASPIRIN 81 MG/1
81 TABLET ORAL DAILY
Status: DISCONTINUED | OUTPATIENT
Start: 2022-03-22 | End: 2022-03-23 | Stop reason: HOSPADM

## 2022-03-22 RX ADMIN — IPRATROPIUM BROMIDE 2 PUFF: 17 AEROSOL, METERED RESPIRATORY (INHALATION) at 09:03

## 2022-03-22 RX ADMIN — METHOCARBAMOL 500 MG: 100 INJECTION, SOLUTION INTRAMUSCULAR; INTRAVENOUS at 05:03

## 2022-03-22 RX ADMIN — PANTOPRAZOLE SODIUM 40 MG: 40 INJECTION, POWDER, FOR SOLUTION INTRAVENOUS at 08:03

## 2022-03-22 RX ADMIN — PIPERACILLIN SODIUM AND TAZOBACTAM SODIUM 4.5 G: 4; .5 INJECTION, POWDER, FOR SOLUTION INTRAVENOUS at 09:03

## 2022-03-22 RX ADMIN — HYDROMORPHONE HYDROCHLORIDE 0.5 MG: 1 INJECTION, SOLUTION INTRAMUSCULAR; INTRAVENOUS; SUBCUTANEOUS at 05:03

## 2022-03-22 RX ADMIN — Medication 10 ML: at 12:03

## 2022-03-22 RX ADMIN — METOROPROLOL TARTRATE 5 MG: 5 INJECTION, SOLUTION INTRAVENOUS at 05:03

## 2022-03-22 RX ADMIN — METOPROLOL TARTRATE 50 MG: 50 TABLET, FILM COATED ORAL at 08:03

## 2022-03-22 RX ADMIN — OXYCODONE HYDROCHLORIDE 10 MG: 10 TABLET ORAL at 01:03

## 2022-03-22 RX ADMIN — OXYCODONE HYDROCHLORIDE 10 MG: 10 TABLET ORAL at 10:03

## 2022-03-22 RX ADMIN — ASPIRIN 81 MG: 81 TABLET, COATED ORAL at 09:03

## 2022-03-22 RX ADMIN — ENOXAPARIN SODIUM 40 MG: 40 INJECTION SUBCUTANEOUS at 05:03

## 2022-03-22 RX ADMIN — Medication 10 ML: at 06:03

## 2022-03-22 RX ADMIN — FUROSEMIDE 20 MG: 10 INJECTION, SOLUTION INTRAMUSCULAR; INTRAVENOUS at 09:03

## 2022-03-22 RX ADMIN — INSULIN ASPART 6 UNITS: 100 INJECTION, SOLUTION INTRAVENOUS; SUBCUTANEOUS at 01:03

## 2022-03-22 RX ADMIN — INSULIN ASPART 6 UNITS: 100 INJECTION, SOLUTION INTRAVENOUS; SUBCUTANEOUS at 03:03

## 2022-03-22 RX ADMIN — OXYCODONE HYDROCHLORIDE 10 MG: 10 TABLET ORAL at 06:03

## 2022-03-22 RX ADMIN — METOPROLOL TARTRATE 50 MG: 50 TABLET, FILM COATED ORAL at 09:03

## 2022-03-22 RX ADMIN — Medication 6 MG: at 08:03

## 2022-03-22 RX ADMIN — METHOCARBAMOL 500 MG: 100 INJECTION, SOLUTION INTRAMUSCULAR; INTRAVENOUS at 01:03

## 2022-03-22 RX ADMIN — INSULIN ASPART 4 UNITS: 100 INJECTION, SOLUTION INTRAVENOUS; SUBCUTANEOUS at 05:03

## 2022-03-22 RX ADMIN — INSULIN ASPART 2 UNITS: 100 INJECTION, SOLUTION INTRAVENOUS; SUBCUTANEOUS at 09:03

## 2022-03-22 RX ADMIN — INSULIN ASPART 6 UNITS: 100 INJECTION, SOLUTION INTRAVENOUS; SUBCUTANEOUS at 11:03

## 2022-03-22 RX ADMIN — INSULIN ASPART 2 UNITS: 100 INJECTION, SOLUTION INTRAVENOUS; SUBCUTANEOUS at 01:03

## 2022-03-22 RX ADMIN — GABAPENTIN 300 MG: 300 CAPSULE ORAL at 08:03

## 2022-03-22 RX ADMIN — MAGNESIUM SULFATE HEPTAHYDRATE: 500 INJECTION, SOLUTION INTRAMUSCULAR; INTRAVENOUS at 10:03

## 2022-03-22 RX ADMIN — SOYBEAN OIL 250 ML: 20 INJECTION, SOLUTION INTRAVENOUS at 10:03

## 2022-03-22 RX ADMIN — PIPERACILLIN SODIUM AND TAZOBACTAM SODIUM 4.5 G: 4; .5 INJECTION, POWDER, FOR SOLUTION INTRAVENOUS at 05:03

## 2022-03-22 RX ADMIN — METHOCARBAMOL 500 MG: 100 INJECTION, SOLUTION INTRAMUSCULAR; INTRAVENOUS at 09:03

## 2022-03-22 RX ADMIN — INSULIN ASPART 2 UNITS: 100 INJECTION, SOLUTION INTRAVENOUS; SUBCUTANEOUS at 08:03

## 2022-03-22 RX ADMIN — PIPERACILLIN SODIUM AND TAZOBACTAM SODIUM 4.5 G: 4; .5 INJECTION, POWDER, FOR SOLUTION INTRAVENOUS at 01:03

## 2022-03-22 RX ADMIN — HYDROMORPHONE HYDROCHLORIDE 0.5 MG: 1 INJECTION, SOLUTION INTRAMUSCULAR; INTRAVENOUS; SUBCUTANEOUS at 01:03

## 2022-03-22 RX ADMIN — PANTOPRAZOLE SODIUM 40 MG: 40 INJECTION, POWDER, FOR SOLUTION INTRAVENOUS at 09:03

## 2022-03-22 RX ADMIN — HEPARIN SODIUM 5000 UNITS: 5000 INJECTION INTRAVENOUS; SUBCUTANEOUS at 05:03

## 2022-03-22 RX ADMIN — ATORVASTATIN CALCIUM 40 MG: 20 TABLET, FILM COATED ORAL at 09:03

## 2022-03-22 RX ADMIN — Medication 10 ML: at 11:03

## 2022-03-22 RX ADMIN — IPRATROPIUM BROMIDE 2 PUFF: 17 AEROSOL, METERED RESPIRATORY (INHALATION) at 10:03

## 2022-03-22 RX ADMIN — OXYCODONE HYDROCHLORIDE 5 MG: 5 TABLET ORAL at 09:03

## 2022-03-22 RX ADMIN — INSULIN ASPART 2 UNITS: 100 INJECTION, SOLUTION INTRAVENOUS; SUBCUTANEOUS at 03:03

## 2022-03-22 RX ADMIN — GABAPENTIN 300 MG: 300 CAPSULE ORAL at 09:03

## 2022-03-22 NOTE — SUBJECTIVE & OBJECTIVE
Interval History: No acute events overnight. Pain controlled. UGI yesterday was negative for leak. NGT removed. Tolerated ice chips overnight. Pain controlled. Had multiple bowel movements.   Medications:  Continuous Infusions:   insulin regular 1 units/mL infusion orderable (TRANSFER) Stopped (03/21/22 1938)    TPN ADULT CENTRAL LINE CUSTOM Stopped (03/22/22 0513)     Scheduled Meds:   fat emulsion 20%  250 mL Intravenous Daily    heparin (porcine)  5,000 Units Subcutaneous Q8H    ipratropium  2 puff Inhalation BID    LIDOcaine (PF) 10 mg/ml (1%)  1 mL Other Once    methocarbamol (ROBAXIN) IVPB  500 mg Intravenous Q8H    metoprolol  5 mg Intravenous Q6H    pantoprazole  40 mg Intravenous BID    piperacillin-tazobactam (ZOSYN) IVPB  4.5 g Intravenous Q8H    sodium chloride 0.9%  10 mL Intravenous Q6H     PRN Meds:sodium chloride 0.9%, dextrose 10%, dextrose 10%, glucagon (human recombinant), glucose, glucose, HYDROmorphone, insulin aspart U-100, melatonin, ondansetron, ondansetron, sodium chloride 0.9%, Flushing PICC Protocol **AND** sodium chloride 0.9% **AND** sodium chloride 0.9%     Review of patient's allergies indicates:   Allergen Reactions    Orange juice Hives    Tomato (solanum lycopersicum) Hives     Objective:     Vital Signs (Most Recent):  Temp: 98.2 °F (36.8 °C) (03/22/22 0459)  Pulse: (!) 58 (03/22/22 0525)  Resp: 16 (03/22/22 0515)  BP: (!) 114/55 (03/22/22 0525)  SpO2: 95 % (03/22/22 0525) Vital Signs (24h Range):  Temp:  [97.7 °F (36.5 °C)-99.7 °F (37.6 °C)] 98.2 °F (36.8 °C)  Pulse:  [58-76] 58  Resp:  [16-20] 16  SpO2:  [90 %-98 %] 95 %  BP: (114-146)/(55-64) 114/55     Weight: 83 kg (182 lb 15.7 oz)  Body mass index is 25.52 kg/m².    Intake/Output - Last 3 Shifts         03/20 0700 03/21 0659 03/21 0700 03/22 0659 03/22 0700 03/23 0659    P.O.  0     I.V. (mL/kg)  120.3 (1.4)     NG/GT 60      IV Piggyback  1867.7     TPN  5372.3     Total Intake(mL/kg) 60 (0.7) 7360.2 (88.7)     Urine  (mL/kg/hr) 500 (0.3) 1350 (0.7)     Emesis/NG output 0 0     Drains 425      Other 0 0     Stool 0 0     Blood 0 0     Total Output 925 1350     Net -865 +6010.2            Urine Occurrence 4 x 5 x     Stool Occurrence 1 x 3 x     Emesis Occurrence 0 x 0 x             Physical Exam  Vitals and nursing note reviewed.   Constitutional:       General: She is not in acute distress.     Appearance: She is not diaphoretic.   HENT:      Head: Normocephalic and atraumatic.      Mouth/Throat:      Mouth: Mucous membranes are moist.      Pharynx: Oropharynx is clear.   Eyes:      Extraocular Movements: Extraocular movements intact.      Conjunctiva/sclera: Conjunctivae normal.   Cardiovascular:      Rate and Rhythm: Normal rate.   Pulmonary:      Effort: Pulmonary effort is normal. No respiratory distress.   Abdominal:      General: There is no distension.      Palpations: Abdomen is soft.      Tenderness: There is no guarding or rebound.      Comments: Incision is clean, dry, and intact without drainage, erythema, or increased warmth. Appropriately TTP.   Musculoskeletal:         General: No deformity.      Cervical back: Normal range of motion.   Skin:     General: Skin is warm and dry.   Neurological:      Mental Status: She is alert and oriented to person, place, and time.       Significant Labs:  I have reviewed all pertinent lab results within the past 24 hours.  CBC:   Recent Labs   Lab 03/22/22 0522   WBC 21.62*   RBC 3.32*   HGB 8.4*   HCT 25.9*   *   MCV 78*   MCH 25.3*   MCHC 32.4       CMP:   Recent Labs   Lab 03/22/22  0522      CALCIUM 9.2      K 3.4*   CO2 27      BUN 16   CREATININE 0.8         Significant Diagnostics:  I have reviewed all pertinent imaging results/findings within the past 24 hours.

## 2022-03-22 NOTE — PLAN OF CARE
POC reviewed w/ pt, verbalized understanding. Pt AAOx4. VS stable on 2L NC at night. IS bedside. Denies nausea, chest pain, & SOB. Pt remains free of fall & injury. No acute events. Bed in lowest position, call light in reach, frequent rounds made for safety.    Sacral wound, covered w/ mepelex. ABD m/l w/ staples CDI. JUDIT PICC. Infusing TPN, ABX, Robaxin. Pain managed w/ PRN pain meds. Prosthetic at bedside. Accu checks Q2 d/t Insulin drip running @ 2.3 n/hr. NPO    Care transferred to oncoming nurse.

## 2022-03-22 NOTE — PLAN OF CARE
POC reviewed with Pt, verbalized understanding. AAOx4. VSS on RA. Tolerating clear liquid diet, denies n/v. TPN per orders. Q2h accucheck. Insulin gtt per orders. Voiding per purewick, adequate UOP. X2 BM, passing gas. Midline incision with staples, CDI. Sacrum covered with mepalex. Pain managed with PRN meds. Bed in lowest position, call light within reach. WCTM.

## 2022-03-22 NOTE — PROGRESS NOTES
[facility] - [facility]  General Surgery  Progress Note    Subjective:     History of Present Illness:  Patient is a 58 yo f with hx of CAD (hx of stents a few years ago), CHF (EF 20%), tobacco abuse, HTN, and right BKA (2/2 diabetes) who presented to ED with acute worsening of her abd pain. She has been noticing the pain worsening on and off over the past 6 weeks, but attributed it to a GI bug. She denies any fevers or chills, N/V, hx of PUD, or hx of NSAID use. In ED her WBC was 22 and CT showed free air and fluid in her upper abd around her stomach and duodenum. General Surgery was consulted.    Of note, patient is somewhat somnolent.    Denies and hx of abd surgery  Has been holding her plavix still since her recent admission for a GI bleed      Post-Op Info:  Procedure(s) (LRB):  LAPAROTOMY, EXPLORATORY, repair gastric ulcer (N/A)   8 Days Post-Op     Interval History: No acute events overnight. Pain controlled. UGI yesterday was negative for leak. NGT removed. Tolerated ice chips overnight. Pain controlled. Had multiple bowel movements.   Medications:  Continuous Infusions:   insulin regular 1 units/mL infusion orderable (TRANSFER) Stopped (03/21/22 1938)    TPN ADULT CENTRAL LINE CUSTOM Stopped (03/22/22 0513)     Scheduled Meds:   fat emulsion 20%  250 mL Intravenous Daily    heparin (porcine)  5,000 Units Subcutaneous Q8H    ipratropium  2 puff Inhalation BID    LIDOcaine (PF) 10 mg/ml (1%)  1 mL Other Once    methocarbamol (ROBAXIN) IVPB  500 mg Intravenous Q8H    metoprolol  5 mg Intravenous Q6H    pantoprazole  40 mg Intravenous BID    piperacillin-tazobactam (ZOSYN) IVPB  4.5 g Intravenous Q8H    sodium chloride 0.9%  10 mL Intravenous Q6H     PRN Meds:sodium chloride 0.9%, dextrose 10%, dextrose 10%, glucagon (human recombinant), glucose, glucose, HYDROmorphone, insulin aspart U-100, melatonin, ondansetron, ondansetron, sodium chloride 0.9%, Flushing PICC Protocol **AND** sodium chloride 0.9%  **AND** sodium chloride 0.9%     Review of patient's allergies indicates:   Allergen Reactions    Orange juice Hives    Tomato (solanum lycopersicum) Hives     Objective:     Vital Signs (Most Recent):  Temp: 98.2 °F (36.8 °C) (03/22/22 0459)  Pulse: (!) 58 (03/22/22 0525)  Resp: 16 (03/22/22 0515)  BP: (!) 114/55 (03/22/22 0525)  SpO2: 95 % (03/22/22 0525) Vital Signs (24h Range):  Temp:  [97.7 °F (36.5 °C)-99.7 °F (37.6 °C)] 98.2 °F (36.8 °C)  Pulse:  [58-76] 58  Resp:  [16-20] 16  SpO2:  [90 %-98 %] 95 %  BP: (114-146)/(55-64) 114/55     Weight: 83 kg (182 lb 15.7 oz)  Body mass index is 25.52 kg/m².    Intake/Output - Last 3 Shifts         03/20 0700  03/21 0659 03/21 0700  03/22 0659 03/22 0700  03/23 0659    P.O.  0     I.V. (mL/kg)  120.3 (1.4)     NG/GT 60      IV Piggyback  1867.7     TPN  5372.3     Total Intake(mL/kg) 60 (0.7) 7360.2 (88.7)     Urine (mL/kg/hr) 500 (0.3) 1350 (0.7)     Emesis/NG output 0 0     Drains 425      Other 0 0     Stool 0 0     Blood 0 0     Total Output 925 1350     Net -865 +6010.2            Urine Occurrence 4 x 5 x     Stool Occurrence 1 x 3 x     Emesis Occurrence 0 x 0 x             Physical Exam  Vitals and nursing note reviewed.   Constitutional:       General: She is not in acute distress.     Appearance: She is not diaphoretic.   HENT:      Head: Normocephalic and atraumatic.      Mouth/Throat:      Mouth: Mucous membranes are moist.      Pharynx: Oropharynx is clear.   Eyes:      Extraocular Movements: Extraocular movements intact.      Conjunctiva/sclera: Conjunctivae normal.   Cardiovascular:      Rate and Rhythm: Normal rate.   Pulmonary:      Effort: Pulmonary effort is normal. No respiratory distress.   Abdominal:      General: There is no distension.      Palpations: Abdomen is soft.      Tenderness: There is no guarding or rebound.      Comments: Incision is clean, dry, and intact without drainage, erythema, or increased warmth. Appropriately TTP.    Musculoskeletal:         General: No deformity.      Cervical back: Normal range of motion.   Skin:     General: Skin is warm and dry.   Neurological:      Mental Status: She is alert and oriented to person, place, and time.       Significant Labs:  I have reviewed all pertinent lab results within the past 24 hours.  CBC:   Recent Labs   Lab 03/22/22 0522   WBC 21.62*   RBC 3.32*   HGB 8.4*   HCT 25.9*   *   MCV 78*   MCH 25.3*   MCHC 32.4       CMP:   Recent Labs   Lab 03/22/22 0522      CALCIUM 9.2      K 3.4*   CO2 27      BUN 16   CREATININE 0.8         Significant Diagnostics:  I have reviewed all pertinent imaging results/findings within the past 24 hours.    Assessment/Plan:     * Perforated viscus  Patient is 58 yo F with h/o CAD (hx of stents a few years ago), CHF (EF 20%), tobacco abuse, HTN, and right BKA (2/2 diabetes) presenting with  perforated viscus s/p ex lap for gastric ulcer repair 3/14. Clinically stable. UGI 3/17 with small contained leak at lesser curvature of stomach.UGI 3/20 negative for leak    - Advance to CLD  - PICC placed 3/17  - TPN re-ordered, likely let run out tomorrow if tolerating diet   - Switch to PO metoprolol and home medications   - Continue empiric antimicrobials (zosyn). Will continue to trend WBC daily  - IVF while NPO  - PRN pain and nausea control  - Scheduled multimodal pain control  - Daily labs  - Replete lytes PRN  - DVT ppx (SCDs and heparin)  - GI ppx (PPI)  - PT/OT  - OOB, ambulate  - RT, IS, inhalation treatments, wean O2 as tolerated    Dispo: Start discharge planning.        Mayra Macias MD  General Surgery  [facility]

## 2022-03-22 NOTE — PROGRESS NOTES
"[facility] - [facility]  Endocrinology  Progress Note    Admit Date: 3/14/2022     Reason for Consult: Management of T2DM, Hyperglycemia     Surgical Procedure and Date: Gastric Ulcer Repair- 3/18/2022    Diabetes diagnosis year: 2008    Home Diabetes Medications: Metformin 1000 mg QD; Lantus 50 units h.s.; Glipizide 5 mg QD.     How often checking glucose at home? One   BG readings on regimen: 's  Hypoglycemia on the regimen?  No  Missed doses on regimen?  No    Diabetes Complications include:     Hyperglycemia    Complicating diabetes co morbidities:   TPN    Hemoglobin A1C   Date Value Ref Range Status   02/20/2022 5.8 (H) 4.0 - 5.6 % Final     Comment:     ADA Screening Guidelines:  5.7-6.4%  Consistent with prediabetes  >or=6.5%  Consistent with diabetes    High levels of fetal hemoglobin interfere with the HbA1C  assay. Heterozygous hemoglobin variants (HbS, HgC, etc)do  not significantly interfere with this assay.   However, presence of multiple variants may affect accuracy.           HPI: Patient is a 56 yo f with hx of CAD (hx of stents a few years ago), CHF (EF 20%), tobacco abuse, HTN, and right BKA (2/2 diabetes) who presented to ED with acute worsening of her abd pain. She has been noticing the pain worsening on and off over the past 6 weeks, but attributed it to a GI bug. She denies any fevers or chills, N/V, hx of PUD, or hx of NSAID use. In ED her WBC was 22 and CT showed free air and fluid in her upper abd around her stomach and duodenum. Endocrine consulted to manage hyperglycemia and type 2 diabetes.             Interval HPI:   Overnight events: blood sugars tightly controlled    /60 (Patient Position: Lying)   Pulse 66   Temp 98.6 °F (37 °C) (Oral)   Resp 16   Ht 5' 11" (1.803 m)   Wt 83 kg (182 lb 15.7 oz)   SpO2 (!) 91%   Breastfeeding No   BMI 25.52 kg/m²     Labs Reviewed and Include    Recent Labs   Lab 03/22/22  0522      CALCIUM 9.2      K 3.4*   CO2 27    "   BUN 16   CREATININE 0.8     Lab Results   Component Value Date    WBC 21.62 (H) 03/22/2022    HGB 8.4 (L) 03/22/2022    HCT 25.9 (L) 03/22/2022    MCV 78 (L) 03/22/2022     (H) 03/22/2022     No results for input(s): TSH, FREET4 in the last 168 hours.  Lab Results   Component Value Date    HGBA1C 5.8 (H) 02/20/2022       Nutritional status:   Body mass index is 25.52 kg/m².  Lab Results   Component Value Date    ALBUMIN 3.3 (L) 03/14/2022    ALBUMIN 2.6 (L) 02/24/2022    ALBUMIN 2.7 (L) 02/23/2022     No results found for: PREALBUMIN    Estimated Creatinine Clearance: 87.8 mL/min (based on SCr of 0.8 mg/dL).    Accu-Checks  Recent Labs     03/21/22  1005 03/21/22  1128 03/21/22  1336 03/21/22  1710 03/21/22  2032 03/21/22  2310 03/22/22  0115 03/22/22  0319 03/22/22  0509 03/22/22  0759   POCTGLUCOSE 188* 156* 136* 106 181* 148* 167* 156* 143* 156*       Current Medications and/or Treatments Impacting Glycemic Control  Immunotherapy:    Immunosuppressants       None          Steroids:   Hormones (From admission, onward)                Start     Stop Route Frequency Ordered    03/18/22 0844  melatonin tablet 6 mg         -- Oral Nightly PRN 03/18/22 0744          Pressors:    Autonomic Drugs (From admission, onward)                None          Hyperglycemia/Diabetes Medications:   Antihyperglycemics (From admission, onward)                Start     Stop Route Frequency Ordered    03/22/22 0930  insulin regular in 0.9 % NaCl 100 unit/100 mL (1 unit/mL) infusion        Question:  Enter initial dose (Units/hr):  Answer:  1    -- IV Continuous 03/22/22 0922    03/21/22 1019  insulin aspart U-100 pen 0-10 Units         -- SubQ As needed (PRN) 03/21/22 0919            ASSESSMENT and PLAN    On total parenteral nutrition (TPN)  Contributing to hyperglycemia  Please let endocrine know when you transition to oral or tube feeds      DM (diabetes mellitus), type 2  Endocrinology consulted for BG management.   BG goal  140-180    - Transition drip decreased at 1 units/hr with step-down parameters   - Novolog (aspart) insulin moderate correction scale subcutaneous as needed for blood glucose excursions.   - BG checks every 2 hours      ** Please notify Endocrine for any change and/or advance in diet**  ** Please call Endocrine for any BG related issues **    Discharge Planning:   TBD. Please notify endocrinology prior to discharge.          Charly Clark MD  Endocrinology  [facility]

## 2022-03-22 NOTE — ASSESSMENT & PLAN NOTE
Endocrinology consulted for BG management.   BG goal 140-180    - Transition drip decreased at 1 units/hr with step-down parameters   - Novolog (aspart) insulin moderate correction scale subcutaneous as needed for blood glucose excursions.   - BG checks every 2 hours      ** Please notify Endocrine for any change and/or advance in diet**  ** Please call Endocrine for any BG related issues **    Discharge Planning:   TBD. Please notify endocrinology prior to discharge.

## 2022-03-22 NOTE — PT/OT/SLP PROGRESS
Occupational Therapy      Patient Name:  Casie Salvador   MRN:  [id number]    Patient not seen today secondary to Pain. Patient politely declining participation in OT session 2' on phone with  and L foot pain. Pt received pain medication ~20 minutes prior to OT attempt. Will follow-up as scheduled.    3/22/2022

## 2022-03-22 NOTE — PROGRESS NOTES
[facility] - [facility]  Endocrinology  Progress Note    Admit Date: 3/14/2022     Reason for Consult: Management of T2DM, Hyperglycemia     Surgical Procedure and Date: Gastric Ulcer Repair- 3/18/2022    Diabetes diagnosis year: 2008    Home Diabetes Medications: Metformin 1000 mg QD; Lantus 50 units h.s.; Glipizide 5 mg QD.     How often checking glucose at home? One   BG readings on regimen: 's  Hypoglycemia on the regimen?  No  Missed doses on regimen?  No    Diabetes Complications include:     Hyperglycemia    Complicating diabetes co morbidities:   TPN    Hemoglobin A1C   Date Value Ref Range Status   02/20/2022 5.8 (H) 4.0 - 5.6 % Final     Comment:     ADA Screening Guidelines:  5.7-6.4%  Consistent with prediabetes  >or=6.5%  Consistent with diabetes    High levels of fetal hemoglobin interfere with the HbA1C  assay. Heterozygous hemoglobin variants (HbS, HgC, etc)do  not significantly interfere with this assay.   However, presence of multiple variants may affect accuracy.           HPI: Patient is a 56 yo f with hx of CAD (hx of stents a few years ago), CHF (EF 20%), tobacco abuse, HTN, and right BKA (2/2 diabetes) who presented to ED with acute worsening of her abd pain. She has been noticing the pain worsening on and off over the past 6 weeks, but attributed it to a GI bug. She denies any fevers or chills, N/V, hx of PUD, or hx of NSAID use. In ED her WBC was 22 and CT showed free air and fluid in her upper abd around her stomach and duodenum. Endocrine consulted to manage hyperglycemia and type 2 diabetes.             No new subjective & objective note has been filed under this hospital service since the last note was generated.      ASSESSMENT and PLAN    On total parenteral nutrition (TPN)  Contributing to hyperglycemia  Please let endocrine know when you transition to oral or tube feeds      DM (diabetes mellitus), type 2  Endocrinology consulted for BG management.   BG goal 140-180    - Transition  drip at 1 units/hrwith step-down parameters (based on needs while on IIP).  - Novolog (aspart) insulin MDC (150/25) SSI Units SQ prn for BG excursions.  - BG checks every 2 hours      ** Please notify Endocrine for any change and/or advance in diet**  ** Please call Endocrine for any BG related issues **    Discharge Planning:   TBD. Please notify endocrinology prior to discharge.          Charly Clark MD  Endocrinology  [facility]

## 2022-03-22 NOTE — ASSESSMENT & PLAN NOTE
Patient is 58 yo F with h/o CAD (hx of stents a few years ago), CHF (EF 20%), tobacco abuse, HTN, and right BKA (2/2 diabetes) presenting with  perforated viscus s/p ex lap for gastric ulcer repair 3/14. Clinically stable. UGI 3/17 with small contained leak at lesser curvature of stomach.UGI 3/20 negative for leak    - Advance to CLD  - PICC placed 3/17  - TPN re-ordered, likely let run out tomorrow if tolerating diet   - Switch to PO metoprolol and home medications   - Continue empiric antimicrobials (zosyn). Will continue to trend WBC daily  - IVF while NPO  - PRN pain and nausea control  - Scheduled multimodal pain control  - Daily labs  - Replete lytes PRN  - DVT ppx (SCDs and heparin)  - GI ppx (PPI)  - PT/OT  - OOB, ambulate  - RT, IS, inhalation treatments, wean O2 as tolerated    Dispo: Start discharge planning.

## 2022-03-22 NOTE — PHYSICIAN QUERY
PT Name: Casie Salvador  MR #: [id number]    DOCUMENTATION CLARIFICATION     CDS: Malgorzata ARIAS RN  Contact information: [email or web address]    This form is a permanent document in the medical record.     Query Date: 2022    By submitting this query, we are merely seeking further clarification of documentation.. Please utilize your independent clinical judgment when addressing the question(s) below.    The medical record contains the following:   Indicators  Supporting Clinical Findings Location in Medical Record   X % of Estimated Energy Intake over a time frame from p.o., TF, or TPN Energy intake <50% of estimated energy requirement for 1 month PN Nutrition 3/18/2022   X Weight Status over a time frame Weight loss 9% x 11 days      Severe Weight Loss (Malnutrition): greater than 5% in 1 month PN Nutrition 3/18/2022    PN Nutrition 3/18/2022    Subcutaneous Fat and/or Muscle Loss Orbital Region (Subcutaneous Fat Loss): well nourished  Upper Arm Region (Subcutaneous Fat Loss): well nourished   Hinduism Region (Muscle Loss): well nourished  Clavicle Bone Region (Muscle Loss): well nourished  Clavicle and Acromion Bone Region (Muscle Loss): well nourished  Patellar Region (Muscle Loss): mild depletion  Anterior Thigh Region (Muscle Loss): mild depletion  Posterior Calf Region (Muscle Loss): well nourished   Edema (Fluid Accumulation): 0-->no edema present   Subcutaneous Fat Loss (Final Summary): well nourished  Muscle Loss Evaluation (Final Summary): mild protein-calorie malnutrition   PN Nutrition 3/18/2022    Fluid Accumulation or Edema     X Wt/BMI/Usual Body Weight Weight: 77 kg (169 lb 12.1 oz)  Weight (lb): 169.76 lb  Ideal Body Weight (IBW), Female: 155 lb  % Ideal Body Weight, Female (lb): 109.52 %  BMI (Calculated): 23.7  BMI Grade: 18.5-24.9 - normal  Weight Loss: unintentional  Usual Body Weight (UBW), k.81 kg  Weight Change Amount: 11 lb  % Usual Body Weight: 94.32  % Weight Change  From Usual Weight: -5.88 %  Amputation %: 5.9  Total Amputation %: 5.9  Amputation Ideal Body Weight (IBW), Female (lb): 149.1 lb  Amputee BMI (kg/m2): 25.23 kg/m2 PN Nutrition 3/18/2022    Delayed Wound Healing/Failure to Thrive     X Acute or Chronic Illness * Perforated viscus  Patient is 56 yo F with h/o CAD (hx of stents a few years ago), CHF (EF 20%), tobacco abuse, HTN, and right BKA (2/2 diabetes) presenting with  perforated viscus s/p ex lap for gastric ulcer repair 3/14. Clinically stable. UGI 3/17 with small contained leak at lesser curvature of stomach.   PN Nutrition 3/18/2022    Medication     X Treatment - PICC placed 3/17  - TPN ordered   PN Gen Surg 3/18/2022   X Other Nutrition Problem  Severe protein malnutrition  Malnutrition in the context of acute illness/injury  Related to (etiology):   Perforated viscus, NPO  Signs and Symptoms (as evidenced by):   Energy intake <50% of estimated energy requirement for 1 month  Muscle mass depletion: knee, thigh  Weight loss 9% x 11 days PN Nutrition 3/18/2022     AND / ASPEN Clinical Characteristics (October 2011)  A minimum of two characteristics is recommended for diagnosing either moderate or severe malnutrition   Mild Malnutrition Moderate Malnutrition Severe Malnutrition   Energy Intake from p.o., TF or TPN. < 75% intake of estimated energy needs for less than 7 days < 75% intake of estimated energy needs for greater than 7 days < 50% intake of estimated energy needs for > 5 days   Weight Loss 1-2% in 1 month  5% in 3 months  7.5% in 6 months  10% in 1 year 1-2 % in 1 week  5% in 1 month  7.5% in 3 months  10% in 6 months  20% in 1 year > 2% in 1 week  > 5% in 1 month  > 7.5% in 3 months  > 10% in 6 months  > 20% in 1 year   Physical Findings     None *Mild subcutaneous fat and/or muscle loss  *Mild fluid accumulation  *Stage II decubitus  *Surgical wound or non-healing wound *Mod/severe subcutaneous fat and/or muscle loss  *Mod/severe fluid  accumulation  *Stage III or IV decubitus  *Non-healing surgical wound     Provider, please specify diagnosis or diagnoses associated with above clinical findings.    [  ] Mild Protein-Calorie Malnutrition   [  ] Moderate Protein-Calorie Malnutrition   [ x ] Severe Protein-Calorie Malnutrition   [  ] Malnutrition, Unspecified degree   [  ] Other Nutritional Diagnosis (please specify): _______   [  ] Malnutrition ruled out   [  ] Clinically Undetermined     Please document in your progress notes daily for the duration of treatment until resolved and include in your discharge summary.

## 2022-03-22 NOTE — ASSESSMENT & PLAN NOTE
Contributing to hyperglycemia  Please let endocrine know when you transition to oral or tube feeds     Oriented - self; Oriented - place; Oriented - time

## 2022-03-22 NOTE — SUBJECTIVE & OBJECTIVE
"Interval HPI:   Overnight events: blood sugars tightly controlled    /60 (Patient Position: Lying)   Pulse 66   Temp 98.6 °F (37 °C) (Oral)   Resp 16   Ht 5' 11" (1.803 m)   Wt 83 kg (182 lb 15.7 oz)   SpO2 (!) 91%   Breastfeeding No   BMI 25.52 kg/m²     Labs Reviewed and Include    Recent Labs   Lab 03/22/22 0522      CALCIUM 9.2      K 3.4*   CO2 27      BUN 16   CREATININE 0.8     Lab Results   Component Value Date    WBC 21.62 (H) 03/22/2022    HGB 8.4 (L) 03/22/2022    HCT 25.9 (L) 03/22/2022    MCV 78 (L) 03/22/2022     (H) 03/22/2022     No results for input(s): TSH, FREET4 in the last 168 hours.  Lab Results   Component Value Date    HGBA1C 5.8 (H) 02/20/2022       Nutritional status:   Body mass index is 25.52 kg/m².  Lab Results   Component Value Date    ALBUMIN 3.3 (L) 03/14/2022    ALBUMIN 2.6 (L) 02/24/2022    ALBUMIN 2.7 (L) 02/23/2022     No results found for: PREALBUMIN    Estimated Creatinine Clearance: 87.8 mL/min (based on SCr of 0.8 mg/dL).    Accu-Checks  Recent Labs     03/21/22  1005 03/21/22  1128 03/21/22  1336 03/21/22  1710 03/21/22  2032 03/21/22  2310 03/22/22  0115 03/22/22  0319 03/22/22  0509 03/22/22  0759   POCTGLUCOSE 188* 156* 136* 106 181* 148* 167* 156* 143* 156*       Current Medications and/or Treatments Impacting Glycemic Control  Immunotherapy:    Immunosuppressants       None          Steroids:   Hormones (From admission, onward)                Start     Stop Route Frequency Ordered    03/18/22 0844  melatonin tablet 6 mg         -- Oral Nightly PRN 03/18/22 0744          Pressors:    Autonomic Drugs (From admission, onward)                None          Hyperglycemia/Diabetes Medications:   Antihyperglycemics (From admission, onward)                Start     Stop Route Frequency Ordered    03/22/22 0930  insulin regular in 0.9 % NaCl 100 unit/100 mL (1 unit/mL) infusion        Question:  Enter initial dose (Units/hr):  Answer:  1    " -- IV Continuous 03/22/22 0922    03/21/22 1019  insulin aspart U-100 pen 0-10 Units         -- SubQ As needed (PRN) 03/21/22 0919

## 2022-03-23 VITALS
SYSTOLIC BLOOD PRESSURE: 111 MMHG | TEMPERATURE: 98 F | BODY MASS INDEX: 25.62 KG/M2 | HEART RATE: 57 BPM | RESPIRATION RATE: 18 BRPM | HEIGHT: 71 IN | DIASTOLIC BLOOD PRESSURE: 56 MMHG | WEIGHT: 183 LBS | OXYGEN SATURATION: 94 %

## 2022-03-23 LAB
ANION GAP SERPL CALC-SCNC: 10 MMOL/L (ref 8–16)
BASOPHILS # BLD AUTO: 0.05 K/UL (ref 0–0.2)
BASOPHILS NFR BLD: 0.3 % (ref 0–1.9)
BUN SERPL-MCNC: 18 MG/DL (ref 6–20)
CALCIUM SERPL-MCNC: 8.8 MG/DL (ref 8.7–10.5)
CHLORIDE SERPL-SCNC: 102 MMOL/L (ref 95–110)
CO2 SERPL-SCNC: 26 MMOL/L (ref 23–29)
CREAT SERPL-MCNC: 0.8 MG/DL (ref 0.5–1.4)
DIFFERENTIAL METHOD: ABNORMAL
EOSINOPHIL # BLD AUTO: 0.9 K/UL (ref 0–0.5)
EOSINOPHIL NFR BLD: 5.3 % (ref 0–8)
ERYTHROCYTE [DISTWIDTH] IN BLOOD BY AUTOMATED COUNT: 18.1 % (ref 11.5–14.5)
EST. GFR  (AFRICAN AMERICAN): >60 ML/MIN/1.73 M^2
EST. GFR  (NON AFRICAN AMERICAN): >60 ML/MIN/1.73 M^2
GLUCOSE SERPL-MCNC: 181 MG/DL (ref 70–110)
HCT VFR BLD AUTO: 25.1 % (ref 37–48.5)
HGB BLD-MCNC: 8.1 G/DL (ref 12–16)
IMM GRANULOCYTES # BLD AUTO: 0.17 K/UL (ref 0–0.04)
IMM GRANULOCYTES NFR BLD AUTO: 1 % (ref 0–0.5)
LYMPHOCYTES # BLD AUTO: 2.8 K/UL (ref 1–4.8)
LYMPHOCYTES NFR BLD: 16.3 % (ref 18–48)
MAGNESIUM SERPL-MCNC: 2 MG/DL (ref 1.6–2.6)
MCH RBC QN AUTO: 25.1 PG (ref 27–31)
MCHC RBC AUTO-ENTMCNC: 32.3 G/DL (ref 32–36)
MCV RBC AUTO: 78 FL (ref 82–98)
MONOCYTES # BLD AUTO: 0.8 K/UL (ref 0.3–1)
MONOCYTES NFR BLD: 4.6 % (ref 4–15)
NEUTROPHILS # BLD AUTO: 12.7 K/UL (ref 1.8–7.7)
NEUTROPHILS NFR BLD: 72.5 % (ref 38–73)
NRBC BLD-RTO: 0 /100 WBC
PHOSPHATE SERPL-MCNC: 3.4 MG/DL (ref 2.7–4.5)
PLATELET # BLD AUTO: 428 K/UL (ref 150–450)
PMV BLD AUTO: 10.2 FL (ref 9.2–12.9)
POCT GLUCOSE: 121 MG/DL (ref 70–110)
POCT GLUCOSE: 164 MG/DL (ref 70–110)
POCT GLUCOSE: 185 MG/DL (ref 70–110)
POCT GLUCOSE: 200 MG/DL (ref 70–110)
POCT GLUCOSE: 203 MG/DL (ref 70–110)
POCT GLUCOSE: 204 MG/DL (ref 70–110)
POCT GLUCOSE: 218 MG/DL (ref 70–110)
POCT GLUCOSE: 221 MG/DL (ref 70–110)
POCT GLUCOSE: 232 MG/DL (ref 70–110)
POTASSIUM SERPL-SCNC: 4 MMOL/L (ref 3.5–5.1)
RBC # BLD AUTO: 3.23 M/UL (ref 4–5.4)
SODIUM SERPL-SCNC: 138 MMOL/L (ref 136–145)
WBC # BLD AUTO: 17.46 K/UL (ref 3.9–12.7)

## 2022-03-23 PROCEDURE — 85025 COMPLETE CBC W/AUTO DIFF WBC: CPT | Performed by: STUDENT IN AN ORGANIZED HEALTH CARE EDUCATION/TRAINING PROGRAM

## 2022-03-23 PROCEDURE — 25000003 PHARM REV CODE 250: Performed by: STUDENT IN AN ORGANIZED HEALTH CARE EDUCATION/TRAINING PROGRAM

## 2022-03-23 PROCEDURE — 84100 ASSAY OF PHOSPHORUS: CPT | Performed by: STUDENT IN AN ORGANIZED HEALTH CARE EDUCATION/TRAINING PROGRAM

## 2022-03-23 PROCEDURE — 25000003 PHARM REV CODE 250: Performed by: SURGERY

## 2022-03-23 PROCEDURE — 94761 N-INVAS EAR/PLS OXIMETRY MLT: CPT

## 2022-03-23 PROCEDURE — 99232 SBSQ HOSP IP/OBS MODERATE 35: CPT | Mod: ,,, | Performed by: INTERNAL MEDICINE

## 2022-03-23 PROCEDURE — 94640 AIRWAY INHALATION TREATMENT: CPT

## 2022-03-23 PROCEDURE — 63600175 PHARM REV CODE 636 W HCPCS: Performed by: STUDENT IN AN ORGANIZED HEALTH CARE EDUCATION/TRAINING PROGRAM

## 2022-03-23 PROCEDURE — 80048 BASIC METABOLIC PNL TOTAL CA: CPT | Performed by: STUDENT IN AN ORGANIZED HEALTH CARE EDUCATION/TRAINING PROGRAM

## 2022-03-23 PROCEDURE — A4216 STERILE WATER/SALINE, 10 ML: HCPCS | Performed by: SURGERY

## 2022-03-23 PROCEDURE — 83735 ASSAY OF MAGNESIUM: CPT | Performed by: STUDENT IN AN ORGANIZED HEALTH CARE EDUCATION/TRAINING PROGRAM

## 2022-03-23 PROCEDURE — 99232 PR SUBSEQUENT HOSPITAL CARE,LEVL II: ICD-10-PCS | Mod: ,,, | Performed by: INTERNAL MEDICINE

## 2022-03-23 PROCEDURE — 97535 SELF CARE MNGMENT TRAINING: CPT

## 2022-03-23 RX ORDER — METRONIDAZOLE 500 MG/1
500 TABLET ORAL EVERY 8 HOURS
Qty: 21 TABLET | Refills: 0 | Status: SHIPPED | OUTPATIENT
Start: 2022-03-23 | End: 2022-03-30

## 2022-03-23 RX ORDER — PANTOPRAZOLE SODIUM 40 MG/1
40 TABLET, DELAYED RELEASE ORAL DAILY
Status: DISCONTINUED | OUTPATIENT
Start: 2022-03-23 | End: 2022-03-23 | Stop reason: HOSPADM

## 2022-03-23 RX ORDER — CIPROFLOXACIN 500 MG/1
500 TABLET ORAL EVERY 8 HOURS
Qty: 21 TABLET | Refills: 0 | Status: SHIPPED | OUTPATIENT
Start: 2022-03-23 | End: 2022-03-30

## 2022-03-23 RX ORDER — METHOCARBAMOL 500 MG/1
500 TABLET, FILM COATED ORAL 4 TIMES DAILY
Status: DISCONTINUED | OUTPATIENT
Start: 2022-03-23 | End: 2022-03-23 | Stop reason: HOSPADM

## 2022-03-23 RX ORDER — INSULIN DETEMIR 100 [IU]/ML
16 INJECTION, SOLUTION SUBCUTANEOUS NIGHTLY
Qty: 3 ML | Refills: 0 | Status: ON HOLD | OUTPATIENT
Start: 2022-03-23 | End: 2022-04-14

## 2022-03-23 RX ORDER — MONTELUKAST SODIUM 10 MG/1
10 TABLET ORAL NIGHTLY
Status: DISCONTINUED | OUTPATIENT
Start: 2022-03-23 | End: 2022-03-23 | Stop reason: HOSPADM

## 2022-03-23 RX ORDER — PANTOPRAZOLE SODIUM 40 MG/1
40 TABLET, DELAYED RELEASE ORAL DAILY
Qty: 30 TABLET | Refills: 11 | Status: ON HOLD | OUTPATIENT
Start: 2022-03-24 | End: 2022-04-16 | Stop reason: SDUPTHER

## 2022-03-23 RX ORDER — OXYCODONE HYDROCHLORIDE 5 MG/1
5 TABLET ORAL EVERY 6 HOURS PRN
Qty: 25 TABLET | Refills: 0 | Status: ON HOLD | OUTPATIENT
Start: 2022-03-23 | End: 2022-04-03 | Stop reason: HOSPADM

## 2022-03-23 RX ADMIN — PANTOPRAZOLE SODIUM 40 MG: 40 TABLET, DELAYED RELEASE ORAL at 09:03

## 2022-03-23 RX ADMIN — PIPERACILLIN SODIUM AND TAZOBACTAM SODIUM 4.5 G: 4; .5 INJECTION, POWDER, FOR SOLUTION INTRAVENOUS at 09:03

## 2022-03-23 RX ADMIN — INSULIN ASPART 2 UNITS: 100 INJECTION, SOLUTION INTRAVENOUS; SUBCUTANEOUS at 04:03

## 2022-03-23 RX ADMIN — METOPROLOL TARTRATE 50 MG: 50 TABLET, FILM COATED ORAL at 09:03

## 2022-03-23 RX ADMIN — Medication 10 ML: at 12:03

## 2022-03-23 RX ADMIN — INSULIN HUMAN 1.2 UNITS/HR: 1 INJECTION, SOLUTION INTRAVENOUS at 09:03

## 2022-03-23 RX ADMIN — METHOCARBAMOL 500 MG: 500 TABLET ORAL at 12:03

## 2022-03-23 RX ADMIN — PIPERACILLIN SODIUM AND TAZOBACTAM SODIUM 4.5 G: 4; .5 INJECTION, POWDER, FOR SOLUTION INTRAVENOUS at 02:03

## 2022-03-23 RX ADMIN — ATORVASTATIN CALCIUM 40 MG: 20 TABLET, FILM COATED ORAL at 09:03

## 2022-03-23 RX ADMIN — INSULIN ASPART 2 UNITS: 100 INJECTION, SOLUTION INTRAVENOUS; SUBCUTANEOUS at 12:03

## 2022-03-23 RX ADMIN — INSULIN HUMAN 1 UNITS/HR: 1 INJECTION, SOLUTION INTRAVENOUS at 06:03

## 2022-03-23 RX ADMIN — ASPIRIN 81 MG: 81 TABLET, COATED ORAL at 09:03

## 2022-03-23 RX ADMIN — INSULIN ASPART 4 UNITS: 100 INJECTION, SOLUTION INTRAVENOUS; SUBCUTANEOUS at 02:03

## 2022-03-23 RX ADMIN — GABAPENTIN 300 MG: 300 CAPSULE ORAL at 09:03

## 2022-03-23 RX ADMIN — ENOXAPARIN SODIUM 40 MG: 40 INJECTION SUBCUTANEOUS at 05:03

## 2022-03-23 RX ADMIN — INSULIN ASPART 4 UNITS: 100 INJECTION, SOLUTION INTRAVENOUS; SUBCUTANEOUS at 09:03

## 2022-03-23 RX ADMIN — METHOCARBAMOL 500 MG: 500 TABLET ORAL at 05:03

## 2022-03-23 RX ADMIN — INSULIN ASPART 4 UNITS: 100 INJECTION, SOLUTION INTRAVENOUS; SUBCUTANEOUS at 08:03

## 2022-03-23 RX ADMIN — INSULIN ASPART 4 UNITS: 100 INJECTION, SOLUTION INTRAVENOUS; SUBCUTANEOUS at 05:03

## 2022-03-23 RX ADMIN — METHOCARBAMOL 500 MG: 100 INJECTION, SOLUTION INTRAMUSCULAR; INTRAVENOUS at 06:03

## 2022-03-23 RX ADMIN — IPRATROPIUM BROMIDE 2 PUFF: 17 AEROSOL, METERED RESPIRATORY (INHALATION) at 08:03

## 2022-03-23 RX ADMIN — OXYCODONE HYDROCHLORIDE 10 MG: 10 TABLET ORAL at 02:03

## 2022-03-23 RX ADMIN — INSULIN ASPART 4 UNITS: 100 INJECTION, SOLUTION INTRAVENOUS; SUBCUTANEOUS at 12:03

## 2022-03-23 RX ADMIN — OXYCODONE HYDROCHLORIDE 10 MG: 10 TABLET ORAL at 06:03

## 2022-03-23 NOTE — PT/OT/SLP PROGRESS
Occupational Therapy   Treatment    Name: Casie Salvador  MRN: [id number]  Admitting Diagnosis:  Perforated viscus  9 Days Post-Op    Recommendations:     Discharge Recommendations: home health OT, home health PT  Discharge Equipment Recommendations:  none  Barriers to discharge:  None    Assessment:     Casie Salvador is a 57 y.o. female with a medical diagnosis of Perforated viscus.  She presents with flat affect, eager to return home. Pt continues to decline OOB participation with OT sessions. However is completing bed/EOB level ADLs and is progressing towards ADL goals. Performance deficits affecting function are weakness, impaired endurance, impaired self care skills, impaired functional mobilty, gait instability, impaired balance, decreased upper extremity function, decreased lower extremity function, decreased safety awareness, decreased ROM, impaired skin, impaired cardiopulmonary response to activity. Pt would benefit from skilled OT services in order to maximize independence with ADLs and facilitate safe discharge. Pt would benefit from home health OT once medically stable for discharge.     Rehab Prognosis:  Good; patient would benefit from acute skilled OT services to address these deficits and reach maximum level of function.       Plan:     Patient to be seen 3 x/week to address the above listed problems via self-care/home management, therapeutic activities, therapeutic exercises, neuromuscular re-education  · Plan of Care Expires: 04/15/22  · Plan of Care Reviewed with: patient    Subjective     Pain/Comfort:  · Pain Rating 1: 0/10  · Pain Rating Post-Intervention 1: 0/10    Objective:     Communicated with: RN prior to session.  Patient found HOB elevated with PureWick, peripheral IV, PICC line upon OT entry to room.    General Precautions: Standard, fall   Orthopedic Precautions:N/A   Braces:  (R LE prosthesis)  Respiratory Status: Room air     Occupational Performance:     Bed  Mobility:    · Patient completed Scooting/Bridging with stand by assistance     Functional Mobility/Transfers:  · Pt declined to participate    Activities of Daily Living:  · Bathing: minimum assistance to bathe at bed level with bath wipes  · Upper Body Dressing: moderate assistance to doff/don gown in supine       [facility] 6 Click ADL: 21    Treatment & Education:  -Therapist provided facilitation and instruction of proper body mechanics, energy conservation, and fall prevention strategies during tasks listed above.  -Pt educated on role of OT, POC and goals for therapy  -Pt educated on importance of OOB activities with staff member assistance and sitting OOB majority of the day.   -Pt verbalized understanding. Pt expressed no further concerns/questions  -Whiteboard updated    Patient left HOB elevated with all lines intact and call button in reachEducation:      GOALS:   Multidisciplinary Problems     Occupational Therapy Goals        Problem: Occupational Therapy Goal    Goal Priority Disciplines Outcome Interventions   Occupational Therapy Goal     OT, PT/OT Ongoing, Progressing    Description: Goals to be met by: 3/30/22     Patient will increase functional independence with ADLs by performing:    Feeding with [place].  UE Dressing with [place].  LE Dressing with Stand-by Assistance.  Grooming while seated with Supervision.  Toileting from bedside commode with Supervision for hygiene and clothing management.   Toilet transfer to bedside commode with Supervision.                     Time Tracking:     OT Date of Treatment: 03/23/22  OT Start Time: 1328  OT Stop Time: 1358  OT Total Time (min): 30 min    Billable Minutes:Self Care/Home Management 23    OT/BREE: OT          3/23/2022

## 2022-03-23 NOTE — SUBJECTIVE & OBJECTIVE
"Interval HPI:   Overnight events: none      BP (!) 125/59 (BP Location: Left arm, Patient Position: Lying)   Pulse 65   Temp 98.7 °F (37.1 °C) (Oral)   Resp 16   Ht 5' 11" (1.803 m)   Wt 83 kg (182 lb 15.7 oz)   SpO2 (!) 91%   Breastfeeding No   BMI 25.52 kg/m²     Labs Reviewed and Include    Recent Labs   Lab 03/23/22  0559   *   CALCIUM 8.8      K 4.0   CO2 26      BUN 18   CREATININE 0.8     Lab Results   Component Value Date    WBC 17.46 (H) 03/23/2022    HGB 8.1 (L) 03/23/2022    HCT 25.1 (L) 03/23/2022    MCV 78 (L) 03/23/2022     03/23/2022     No results for input(s): TSH, FREET4 in the last 168 hours.  Lab Results   Component Value Date    HGBA1C 5.8 (H) 02/20/2022       Nutritional status:   Body mass index is 25.52 kg/m².  Lab Results   Component Value Date    ALBUMIN 3.3 (L) 03/14/2022    ALBUMIN 2.6 (L) 02/24/2022    ALBUMIN 2.7 (L) 02/23/2022     No results found for: PREALBUMIN    Estimated Creatinine Clearance: 87.8 mL/min (based on SCr of 0.8 mg/dL).    Accu-Checks  Recent Labs     03/22/22  0952 03/22/22  1153 03/22/22  1355 03/22/22  1557 03/22/22  1756 03/22/22  2153 03/23/22  0014 03/23/22  0241 03/23/22  0543 03/23/22  0759   POCTGLUCOSE 166* 252* 276* 298* 225* 184* 185* 200* 203* 221*       Current Medications and/or Treatments Impacting Glycemic Control  Immunotherapy:    Immunosuppressants       None          Steroids:   Hormones (From admission, onward)                Start     Stop Route Frequency Ordered    03/18/22 0844  melatonin tablet 6 mg         -- Oral Nightly PRN 03/18/22 0744          Pressors:    Autonomic Drugs (From admission, onward)                None          Hyperglycemia/Diabetes Medications:   Antihyperglycemics (From admission, onward)                Start     Stop Route Frequency Ordered    03/22/22 2701  insulin regular in 0.9 % NaCl 100 unit/100 mL (1 unit/mL) infusion        Question:  Enter initial dose (Units/hr):  Answer:  1 "    -- IV Continuous 03/22/22 0922    03/21/22 1019  insulin aspart U-100 pen 0-10 Units         -- SubQ As needed (PRN) 03/21/22 0919

## 2022-03-23 NOTE — PROGRESS NOTES
"[facility]  Endocrinology  Progress Note    Admit Date: 3/14/2022     Reason for Consult: Management of T2DM, Hyperglycemia     Surgical Procedure and Date: Gastric Ulcer Repair- 3/18/2022    Diabetes diagnosis year: 2008    Home Diabetes Medications: Metformin 1000 mg QD; Lantus 50 units h.s.; Glipizide 5 mg QD.     How often checking glucose at home? One   BG readings on regimen: 's  Hypoglycemia on the regimen?  No  Missed doses on regimen?  No    Diabetes Complications include:     Hyperglycemia    Complicating diabetes co morbidities:   TPN    Hemoglobin A1C   Date Value Ref Range Status   02/20/2022 5.8 (H) 4.0 - 5.6 % Final     Comment:     ADA Screening Guidelines:  5.7-6.4%  Consistent with prediabetes  >or=6.5%  Consistent with diabetes    High levels of fetal hemoglobin interfere with the HbA1C  assay. Heterozygous hemoglobin variants (HbS, HgC, etc)do  not significantly interfere with this assay.   However, presence of multiple variants may affect accuracy.           HPI: Patient is a 56 yo f with hx of CAD (hx of stents a few years ago), CHF (EF 20%), tobacco abuse, HTN, and right BKA (2/2 diabetes) who presented to ED with acute worsening of her abd pain. She has been noticing the pain worsening on and off over the past 6 weeks, but attributed it to a GI bug. She denies any fevers or chills, N/V, hx of PUD, or hx of NSAID use. In ED her WBC was 22 and CT showed free air and fluid in her upper abd around her stomach and duodenum. Endocrine consulted to manage hyperglycemia and type 2 diabetes.             Interval HPI:   Overnight events: none      BP (!) 125/59 (BP Location: Left arm, Patient Position: Lying)   Pulse 65   Temp 98.7 °F (37.1 °C) (Oral)   Resp 16   Ht 5' 11" (1.803 m)   Wt 83 kg (182 lb 15.7 oz)   SpO2 (!) 91%   Breastfeeding No   BMI 25.52 kg/m²     Labs Reviewed and Include    Recent Labs   Lab 03/23/22  0559   *   CALCIUM 8.8      K 4.0   CO2 26   CL " 102   BUN 18   CREATININE 0.8     Lab Results   Component Value Date    WBC 17.46 (H) 03/23/2022    HGB 8.1 (L) 03/23/2022    HCT 25.1 (L) 03/23/2022    MCV 78 (L) 03/23/2022     03/23/2022     No results for input(s): TSH, FREET4 in the last 168 hours.  Lab Results   Component Value Date    HGBA1C 5.8 (H) 02/20/2022       Nutritional status:   Body mass index is 25.52 kg/m².  Lab Results   Component Value Date    ALBUMIN 3.3 (L) 03/14/2022    ALBUMIN 2.6 (L) 02/24/2022    ALBUMIN 2.7 (L) 02/23/2022     No results found for: PREALBUMIN    Estimated Creatinine Clearance: 87.8 mL/min (based on SCr of 0.8 mg/dL).    Accu-Checks  Recent Labs     03/22/22  0952 03/22/22  1153 03/22/22  1355 03/22/22  1557 03/22/22  1756 03/22/22  2153 03/23/22  0014 03/23/22  0241 03/23/22  0543 03/23/22  0759   POCTGLUCOSE 166* 252* 276* 298* 225* 184* 185* 200* 203* 221*       Current Medications and/or Treatments Impacting Glycemic Control  Immunotherapy:    Immunosuppressants       None          Steroids:   Hormones (From admission, onward)                Start     Stop Route Frequency Ordered    03/18/22 0844  melatonin tablet 6 mg         -- Oral Nightly PRN 03/18/22 0744          Pressors:    Autonomic Drugs (From admission, onward)                None          Hyperglycemia/Diabetes Medications:   Antihyperglycemics (From admission, onward)                Start     Stop Route Frequency Ordered    03/22/22 0930  insulin regular in 0.9 % NaCl 100 unit/100 mL (1 unit/mL) infusion        Question:  Enter initial dose (Units/hr):  Answer:  1    -- IV Continuous 03/22/22 0922    03/21/22 1019  insulin aspart U-100 pen 0-10 Units         -- SubQ As needed (PRN) 03/21/22 0919            ASSESSMENT and PLAN    On total parenteral nutrition (TPN)  Contributing to hyperglycemia  Please let endocrine know when you transition to oral or tube feeds      DM (diabetes mellitus), type 2  Endocrinology consulted for BG management.   BG goal  140-180    - Transition drip at 1.2 units/hr with step-down parameters  - Novolog (aspart) insulin moderate correction scale subcutaneous as needed for blood glucose excursions.   - Can transition to Levemir 16 units and moderate dose correction scale before meals  - fingersticks before meals, at bedtime and at 2am    ** Please notify Endocrine for any change and/or advance in diet**  ** Please call Endocrine for any BG related issues **    Discharge Planning:   Can discharge on Levemir 16 units and moderate dose correction scale before meals        Charly Clark MD  Endocrinology  [facility]

## 2022-03-23 NOTE — PLAN OF CARE
POC reviewed with Pt, verbalized understanding. AAOx4. VSS on RA. Tolerating diabetic diet, denies n/v. TPN d/c per orders. Q2h accucheck. Insulin gtt per orders. Voiding per purewick, adequate UOP. X1 BM, passing gas. Midline incision with staples, CDI. Sacrum covered with mepalex. Pain managed with PRN meds. Bed in lowest position, call light within reach. WCTM.

## 2022-03-23 NOTE — ASSESSMENT & PLAN NOTE
Patient is 58 yo F with h/o CAD (hx of stents a few years ago), CHF (EF 20%), tobacco abuse, HTN, and right BKA (2/2 diabetes) presenting with  perforated viscus s/p ex lap for gastric ulcer repair 3/14. Clinically stable. UGI 3/17 with small contained leak at lesser curvature of stomach.UGI 3/20 negative for leak    - Diabetic diet   - PICC placed 3/17  - Run TPN at half rate and let run out   - PO metoprolol and home medications   - Continue empiric antimicrobials (zosyn)  - PRN pain and nausea control  - Scheduled multimodal pain control  - Daily labs  - Replete lytes PRN  - DVT ppx (SCDs and heparin)  - GI ppx (PPI)  - PT/OT  - OOB, ambulate  - RT, IS, inhalation treatments, wean O2 as tolerated    Dispo: approaching medical stability for dc, pending dietary toleration and getting off insulin drip

## 2022-03-23 NOTE — PT/OT/SLP PROGRESS
"     Physical Therapy  Pt Not Seen    Patient Name:  Casie Salvador   MRN:  [id number]    3:53 pm  Patient not seen today secondary to  Other (Comment) (Pt declines OOB mobility on this date stating "I don't feel too well"  Pt's nurse, Shelli, was notified.). Will follow-up on next scheduled visit.    Jackie Mace, PTA  3/23/2022    "

## 2022-03-23 NOTE — PLAN OF CARE
POC reviewed w/ pt, verbalized understanding. Pt AAOx4. VS stable on RA. On TELE, NSR. IS bedside. Denies nausea, chest pain, & SOB. Pt remains free of fall & injury. No acute events. Bed in lowest position, call light in reach, frequent rounds made for safety.    - Pt has abdominal m/l w/ staples CDI. JUDIT PICC infusing ABX, robaxin, TPN per MAR.  Left foot partial amputation. RLE BKA. Sacral skin wound breakdown. Clear liquid diet overnight, Diabetic diet this morning. Pain managed w/ PRN meds. Insulin drip running per mar. ACCU check Q2, coverage needed overnight.      Care transferred to oncoming nurse.

## 2022-03-23 NOTE — ASSESSMENT & PLAN NOTE
- Currently on insulin gtt  - Endocrine consulted, appreciate assistance   - POCT glucose checks  - Hypoglycemia protocol  - Holding home meds in setting of acute disease process

## 2022-03-23 NOTE — ASSESSMENT & PLAN NOTE
Contributing to hyperglycemia  Please let endocrine know when you transition to oral or tube feeds

## 2022-03-23 NOTE — PROGRESS NOTES
[facility] - [facility]  General Surgery  Progress Note    Subjective:     History of Present Illness:  Patient is a 56 yo f with hx of CAD (hx of stents a few years ago), CHF (EF 20%), tobacco abuse, HTN, and right BKA (2/2 diabetes) who presented to ED with acute worsening of her abd pain. She has been noticing the pain worsening on and off over the past 6 weeks, but attributed it to a GI bug. She denies any fevers or chills, N/V, hx of PUD, or hx of NSAID use. In ED her WBC was 22 and CT showed free air and fluid in her upper abd around her stomach and duodenum. General Surgery was consulted.    Of note, patient is somewhat somnolent.    Denies and hx of abd surgery  Has been holding her plavix still since her recent admission for a GI bleed      Post-Op Info:  Procedure(s) (LRB):  LAPAROTOMY, EXPLORATORY, repair gastric ulcer (N/A)   9 Days Post-Op     Interval History: NAEON. Afebrile. HDS. Tolerating CLD without n/v. Having BM. Pain is controlled with current regimen. Adequate UOP. No new symptoms or concerns this morning. All questions answered.   WBC 21 > 17  BG 180s-220s    Medications:  Continuous Infusions:   insulin regular 1 units/mL infusion orderable (TRANSFER) 1 Units/hr (03/23/22 0652)    TPN ADULT CENTRAL LINE CUSTOM 30 mL/hr at 03/23/22 0730     Scheduled Meds:   aspirin  81 mg Oral Daily    atorvastatin  40 mg Oral Daily    busPIRone  10 mg Oral BID    citalopram  20 mg Oral Daily    enoxaparin  40 mg Subcutaneous Daily    fat emulsion 20%  250 mL Intravenous Daily    gabapentin  300 mg Oral BID    ipratropium  2 puff Inhalation BID    LIDOcaine (PF) 10 mg/ml (1%)  1 mL Other Once    methocarbamol (ROBAXIN) IVPB  500 mg Intravenous Q8H    metoprolol tartrate  50 mg Oral BID    pantoprazole  40 mg Intravenous BID    piperacillin-tazobactam (ZOSYN) IVPB  4.5 g Intravenous Q8H    sodium chloride 0.9%  10 mL Intravenous Q6H     PRN Meds:sodium chloride 0.9%, dextrose 10%, dextrose 10%,  glucagon (human recombinant), glucose, glucose, insulin aspart U-100, melatonin, ondansetron, ondansetron, oxyCODONE, oxyCODONE, sodium chloride 0.9%, Flushing PICC Protocol **AND** sodium chloride 0.9% **AND** sodium chloride 0.9%     Review of patient's allergies indicates:   Allergen Reactions    Orange juice Hives    Tomato (solanum lycopersicum) Hives     Objective:     Vital Signs (Most Recent):  Temp: 98.7 °F (37.1 °C) (03/23/22 0752)  Pulse: 65 (03/23/22 0752)  Resp: 16 (03/23/22 0752)  BP: (!) 125/59 (03/23/22 0752)  SpO2: (!) 91 % (03/23/22 0752)   Vital Signs (24h Range):  Temp:  [98 °F (36.7 °C)-99.2 °F (37.3 °C)] 98.7 °F (37.1 °C)  Pulse:  [60-69] 65  Resp:  [16-20] 16  SpO2:  [87 %-96 %] 91 %  BP: (115-129)/(58-61) 125/59     Weight: 83 kg (182 lb 15.7 oz)  Body mass index is 25.52 kg/m².    Intake/Output - Last 3 Shifts         03/21 0700  03/22 0659 03/22 0700  03/23 0659 03/23 0700  03/24 0659    P.O. 0 120     I.V. (mL/kg) 120.3 (1.4) 28.6 (0.3)     NG/GT       IV Piggyback 1867.7 478.7     TPN 5372.3 1742.5     Total Intake(mL/kg) 7360.2 (88.7) 2369.8 (28.6)     Urine (mL/kg/hr) 1350 (0.7) 1200 (0.6)     Emesis/NG output 0 0     Drains       Other 0      Stool 0 0     Blood 0      Total Output 1350 1200     Net +6010.2 +1169.8            Urine Occurrence 5 x 1 x     Stool Occurrence 3 x 2 x     Emesis Occurrence 0 x 0 x           Vitals and nursing note reviewed.   Constitutional:       General: She is not in acute distress.     Appearance: She is not diaphoretic.   HENT:      Head: Normocephalic and atraumatic.      Mouth/Throat:      Mouth: Mucous membranes are moist.      Pharynx: Oropharynx is clear.   Eyes:      Extraocular Movements: Extraocular movements intact.      Conjunctiva/sclera: Conjunctivae normal.   Cardiovascular:      Rate and Rhythm: Normal rate.   Pulmonary:      Effort: Pulmonary effort is normal. No respiratory distress.   Abdominal:      General: There is no distension.       Palpations: Abdomen is soft.      Tenderness: There is no guarding or rebound.      Comments: Incision is clean, dry, and intact without drainage, erythema, or increased warmth. Appropriately TTP.   Musculoskeletal:         General: No deformity.      Cervical back: Normal range of motion.   Skin:     General: Skin is warm and dry.   Neurological:      Mental Status: She is alert and oriented to person, place, and time.    Significant Labs:  I have reviewed all pertinent lab results within the past 24 hours.  CBC:   Recent Labs   Lab 03/23/22  0600   WBC 17.46*   RBC 3.23*   HGB 8.1*   HCT 25.1*      MCV 78*   MCH 25.1*   MCHC 32.3     CMP:   Recent Labs   Lab 03/23/22  0559   *   CALCIUM 8.8      K 4.0   CO2 26      BUN 18   CREATININE 0.8       Significant Diagnostics:  I have reviewed all pertinent imaging results/findings within the past 24 hours.    Assessment/Plan:     * Perforated viscus  Patient is 56 yo F with h/o CAD (hx of stents a few years ago), CHF (EF 20%), tobacco abuse, HTN, and right BKA (2/2 diabetes) presenting with  perforated viscus s/p ex lap for gastric ulcer repair 3/14. Clinically stable. UGI 3/17 with small contained leak at lesser curvature of stomach.UGI 3/20 negative for leak    - Diabetic diet   - PICC placed 3/17  - Run TPN at half rate and let run out   - PO metoprolol and home medications   - Continue empiric antimicrobials (zosyn)  - PRN pain and nausea control  - Scheduled multimodal pain control  - Daily labs  - Replete lytes PRN  - DVT ppx (SCDs and heparin)  - GI ppx (PPI)  - PT/OT  - OOB, ambulate  - RT, IS, inhalation treatments, wean O2 as tolerated    Dispo: approaching medical stability for dc, pending dietary toleration and getting off insulin drip    On total parenteral nutrition (TPN)  - Half rate and let run out today now that she is tolerating PO    Depression  - Home Buspar and celexa    DM (diabetes mellitus), type 2  - Currently on  insulin gtt  - Endocrine consulted, appreciate assistance   - POCT glucose checks  - Hypoglycemia protocol  - Holding home meds in setting of acute disease process      CAD (coronary artery disease)  - Home ASA and statin        Arnaud Dyer, PA-C  General Surgery  [facility]

## 2022-03-23 NOTE — SUBJECTIVE & OBJECTIVE
Interval History: NAEON. Afebrile. HDS. Tolerating CLD without n/v. Having BM. Pain is controlled with current regimen. Adequate UOP. No new symptoms or concerns this morning. All questions answered.   WBC 21 > 17  BG 180s-220s    Medications:  Continuous Infusions:   insulin regular 1 units/mL infusion orderable (TRANSFER) 1 Units/hr (03/23/22 0652)    TPN ADULT CENTRAL LINE CUSTOM 30 mL/hr at 03/23/22 0730     Scheduled Meds:   aspirin  81 mg Oral Daily    atorvastatin  40 mg Oral Daily    busPIRone  10 mg Oral BID    citalopram  20 mg Oral Daily    enoxaparin  40 mg Subcutaneous Daily    fat emulsion 20%  250 mL Intravenous Daily    gabapentin  300 mg Oral BID    ipratropium  2 puff Inhalation BID    LIDOcaine (PF) 10 mg/ml (1%)  1 mL Other Once    methocarbamol (ROBAXIN) IVPB  500 mg Intravenous Q8H    metoprolol tartrate  50 mg Oral BID    pantoprazole  40 mg Intravenous BID    piperacillin-tazobactam (ZOSYN) IVPB  4.5 g Intravenous Q8H    sodium chloride 0.9%  10 mL Intravenous Q6H     PRN Meds:sodium chloride 0.9%, dextrose 10%, dextrose 10%, glucagon (human recombinant), glucose, glucose, insulin aspart U-100, melatonin, ondansetron, ondansetron, oxyCODONE, oxyCODONE, sodium chloride 0.9%, Flushing PICC Protocol **AND** sodium chloride 0.9% **AND** sodium chloride 0.9%     Review of patient's allergies indicates:   Allergen Reactions    Orange juice Hives    Tomato (solanum lycopersicum) Hives     Objective:     Vital Signs (Most Recent):  Temp: 98.7 °F (37.1 °C) (03/23/22 0752)  Pulse: 65 (03/23/22 0752)  Resp: 16 (03/23/22 0752)  BP: (!) 125/59 (03/23/22 0752)  SpO2: (!) 91 % (03/23/22 0752)   Vital Signs (24h Range):  Temp:  [98 °F (36.7 °C)-99.2 °F (37.3 °C)] 98.7 °F (37.1 °C)  Pulse:  [60-69] 65  Resp:  [16-20] 16  SpO2:  [87 %-96 %] 91 %  BP: (115-129)/(58-61) 125/59     Weight: 83 kg (182 lb 15.7 oz)  Body mass index is 25.52 kg/m².    Intake/Output - Last 3 Shifts         03/21 0700  03/22 0659 03/22  0700  03/23 0659 03/23 0700  03/24 0659    P.O. 0 120     I.V. (mL/kg) 120.3 (1.4) 28.6 (0.3)     NG/GT       IV Piggyback 1867.7 478.7     TPN 5372.3 1742.5     Total Intake(mL/kg) 7360.2 (88.7) 2369.8 (28.6)     Urine (mL/kg/hr) 1350 (0.7) 1200 (0.6)     Emesis/NG output 0 0     Drains       Other 0      Stool 0 0     Blood 0      Total Output 1350 1200     Net +6010.2 +1169.8            Urine Occurrence 5 x 1 x     Stool Occurrence 3 x 2 x     Emesis Occurrence 0 x 0 x           Vitals and nursing note reviewed.   Constitutional:       General: She is not in acute distress.     Appearance: She is not diaphoretic.   HENT:      Head: Normocephalic and atraumatic.      Mouth/Throat:      Mouth: Mucous membranes are moist.      Pharynx: Oropharynx is clear.   Eyes:      Extraocular Movements: Extraocular movements intact.      Conjunctiva/sclera: Conjunctivae normal.   Cardiovascular:      Rate and Rhythm: Normal rate.   Pulmonary:      Effort: Pulmonary effort is normal. No respiratory distress.   Abdominal:      General: There is no distension.      Palpations: Abdomen is soft.      Tenderness: There is no guarding or rebound.      Comments: Incision is clean, dry, and intact without drainage, erythema, or increased warmth. Appropriately TTP.   Musculoskeletal:         General: No deformity.      Cervical back: Normal range of motion.   Skin:     General: Skin is warm and dry.   Neurological:      Mental Status: She is alert and oriented to person, place, and time.    Significant Labs:  I have reviewed all pertinent lab results within the past 24 hours.  CBC:   Recent Labs   Lab 03/23/22  0600   WBC 17.46*   RBC 3.23*   HGB 8.1*   HCT 25.1*      MCV 78*   MCH 25.1*   MCHC 32.3     CMP:   Recent Labs   Lab 03/23/22  0559   *   CALCIUM 8.8      K 4.0   CO2 26      BUN 18   CREATININE 0.8       Significant Diagnostics:  I have reviewed all pertinent imaging results/findings within the past 24  hours.

## 2022-03-23 NOTE — ASSESSMENT & PLAN NOTE
Endocrinology consulted for BG management.   BG goal 140-180    - Transition drip increased to 1.2 units/hr with step-down parameters   - Novolog (aspart) insulin moderate correction scale subcutaneous as needed for blood glucose excursions.   - BG checks every 2 hours      ** Please notify Endocrine for any change and/or advance in diet**  ** Please call Endocrine for any BG related issues **    Discharge Planning:   TBD. Please notify endocrinology prior to discharge.

## 2022-03-24 ENCOUNTER — PATIENT OUTREACH (OUTPATIENT)
Dept: ADMINISTRATIVE | Facility: CLINIC | Age: 57
End: 2022-03-24
Payer: MEDICAID

## 2022-03-24 NOTE — PLAN OF CARE
[facility] - [facility]  Discharge Final Note    Expected Discharge Date: 3/23/2022    Final Discharge Note (most recent)     Final Note - 03/24/22 1220        Final Note    Assessment Type Final Discharge Note     Anticipated Discharge Disposition Home or Self Care        Post-Acute Status    Post-Acute Authorization Other     Other Status No Post-Acute Service Needs   Not eligible for home health due to insurance              Future Appointments   Date Time Provider Department Center   4/4/2022  8:45 AM [facility] MD [facility]   6/1/2022 11:00 AM Seferino Yoo MD [facility]         Lisa Rothman RN, CM   Ext: [phone]

## 2022-03-24 NOTE — DISCHARGE SUMMARY
Den King - [facility]  General Surgery  Discharge Summary      Patient Name: Casie Salvador  MRN: [id number]  Admission Date: 3/14/2022  Hospital Length of Stay: 9 days  Discharge Date and Time:  03/24/2022 8:36 AM  Attending Physician: No att. providers found   Discharging Provider: Susan Deleon MD  Primary Care Provider: No primary care provider on file.     HPI: Patient is a 58 yo f with hx of CAD (hx of stents a few years ago), CHF (EF 20%), tobacco abuse, HTN, and right BKA (2/2 diabetes) who presented to ED with acute worsening of her abd pain. She has been noticing the pain worsening on and off over the past 6 weeks, but attributed it to a GI bug. She denies any fevers or chills, N/V, hx of PUD, or hx of NSAID use. In ED her WBC was 22 and CT showed free air and fluid in her upper abd around her stomach and duodenum. General Surgery was consulted.     Of note, patient is somewhat somnolent.     Denies and hx of abd surgery  Has been holding her plavix still since her recent admission for a GI bleed    Procedure(s) (LRB):  LAPAROTOMY, EXPLORATORY, repair gastric ulcer (N/A)     Hospital Course: Patient presented as above and was taken emergently to the OR for the stated procedure which was performed without any apparent intra-operative complication. Please see op note for details regarding the procedure. Following the case she was taken to the SICU given her CHF history for observation. Here she progressed well, did not require any ventilatory or cardiac inotropic support and as such was stepped out of the SICU on POD1. She subsequently progressed with NGT in place until UGI on POD3 which was concerning for small contained ongoing leak. Given this NGT was left in place and PICC line was placed for TPN. This was continued and repeat study was performed on POD7 which demonstrated on extravasation of contrast. As such, NGT was removed and her diet was subsequently progressed and well tolerated. She was  weaned off the insulin gtt as well and discharged home on new insulin regimen, PO antibiotics and pain control with follow up.     Consults:   Consults (From admission, onward)        Status Ordering Provider     Inpatient consult to Registered Dietitian/Nutritionist  Once        Provider:  (Not yet assigned)    Completed LENNOX NAM     Inpatient consult to Endocrinology  Once        Provider:  (Not yet assigned)    Completed SOWMYA DENISE     Inpatient consult to PICC team ([facility])  Once        Provider:  (Not yet assigned)    Completed VIRGINIA CHAN     Inpatient consult to Midline team  Once        Provider:  (Not yet assigned)    Completed LENNOX NAM     Inpatient consult to General surgery  Once        Provider:  (Not yet assigned)    Completed HALEIGH GARCIA          Significant Diagnostic Studies: Labs:   BMP:   Recent Labs   Lab 03/23/22  0559   *      K 4.0      CO2 26   BUN 18   CREATININE 0.8   CALCIUM 8.8   MG 2.0    and CBC   Recent Labs   Lab 03/23/22  0600   WBC 17.46*   HGB 8.1*   HCT 25.1*          Pending Diagnostic Studies:     None        Final Active Diagnoses:    Diagnosis Date Noted POA    PRINCIPAL PROBLEM:  Perforated viscus [R19.8] 03/14/2022 Yes    On total parenteral nutrition (TPN) [Z78.9] 03/20/2022 Unknown    DM (diabetes mellitus), type 2 [E11.9] 02/20/2022 Yes    Hypertension [I10] 02/20/2022 Yes    Depression [F32.A] 02/20/2022 Yes    CAD (coronary artery disease) [I25.10] 02/20/2022 Yes      Problems Resolved During this Admission:      Discharged Condition: good    Disposition: Home or Self Care    Follow Up:   Follow-up Information     Lennox Nam MD Follow up.    Specialty: General Surgery  Contact information:  [address] [facility] [id number] [phone]                       Patient Instructions:      Lifting restrictions   Order Comments: May shower. No submerging the incision in the water. May wash softly  with water and soap.  Call with any redness, hotness, or drainage from the incision. Pain that is not well controlled with the prescribed medications or any fevers greater than 101.  No heavy lifting over 10 lbs for 4 weeks.    Please keep your follow up appointment  You may resume all mediations that you were taking at home.     Notify your health care provider if you experience any of the following:  increased confusion or weakness     Notify your health care provider if you experience any of the following:  persistent dizziness, light-headedness, or visual disturbances     Notify your health care provider if you experience any of the following:  worsening rash     Notify your health care provider if you experience any of the following:  severe persistent headache     Notify your health care provider if you experience any of the following:  difficulty breathing or increased cough     Notify your health care provider if you experience any of the following:  redness, tenderness, or signs of infection (pain, swelling, redness, odor or green/yellow discharge around incision site)     Notify your health care provider if you experience any of the following:  severe uncontrolled pain     Notify your health care provider if you experience any of the following:  persistent nausea and vomiting or diarrhea     Notify your health care provider if you experience any of the following:  temperature >100.4     No dressing needed     Medications:  Reconciled Home Medications:      Medication List      START taking these medications    ciprofloxacin HCl 500 MG tablet  Commonly known as: CIPRO  Take 1 tablet (500 mg total) by mouth every 8 (eight) hours. for 7 days     LEVEMIR FLEXTOUCH U-100 INSULN 100 unit/mL (3 mL) Inpn pen  Generic drug: insulin detemir U-100  Inject 16 Units into the skin every evening.     metroNIDAZOLE 500 MG tablet  Commonly known as: FLAGYL  Take 1 tablet (500 mg total) by mouth every 8 (eight) hours. for 7  days     oxyCODONE 5 MG immediate release tablet  Commonly known as: ROXICODONE  Take 1 tablet (5 mg total) by mouth every 6 (six) hours as needed for Pain.     pantoprazole 40 MG tablet  Commonly known as: PROTONIX  Take 1 tablet (40 mg total) by mouth once daily.        CONTINUE taking these medications    amLODIPine 10 MG tablet  Commonly known as: NORVASC  Take 10 mg by mouth once daily.     aspirin 81 MG EC tablet  Commonly known as: ECOTRIN  Take 81 mg by mouth once daily.     atorvastatin 80 MG tablet  Commonly known as: LIPITOR  Take 40 mg by mouth once daily.     ATROVENT HFA 17 mcg/actuation inhaler  Generic drug: ipratropium  SMARTSI Puff(s) By Mouth Twice Daily     azithromycin 250 MG tablet  Commonly known as: Z-ADRY  Take by mouth.     blood sugar diagnostic Strp  by Misc.(Non-Drug; Combo Route) route.     busPIRone 10 MG tablet  Commonly known as: BUSPAR  Take 10 mg by mouth 2 (two) times daily.     carisoprodoL 350 MG tablet  Commonly known as: SOMA  Take 350 mg by mouth daily as needed.     citalopram 20 MG tablet  Commonly known as: CeleXA  Take 20 mg by mouth once daily.     clopidogreL 75 mg tablet  Commonly known as: PLAVIX  Take 75 mg by mouth once daily.     docusate sodium 100 MG capsule  Commonly known as: COLACE  Take 1 capsule (100 mg total) by mouth 2 (two) times daily.     doxycycline 50 MG Cap  Commonly known as: MONODOX  Take 100 mg by mouth every 12 (twelve) hours.     ergocalciferol 50,000 unit Cap  Commonly known as: ERGOCALCIFEROL  Take 50,000 Units by mouth every 7 days.     ferrous sulfate 325 mg (65 mg iron) Tab tablet  Commonly known as: FEOSOL  Take 325 mg by mouth.     furosemide 20 MG tablet  Commonly known as: LASIX  Take 20 mg by mouth 2 (two) times daily.     gabapentin 600 MG tablet  Commonly known as: NEURONTIN  Take 600 mg by mouth 2 (two) times daily.     glipiZIDE 10 MG tablet  Commonly known as: GLUCOTROL  Take 10 mg by mouth 2 (two) times daily before meals.      hydrALAZINE 50 MG tablet  Commonly known as: APRESOLINE  Take 50 mg by mouth daily as needed.     hyoscyamine 0.125 mg Tab  Commonly known as: ANASPAZ,LEVSIN  Take 1 tablet (125 mcg total) by mouth every 6 (six) hours as needed (abdominal cramping).     lancets 28 gauge Misc  by Misc.(Non-Drug; Combo Route) route.     lisinopriL 2.5 MG tablet  Commonly known as: PRINIVIL,ZESTRIL  Take 1 tablet (2.5 mg total) by mouth once daily.     loperamide 2 mg Tab  Commonly known as: IMODIUM A-D  Take 2 mg by mouth 4 (four) times daily as needed.     losartan 50 MG tablet  Commonly known as: COZAAR  Take 50 mg by mouth once daily.     meloxicam 7.5 MG tablet  Commonly known as: MOBIC  Take 7.5 mg by mouth once daily.     metFORMIN 1000 MG tablet  Commonly known as: GLUCOPHAGE  Take 1,000 mg by mouth 2 (two) times daily with meals.     metoclopramide HCl 10 MG tablet  Commonly known as: REGLAN  Take 10 mg by mouth 2 (two) times a day.     metoprolol tartrate 50 MG tablet  Commonly known as: LOPRESSOR  Take 50 mg by mouth 2 (two) times daily.     montelukast 10 mg tablet  Commonly known as: SINGULAIR  Take 10 mg by mouth every evening.     NIVA-FOL 2.5-25-2 mg Tab  Generic drug: folic acid-vit B6-vit B12 2.5-25-2 mg  Take 1 tablet by mouth once daily.     nortriptyline 50 MG capsule  Commonly known as: PAMELOR  SMARTSI Capsule(s) By Mouth Every Evening     omeprazole 20 MG capsule  Commonly known as: PRILOSEC  Take 20 mg by mouth once daily.     ondansetron 4 MG Tbdl  Commonly known as: ZOFRAN-ODT  Take 1 tablet (4 mg total) by mouth every 8 (eight) hours as needed.     polyethylene glycol 17 gram/dose powder  Commonly known as: GLYCOLAX  Take 17 g by mouth daily as needed.     potassium chloride 10 MEQ Cpsr  Commonly known as: MICRO-K  Take 10 mEq by mouth once daily.     promethazine 6.25 mg/5 mL syrup  Commonly known as: PHENERGAN  TAKE 10 mls BY MOUTH EVERY 6 HOURS AS NEEDED     zolpidem 10 mg Tab  Commonly known as:  "AMBIEN  Take 5 mg by mouth nightly as needed.        STOP taking these medications    amoxicillin-clavulanate 875-125mg 875-125 mg per tablet  Commonly known as: AUGMENTIN     insulin glargine 100 unit/mL injection  Commonly known as: Lantus     oxyCODONE-acetaminophen 5-325 mg per tablet  Commonly known as: PERCOCET        ASK your doctor about these medications    * pen needle, diabetic 31 gauge x 3/16" Ndle  by Misc.(Non-Drug; Combo Route) route.  Ask about: Which instructions should I use?     * pen needle, diabetic 31 gauge x 5/16" Ndle  Use to inject insulin into the skin every evening.  Ask about: Which instructions should I use?         * This list has 2 medication(s) that are the same as other medications prescribed for you. Read the directions carefully, and ask your doctor or other care provider to review them with you.                Susan Deleon MD  General Surgery  [facility]  "

## 2022-03-24 NOTE — PROGRESS NOTES
C3 nurse spoke with Casie Salvador  for a TCC post hospital discharge follow up call. The patient does not have a scheduled HOSFU appointment with No primary care provider on file.  within 5-7  days post hospital discharge date 03/23/2022 C3 nurse was unable to schedule HOSFU appointment in [facility].

## 2022-03-24 NOTE — NURSING
D/c instructions reviewed with pt. F/u appointment reviewed with pt.  Pt. Verbalized understanding. Medications at bedside with all belongings. Pt. declined transportation. Pt left with  via personal wheelchair.

## 2022-03-31 ENCOUNTER — HOSPITAL ENCOUNTER (EMERGENCY)
Facility: HOSPITAL | Age: 57
Discharge: HOME | End: 2022-04-01
Attending: EMERGENCY MEDICINE
Payer: MEDICAID

## 2022-03-31 VITALS
TEMPERATURE: 98 F | SYSTOLIC BLOOD PRESSURE: 136 MMHG | OXYGEN SATURATION: 96 % | WEIGHT: 175 LBS | BODY MASS INDEX: 25.92 KG/M2 | DIASTOLIC BLOOD PRESSURE: 65 MMHG | HEART RATE: 82 BPM | HEIGHT: 69 IN | RESPIRATION RATE: 16 BRPM

## 2022-03-31 DIAGNOSIS — K59.00 CONSTIPATION, UNSPECIFIED CONSTIPATION TYPE: ICD-10-CM

## 2022-03-31 DIAGNOSIS — R10.9 ABDOMINAL PAIN, UNSPECIFIED ABDOMINAL LOCATION: Primary | ICD-10-CM

## 2022-03-31 LAB
ALBUMIN SERPL BCP-MCNC: 3.2 G/DL (ref 3.5–5.2)
ALP SERPL-CCNC: 103 U/L (ref 55–135)
ALT SERPL W/O P-5'-P-CCNC: 8 U/L (ref 10–44)
ANION GAP SERPL CALC-SCNC: 9 MMOL/L (ref 8–16)
AST SERPL-CCNC: 13 U/L (ref 10–40)
BACTERIA #/AREA URNS AUTO: ABNORMAL /HPF
BASOPHILS # BLD AUTO: 0.08 K/UL (ref 0–0.2)
BASOPHILS NFR BLD: 0.5 % (ref 0–1.9)
BILIRUB SERPL-MCNC: 0.3 MG/DL (ref 0.1–1)
BILIRUB UR QL STRIP: NEGATIVE
BUN SERPL-MCNC: 13 MG/DL (ref 6–20)
CALCIUM SERPL-MCNC: 9.7 MG/DL (ref 8.7–10.5)
CHLORIDE SERPL-SCNC: 99 MMOL/L (ref 95–110)
CLARITY UR REFRACT.AUTO: CLEAR
CO2 SERPL-SCNC: 24 MMOL/L (ref 23–29)
COLOR UR AUTO: YELLOW
CREAT SERPL-MCNC: 1.1 MG/DL (ref 0.5–1.4)
DIFFERENTIAL METHOD: ABNORMAL
EOSINOPHIL # BLD AUTO: 0.3 K/UL (ref 0–0.5)
EOSINOPHIL NFR BLD: 1.9 % (ref 0–8)
ERYTHROCYTE [DISTWIDTH] IN BLOOD BY AUTOMATED COUNT: 17.7 % (ref 11.5–14.5)
EST. GFR  (AFRICAN AMERICAN): >60 ML/MIN/1.73 M^2
EST. GFR  (NON AFRICAN AMERICAN): 56.3 ML/MIN/1.73 M^2
GLUCOSE SERPL-MCNC: 99 MG/DL (ref 70–110)
GLUCOSE UR QL STRIP: NEGATIVE
HCT VFR BLD AUTO: 31 % (ref 37–48.5)
HGB BLD-MCNC: 10.3 G/DL (ref 12–16)
HGB UR QL STRIP: NEGATIVE
IMM GRANULOCYTES # BLD AUTO: 0.08 K/UL (ref 0–0.04)
IMM GRANULOCYTES NFR BLD AUTO: 0.5 % (ref 0–0.5)
KETONES UR QL STRIP: NEGATIVE
LACTATE SERPL-SCNC: 0.7 MMOL/L (ref 0.5–2.2)
LEUKOCYTE ESTERASE UR QL STRIP: ABNORMAL
LIPASE SERPL-CCNC: 19 U/L (ref 4–60)
LYMPHOCYTES # BLD AUTO: 3 K/UL (ref 1–4.8)
LYMPHOCYTES NFR BLD: 18.5 % (ref 18–48)
MCH RBC QN AUTO: 25.2 PG (ref 27–31)
MCHC RBC AUTO-ENTMCNC: 33.2 G/DL (ref 32–36)
MCV RBC AUTO: 76 FL (ref 82–98)
MICROSCOPIC COMMENT: ABNORMAL
MONOCYTES # BLD AUTO: 0.9 K/UL (ref 0.3–1)
MONOCYTES NFR BLD: 5.7 % (ref 4–15)
NEUTROPHILS # BLD AUTO: 11.9 K/UL (ref 1.8–7.7)
NEUTROPHILS NFR BLD: 72.9 % (ref 38–73)
NITRITE UR QL STRIP: NEGATIVE
NRBC BLD-RTO: 0 /100 WBC
PH UR STRIP: 5 [PH] (ref 5–8)
PLATELET # BLD AUTO: 582 K/UL (ref 150–450)
PMV BLD AUTO: 10.3 FL (ref 9.2–12.9)
POCT GLUCOSE: 119 MG/DL (ref 70–110)
POTASSIUM SERPL-SCNC: 5.1 MMOL/L (ref 3.5–5.1)
PROT SERPL-MCNC: 9 G/DL (ref 6–8.4)
PROT UR QL STRIP: NEGATIVE
RBC # BLD AUTO: 4.08 M/UL (ref 4–5.4)
RBC #/AREA URNS AUTO: 2 /HPF (ref 0–4)
SODIUM SERPL-SCNC: 132 MMOL/L (ref 136–145)
SP GR UR STRIP: 1.01 (ref 1–1.03)
SQUAMOUS #/AREA URNS AUTO: 2 /HPF
URN SPEC COLLECT METH UR: ABNORMAL
WBC # BLD AUTO: 16.25 K/UL (ref 3.9–12.7)
WBC #/AREA URNS AUTO: 7 /HPF (ref 0–5)

## 2022-03-31 PROCEDURE — 82962 GLUCOSE BLOOD TEST: CPT

## 2022-03-31 PROCEDURE — 99285 EMERGENCY DEPT VISIT HI MDM: CPT | Mod: 25

## 2022-03-31 PROCEDURE — 25500020 PHARM REV CODE 255: Performed by: EMERGENCY MEDICINE

## 2022-03-31 PROCEDURE — 81001 URINALYSIS AUTO W/SCOPE: CPT | Performed by: NURSE PRACTITIONER

## 2022-03-31 PROCEDURE — 96374 THER/PROPH/DIAG INJ IV PUSH: CPT

## 2022-03-31 PROCEDURE — 99285 EMERGENCY DEPT VISIT HI MDM: CPT | Mod: ,,, | Performed by: EMERGENCY MEDICINE

## 2022-03-31 PROCEDURE — 63600175 PHARM REV CODE 636 W HCPCS: Performed by: PHYSICIAN ASSISTANT

## 2022-03-31 PROCEDURE — 80053 COMPREHEN METABOLIC PANEL: CPT | Performed by: NURSE PRACTITIONER

## 2022-03-31 PROCEDURE — 83690 ASSAY OF LIPASE: CPT | Performed by: NURSE PRACTITIONER

## 2022-03-31 PROCEDURE — 96375 TX/PRO/DX INJ NEW DRUG ADDON: CPT

## 2022-03-31 PROCEDURE — 25000003 PHARM REV CODE 250: Performed by: PHYSICIAN ASSISTANT

## 2022-03-31 PROCEDURE — 85025 COMPLETE CBC W/AUTO DIFF WBC: CPT | Performed by: NURSE PRACTITIONER

## 2022-03-31 PROCEDURE — 99285 PR EMERGENCY DEPT VISIT,LEVEL V: ICD-10-PCS | Mod: ,,, | Performed by: EMERGENCY MEDICINE

## 2022-03-31 PROCEDURE — 83605 ASSAY OF LACTIC ACID: CPT | Performed by: PHYSICIAN ASSISTANT

## 2022-03-31 RX ORDER — SYRING-NEEDL,DISP,INSUL,0.3 ML 29 G X1/2"
296 SYRINGE, EMPTY DISPOSABLE MISCELLANEOUS
Status: COMPLETED | OUTPATIENT
Start: 2022-03-31 | End: 2022-03-31

## 2022-03-31 RX ORDER — HYDROMORPHONE HYDROCHLORIDE 1 MG/ML
0.5 INJECTION, SOLUTION INTRAMUSCULAR; INTRAVENOUS; SUBCUTANEOUS
Status: COMPLETED | OUTPATIENT
Start: 2022-03-31 | End: 2022-03-31

## 2022-03-31 RX ORDER — PSEUDOEPHEDRINE/ACETAMINOPHEN 30MG-500MG
100 TABLET ORAL
Status: COMPLETED | OUTPATIENT
Start: 2022-03-31 | End: 2022-03-31

## 2022-03-31 RX ORDER — MORPHINE SULFATE 4 MG/ML
4 INJECTION, SOLUTION INTRAMUSCULAR; INTRAVENOUS
Status: COMPLETED | OUTPATIENT
Start: 2022-03-31 | End: 2022-03-31

## 2022-03-31 RX ADMIN — SODIUM CHLORIDE 500 ML: 0.9 INJECTION, SOLUTION INTRAVENOUS at 10:03

## 2022-03-31 RX ADMIN — MORPHINE SULFATE 4 MG: 4 INJECTION INTRAVENOUS at 06:03

## 2022-03-31 RX ADMIN — IOHEXOL 75 ML: 350 INJECTION, SOLUTION INTRAVENOUS at 08:03

## 2022-03-31 RX ADMIN — Medication 100 ML: at 10:03

## 2022-03-31 RX ADMIN — MAGNESIUM CITRATE 296 ML: 1.75 LIQUID ORAL at 10:03

## 2022-03-31 RX ADMIN — DOCUSATE SODIUM 50 MG: 50 CAPSULE, LIQUID FILLED ORAL at 10:03

## 2022-03-31 RX ADMIN — HYDROMORPHONE HYDROCHLORIDE 0.5 MG: 1 INJECTION, SOLUTION INTRAMUSCULAR; INTRAVENOUS; SUBCUTANEOUS at 08:03

## 2022-03-31 NOTE — ED NOTES
"Pt states she had a gastric surgery two weeks ago and yesterday, she started having intense abdominal surgery. Pt is tearful and an in acute distress. Pt is stating she's hurting really bad and rates the pain a 10/10. Pt's family member states that she "has staples that popped out and her gastric wound is leaking." Pt's  also reports that patient hasn't had a bowel movement in a week.    Patient identifiers for Casie Salvador 57 y.o. female checked and correct.  Chief Complaint   Patient presents with    Abdominal Cramping     Recurring abdominal pain    Post-op Problem     Past Medical History:   Diagnosis Date    Coronary artery disease     Diabetes mellitus     Encounter for blood transfusion     Heart attack     Hypertension      Allergies reported:   Review of patient's allergies indicates:   Allergen Reactions    Orange juice Hives    Tomato (solanum lycopersicum) Hives       LOC: Patient is awake, alert, and aware of environment with an appropriate affect. Patient is oriented x 3 and speaking appropriately.  APPEARANCE: Patient in acute distress d/t abdominal pain s/p surgery.  HEENT: WDL  SKIN: The skin is warm and dry. Patient has normal skin turgor and moist mucus membranes. Skin is intact; no bruising or breakdown noted.  MUSKULOSKELETAL: Patient is moving all extremities well, no obvious deformities noted. Pulses intact.   RESPIRATORY: Airway is open and patent. Respirations are spontaneous and non-labored with normal effort and rate, BBS=clear  ABDOMEN: Abdominal pain on palpation.  NEUROLOGICAL: PERRL. Facial expression is symmetrical. Hand grasps are equal bilaterally. Normal sensation in all extremities when touched with finger.    "

## 2022-03-31 NOTE — ED PROVIDER NOTES
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  Patient reports severe upper abdominal pain since yesterday.   She has not had a bowel movement in about 5 days.  She has been taking pain medication at home post surgery.  Patient's family member notes that 2 of her surgical staples came out a few days ago.  There has been some drainage from her wound since then.Patient denies nausea, vomiting, fever, chills, changes in urination.         Review of patient's allergies indicates:   Allergen Reactions    Orange juice Hives    Tomato (solanum lycopersicum) Hives     Past Medical History:   Diagnosis Date    Coronary artery disease     Diabetes mellitus     Encounter for blood transfusion     Heart attack     Hypertension      Past Surgical History:   Procedure Laterality Date    APPENDECTOMY      COLONOSCOPY N/A 2/23/2022    Procedure: COLONOSCOPY;  Surgeon: Estefania Bryant MD;  Location: [address]);  Service: Endoscopy;  Laterality: N/A;  anemia, melena    ESOPHAGOGASTRODUODENOSCOPY N/A 2/23/2022    Procedure: EGD (ESOPHAGOGASTRODUODENOSCOPY);  Surgeon: Estefania Bryant MD;  Location: [address]);  Service: Endoscopy;  Laterality: N/A;  anemia    stents      TUBAL LIGATION       No family history on file.  Social History     Tobacco Use    Smoking status: Current Every Day Smoker     Packs/day: 0.50     Types: Cigarettes    Smokeless tobacco: Never Used   Substance Use Topics    Alcohol use: No    Drug use: No     Review of Systems   Constitutional: Negative for fever.   HENT: Negative for sore throat.    Respiratory: Negative for shortness of breath.    Cardiovascular:  Negative for chest pain.   Gastrointestinal: Positive for abdominal pain and constipation. Negative for nausea.   Genitourinary: Negative for dysuria.   Musculoskeletal: Negative for back pain.   Skin: Negative for rash.   Neurological: Negative for weakness.   Hematological: Does not bruise/bleed easily.       Physical Exam     Initial Vitals   BP Pulse Resp Temp SpO2   03/31/22 1545 03/31/22 1545 03/31/22 1545 03/31/22 1658 03/31/22 1545   (!) 141/68 87 18 98.4 °F (36.9 °C) 97 %      MAP       --                Physical Exam    Nursing note and vitals reviewed.  Constitutional: She appears well-developed and well-nourished. She is not diaphoretic.  Non-toxic appearance. She does not appear ill. No distress.   HENT:   Head: Normocephalic and atraumatic.   Neck: Neck supple.   Cardiovascular: Normal rate and regular rhythm. Exam reveals no gallop and no friction rub.    No murmur heard.  Pulmonary/Chest: Effort normal and breath sounds normal. No accessory muscle usage. No tachypnea. No respiratory distress. She has no decreased breath sounds. She has no wheezes. She has no rhonchi. She has no rales.   Abdominal: Abdomen is soft. She exhibits distension. She exhibits no mass. There is abdominal tenderness.   Healing vertical surgical incision in the upper abdomen.  Staples in place.  There is scant drainage that appears serous from 2 small areas.  No significant dehiscence.  Minimal tenderness to palpation to the incision.  No surrounding induration or significant erythema.There is moderate tenderness in the mid upper abdomen with deeper palpation.        Musculoskeletal:      Cervical back: Neck supple.     Neurological: She is alert.   Skin: No rash noted.   Psychiatric: She has a normal mood and affect. Her behavior is normal.         ED Course   Procedures  Labs Reviewed   CBC W/ AUTO DIFFERENTIAL - Abnormal; Notable for the following components:       Result Value    WBC 16.25 (*)     Hemoglobin 10.3 (*)      Hematocrit 31.0 (*)     MCV 76 (*)     MCH 25.2 (*)     RDW 17.7 (*)     Platelets 582 (*)     Gran # (ANC) 11.9 (*)     Immature Grans (Abs) 0.08 (*)     All other components within normal limits   COMPREHENSIVE METABOLIC PANEL - Abnormal; Notable for the following components:    Sodium 132 (*)     Total Protein 9.0 (*)     Albumin 3.2 (*)     ALT 8 (*)     eGFR if non  56.3 (*)     All other components within normal limits   URINALYSIS, REFLEX TO URINE CULTURE - Abnormal; Notable for the following components:    Leukocytes, UA Trace (*)     All other components within normal limits    Narrative:     Specimen Source->Urine   URINALYSIS MICROSCOPIC - Abnormal; Notable for the following components:    WBC, UA 7 (*)     All other components within normal limits    Narrative:     Specimen Source->Urine   POCT GLUCOSE - Abnormal; Notable for the following components:    POCT Glucose 119 (*)     All other components within normal limits   LIPASE   LACTIC ACID, PLASMA          Imaging Results          CT Abdomen Pelvis With Contrast (Final result)  Result time 03/31/22 21:41:51    Final result by Bakari Trejo MD (03/31/22 21:41:51)                 Impression:      1. Status post recent gastric perforation repair.  There is persistent gastric wall thickening with adjacent mild inflammatory changes likely related to residual gastritis and/or operative change.  No evidence of intraperitoneal abscess or free air.  2. Advanced aortoiliac and coronary artery atherosclerosis.  3.  Additional stable/incidental findings discussed in the body of the report.    Electronically signed by resident: Alok Patrick  Date:    03/31/2022  Time:    21:02    Electronically signed by: Bakari Trejo MD  Date:    03/31/2022  Time:    21:41             Narrative:    EXAMINATION:  CT ABDOMEN PELVIS WITH CONTRAST    CLINICAL HISTORY:  Abdominal abscess/infection suspected;    TECHNIQUE:  Low dose axial images, sagittal and  coronal reformations were obtained from the lung bases to the pubic symphysis following the IV administration of 75 mL of Omnipaque 350.  Oral contrast was not given.    COMPARISON:  CT chest abdomen pelvis 03/14/2022.  CT abdomen pelvis 02/20/2022.  FL upper GI 03/21/2022.    FINDINGS:  Heart: Normal in size. No pericardial effusion. Aortic annulus and coronary artery calcification.    Lung Bases: Bibasilar atelectasis.  No significant pleural fluid.    Liver: Normal in size and attenuation, with no focal hepatic lesions.  The portal vein is patent.  There is apparent narrowing at the origin of the splenic vein however patent.    Gallbladder: No calcified gallstones.    Bile Ducts: No evidence of dilated ducts.    Pancreas: No mass or peripancreatic fat stranding.    Spleen: Unremarkable.    Adrenals: Unremarkable.    Kidneys/ Ureters: Stable in size and location.  Stable bilateral renal cysts.  No hydronephrosis or nephrolithiasis.  The visualized ureters are unremarkable.    Bladder: No evidence of wall thickening.    Reproductive organs: Unremarkable.    GI Tract/Mesentery: Status post recent gastric perforation repair.  There is persistent gastric wall thickening and hyperenhancement with adjacent mild inflammatory changes.  No adjacent intraperitoneal free air.  No evidence of small-bowel obstruction.  Retained contrast throughout the large bowel, presumably from recent fluoroscopy study.    Peritoneal Space: No ascites. No free air.    Retroperitoneum: No significant adenopathy.    Abdominal wall: Surgical scar noted within the anterior midline supraumbilical abdominal wall with adjacent inflammatory changes.  No confluent fluid collection.    Vasculature: There is significant calcific and noncalcific atherosclerosis involving the aortoiliac vasculature.  The bilateral renal arteries are patent noting calcific atherosclerosis of the right renal artery origin with suspected moderate stenosis.  The celiac,  superior mesenteric, and inferior mesenteric arteries are patent.  There are stents within the bilateral common iliac and common femoral arteries.    Bones: Degenerative changes.  No acute fracture or aggressive osseous lesion.                                 Medications   glycerin 99.5% topical solution 100 mL (has no administration in time range)     And   magnesium citrate solution 296 mL (has no administration in time range)     And   sodium chloride 0.9% bolus 500 mL (has no administration in time range)   docusate sodium capsule 50 mg (has no administration in time range)   morphine injection 4 mg (4 mg Intravenous Given 3/31/22 1800)   HYDROmorphone injection 0.5 mg (0.5 mg Intravenous Given 3/31/22 2006)   iohexoL (OMNIPAQUE 350) injection 75 mL (75 mLs Intravenous Given 3/31/22 2037)     Medical Decision Making:   History:   Old Medical Records: I decided to obtain old medical records.  Initial Assessment:   57-year-old female 2 weeks postop ex lap for perforated gastric ulcer presents to the ED with abdominal pain.  Hemodynamically stable.  Afebrile.  Differential Diagnosis:   My differential diagnosis includes but is not limited to:  Postop infection, hematoma, bowel obstruction, constipation  Clinical Tests:   Lab Tests: Ordered  Radiological Study: Ordered  ED Management:  Labs without clinically significant abnormalities.  See ED course below for details.  Other:   I have discussed this case with another health care provider.       <> Summary of the Discussion: General surgery            Attending Attestation:     Physician Attestation Statement for NP/PA:   I discussed this assessment and plan of this patient with the NP/PA, but I did not personally examine the patient. The face to face encounter was performed by the NP/PA.              ED Course as of 03/31/22 2231   Thu Mar 31, 2022   2228 CT showed no emergent process or other acute concerning abnormalities.  Discussed with General surgery.  They  evaluated the patient and reviewed imaging.  No indication for admission or surgical intervention.  They recommend enema as patient appears to have a significant amount of stool likely contributing to her pain. [EH]   2229 Anticipate discharge.  Patient signed out to oncoming ED team pending administration of brown bomb enema and discharge. [EH]      ED Course User Index  [EH] Lynette Acevedo PA-C             Clinical Impression:   Final diagnoses:  [R10.9] Abdominal pain, unspecified abdominal location (Primary)  [K59.00] Constipation, unspecified constipation type                 Lynette Acevedo PA-C  03/31/22 [id number]       Claudette Leonard MD  04/01/22 1919

## 2022-03-31 NOTE — ED NOTES
Patient's 's name is Mario. His phone number is [phone]. He would like to be notified of any change of plans or updates regarding care of his wife.

## 2022-03-31 NOTE — FIRST PROVIDER EVALUATION
Emergency Department TeleTriage Encounter Note      CHIEF COMPLAINT    Chief Complaint   Patient presents with    Abdominal Cramping     Recurring abdominal pain    Post-op Problem       VITAL SIGNS   Initial Vitals [03/31/22 1545]   BP Pulse Resp Temp SpO2   (!) 141/68 87 18 -- 97 %      MAP       --            ALLERGIES    Review of patient's allergies indicates:   Allergen Reactions    Orange juice Hives    Tomato (solanum lycopersicum) Hives       PROVIDER TRIAGE NOTE  This is a teletriage evaluation of a 57 y.o. female presenting to the ED with c/o lower abdominal pain/cramping. Recent admission for perforated viscus. Limited physical exam via telehealth: The patient is awake, alert, answering questions appropriately and is not in respiratory distress. Initial orders will be placed and care will be transferred to an alternate provider when patient is roomed for a full evaluation. Any additional orders and the final disposition will be determined by that provider.         ORDERS  Labs Reviewed   CBC W/ AUTO DIFFERENTIAL   COMPREHENSIVE METABOLIC PANEL   LIPASE   URINALYSIS, REFLEX TO URINE CULTURE       ED Orders (720h ago, onward)    Start Ordered     Status Ordering Provider    03/31/22 1638 03/31/22 1637  Check temperature  Once         Ordered ROXIE PIEDRA    03/31/22 1638 03/31/22 1637  Vital signs  Every 2 hours         Ordered ROXIE PIEDRA    03/31/22 1638 03/31/22 1637  Diet NPO  Diet effective now         Ordered ROXIE PIEDRA    03/31/22 1638 03/31/22 1637  Insert peripheral IV  Once         Ordered ROXIE PIEDRA    03/31/22 1638 03/31/22 1637  CBC W/ AUTO DIFFERENTIAL  STAT         Ordered ROXIE PIEDRA    03/31/22 1638 03/31/22 1637  Comp. Metabolic Panel  STAT         Ordered ROXIE PIEDRA    03/31/22 1638 03/31/22 1637  Lipase  STAT         Ordered ROXIE PIEDRA    03/31/22 1638 03/31/22 1637  Urinalysis, Reflex to Urine Culture Urine, Clean Catch  STAT         Ordered  ROXIE PIEDRA            Virtual Visit Note: The provider triage portion of this emergency department evaluation and documentation was performed via [employer], a HIPAA-compliant telemedicine application, in concert with a tele-presenter in the room. A face to face patient evaluation with one of my colleagues will occur once the patient is placed in an emergency department room.      DISCLAIMER: This note was prepared with [employer] voice recognition transcription software. Garbled syntax, mangled pronouns, and other bizarre constructions may be attributed to that software system.

## 2022-04-01 NOTE — ED NOTES
Pt had large amount of stool results from enema. Pt cleaned and changed. Pt. Put in wheelchair for discharge and prosthesis applied.Family on the way to pick pt. Up.

## 2022-04-01 NOTE — PROVIDER PROGRESS NOTES - EMERGENCY DEPT.
Encounter Date: 3/31/2022    ED Physician Progress Notes        Physician Note:   ED Physician Hand-off Note:    ED Course: I assumed care of patient from off-going ED physician team. Briefly, Patient is a 57 F recent admission for gastric perforation requiring ex-lap here with abdominal pain, constipation x 5 days.  No vomiting, fever, or chills.    General Surgery evaluated patient and CT scan reviewed, no acute surgical complication.  Recommend brown bomb enema, stool regimen.  Ok to discharge to follow up in clinic.     At the time of signout plan was pending brown bomb enema.    Medications given in the ED:    Medications  glycerin 99.5% topical solution 100 mL (has no administration in time range)     And  magnesium citrate solution 296 mL (has no administration in time range)    And  sodium chloride 0.9% bolus 500 mL (has no administration in time range)  docusate sodium capsule 50 mg (has no administration in time range)  morphine injection 4 mg (4 mg Intravenous Given 3/31/22 1800)  HYDROmorphone injection 0.5 mg (0.5 mg Intravenous Given 3/31/22 2006)  iohexoL (OMNIPAQUE 350) injection 75 mL (75 mLs Intravenous Given 3/31/22 2037)    11:30 PM  Patient re-evaluated after brown bomb, had a large bowel movement.  She reports improvement of symptoms.  Discussed stool softeners for home use.  Stable for discharge    Disposition: discharge    Patient comfortable with plan. Patient counseled regarding exam, results, diagnosis, treatment, and plan.    Impression: abdominal pain, constipation, s/p gastric perforation w ex lap     RAYMOND Dailey MD

## 2022-04-01 NOTE — CARE UPDATE
General Surgery    Patient well known to the general surgery service after recent 10 day admission 3/14-23 for  Perforated gastric ulcer requiring ex-lap with anita patch. She was discharged home tolerating a regular diet without issue. She represented today complaining of generalized abdominal pain and constipation for the last 5 days. Afebrile, otherwise feeling well. Not moving around much at home.     Abdominal exam benign, incision c/d/i  WBC 16 (downtrending from discharge)  Other labs wnl  CT A/P with some persistent gastritis and inflammatory changes but no signs of abscess or free air but old contrast and large colonic stool burden    Given above recommend enema in ED, ok for discharge home. Needs to have good bowel regimen while taking narcotic pain medication. Discussed with patient, she understands and is in agreement with the plan. Has follow up in clinic next week.     Francesca Deleon MD  PGY- 5 General Surgery   [phone]

## 2022-04-01 NOTE — DISCHARGE INSTRUCTIONS
Please use MiraLax or Dulcolax at home to help with constipation.  Follow-up with General surgery as scheduled

## 2022-04-02 ENCOUNTER — HOSPITAL ENCOUNTER (OUTPATIENT)
Facility: HOSPITAL | Age: 57
Discharge: HOME | End: 2022-04-03
Attending: EMERGENCY MEDICINE | Admitting: HOSPITALIST
Payer: MEDICAID

## 2022-04-02 DIAGNOSIS — E11.40 TYPE 2 DIABETES MELLITUS WITH DIABETIC NEUROPATHY, WITH LONG-TERM CURRENT USE OF INSULIN: Chronic | ICD-10-CM

## 2022-04-02 DIAGNOSIS — Z79.4 TYPE 2 DIABETES MELLITUS WITH DIABETIC NEUROPATHY, WITH LONG-TERM CURRENT USE OF INSULIN: Chronic | ICD-10-CM

## 2022-04-02 DIAGNOSIS — T39.395A NSAID INDUCED GASTRITIS: ICD-10-CM

## 2022-04-02 DIAGNOSIS — N17.9 AKI (ACUTE KIDNEY INJURY): Primary | ICD-10-CM

## 2022-04-02 DIAGNOSIS — R11.10 VOMITING: ICD-10-CM

## 2022-04-02 DIAGNOSIS — R11.2 NAUSEA & VOMITING: ICD-10-CM

## 2022-04-02 DIAGNOSIS — K29.60 NSAID INDUCED GASTRITIS: ICD-10-CM

## 2022-04-02 PROBLEM — T40.2X5A CONSTIPATION DUE TO OPIOID THERAPY: Chronic | Status: ACTIVE | Noted: 2022-02-20

## 2022-04-02 PROBLEM — I50.42 CHRONIC COMBINED SYSTOLIC AND DIASTOLIC CONGESTIVE HEART FAILURE: Chronic | Status: ACTIVE | Noted: 2022-02-21

## 2022-04-02 PROBLEM — Z78.9 ON TOTAL PARENTERAL NUTRITION (TPN): Status: RESOLVED | Noted: 2022-03-20 | Resolved: 2022-04-02

## 2022-04-02 PROBLEM — F11.90 CHRONIC, CONTINUOUS USE OF OPIOIDS: Chronic | Status: ACTIVE | Noted: 2022-04-02

## 2022-04-02 PROBLEM — I10 HYPERTENSION: Chronic | Status: ACTIVE | Noted: 2022-02-20

## 2022-04-02 PROBLEM — Z87.11 HISTORY OF GASTRIC ULCER: Status: ACTIVE | Noted: 2022-04-02

## 2022-04-02 PROBLEM — I50.22 CHRONIC SYSTOLIC CONGESTIVE HEART FAILURE: Chronic | Status: ACTIVE | Noted: 2022-02-21

## 2022-04-02 PROBLEM — K59.03 CONSTIPATION DUE TO OPIOID THERAPY: Chronic | Status: ACTIVE | Noted: 2022-02-20

## 2022-04-02 PROBLEM — E11.9 TYPE 2 DIABETES MELLITUS, WITH LONG-TERM CURRENT USE OF INSULIN: Chronic | Status: ACTIVE | Noted: 2022-02-20

## 2022-04-02 PROBLEM — I73.9 PERIPHERAL ARTERY DISEASE: Status: ACTIVE | Noted: 2022-04-02

## 2022-04-02 PROBLEM — I25.10 CAD (CORONARY ARTERY DISEASE): Chronic | Status: ACTIVE | Noted: 2022-02-20

## 2022-04-02 PROBLEM — G89.29 CHRONIC PAIN: Chronic | Status: ACTIVE | Noted: 2022-02-20

## 2022-04-02 PROBLEM — I73.9 PERIPHERAL ARTERY DISEASE: Chronic | Status: ACTIVE | Noted: 2022-04-02

## 2022-04-02 PROBLEM — F17.210 CIGARETTE SMOKER: Chronic | Status: ACTIVE | Noted: 2022-04-02

## 2022-04-02 PROBLEM — Z89.511 HISTORY OF RIGHT BELOW KNEE AMPUTATION: Chronic | Status: ACTIVE | Noted: 2022-04-02

## 2022-04-02 PROBLEM — F32.A DEPRESSION: Chronic | Status: ACTIVE | Noted: 2022-02-20

## 2022-04-02 LAB
ALBUMIN SERPL BCP-MCNC: 3.2 G/DL (ref 3.5–5.2)
ALP SERPL-CCNC: 102 U/L (ref 55–135)
ALT SERPL W/O P-5'-P-CCNC: 9 U/L (ref 10–44)
ANION GAP SERPL CALC-SCNC: 13 MMOL/L (ref 8–16)
AST SERPL-CCNC: 14 U/L (ref 10–40)
BASOPHILS # BLD AUTO: 0.06 K/UL (ref 0–0.2)
BASOPHILS NFR BLD: 0.3 % (ref 0–1.9)
BILIRUB SERPL-MCNC: 0.4 MG/DL (ref 0.1–1)
BUN SERPL-MCNC: 17 MG/DL (ref 6–20)
CALCIUM SERPL-MCNC: 9.9 MG/DL (ref 8.7–10.5)
CHLORIDE SERPL-SCNC: 95 MMOL/L (ref 95–110)
CO2 SERPL-SCNC: 24 MMOL/L (ref 23–29)
CREAT SERPL-MCNC: 1.3 MG/DL (ref 0.5–1.4)
DIFFERENTIAL METHOD: ABNORMAL
EOSINOPHIL # BLD AUTO: 0 K/UL (ref 0–0.5)
EOSINOPHIL NFR BLD: 0.1 % (ref 0–8)
ERYTHROCYTE [DISTWIDTH] IN BLOOD BY AUTOMATED COUNT: 16.8 % (ref 11.5–14.5)
EST. GFR  (AFRICAN AMERICAN): 53 ML/MIN/1.73 M^2
EST. GFR  (NON AFRICAN AMERICAN): 46 ML/MIN/1.73 M^2
GLUCOSE SERPL-MCNC: 248 MG/DL (ref 70–110)
HCT VFR BLD AUTO: 31.6 % (ref 37–48.5)
HGB BLD-MCNC: 10.6 G/DL (ref 12–16)
IMM GRANULOCYTES # BLD AUTO: 0.11 K/UL (ref 0–0.04)
IMM GRANULOCYTES NFR BLD AUTO: 0.5 % (ref 0–0.5)
LIPASE SERPL-CCNC: 38 U/L (ref 4–60)
LYMPHOCYTES # BLD AUTO: 2.2 K/UL (ref 1–4.8)
LYMPHOCYTES NFR BLD: 10.6 % (ref 18–48)
MCH RBC QN AUTO: 24.7 PG (ref 27–31)
MCHC RBC AUTO-ENTMCNC: 33.5 G/DL (ref 32–36)
MCV RBC AUTO: 74 FL (ref 82–98)
MONOCYTES # BLD AUTO: 0.8 K/UL (ref 0.3–1)
MONOCYTES NFR BLD: 3.8 % (ref 4–15)
NEUTROPHILS # BLD AUTO: 17.9 K/UL (ref 1.8–7.7)
NEUTROPHILS NFR BLD: 84.7 % (ref 38–73)
NRBC BLD-RTO: 0 /100 WBC
PLATELET # BLD AUTO: 571 K/UL (ref 150–450)
PMV BLD AUTO: 10.3 FL (ref 9.2–12.9)
POCT GLUCOSE: 205 MG/DL (ref 70–110)
POTASSIUM SERPL-SCNC: 4.8 MMOL/L (ref 3.5–5.1)
PROT SERPL-MCNC: 9.1 G/DL (ref 6–8.4)
RBC # BLD AUTO: 4.29 M/UL (ref 4–5.4)
SODIUM SERPL-SCNC: 132 MMOL/L (ref 136–145)
TROPONIN I SERPL DL<=0.01 NG/ML-MCNC: <0.006 NG/ML (ref 0–0.03)
WBC # BLD AUTO: 21.17 K/UL (ref 3.9–12.7)

## 2022-04-02 PROCEDURE — 96361 HYDRATE IV INFUSION ADD-ON: CPT

## 2022-04-02 PROCEDURE — 96372 THER/PROPH/DIAG INJ SC/IM: CPT | Mod: 59 | Performed by: HOSPITALIST

## 2022-04-02 PROCEDURE — 85025 COMPLETE CBC W/AUTO DIFF WBC: CPT | Performed by: EMERGENCY MEDICINE

## 2022-04-02 PROCEDURE — 83690 ASSAY OF LIPASE: CPT | Performed by: EMERGENCY MEDICINE

## 2022-04-02 PROCEDURE — 84484 ASSAY OF TROPONIN QUANT: CPT | Performed by: EMERGENCY MEDICINE

## 2022-04-02 PROCEDURE — 99220 PR INITIAL OBSERVATION CARE,LEVL III: ICD-10-PCS | Mod: ,,, | Performed by: HOSPITALIST

## 2022-04-02 PROCEDURE — 99285 EMERGENCY DEPT VISIT HI MDM: CPT | Mod: 25

## 2022-04-02 PROCEDURE — 93005 ELECTROCARDIOGRAM TRACING: CPT

## 2022-04-02 PROCEDURE — 25000003 PHARM REV CODE 250: Performed by: HOSPITALIST

## 2022-04-02 PROCEDURE — C9399 UNCLASSIFIED DRUGS OR BIOLOG: HCPCS | Performed by: HOSPITALIST

## 2022-04-02 PROCEDURE — 63600175 PHARM REV CODE 636 W HCPCS: Performed by: PHYSICIAN ASSISTANT

## 2022-04-02 PROCEDURE — 99284 EMERGENCY DEPT VISIT MOD MDM: CPT | Mod: ,,, | Performed by: PHYSICIAN ASSISTANT

## 2022-04-02 PROCEDURE — 93010 EKG 12-LEAD: ICD-10-PCS | Mod: ,,, | Performed by: INTERNAL MEDICINE

## 2022-04-02 PROCEDURE — 96374 THER/PROPH/DIAG INJ IV PUSH: CPT

## 2022-04-02 PROCEDURE — 99284 PR EMERGENCY DEPT VISIT,LEVEL IV: ICD-10-PCS | Mod: ,,, | Performed by: PHYSICIAN ASSISTANT

## 2022-04-02 PROCEDURE — G0378 HOSPITAL OBSERVATION PER HR: HCPCS

## 2022-04-02 PROCEDURE — 25000003 PHARM REV CODE 250: Performed by: PHYSICIAN ASSISTANT

## 2022-04-02 PROCEDURE — 99220 PR INITIAL OBSERVATION CARE,LEVL III: CPT | Mod: ,,, | Performed by: HOSPITALIST

## 2022-04-02 PROCEDURE — 93010 ELECTROCARDIOGRAM REPORT: CPT | Mod: ,,, | Performed by: INTERNAL MEDICINE

## 2022-04-02 PROCEDURE — 80053 COMPREHEN METABOLIC PANEL: CPT | Performed by: EMERGENCY MEDICINE

## 2022-04-02 RX ORDER — NORTRIPTYLINE HYDROCHLORIDE 25 MG/1
50 CAPSULE ORAL NIGHTLY
Status: DISCONTINUED | OUTPATIENT
Start: 2022-04-02 | End: 2022-04-03 | Stop reason: HOSPADM

## 2022-04-02 RX ORDER — OXYCODONE AND ACETAMINOPHEN 10; 325 MG/1; MG/1
1 TABLET ORAL EVERY 8 HOURS PRN
Status: DISCONTINUED | OUTPATIENT
Start: 2022-04-02 | End: 2022-04-03 | Stop reason: HOSPADM

## 2022-04-02 RX ORDER — IBUPROFEN 200 MG
16 TABLET ORAL
Status: DISCONTINUED | OUTPATIENT
Start: 2022-04-02 | End: 2022-04-03 | Stop reason: HOSPADM

## 2022-04-02 RX ORDER — HYOSCYAMINE SULFATE 0.12 MG/1
0.12 TABLET SUBLINGUAL EVERY 4 HOURS PRN
Status: DISCONTINUED | OUTPATIENT
Start: 2022-04-02 | End: 2022-04-03 | Stop reason: HOSPADM

## 2022-04-02 RX ORDER — ZOLPIDEM TARTRATE 5 MG/1
5 TABLET ORAL NIGHTLY PRN
Status: DISCONTINUED | OUTPATIENT
Start: 2022-04-02 | End: 2022-04-03 | Stop reason: HOSPADM

## 2022-04-02 RX ORDER — IBUPROFEN 200 MG
24 TABLET ORAL
Status: DISCONTINUED | OUTPATIENT
Start: 2022-04-02 | End: 2022-04-03 | Stop reason: HOSPADM

## 2022-04-02 RX ORDER — CARISOPRODOL 350 MG/1
350 TABLET ORAL DAILY PRN
Status: DISCONTINUED | OUTPATIENT
Start: 2022-04-02 | End: 2022-04-03 | Stop reason: HOSPADM

## 2022-04-02 RX ORDER — BUSPIRONE HYDROCHLORIDE 10 MG/1
10 TABLET ORAL 2 TIMES DAILY
Status: DISCONTINUED | OUTPATIENT
Start: 2022-04-02 | End: 2022-04-03 | Stop reason: HOSPADM

## 2022-04-02 RX ORDER — MORPHINE SULFATE 2 MG/ML
2 INJECTION, SOLUTION INTRAMUSCULAR; INTRAVENOUS
Status: ACTIVE | OUTPATIENT
Start: 2022-04-02 | End: 2022-04-03

## 2022-04-02 RX ORDER — GLUCAGON 1 MG
1 KIT INJECTION
Status: DISCONTINUED | OUTPATIENT
Start: 2022-04-02 | End: 2022-04-03 | Stop reason: HOSPADM

## 2022-04-02 RX ORDER — INSULIN ASPART 100 [IU]/ML
0-5 INJECTION, SOLUTION INTRAVENOUS; SUBCUTANEOUS
Status: DISCONTINUED | OUTPATIENT
Start: 2022-04-02 | End: 2022-04-03 | Stop reason: HOSPADM

## 2022-04-02 RX ORDER — OXYCODONE AND ACETAMINOPHEN 10; 325 MG/1; MG/1
1 TABLET ORAL EVERY 8 HOURS PRN
Status: ON HOLD | COMMUNITY
End: 2023-10-27 | Stop reason: HOSPADM

## 2022-04-02 RX ORDER — CLOPIDOGREL BISULFATE 75 MG/1
75 TABLET ORAL DAILY
Status: DISCONTINUED | OUTPATIENT
Start: 2022-04-03 | End: 2022-04-03 | Stop reason: HOSPADM

## 2022-04-02 RX ORDER — AMOXICILLIN 250 MG
1 CAPSULE ORAL 2 TIMES DAILY
Status: DISCONTINUED | OUTPATIENT
Start: 2022-04-02 | End: 2022-04-03 | Stop reason: HOSPADM

## 2022-04-02 RX ORDER — ONDANSETRON 2 MG/ML
4 INJECTION INTRAMUSCULAR; INTRAVENOUS
Status: COMPLETED | OUTPATIENT
Start: 2022-04-02 | End: 2022-04-02

## 2022-04-02 RX ORDER — ATORVASTATIN CALCIUM 40 MG/1
40 TABLET, FILM COATED ORAL DAILY
Status: DISCONTINUED | OUTPATIENT
Start: 2022-04-03 | End: 2022-04-03 | Stop reason: HOSPADM

## 2022-04-02 RX ORDER — PROCHLORPERAZINE EDISYLATE 5 MG/ML
2.5 INJECTION INTRAMUSCULAR; INTRAVENOUS EVERY 6 HOURS PRN
Status: DISCONTINUED | OUTPATIENT
Start: 2022-04-02 | End: 2022-04-03 | Stop reason: HOSPADM

## 2022-04-02 RX ORDER — HYOSCYAMINE SULFATE 0.12 MG/ML
0.25 LIQUID ORAL EVERY 4 HOURS PRN
Refills: 0 | Status: DISCONTINUED | OUTPATIENT
Start: 2022-04-02 | End: 2022-04-02 | Stop reason: SDUPTHER

## 2022-04-02 RX ORDER — CITALOPRAM 20 MG/1
20 TABLET, FILM COATED ORAL DAILY
Status: DISCONTINUED | OUTPATIENT
Start: 2022-04-03 | End: 2022-04-03 | Stop reason: HOSPADM

## 2022-04-02 RX ORDER — ONDANSETRON 2 MG/ML
4 INJECTION INTRAMUSCULAR; INTRAVENOUS EVERY 6 HOURS PRN
Status: DISCONTINUED | OUTPATIENT
Start: 2022-04-02 | End: 2022-04-02

## 2022-04-02 RX ORDER — ACETAMINOPHEN 325 MG/1
650 TABLET ORAL EVERY 6 HOURS PRN
Status: DISCONTINUED | OUTPATIENT
Start: 2022-04-02 | End: 2022-04-03 | Stop reason: HOSPADM

## 2022-04-02 RX ORDER — PANTOPRAZOLE SODIUM 40 MG/1
40 TABLET, DELAYED RELEASE ORAL 2 TIMES DAILY
Status: DISCONTINUED | OUTPATIENT
Start: 2022-04-02 | End: 2022-04-03 | Stop reason: HOSPADM

## 2022-04-02 RX ORDER — OXYCODONE AND ACETAMINOPHEN 5; 325 MG/1; MG/1
1 TABLET ORAL
Status: COMPLETED | OUTPATIENT
Start: 2022-04-02 | End: 2022-04-02

## 2022-04-02 RX ORDER — GABAPENTIN 300 MG/1
600 CAPSULE ORAL 2 TIMES DAILY
Status: DISCONTINUED | OUTPATIENT
Start: 2022-04-02 | End: 2022-04-03 | Stop reason: HOSPADM

## 2022-04-02 RX ORDER — MONTELUKAST SODIUM 10 MG/1
10 TABLET ORAL NIGHTLY
Status: DISCONTINUED | OUTPATIENT
Start: 2022-04-02 | End: 2022-04-03 | Stop reason: HOSPADM

## 2022-04-02 RX ADMIN — OXYCODONE HYDROCHLORIDE AND ACETAMINOPHEN 1 TABLET: 5; 325 TABLET ORAL at 02:04

## 2022-04-02 RX ADMIN — SODIUM CHLORIDE 250 ML: 0.9 INJECTION, SOLUTION INTRAVENOUS at 01:04

## 2022-04-02 RX ADMIN — ONDANSETRON 4 MG: 2 INJECTION INTRAMUSCULAR; INTRAVENOUS at 01:04

## 2022-04-02 RX ADMIN — PANTOPRAZOLE SODIUM 40 MG: 40 TABLET, DELAYED RELEASE ORAL at 09:04

## 2022-04-02 RX ADMIN — INSULIN DETEMIR 12 UNITS: 100 INJECTION, SOLUTION SUBCUTANEOUS at 09:04

## 2022-04-02 RX ADMIN — GABAPENTIN 600 MG: 300 CAPSULE ORAL at 09:04

## 2022-04-02 RX ADMIN — MONTELUKAST 10 MG: 10 TABLET, FILM COATED ORAL at 09:04

## 2022-04-02 RX ADMIN — OXYCODONE HYDROCHLORIDE AND ACETAMINOPHEN 1 TABLET: 10; 325 TABLET ORAL at 10:04

## 2022-04-02 NOTE — ED PROVIDER NOTES
"Encounter Date: 4/2/2022       History     Chief Complaint   Patient presents with    Vomiting     Pt reports to ED w/ complaints of " im throwing up my bowels" pt reports color dark. Pt seen for same complaints 2x days ago. Pt reports last BM 2x days ago, abdominal sx 2 weeks ago for bleeding ulcer, pt hx of previous MI, EF 20%     This is a 57 year old female with a PMH of HTN, DM, CAD, perforated gastric ulcer s/p ex lap 3/14/22 presenting to the ED with a chief complaint of vomiting. Patient reports last night she started vomiting dark brown emesis resembling fecal contents. She has had 3 episodes including one prior to arrival today. She says she hasn't felt well since she was in the hospital 3 days ago with abdominal pain and constipation. She was able to have a bowel movement following enema administration but states she continues to feel poorly. She hasn't taken any of her home meds since time of discharge. She underwent CT abdomen on 3/31 which did not show abscess formation or other acute findings. She denies fever, chills, chest pain, SOB, dysuria, melena or hematochezia. Surgicial hx of appendectomy, tubal ligation, ex lap.        Review of patient's allergies indicates:   Allergen Reactions    Orange juice Hives    Tomato (solanum lycopersicum) Hives     Past Medical History:   Diagnosis Date    Chronic combined systolic and diastolic congestive heart failure     Coronary artery disease     Diabetes mellitus     Encounter for blood transfusion     Gastric ulcer with hemorrhage     Heart attack     Hypertension     Perforated viscus 03/14/2022    Peripheral artery disease      Past Surgical History:   Procedure Laterality Date    APPENDECTOMY      BELOW KNEE AMPUTATION OF LOWER EXTREMITY Right 2019    COLONOSCOPY N/A 02/23/2022    Procedure: COLONOSCOPY;  Surgeon: Estefania Bryant MD;  Location: [facility] ([address] [place] [address]);  Service: Endoscopy;  Laterality: N/A;  anemia, melena    CORONARY STENT " PLACEMENT      ESOPHAGOGASTRODUODENOSCOPY N/A 02/23/2022    Procedure: EGD (ESOPHAGOGASTRODUODENOSCOPY);  Surgeon: Estefania Bryant MD;  Location: [facility] ([address] [place] [address]);  Service: Endoscopy;  Laterality: N/A;  anemia    FOOT AMPUTATION THROUGH METATARSAL Left 2019    TUBAL LIGATION       No family history on file.  Social History     Tobacco Use    Smoking status: Current Every Day Smoker     Packs/day: 0.50     Types: Cigarettes    Smokeless tobacco: Never Used   Substance Use Topics    Alcohol use: No    Drug use: No     Review of Systems   Constitutional: Positive for appetite change and fatigue. Negative for chills and fever.   HENT: Negative for sore throat.    Respiratory: Negative for shortness of breath.    Cardiovascular: Negative for chest pain.   Gastrointestinal: Positive for abdominal pain, nausea and vomiting.   Genitourinary: Negative for dysuria.   Musculoskeletal: Negative for back pain.   Skin: Negative for rash.   Neurological: Positive for weakness.   Hematological: Does not bruise/bleed easily.       Physical Exam     Initial Vitals [04/02/22 1127]   BP Pulse Resp Temp SpO2   129/72 105 20 98.9 °F (37.2 °C) 99 %      MAP       --         Physical Exam    Constitutional: She appears well-developed and well-nourished. No distress.   HENT:   Head: Atraumatic.   Eyes: Conjunctivae and EOM are normal. Pupils are equal, round, and reactive to light.   Cardiovascular: Normal rate, regular rhythm and normal heart sounds.   Pulmonary/Chest: Breath sounds normal. No respiratory distress. She has no wheezes. She has no rhonchi. She has no rales.   Abdominal: Abdomen is soft. Bowel sounds are normal. There is no abdominal tenderness.       Musculoskeletal:      Comments: Right BKA  Left charcot foot     Neurological: She is alert and oriented to person, place, and time.   Skin: Skin is warm and dry. No rash noted.         ED Course   Procedures  Labs Reviewed   COMPREHENSIVE METABOLIC PANEL -  Abnormal; Notable for the following components:       Result Value    Sodium 132 (*)     Glucose 248 (*)     Total Protein 9.1 (*)     Albumin 3.2 (*)     ALT 9 (*)     eGFR if  53.0 (*)     eGFR if non  46.0 (*)     All other components within normal limits   CBC W/ AUTO DIFFERENTIAL - Abnormal; Notable for the following components:    WBC 21.17 (*)     Hemoglobin 10.6 (*)     Hematocrit 31.6 (*)     MCV 74 (*)     MCH 24.7 (*)     RDW 16.8 (*)     Platelets 571 (*)     Gran # (ANC) 17.9 (*)     Immature Grans (Abs) 0.11 (*)     Gran % 84.7 (*)     Lymph % 10.6 (*)     Mono % 3.8 (*)     All other components within normal limits   LIPASE   TROPONIN I   TROPONIN I    Narrative:     ADD ON TROP #[id number] PER DESI MEYER PA-C 04/02/2022  13:20    URINALYSIS, REFLEX TO URINE CULTURE   SARS-COV-2 RDRP GENE   POCT GLUCOSE MONITORING CONTINUOUS          Imaging Results          X-Ray Chest 1 View (Final result)  Result time 04/02/22 13:57:37    Final result by Luciano Oliver MD (04/02/22 13:57:37)                 Impression:      1. Bilateral lower lung zone findings are likely on the basis of atelectasis from shallow inspiratory effort, early edema is a consideration, correlation is advised.      Electronically signed by: Luciano Oliver MD  Date:    04/02/2022  Time:    13:57             Narrative:    EXAMINATION:  XR CHEST 1 VIEW    CLINICAL HISTORY:  possible PNA;    TECHNIQUE:  Single frontal view of the chest was performed.    COMPARISON:  03/17/2022    FINDINGS:  The cardiomediastinal silhouette is prominent, similar to the previous examination noting magnification by technique..  There is no pleural effusion.  The trachea is midline.  The lungs are symmetrically expanded bilaterally with mildly coarse interstitial attenuation projected over the bilateral lower lung zones.  No large focal consolidation seen.  There is no pneumothorax.  The osseous structures are remarkable  for degenerative change..  Surgical change projects over the epigastric region.                               X-Ray Abdomen Flat And Erect (Final result)  Result time 04/02/22 13:14:37    Final result by Lennox Sheppard MD (04/02/22 13:14:37)                 Impression:      Nonobstructive bowel gas pattern.      Electronically signed by: Lennox Shepprad MD  Date:    04/02/2022  Time:    13:14             Narrative:    EXAMINATION:  XR ABDOMEN FLAT AND ERECT    CLINICAL HISTORY:  Vomiting, unspecified    TECHNIQUE:  Flat and erect AP views of the abdomen were performed.    COMPARISON:  Abdominal series 02/05/2022 and CT abdomen and pelvis 03/31/2022    FINDINGS:  Mild amount of scattered fecal material with residual retained enteric contrast media within the colon and rectum.  Nonobstructive bowel gas pattern.  Urinary bladder is distended with intraluminal excreted contrast media.  Pelvic phleboliths noted.  No large amount of free air definitively seen.  No organomegaly or significant mass effect.  Midline ventral abdominal wall skin staples noted.  No large consolidation at the imaged lung bases.  No acute osseous process seen.  Left hip/inguinal surgical clips again noted.                                 Medications   sodium phosphates 19-7 gram/118 mL enema 1 enema (has no administration in time range)   senna-docusate 8.6-50 mg per tablet 1 tablet (has no administration in time range)   glucose chewable tablet 16 g (has no administration in time range)   glucose chewable tablet 24 g (has no administration in time range)   dextrose 10% bolus 125 mL (has no administration in time range)   dextrose 10% bolus 250 mL (has no administration in time range)   glucagon (human recombinant) injection 1 mg (has no administration in time range)   insulin aspart U-100 pen 0-5 Units (has no administration in time range)   acetaminophen tablet 650 mg (has no administration in time range)   morphine injection 2 mg (has no  administration in time range)   prochlorperazine injection Soln 2.5 mg (has no administration in time range)   atorvastatin tablet 40 mg (has no administration in time range)   busPIRone tablet 10 mg (has no administration in time range)   carisoprodoL tablet 350 mg (has no administration in time range)   citalopram tablet 20 mg (has no administration in time range)   clopidogreL tablet 75 mg (has no administration in time range)   gabapentin capsule 600 mg (has no administration in time range)   montelukast tablet 10 mg (has no administration in time range)   nortriptyline capsule 50 mg (has no administration in time range)   oxyCODONE-acetaminophen  mg per tablet 1 tablet (has no administration in time range)   zolpidem tablet 5 mg (has no administration in time range)   pantoprazole EC tablet 40 mg (has no administration in time range)   hyoscyamine ODT 0.125 mg (has no administration in time range)   insulin detemir U-100 pen 12 Units (has no administration in time range)   sodium chloride 0.9% bolus 250 mL (0 mLs Intravenous Stopped 4/2/22 1449)   ondansetron injection 4 mg (4 mg Intravenous Given 4/2/22 1350)   oxyCODONE-acetaminophen 5-325 mg per tablet 1 tablet (1 tablet Oral Given 4/2/22 1405)     Medical Decision Making:   History:   Old Medical Records: I decided to obtain old medical records.  Old Records Summarized: records from previous admission(s).  Clinical Tests:   Lab Tests: Ordered and Reviewed  Radiological Study: Ordered and Reviewed  Other:   I have discussed this case with another health care provider.       <> Summary of the Discussion: General Surgery       APC / Resident Notes:   57 year old female presenting with nausea/vomiting, recent ex lap for perforated gastric ulcer.    Discussed with gen surg who saw patient in ED and recommended hospital medicine admission for workup of leukocytosis, consider GI consult for upper endoscopy.                 Clinical Impression:   Final  diagnoses:  [R11.2] Nausea & vomiting  [R11.10] Vomiting  [K29.60, T39.395A] NSAID induced gastritis          ED Disposition Condition    Observation               Danyelle Chappell PA-C  04/02/22 9435

## 2022-04-02 NOTE — H&P
"[facility] - Emergency Dept  [facility] Medicine  History & Physical    Patient Name: Casie Salvador  MRN: [id number]  Patient Class: OP- Observation  Admission Date: 4/2/2022  Attending Physician: Simon Green MD   Primary Care Provider: Adonis Carey MD         Patient information was obtained from patient, past medical records and ER records.     Subjective:     Principal Problem:NSAID induced gastritis    Chief Complaint:   Chief Complaint   Patient presents with    Vomiting     Pt reports to ED w/ complaints of " im throwing up my bowels" pt reports color dark. Pt seen for same complaints 2x days ago. Pt reports last BM 2x days ago, abdominal sx 2 weeks ago for bleeding ulcer, pt hx of previous MI, EF 20%        HPI: Casie Salvador is a 57 year old Black woman with cigarette smoking, hypertension, diabetes mellitus type 2 (treated with insulin) with neuropathy, coronary artery disease status post coronary artery stent placement, chronic systolic and diastolic congestive heart failure, peripheral artery disease, history of right below-knee amputation in 2019, history of left transmetatarsal foot amputation in 2019, chronic foot pain (on chronic opioids and NSAIDs), history of gastric ulcers on 2/23/2022 complicated by perforated viscus status post exploratory laparotomy with repair on 3/14/2022, opioid induced constipation, anemia, depression. She lives in [facility]. She is . Her primary care physician is Dr. Adonis Carey.    She was seen at [facility] Emergency [facility] on 2/5/2022 for abdominal pain. She was diagnosed with opioid induced constipation.    She was seen again at [facility] Emergency [facility] on 2/7/2022 for abdominal pain, nausea, vomiting, and diarrhea.    She was hospitalized at [facility] from 2/20/2022 to 2/24/2022 for severe anemia due to bleeding gastric ulcers.    She was hospitalized " again at [facility] from 3/14/2022 to 3/24/2022 for perforated viscus. She was prescribed pantoprazole, 7 days of ciprofloxacin and metronidazole, 25 tablets of oxycodone, and insulin detemir. Her meloxicam was not discontinued. [place]BC [place] still [place] [address]/uL at discharge.    She returned to [facility] on 3/31/2022 with recurrent abdominal pain and constipation for 5 days. WBC count was still elevated at 34430/uL. Creatinine was 1.1 mg/dL, increased from 0.8. CT showed no acute concerning abnormalities, noting persistent gastric wall thickening with adjacent mild inflammatory changes. She was seen by General Surgery, who recommended enema to treat her constipation. She was able to have a bowel movement.   She returned to [facility] on 4/2/2022 with vomiting since the night before, with multiple episodes of dark brown emesis resembling feculent material. She was able to keep medications down, and was not vomiting everything up. [place] still [place] [address]/uL. Creatinine was 1.3 mg/dL, increased from 1.1. Chest X-ray showed atelectasis and abdominal X-ray showed scattered fecal material and contrast still there from 2 days prior. She was seen by General Surgery, who saw no evidence of intraabdominal abscess or wound complications. They recommended admission to Hospital Medicine for treatment of dehydration and evaluation of nausea and vomiting, Gastroenterology consult, bowel regimen with daily senna and enemas (polyethylene glycol makes her vomit). She was given oxycodone-acetaminophen, ondansetron, and 250 mL of normal saline. She was admitted to Hospital Medicine Team A. She confirmed that no one had told her to stop taking meloxicam and that she was still taking it.       Past Medical History:   Diagnosis Date    Chronic combined systolic and diastolic congestive heart failure      Coronary artery disease     Diabetes mellitus     Encounter for blood transfusion     Gastric ulcer with hemorrhage     Heart attack     Hypertension     Perforated viscus 2022    Peripheral artery disease        Past Surgical History:   Procedure Laterality Date    APPENDECTOMY      BELOW KNEE AMPUTATION OF LOWER EXTREMITY Right 2019    COLONOSCOPY N/A 2022    Procedure: COLONOSCOPY;  Surgeon: Estefania Bryant MD;  [facility]  Service: Endoscopy;  Laterality: N/A;  anemia, melena    CORONARY STENT PLACEMENT      ESOPHAGOGASTRODUODENOSCOPY N/A 2022    Procedure: EGD (ESOPHAGOGASTRODUODENOSCOPY);  Surgeon: Estefania Bryant MD;  [facility]  Service: Endoscopy;  Laterality: N/A;  anemia    FOOT AMPUTATION THROUGH METATARSAL Left 2019    TUBAL LIGATION         Review of patient's allergies indicates:   Allergen Reactions    Orange juice Hives    Tomato (solanum lycopersicum) Hives       No current facility-administered medications on file prior to encounter.     Current Outpatient Medications on File Prior to Encounter   Medication Sig    gabapentin (NEURONTIN) 600 MG tablet Take 600 mg by mouth 2 (two) times daily.    meloxicam (MOBIC) 7.5 MG tablet Take 7.5 mg by mouth once daily.    oxyCODONE-acetaminophen (PERCOCET)  mg per tablet Take 1 tablet by mouth every 8 (eight) hours as needed for Pain.    amLODIPine (NORVASC) 10 MG tablet Take 10 mg by mouth once daily.    aspirin (ECOTRIN) 81 MG EC tablet Take 81 mg by mouth once daily.    atorvastatin (LIPITOR) 80 MG tablet Take 40 mg by mouth once daily.    ATROVENT HFA 17 mcg/actuation inhaler SMARTSI Puff(s) By Mouth Twice Daily    blood sugar diagnostic Strp by Misc.(Non-Drug; Combo Route) route.    busPIRone (BUSPAR) 10 MG tablet Take 10 mg by mouth 2 (two) times daily.    carisoprodoL (SOMA) 350 MG tablet Take 350 mg by mouth daily as needed.    citalopram (CELEXA) 20 MG  "tablet Take 20 mg by mouth once daily.    clopidogreL (PLAVIX) 75 mg tablet Take 75 mg by mouth once daily.    docusate sodium (COLACE) 100 MG capsule Take 1 capsule (100 mg total) by mouth 2 (two) times daily.    ergocalciferol (ERGOCALCIFEROL) 50,000 unit Cap Take 50,000 Units by mouth every 7 days.    ferrous sulfate (FEOSOL) 325 mg (65 mg iron) Tab tablet Take 325 mg by mouth.    folic acid-vit B6-vit B12 2.5-25-2 mg (FOLBIC OR EQUIV) 2.5-25-2 mg Tab Take 1 tablet by mouth once daily.    furosemide (LASIX) 20 MG tablet Take 20 mg by mouth 2 (two) times daily.    glipiZIDE (GLUCOTROL) 10 MG tablet Take 10 mg by mouth 2 (two) times daily before meals.    hydrALAZINE (APRESOLINE) 50 MG tablet Take 50 mg by mouth daily as needed.    hyoscyamine (ANASPAZ,LEVSIN) 0.125 mg Tab Take 1 tablet (125 mcg total) by mouth every 6 (six) hours as needed (abdominal cramping).    insulin detemir U-100 (LEVEMIR FLEXTOUCH U-100 INSULN) 100 unit/mL (3 mL) InPn pen Inject 16 Units into the skin every evening.    insulin needles, disposable, 31 x 3/16 " Ndle by Misc.(Non-Drug; Combo Route) route.    lancets 28 gauge Misc by Misc.(Non-Drug; Combo Route) route.    lisinopriL (PRINIVIL,ZESTRIL) 2.5 MG tablet Take 1 tablet (2.5 mg total) by mouth once daily.    loperamide (IMODIUM A-D) 2 mg Tab Take 2 mg by mouth 4 (four) times daily as needed.    losartan (COZAAR) 50 MG tablet Take 50 mg by mouth once daily.    metformin (GLUCOPHAGE) 1000 MG tablet Take 1,000 mg by mouth 2 (two) times daily with meals.    metoclopramide HCl (REGLAN) 10 MG tablet Take 10 mg by mouth 2 (two) times a day.    metoprolol tartrate (LOPRESSOR) 50 MG tablet Take 50 mg by mouth 2 (two) times daily.    montelukast (SINGULAIR) 10 mg tablet Take 10 mg by mouth every evening.    nortriptyline (PAMELOR) 50 MG capsule SMARTSI Capsule(s) By Mouth Every Evening    ondansetron (ZOFRAN-ODT) 4 MG TbDL Take 1 tablet (4 mg total) by mouth every 8 " "(eight) hours as needed.    oxyCODONE (ROXICODONE) 5 MG immediate release tablet Take 1 tablet (5 mg total) by mouth every 6 (six) hours as needed for Pain.    pantoprazole (PROTONIX) 40 MG tablet Take 1 tablet (40 mg total) by mouth once daily.    pen needle, diabetic 31 gauge x 5/16" Ndle Use to inject insulin into the skin every evening.    polyethylene glycol (GLYCOLAX) 17 gram/dose powder Take 17 g by mouth daily as needed.    potassium chloride (MICRO-K) 10 MEQ CpSR Take 10 mEq by mouth once daily.    promethazine (PHENERGAN) 6.25 mg/5 mL syrup TAKE 10 mls BY MOUTH EVERY 6 HOURS AS NEEDED    zolpidem (AMBIEN) 10 mg Tab Take 5 mg by mouth nightly as needed.    [DISCONTINUED] azithromycin (Z-ADRY) 250 MG tablet Take by mouth.    [DISCONTINUED] doxycycline (MONODOX) 50 MG Cap Take 100 mg by mouth every 12 (twelve) hours.    [DISCONTINUED] insulin glargine (LANTUS) 100 unit/mL injection Inject 50 Units into the skin every evening.    [DISCONTINUED] omeprazole (PRILOSEC) 20 MG capsule Take 20 mg by mouth once daily.     Family History    None       Tobacco Use    Smoking status: Current Every Day Smoker     Packs/day: 0.50     Types: Cigarettes    Smokeless tobacco: Never Used   Substance and Sexual Activity    Alcohol use: No    Drug use: No    Sexual activity: Not on file     Review of Systems   Constitutional:  Negative for chills and fever.   HENT:  Negative for trouble swallowing and voice change.    Eyes:  Negative for pain and redness.   Respiratory:  Negative for cough and shortness of breath.    Cardiovascular:  Negative for chest pain and palpitations.   Gastrointestinal:  Positive for nausea and vomiting.   Genitourinary:  Negative for difficulty urinating and dysuria.   Musculoskeletal:  Negative for back pain and myalgias.   Skin:  Negative for rash and wound.   Neurological:  Negative for seizures and syncope.   Objective:     Vital Signs (Most Recent):  Temp: 98.9 °F (37.2 °C) " (04/02/22 1127)  Pulse: 92 (04/02/22 1529)  Resp: 16 (04/02/22 1529)  BP: (!) 114/57 (04/02/22 1529)  SpO2: 100 % (04/02/22 1529)   Vital Signs (24h Range):  Temp:  [98.9 °F (37.2 °C)] 98.9 °F (37.2 °C)  Pulse:  [] 92  Resp:  [16-20] 16  SpO2:  [99 %-100 %] 100 %  BP: (114-129)/(57-72) 114/57     Weight: 77.1 kg (170 lb)  Body mass index is 25.1 kg/m².    Physical Exam  Vitals and nursing note reviewed.   Constitutional:       General: She is not in acute distress.     Appearance: She is not diaphoretic.   HENT:      Head: Normocephalic and atraumatic.   Eyes:      General: No scleral icterus.     Extraocular Movements: Extraocular movements intact.      Conjunctiva/sclera: Conjunctivae normal.      Pupils: Pupils are equal, round, and reactive to light.   Cardiovascular:      Rate and Rhythm: Regular rhythm. Tachycardia present.      Heart sounds: Normal heart sounds. No murmur heard.  Pulmonary:      Effort: Pulmonary effort is normal. No respiratory distress.      Breath sounds: No wheezing or rhonchi.   Abdominal:      General: There is no distension.      Palpations: Abdomen is soft.      Tenderness: There is no abdominal tenderness. There is no guarding or rebound.      Comments: Midline incision with staples   Musculoskeletal:         General: Deformity present.      Left lower leg: No edema.      Comments: Right below-knee amputation with prosthesis, left foot transmetatarsal amputation   Skin:     General: Skin is warm and dry.      Findings: No erythema or rash.   Neurological:      Mental Status: She is alert and oriented to person, place, and time.      Motor: No tremor or seizure activity.   Psychiatric:         Attention and Perception: Attention normal.         Mood and Affect: Mood and affect normal.         Behavior: Behavior is cooperative.         Judgment: Judgment normal.         CRANIAL NERVES     CN III, IV, VI   Pupils are equal, round, and reactive to light.     Significant Labs: All  pertinent labs within the past 24 hours have been reviewed.    Significant Imaging: I have reviewed all pertinent imaging results/findings within the past 24 hours.  X-Ray Chest 1 View 4/02/22: FINDINGS:   The cardiomediastinal silhouette is prominent, similar to the previous examination noting magnification by technique..  There is no pleural effusion.  The trachea is midline.  The lungs are symmetrically expanded bilaterally with mildly coarse interstitial attenuation projected over the bilateral lower lung zones.  No large focal consolidation seen.  There is no pneumothorax.  The osseous structures are remarkable for degenerative change..  Surgical change projects over the epigastric region.   Impression:  1. Bilateral lower lung zone findings are likely on the basis of atelectasis from shallow inspiratory effort, early edema is a consideration, correlation is advised.   X-Ray Abdomen Flat And Erect 4/02/22: FINDINGS:   Mild amount of scattered fecal material with residual retained enteric contrast media within the colon and rectum.  Nonobstructive bowel gas pattern.  Urinary bladder is distended with intraluminal excreted contrast media.  Pelvic phleboliths noted.  No large amount of free air definitively seen.  No organomegaly or significant mass effect.  Midline ventral abdominal wall skin staples noted.  No large consolidation at the imaged lung bases.  No acute osseous process seen.  Left hip/inguinal surgical clips again noted.   Impression:  Nonobstructive bowel gas pattern.     Assessment/Plan:     * NSAID induced gastritis  History of gastric ulcer  Leukocytosis  Meloxicam is a non-steroid antiinflammatory drug (NSAID) that can cause gastric ulcer and gastritis so should have been discontinued when gastric ulcers were diagnosed, and also when her gastric ulcer perforated. I advised her to stop. She understood. Leukocytosis is not an acute problem and consistent with persistent gastritis seen on recent  CT, explained by persistent NSAID use. I doubt Gastroenterology is needed acutely. She can follow up with them outpatient for repeat endoscopy to monitor healing. Continue home pantoprazole but give twice daily for now. Give prochlorperazine prn for nausea rather than ondansetron due to prolonged QTc on EKG.    Peripheral artery disease  History of right below knee amputation  Hold home aspirin for now. Continue home clopidrogel and atorvastatin.    Chronic combined systolic and diastolic congestive heart failure  Hold home furosemide for now.    Depression  Continue home buspirone.    Constipation due to opioid therapy  Give senna-docusate twice daily, Fleets enema.    Chronic pain  Chronic, continuous use of opioids  Continue home oxycodone-acetaminophen prn.    Type 2 diabetes mellitus with diabetic neuropathy, with long-term current use of insulin  Hold home metformin and glipizide. She takes insulin detemir 16 units nightly. Diabetic diet. Give insulin aspart sliding scale. Accuchecks. Continue home gabapentin.    Coronary artery disease involving native coronary artery of native heart without angina pectoris  Hold home aspirin for now. Continue home clopidrogel and atorvastatin.    Hypertension  Hold home amlodipine, lisinopril, losartan, and metoprolol tartrate for now.    VTE Risk Mitigation (From admission, onward)    None             Simon Green MD  Department of Hospital Medicine   [facility] - Emergency Dept

## 2022-04-02 NOTE — ED NOTES
"Patient identifiers for Casie Salvador 57 y.o. female checked and correct.  Chief Complaint   Patient presents with    Vomiting     Pt reports to ED w/ complaints of " im throwing up my bowels" pt reports color dark. Pt seen for same complaints 2x days ago. Pt reports last BM 2x days ago, abdominal sx 2 weeks ago for bleeding ulcer, pt hx of previous MI, EF 20%     Past Medical History:   Diagnosis Date    Coronary artery disease     Diabetes mellitus     Encounter for blood transfusion     Heart attack     Hypertension      Allergies reported:   Review of patient's allergies indicates:   Allergen Reactions    Orange juice Hives    Tomato (solanum lycopersicum) Hives         LOC: Patient is awake, alert, and aware of environment with an appropriate affect. Patient is oriented x 4 and speaking appropriately.  APPEARANCE: Patient resting comfortably and in no acute distress. Patient is clean and well groomed, patient's clothing is properly fastened.  HEENT:   SKIN: The skin is warm and dry. Patient has normal skin turgor and moist mucus membranes.   MUSKULOSKELETAL: Patient is moving all extremities well, no obvious deformities noted. Pulses intact.   RESPIRATORY: Airway is open and patent. Respirations are spontaneous and non-labored with normal effort and rate.  CARDIAC: Patient has a normal rate and rhythm.  No peripheral edema noted.   ABDOMEN: No distention noted. Soft and non-tender upon palpation.  NEUROLOGICAL: pupils 3 mm, PERRL. Facial expression is symmetrical. Hand grasps are equal bilaterally. Normal sensation in all extremities when touched with finger.        "

## 2022-04-02 NOTE — ASSESSMENT & PLAN NOTE
Hold home metformin and glipizide. She takes insulin detemir 16 units nightly. Giving 12 units nightly. Diabetic diet. Give insulin aspart sliding scale. Accuchecks. Continue home gabapentin.

## 2022-04-02 NOTE — Clinical Note
Diagnosis: Nausea & vomiting [464211]   Future Attending Provider: TRACY MOLINA [[id number]]   Admitting Provider:: TRACY MOLINA [[id number]]

## 2022-04-02 NOTE — CONSULTS
Consult  Surgery      SUBJECTIVE:     Reason for Consult: N/V, post op    History of Present Illness: Casie Salvador is a 57 y.o. female with HTN, DM, CAD, CHF (EF 20% 22), BLLE amputation, and perforated gastric ulcer s/p ex lap 3/14/22.  She presents to the ER with complaints of nausea and vomiting as well as constipation for the past month.  She states she has been vomiting for the past 2 days, his dark, the patient is unsure if it is bloody.  Per the patient and her  she has longstanding constipation which has been worse over the past month.  She was seen in the ER 2 days ago and found to have significant constipation, she was treated with enemas and discharged home.  She has not had a bowel movement since then.  She denies significant abdominal pain aside from soreness after vomiting.  She does report feeling weak today has not been able to eat.  I carefully asked the patient and her  if she has been compliant with her PPI since discharge last month for perforated gastric ulcer, she states she has not been taking as for the last 2 days but was previously compliant for the last few weeks.    CT obtained 3/31 showed thickening of the stomach but no abscess or evidence of ongoing gastric perforation.   Noted leukocytosis today.   No cough, congestion, sputum, or urinary symptoms     No current facility-administered medications on file prior to encounter.     Current Outpatient Medications on File Prior to Encounter   Medication Sig    amLODIPine (NORVASC) 10 MG tablet Take 10 mg by mouth once daily.    aspirin (ECOTRIN) 81 MG EC tablet Take 81 mg by mouth once daily.    atorvastatin (LIPITOR) 80 MG tablet Take 40 mg by mouth once daily.    ATROVENT HFA 17 mcg/actuation inhaler SMARTSI Puff(s) By Mouth Twice Daily    azithromycin (Z-ADRY) 250 MG tablet Take by mouth.    blood sugar diagnostic Strp by Misc.(Non-Drug; Combo Route) route.    busPIRone (BUSPAR) 10 MG tablet Take 10  "mg by mouth 2 (two) times daily.    carisoprodoL (SOMA) 350 MG tablet Take 350 mg by mouth daily as needed.    citalopram (CELEXA) 20 MG tablet Take 20 mg by mouth once daily.    clopidogreL (PLAVIX) 75 mg tablet Take 75 mg by mouth once daily.    docusate sodium (COLACE) 100 MG capsule Take 1 capsule (100 mg total) by mouth 2 (two) times daily.    doxycycline (MONODOX) 50 MG Cap Take 100 mg by mouth every 12 (twelve) hours.    ergocalciferol (ERGOCALCIFEROL) 50,000 unit Cap Take 50,000 Units by mouth every 7 days.    ferrous sulfate (FEOSOL) 325 mg (65 mg iron) Tab tablet Take 325 mg by mouth.    folic acid-vit B6-vit B12 2.5-25-2 mg (FOLBIC OR EQUIV) 2.5-25-2 mg Tab Take 1 tablet by mouth once daily.    furosemide (LASIX) 20 MG tablet Take 20 mg by mouth 2 (two) times daily.    gabapentin (NEURONTIN) 600 MG tablet Take 600 mg by mouth 2 (two) times daily.    glipiZIDE (GLUCOTROL) 10 MG tablet Take 10 mg by mouth 2 (two) times daily before meals.    hydrALAZINE (APRESOLINE) 50 MG tablet Take 50 mg by mouth daily as needed.    hyoscyamine (ANASPAZ,LEVSIN) 0.125 mg Tab Take 1 tablet (125 mcg total) by mouth every 6 (six) hours as needed (abdominal cramping).    insulin detemir U-100 (LEVEMIR FLEXTOUCH U-100 INSULN) 100 unit/mL (3 mL) InPn pen Inject 16 Units into the skin every evening.    insulin needles, disposable, 31 x 3/16 " Ndle by Misc.(Non-Drug; Combo Route) route.    lancets 28 gauge Misc by Misc.(Non-Drug; Combo Route) route.    lisinopriL (PRINIVIL,ZESTRIL) 2.5 MG tablet Take 1 tablet (2.5 mg total) by mouth once daily.    loperamide (IMODIUM A-D) 2 mg Tab Take 2 mg by mouth 4 (four) times daily as needed.    losartan (COZAAR) 50 MG tablet Take 50 mg by mouth once daily.    meloxicam (MOBIC) 7.5 MG tablet Take 7.5 mg by mouth once daily.    metformin (GLUCOPHAGE) 1000 MG tablet Take 1,000 mg by mouth 2 (two) times daily with meals.    metoclopramide HCl (REGLAN) 10 MG tablet Take 10 " "mg by mouth 2 (two) times a day.    metoprolol tartrate (LOPRESSOR) 50 MG tablet Take 50 mg by mouth 2 (two) times daily.    montelukast (SINGULAIR) 10 mg tablet Take 10 mg by mouth every evening.    nortriptyline (PAMELOR) 50 MG capsule SMARTSI Capsule(s) By Mouth Every Evening    omeprazole (PRILOSEC) 20 MG capsule Take 20 mg by mouth once daily.    ondansetron (ZOFRAN-ODT) 4 MG TbDL Take 1 tablet (4 mg total) by mouth every 8 (eight) hours as needed.    oxyCODONE (ROXICODONE) 5 MG immediate release tablet Take 1 tablet (5 mg total) by mouth every 6 (six) hours as needed for Pain.    pantoprazole (PROTONIX) 40 MG tablet Take 1 tablet (40 mg total) by mouth once daily.    pen needle, diabetic 31 gauge x 5/16" Ndle Use to inject insulin into the skin every evening.    polyethylene glycol (GLYCOLAX) 17 gram/dose powder Take 17 g by mouth daily as needed.    potassium chloride (MICRO-K) 10 MEQ CpSR Take 10 mEq by mouth once daily.    promethazine (PHENERGAN) 6.25 mg/5 mL syrup TAKE 10 mls BY MOUTH EVERY 6 HOURS AS NEEDED    zolpidem (AMBIEN) 10 mg Tab Take 5 mg by mouth nightly as needed.    [DISCONTINUED] insulin glargine (LANTUS) 100 unit/mL injection Inject 50 Units into the skin every evening.       Review of patient's allergies indicates:   Allergen Reactions    Orange juice Hives    Tomato (solanum lycopersicum) Hives       Past Medical History:   Diagnosis Date    Coronary artery disease     Diabetes mellitus     Encounter for blood transfusion     Heart attack     Hypertension      Past Surgical History:   Procedure Laterality Date    APPENDECTOMY      COLONOSCOPY N/A 2022    Procedure: COLONOSCOPY;  Surgeon: Estefania Bryant MD;  [facility]  Service: Endoscopy;  Laterality: N/A;  anemia, melena    ESOPHAGOGASTRODUODENOSCOPY N/A 2022    Procedure: EGD (ESOPHAGOGASTRODUODENOSCOPY);  Surgeon: Estefania Bryant MD;  [facility]  Service: " Endoscopy;  Laterality: N/A;  anemia    stents      TUBAL LIGATION       No family history on file.  Social History     Tobacco Use    Smoking status: Current Every Day Smoker     Packs/day: 0.50     Types: Cigarettes    Smokeless tobacco: Never Used   Substance Use Topics    Alcohol use: No    Drug use: No        Review of Systems  Otherwise negative except as listed above    OBJECTIVE:     Vital Signs (Most Recent)  Temp: 98.9 °F (37.2 °C) (04/02/22 1127)  Pulse: 105 (04/02/22 1127)  Resp: 18 (04/02/22 1405)  BP: 129/72 (04/02/22 1127)  SpO2: 99 % (04/02/22 1127)    Physical Exam  A&O, NAD  RRR  No Respiratory Distress, ROMEL  ABD: soft, non tender, non distended, staples intact with incision c/d/i , no rebound or guarding     Laboratory  CBC:   Recent Labs   Lab 04/02/22  1230   WBC 21.17*   RBC 4.29   HGB 10.6*   HCT 31.6*   *   MCV 74*   MCH 24.7*   MCHC 33.5     CMP:   Recent Labs   Lab 04/02/22  1230   *   CALCIUM 9.9   ALBUMIN 3.2*   PROT 9.1*   *   K 4.8   CO2 24   CL 95   BUN 17   CREATININE 1.3   ALKPHOS 102   ALT 9*   AST 14   BILITOT 0.4     Recent Labs   Lab 03/31/22  1654   COLORU Yellow   SPECGRAV 1.010   PHUR 5.0   PROTEINUA Negative   BACTERIA Rare   NITRITE Negative   LEUKOCYTESUR Trace*       Diagnostic Results:  ECHO 2/21/22    · The left ventricle is normal in size with severely decreased systolic function. The estimated ejection fraction is 20%.  · Normal right ventricular size with normal right ventricular systolic function.  · Grade II left ventricular diastolic dysfunction.  · Mild left atrial enlargement.  · Elevated central venous pressure (15 mmHg).     CT 3/31/22  GI Tract/Mesentery: Status post recent gastric perforation repair.  There is persistent gastric wall thickening and hyperenhancement with adjacent mild inflammatory changes.  No adjacent intraperitoneal free air.  No evidence of small-bowel obstruction.  Retained contrast throughout the large bowel,  presumably from recent fluoroscopy study.    Pathology  GASTRIC BIOPSY:  NONSPECIFIC INTENSE CHRONIC ACTIVE ULCERATION WITH INTENSE ACUTE INFLAMMATION  NO NEOPLASIA IDENTIFIED    H pylori negative    EGD 2/23/22  Impression:            - Normal esophagus.                          - Non-obstructing oozing gastric ulcer with a                          clean ulcer base (Christopher Class III) overlying the                          entire incisura. Biopsied.                          - Non-bleeding gastric ulcer with a clean ulcer                          base (Christopher Class III).                          - Normal examined duodenum.                          - This is a 58yo F presenting with symptomatic                          iron deficiency anemia. EGD revealed large gastric                          ulcer along incisura, biopsied.     ASSESSMENT/PLAN:     A/P:  Casie Salvador is a 57 y.o. female with multiple medical issues most recently with perforated gastric ulcer requiring ExLap and Aron patch 3/14/22 who presents with N/V and constipation. No evidence of intra-abdominal process based on CT 2 days ago. She could be having continued symptoms from her ulcer disease and questionable Rx compliance, also suspicious for hematemesis given self-reported character of emesis.     Unclear where the leukocytosis is from, no intra-abdominal abscess on CT and no issues with wound    - Might need admission to hospital medicine for dehydration and workup of N/V, this is her second ER visit this week  - Recommend GI involvement, defer utility of repeat EGD to them  - recommend bowel regimen with daily oral Senna and enemas today (reports Miralax makes her vomit)  - No acute surgical intervention indicated, will follow peripherally, please call with questions       Randolph Ronquillo MD   Pager: [phone]  General Surgery PGY-V  [facility]

## 2022-04-02 NOTE — ASSESSMENT & PLAN NOTE
History of gastric ulcer  Leukocytosis  Meloxicam is a non-steroid antiinflammatory drug (NSAID) that can cause gastric ulcer and gastritis so should have been discontinued when gastric ulcers were diagnosed, and also when her gastric ulcer perforated. I advised her to stop. She understood. Leukocytosis is not an acute problem and consistent with persistent gastritis seen on recent CT, explained by persistent NSAID use. I doubt Gastroenterology is needed acutely. She can follow up with them outpatient for repeat endoscopy to monitor healing. Continue home pantoprazole but give twice daily for now. Give prochlorperazine prn for nausea rather than ondansetron due to prolonged QTc on EKG.

## 2022-04-02 NOTE — NURSING
Patient is awake and oriented X 4.  She is complaining of nausea and pain in the lower limbs.  She is requesting IV pain meds.  Incision to the abdomen is intact with no signs of infection.

## 2022-04-02 NOTE — HPI
Casie Salvador is a 57 year old Black woman with cigarette smoking, hypertension, diabetes mellitus type 2 (treated with insulin) with neuropathy, coronary artery disease status post coronary artery stent placement, chronic systolic and diastolic congestive heart failure, peripheral artery disease, history of right below-knee amputation in 2019, history of left transmetatarsal foot amputation in 2019, chronic foot pain (on chronic opioids and NSAIDs), history of gastric ulcers on 2/23/2022 complicated by perforated viscus status post exploratory laparotomy with repair on 3/14/2022, opioid induced constipation, anemia, depression. She lives in [facility]. She is . Her primary care physician is Dr. Adonis Carey.    She was seen at [facility] on 2/5/2022 for abdominal pain. She was diagnosed with opioid induced constipation.    She was seen again at [facility] on 2/7/2022 for abdominal pain, nausea, vomiting, and diarrhea.    She was hospitalized at [facility] from 2/20/2022 to 2/24/2022 for severe anemia due to bleeding gastric ulcers. She was prescribed lisinopril 2.5 mg daily.   She was hospitalized again at [facility] from 3/14/2022 to 3/24/2022 for perforated viscus. She was prescribed pantoprazole, 7 days of ciprofloxacin and metronidazole, 25 tablets of oxycodone, and insulin detemir. Her meloxicam was not discontinued. WBC count was still elevated [place] [address]92/uL at discharge.    She returned to [facility] on 3/31/2022 with recurrent abdominal pain and constipation for 5 days. WBC count was still elevated at 33865/uL. Creatinine was 1.1 mg/dL, increased from 0.8. CT showed no acute concerning abnormalities, noting persistent gastric wall thickening with adjacent mild inflammatory changes. She was seen by General  Surgery, who recommended enema to treat her constipation. She was able to have a bowel movement.   She returned to [facility] on 4/2/2022 with vomiting since the night before, with multiple episodes of dark brown emesis resembling feculent material. She was able to keep medications down, and was not vomiting everything up. WBC count was still elevated at 17784/uL. Creatinine was 1.3 mg/dL, increased from 1.1. Chest X-ray showed atelectasis and abdominal X-ray showed scattered fecal material and contrast still there from 2 days prior. She was seen by General Surgery, who saw no evidence of intraabdominal abscess or wound complications. They recommended admission to Hospital Medicine for treatment of dehydration and evaluation of nausea and vomiting, Gastroenterology consult, bowel regimen with daily senna and enemas (polyethylene glycol makes her vomit). She was given oxycodone-acetaminophen, ondansetron, and 250 mL of normal saline. She was admitted to Hospital Medicine Team A. She confirmed that no one had told her to stop taking meloxicam and that she was still taking it.

## 2022-04-02 NOTE — SUBJECTIVE & OBJECTIVE
Past Medical History:   Diagnosis Date    Chronic combined systolic and diastolic congestive heart failure     Coronary artery disease     Diabetes mellitus     Encounter for blood transfusion     Gastric ulcer with hemorrhage     Heart attack     Hypertension     Perforated viscus 2022    Peripheral artery disease        Past Surgical History:   Procedure Laterality Date    APPENDECTOMY      BELOW KNEE AMPUTATION OF LOWER EXTREMITY Right 2019    COLONOSCOPY N/A 2022    Procedure: COLONOSCOPY;  Surgeon: Estefania Bryant MD;  [facility]  Service: Endoscopy;  Laterality: N/A;  anemia, melena    CORONARY STENT PLACEMENT      ESOPHAGOGASTRODUODENOSCOPY N/A 2022    Procedure: EGD (ESOPHAGOGASTRODUODENOSCOPY);  Surgeon: Estefania Bryant MD;  [facility]  Service: Endoscopy;  Laterality: N/A;  anemia    FOOT AMPUTATION THROUGH METATARSAL Left 2019    TUBAL LIGATION         Review of patient's allergies indicates:   Allergen Reactions    Orange juice Hives    Tomato (solanum lycopersicum) Hives       No current facility-administered medications on file prior to encounter.     Current Outpatient Medications on File Prior to Encounter   Medication Sig    gabapentin (NEURONTIN) 600 MG tablet Take 600 mg by mouth 2 (two) times daily.    meloxicam (MOBIC) 7.5 MG tablet Take 7.5 mg by mouth once daily.    oxyCODONE-acetaminophen (PERCOCET)  mg per tablet Take 1 tablet by mouth every 8 (eight) hours as needed for Pain.    amLODIPine (NORVASC) 10 MG tablet Take 10 mg by mouth once daily.    aspirin (ECOTRIN) 81 MG EC tablet Take 81 mg by mouth once daily.    atorvastatin (LIPITOR) 80 MG tablet Take 40 mg by mouth once daily.    ATROVENT HFA 17 mcg/actuation inhaler SMARTSI Puff(s) By Mouth Twice Daily    blood sugar diagnostic Strp by Misc.(Non-Drug; Combo Route) route.    busPIRone (BUSPAR) 10 MG tablet Take 10 mg by mouth 2 (two) times daily.    carisoprodoL  "(SOMA) 350 MG tablet Take 350 mg by mouth daily as needed.    citalopram (CELEXA) 20 MG tablet Take 20 mg by mouth once daily.    clopidogreL (PLAVIX) 75 mg tablet Take 75 mg by mouth once daily.    docusate sodium (COLACE) 100 MG capsule Take 1 capsule (100 mg total) by mouth 2 (two) times daily.    ergocalciferol (ERGOCALCIFEROL) 50,000 unit Cap Take 50,000 Units by mouth every 7 days.    ferrous sulfate (FEOSOL) 325 mg (65 mg iron) Tab tablet Take 325 mg by mouth.    folic acid-vit B6-vit B12 2.5-25-2 mg (FOLBIC OR EQUIV) 2.5-25-2 mg Tab Take 1 tablet by mouth once daily.    furosemide (LASIX) 20 MG tablet Take 20 mg by mouth 2 (two) times daily.    glipiZIDE (GLUCOTROL) 10 MG tablet Take 10 mg by mouth 2 (two) times daily before meals.    hydrALAZINE (APRESOLINE) 50 MG tablet Take 50 mg by mouth daily as needed.    hyoscyamine (ANASPAZ,LEVSIN) 0.125 mg Tab Take 1 tablet (125 mcg total) by mouth every 6 (six) hours as needed (abdominal cramping).    insulin detemir U-100 (LEVEMIR FLEXTOUCH U-100 INSULN) 100 unit/mL (3 mL) InPn pen Inject 16 Units into the skin every evening.    insulin needles, disposable, 31 x 3/16 " Ndle by Misc.(Non-Drug; Combo Route) route.    lancets 28 gauge Misc by Misc.(Non-Drug; Combo Route) route.    lisinopriL (PRINIVIL,ZESTRIL) 2.5 MG tablet Take 1 tablet (2.5 mg total) by mouth once daily.    loperamide (IMODIUM A-D) 2 mg Tab Take 2 mg by mouth 4 (four) times daily as needed.    losartan (COZAAR) 50 MG tablet Take 50 mg by mouth once daily.    metformin (GLUCOPHAGE) 1000 MG tablet Take 1,000 mg by mouth 2 (two) times daily with meals.    metoclopramide HCl (REGLAN) 10 MG tablet Take 10 mg by mouth 2 (two) times a day.    metoprolol tartrate (LOPRESSOR) 50 MG tablet Take 50 mg by mouth 2 (two) times daily.    montelukast (SINGULAIR) 10 mg tablet Take 10 mg by mouth every evening.    nortriptyline (PAMELOR) 50 MG capsule SMARTSI Capsule(s) By Mouth Every Evening    ondansetron " "(ZOFRAN-ODT) 4 MG TbDL Take 1 tablet (4 mg total) by mouth every 8 (eight) hours as needed.    oxyCODONE (ROXICODONE) 5 MG immediate release tablet Take 1 tablet (5 mg total) by mouth every 6 (six) hours as needed for Pain.    pantoprazole (PROTONIX) 40 MG tablet Take 1 tablet (40 mg total) by mouth once daily.    pen needle, diabetic 31 gauge x 5/16" Ndle Use to inject insulin into the skin every evening.    polyethylene glycol (GLYCOLAX) 17 gram/dose powder Take 17 g by mouth daily as needed.    potassium chloride (MICRO-K) 10 MEQ CpSR Take 10 mEq by mouth once daily.    promethazine (PHENERGAN) 6.25 mg/5 mL syrup TAKE 10 mls BY MOUTH EVERY 6 HOURS AS NEEDED    zolpidem (AMBIEN) 10 mg Tab Take 5 mg by mouth nightly as needed.    [DISCONTINUED] azithromycin (Z-ADRY) 250 MG tablet Take by mouth.    [DISCONTINUED] doxycycline (MONODOX) 50 MG Cap Take 100 mg by mouth every 12 (twelve) hours.    [DISCONTINUED] insulin glargine (LANTUS) 100 unit/mL injection Inject 50 Units into the skin every evening.    [DISCONTINUED] omeprazole (PRILOSEC) 20 MG capsule Take 20 mg by mouth once daily.     Family History    None       Tobacco Use    Smoking status: Current Every Day Smoker     Packs/day: 0.50     Types: Cigarettes    Smokeless tobacco: Never Used   Substance and Sexual Activity    Alcohol use: No    Drug use: No    Sexual activity: Not on file     Review of Systems   Constitutional:  Negative for chills and fever.   HENT:  Negative for trouble swallowing and voice change.    Eyes:  Negative for pain and redness.   Respiratory:  Negative for cough and shortness of breath.    Cardiovascular:  Negative for chest pain and palpitations.   Gastrointestinal:  Positive for nausea and vomiting.   Genitourinary:  Negative for difficulty urinating and dysuria.   Musculoskeletal:  Negative for back pain and myalgias.   Skin:  Negative for rash and wound.   Neurological:  Negative for seizures and syncope.   Objective:     Vital " Signs (Most Recent):  Temp: 98.9 °F (37.2 °C) (04/02/22 1127)  Pulse: 92 (04/02/22 1529)  Resp: 16 (04/02/22 1529)  BP: (!) 114/57 (04/02/22 1529)  SpO2: 100 % (04/02/22 1529)   Vital Signs (24h Range):  Temp:  [98.9 °F (37.2 °C)] 98.9 °F (37.2 °C)  Pulse:  [] 92  Resp:  [16-20] 16  SpO2:  [99 %-100 %] 100 %  BP: (114-129)/(57-72) 114/57     Weight: 77.1 kg (170 lb)  Body mass index is 25.1 kg/m².    Physical Exam  Vitals and nursing note reviewed.   Constitutional:       General: She is not in acute distress.     Appearance: She is not diaphoretic.   HENT:      Head: Normocephalic and atraumatic.   Eyes:      General: No scleral icterus.     Extraocular Movements: Extraocular movements intact.      Conjunctiva/sclera: Conjunctivae normal.      Pupils: Pupils are equal, round, and reactive to light.   Cardiovascular:      Rate and Rhythm: Regular rhythm. Tachycardia present.      Heart sounds: Normal heart sounds. No murmur heard.  Pulmonary:      Effort: Pulmonary effort is normal. No respiratory distress.      Breath sounds: No wheezing or rhonchi.   Abdominal:      General: There is no distension.      Palpations: Abdomen is soft.      Tenderness: There is no abdominal tenderness. There is no guarding or rebound.      Comments: Midline incision with staples   Musculoskeletal:         General: Deformity present.      Left lower leg: No edema.      Comments: Right below-knee amputation with prosthesis, left foot transmetatarsal amputation   Skin:     General: Skin is warm and dry.      Findings: No erythema or rash.   Neurological:      Mental Status: She is alert and oriented to person, place, and time.      Motor: No tremor or seizure activity.   Psychiatric:         Attention and Perception: Attention normal.         Mood and Affect: Mood and affect normal.         Behavior: Behavior is cooperative.         Judgment: Judgment normal.         CRANIAL NERVES     CN III, IV, VI   Pupils are equal, round, and  reactive to light.     Significant Labs: All pertinent labs within the past 24 hours have been reviewed.    Significant Imaging: I have reviewed all pertinent imaging results/findings within the past 24 hours.  X-Ray Chest 1 View 4/02/22: FINDINGS:   The cardiomediastinal silhouette is prominent, similar to the previous examination noting magnification by technique..  There is no pleural effusion.  The trachea is midline.  The lungs are symmetrically expanded bilaterally with mildly coarse interstitial attenuation projected over the bilateral lower lung zones.  No large focal consolidation seen.  There is no pneumothorax.  The osseous structures are remarkable for degenerative change..  Surgical change projects over the epigastric region.   Impression:  1. Bilateral lower lung zone findings are likely on the basis of atelectasis from shallow inspiratory effort, early edema is a consideration, correlation is advised.   X-Ray Abdomen Flat And Erect 4/02/22: FINDINGS:   Mild amount of scattered fecal material with residual retained enteric contrast media within the colon and rectum.  Nonobstructive bowel gas pattern.  Urinary bladder is distended with intraluminal excreted contrast media.  Pelvic phleboliths noted.  No large amount of free air definitively seen.  No organomegaly or significant mass effect.  Midline ventral abdominal wall skin staples noted.  No large consolidation at the imaged lung bases.  No acute osseous process seen.  Left hip/inguinal surgical clips again noted.   Impression:  Nonobstructive bowel gas pattern.

## 2022-04-02 NOTE — ASSESSMENT & PLAN NOTE
History of right below knee amputation  Hold home aspirin for now. Continue home clopidrogel and atorvastatin.

## 2022-04-02 NOTE — ED NOTES
Bed: [facility]  Expected date:   Expected time:   Means of arrival:   Comments:

## 2022-04-03 VITALS
BODY MASS INDEX: 25.14 KG/M2 | TEMPERATURE: 98 F | SYSTOLIC BLOOD PRESSURE: 92 MMHG | HEART RATE: 74 BPM | OXYGEN SATURATION: 92 % | HEIGHT: 69 IN | WEIGHT: 169.75 LBS | DIASTOLIC BLOOD PRESSURE: 53 MMHG | RESPIRATION RATE: 18 BRPM

## 2022-04-03 PROBLEM — T50.8X5A CONTRAST DYE INDUCED NEPHROPATHY: Status: ACTIVE | Noted: 2022-04-02

## 2022-04-03 PROBLEM — N17.9 AKI (ACUTE KIDNEY INJURY): Status: ACTIVE | Noted: 2022-04-02

## 2022-04-03 PROBLEM — N14.11 CONTRAST DYE INDUCED NEPHROPATHY: Status: ACTIVE | Noted: 2022-04-02

## 2022-04-03 LAB
ANION GAP SERPL CALC-SCNC: 11 MMOL/L (ref 8–16)
ANION GAP SERPL CALC-SCNC: 9 MMOL/L (ref 8–16)
BUN SERPL-MCNC: 24 MG/DL (ref 6–20)
BUN SERPL-MCNC: 26 MG/DL (ref 6–20)
CALCIUM SERPL-MCNC: 9 MG/DL (ref 8.7–10.5)
CALCIUM SERPL-MCNC: 9.3 MG/DL (ref 8.7–10.5)
CHLORIDE SERPL-SCNC: 98 MMOL/L (ref 95–110)
CHLORIDE SERPL-SCNC: 99 MMOL/L (ref 95–110)
CO2 SERPL-SCNC: 24 MMOL/L (ref 23–29)
CO2 SERPL-SCNC: 25 MMOL/L (ref 23–29)
CREAT SERPL-MCNC: 1.9 MG/DL (ref 0.5–1.4)
CREAT SERPL-MCNC: 2.5 MG/DL (ref 0.5–1.4)
ERYTHROCYTE [DISTWIDTH] IN BLOOD BY AUTOMATED COUNT: 16.9 % (ref 11.5–14.5)
EST. GFR  (AFRICAN AMERICAN): 24 ML/MIN/1.73 M^2
EST. GFR  (AFRICAN AMERICAN): 33.5 ML/MIN/1.73 M^2
EST. GFR  (NON AFRICAN AMERICAN): 20.9 ML/MIN/1.73 M^2
EST. GFR  (NON AFRICAN AMERICAN): 29.1 ML/MIN/1.73 M^2
GLUCOSE SERPL-MCNC: 123 MG/DL (ref 70–110)
GLUCOSE SERPL-MCNC: 163 MG/DL (ref 70–110)
HCT VFR BLD AUTO: 28.3 % (ref 37–48.5)
HGB BLD-MCNC: 8.9 G/DL (ref 12–16)
MCH RBC QN AUTO: 24.3 PG (ref 27–31)
MCHC RBC AUTO-ENTMCNC: 31.4 G/DL (ref 32–36)
MCV RBC AUTO: 77 FL (ref 82–98)
PLATELET # BLD AUTO: 474 K/UL (ref 150–450)
PMV BLD AUTO: 10.8 FL (ref 9.2–12.9)
POCT GLUCOSE: 126 MG/DL (ref 70–110)
POCT GLUCOSE: 224 MG/DL (ref 70–110)
POTASSIUM SERPL-SCNC: 4.1 MMOL/L (ref 3.5–5.1)
POTASSIUM SERPL-SCNC: 4.5 MMOL/L (ref 3.5–5.1)
RBC # BLD AUTO: 3.66 M/UL (ref 4–5.4)
SODIUM SERPL-SCNC: 132 MMOL/L (ref 136–145)
SODIUM SERPL-SCNC: 134 MMOL/L (ref 136–145)
WBC # BLD AUTO: 16.34 K/UL (ref 3.9–12.7)

## 2022-04-03 PROCEDURE — 80048 BASIC METABOLIC PNL TOTAL CA: CPT | Mod: 91 | Performed by: HOSPITALIST

## 2022-04-03 PROCEDURE — 99226 PR SUBSEQUENT OBSERVATION CARE,LEVEL III: CPT | Mod: ,,, | Performed by: HOSPITALIST

## 2022-04-03 PROCEDURE — 25000003 PHARM REV CODE 250: Performed by: HOSPITALIST

## 2022-04-03 PROCEDURE — 36415 COLL VENOUS BLD VENIPUNCTURE: CPT | Performed by: HOSPITALIST

## 2022-04-03 PROCEDURE — 85027 COMPLETE CBC AUTOMATED: CPT | Performed by: HOSPITALIST

## 2022-04-03 PROCEDURE — 63600175 PHARM REV CODE 636 W HCPCS: Performed by: HOSPITALIST

## 2022-04-03 PROCEDURE — 99226 PR SUBSEQUENT OBSERVATION CARE,LEVEL III: ICD-10-PCS | Mod: ,,, | Performed by: HOSPITALIST

## 2022-04-03 PROCEDURE — G0378 HOSPITAL OBSERVATION PER HR: HCPCS

## 2022-04-03 PROCEDURE — 96372 THER/PROPH/DIAG INJ SC/IM: CPT | Performed by: HOSPITALIST

## 2022-04-03 RX ORDER — GLIPIZIDE 10 MG/1
10 TABLET ORAL
Status: ON HOLD
Start: 2022-04-03 | End: 2023-10-27 | Stop reason: HOSPADM

## 2022-04-03 RX ORDER — LOSARTAN POTASSIUM 50 MG/1
50 TABLET ORAL DAILY
Status: ON HOLD
Start: 2022-04-03 | End: 2022-04-16 | Stop reason: SDUPTHER

## 2022-04-03 RX ORDER — POTASSIUM CHLORIDE 750 MG/1
10 CAPSULE, EXTENDED RELEASE ORAL DAILY
Status: ON HOLD
Start: 2022-04-03 | End: 2023-10-27 | Stop reason: HOSPADM

## 2022-04-03 RX ORDER — HYDRALAZINE HYDROCHLORIDE 50 MG/1
50 TABLET, FILM COATED ORAL DAILY PRN
Start: 2022-04-03 | End: 2022-04-16

## 2022-04-03 RX ORDER — METOPROLOL TARTRATE 50 MG/1
50 TABLET ORAL 2 TIMES DAILY
Start: 2022-04-03 | End: 2022-04-16

## 2022-04-03 RX ORDER — AMLODIPINE BESYLATE 10 MG/1
10 TABLET ORAL DAILY
Start: 2022-04-03 | End: 2022-04-16

## 2022-04-03 RX ORDER — SODIUM CHLORIDE 9 MG/ML
INJECTION, SOLUTION INTRAVENOUS CONTINUOUS
Status: DISCONTINUED | OUTPATIENT
Start: 2022-04-03 | End: 2022-04-03 | Stop reason: HOSPADM

## 2022-04-03 RX ORDER — METFORMIN HYDROCHLORIDE 1000 MG/1
1000 TABLET ORAL 2 TIMES DAILY WITH MEALS
Start: 2022-04-03 | End: 2022-04-29

## 2022-04-03 RX ORDER — FUROSEMIDE 20 MG/1
20 TABLET ORAL 2 TIMES DAILY
Status: ON HOLD
Start: 2022-04-03 | End: 2023-10-27 | Stop reason: SDUPTHER

## 2022-04-03 RX ADMIN — PANTOPRAZOLE SODIUM 40 MG: 40 TABLET, DELAYED RELEASE ORAL at 09:04

## 2022-04-03 RX ADMIN — CITALOPRAM HYDROBROMIDE 20 MG: 20 TABLET ORAL at 09:04

## 2022-04-03 RX ADMIN — SODIUM CHLORIDE: 0.9 INJECTION, SOLUTION INTRAVENOUS at 09:04

## 2022-04-03 RX ADMIN — INSULIN ASPART 2 UNITS: 100 INJECTION, SOLUTION INTRAVENOUS; SUBCUTANEOUS at 12:04

## 2022-04-03 RX ADMIN — ATORVASTATIN CALCIUM 40 MG: 40 TABLET, FILM COATED ORAL at 09:04

## 2022-04-03 RX ADMIN — GABAPENTIN 600 MG: 300 CAPSULE ORAL at 09:04

## 2022-04-03 RX ADMIN — OXYCODONE HYDROCHLORIDE AND ACETAMINOPHEN 1 TABLET: 10; 325 TABLET ORAL at 06:04

## 2022-04-03 RX ADMIN — CLOPIDOGREL 75 MG: 75 TABLET, FILM COATED ORAL at 09:04

## 2022-04-03 RX ADMIN — SENNOSIDES AND DOCUSATE SODIUM 1 TABLET: 50; 8.6 TABLET ORAL at 09:04

## 2022-04-03 RX ADMIN — OXYCODONE HYDROCHLORIDE AND ACETAMINOPHEN 1 TABLET: 10; 325 TABLET ORAL at 02:04

## 2022-04-03 NOTE — DISCHARGE SUMMARY
[facility] [facility]  Discharge Summary      Patient Name: Casie Salvador  MRN: [id number]  Patient Class: OP- Observation  Admission Date: 4/2/2022  Hospital Length of Stay: 0 days  Discharge Date and Time: 4/3/2022  4:27 PM  Attending Physician: Simon Green MD   Discharging Provider: Simon Green MD  Primary Care Provider: Adonis Carey MD  [facility]: [facility] Simon Green MD    HPI:   Casie Salvador is a 57 year old Black woman with cigarette smoking, hypertension, diabetes mellitus type 2 (treated with insulin) with neuropathy, coronary artery disease status post coronary artery stent placement, chronic systolic and diastolic congestive heart failure, peripheral artery disease, history of right below-knee amputation in 2019, history of left transmetatarsal foot amputation in 2019, chronic foot pain (on chronic opioids and NSAIDs), history of gastric ulcers on 2/23/2022 complicated by perforated viscus status post exploratory laparotomy with repair on 3/14/2022, opioid induced constipation, anemia, depression. She lives in [facility]. She is . Her primary care physician is Dr. Adonis Carey.    She was seen at [facility] on 2/5/2022 for abdominal pain. She was diagnosed with opioid induced constipation.    She was seen again at [facility] on 2/7/2022 for abdominal pain, nausea, vomiting, and diarrhea.    She was hospitalized at [facility] from 2/20/2022 to 2/24/2022 for severe anemia due to bleeding gastric ulcers. She was prescribed lisinopril 2.5 mg daily.   She was hospitalized again at [facility] from 3/14/2022 to 3/24/2022 for perforated viscus. She was prescribed pantoprazole, 7 days of ciprofloxacin and metronidazole, 25 tablets of oxycodone, and insulin detemir. Her  meloxicam was not discontinued. WBC count was still elevated at 206[address]/uL at discharge.    She returned to [facility] on 3/31/2022 with recurrent abdominal pain and constipation for 5 days. WBC count was still elevated at 17890/uL. Creatinine was 1.1 mg/dL, increased from 0.8. CT showed no acute concerning abnormalities, noting persistent gastric wall thickening with adjacent mild inflammatory changes. She was seen by General Surgery, who recommended enema to treat her constipation. She was able to have a bowel movement.   She returned to [facility] on 4/2/2022 with vomiting since the night before, with multiple episodes of dark brown emesis resembling feculent material. She was able to keep medications down, and was not vomiting everything up. WBC [place] was still elevated at 0[address]/uL. Creatinine was 1.3 mg/dL, increased from 1.1. Chest X-ray showed atelectasis and abdominal X-ray showed scattered fecal material and contrast still there from 2 days prior. She was seen by General Surgery, who saw no evidence of intraabdominal abscess or wound complications. They recommended admission to Hospital Medicine for treatment of dehydration and evaluation of nausea and vomiting, Gastroenterology consult, bowel regimen with daily senna and enemas (polyethylene glycol makes her vomit). She was given oxycodone-acetaminophen, ondansetron, and 250 mL of normal saline. She was admitted to Hospital Medicine Team A. She confirmed that no one had told her to stop taking meloxicam and that she was still taking it.         Hospital Course:   She was advised to stop meloxicam. Pantoprazole was increased to twice daily temporarily to better treat the persistent NSAID-induced gastritis seen on recent CT. She felt better the next morning, stating she had not been able to eat well in the past two months but could now. General Surgery removed her  staples, which was planned to be done at an upcoming clinic appointment that she no longer needs to attend. BUN and creatinine increased to 24 mg/dL and 1.9 mg/dL, from 17 and 1.3 on admission. Acute kidney injury was likely caused by the contrast CT she had done on 3/31/2022 and compounded by dehydration, continued NSAID use, and possibly concurrent use of ACE inhibitor (lisinopril) and angiotension receptor blocker (losartan), if her home medication list is correct. She was unable to verify all of her home medications. In the afternoon, creatinine was 2.5. She was advised that renal function should be monitored acutely, but she strongly desired to go home, so she was advised to hold her hypertension medications, furosemide, potassium, metformin, and glipizide. A BMP (basic metabolic profile) was ordered to be done outpatient. She will go to the lab the day after discharge. She will continue pantoprazole and stop meloxicam, which should keep her gastritis symptoms from returning.        Goals of Care Treatment Preferences:  Code Status: Full Code      Consults:   Consults (From admission, onward)        Status Ordering Provider     Inpatient consult to General surgery  Once        Provider:  (Not yet assigned)    Completed DESI MEYER        Final Active Diagnoses:    Diagnosis Date Noted POA    PRINCIPAL PROBLEM:  NSAID induced gastritis [K29.60, T39.395A] 04/02/2022 Yes    History of gastric ulcer [Z87.11] 04/02/2022 Not Applicable    Cigarette smoker [F17.210] 04/02/2022 Yes     Chronic    Peripheral artery disease [I73.9] 04/02/2022 Yes     Chronic    History of right below knee amputation [Z89.511] 04/02/2022 Not Applicable     Chronic    Chronic, continuous use of opioids [F11.90] 04/02/2022 Yes     Chronic    Contrast dye induced nephropathy [N14.1, T50.8X5A] 04/02/2022 Yes    Chronic combined systolic and diastolic congestive heart failure [I50.42] 02/21/2022 Yes     Chronic    Coronary  artery disease involving native coronary artery of native heart without angina pectoris [I25.10] 02/20/2022 Yes     Chronic    Type 2 diabetes mellitus with diabetic neuropathy, with long-term current use of insulin [E11.40, Z79.4] 02/20/2022 Not Applicable     Chronic    Constipation due to opioid therapy [K59.03, T40.2X5A] 02/20/2022 Yes     Chronic    Chronic pain [G89.29] 02/20/2022 Yes     Chronic    Hypertension [I10] 02/20/2022 Yes     Chronic    Leukocytosis [D72.829] 02/20/2022 Yes    Anemia [D64.9] 02/20/2022 Yes    Depression [F32.A] 02/20/2022 Yes     Chronic      Problems Resolved During this Admission:       Discharged Condition: good    Disposition: Home or Self Care    Follow Up:   Follow-up Information     [facility] Follow up in 1 day(s).    Specialty: Administration  Contact information:  [address] [place] [id number]           Adonis Carey MD Follow up on 4/8/2022.    Specialty: Internal Medicine  Contact information:  [address] [facility] [id number] [phone]                       Patient Instructions:      Basic Metabolic Panel   Standing Status: Future Standing Exp. Date: 04/03/23     Diet diabetic     Notify your health care provider if you experience any of the following:  persistent dizziness, light-headedness, or visual disturbances     Notify your health care provider if you experience any of the following:  increased confusion or weakness     Notify your health care provider if you experience any of the following:  persistent nausea and vomiting or diarrhea     Activity as tolerated       Significant Diagnostic Studies:   Recent Labs   Lab 03/31/22  1644 04/02/22  1230 04/03/22  0530 04/03/22  1408   * 132* 132* 134*   K 5.1 4.8 4.1 4.5   CL 99 95 98 99   CO2 24 24 25 24   BUN 13 17 24* 26*   CREATININE 1.1 1.3 1.9* 2.5*   CALCIUM 9.7 9.9 9.3 9.0   PROT 9.0* 9.1*  --   --    BILITOT 0.3 0.4  --   --    ALKPHOS 103 102  --   --     ALT 8* 9*  --   --    AST 13 14  --   --      X-Ray Chest 1 View 4/02/22: FINDINGS:   The cardiomediastinal silhouette is prominent, similar to the previous examination noting magnification by technique..  There is no pleural effusion.  The trachea is midline.  The lungs are symmetrically expanded bilaterally with mildly coarse interstitial attenuation projected over the bilateral lower lung zones.  No large focal consolidation seen.  There is no pneumothorax.  The osseous structures are remarkable for degenerative change..  Surgical change projects over the epigastric region.   Impression:  1. Bilateral lower lung zone findings are likely on the basis of atelectasis from shallow inspiratory effort, early edema is a consideration, correlation is advised.   X-Ray Abdomen Flat And Erect 4/02/22: FINDINGS:   Mild amount of scattered fecal material with residual retained enteric contrast media within the colon and rectum.  Nonobstructive bowel gas pattern.  Urinary bladder is distended with intraluminal excreted contrast media.  Pelvic phleboliths noted.  No large amount of free air definitively seen.  No organomegaly or significant mass effect.  Midline ventral abdominal wall skin staples noted.  No large consolidation at the imaged lung bases.  No acute osseous process seen.  Left hip/inguinal surgical clips again noted.   Impression:  Nonobstructive bowel gas pattern.      Medications:  Reconciled Home Medications:      Medication List      CHANGE how you take these medications    amLODIPine 10 MG tablet  Commonly known as: NORVASC  Take 1 tablet (10 mg total) by mouth once daily. Hold this. Do not take it until you see your doctor.  What changed: additional instructions     furosemide 20 MG tablet  Commonly known as: LASIX  Take 1 tablet (20 mg total) by mouth 2 (two) times daily. Hold this. Do not take it until you see your doctor.  What changed: additional instructions     glipiZIDE 10 MG tablet  Commonly known  as: GLUCOTROL  Take 1 tablet (10 mg total) by mouth 2 (two) times daily before meals. Hold this. Do not take it until you see your doctor.  What changed: additional instructions     hydrALAZINE 50 MG tablet  Commonly known as: APRESOLINE  Take 1 tablet (50 mg total) by mouth daily as needed. Hold this. Do not take it until you see your doctor.  What changed: additional instructions     losartan 50 MG tablet  Commonly known as: COZAAR  Take 1 tablet (50 mg total) by mouth once daily. Hold this. Do not take it until you see your doctor.  What changed: additional instructions     metFORMIN 1000 MG tablet  Commonly known as: GLUCOPHAGE  Take 1 tablet (1,000 mg total) by mouth 2 (two) times daily with meals. Hold this. Do not take it until you see your doctor.  What changed: additional instructions     metoprolol tartrate 50 MG tablet  Commonly known as: LOPRESSOR  Take 1 tablet (50 mg total) by mouth 2 (two) times daily. Hold this. Do not take it until you see your doctor.  What changed: additional instructions     potassium chloride 10 MEQ Cpsr  Commonly known as: MICRO-K  Take 1 capsule (10 mEq total) by mouth once daily. Hold this. Do not take it until you see your doctor.  What changed: additional instructions        CONTINUE taking these medications    aspirin 81 MG EC tablet  Commonly known as: ECOTRIN  Take 81 mg by mouth once daily.     atorvastatin 80 MG tablet  Commonly known as: LIPITOR  Take 40 mg by mouth once daily.     ATROVENT HFA 17 mcg/actuation inhaler  Generic drug: ipratropium  SMARTSI Puff(s) By Mouth Twice Daily     blood sugar diagnostic Strp  by Misc.(Non-Drug; Combo Route) route.     busPIRone 10 MG tablet  Commonly known as: BUSPAR  Take 10 mg by mouth 2 (two) times daily.     carisoprodoL 350 MG tablet  Commonly known as: SOMA  Take 350 mg by mouth daily as needed.     citalopram 20 MG tablet  Commonly known as: CeleXA  Take 20 mg by mouth once daily.     clopidogreL 75 mg tablet  Commonly  "known as: PLAVIX  Take 75 mg by mouth once daily.     docusate sodium 100 MG capsule  Commonly known as: COLACE  Take 1 capsule (100 mg total) by mouth 2 (two) times daily.     ergocalciferol 50,000 unit Cap  Commonly known as: ERGOCALCIFEROL  Take 50,000 Units by mouth every 7 days.     ferrous sulfate 325 mg (65 mg iron) Tab tablet  Commonly known as: FEOSOL  Take 325 mg by mouth.     gabapentin 600 MG tablet  Commonly known as: NEURONTIN  Take 600 mg by mouth 2 (two) times daily.     hyoscyamine 0.125 mg Tab  Commonly known as: ANASPAZ,LEVSIN  Take 1 tablet (125 mcg total) by mouth every 6 (six) hours as needed (abdominal cramping).     lancets 28 gauge Misc  by Misc.(Non-Drug; Combo Route) route.     LEVEMIR FLEXTOUCH U-100 INSULN 100 unit/mL (3 mL) Inpn pen  Generic drug: insulin detemir U-100  Inject 16 Units into the skin every evening.     metoclopramide HCl 10 MG tablet  Commonly known as: REGLAN  Take 10 mg by mouth 2 (two) times a day.     montelukast 10 mg tablet  Commonly known as: SINGULAIR  Take 10 mg by mouth every evening.     NIVA-FOL 2.5-25-2 mg Tab  Generic drug: folic acid-vit B6-vit B12 2.5-25-2 mg  Take 1 tablet by mouth once daily.     nortriptyline 50 MG capsule  Commonly known as: PAMELOR  SMARTSI Capsule(s) By Mouth Every Evening     ondansetron 4 MG Tbdl  Commonly known as: ZOFRAN-ODT  Take 1 tablet (4 mg total) by mouth every 8 (eight) hours as needed.     oxyCODONE-acetaminophen  mg per tablet  Commonly known as: PERCOCET  Take 1 tablet by mouth every 8 (eight) hours as needed for Pain.     pantoprazole 40 MG tablet  Commonly known as: PROTONIX  Take 1 tablet (40 mg total) by mouth once daily.     * pen needle, diabetic 31 gauge x 3/16" Ndle  by Misc.(Non-Drug; Combo Route) route.     * pen needle, diabetic 31 gauge x 5/16" Ndle  Use to inject insulin into the skin every evening.     promethazine 6.25 mg/5 mL syrup  Commonly known as: PHENERGAN  TAKE 10 mls BY MOUTH EVERY 6 " HOURS AS NEEDED     zolpidem 10 mg Tab  Commonly known as: AMBIEN  Take 5 mg by mouth nightly as needed.         * This list has 2 medication(s) that are the same as other medications prescribed for you. Read the directions carefully, and ask your doctor or other care provider to review them with you.            STOP taking these medications    insulin glargine 100 unit/mL injection  Commonly known as: Lantus     lisinopriL 2.5 MG tablet  Commonly known as: PRINIVIL,ZESTRIL     loperamide 2 mg Tab  Commonly known as: IMODIUM A-D     meloxicam 7.5 MG tablet  Commonly known as: MOBIC     oxyCODONE 5 MG immediate release tablet  Commonly known as: ROXICODONE     polyethylene glycol 17 gram/dose powder  Commonly known as: GLYCOLAX            Indwelling Lines/Drains at time of discharge: None    Time spent on the discharge of patient: 35 minutes         Simon Green MD  Department of Hospital Medicine  [facility] - Telemetry Stepdown ([place]

## 2022-04-03 NOTE — PLAN OF CARE
General Surgery Care Update    Patient seen and examined on rounds this morning. No changes since initial consult note written. Reports she feels much better than at the time of presentation.     - She had planned follow up this week in clinic for staple removal, this was performed at the bedside and patient was instructed that she does not need to return to clinic.   - No other intervention planned at this time.   - Please call with questions or concerns.     Rosy Gusman MD  General Surgery, PGY-III  Pager: [phone]  4/3/2022 7:55 AM

## 2022-04-03 NOTE — ASSESSMENT & PLAN NOTE
Creatinine had been increasing from her baseline since 3/31/2022. Likely prerenal due to decreased oral intake and vomiting. Giving IV fluids. Recheck. If no worse, she can go home.

## 2022-04-03 NOTE — PLAN OF CARE
Problem: Adult Inpatient Plan of Care  Goal: Plan of Care Review  Outcome: Ongoing, Progressing     Problem: Adult Inpatient Plan of Care  Goal: Absence of Hospital-Acquired Illness or Injury  Outcome: Ongoing, Progressing     Problem: Adult Inpatient Plan of Care  Goal: Optimal Comfort and Wellbeing  Outcome: Ongoing, Progressing     Problem: Diabetes Comorbidity  Goal: Blood Glucose Level Within Targeted Range  Outcome: Ongoing, Progressing     Pt. AAOx4, VSS, no falls or injuries during shift, safety maintained. Call light in reach, bed locked and in lowset position, side rails up x2. Blood glucose monitored and controlled. Staples removed from abdominal incision, incision CDI. No acute events. Pending discharge, will continue to monitor.

## 2022-04-03 NOTE — ASSESSMENT & PLAN NOTE
History of gastric ulcer  Leukocytosis  Meloxicam is a non-steroid antiinflammatory drug (NSAID) that can cause gastric ulcer and gastritis so should have been discontinued when gastric ulcers were diagnosed, and also when her gastric ulcer perforated. I advised her to stop. She understood. Leukocytosis is persistent and improving. She feels much better. Giving pantoprazole twice daily for now. Resume daily pantoprazole after discharge.

## 2022-04-03 NOTE — NURSING
Discharged home, IV removed with catheter intact. Discharge instructions reviewed with and provided to patient, verbalized understanding. Left unit via personal wheelchair with  with no signs of distress noted.

## 2022-04-03 NOTE — SUBJECTIVE & OBJECTIVE
Interval History: Discussed the reason I haven't discharged yet despite her feeling well. Will recheck labs after she has received some IV fluids. Hopefully creatinine is not any worse, so that she will be safe to be discharged home.    Review of Systems   Constitutional:  Negative for chills and fever.   Respiratory:  Negative for cough and shortness of breath.    Gastrointestinal:  Negative for abdominal pain, nausea and vomiting.   Objective:     Vital Signs (Most Recent):  Temp: 98.2 °F (36.8 °C) (04/03/22 1211)  Pulse: 73 (04/03/22 1211)  Resp: 18 (04/03/22 1211)  BP: (!) 100/53 (04/03/22 1211)  SpO2: 95 % (04/03/22 1211)   Vital Signs (24h Range):  Temp:  [97.2 °F (36.2 °C)-98.6 °F (37 °C)] 98.2 °F (36.8 °C)  Pulse:  [73-92] 73  Resp:  [14-20] 18  SpO2:  [92 %-100 %] 95 %  BP: ()/(51-62) 100/53     Weight: 77 kg (169 lb 12.1 oz)  Body mass index is 25.07 kg/m².  No intake or output data in the 24 hours ending 04/03/22 1349   Physical Exam  Vitals and nursing note reviewed.   Constitutional:       General: She is not in acute distress.     Appearance: She is not ill-appearing, toxic-appearing or diaphoretic.   Pulmonary:      Effort: Pulmonary effort is normal. No respiratory distress.      Breath sounds: No wheezing or rhonchi.   Abdominal:      General: There is no distension.      Palpations: Abdomen is soft.      Tenderness: There is no abdominal tenderness. There is no guarding.      Comments: Idalia removed   Neurological:      Mental Status: She is alert and oriented to person, place, and time.      Motor: No tremor or seizure activity.   Psychiatric:         Attention and Perception: Attention normal.         Mood and Affect: Mood and affect normal.         Behavior: Behavior is cooperative.         Judgment: Judgment normal.       Significant Labs:  CBC:  Recent Labs   Lab 04/02/22  1230 04/03/22  0530   WBC 21.17* 16.34*   HGB 10.6* 8.9*   HCT 31.6* 28.3*   * 474*     CMP:  Recent Labs    Lab 04/02/22  1230 04/03/22  0530   * 132*   K 4.8 4.1   CL 95 98   CO2 24 25   * 123*   BUN 17 24*   CREATININE 1.3 1.9*   CALCIUM 9.9 9.3   PROT 9.1*  --    ALBUMIN 3.2*  --    BILITOT 0.4  --    ALKPHOS 102  --    AST 14  --    ALT 9*  --    ANIONGAP 13 9   EGFRNONAA 46.0* 29.1*

## 2022-04-03 NOTE — DISCHARGE INSTRUCTIONS
You were treated for gastritis (stomach inflammation).  Stop taking meloxicam. Also avoid taking ibuprofen, naproxen, BC powder, or any other NSAID (non-steroid anti-inflammatory drug) as these can cause gastritis and stomach ulcers.    You developed acute kidney injury. This likely began when you had a CT scan with IV contrast on 3/31/2022. This may take some time to improve.   In the meantime, hold any of your medications that can decrease blood pressure. These include:  Amlodipine  Hydralazine  Furosemide  Metoprolol  Lisinopril  Losartan    If you have been taking both lisinopril and losartan, there is no reason to take both. Stop one of them totally.    Also hold potassium chloride. When kidneys are not functioning well, potassium can build up to dangerous levels more easily.    Until your kidney function improves, also hold these medications, which are for diabetes:  Metformin  Glipizide    You can continue taking insulin.    Get blood work done in the lab to follow up your kidney function. The main result to look at is the creatinine. The lab results below show how the creatinine has increased since you had the CT scan done.  Recent Labs   Lab 03/31/22  1644 04/02/22  1230 04/03/22  0530 04/03/22  1408   * 132* 132* 134*   K 5.1 4.8 4.1 4.5   CL 99 95 98 99   CO2 24 24 25 24   BUN 13 17 24* 26*   CREATININE 1.1 1.3 1.9* 2.5*   CALCIUM 9.7 9.9 9.3 9.0   PROT 9.0* 9.1*  --   --    BILITOT 0.3 0.4  --   --    ALKPHOS 103 102  --   --    ALT 8* 9*  --   --    AST 13 14  --   --

## 2022-04-03 NOTE — ASSESSMENT & PLAN NOTE
Holding home amlodipine, lisinopril, losartan, and metoprolol tartrate for now. Blood pressures relatively low, possibly due to fluid volume depletion.

## 2022-04-03 NOTE — HOSPITAL COURSE
She was advised to stop meloxicam. Pantoprazole was increased to twice daily temporarily to better treat the persistent NSAID-induced gastritis seen on recent CT. She felt better the next morning, stating she had not been able to eat well in the past two months but could now. General Surgery removed her staples, which was planned to be done at an upcoming clinic appointment that she no longer needs to attend. BUN and creatinine increased to 24 mg/dL and 1.9 mg/dL, from 17 and 1.3 on admission. Acute kidney injury was likely caused by the contrast CT she had done on 3/31/2022 and compounded by dehydration, continued NSAID use, and possibly concurrent use of ACE inhibitor (lisinopril) and angiotension receptor blocker (losartan), if her home medication list is correct. She was unable to verify all of her home medications. In the afternoon, creatinine was 2.5. She was advised that renal function should be monitored acutely, but she strongly desired to go home, so she was advised to hold her hypertension medications, furosemide, potassium, metformin, and glipizide. A BMP (basic metabolic profile) was ordered to be done outpatient. She will go to the lab the day after discharge. She will continue pantoprazole and stop meloxicam, which should keep her gastritis symptoms from returning.

## 2022-04-03 NOTE — PROGRESS NOTES
[facility] [facility]  Progress Note    Patient Name: Casie Salvador  MRN: [id number]  Patient Class: OP- Observation   Admission Date: 4/2/2022  Length of Stay: 0 days  Attending Physician: Simon Green MD  Primary Care Provider: Adonis Carey MD        Subjective:     Principal Problem:NSAID induced gastritis        HPI:  Casie Salvador is a 57 year old Black woman with cigarette smoking, hypertension, diabetes mellitus type 2 (treated with insulin) with neuropathy, coronary artery disease status post coronary artery stent placement, chronic systolic and diastolic congestive heart failure, peripheral artery disease, history of right below-knee amputation in 2019, history of left transmetatarsal foot amputation in 2019, chronic foot pain (on chronic opioids and NSAIDs), history of gastric ulcers on 2/23/2022 complicated by perforated viscus status post exploratory laparotomy with repair on 3/14/2022, opioid induced constipation, anemia, depression. She lives in [facility]. She is . Her primary care physician is Dr. Adonis Carey.    She was seen at [facility] on 2/5/2022 for abdominal pain. She was diagnosed with opioid induced constipation.    She was seen again at [facility] on 2/7/2022 for abdominal pain, nausea, vomiting, and diarrhea.    She was hospitalized at [facility] from 2/20/2022 to 2/24/2022 for severe anemia due to bleeding gastric ulcers.    She was hospitalized again at [facility] from 3/14/2022 to 3/24/2022 for perforated viscus. She was prescribed pantoprazole, 7 days of ciprofloxacin and metronidazole, 25 tablets of oxycodone, and insulin detemir. Her meloxicam was not discontinued. WBC count was still elevated at [address]64/uL at discharge.    She returned to [facility]gency [facility] on 3/31/2022 with recurrent abdominal pain and constipation for 5 days. WBC count was still elevated at 93197/uL. Creatinine was 1.1 mg/dL, increased from 0.8. CT showed no acute concerning abnormalities, noting persistent gastric wall thickening with adjacent mild inflammatory changes. She was seen by General Surgery, who recommended enema to treat her constipation. She was able to have a bowel movement.   She returned to [facility] on 4/2/2022 with vomiting since the night before, with multiple episodes of dark brown emesis resembling feculent material. She was able to keep medications down, and was not vomiting everything up. WBC count was still elevated at 369[address]/uL. Creatinine was 1.3 mg/dL, increased from 1.1. Chest X-ray showed atelectasis and abdominal X-ray showed scattered fecal material and contrast still there from 2 days prior. She was seen by General Surgery, who saw no evidence of intraabdominal abscess or wound complications. They recommended admission to Hospital Medicine for treatment of dehydration and evaluation of nausea and vomiting, Gastroenterology consult, bowel regimen with daily senna and enemas (polyethylene glycol makes her vomit). She was given oxycodone-acetaminophen, ondansetron, and 250 mL of normal saline. She was admitted to Hospital Medicine Team A. She confirmed that no one had told her to stop taking meloxicam and that she was still taking it.       Overview/Hospital Course:  She was advised to stop meloxicam. Pantoprazole was increased to twice daily temporarily to better treat the persistent NSAID-induced gastritis seen on recent CT. She felt better the next morning, stating she had not been able to eat well in the past two months but could now. General Surgery removed her staples, which was planned to be done at an upcoming clinic appointment that she no longer needs to attend. BUN and creatinine increased to 24  mg/dL and 1.9 mg/dL, from 17 and 1.3 on admission.       Interval History: Discussed the reason I haven't discharged yet despite her feeling well. Will recheck labs after she has received some IV fluids. Hopefully creatinine is not any worse, so that she will be safe to be discharged home.    Review of Systems   Constitutional:  Negative for chills and fever.   Respiratory:  Negative for cough and shortness of breath.    Gastrointestinal:  Negative for abdominal pain, nausea and vomiting.   Objective:     Vital Signs (Most Recent):  Temp: 98.2 °F (36.8 °C) (04/03/22 1211)  Pulse: 73 (04/03/22 1211)  Resp: 18 (04/03/22 1211)  BP: (!) 100/53 (04/03/22 1211)  SpO2: 95 % (04/03/22 1211)   Vital Signs (24h Range):  Temp:  [97.2 °F (36.2 °C)-98.6 °F (37 °C)] 98.2 °F (36.8 °C)  Pulse:  [73-92] 73  Resp:  [14-20] 18  SpO2:  [92 %-100 %] 95 %  BP: ()/(51-62) 100/53     Weight: 77 kg (169 lb 12.1 oz)  Body mass index is 25.07 kg/m².  No intake or output data in the 24 hours ending 04/03/22 1349   Physical Exam  Vitals and nursing note reviewed.   Constitutional:       General: She is not in acute distress.     Appearance: She is not ill-appearing, toxic-appearing or diaphoretic.   Pulmonary:      Effort: Pulmonary effort is normal. No respiratory distress.      Breath sounds: No wheezing or rhonchi.   Abdominal:      General: There is no distension.      Palpations: Abdomen is soft.      Tenderness: There is no abdominal tenderness. There is no guarding.      Comments: [place] removed   Neurological:      Mental Status: She is alert and oriented to person, place, and time.      Motor: No tremor or seizure activity.   Psychiatric:         Attention and Perception: Attention normal.         Mood and Affect: Mood and affect normal.         Behavior: Behavior is cooperative.         Judgment: Judgment normal.       Significant Labs:  CBC:  Recent Labs   Lab 04/02/22  1230 04/03/22  0530   WBC 21.17* 16.34*   HGB 10.6* 8.9*    HCT 31.6* 28.3*   * 474*     CMP:  Recent Labs   Lab 04/02/22  1230 04/03/22  0530   * 132*   K 4.8 4.1   CL 95 98   CO2 24 25   * 123*   BUN 17 24*   CREATININE 1.3 1.9*   CALCIUM 9.9 9.3   PROT 9.1*  --    ALBUMIN 3.2*  --    BILITOT 0.4  --    ALKPHOS 102  --    AST 14  --    ALT 9*  --    ANIONGAP 13 9   EGFRNONAA 46.0* 29.1*       Assessment/Plan:      * NSAID induced gastritis  History of gastric ulcer  Leukocytosis  Meloxicam is a non-steroid antiinflammatory drug (NSAID) that can cause gastric ulcer and gastritis so should have been discontinued when gastric ulcers were diagnosed, and also when her gastric ulcer perforated. I advised her to stop. She understood. Leukocytosis is persistent and improving. She feels much better. Giving pantoprazole twice daily for now. Resume daily pantoprazole after discharge.    BRENDA (acute kidney injury)  Creatinine had been increasing from her baseline since 3/31/2022. Likely prerenal due to decreased oral intake and vomiting. Giving IV fluids. Recheck. If no worse, she can go home.    Peripheral artery disease  History of right below knee amputation  Hold home aspirin for now. Continue home clopidrogel and atorvastatin.    Chronic combined systolic and diastolic congestive heart failure  Hold home furosemide for now.    Depression  Continue home buspirone.    Constipation due to opioid therapy  Give senna-docusate twice daily, Fleets enema.    Chronic pain  Chronic, continuous use of opioids  Continue home oxycodone-acetaminophen prn.    Type 2 diabetes mellitus with diabetic neuropathy, with long-term current use of insulin  Hold home metformin and glipizide. She takes insulin detemir 16 units nightly. Giving 12 units nightly. Diabetic diet. Give insulin aspart sliding scale. Accuchecks. Continue home gabapentin.    Coronary artery disease involving native coronary artery of native heart without angina pectoris  Hold home aspirin for now. Continue  home clopidrogel and atorvastatin.    Hypertension  Holding home amlodipine, lisinopril, losartan, and metoprolol tartrate for now. Blood pressures relatively low, possibly due to fluid volume depletion.      VTE Risk Mitigation (From admission, onward)    None          Discharge Planning   SOCO: 4/4/2022     Code Status: Prior   Is the patient medically ready for discharge?: No    Reason for patient still in hospital (select all that apply): Laboratory test                     Simon Green MD  Department of Hospital Medicine   [facility] - Telemetry Stepdown ([place]-[facility])

## 2022-04-04 ENCOUNTER — LAB VISIT (OUTPATIENT)
Dept: LAB | Facility: HOSPITAL | Age: 57
End: 2022-04-04
Attending: HOSPITALIST
Payer: MEDICAID

## 2022-04-04 DIAGNOSIS — T50.8X5A CONTRAST DYE INDUCED NEPHROPATHY: Primary | ICD-10-CM

## 2022-04-04 DIAGNOSIS — N14.11 CONTRAST DYE INDUCED NEPHROPATHY: Primary | ICD-10-CM

## 2022-04-04 DIAGNOSIS — N17.9 AKI (ACUTE KIDNEY INJURY): ICD-10-CM

## 2022-04-04 LAB
ANION GAP SERPL CALC-SCNC: 10 MMOL/L (ref 8–16)
BUN SERPL-MCNC: 34 MG/DL (ref 6–20)
CALCIUM SERPL-MCNC: 8.8 MG/DL (ref 8.7–10.5)
CHLORIDE SERPL-SCNC: 97 MMOL/L (ref 95–110)
CO2 SERPL-SCNC: 24 MMOL/L (ref 23–29)
CREAT SERPL-MCNC: 2.8 MG/DL (ref 0.5–1.4)
EST. GFR  (AFRICAN AMERICAN): 21 ML/MIN/1.73 M^2
EST. GFR  (NON AFRICAN AMERICAN): 18.2 ML/MIN/1.73 M^2
GLUCOSE SERPL-MCNC: 124 MG/DL (ref 70–110)
POTASSIUM SERPL-SCNC: 4.3 MMOL/L (ref 3.5–5.1)
SODIUM SERPL-SCNC: 131 MMOL/L (ref 136–145)

## 2022-04-04 PROCEDURE — 80048 BASIC METABOLIC PNL TOTAL CA: CPT | Performed by: HOSPITALIST

## 2022-04-04 PROCEDURE — 36415 COLL VENOUS BLD VENIPUNCTURE: CPT | Performed by: HOSPITALIST

## 2022-04-04 NOTE — PROGRESS NOTES
Repeat BMP done this morning. Creatinine increased to 2.8 from 2.5 yesterday. However, it had increased to 2.5 from 1.3 the day before, so the rate of change is decreasing. I contacted Ms. Salvador, who said she took her hypertension medications after getting labs. I reminded her to no take them unless her blood pressure is high, and she will get repeat BMP in 2 days.

## 2022-04-06 ENCOUNTER — TELEPHONE (OUTPATIENT)
Dept: HEPATOLOGY | Facility: HOSPITAL | Age: 57
End: 2022-04-06
Payer: MEDICAID

## 2022-04-06 ENCOUNTER — LAB VISIT (OUTPATIENT)
Dept: LAB | Facility: HOSPITAL | Age: 57
End: 2022-04-06
Attending: HOSPITALIST
Payer: MEDICAID

## 2022-04-06 DIAGNOSIS — T50.8X5A CONTRAST DYE INDUCED NEPHROPATHY: ICD-10-CM

## 2022-04-06 DIAGNOSIS — N14.11 CONTRAST DYE INDUCED NEPHROPATHY: ICD-10-CM

## 2022-04-06 LAB
ANION GAP SERPL CALC-SCNC: 8 MMOL/L (ref 8–16)
BUN SERPL-MCNC: 39 MG/DL (ref 6–20)
CALCIUM SERPL-MCNC: 9.1 MG/DL (ref 8.7–10.5)
CHLORIDE SERPL-SCNC: 104 MMOL/L (ref 95–110)
CO2 SERPL-SCNC: 25 MMOL/L (ref 23–29)
CREAT SERPL-MCNC: 1.1 MG/DL (ref 0.5–1.4)
EST. GFR  (AFRICAN AMERICAN): >60 ML/MIN/1.73 M^2
EST. GFR  (NON AFRICAN AMERICAN): 56.3 ML/MIN/1.73 M^2
GLUCOSE SERPL-MCNC: 45 MG/DL (ref 70–110)
POTASSIUM SERPL-SCNC: 4.3 MMOL/L (ref 3.5–5.1)
SODIUM SERPL-SCNC: 137 MMOL/L (ref 136–145)

## 2022-04-06 PROCEDURE — 80048 BASIC METABOLIC PNL TOTAL CA: CPT | Performed by: HOSPITALIST

## 2022-04-06 PROCEDURE — 36415 COLL VENOUS BLD VENIPUNCTURE: CPT | Performed by: HOSPITALIST

## 2022-04-06 NOTE — TELEPHONE ENCOUNTER
"Called Ms. Salvador. Labs today showed that creatinine improved to 1.1, from 2.8 two days ago. Her blood pressures are "good" and she is still holding her blood pressure medications. I advised her to continue holding them until she sees her doctor in two days. He glucose was 45, but she had not eaten before getting labs and has eaten since then.  "

## 2022-04-13 ENCOUNTER — HOSPITAL ENCOUNTER (INPATIENT)
Facility: HOSPITAL | Age: 57
LOS: 3 days | Discharge: HOME | DRG: 378 | End: 2022-04-16
Attending: EMERGENCY MEDICINE | Admitting: HOSPITALIST
Payer: MEDICAID

## 2022-04-13 DIAGNOSIS — R07.9 CHEST PAIN: ICD-10-CM

## 2022-04-13 DIAGNOSIS — D64.9 ANEMIA, UNSPECIFIED TYPE: ICD-10-CM

## 2022-04-13 DIAGNOSIS — D62 ACUTE BLOOD LOSS ANEMIA: ICD-10-CM

## 2022-04-13 DIAGNOSIS — I50.20 HFREF (HEART FAILURE WITH REDUCED EJECTION FRACTION): ICD-10-CM

## 2022-04-13 DIAGNOSIS — K92.2 GASTROINTESTINAL HEMORRHAGE, UNSPECIFIED GASTROINTESTINAL HEMORRHAGE TYPE: Primary | ICD-10-CM

## 2022-04-13 DIAGNOSIS — K92.2 UPPER GI BLEED: ICD-10-CM

## 2022-04-13 DIAGNOSIS — K92.2 GI BLEED: ICD-10-CM

## 2022-04-13 DIAGNOSIS — K27.9 PEPTIC ULCER DISEASE: ICD-10-CM

## 2022-04-13 DIAGNOSIS — R11.10 VOMITING: ICD-10-CM

## 2022-04-13 DIAGNOSIS — K59.00 CONSTIPATION, UNSPECIFIED CONSTIPATION TYPE: ICD-10-CM

## 2022-04-13 LAB
ABO + RH BLD: NORMAL
ALBUMIN SERPL BCP-MCNC: 2.8 G/DL (ref 3.5–5.2)
ALP SERPL-CCNC: 104 U/L (ref 55–135)
ALT SERPL W/O P-5'-P-CCNC: 8 U/L (ref 10–44)
ANION GAP SERPL CALC-SCNC: 9 MMOL/L (ref 8–16)
ANISOCYTOSIS BLD QL SMEAR: SLIGHT
AST SERPL-CCNC: 8 U/L (ref 10–40)
BASOPHILS # BLD AUTO: 0.06 K/UL (ref 0–0.2)
BASOPHILS NFR BLD: 0.3 % (ref 0–1.9)
BILIRUB SERPL-MCNC: 0.2 MG/DL (ref 0.1–1)
BLD GP AB SCN CELLS X3 SERPL QL: NORMAL
BLD PROD TYP BPU: NORMAL
BLOOD UNIT EXPIRATION DATE: NORMAL
BLOOD UNIT TYPE CODE: 9500
BLOOD UNIT TYPE: NORMAL
BUN SERPL-MCNC: 42 MG/DL (ref 6–20)
CALCIUM SERPL-MCNC: 8.8 MG/DL (ref 8.7–10.5)
CHLORIDE SERPL-SCNC: 100 MMOL/L (ref 95–110)
CO2 SERPL-SCNC: 21 MMOL/L (ref 23–29)
CODING SYSTEM: NORMAL
CREAT SERPL-MCNC: 1.1 MG/DL (ref 0.5–1.4)
CTP QC/QA: YES
DIFFERENTIAL METHOD: ABNORMAL
DISPENSE STATUS: NORMAL
EOSINOPHIL # BLD AUTO: 0.1 K/UL (ref 0–0.5)
EOSINOPHIL NFR BLD: 0.5 % (ref 0–8)
ERYTHROCYTE [DISTWIDTH] IN BLOOD BY AUTOMATED COUNT: 17 % (ref 11.5–14.5)
EST. GFR  (AFRICAN AMERICAN): >60 ML/MIN/1.73 M^2
EST. GFR  (NON AFRICAN AMERICAN): 56.3 ML/MIN/1.73 M^2
GLUCOSE SERPL-MCNC: 182 MG/DL (ref 70–110)
HCT VFR BLD AUTO: 14.8 % (ref 37–48.5)
HGB BLD-MCNC: 4.7 G/DL (ref 12–16)
HYPOCHROMIA BLD QL SMEAR: ABNORMAL
IMM GRANULOCYTES # BLD AUTO: 0.12 K/UL (ref 0–0.04)
IMM GRANULOCYTES NFR BLD AUTO: 0.6 % (ref 0–0.5)
INR PPP: 1 (ref 0.8–1.2)
LIPASE SERPL-CCNC: 18 U/L (ref 4–60)
LYMPHOCYTES # BLD AUTO: 4.3 K/UL (ref 1–4.8)
LYMPHOCYTES NFR BLD: 22.5 % (ref 18–48)
MCH RBC QN AUTO: 24 PG (ref 27–31)
MCHC RBC AUTO-ENTMCNC: 31.8 G/DL (ref 32–36)
MCV RBC AUTO: 76 FL (ref 82–98)
MONOCYTES # BLD AUTO: 0.7 K/UL (ref 0.3–1)
MONOCYTES NFR BLD: 3.6 % (ref 4–15)
NEUTROPHILS # BLD AUTO: 13.8 K/UL (ref 1.8–7.7)
NEUTROPHILS NFR BLD: 72.5 % (ref 38–73)
NRBC BLD-RTO: 0 /100 WBC
OVALOCYTES BLD QL SMEAR: ABNORMAL
PLATELET # BLD AUTO: 418 K/UL (ref 150–450)
PLATELET BLD QL SMEAR: ABNORMAL
PMV BLD AUTO: 10.4 FL (ref 9.2–12.9)
POIKILOCYTOSIS BLD QL SMEAR: SLIGHT
POLYCHROMASIA BLD QL SMEAR: ABNORMAL
POTASSIUM SERPL-SCNC: 4.6 MMOL/L (ref 3.5–5.1)
PROT SERPL-MCNC: 7.1 G/DL (ref 6–8.4)
PROTHROMBIN TIME: 10.8 SEC (ref 9–12.5)
RBC # BLD AUTO: 1.96 M/UL (ref 4–5.4)
SARS-COV-2 RDRP RESP QL NAA+PROBE: NEGATIVE
SCHISTOCYTES BLD QL SMEAR: ABNORMAL
SODIUM SERPL-SCNC: 130 MMOL/L (ref 136–145)
SPHEROCYTES BLD QL SMEAR: ABNORMAL
TARGETS BLD QL SMEAR: ABNORMAL
TRANS ERYTHROCYTES VOL PATIENT: NORMAL ML
TROPONIN I SERPL DL<=0.01 NG/ML-MCNC: <0.006 NG/ML (ref 0–0.03)
WBC # BLD AUTO: 19.03 K/UL (ref 3.9–12.7)

## 2022-04-13 PROCEDURE — 84484 ASSAY OF TROPONIN QUANT: CPT | Performed by: STUDENT IN AN ORGANIZED HEALTH CARE EDUCATION/TRAINING PROGRAM

## 2022-04-13 PROCEDURE — 86920 COMPATIBILITY TEST SPIN: CPT | Performed by: STUDENT IN AN ORGANIZED HEALTH CARE EDUCATION/TRAINING PROGRAM

## 2022-04-13 PROCEDURE — 12000002 HC ACUTE/MED SURGE SEMI-PRIVATE ROOM

## 2022-04-13 PROCEDURE — 93005 ELECTROCARDIOGRAM TRACING: CPT

## 2022-04-13 PROCEDURE — 99291 PR CRITICAL CARE, E/M 30-74 MINUTES: ICD-10-PCS | Mod: CS,,, | Performed by: EMERGENCY MEDICINE

## 2022-04-13 PROCEDURE — 83690 ASSAY OF LIPASE: CPT | Performed by: STUDENT IN AN ORGANIZED HEALTH CARE EDUCATION/TRAINING PROGRAM

## 2022-04-13 PROCEDURE — 86901 BLOOD TYPING SEROLOGIC RH(D): CPT | Performed by: STUDENT IN AN ORGANIZED HEALTH CARE EDUCATION/TRAINING PROGRAM

## 2022-04-13 PROCEDURE — 93010 ELECTROCARDIOGRAM REPORT: CPT | Mod: ,,, | Performed by: INTERNAL MEDICINE

## 2022-04-13 PROCEDURE — 93010 EKG 12-LEAD: ICD-10-PCS | Mod: ,,, | Performed by: INTERNAL MEDICINE

## 2022-04-13 PROCEDURE — 63600175 PHARM REV CODE 636 W HCPCS: Performed by: STUDENT IN AN ORGANIZED HEALTH CARE EDUCATION/TRAINING PROGRAM

## 2022-04-13 PROCEDURE — 96361 HYDRATE IV INFUSION ADD-ON: CPT

## 2022-04-13 PROCEDURE — 80053 COMPREHEN METABOLIC PANEL: CPT | Performed by: STUDENT IN AN ORGANIZED HEALTH CARE EDUCATION/TRAINING PROGRAM

## 2022-04-13 PROCEDURE — 96376 TX/PRO/DX INJ SAME DRUG ADON: CPT

## 2022-04-13 PROCEDURE — C9113 INJ PANTOPRAZOLE SODIUM, VIA: HCPCS | Performed by: STUDENT IN AN ORGANIZED HEALTH CARE EDUCATION/TRAINING PROGRAM

## 2022-04-13 PROCEDURE — P9021 RED BLOOD CELLS UNIT: HCPCS | Performed by: STUDENT IN AN ORGANIZED HEALTH CARE EDUCATION/TRAINING PROGRAM

## 2022-04-13 PROCEDURE — 85025 COMPLETE CBC W/AUTO DIFF WBC: CPT | Performed by: STUDENT IN AN ORGANIZED HEALTH CARE EDUCATION/TRAINING PROGRAM

## 2022-04-13 PROCEDURE — 99285 EMERGENCY DEPT VISIT HI MDM: CPT | Mod: 25

## 2022-04-13 PROCEDURE — 99291 CRITICAL CARE FIRST HOUR: CPT | Mod: CS,,, | Performed by: EMERGENCY MEDICINE

## 2022-04-13 PROCEDURE — 85610 PROTHROMBIN TIME: CPT | Performed by: STUDENT IN AN ORGANIZED HEALTH CARE EDUCATION/TRAINING PROGRAM

## 2022-04-13 PROCEDURE — U0002 COVID-19 LAB TEST NON-CDC: HCPCS | Performed by: STUDENT IN AN ORGANIZED HEALTH CARE EDUCATION/TRAINING PROGRAM

## 2022-04-13 PROCEDURE — 96374 THER/PROPH/DIAG INJ IV PUSH: CPT

## 2022-04-13 PROCEDURE — 96375 TX/PRO/DX INJ NEW DRUG ADDON: CPT

## 2022-04-13 RX ORDER — ONDANSETRON 2 MG/ML
4 INJECTION INTRAMUSCULAR; INTRAVENOUS
Status: COMPLETED | OUTPATIENT
Start: 2022-04-13 | End: 2022-04-13

## 2022-04-13 RX ORDER — HYDROMORPHONE HYDROCHLORIDE 1 MG/ML
0.2 INJECTION, SOLUTION INTRAMUSCULAR; INTRAVENOUS; SUBCUTANEOUS
Status: COMPLETED | OUTPATIENT
Start: 2022-04-13 | End: 2022-04-13

## 2022-04-13 RX ORDER — HYDROCODONE BITARTRATE AND ACETAMINOPHEN 500; 5 MG/1; MG/1
TABLET ORAL
Status: DISCONTINUED | OUTPATIENT
Start: 2022-04-13 | End: 2022-04-16 | Stop reason: HOSPADM

## 2022-04-13 RX ORDER — PANTOPRAZOLE SODIUM 40 MG/10ML
40 INJECTION, POWDER, LYOPHILIZED, FOR SOLUTION INTRAVENOUS
Status: COMPLETED | OUTPATIENT
Start: 2022-04-13 | End: 2022-04-13

## 2022-04-13 RX ORDER — PANTOPRAZOLE SODIUM 40 MG/10ML
40 INJECTION, POWDER, LYOPHILIZED, FOR SOLUTION INTRAVENOUS ONCE
Status: COMPLETED | OUTPATIENT
Start: 2022-04-13 | End: 2022-04-13

## 2022-04-13 RX ADMIN — PANTOPRAZOLE SODIUM 40 MG: 40 INJECTION, POWDER, FOR SOLUTION INTRAVENOUS at 11:04

## 2022-04-13 RX ADMIN — PANTOPRAZOLE SODIUM 40 MG: 40 INJECTION, POWDER, FOR SOLUTION INTRAVENOUS at 09:04

## 2022-04-13 RX ADMIN — SODIUM CHLORIDE, SODIUM LACTATE, POTASSIUM CHLORIDE, AND CALCIUM CHLORIDE 250 ML: .6; .31; .03; .02 INJECTION, SOLUTION INTRAVENOUS at 09:04

## 2022-04-13 RX ADMIN — ONDANSETRON 4 MG: 2 INJECTION INTRAMUSCULAR; INTRAVENOUS at 09:04

## 2022-04-13 RX ADMIN — HYDROMORPHONE HYDROCHLORIDE 0.2 MG: 1 INJECTION, SOLUTION INTRAMUSCULAR; INTRAVENOUS; SUBCUTANEOUS at 11:04

## 2022-04-14 PROBLEM — K92.2 GI BLEED: Status: ACTIVE | Noted: 2022-04-14

## 2022-04-14 PROBLEM — K59.00 CONSTIPATION: Status: ACTIVE | Noted: 2022-04-14

## 2022-04-14 LAB
ALBUMIN SERPL BCP-MCNC: 2.5 G/DL (ref 3.5–5.2)
ALP SERPL-CCNC: 79 U/L (ref 55–135)
ALT SERPL W/O P-5'-P-CCNC: 7 U/L (ref 10–44)
ANION GAP SERPL CALC-SCNC: 8 MMOL/L (ref 8–16)
ANISOCYTOSIS BLD QL SMEAR: SLIGHT
AST SERPL-CCNC: 9 U/L (ref 10–40)
BASO STIPL BLD QL SMEAR: ABNORMAL
BASOPHILS # BLD AUTO: 0.06 K/UL (ref 0–0.2)
BASOPHILS # BLD AUTO: 0.06 K/UL (ref 0–0.2)
BASOPHILS # BLD AUTO: 0.11 K/UL (ref 0–0.2)
BASOPHILS NFR BLD: 0.3 % (ref 0–1.9)
BILIRUB SERPL-MCNC: 1.1 MG/DL (ref 0.1–1)
BLD PROD TYP BPU: NORMAL
BLD PROD TYP BPU: NORMAL
BLOOD UNIT EXPIRATION DATE: NORMAL
BLOOD UNIT EXPIRATION DATE: NORMAL
BLOOD UNIT TYPE CODE: 9500
BLOOD UNIT TYPE CODE: 9500
BLOOD UNIT TYPE: NORMAL
BLOOD UNIT TYPE: NORMAL
BUN SERPL-MCNC: 36 MG/DL (ref 6–20)
CALCIUM SERPL-MCNC: 8.5 MG/DL (ref 8.7–10.5)
CHLORIDE SERPL-SCNC: 103 MMOL/L (ref 95–110)
CO2 SERPL-SCNC: 22 MMOL/L (ref 23–29)
CODING SYSTEM: NORMAL
CODING SYSTEM: NORMAL
CREAT SERPL-MCNC: 0.9 MG/DL (ref 0.5–1.4)
DACRYOCYTES BLD QL SMEAR: ABNORMAL
DIFFERENTIAL METHOD: ABNORMAL
DISPENSE STATUS: NORMAL
DISPENSE STATUS: NORMAL
EOSINOPHIL # BLD AUTO: 0.2 K/UL (ref 0–0.5)
EOSINOPHIL # BLD AUTO: 0.2 K/UL (ref 0–0.5)
EOSINOPHIL # BLD AUTO: 0.6 K/UL (ref 0–0.5)
EOSINOPHIL NFR BLD: 1 % (ref 0–8)
EOSINOPHIL NFR BLD: 1.3 % (ref 0–8)
EOSINOPHIL NFR BLD: 1.7 % (ref 0–8)
ERYTHROCYTE [DISTWIDTH] IN BLOOD BY AUTOMATED COUNT: 18.2 % (ref 11.5–14.5)
ERYTHROCYTE [DISTWIDTH] IN BLOOD BY AUTOMATED COUNT: 18.2 % (ref 11.5–14.5)
ERYTHROCYTE [DISTWIDTH] IN BLOOD BY AUTOMATED COUNT: 18.6 % (ref 11.5–14.5)
EST. GFR  (AFRICAN AMERICAN): >60 ML/MIN/1.73 M^2
EST. GFR  (NON AFRICAN AMERICAN): >60 ML/MIN/1.73 M^2
GLUCOSE SERPL-MCNC: 154 MG/DL (ref 70–110)
GLUCOSE SERPL-MCNC: 81 MG/DL (ref 70–110)
HCT VFR BLD AUTO: 17.8 % (ref 37–48.5)
HCT VFR BLD AUTO: 17.9 % (ref 37–48.5)
HCT VFR BLD AUTO: 25.4 % (ref 37–48.5)
HGB BLD-MCNC: 5.7 G/DL (ref 12–16)
HGB BLD-MCNC: 5.8 G/DL (ref 12–16)
HGB BLD-MCNC: 8.4 G/DL (ref 12–16)
HYPOCHROMIA BLD QL SMEAR: ABNORMAL
IMM GRANULOCYTES # BLD AUTO: 0.11 K/UL (ref 0–0.04)
IMM GRANULOCYTES # BLD AUTO: 0.13 K/UL (ref 0–0.04)
IMM GRANULOCYTES # BLD AUTO: 0.35 K/UL (ref 0–0.04)
IMM GRANULOCYTES NFR BLD AUTO: 0.6 % (ref 0–0.5)
IMM GRANULOCYTES NFR BLD AUTO: 0.7 % (ref 0–0.5)
IMM GRANULOCYTES NFR BLD AUTO: 1 % (ref 0–0.5)
LYMPHOCYTES # BLD AUTO: 3.1 K/UL (ref 1–4.8)
LYMPHOCYTES # BLD AUTO: 4.1 K/UL (ref 1–4.8)
LYMPHOCYTES # BLD AUTO: 4.8 K/UL (ref 1–4.8)
LYMPHOCYTES NFR BLD: 21.3 % (ref 18–48)
LYMPHOCYTES NFR BLD: 27.1 % (ref 18–48)
LYMPHOCYTES NFR BLD: 8.7 % (ref 18–48)
MAGNESIUM SERPL-MCNC: 2 MG/DL (ref 1.6–2.6)
MCH RBC QN AUTO: 25.3 PG (ref 27–31)
MCH RBC QN AUTO: 25.7 PG (ref 27–31)
MCH RBC QN AUTO: 25.7 PG (ref 27–31)
MCHC RBC AUTO-ENTMCNC: 32 G/DL (ref 32–36)
MCHC RBC AUTO-ENTMCNC: 32.4 G/DL (ref 32–36)
MCHC RBC AUTO-ENTMCNC: 33.1 G/DL (ref 32–36)
MCV RBC AUTO: 78 FL (ref 82–98)
MCV RBC AUTO: 78 FL (ref 82–98)
MCV RBC AUTO: 80 FL (ref 82–98)
MONOCYTES # BLD AUTO: 0.8 K/UL (ref 0.3–1)
MONOCYTES # BLD AUTO: 0.8 K/UL (ref 0.3–1)
MONOCYTES # BLD AUTO: 0.9 K/UL (ref 0.3–1)
MONOCYTES NFR BLD: 2.4 % (ref 4–15)
MONOCYTES NFR BLD: 3.9 % (ref 4–15)
MONOCYTES NFR BLD: 4.4 % (ref 4–15)
NEUTROPHILS # BLD AUTO: 11.9 K/UL (ref 1.8–7.7)
NEUTROPHILS # BLD AUTO: 14 K/UL (ref 1.8–7.7)
NEUTROPHILS # BLD AUTO: 30.9 K/UL (ref 1.8–7.7)
NEUTROPHILS NFR BLD: 66.3 % (ref 38–73)
NEUTROPHILS NFR BLD: 72.8 % (ref 38–73)
NEUTROPHILS NFR BLD: 85.9 % (ref 38–73)
NRBC BLD-RTO: 0 /100 WBC
OVALOCYTES BLD QL SMEAR: ABNORMAL
PHOSPHATE SERPL-MCNC: 3 MG/DL (ref 2.7–4.5)
PLATELET # BLD AUTO: 277 K/UL (ref 150–450)
PLATELET # BLD AUTO: 310 K/UL (ref 150–450)
PLATELET # BLD AUTO: 325 K/UL (ref 150–450)
PLATELET BLD QL SMEAR: ABNORMAL
PMV BLD AUTO: 10.1 FL (ref 9.2–12.9)
PMV BLD AUTO: 10.2 FL (ref 9.2–12.9)
PMV BLD AUTO: 10.4 FL (ref 9.2–12.9)
POCT GLUCOSE: 113 MG/DL (ref 70–110)
POCT GLUCOSE: 133 MG/DL (ref 70–110)
POCT GLUCOSE: 154 MG/DL (ref 70–110)
POCT GLUCOSE: 156 MG/DL (ref 70–110)
POCT GLUCOSE: 192 MG/DL (ref 70–110)
POCT GLUCOSE: 60 MG/DL (ref 70–110)
POCT GLUCOSE: 65 MG/DL (ref 70–110)
POCT GLUCOSE: 70 MG/DL (ref 70–110)
POIKILOCYTOSIS BLD QL SMEAR: SLIGHT
POLYCHROMASIA BLD QL SMEAR: ABNORMAL
POTASSIUM SERPL-SCNC: 3.9 MMOL/L (ref 3.5–5.1)
PROT SERPL-MCNC: 6.2 G/DL (ref 6–8.4)
RBC # BLD AUTO: 2.22 M/UL (ref 4–5.4)
RBC # BLD AUTO: 2.29 M/UL (ref 4–5.4)
RBC # BLD AUTO: 3.27 M/UL (ref 4–5.4)
SODIUM SERPL-SCNC: 133 MMOL/L (ref 136–145)
TRANS ERYTHROCYTES VOL PATIENT: NORMAL ML
TRANS ERYTHROCYTES VOL PATIENT: NORMAL ML
WBC # BLD AUTO: 17.88 K/UL (ref 3.9–12.7)
WBC # BLD AUTO: 19.26 K/UL (ref 3.9–12.7)
WBC # BLD AUTO: 36.02 K/UL (ref 3.9–12.7)

## 2022-04-14 PROCEDURE — P9021 RED BLOOD CELLS UNIT: HCPCS | Performed by: STUDENT IN AN ORGANIZED HEALTH CARE EDUCATION/TRAINING PROGRAM

## 2022-04-14 PROCEDURE — C9399 UNCLASSIFIED DRUGS OR BIOLOG: HCPCS | Performed by: STUDENT IN AN ORGANIZED HEALTH CARE EDUCATION/TRAINING PROGRAM

## 2022-04-14 PROCEDURE — 99223 PR INITIAL HOSPITAL CARE,LEVL III: ICD-10-PCS | Mod: ,,, | Performed by: HOSPITALIST

## 2022-04-14 PROCEDURE — 80053 COMPREHEN METABOLIC PANEL: CPT | Performed by: STUDENT IN AN ORGANIZED HEALTH CARE EDUCATION/TRAINING PROGRAM

## 2022-04-14 PROCEDURE — 99223 1ST HOSP IP/OBS HIGH 75: CPT | Mod: ,,, | Performed by: HOSPITALIST

## 2022-04-14 PROCEDURE — 63600175 PHARM REV CODE 636 W HCPCS: Performed by: STUDENT IN AN ORGANIZED HEALTH CARE EDUCATION/TRAINING PROGRAM

## 2022-04-14 PROCEDURE — C9113 INJ PANTOPRAZOLE SODIUM, VIA: HCPCS | Performed by: STUDENT IN AN ORGANIZED HEALTH CARE EDUCATION/TRAINING PROGRAM

## 2022-04-14 PROCEDURE — 25000003 PHARM REV CODE 250: Performed by: STUDENT IN AN ORGANIZED HEALTH CARE EDUCATION/TRAINING PROGRAM

## 2022-04-14 PROCEDURE — 25000003 PHARM REV CODE 250

## 2022-04-14 PROCEDURE — 25500020 PHARM REV CODE 255: Performed by: STUDENT IN AN ORGANIZED HEALTH CARE EDUCATION/TRAINING PROGRAM

## 2022-04-14 PROCEDURE — 83735 ASSAY OF MAGNESIUM: CPT | Performed by: STUDENT IN AN ORGANIZED HEALTH CARE EDUCATION/TRAINING PROGRAM

## 2022-04-14 PROCEDURE — 11000001 HC ACUTE MED/SURG PRIVATE ROOM

## 2022-04-14 PROCEDURE — 99222 PR INITIAL HOSPITAL CARE,LEVL II: ICD-10-PCS | Mod: ,,, | Performed by: INTERNAL MEDICINE

## 2022-04-14 PROCEDURE — 99222 1ST HOSP IP/OBS MODERATE 55: CPT | Mod: ,,, | Performed by: INTERNAL MEDICINE

## 2022-04-14 PROCEDURE — 85025 COMPLETE CBC W/AUTO DIFF WBC: CPT | Performed by: STUDENT IN AN ORGANIZED HEALTH CARE EDUCATION/TRAINING PROGRAM

## 2022-04-14 PROCEDURE — 36415 COLL VENOUS BLD VENIPUNCTURE: CPT | Performed by: STUDENT IN AN ORGANIZED HEALTH CARE EDUCATION/TRAINING PROGRAM

## 2022-04-14 PROCEDURE — 36430 TRANSFUSION BLD/BLD COMPNT: CPT

## 2022-04-14 PROCEDURE — 84100 ASSAY OF PHOSPHORUS: CPT | Performed by: STUDENT IN AN ORGANIZED HEALTH CARE EDUCATION/TRAINING PROGRAM

## 2022-04-14 RX ORDER — SENNOSIDES 8.6 MG/1
8.6 TABLET ORAL DAILY
Status: DISCONTINUED | OUTPATIENT
Start: 2022-04-14 | End: 2022-04-16 | Stop reason: HOSPADM

## 2022-04-14 RX ORDER — PREGABALIN 150 MG/1
150 CAPSULE ORAL 2 TIMES DAILY
Status: ON HOLD | COMMUNITY
Start: 2022-04-08 | End: 2023-10-27 | Stop reason: SDUPTHER

## 2022-04-14 RX ORDER — OXYCODONE HYDROCHLORIDE 5 MG/1
5 TABLET ORAL ONCE
Status: COMPLETED | OUTPATIENT
Start: 2022-04-14 | End: 2022-04-14

## 2022-04-14 RX ORDER — NORTRIPTYLINE HYDROCHLORIDE 10 MG/1
10 CAPSULE ORAL NIGHTLY
Status: DISCONTINUED | OUTPATIENT
Start: 2022-04-14 | End: 2022-04-16 | Stop reason: HOSPADM

## 2022-04-14 RX ORDER — DIAZEPAM 10 MG/2ML
5 INJECTION INTRAMUSCULAR ONCE
Status: DISCONTINUED | OUTPATIENT
Start: 2022-04-14 | End: 2022-04-14

## 2022-04-14 RX ORDER — LIDOCAINE 50 MG/G
1 PATCH TOPICAL
Status: DISCONTINUED | OUTPATIENT
Start: 2022-04-14 | End: 2022-04-16 | Stop reason: HOSPADM

## 2022-04-14 RX ORDER — CYPROHEPTADINE HYDROCHLORIDE 4 MG/1
4 TABLET ORAL EVERY OTHER DAY
COMMUNITY
End: 2022-06-14

## 2022-04-14 RX ORDER — OXYCODONE AND ACETAMINOPHEN 5; 325 MG/1; MG/1
1 TABLET ORAL ONCE
Status: COMPLETED | OUTPATIENT
Start: 2022-04-14 | End: 2022-04-14

## 2022-04-14 RX ORDER — IBUPROFEN 200 MG
24 TABLET ORAL
Status: DISCONTINUED | OUTPATIENT
Start: 2022-04-14 | End: 2022-04-16 | Stop reason: HOSPADM

## 2022-04-14 RX ORDER — PANTOPRAZOLE SODIUM 40 MG/10ML
40 INJECTION, POWDER, LYOPHILIZED, FOR SOLUTION INTRAVENOUS EVERY 12 HOURS
Status: DISCONTINUED | OUTPATIENT
Start: 2022-04-14 | End: 2022-04-16 | Stop reason: HOSPADM

## 2022-04-14 RX ORDER — GABAPENTIN 100 MG/1
100 CAPSULE ORAL 3 TIMES DAILY
Status: DISCONTINUED | OUTPATIENT
Start: 2022-04-14 | End: 2022-04-16 | Stop reason: HOSPADM

## 2022-04-14 RX ORDER — BUSPIRONE HYDROCHLORIDE 10 MG/1
10 TABLET ORAL 2 TIMES DAILY
Status: DISCONTINUED | OUTPATIENT
Start: 2022-04-14 | End: 2022-04-16 | Stop reason: HOSPADM

## 2022-04-14 RX ORDER — LORAZEPAM 2 MG/ML
0.5 INJECTION INTRAMUSCULAR EVERY 10 MIN PRN
Status: DISCONTINUED | OUTPATIENT
Start: 2022-04-14 | End: 2022-04-14

## 2022-04-14 RX ORDER — TALC
6 POWDER (GRAM) TOPICAL NIGHTLY PRN
Status: DISCONTINUED | OUTPATIENT
Start: 2022-04-14 | End: 2022-04-16 | Stop reason: HOSPADM

## 2022-04-14 RX ORDER — CITALOPRAM 20 MG/1
20 TABLET, FILM COATED ORAL DAILY
Status: DISCONTINUED | OUTPATIENT
Start: 2022-04-14 | End: 2022-04-16 | Stop reason: HOSPADM

## 2022-04-14 RX ORDER — ACETAMINOPHEN 325 MG/1
650 TABLET ORAL EVERY 6 HOURS PRN
Status: DISCONTINUED | OUTPATIENT
Start: 2022-04-14 | End: 2022-04-16 | Stop reason: HOSPADM

## 2022-04-14 RX ORDER — LIDOCAINE AND PRILOCAINE 25; 25 MG/G; MG/G
CREAM TOPICAL DAILY
Status: ON HOLD | COMMUNITY
Start: 2022-04-04 | End: 2023-10-27 | Stop reason: HOSPADM

## 2022-04-14 RX ORDER — ASPIRIN 81 MG/1
81 TABLET ORAL DAILY
Status: DISCONTINUED | OUTPATIENT
Start: 2022-04-14 | End: 2022-04-14

## 2022-04-14 RX ORDER — POLYETHYLENE GLYCOL 3350 17 G/17G
17 POWDER, FOR SOLUTION ORAL DAILY
Status: DISCONTINUED | OUTPATIENT
Start: 2022-04-14 | End: 2022-04-16 | Stop reason: HOSPADM

## 2022-04-14 RX ORDER — GLUCAGON 1 MG
1 KIT INJECTION
Status: DISCONTINUED | OUTPATIENT
Start: 2022-04-14 | End: 2022-04-16 | Stop reason: HOSPADM

## 2022-04-14 RX ORDER — ATORVASTATIN CALCIUM 20 MG/1
40 TABLET, FILM COATED ORAL DAILY
Status: DISCONTINUED | OUTPATIENT
Start: 2022-04-14 | End: 2022-04-16 | Stop reason: HOSPADM

## 2022-04-14 RX ORDER — IBUPROFEN 200 MG
16 TABLET ORAL
Status: DISCONTINUED | OUTPATIENT
Start: 2022-04-14 | End: 2022-04-16 | Stop reason: HOSPADM

## 2022-04-14 RX ORDER — INSULIN ASPART 100 [IU]/ML
0-5 INJECTION, SOLUTION INTRAVENOUS; SUBCUTANEOUS
Status: DISCONTINUED | OUTPATIENT
Start: 2022-04-14 | End: 2022-04-16 | Stop reason: HOSPADM

## 2022-04-14 RX ORDER — FLUTICASONE PROPIONATE 50 MCG
1 SPRAY, SUSPENSION (ML) NASAL DAILY
Status: ON HOLD | COMMUNITY
Start: 2022-04-08

## 2022-04-14 RX ORDER — NALOXONE HCL 0.4 MG/ML
0.02 VIAL (ML) INJECTION
Status: DISCONTINUED | OUTPATIENT
Start: 2022-04-14 | End: 2022-04-16 | Stop reason: HOSPADM

## 2022-04-14 RX ORDER — HYDROMORPHONE HYDROCHLORIDE 1 MG/ML
1 INJECTION, SOLUTION INTRAMUSCULAR; INTRAVENOUS; SUBCUTANEOUS ONCE
Status: COMPLETED | OUTPATIENT
Start: 2022-04-14 | End: 2022-04-14

## 2022-04-14 RX ORDER — MELOXICAM 7.5 MG/1
7.5 TABLET ORAL DAILY
Status: ON HOLD | COMMUNITY
Start: 2022-04-04 | End: 2022-04-16 | Stop reason: HOSPADM

## 2022-04-14 RX ORDER — SODIUM CHLORIDE 0.9 % (FLUSH) 0.9 %
10 SYRINGE (ML) INJECTION
Status: DISCONTINUED | OUTPATIENT
Start: 2022-04-14 | End: 2022-04-16 | Stop reason: HOSPADM

## 2022-04-14 RX ADMIN — BUSPIRONE HYDROCHLORIDE 10 MG: 10 TABLET ORAL at 08:04

## 2022-04-14 RX ADMIN — INSULIN DETEMIR 16 UNITS: 100 INJECTION, SOLUTION SUBCUTANEOUS at 01:04

## 2022-04-14 RX ADMIN — BUSPIRONE HYDROCHLORIDE 10 MG: 10 TABLET ORAL at 09:04

## 2022-04-14 RX ADMIN — OXYCODONE 5 MG: 5 TABLET ORAL at 09:04

## 2022-04-14 RX ADMIN — PANTOPRAZOLE SODIUM 40 MG: 40 INJECTION, POWDER, FOR SOLUTION INTRAVENOUS at 09:04

## 2022-04-14 RX ADMIN — HYDROMORPHONE HYDROCHLORIDE 1 MG: 1 INJECTION, SOLUTION INTRAMUSCULAR; INTRAVENOUS; SUBCUTANEOUS at 02:04

## 2022-04-14 RX ADMIN — NORTRIPTYLINE HYDROCHLORIDE 10 MG: 10 CAPSULE ORAL at 03:04

## 2022-04-14 RX ADMIN — PANTOPRAZOLE SODIUM 40 MG: 40 INJECTION, POWDER, FOR SOLUTION INTRAVENOUS at 08:04

## 2022-04-14 RX ADMIN — IOHEXOL 15 ML: 350 INJECTION, SOLUTION INTRAVENOUS at 11:04

## 2022-04-14 RX ADMIN — DEXTROSE 125 ML: 10 SOLUTION INTRAVENOUS at 12:04

## 2022-04-14 RX ADMIN — CITALOPRAM HYDROBROMIDE 20 MG: 20 TABLET ORAL at 09:04

## 2022-04-14 RX ADMIN — ATORVASTATIN CALCIUM 40 MG: 20 TABLET, FILM COATED ORAL at 09:04

## 2022-04-14 RX ADMIN — Medication 6 MG: at 10:04

## 2022-04-14 RX ADMIN — ACETAMINOPHEN 650 MG: 325 TABLET ORAL at 08:04

## 2022-04-14 RX ADMIN — INSULIN DETEMIR 6 UNITS: 100 INJECTION, SOLUTION SUBCUTANEOUS at 08:04

## 2022-04-14 RX ADMIN — LORAZEPAM 0.5 MG: 2 INJECTION INTRAMUSCULAR; INTRAVENOUS at 12:04

## 2022-04-14 RX ADMIN — GABAPENTIN 100 MG: 100 CAPSULE ORAL at 08:04

## 2022-04-14 RX ADMIN — ACETAMINOPHEN 650 MG: 325 TABLET ORAL at 09:04

## 2022-04-14 RX ADMIN — NORTRIPTYLINE HYDROCHLORIDE 10 MG: 10 CAPSULE ORAL at 08:04

## 2022-04-14 RX ADMIN — OXYCODONE HYDROCHLORIDE AND ACETAMINOPHEN 1 TABLET: 5; 325 TABLET ORAL at 02:04

## 2022-04-14 RX ADMIN — LIDOCAINE 1 PATCH: 50 PATCH CUTANEOUS at 08:04

## 2022-04-14 NOTE — PLAN OF CARE
[facility] - Med Surg  Initial Discharge Assessment       Primary Care Provider: Adonis Carey MD    Admission Diagnosis: Vomiting [R11.10]  Chest pain [R07.9]  Constipation, unspecified constipation type [K59.00]  Gastrointestinal hemorrhage, unspecified gastrointestinal hemorrhage type [K92.2]  Anemia, unspecified type [D64.9]    Admission Date: 4/13/2022  Expected Discharge Date: 4/19/2022    Discharge Barriers Identified: None    Payor: MEDICAID / Plan: [facility] / Product Type: Managed Medicaid /     Extended Emergency Contact Information  Primary Emergency Contact: Mario Salvador  Address: [address] [place] [address] [facility]  Home Phone: [phone]  Mobile Phone: [phone]  Relation: Spouse  Secondary Emergency Contact: PerezLeonila  Mobile Phone: [phone]  Relation: Relative    Discharge Plan A: Home with family  Discharge Plan B: Home      [facility] [place], [place] - [address]  [address]  [facility] [address]  Phone: [phone] Fax: [phone]      Initial Assessment (most recent)     Adult Discharge Assessment - 04/14/22 1246        Discharge Assessment    Assessment Type Discharge Planning Assessment     Confirmed/corrected address, phone number and insurance Yes     Confirmed Demographics Correct on Facesheet     Source of Information patient     When was your last doctors appointment? 03/07/22     Does patient/caregiver understand observation status Yes     Communicated SOCO with patient/caregiver Yes     Reason For Admission Vomiting     Lives With spouse     Facility Arrived From: Home     Do you expect to return to your current living situation? Yes     Do you have help at home or someone to help you manage your care at home? Yes     Who are your caregiver(s) and their phone number(s)? Spouse Mario     Prior to hospitilization cognitive status: Alert/Oriented;No Deficits     Current  cognitive status: Alert/Oriented;No Deficits     Walking or Climbing Stairs Difficulty none     Dressing/Bathing Difficulty none     Home Layout Able to live on 1st floor     Equipment Currently Used at Home prosthesis;wheelchair     Readmission within 30 days? Yes     Patient currently being followed by outpatient case management? No     Do you currently have service(s) that help you manage your care at home? No     Do you take prescription medications? Yes     Do you have prescription coverage? Yes     Do you have any problems affording any of your prescribed medications? No     Is the patient taking medications as prescribed? yes     Who is going to help you get home at discharge? Spouse Mario     How do you get to doctors appointments? car, drives self     Are you on dialysis? No     Do you take coumadin? No     Discharge Plan A Home with family     Discharge Plan B Home     DME Needed Upon Discharge  none     Discharge Plan discussed with: Patient     Discharge Barriers Identified None        Relationship/Environment    Name(s) of Who Lives With Patient Mario, spouse

## 2022-04-14 NOTE — ASSESSMENT & PLAN NOTE
History of BRYANNA placed 15 years ago on DAPT therapy    Will hold DAPT therapy in setting of GI bleed

## 2022-04-14 NOTE — ED PROVIDER NOTES
Encounter Date: 4/13/2022       History     Chief Complaint   Patient presents with    Vomiting     Began today, states there was some blood in it. Reports no BM since being d/c 2 weeks ago.      The history is provided by the patient, the spouse and medical records.     This a 57-year-old female with past medical history of diabetes, coronary disease status post multiple stents, heart failure with an EF of 20%, gastric ulcers with history of perforation status post ex lap on 03/14, bilateral lower extremity amputations secondary to complications of diabetes, who presents with complaint of constipation, emesis x2 the most recent a few hours ago being black and bloody.  Reports very mild lower left abdominal pain that is crampy and nonradiating.  Per her primary complaint is constipation with decreased p.o. intake as well as the vomiting.  She says she has been staying well hydrated.  She is just unable to eat full meals.  She denies any fevers, chills, chest pain, shortness a breath, urinary changes.     Patient reports she is no longer taking NSAID.  She has been compliant with all her other medications.    Chart review indicates that patient return to ER on 04/02 for vomiting.  At that time she is noted to have elevated white blood cell count, creatinine of 1.3, x-ray with scattered fecal material, was admitted to Hospital Medicine in treated for dehydration.    Review of patient's allergies indicates:   Allergen Reactions    Orange juice Hives    Tomato (solanum lycopersicum) Hives     Past Medical History:   Diagnosis Date    Chronic combined systolic and diastolic congestive heart failure     Coronary artery disease     Diabetes mellitus     Encounter for blood transfusion     Gastric ulcer with hemorrhage     Heart attack     Hypertension     Perforated viscus 03/14/2022    Peripheral artery disease      Past Surgical History:   Procedure Laterality Date    APPENDECTOMY      BELOW KNEE AMPUTATION  OF LOWER EXTREMITY Right 2019    COLONOSCOPY N/A 02/23/2022    Procedure: COLONOSCOPY;  Surgeon: Estefania Bryant MD;  [facility]  Service: Endoscopy;  Laterality: N/A;  anemia, melena    CORONARY STENT PLACEMENT      ESOPHAGOGASTRODUODENOSCOPY N/A 02/23/2022    Procedure: EGD (ESOPHAGOGASTRODUODENOSCOPY);  Surgeon: Estefania Bryant MD;  [facility]  Service: Endoscopy;  Laterality: N/A;  anemia    FOOT AMPUTATION THROUGH METATARSAL Left 2019    TUBAL LIGATION       History reviewed. No pertinent family history.  Social History     Tobacco Use    Smoking status: Current Every Day Smoker     Packs/day: 0.50     Types: Cigarettes    Smokeless tobacco: Never Used   Substance Use Topics    Alcohol use: No    Drug use: No     Review of Systems   Constitutional: Negative for chills and fever.   HENT: Negative for congestion and sore throat.    Respiratory: Negative for shortness of breath and wheezing.    Cardiovascular: Negative for chest pain and palpitations.   Gastrointestinal: Positive for constipation and vomiting. Negative for abdominal distention, abdominal pain and nausea.   Genitourinary: Negative for dysuria and hematuria.   Musculoskeletal: Negative for arthralgias and back pain.   Skin: Negative for rash.   Neurological: Negative for weakness and headaches.   Hematological: Does not bruise/bleed easily.       Physical Exam     Initial Vitals [04/13/22 1934]   BP Pulse Resp Temp SpO2   (!) 130/90 100 18 98.8 °F (37.1 °C) 99 %      MAP       --         Physical Exam    Nursing note and vitals reviewed.  Constitutional: She appears well-developed. She is not diaphoretic.   HENT:   Head: Normocephalic and atraumatic.   Mouth/Throat: Oropharynx is clear and moist.   Eyes: EOM are normal. Pupils are equal, round, and reactive to light.   Neck: No JVD present.   Normal range of motion.  Cardiovascular: Normal rate, regular rhythm and intact distal pulses.   Murmur  heard.  Pulmonary/Chest: Breath sounds normal. No respiratory distress. She has no wheezes. She has no rales.   Abdominal: Abdomen is soft. She exhibits no distension. There is no abdominal tenderness.   Midline surgical scar is well healing with no signs of erythema, fluctuance, pus drainage.  No reproducible abdominal pain to palpation   Musculoskeletal:         General: No edema. Normal range of motion.      Cervical back: Normal range of motion.      Comments: Bilateral lower extremity amputations.  Left leg with midfoot.  Right leg with below-knee     Neurological: She is alert and oriented to person, place, and time.   Moving all extremities equally   Skin: Skin is warm and dry.   Psychiatric: She has a normal mood and affect.         ED Course   Procedures  Labs Reviewed   CBC W/ AUTO DIFFERENTIAL - Abnormal; Notable for the following components:       Result Value    WBC 19.03 (*)     RBC 1.96 (*)     Hemoglobin 4.7 (*)     Hematocrit 14.8 (*)     MCV 76 (*)     MCH 24.0 (*)     MCHC 31.8 (*)     RDW 17.0 (*)     Immature Granulocytes 0.6 (*)     Gran # (ANC) 13.8 (*)     Immature Grans (Abs) 0.12 (*)     Mono % 3.6 (*)     All other components within normal limits    Narrative:      HCT & HGB  critical result(s) called and verbal readback obtained   from KEVIN LOMBARDO RN by  04/13/2022 22:10   COMPREHENSIVE METABOLIC PANEL - Abnormal; Notable for the following components:    Sodium 130 (*)     CO2 21 (*)     Glucose 182 (*)     BUN 42 (*)     Albumin 2.8 (*)     AST 8 (*)     ALT 8 (*)     eGFR if non  56.3 (*)     All other components within normal limits   LIPASE   TROPONIN I   PROTIME-INR   SARS-COV-2 RNA AMPLIFICATION, QUAL   TYPE & SCREEN   PREPARE RBC SOFT          Imaging Results          X-Ray Abdomen Flat And Erect (Final result)  Result time 04/13/22 22:29:49    Final result by Melo Adam MD (04/13/22 22:29:49)                 Impression:      1. No acute radiographic  abnormality  2. Possible constipation.      Electronically signed by: Melo Fan  Date:    04/13/2022  Time:    22:29             Narrative:    EXAMINATION:  XR ABDOMEN FLAT AND ERECT    CLINICAL HISTORY:  Vomiting, unspecified    TECHNIQUE:  Flat and erect AP views of the abdomen were performed.    COMPARISON:  04/02/2022    FINDINGS:  No free air is detected.  No radiographic mass, organomegaly or pathologic calcification.  No acute osseous abnormality.    Few probable small phleboliths in the right pelvis.    Retained feces in the colon.    Surgical clips in the left inguinal region.                               X-Ray Chest AP Portable (Final result)  Result time 04/13/22 22:27:49    Final result by Randolph Ryan MD (04/13/22 22:27:49)                 Impression:      Better inspiratory effort with no evidence of acute chest disease on today's exam.      Electronically signed by: Randolph Ryan  Date:    04/13/2022  Time:    22:27             Narrative:    EXAMINATION:  XR CHEST AP PORTABLE    CLINICAL HISTORY:  Vomiting, unspecified    TECHNIQUE:  Single frontal view of the chest was performed.    COMPARISON:  Chest x-ray, 04/02/2022    FINDINGS:  Cardiac silhouette appears more normal in size.  The hazy opacities in the lung bases have cleared.  Pleural spaces and bones appear unremarkable.                                 Medications   lactated ringers bolus 250 mL (250 mLs Intravenous New Bag 4/13/22 2152)   0.9%  NaCl infusion (for blood administration) (has no administration in time range)   pantoprazole injection 40 mg (has no administration in time range)   HYDROmorphone injection 0.2 mg (has no administration in time range)   ondansetron injection 4 mg (4 mg Intravenous Given 4/13/22 2137)   pantoprazole injection 40 mg (40 mg Intravenous Given 4/13/22 2152)     Medical Decision Making:   History:   Old Medical Records: I decided to obtain old medical records.  Old Records Summarized: records  from previous admission(s).  Initial Assessment:   57-year-old female with past medical history of diabetes, coronary disease status post multiple stents, heart failure with an EF of 20%, gastric ulcers with history of perforation status post ex lap on 03/14, bilateral lower extremity amputations secondary to complications of diabetes, who presents with complaint of constipation, emesis x2 the most recent a few hours ago being black and bloody.  Reports very mild left lower quadrant abdominal pain.  Reports decreased p.o. intake of food but is able to stay well hydrated.  On exam she is not peritoneal, no reproducible abdominal tenderness.  Differential Diagnosis:   Constipation, repeat perforated ulcer, bleeding ulcer, dehydration, bowel obstruction, ACS.  Patient is not significantly distended or peritoneal so lower concern for perforation or overt obstruction  Clinical Tests:   Lab Tests: Ordered and Reviewed  Radiological Study: Ordered and Reviewed  Medical Tests: Ordered and Reviewed  ED Management:  Given the patient's heart failure she was treated with 250 mL of IV fluids.  Zofran for nausea.  Protonix for possible bleeding ulcer.  Chest x-ray, upright as well as abdominal x-ray obtained to evaluate for perforation or constipation.  KUB did not show acute abnormalities.  X-ray without air under the diaphragm.  H&H notable for 4.7/15.  Creatinine 1.1.  Discussed case with GI who recommends transfusion overnight they will likely perform EGD in the morning.  Will continue Protonix.  Patient consented for blood and 1st unit of blood ordered.  Will give over 2 hours given her cardiac history.  Will be NPO at midnight.  Hospital Medicine was consulted for admission.                      Clinical Impression:   Final diagnoses:  [R11.10] Vomiting  [K92.2] Gastrointestinal hemorrhage, unspecified gastrointestinal hemorrhage type (Primary)  [K59.00] Constipation, unspecified constipation type  [D64.9] Anemia,  unspecified type                 John Quezada MD  Resident  04/13/22 0533

## 2022-04-14 NOTE — PLAN OF CARE
Patient is AAOx4. Vital signs stable. No falls throughout shift. Patient complains of pain to lower extremities. One time dose of dilaudid administered. Patient unable to tolerate CT contrast orally. Attempted to place NG tube for contrast administration. Patient unable to tolerate placement of NG tube and agreed to try oral contrast again. Patient successful. CT abd pelvis done. 2 units of PRBCs administered. Dextrose administered for low glucose. Plan for possible EGD tomorrow.  Problem: Adult Inpatient Plan of Care  Goal: Plan of Care Review  Outcome: Ongoing, Progressing  Goal: Patient-Specific Goal (Individualized)  Outcome: Ongoing, Progressing  Goal: Absence of Hospital-Acquired Illness or Injury  Outcome: Ongoing, Progressing  Goal: Optimal Comfort and Wellbeing  Outcome: Ongoing, Progressing  Goal: Readiness for Transition of Care  Outcome: Ongoing, Progressing     Problem: Diabetes Comorbidity  Goal: Blood Glucose Level Within Targeted Range  Outcome: Ongoing, Progressing     Problem: Impaired Wound Healing  Goal: Optimal Wound Healing  Outcome: Ongoing, Progressing     Problem: Skin Injury Risk Increased  Goal: Skin Health and Integrity  Outcome: Ongoing, Progressing

## 2022-04-14 NOTE — ASSESSMENT & PLAN NOTE
Reportedly has not had a bowel movement since discharge 2 weeks ago    Garcia Rodrigues aggressive bowel regiment in setting of GI bleed

## 2022-04-14 NOTE — PHARMACY MED REC
"Admission Medication History     The home medication history was taken by Laura Dotson.    You may go to "Admission" then "Reconcile Home Medications" tabs to review and/or act upon these items.      The home medication list has been updated by the Pharmacy department.    Please read ALL comments highlighted in yellow.    Please address this information as you see fit.     Feel free to contact us if you have any questions or require assistance.    Unsure of what patient is actually taking at home.     Medications listed below were obtained from: [facility]   Medication Sig    carisoprodoL (SOMA) 350 MG tablet Take 350 mg by mouth once daily.    cyproheptadine (PERIACTIN) 4 mg tablet Take 4 mg by mouth every other day.    fluticasone propionate (FLONASE) 50 mcg/actuation nasal spray 1 spray by Each Nostril route once daily.    gabapentin (NEURONTIN) 600 MG tablet Take 600 mg by mouth 2 (two) times daily.    LIDOcaine-prilocaine (EMLA) cream Apply topically once daily.    meloxicam (MOBIC) 7.5 MG tablet Take 7.5 mg by mouth once daily.    oxyCODONE-acetaminophen (PERCOCET)  mg per tablet Take 1 tablet by mouth every 8 (eight) hours as needed for Pain.    pregabalin (LYRICA) 150 MG capsule Take 150 mg by mouth 2 (two) times daily.    promethazine (PHENERGAN) 6.25 mg/5 mL syrup TAKE 10 mls BY MOUTH EVERY 6 HOURS AS NEEDED    zolpidem (AMBIEN) 10 mg Tab Take 10 mg by mouth nightly as needed.    amLODIPine (NORVASC) 10 MG tablet Take 1 tablet (10 mg total) by mouth once daily. Hold this. Do not take it until you see your doctor.    aspirin (ECOTRIN) 81 MG EC tablet Take 81 mg by mouth once daily.    atorvastatin (LIPITOR) 40 MG tablet Take 40 mg by mouth once daily.    ATROVENT HFA 17 mcg/actuation inhaler Inhale 2 puffs into the lungs once daily.    blood sugar diagnostic Strp by Misc.(Non-Drug; Combo Route) route.    busPIRone (BUSPAR) 10 MG tablet Take 10 mg " "by mouth 2 (two) times daily.    citalopram (CELEXA) 20 MG tablet Take 20 mg by mouth once daily.    clopidogreL (PLAVIX) 75 mg tablet Take 75 mg by mouth once daily.    docusate sodium (COLACE) 100 MG capsule Take 1 capsule (100 mg total) by mouth 2 (two) times daily.    ergocalciferol (ERGOCALCIFEROL) 50,000 unit Cap Take 50,000 Units by mouth every 7 days.    ferrous sulfate (FEOSOL) 325 mg (65 mg iron) Tab tablet Take 325 mg by mouth 2 (two) times daily.    folic acid-vit B6-vit B12 2.5-25-2 mg (FOLBIC OR EQUIV) 2.5-25-2 mg Tab Take 1 tablet by mouth once daily.    furosemide (LASIX) 20 MG tablet Take 1 tablet (20 mg total) by mouth 2 (two) times daily. Hold this. Do not take it until you see your doctor.    glipiZIDE (GLUCOTROL) 10 MG tablet Take 1 tablet (10 mg total) by mouth 2 (two) times daily before meals. Hold this. Do not take it until you see your doctor.    hydrALAZINE (APRESOLINE) 50 MG tablet Take 1 tablet (50 mg total) by mouth daily as needed. Hold this. Do not take it until you see your doctor.    hyoscyamine (ANASPAZ,LEVSIN) 0.125 mg Tab Take 1 tablet (125 mcg total) by mouth every 6 (six) hours as needed (abdominal cramping).    insulin glargine,hum.rec.anlog (LANTUS U-100 INSULIN SUBQ) Inject 60 Units into the skin every evening.    insulin needles, disposable, 31 x 3/16 " Ndle by Misc.(Non-Drug; Combo Route) route.    lancets 28 gauge Misc by Misc.(Non-Drug; Combo Route) route.    losartan (COZAAR) 50 MG tablet Take 1 tablet (50 mg total) by mouth once daily. Hold this. Do not take it until you see your doctor.    metFORMIN (GLUCOPHAGE) 1000 MG tablet Take 1 tablet (1,000 mg total) by mouth 2 (two) times daily with meals. Hold this. Do not take it until you see your doctor.    metoclopramide HCl (REGLAN) 10 MG tablet Take 10 mg by mouth 2 (two) times a day.    metoprolol tartrate (LOPRESSOR) 50 MG tablet Take 1 tablet (50 mg total) by mouth 2 (two) times daily. Hold this. Do " "not take it until you see your doctor.    montelukast (SINGULAIR) 10 mg tablet Take 10 mg by mouth every evening.    ondansetron (ZOFRAN-ODT) 4 MG TbDL Take 1 tablet (4 mg total) by mouth every 8 (eight) hours as needed.    pantoprazole (PROTONIX) 40 MG tablet Take 1 tablet (40 mg total) by mouth once daily.    pen needle, diabetic 31 gauge x 5/16" Ndle Use to inject insulin into the skin every evening.    potassium chloride (MICRO-K) 10 MEQ CpSR Take 1 capsule (10 mEq total) by mouth once daily. Hold this. Do not take it until you see your doctor.       Potential issues to be addressed PRIOR TO DISCHARGE  Please discuss with the patient barriers to adherence with medication treatment plans    Laura Dotson  EXT [phone]                  .        "

## 2022-04-14 NOTE — ASSESSMENT & PLAN NOTE
57 year old female with a history of gastric ulcer perforation, HFrEF (20%), DM who presents with 1 day of ground glass emesis.     Found to have a hemoglobin 4.7; given severity of anemia and that it was 8.9 2 weeks ago suspect slow bleed on background of reported constipation.       Plan:  Transfuse for hemoglobin < 7  Consult GI

## 2022-04-14 NOTE — ASSESSMENT & PLAN NOTE
[address] Priscilla Be  MR#: [id number]  : 1926  ACCOUNT #: [de-identified]   ADMIT DATE: 2017  DISCHARGE DATE: 2017    DISCHARGE DIAGNOSES:   1. Altered mental status. 2.  Parkinsonism. 3.  Hypertension. 4.  Coronary artery disease. 5.  Peripheral neuropathy. DISCHARGE INSTRUCTIONS:  MEDICATIONS    1. Sinemet 10/120 p.o. t.i.d.   2.  Docusate 100 mg p.o. b.i.d.   3.  Wellbutrin- mg daily. 4.  Metoprolol XL 25 mg p.o. daily. 5.  Pepcid 20 mg p.o. b.i.d.   6.  Cerefolin 1 tab daily. 7.  Milk of magnesia 30 mL p.o. daily p.r.n.   8.  Loperamide 2 mg p.o. q.i.d. p.r.n. diarrhea. 9.  Ketoconazole 2% b.i.d. p.r.n. 10.  Limbrel 500 mg capsule 1 p.o. b.i.d. 11.  Centrum Silver 1 p.o. daily. 12.  Tylenol 1000 mg p.o. q.8 hours p.r.n.   13.  Hydrocortisone 2.5% cream topically b.i.d. p.r.n. Discharged to [facility] to follow up with health care unit physician staff. Regular diet. Call back with acute changes in her health including confusion. Follow up with Dr. Simone Zimmerman, psychiatrist, as previously scheduled. CHIEF COMPLAINT:  A 81-year-old white male admitted with altered mental status. HISTORY OF PRESENT ILLNESS:  The patient has depression and decreased energy. Two days ago, Wellbutrin was changed from Wellbutrin- mg daily to Wellbutrin- mg b.i.d. Early in the morning on the day of the current admission, he was found by the residents at [facility] staff with clothes off and altered mental status, lying over his Rollator   Blood pressure 70/50, altered mental status. He was placed back to bed. He later felt well enough to sit in the chair. He was somewhat confused during breakfast.  In the ER, head CT negative. His speech is very conversant and improved since he has been here.   He was referred initially to observation for further evaluation and Hold home regiment in setting of hypotension    Continue to monitor   treatment. PAST MEDICAL HISTORY  1. Coronary artery disease. 2.  Presyncope. 3.  TIA. 4.  Anxiety. 5.  Pneumonia. 6.  Peripheral neuropathy. 7. DJD. 8.  Overactive bladder. 9.  Osteoporosis. 10.  Hyponatremia. 11.  Hypertension. 12.  Degenerative arthritis. 13.  Glaucoma. 14.  GERD. 15.  Thrombocytopenia. CODE STATUS:  DNR. PAST SURGICAL HISTORY:  Single vessel in  CABG, carotid endarterectomy, bilateral knee replacement surgery, lumbar laminectomy, ventral hernia and abdominal hernia  left inguinal rniorrhaphy, bilateral cataract surgery, Spgelian  hernia repair, right shoulder decompression, right hip surgery, tonsillectomy. MEDICATIONS   1. Wellbutrin- mg b.i.d.   2.  Toprol-XL 25 mg daily. 3.  MiraLax 17 grams daily. 4.  Pepcid 20 mg p.o. b.i.d.   5.  Cerefolin 1 daily. 6.  Ketoconazole 2% b.i.d. to local skin irritation. 7.  Hydrocortisone 2.5% cream to affected area daily as needed. 8.  Xalatan 1 drop to left eye nightly. 9.  Limbrel 500 mg b.i.d.   10  Centrum Silver 1 daily. 11.  Tylenol 325 mg q.a.m.,  650 mg at bedtime in the extended release form. 12.  Astepro 1 spray in the right nostril b.i.d. 13.  Voltaren 1% topical gel q.i.d. 4 gram strength. 14.  Mucinex 1200 mg p.o. b.i.d. s  15. Plavix 75 mg daily. 16  Timolol 0.1%, 1 drop left eye daily. 17  Cardura 1 mg p.o. at bedtime. 18.  Nitroglycerin 0.4 sublingual q.5 minutes p.r.n. chest pain. 19.  Amoxicillin 2 grams prior to dental work. ALLERGIES    1. LATEX, RASH. 2.  ACTONEL, UNKNOWN. 3.  AUGMENTIN, DIARRHEA. 4.  AZITHROMYCIN, HIVES. 5.  ROSUVASTATIN, MYALGIA. 6.  LESCOL, MYALGIA. 7.  MICARDIS, UNKNOWN. 8.  MORPHINE, NAUSEA AND VOMITING. .    9.  PROLIA,  UNKNOWN.   10.  ZOCOR, MYOPATHY. FAMILY HISTORY:  Father had a stroke and heart disease. He is . Mother . Brother had diabetes and heart disease.   He is also . SOCIAL HISTORY:  Denies ETOH. Quit smoking a while ago. REVIEW OF SYSTEMS:  Denies fever or chills. Chronic urinary frequency, recent constipation. He took 2 doses of MOM. He had some diarrhea. Feels like his speech is improving, but not back to baseline. Denies other acute focal neurologic changes. PHYSICAL EXAMINATION:  VITAL SIGNS:  Temperature 98.4, pulse 70, /81, room air sat is 99%. HEENT:  Left temple healing area from recent skin cancer treatment  . Pupils react to light. Throat is clear. NECK:  Supple   LUNGS:  Clear. CARDIOVASCULAR:  Regular with II/VI systolic murmur. No gallop or rubs. ABDOMEN:  +Bowel sounds soft, no masses, nontender. EXTREMITIES:  Knee flexion intact status post TKR, bilaterally. Exam reveals light touch sensation intact. NEUROLOGIC:  Awake, alert, oriented x3, slight word finding difficulty He was briefly confused. Otherwise, neurologic exam nonfocal.    LABORATORY DATA:  Sodium 134, glucose 104, BUN 22, creatinine 1.22. Troponin I negative x2. Urinalysis 30 protein, otherwise negative. CBC normal.    HOSPITAL COURSE:  The patient was admitted, see in consultation by Rafi Good MD, cardiologist, and Alivia Liriano, neurologist.  The patient was hydrated. An MRI of the brain, cerebral volume loss, white matter T2 hyperintensity greater than expected and associated with continuous progression of ASCVD since  MRI since :  No acute intracranial abnormality. Brain and neck MRA:  Possible stenosis of the origin of the right vertebral artery; otherwise, normal MRA of the arch of neck and head arteries. He was changed on 17 to full inpatient admission on advice of reviewing staff. He was noted to have some tremor and was felt to have parkinsonism and was started on Sinemet by neurology. His dose of Wellbutrin was returned back to the original 150 mg.   Due to formulary issues, he was given 100 mg in the morning and 50 mg in the evening. He made progress and was discharged back to [facility].       Priscilla Shelby MD       [facility]/JOHN  D: 12/17/2017 22:14     T: 12/18/2017 19:28  JOB #: [id number]  CC: Yazmin Swan DO  CC: Olga Horn MD  CC: [address]

## 2022-04-14 NOTE — SUBJECTIVE & OBJECTIVE
Past Medical History:   Diagnosis Date    Chronic combined systolic and diastolic congestive heart failure     Coronary artery disease     Diabetes mellitus     Encounter for blood transfusion     Gastric ulcer with hemorrhage     Heart attack     Hypertension     Perforated viscus 2022    Peripheral artery disease        Past Surgical History:   Procedure Laterality Date    APPENDECTOMY      BELOW KNEE AMPUTATION OF LOWER EXTREMITY Right 2019    COLONOSCOPY N/A 2022    Procedure: COLONOSCOPY;  Surgeon: Estefania Bryant MD;  Location: [facility] ([address] [place] [address]);  Service: Endoscopy;  Laterality: N/A;  anemia, melena    CORONARY STENT PLACEMENT      ESOPHAGOGASTRODUODENOSCOPY N/A 2022    Procedure: EGD (ESOPHAGOGASTRODUODENOSCOPY);  Surgeon: Estefania Bryant MD;  Location: [facility] ([facility]);  Service: Endoscopy;  Laterality: N/A;  anemia    FOOT AMPUTATION THROUGH METATARSAL Left 2019    TUBAL LIGATION         Review of patient's allergies indicates:   Allergen Reactions    Orange juice Hives    Tomato (solanum lycopersicum) Hives       No current facility-administered medications on file prior to encounter.     Current Outpatient Medications on File Prior to Encounter   Medication Sig    amLODIPine (NORVASC) 10 MG tablet Take 1 tablet (10 mg total) by mouth once daily. Hold this. Do not take it until you see your doctor.    aspirin (ECOTRIN) 81 MG EC tablet Take 81 mg by mouth once daily.    atorvastatin (LIPITOR) 80 MG tablet Take 40 mg by mouth once daily.    ATROVENT HFA 17 mcg/actuation inhaler SMARTSI Puff(s) By Mouth Twice Daily    blood sugar diagnostic Strp by Misc.(Non-Drug; Combo Route) route.    busPIRone (BUSPAR) 10 MG tablet Take 10 mg by mouth 2 (two) times daily.    carisoprodoL (SOMA) 350 MG tablet Take 350 mg by mouth daily as needed.    citalopram (CELEXA) 20 MG tablet Take 20 mg by mouth once daily.    clopidogreL (PLAVIX) 75 mg tablet Take 75 mg by mouth once daily.    docusate  "sodium (COLACE) 100 MG capsule Take 1 capsule (100 mg total) by mouth 2 (two) times daily.    ergocalciferol (ERGOCALCIFEROL) 50,000 unit Cap Take 50,000 Units by mouth every 7 days.    ferrous sulfate (FEOSOL) 325 mg (65 mg iron) Tab tablet Take 325 mg by mouth.    folic acid-vit B6-vit B12 2.5-25-2 mg (FOLBIC OR EQUIV) 2.5-25-2 mg Tab Take 1 tablet by mouth once daily.    furosemide (LASIX) 20 MG tablet Take 1 tablet (20 mg total) by mouth 2 (two) times daily. Hold this. Do not take it until you see your doctor.    gabapentin (NEURONTIN) 600 MG tablet Take 600 mg by mouth 2 (two) times daily.    glipiZIDE (GLUCOTROL) 10 MG tablet Take 1 tablet (10 mg total) by mouth 2 (two) times daily before meals. Hold this. Do not take it until you see your doctor.    hydrALAZINE (APRESOLINE) 50 MG tablet Take 1 tablet (50 mg total) by mouth daily as needed. Hold this. Do not take it until you see your doctor.    hyoscyamine (ANASPAZ,LEVSIN) 0.125 mg Tab Take 1 tablet (125 mcg total) by mouth every 6 (six) hours as needed (abdominal cramping).    insulin detemir U-100 (LEVEMIR FLEXTOUCH U-100 INSULN) 100 unit/mL (3 mL) InPn pen Inject 16 Units into the skin every evening.    insulin needles, disposable, 31 x 3/16 " Ndle by Misc.(Non-Drug; Combo Route) route.    lancets 28 gauge Misc by Misc.(Non-Drug; Combo Route) route.    losartan (COZAAR) 50 MG tablet Take 1 tablet (50 mg total) by mouth once daily. Hold this. Do not take it until you see your doctor.    metFORMIN (GLUCOPHAGE) 1000 MG tablet Take 1 tablet (1,000 mg total) by mouth 2 (two) times daily with meals. Hold this. Do not take it until you see your doctor.    metoclopramide HCl (REGLAN) 10 MG tablet Take 10 mg by mouth 2 (two) times a day.    metoprolol tartrate (LOPRESSOR) 50 MG tablet Take 1 tablet (50 mg total) by mouth 2 (two) times daily. Hold this. Do not take it until you see your doctor.    montelukast (SINGULAIR) 10 mg tablet Take 10 mg by mouth every " "evening.    nortriptyline (PAMELOR) 50 MG capsule SMARTSI Capsule(s) By Mouth Every Evening    ondansetron (ZOFRAN-ODT) 4 MG TbDL Take 1 tablet (4 mg total) by mouth every 8 (eight) hours as needed.    oxyCODONE-acetaminophen (PERCOCET)  mg per tablet Take 1 tablet by mouth every 8 (eight) hours as needed for Pain.    pantoprazole (PROTONIX) 40 MG tablet Take 1 tablet (40 mg total) by mouth once daily.    pen needle, diabetic 31 gauge x 5/16" Ndle Use to inject insulin into the skin every evening.    potassium chloride (MICRO-K) 10 MEQ CpSR Take 1 capsule (10 mEq total) by mouth once daily. Hold this. Do not take it until you see your doctor.    promethazine (PHENERGAN) 6.25 mg/5 mL syrup TAKE 10 mls BY MOUTH EVERY 6 HOURS AS NEEDED    zolpidem (AMBIEN) 10 mg Tab Take 5 mg by mouth nightly as needed.    [DISCONTINUED] insulin glargine (LANTUS) 100 unit/mL injection Inject 50 Units into the skin every evening.    [DISCONTINUED] lisinopriL (PRINIVIL,ZESTRIL) 2.5 MG tablet Take 1 tablet (2.5 mg total) by mouth once daily.     Family History    None       Tobacco Use    Smoking status: Current Every Day Smoker     Packs/day: 0.50     Types: Cigarettes    Smokeless tobacco: Never Used   Substance and Sexual Activity    Alcohol use: No    Drug use: No    Sexual activity: Not on file     Review of Systems   Constitutional:  Positive for activity change. Negative for chills, diaphoresis, fatigue and fever.   HENT:  Negative for rhinorrhea and sore throat.    Eyes:  Negative for pain and visual disturbance.   Respiratory:  Negative for cough and shortness of breath.    Cardiovascular:  Negative for chest pain and palpitations.   Gastrointestinal:  Positive for constipation, nausea and vomiting. Negative for abdominal pain and diarrhea.   Endocrine: Negative for cold intolerance, heat intolerance and polyuria.   Genitourinary:  Negative for dysuria and flank pain.   Musculoskeletal:  Negative for back pain, gait " problem and joint swelling.   Allergic/Immunologic: Negative for immunocompromised state.   Neurological:  Negative for light-headedness, numbness and headaches.   Objective:     Vital Signs (Most Recent):  Temp: 98.3 °F (36.8 °C) (04/14/22 0140)  Pulse: 83 (04/14/22 0140)  Resp: 18 (04/14/22 0140)  BP: (!) 103/54 (04/14/22 0140)  SpO2: 98 % (04/14/22 0140)   Vital Signs (24h Range):  Temp:  [98.2 °F (36.8 °C)-98.8 °F (37.1 °C)] 98.3 °F (36.8 °C)  Pulse:  [] 83  Resp:  [11-20] 18  SpO2:  [97 %-100 %] 98 %  BP: ()/(47-90) 103/54     Weight: 77.1 kg (170 lb)  Body mass index is 25.1 kg/m².    Physical Exam  Constitutional:       General: She is not in acute distress.     Appearance: Normal appearance. She is normal weight. She is not ill-appearing or toxic-appearing.   HENT:      Nose: Nose normal. No congestion or rhinorrhea.      Mouth/Throat:      Mouth: Mucous membranes are moist.      Pharynx: No oropharyngeal exudate or posterior oropharyngeal erythema.   Eyes:      General: No scleral icterus.        Right eye: No discharge.         Left eye: No discharge.      Pupils: Pupils are equal, round, and reactive to light.   Cardiovascular:      Rate and Rhythm: Normal rate.      Heart sounds: Murmur heard.   Pulmonary:      Effort: Pulmonary effort is normal. No respiratory distress.      Breath sounds: No stridor. No wheezing.   Abdominal:      General: Abdomen is flat. Bowel sounds are normal. There is no distension.      Palpations: Abdomen is soft. There is no mass.      Tenderness: There is no abdominal tenderness.   Musculoskeletal:         General: Deformity present. Normal range of motion.      Cervical back: Normal range of motion. No rigidity.   Lymphadenopathy:      Cervical: No cervical adenopathy.   Skin:     General: Skin is warm.      Coloration: Skin is not jaundiced or pale.      Findings: No bruising.   Neurological:      General: No focal deficit present.      Mental Status: She is  alert.         CRANIAL NERVES     CN III, IV, VI   Pupils are equal, round, and reactive to light.     Significant Labs: All pertinent labs within the past 24 hours have been reviewed.    Significant Imaging: I have reviewed all pertinent imaging results/findings within the past 24 hours.

## 2022-04-14 NOTE — H&P
[facility] Medicine  History & Physical    Patient Name: Casie Salvador  MRN: [id number]  Patient Class: IP- Inpatient  Admission Date: 4/13/2022  Attending Physician: Risa Dennis*   Primary Care Provider: Adonis Carey MD         Patient information was obtained from patient and ER records.     Subjective:     Principal Problem:GI bleed    Chief Complaint:   Chief Complaint   Patient presents with    Vomiting     Began today, states there was some blood in it. Reports no BM since being d/c 2 weeks ago.         HPI: 57 year old female with a history of diabetes, coronary artery disease with stents, heart failure with reduced ejection fraction of 20%, bilateral lower extremity amputations secondary to the diabetes, history of perferated gastric ulcer who presents with 1 day of nausea and vomiting.  She reports that she was in her usual state of health when approximately 4:00 p.m. she had 1 episode of ground-glass emesis.  She presented the ED was found to have a hemoglobin of 4.7.  She reports that she has noticed she has been fatigued for the last week after discharge for perforated gastric ulcer.    She denies any fevers, chest pain, shortness of breath, or vaginal bleeding.  She does work but her last bowel movement was 2 weeks ago prior to discharge from the hospital.  She has tried over-the-counter laxatives without success.  Additionally she reports that for the last week she has had no appetite.    On evaluation in the emergency room, she was afebrile, a pulse of 100, blood pressure 130/90 and satting 100% on room air.      Past Medical History:   Diagnosis Date    Chronic combined systolic and diastolic congestive heart failure     Coronary artery disease     Diabetes mellitus     Encounter for blood transfusion     Gastric ulcer with hemorrhage     Heart attack     Hypertension     Perforated viscus 03/14/2022    Peripheral artery disease        Past Surgical  History:   Procedure Laterality Date    APPENDECTOMY      BELOW KNEE AMPUTATION OF LOWER EXTREMITY Right 2019    COLONOSCOPY N/A 2022    Procedure: COLONOSCOPY;  Surgeon: Estefania Bryant MD;  [facility]  Service: Endoscopy;  Laterality: N/A;  anemia, melena    CORONARY STENT PLACEMENT      ESOPHAGOGASTRODUODENOSCOPY N/A 2022    Procedure: EGD (ESOPHAGOGASTRODUODENOSCOPY);  Surgeon: Estefania Bryant MD;  [facility]  Service: Endoscopy;  Laterality: N/A;  anemia    FOOT AMPUTATION THROUGH METATARSAL Left 2019    TUBAL LIGATION         Review of patient's allergies indicates:   Allergen Reactions    Orange juice Hives    Tomato (solanum lycopersicum) Hives       No current facility-administered medications on file prior to encounter.     Current Outpatient Medications on File Prior to Encounter   Medication Sig    amLODIPine (NORVASC) 10 MG tablet Take 1 tablet (10 mg total) by mouth once daily. Hold this. Do not take it until you see your doctor.    aspirin (ECOTRIN) 81 MG EC tablet Take 81 mg by mouth once daily.    atorvastatin (LIPITOR) 80 MG tablet Take 40 mg by mouth once daily.    ATROVENT HFA 17 mcg/actuation inhaler SMARTSI Puff(s) By Mouth Twice Daily    blood sugar diagnostic Strp by Misc.(Non-Drug; Combo Route) route.    busPIRone (BUSPAR) 10 MG tablet Take 10 mg by mouth 2 (two) times daily.    carisoprodoL (SOMA) 350 MG tablet Take 350 mg by mouth daily as needed.    citalopram (CELEXA) 20 MG tablet Take 20 mg by mouth once daily.    clopidogreL (PLAVIX) 75 mg tablet Take 75 mg by mouth once daily.    docusate sodium (COLACE) 100 MG capsule Take 1 capsule (100 mg total) by mouth 2 (two) times daily.    ergocalciferol (ERGOCALCIFEROL) 50,000 unit Cap Take 50,000 Units by mouth every 7 days.    ferrous sulfate (FEOSOL) 325 mg (65 mg iron) Tab tablet Take 325 mg by mouth.    folic acid-vit B6-vit B12 2.5-25-2 mg (FOLBIC OR EQUIV)  "2.5-25-2 mg Tab Take 1 tablet by mouth once daily.    furosemide (LASIX) 20 MG tablet Take 1 tablet (20 mg total) by mouth 2 (two) times daily. Hold this. Do not take it until you see your doctor.    gabapentin (NEURONTIN) 600 MG tablet Take 600 mg by mouth 2 (two) times daily.    glipiZIDE (GLUCOTROL) 10 MG tablet Take 1 tablet (10 mg total) by mouth 2 (two) times daily before meals. Hold this. Do not take it until you see your doctor.    hydrALAZINE (APRESOLINE) 50 MG tablet Take 1 tablet (50 mg total) by mouth daily as needed. Hold this. Do not take it until you see your doctor.    hyoscyamine (ANASPAZ,LEVSIN) 0.125 mg Tab Take 1 tablet (125 mcg total) by mouth every 6 (six) hours as needed (abdominal cramping).    insulin detemir U-100 (LEVEMIR FLEXTOUCH U-100 INSULN) 100 unit/mL (3 mL) InPn pen Inject 16 Units into the skin every evening.    insulin needles, disposable, 31 x 3/16 " Ndle by Misc.(Non-Drug; Combo Route) route.    lancets 28 gauge Misc by Misc.(Non-Drug; Combo Route) route.    losartan (COZAAR) 50 MG tablet Take 1 tablet (50 mg total) by mouth once daily. Hold this. Do not take it until you see your doctor.    metFORMIN (GLUCOPHAGE) 1000 MG tablet Take 1 tablet (1,000 mg total) by mouth 2 (two) times daily with meals. Hold this. Do not take it until you see your doctor.    metoclopramide HCl (REGLAN) 10 MG tablet Take 10 mg by mouth 2 (two) times a day.    metoprolol tartrate (LOPRESSOR) 50 MG tablet Take 1 tablet (50 mg total) by mouth 2 (two) times daily. Hold this. Do not take it until you see your doctor.    montelukast (SINGULAIR) 10 mg tablet Take 10 mg by mouth every evening.    nortriptyline (PAMELOR) 50 MG capsule SMARTSI Capsule(s) By Mouth Every Evening    ondansetron (ZOFRAN-ODT) 4 MG TbDL Take 1 tablet (4 mg total) by mouth every 8 (eight) hours as needed.    oxyCODONE-acetaminophen (PERCOCET)  mg per tablet Take 1 tablet by mouth every 8 (eight) hours as " "needed for Pain.    pantoprazole (PROTONIX) 40 MG tablet Take 1 tablet (40 mg total) by mouth once daily.    pen needle, diabetic 31 gauge x 5/16" Ndle Use to inject insulin into the skin every evening.    potassium chloride (MICRO-K) 10 MEQ CpSR Take 1 capsule (10 mEq total) by mouth once daily. Hold this. Do not take it until you see your doctor.    promethazine (PHENERGAN) 6.25 mg/5 mL syrup TAKE 10 mls BY MOUTH EVERY 6 HOURS AS NEEDED    zolpidem (AMBIEN) 10 mg Tab Take 5 mg by mouth nightly as needed.    [DISCONTINUED] insulin glargine (LANTUS) 100 unit/mL injection Inject 50 Units into the skin every evening.    [DISCONTINUED] lisinopriL (PRINIVIL,ZESTRIL) 2.5 MG tablet Take 1 tablet (2.5 mg total) by mouth once daily.     Family History    None       Tobacco Use    Smoking status: Current Every Day Smoker     Packs/day: 0.50     Types: Cigarettes    Smokeless tobacco: Never Used   Substance and Sexual Activity    Alcohol use: No    Drug use: No    Sexual activity: Not on file     Review of Systems   Constitutional:  Positive for activity change. Negative for chills, diaphoresis, fatigue and fever.   HENT:  Negative for rhinorrhea and sore throat.    Eyes:  Negative for pain and visual disturbance.   Respiratory:  Negative for cough and shortness of breath.    Cardiovascular:  Negative for chest pain and palpitations.   Gastrointestinal:  Positive for constipation, nausea and vomiting. Negative for abdominal pain and diarrhea.   Endocrine: Negative for cold intolerance, heat intolerance and polyuria.   Genitourinary:  Negative for dysuria and flank pain.   Musculoskeletal:  Negative for back pain, gait problem and joint swelling.   Allergic/Immunologic: Negative for immunocompromised state.   Neurological:  Negative for light-headedness, numbness and headaches.   Objective:     Vital Signs (Most Recent):  Temp: 98.3 °F (36.8 °C) (04/14/22 0140)  Pulse: 83 (04/14/22 0140)  Resp: 18 (04/14/22 " 0140)  BP: (!) 103/54 (04/14/22 0140)  SpO2: 98 % (04/14/22 0140)   Vital Signs (24h Range):  Temp:  [98.2 °F (36.8 °C)-98.8 °F (37.1 °C)] 98.3 °F (36.8 °C)  Pulse:  [] 83  Resp:  [11-20] 18  SpO2:  [97 %-100 %] 98 %  BP: ()/(47-90) 103/54     Weight: 77.1 kg (170 lb)  Body mass index is 25.1 kg/m².    Physical Exam  Constitutional:       General: She is not in acute distress.     Appearance: Normal appearance. She is normal weight. She is not ill-appearing or toxic-appearing.   HENT:      Nose: Nose normal. No congestion or rhinorrhea.      Mouth/Throat:      Mouth: Mucous membranes are moist.      Pharynx: No oropharyngeal exudate or posterior oropharyngeal erythema.   Eyes:      General: No scleral icterus.        Right eye: No discharge.         Left eye: No discharge.      Pupils: Pupils are equal, round, and reactive to light.   Cardiovascular:      Rate and Rhythm: Normal rate.      Heart sounds: Murmur heard.   Pulmonary:      Effort: Pulmonary effort is normal. No respiratory distress.      Breath sounds: No stridor. No wheezing.   Abdominal:      General: Abdomen is flat. Bowel sounds are normal. There is no distension.      Palpations: Abdomen is soft. There is no mass.      Tenderness: There is no abdominal tenderness.   Musculoskeletal:         General: Deformity present. Normal range of motion.      Cervical back: Normal range of motion. No rigidity.   Lymphadenopathy:      Cervical: No cervical adenopathy.   Skin:     General: Skin is warm.      Coloration: Skin is not jaundiced or pale.      Findings: No bruising.   Neurological:      General: No focal deficit present.      Mental Status: She is alert.         CRANIAL NERVES     CN III, IV, VI   Pupils are equal, round, and reactive to light.     Significant Labs: All pertinent labs within the past 24 hours have been reviewed.    Significant Imaging: I have reviewed all pertinent imaging results/findings within the past 24  hours.    Assessment/Plan:     * GI bleed  57 year old female with a history of gastric ulcer perforation, HFrEF (20%), DM who presents with 1 day of ground glass emesis.     Found to have a hemoglobin 4.7; given severity of anemia and that it was 8.9 2 weeks ago suspect slow bleed on background of reported constipation.       Plan:  Transfuse for hemoglobin < 7  Consult GI        Constipation  Reportedly has not had a bowel movement since discharge 2 weeks ago    Senna  Miralax  Rodrigues aggressive bowel regiment in setting of GI bleed      History of right below knee amputation  2/2 diabetes    Cigarette smoker        Chronic combined systolic and diastolic congestive heart failure  EF of 20%, Grade 2 diastolic dysfunction  Home GDMT metoprolol 50, losartan 50  Does not have a cardiologist    Currently on room air              Depression  History of    Continue home meds    Type 2 diabetes mellitus with diabetic neuropathy, with long-term current use of insulin  Detemir 16  SSI  Diabetic renal diet when able    Coronary artery disease involving native coronary artery of native heart without angina pectoris  History of BRYANNA placed 15 years ago on DAPT therapy    Will hold DAPT therapy in setting of GI bleed      Hypertension  Hold home regiment in setting of hypotension    Continue to monitor      VTE Risk Mitigation (From admission, onward)         Ordered     IP VTE HIGH RISK PATIENT  Once         04/14/22 0018     Place sequential compression device  Until discontinued         04/14/22 0018                   Easton Goff MD  Department of Hospital Medicine   [facility]

## 2022-04-14 NOTE — ASSESSMENT & PLAN NOTE
EF of 20%, Grade 2 diastolic dysfunction  Home GDMT metoprolol 50, losartan 50  Does not have a cardiologist    Currently on room air

## 2022-04-14 NOTE — HPI
57 year old female with a history of diabetes, coronary artery disease with stents, heart failure with reduced ejection fraction of 20%, bilateral lower extremity amputations secondary to the diabetes, history of perferated gastric ulcer who presents with 1 day of nausea and vomiting.  She reports that she was in her usual state of health when approximately 4:00 p.m. she had 1 episode of ground-glass emesis.  She presented the ED was found to have a hemoglobin of 4.7.  She reports that she has noticed she has been fatigued for the last week after discharge for perforated gastric ulcer.    She denies any fevers, chest pain, shortness of breath, or vaginal bleeding.  She does work but her last bowel movement was 2 weeks ago prior to discharge from the hospital.  She has tried over-the-counter laxatives without success.  Additionally she reports that for the last week she has had no appetite.    On evaluation in the emergency room, she was afebrile, a pulse of 100, blood pressure 130/90 and satting 100% on room air.

## 2022-04-14 NOTE — CONSULTS
[facility]  Gastroenterology  Consult Note    Patient Name: Casie Salvador  MRN: [id number]  Admission Date: 4/13/2022  Hospital Length of Stay: 0 days  Code Status: Full Code   Attending Provider: Risa Dennis*   Consulting Provider: Lisa Ga MD  Primary Care Physician: Adonis Carey MD  Principal Problem:GI bleed    Inpatient consult to Gastroenterology  Consult performed by: Lisa Ga MD  Consult ordered by: John Quezada MD        Subjective:     HPI: Casie Salvador is a 57 y.o. female with history of DM, CAD s/p stents, HF with EF 20%, bilateral lower extremity amputations secondary to the diabetes, history of perferated gastric ulcer on 3/17, opioid-induced constipation, who presents for coffee-ground emesis.  She reports that she was in her usual state of health until yesterday afternoon when she developed sudden onset vomiting initially was clear, however per then progressed to brown/coffee-grounds.  She saw small amount of red tinged vomitus as well.  Has not had a bowel movement since her last hospitalization.  Reports that she has been taking her PPI.  Denies any abdominal pain.  She was admitted to the hospital in February with GI bleeding, was found to have a gastric ulcer, started on PPI but she continued to take NSAIDs and re-presented in March with a perforated gastric ulcer requiring exploratory laparotomy with repair.  On admission this time, she was afebrile, slightly tachycardic, hemodynamically stable.  Labs notable for leukocytosis with WBC count of 19 which is chronic, Hgb 4.7, platelets 418, BUN 42, creatinine 1.1.      Past Medical History:   Diagnosis Date    Chronic combined systolic and diastolic congestive heart failure     Coronary artery disease     Diabetes mellitus     Encounter for blood transfusion     Gastric ulcer with hemorrhage     Heart attack     Hypertension     Perforated viscus 03/14/2022     Peripheral artery disease        Past Surgical History:   Procedure Laterality Date    APPENDECTOMY      BELOW KNEE AMPUTATION OF LOWER EXTREMITY Right 2019    COLONOSCOPY N/A 2022    Procedure: COLONOSCOPY;  Surgeon: Estefania Bryant MD;  Location: [facility] ([address] [place] [address]);  Service: Endoscopy;  Laterality: N/A;  anemia, melena    CORONARY STENT PLACEMENT      ESOPHAGOGASTRODUODENOSCOPY N/A 2022    Procedure: EGD (ESOPHAGOGASTRODUODENOSCOPY);  Surgeon: Estefania Bryant MD;  Location: [facility] ([address] [place] [address]);  Service: Endoscopy;  Laterality: N/A;  anemia    FOOT AMPUTATION THROUGH METATARSAL Left 2019    TUBAL LIGATION         History reviewed. No pertinent family history.    Social History     Socioeconomic History    Marital status:    Tobacco Use    Smoking status: Current Every Day Smoker     Packs/day: 0.50     Types: Cigarettes    Smokeless tobacco: Never Used   Substance and Sexual Activity    Alcohol use: No    Drug use: No       No current facility-administered medications on file prior to encounter.     Current Outpatient Medications on File Prior to Encounter   Medication Sig Dispense Refill    amLODIPine (NORVASC) 10 MG tablet Take 1 tablet (10 mg total) by mouth once daily. Hold this. Do not take it until you see your doctor.      aspirin (ECOTRIN) 81 MG EC tablet Take 81 mg by mouth once daily.      atorvastatin (LIPITOR) 80 MG tablet Take 40 mg by mouth once daily.      ATROVENT HFA 17 mcg/actuation inhaler SMARTSI Puff(s) By Mouth Twice Daily      blood sugar diagnostic Strp by Misc.(Non-Drug; Combo Route) route.      busPIRone (BUSPAR) 10 MG tablet Take 10 mg by mouth 2 (two) times daily.      carisoprodoL (SOMA) 350 MG tablet Take 350 mg by mouth daily as needed.      citalopram (CELEXA) 20 MG tablet Take 20 mg by mouth once daily.      clopidogreL (PLAVIX) 75 mg tablet Take 75 mg by mouth once daily.      docusate sodium (COLACE) 100 MG capsule Take 1 capsule  "(100 mg total) by mouth 2 (two) times daily. 60 capsule 0    ergocalciferol (ERGOCALCIFEROL) 50,000 unit Cap Take 50,000 Units by mouth every 7 days.      ferrous sulfate (FEOSOL) 325 mg (65 mg iron) Tab tablet Take 325 mg by mouth.      folic acid-vit B6-vit B12 2.5-25-2 mg (FOLBIC OR EQUIV) 2.5-25-2 mg Tab Take 1 tablet by mouth once daily. 90 tablet 3    furosemide (LASIX) 20 MG tablet Take 1 tablet (20 mg total) by mouth 2 (two) times daily. Hold this. Do not take it until you see your doctor.      gabapentin (NEURONTIN) 600 MG tablet Take 600 mg by mouth 2 (two) times daily.      glipiZIDE (GLUCOTROL) 10 MG tablet Take 1 tablet (10 mg total) by mouth 2 (two) times daily before meals. Hold this. Do not take it until you see your doctor.      hydrALAZINE (APRESOLINE) 50 MG tablet Take 1 tablet (50 mg total) by mouth daily as needed. Hold this. Do not take it until you see your doctor.      hyoscyamine (ANASPAZ,LEVSIN) 0.125 mg Tab Take 1 tablet (125 mcg total) by mouth every 6 (six) hours as needed (abdominal cramping). 30 tablet 0    insulin detemir U-100 (LEVEMIR FLEXTOUCH U-100 INSULN) 100 unit/mL (3 mL) InPn pen Inject 16 Units into the skin every evening. 3 mL 0    insulin needles, disposable, 31 x 3/16 " Ndle by Misc.(Non-Drug; Combo Route) route.      lancets 28 gauge Misc by Misc.(Non-Drug; Combo Route) route.      losartan (COZAAR) 50 MG tablet Take 1 tablet (50 mg total) by mouth once daily. Hold this. Do not take it until you see your doctor.      metFORMIN (GLUCOPHAGE) 1000 MG tablet Take 1 tablet (1,000 mg total) by mouth 2 (two) times daily with meals. Hold this. Do not take it until you see your doctor.      metoclopramide HCl (REGLAN) 10 MG tablet Take 10 mg by mouth 2 (two) times a day.      metoprolol tartrate (LOPRESSOR) 50 MG tablet Take 1 tablet (50 mg total) by mouth 2 (two) times daily. Hold this. Do not take it until you see your doctor.      montelukast (SINGULAIR) 10 mg " "tablet Take 10 mg by mouth every evening.      nortriptyline (PAMELOR) 50 MG capsule SMARTSI Capsule(s) By Mouth Every Evening      ondansetron (ZOFRAN-ODT) 4 MG TbDL Take 1 tablet (4 mg total) by mouth every 8 (eight) hours as needed. 20 tablet 0    oxyCODONE-acetaminophen (PERCOCET)  mg per tablet Take 1 tablet by mouth every 8 (eight) hours as needed for Pain.      pantoprazole (PROTONIX) 40 MG tablet Take 1 tablet (40 mg total) by mouth once daily. 30 tablet 11    pen needle, diabetic 31 gauge x 5/16" Ndle Use to inject insulin into the skin every evening. 100 each 0    potassium chloride (MICRO-K) 10 MEQ CpSR Take 1 capsule (10 mEq total) by mouth once daily. Hold this. Do not take it until you see your doctor.      promethazine (PHENERGAN) 6.25 mg/5 mL syrup TAKE 10 mls BY MOUTH EVERY 6 HOURS AS NEEDED      zolpidem (AMBIEN) 10 mg Tab Take 5 mg by mouth nightly as needed.      [DISCONTINUED] insulin glargine (LANTUS) 100 unit/mL injection Inject 50 Units into the skin every evening.      [DISCONTINUED] lisinopriL (PRINIVIL,ZESTRIL) 2.5 MG tablet Take 1 tablet (2.5 mg total) by mouth once daily. 90 tablet 3       Review of patient's allergies indicates:   Allergen Reactions    Orange juice Hives    Tomato (solanum lycopersicum) Hives       Review of Systems   Constitutional: Negative.    HENT: Negative.    Eyes: Negative.    Respiratory: Negative.    Cardiovascular: Negative.    Gastrointestinal: Positive for constipation, nausea and vomiting. Negative for abdominal pain, blood in stool and melena.   Genitourinary: Negative.    Musculoskeletal: Negative.    Neurological: Negative.    Psychiatric/Behavioral: Negative.         Objective:     Vitals:    22 0717   BP: (!) 112/53   Pulse: 78   Resp: 17   Temp: 97.6 °F (36.4 °C)         Constitutional:  not in acute distress and well developed  HENT: Head: Normal, normocephalic, atraumatic.  Eyes: conjunctiva clear and sclera " nonicteric  Cardiovascular: regular rate and rhythm  Respiratory: normal chest expansion & respiratory effort   and no accessory muscle use  GI: soft, non-tender, without masses or organomegaly  Musculoskeletal: no muscular tenderness noted  Skin: normal color  Neurological: alert, oriented x3  Psychiatric: mood and affect are within normal limits, pt is a good historian; no memory problems were noted    Significant Labs:  Recent Labs   Lab 04/13/22  2144 04/14/22  0517 04/14/22  0759   HGB 4.7* 5.8* 5.7*       Lab Results   Component Value Date    WBC 17.88 (H) 04/14/2022    HGB 5.7 (LL) 04/14/2022    HCT 17.8 (LL) 04/14/2022    MCV 80 (L) 04/14/2022     04/14/2022       Lab Results   Component Value Date     (L) 04/14/2022    K 3.9 04/14/2022     04/14/2022    CO2 22 (L) 04/14/2022    BUN 36 (H) 04/14/2022    CREATININE 0.9 04/14/2022    CALCIUM 8.5 (L) 04/14/2022    ANIONGAP 8 04/14/2022    ESTGFRAFRICA >60.0 04/14/2022    EGFRNONAA >60.0 04/14/2022       Lab Results   Component Value Date    ALT 7 (L) 04/14/2022    AST 9 (L) 04/14/2022    ALKPHOS 79 04/14/2022    BILITOT 1.1 (H) 04/14/2022       Lab Results   Component Value Date    INR 1.0 04/13/2022    INR 1.0 02/20/2022    INR 1.0 12/10/2012       Significant Imaging:  Reviewed pertinent radiology findings.       Assessment/Plan:     Casie Salvador is a 57 y.o. female with history of DM, CAD s/p stents, HF with EF 20%, bilateral lower extremity amputations secondary to the diabetes, history of perferated gastric ulcer on 3/17, opioid-induced constipation, who presents for coffee-ground emesis.    Problem List:  1. Upper GI bleeding  2. Acute blood loss anemia  3. History of perforated gastric ulcer  4. OIC    The patient presents with vomiting which progressed to coffee-grounds and anemia with a Hgb of 4.9.  She has not had a bowel movement since her discharge from the hospital over week ago.  In the setting of leukocytosis and a  history of perforated gastric ulcer, would recommend obtaining imaging to rule out gastric leak/perforation although less likely since she is hemodynamically stable. Will plan for EGD for evaluation of anemia pending clinical progression, potentially tomorrow vs Monday.    Recommendations:  - please obtain CT abdomen pelvis without IV contrast, with oral contrast to evaluate for gastric leak  - if there is no perforation, will plan for EGD pending clinical progression  - Trend Hgb q12hrs. Transfuse for Hgb <7, unless otherwise indicated  - Maintain IV access with 2 large bore Ivs  - Intravascular resuscitation/support with IVFs   - advance diet today, NPO at midnight  - Hold all NSAIDs and anticoagulants, unless contraindicated  - Bolus IV pantoprazole 80mg followed by 40mg BID  - Please correct any coagulopathy with platelets and FFP for goal of platelets >50K and INR <2.0  - Please notify GI team if there is significant change in patient's clinical status      Thank you for involving us in the care of Casie Salvador. Please call with any additional questions, concerns or changes in the patient's clinical status. We will continue to follow.    Lisa Ga MD  Gastroenterology Fellow PGY V  [facility]

## 2022-04-15 PROBLEM — N14.11 CONTRAST DYE INDUCED NEPHROPATHY: Status: RESOLVED | Noted: 2022-04-02 | Resolved: 2022-04-15

## 2022-04-15 PROBLEM — R19.8 PERFORATED VISCUS: Status: RESOLVED | Noted: 2022-03-14 | Resolved: 2022-04-15

## 2022-04-15 PROBLEM — K27.9 PEPTIC ULCER DISEASE: Status: ACTIVE | Noted: 2022-04-15

## 2022-04-15 PROBLEM — K29.60 NSAID INDUCED GASTRITIS: Status: RESOLVED | Noted: 2022-04-02 | Resolved: 2022-04-15

## 2022-04-15 PROBLEM — Z87.11 HISTORY OF GASTRIC ULCER: Status: RESOLVED | Noted: 2022-04-02 | Resolved: 2022-04-15

## 2022-04-15 PROBLEM — D62 ACUTE BLOOD LOSS ANEMIA: Status: ACTIVE | Noted: 2022-04-15

## 2022-04-15 PROBLEM — T50.8X5A CONTRAST DYE INDUCED NEPHROPATHY: Status: RESOLVED | Noted: 2022-04-02 | Resolved: 2022-04-15

## 2022-04-15 PROBLEM — T39.395A NSAID INDUCED GASTRITIS: Status: RESOLVED | Noted: 2022-04-02 | Resolved: 2022-04-15

## 2022-04-15 LAB
ALBUMIN SERPL BCP-MCNC: 2.7 G/DL (ref 3.5–5.2)
ALP SERPL-CCNC: 78 U/L (ref 55–135)
ALT SERPL W/O P-5'-P-CCNC: 10 U/L (ref 10–44)
ANION GAP SERPL CALC-SCNC: 7 MMOL/L (ref 8–16)
AST SERPL-CCNC: 12 U/L (ref 10–40)
BASOPHILS # BLD AUTO: 0.05 K/UL (ref 0–0.2)
BASOPHILS # BLD AUTO: 0.06 K/UL (ref 0–0.2)
BASOPHILS NFR BLD: 0.4 % (ref 0–1.9)
BASOPHILS NFR BLD: 0.5 % (ref 0–1.9)
BILIRUB SERPL-MCNC: 1.3 MG/DL (ref 0.1–1)
BUN SERPL-MCNC: 25 MG/DL (ref 6–20)
CALCIUM SERPL-MCNC: 8.4 MG/DL (ref 8.7–10.5)
CHLORIDE SERPL-SCNC: 103 MMOL/L (ref 95–110)
CO2 SERPL-SCNC: 23 MMOL/L (ref 23–29)
CREAT SERPL-MCNC: 0.9 MG/DL (ref 0.5–1.4)
DIFFERENTIAL METHOD: ABNORMAL
DIFFERENTIAL METHOD: ABNORMAL
EOSINOPHIL # BLD AUTO: 0.8 K/UL (ref 0–0.5)
EOSINOPHIL # BLD AUTO: 0.9 K/UL (ref 0–0.5)
EOSINOPHIL NFR BLD: 6 % (ref 0–8)
EOSINOPHIL NFR BLD: 7.4 % (ref 0–8)
ERYTHROCYTE [DISTWIDTH] IN BLOOD BY AUTOMATED COUNT: 19.7 % (ref 11.5–14.5)
ERYTHROCYTE [DISTWIDTH] IN BLOOD BY AUTOMATED COUNT: 19.9 % (ref 11.5–14.5)
EST. GFR  (AFRICAN AMERICAN): >60 ML/MIN/1.73 M^2
EST. GFR  (NON AFRICAN AMERICAN): >60 ML/MIN/1.73 M^2
GLUCOSE SERPL-MCNC: 77 MG/DL (ref 70–110)
HCT VFR BLD AUTO: 25.8 % (ref 37–48.5)
HCT VFR BLD AUTO: 26.1 % (ref 37–48.5)
HGB BLD-MCNC: 8.4 G/DL (ref 12–16)
HGB BLD-MCNC: 8.5 G/DL (ref 12–16)
IMM GRANULOCYTES # BLD AUTO: 0.05 K/UL (ref 0–0.04)
IMM GRANULOCYTES # BLD AUTO: 0.05 K/UL (ref 0–0.04)
IMM GRANULOCYTES NFR BLD AUTO: 0.4 % (ref 0–0.5)
IMM GRANULOCYTES NFR BLD AUTO: 0.4 % (ref 0–0.5)
LYMPHOCYTES # BLD AUTO: 2.4 K/UL (ref 1–4.8)
LYMPHOCYTES # BLD AUTO: 2.9 K/UL (ref 1–4.8)
LYMPHOCYTES NFR BLD: 19 % (ref 18–48)
LYMPHOCYTES NFR BLD: 24.7 % (ref 18–48)
MAGNESIUM SERPL-MCNC: 2 MG/DL (ref 1.6–2.6)
MCH RBC QN AUTO: 25.2 PG (ref 27–31)
MCH RBC QN AUTO: 25.9 PG (ref 27–31)
MCHC RBC AUTO-ENTMCNC: 32.2 G/DL (ref 32–36)
MCHC RBC AUTO-ENTMCNC: 32.9 G/DL (ref 32–36)
MCV RBC AUTO: 78 FL (ref 82–98)
MCV RBC AUTO: 79 FL (ref 82–98)
MONOCYTES # BLD AUTO: 0.6 K/UL (ref 0.3–1)
MONOCYTES # BLD AUTO: 0.8 K/UL (ref 0.3–1)
MONOCYTES NFR BLD: 5.1 % (ref 4–15)
MONOCYTES NFR BLD: 6.9 % (ref 4–15)
NEUTROPHILS # BLD AUTO: 6.9 K/UL (ref 1.8–7.7)
NEUTROPHILS # BLD AUTO: 8.7 K/UL (ref 1.8–7.7)
NEUTROPHILS NFR BLD: 60.1 % (ref 38–73)
NEUTROPHILS NFR BLD: 69.1 % (ref 38–73)
NRBC BLD-RTO: 0 /100 WBC
NRBC BLD-RTO: 0 /100 WBC
PHOSPHATE SERPL-MCNC: 2.7 MG/DL (ref 2.7–4.5)
PLATELET # BLD AUTO: 299 K/UL (ref 150–450)
PLATELET # BLD AUTO: 321 K/UL (ref 150–450)
PMV BLD AUTO: 9.8 FL (ref 9.2–12.9)
PMV BLD AUTO: 9.9 FL (ref 9.2–12.9)
POCT GLUCOSE: 197 MG/DL (ref 70–110)
POCT GLUCOSE: 70 MG/DL (ref 70–110)
POTASSIUM SERPL-SCNC: 4.3 MMOL/L (ref 3.5–5.1)
PROT SERPL-MCNC: 6.4 G/DL (ref 6–8.4)
RBC # BLD AUTO: 3.28 M/UL (ref 4–5.4)
RBC # BLD AUTO: 3.33 M/UL (ref 4–5.4)
SODIUM SERPL-SCNC: 133 MMOL/L (ref 136–145)
WBC # BLD AUTO: 11.54 K/UL (ref 3.9–12.7)
WBC # BLD AUTO: 12.6 K/UL (ref 3.9–12.7)

## 2022-04-15 PROCEDURE — 84100 ASSAY OF PHOSPHORUS: CPT | Performed by: STUDENT IN AN ORGANIZED HEALTH CARE EDUCATION/TRAINING PROGRAM

## 2022-04-15 PROCEDURE — 99232 PR SUBSEQUENT HOSPITAL CARE,LEVL II: ICD-10-PCS | Mod: ,,, | Performed by: HOSPITALIST

## 2022-04-15 PROCEDURE — 94761 N-INVAS EAR/PLS OXIMETRY MLT: CPT

## 2022-04-15 PROCEDURE — C9399 UNCLASSIFIED DRUGS OR BIOLOG: HCPCS | Performed by: STUDENT IN AN ORGANIZED HEALTH CARE EDUCATION/TRAINING PROGRAM

## 2022-04-15 PROCEDURE — 85025 COMPLETE CBC W/AUTO DIFF WBC: CPT | Mod: 91 | Performed by: STUDENT IN AN ORGANIZED HEALTH CARE EDUCATION/TRAINING PROGRAM

## 2022-04-15 PROCEDURE — 85025 COMPLETE CBC W/AUTO DIFF WBC: CPT | Performed by: STUDENT IN AN ORGANIZED HEALTH CARE EDUCATION/TRAINING PROGRAM

## 2022-04-15 PROCEDURE — 25000003 PHARM REV CODE 250

## 2022-04-15 PROCEDURE — 11000001 HC ACUTE MED/SURG PRIVATE ROOM

## 2022-04-15 PROCEDURE — 25000003 PHARM REV CODE 250: Performed by: STUDENT IN AN ORGANIZED HEALTH CARE EDUCATION/TRAINING PROGRAM

## 2022-04-15 PROCEDURE — C9113 INJ PANTOPRAZOLE SODIUM, VIA: HCPCS | Performed by: STUDENT IN AN ORGANIZED HEALTH CARE EDUCATION/TRAINING PROGRAM

## 2022-04-15 PROCEDURE — 99232 SBSQ HOSP IP/OBS MODERATE 35: CPT | Mod: ,,, | Performed by: HOSPITALIST

## 2022-04-15 PROCEDURE — 80053 COMPREHEN METABOLIC PANEL: CPT | Performed by: STUDENT IN AN ORGANIZED HEALTH CARE EDUCATION/TRAINING PROGRAM

## 2022-04-15 PROCEDURE — 83735 ASSAY OF MAGNESIUM: CPT | Performed by: STUDENT IN AN ORGANIZED HEALTH CARE EDUCATION/TRAINING PROGRAM

## 2022-04-15 PROCEDURE — 36415 COLL VENOUS BLD VENIPUNCTURE: CPT | Performed by: STUDENT IN AN ORGANIZED HEALTH CARE EDUCATION/TRAINING PROGRAM

## 2022-04-15 PROCEDURE — 63600175 PHARM REV CODE 636 W HCPCS: Performed by: STUDENT IN AN ORGANIZED HEALTH CARE EDUCATION/TRAINING PROGRAM

## 2022-04-15 RX ORDER — BISACODYL 10 MG
10 SUPPOSITORY, RECTAL RECTAL DAILY PRN
Status: DISCONTINUED | OUTPATIENT
Start: 2022-04-15 | End: 2022-04-16 | Stop reason: HOSPADM

## 2022-04-15 RX ORDER — OXYCODONE AND ACETAMINOPHEN 10; 325 MG/1; MG/1
1 TABLET ORAL EVERY 8 HOURS PRN
Status: DISCONTINUED | OUTPATIENT
Start: 2022-04-15 | End: 2022-04-15

## 2022-04-15 RX ORDER — OXYCODONE AND ACETAMINOPHEN 10; 325 MG/1; MG/1
1 TABLET ORAL EVERY 4 HOURS PRN
Status: DISCONTINUED | OUTPATIENT
Start: 2022-04-15 | End: 2022-04-16 | Stop reason: HOSPADM

## 2022-04-15 RX ADMIN — ATORVASTATIN CALCIUM 40 MG: 20 TABLET, FILM COATED ORAL at 08:04

## 2022-04-15 RX ADMIN — BISACODYL 10 MG: 10 SUPPOSITORY RECTAL at 03:04

## 2022-04-15 RX ADMIN — PANTOPRAZOLE SODIUM 40 MG: 40 INJECTION, POWDER, FOR SOLUTION INTRAVENOUS at 08:04

## 2022-04-15 RX ADMIN — GABAPENTIN 100 MG: 100 CAPSULE ORAL at 08:04

## 2022-04-15 RX ADMIN — LIDOCAINE 1 PATCH: 50 PATCH CUTANEOUS at 09:04

## 2022-04-15 RX ADMIN — OXYCODONE HYDROCHLORIDE AND ACETAMINOPHEN 1 TABLET: 10; 325 TABLET ORAL at 03:04

## 2022-04-15 RX ADMIN — NORTRIPTYLINE HYDROCHLORIDE 10 MG: 10 CAPSULE ORAL at 09:04

## 2022-04-15 RX ADMIN — INSULIN DETEMIR 6 UNITS: 100 INJECTION, SOLUTION SUBCUTANEOUS at 09:04

## 2022-04-15 RX ADMIN — Medication 16 G: at 11:04

## 2022-04-15 RX ADMIN — OXYCODONE AND ACETAMINOPHEN 1 TABLET: 10; 325 TABLET ORAL at 11:04

## 2022-04-15 RX ADMIN — OXYCODONE HYDROCHLORIDE AND ACETAMINOPHEN 1 TABLET: 10; 325 TABLET ORAL at 07:04

## 2022-04-15 RX ADMIN — GABAPENTIN 100 MG: 100 CAPSULE ORAL at 03:04

## 2022-04-15 RX ADMIN — Medication 6 MG: at 08:04

## 2022-04-15 RX ADMIN — ACETAMINOPHEN 650 MG: 325 TABLET ORAL at 09:04

## 2022-04-15 RX ADMIN — ACETAMINOPHEN 650 MG: 325 TABLET ORAL at 02:04

## 2022-04-15 NOTE — PROGRESS NOTES
[facility] Medicine  Progress Note    Patient Name: Casie Salvador  MRN: [id number]  Patient Class: IP- Inpatient   Admission Date: 4/13/2022  Length of Stay: 1 days  Attending Physician: Risa Dennis*  Primary Care Provider: Adonis Carey MD        Subjective:     Principal Problem:Upper GI bleed        HPI:  57 year old female with a history of diabetes, coronary artery disease with stents, heart failure with reduced ejection fraction of 20%, bilateral lower extremity amputations secondary to the diabetes, history of perferated gastric ulcer who presents with 1 day of nausea and vomiting.  She reports that she was in her usual state of health when approximately 4:00 p.m. she had 1 episode of ground-glass emesis.  She presented the ED was found to have a hemoglobin of 4.7.  She reports that she has noticed she has been fatigued for the last week after discharge for perforated gastric ulcer.    She denies any fevers, chest pain, shortness of breath, or vaginal bleeding.  She does work but her last bowel movement was 2 weeks ago prior to discharge from the hospital.  She has tried over-the-counter laxatives without success.  Additionally she reports that for the last week she has had no appetite.    On evaluation in the emergency room, she was afebrile, a pulse of 100, blood pressure 130/90 and satting 100% on room air.      Overview/Hospital Course:  Patient admitted to Hospital Medicine on 4/14 with upper gastrointestinal bleed with associated anemia with hemoglobin 4.7 on presentation in the setting of known peptic ulcer disease. She underwent CT abdomen & pelvis with oral contrast which did no show evidence of perforation. She received a total of 3 units pRBCs with improvement in her hemoglobin. Gastroenterology following with tentative plans for EGD on 4/18. She is continued on IV Protonix in the mean time and coffee-ground emesis has not recurred and appetite improved.        Interval History: Patient seen and examined this morning. Complaints of left foot pain overnight for which she received oxycodone. Hemoglobin much improved this morning with value in the 8s following 3 units pRBCs. Afebrile with pulse ranging from 80-60s bpm. Systolic blood pressures 110-100s mmHg. She is saturating +94% on room air. Gastroenterology following with plans for EGD on 4/18 as hemoglobin remains stable. Constipation for which oral bowel regimen encouraged in addition to PO suppository ordered PRN.     Review of Systems   Constitutional:  Positive for activity change. Negative for appetite change, chills, diaphoresis, fatigue and fever.   HENT:  Negative for congestion and sore throat.    Eyes:  Negative for photophobia, pain and visual disturbance.   Respiratory:  Negative for cough, shortness of breath and wheezing.    Cardiovascular:  Negative for chest pain, palpitations and leg swelling.   Gastrointestinal:  Positive for constipation. Negative for abdominal distention, abdominal pain, diarrhea, nausea and vomiting.        Reports improvement in appetite, nausea and vomiting.    Endocrine: Negative for cold intolerance, heat intolerance and polyuria.   Genitourinary:  Negative for dysuria and flank pain.   Musculoskeletal:  Positive for gait problem and myalgias. Negative for back pain, joint swelling, neck pain and neck stiffness.        Complaints of left foot pain related to her prior amputation.    Skin:  Negative for rash.   Allergic/Immunologic: Negative for immunocompromised state.   Neurological:  Positive for weakness (non-focal). Negative for dizziness, tremors, syncope, light-headedness, numbness and headaches.   Psychiatric/Behavioral:  Negative for confusion and sleep disturbance. The patient is not nervous/anxious.      Objective:     Vital Signs (Most Recent):  Temp: 98.1 °F (36.7 °C) (04/15/22 0447)  Pulse: 63 (04/15/22 0447)  Resp: 18 (04/15/22 0447)  BP: (!) 105/59 (04/15/22  0447)  SpO2: 96 % (04/15/22 0447)   Vital Signs (24h Range):  Temp:  [96.1 °F (35.6 °C)-98.1 °F (36.7 °C)] 98.1 °F (36.7 °C)  Pulse:  [63-86] 63  Resp:  [16-20] 18  SpO2:  [92 %-100 %] 96 %  BP: ()/(51-62) 105/59     Weight: 81.2 kg (179 lb 0.2 oz)  Body mass index is 28.89 kg/m².    Intake/Output Summary (Last 24 hours) at 4/15/2022 0655  Last data filed at 4/14/2022 2345  Gross per 24 hour   Intake 1147.09 ml   Output 450 ml   Net 697.09 ml      Physical Exam  Vitals and nursing note reviewed.   Constitutional:       General: She is not in acute distress.     Appearance: Normal appearance. She is normal weight. She is not ill-appearing or toxic-appearing.   HENT:      Head: Normocephalic and atraumatic.      Right Ear: External ear normal.      Left Ear: External ear normal.      Nose: Nose normal. No congestion or rhinorrhea.      Mouth/Throat:      Mouth: Mucous membranes are moist.      Pharynx: Oropharynx is clear. No oropharyngeal exudate or posterior oropharyngeal erythema.   Eyes:      General: No scleral icterus.        Right eye: No discharge.         Left eye: No discharge.      Extraocular Movements: Extraocular movements intact.      Conjunctiva/sclera: Conjunctivae normal.   Cardiovascular:      Rate and Rhythm: Normal rate.      Heart sounds: Murmur heard.     No friction rub. No gallop.   Pulmonary:      Effort: Pulmonary effort is normal. No respiratory distress.      Breath sounds: No wheezing, rhonchi or rales.   Abdominal:      General: Bowel sounds are normal. There is no distension.      Palpations: Abdomen is soft.      Tenderness: There is no abdominal tenderness.   Musculoskeletal:         General: Deformity present.      Cervical back: Normal range of motion and neck supple. No rigidity.      Left foot: Deformity present.      Comments: Partial amputation of left foot.      Right Lower Extremity: Right leg is amputated below knee.   Skin:     General: Skin is warm and dry.       Coloration: Skin is not jaundiced.   Neurological:      General: No focal deficit present.      Mental Status: She is alert and oriented to person, place, and time. Mental status is at baseline.      Cranial Nerves: No cranial nerve deficit.      Motor: No weakness.   Psychiatric:         Mood and Affect: Mood normal.         Behavior: Behavior normal.       Significant Labs: All pertinent labs within the past 24 hours have been reviewed.  Recent Lab Results  (Last 5 results in the past 24 hours)        04/15/22  0316   04/14/22  2107   04/14/22  2040   04/14/22  1721   04/14/22  1629        Albumin 2.7               Alkaline Phosphatase 78               ALT 10               Anion Gap 7               Aniso   Slight             AST 12               Baso #   0.11             Basophilic Stippling   Occasional             Basophil %   0.3             BILIRUBIN TOTAL 1.3  Comment: For infants and newborns, interpretation of results should be based  on gestational age, weight and in agreement with clinical  observations.    Premature Infant recommended reference ranges:  Up to 24 hours.............<8.0 mg/dL  Up to 48 hours............<12.0 mg/dL  3-5 days..................<15.0 mg/dL  6-29 days.................<15.0 mg/dL                 BUN 25               Calcium 8.4               Chloride 103               CO2 23               Creatinine 0.9               Differential Method   Automated             eGFR if  >60.0               eGFR if non  >60.0  Comment: Calculation used to obtain the estimated glomerular filtration  rate (eGFR) is the CKD-EPI equation.                  Eos #   0.6             Eosinophil %   1.7             Glucose 77               Gran # (ANC)   30.9             Gran %   85.9             Hematocrit   25.4             Hemoglobin   8.4             Hypo   Occasional             Immature Grans (Abs)   0.35  Comment: Mild elevation in immature granulocytes is non specific  and   can be seen in a variety of conditions including stress response,   acute inflammation, trauma and pregnancy. Correlation with other   laboratory and clinical findings is essential.               Immature Granulocytes   1.0             Lymph #   3.1             Lymph %   8.7             Magnesium 2.0               MCH   25.7             MCHC   33.1             MCV   78             Mono #   0.9             Mono %   2.4             MPV   10.4             nRBC   0             Ovalocytes   Occasional             Phosphorus 2.7               Platelet Estimate   Appears normal             Platelets   277             POCT Glucose     156   113   65       Poik   Slight             Poly   Occasional             Potassium 4.3               PROTEIN TOTAL 6.4               RBC   3.27             RDW   18.6             Sodium 133               Tear Drop Cells   Occasional             WBC   36.02                                    Significant Imaging: I have reviewed all pertinent imaging results/findings within the past 24 hours.      Assessment/Plan:      * Upper GI bleed  Acute blood loss anemia  57 year old female with a history of gastric ulcer perforation, HFrEF (20%), DM who presents with 1 day of coffee ground emesis. Found to have a hemoglobin 4.7; given severity of anemia and that it was 8.9 2 weeks ago suspect slow bleed on background of reported constipation. CT with oral contrast without evidence of perforation. She is status post 3 units pRBCs since admission.    - continue Protonix 40 mg IV BID  - CBCs Q12H and transfuse for hemoglobin < 7  - Gastroenterology following with tentative plans for EGD on 4/18, made NPO at midnight in preparation  - holding DAPT and NSAIDs in the light of known peptic ulcer disease    History of right below knee amputation  - secondary to type 2 diabetes  - declined PT/OT evaluation this admission    Cigarette smoker      Peripheral artery disease  - holding home dose ASA 81 mg  daily and Plavix 75 mg daily and continuing with Lipitor 40 mg daily     Chronic combined systolic and diastolic congestive heart failure  TTE on 02/21/2022:  · The left ventricle is normal in size with severely decreased systolic function. The estimated ejection fraction is 20%.  · Normal right ventricular size with normal right ventricular systolic function.  · Grade II left ventricular diastolic dysfunction.  · Mild left atrial enlargement.  · Elevated central venous pressure (15 mmHg).    - outpatient regimen consists of Lopressor 50 mg daily and losartan 50 mg daily in addition to Lasix 20 mg   - currently on room air and does not appear hypervolemic despite blood transfusions  - holding guide line directed medical therapy in the light of hypotension for now   - diabetic cardiac diet with 2 liter volume restriction for now  - strict I/Os and daily weights    Depression  - continue home dose Buspar 10 mg BID, Celexa 20 mg daily and nortriptyline 10 mg QHS    Constipation due to opioid therapy  - bowel regimen ordered with senna-docusate and Miralax  - reportedly has not had a bowel movement since discharge 2 weeks ago  - patient states oral medications not helpful outpatient  - PRN suppository available     Type 2 diabetes mellitus with diabetic neuropathy, with long-term current use of insulin  - continue with detemir 6U QHS with low dose SSI  - accuchecks ACHS  - cardiac diabetic diet    Coronary artery disease involving native coronary artery of native heart without angina pectoris  - history of BRYANNA placed 15 years ago on DAPT therapy  - continue holding DAPT therapy in setting of GI bleed  - likely just ASA thereafter     Hypertension  - holding home dose regimen in setting of hypotension  - continue to monitor      VTE Risk Mitigation (From admission, onward)         Ordered     IP VTE HIGH RISK PATIENT  Once         04/14/22 0018     Place sequential compression device  Until discontinued         04/14/22  0018                Discharge Planning   SOCO: 4/16/2022     Code Status: Full Code   Is the patient medically ready for discharge?: No    Reason for patient still in hospital (select all that apply): Patient trending condition, Treatment and Consult recommendations  Discharge Plan A: Home with family                  Kishor Tafoya MD  Department of Hospital Medicine   [facility]

## 2022-04-15 NOTE — SUBJECTIVE & OBJECTIVE
Interval History: Patient seen and examined this morning. Complaints of left foot pain overnight for which she received oxycodone. Hemoglobin much improved this morning with value in the 8s following 3 units pRBCs. Afebrile with pulse ranging from 80-60s bpm. Systolic blood pressures 110-100s mmHg. She is saturating +94% on room air. Gastroenterology following with plans for EGD on 4/18 as hemoglobin remains stable. Constipation for which oral bowel regimen encouraged in addition to PO suppository ordered PRN.     Review of Systems   Constitutional:  Positive for activity change. Negative for appetite change, chills, diaphoresis, fatigue and fever.   HENT:  Negative for congestion and sore throat.    Eyes:  Negative for photophobia, pain and visual disturbance.   Respiratory:  Negative for cough, shortness of breath and wheezing.    Cardiovascular:  Negative for chest pain, palpitations and leg swelling.   Gastrointestinal:  Positive for constipation. Negative for abdominal distention, abdominal pain, diarrhea, nausea and vomiting.        Reports improvement in appetite, nausea and vomiting.    Endocrine: Negative for cold intolerance, heat intolerance and polyuria.   Genitourinary:  Negative for dysuria and flank pain.   Musculoskeletal:  Positive for gait problem and myalgias. Negative for back pain, joint swelling, neck pain and neck stiffness.        Complaints of left foot pain related to her prior amputation.    Skin:  Negative for rash.   Allergic/Immunologic: Negative for immunocompromised state.   Neurological:  Positive for weakness (non-focal). Negative for dizziness, tremors, syncope, light-headedness, numbness and headaches.   Psychiatric/Behavioral:  Negative for confusion and sleep disturbance. The patient is not nervous/anxious.      Objective:     Vital Signs (Most Recent):  Temp: 98.1 °F (36.7 °C) (04/15/22 0447)  Pulse: 63 (04/15/22 0447)  Resp: 18 (04/15/22 0447)  BP: (!) 105/59 (04/15/22  0447)  SpO2: 96 % (04/15/22 0447)   Vital Signs (24h Range):  Temp:  [96.1 °F (35.6 °C)-98.1 °F (36.7 °C)] 98.1 °F (36.7 °C)  Pulse:  [63-86] 63  Resp:  [16-20] 18  SpO2:  [92 %-100 %] 96 %  BP: ()/(51-62) 105/59     Weight: 81.2 kg (179 lb 0.2 oz)  Body mass index is 28.89 kg/m².    Intake/Output Summary (Last 24 hours) at 4/15/2022 0655  Last data filed at 4/14/2022 2345  Gross per 24 hour   Intake 1147.09 ml   Output 450 ml   Net 697.09 ml      Physical Exam  Vitals and nursing note reviewed.   Constitutional:       General: She is not in acute distress.     Appearance: Normal appearance. She is normal weight. She is not ill-appearing or toxic-appearing.   HENT:      Head: Normocephalic and atraumatic.      Right Ear: External ear normal.      Left Ear: External ear normal.      Nose: Nose normal. No congestion or rhinorrhea.      Mouth/Throat:      Mouth: Mucous membranes are moist.      Pharynx: Oropharynx is clear. No oropharyngeal exudate or posterior oropharyngeal erythema.   Eyes:      General: No scleral icterus.        Right eye: No discharge.         Left eye: No discharge.      Extraocular Movements: Extraocular movements intact.      Conjunctiva/sclera: Conjunctivae normal.   Cardiovascular:      Rate and Rhythm: Normal rate.      Heart sounds: Murmur heard.     No friction rub. No gallop.   Pulmonary:      Effort: Pulmonary effort is normal. No respiratory distress.      Breath sounds: No wheezing, rhonchi or rales.   Abdominal:      General: Bowel sounds are normal. There is no distension.      Palpations: Abdomen is soft.      Tenderness: There is no abdominal tenderness.   Musculoskeletal:         General: Deformity present.      Cervical back: Normal range of motion and neck supple. No rigidity.      Left foot: Deformity present.      Comments: Partial amputation of left foot.      Right Lower Extremity: Right leg is amputated below knee.   Skin:     General: Skin is warm and dry.       Coloration: Skin is not jaundiced.   Neurological:      General: No focal deficit present.      Mental Status: She is alert and oriented to person, place, and time. Mental status is at baseline.      Cranial Nerves: No cranial nerve deficit.      Motor: No weakness.   Psychiatric:         Mood and Affect: Mood normal.         Behavior: Behavior normal.       Significant Labs: All pertinent labs within the past 24 hours have been reviewed.  Recent Lab Results  (Last 5 results in the past 24 hours)        04/15/22  0316   04/14/22  2107   04/14/22  2040   04/14/22  1721   04/14/22  1629        Albumin 2.7               Alkaline Phosphatase 78               ALT 10               Anion Gap 7               Aniso   Slight             AST 12               Baso #   0.11             Basophilic Stippling   Occasional             Basophil %   0.3             BILIRUBIN TOTAL 1.3  Comment: For infants and newborns, interpretation of results should be based  on gestational age, weight and in agreement with clinical  observations.    Premature Infant recommended reference ranges:  Up to 24 hours.............<8.0 mg/dL  Up to 48 hours............<12.0 mg/dL  3-5 days..................<15.0 mg/dL  6-29 days.................<15.0 mg/dL                 BUN 25               Calcium 8.4               Chloride 103               CO2 23               Creatinine 0.9               Differential Method   Automated             eGFR if  >60.0               eGFR if non  >60.0  Comment: Calculation used to obtain the estimated glomerular filtration  rate (eGFR) is the CKD-EPI equation.                  Eos #   0.6             Eosinophil %   1.7             Glucose 77               Gran # (ANC)   30.9             Gran %   85.9             Hematocrit   25.4             Hemoglobin   8.4             Hypo   Occasional             Immature Grans (Abs)   0.35  Comment: Mild elevation in immature granulocytes is non specific  and   can be seen in a variety of conditions including stress response,   acute inflammation, trauma and pregnancy. Correlation with other   laboratory and clinical findings is essential.               Immature Granulocytes   1.0             Lymph #   3.1             Lymph %   8.7             Magnesium 2.0               MCH   25.7             MCHC   33.1             MCV   78             Mono #   0.9             Mono %   2.4             MPV   10.4             nRBC   0             Ovalocytes   Occasional             Phosphorus 2.7               Platelet Estimate   Appears normal             Platelets   277             POCT Glucose     156   113   65       Poik   Slight             Poly   Occasional             Potassium 4.3               PROTEIN TOTAL 6.4               RBC   3.27             RDW   18.6             Sodium 133               Tear Drop Cells   Occasional             WBC   36.02                                    Significant Imaging: I have reviewed all pertinent imaging results/findings within the past 24 hours.

## 2022-04-15 NOTE — ASSESSMENT & PLAN NOTE
- history of BRYANNA placed 15 years ago on DAPT therapy  - continue holding DAPT therapy in setting of GI bleed  - likely just ASA thereafter

## 2022-04-15 NOTE — PLAN OF CARE
Received patient lying in bed with no acute distress  Respiration even and unlabored  Able to make needs known  AAOx4  Pain management unsuccessful with PRN APAP; new order for Oxycodone 10mg PO q4h PRN  Bisacodyl suppository given due to patient refusing miralax and senna, no BM since last hospital admission.  BM results x2, per patient first BM was small, and second BM was a moderate amount   at bedside  Accu check, AC/HS  EGD rescheduled to 4/18; patient now on cardiac-diabetic diet  Bed locked in lowest position  Call light in reach

## 2022-04-15 NOTE — PROGRESS NOTES
GI Treatment Plan    Casie Salvador is a 57 y.o. female admitted to hospital 4/13/2022 (Hospital Day: 3) due to Upper GI bleed.     Interval History  Hgb remains stable, no overt bleeding.  CT with gastric thickening from prior perforation, which is expected.  No active leak.    Objective  Temp:  [96.1 °F (35.6 °C)-98.1 °F (36.7 °C)] 97.9 °F (36.6 °C) (04/15 0803)  Pulse:  [63-86] 69 (04/15 0803)  BP: ()/(51-62) 98/51 (04/15 0803)  Resp:  [16-20] 20 (04/15 1119)  SpO2:  [92 %-97 %] 96 % (04/15 0803)      Laboratory    Recent Labs   Lab 04/14/22  0759 04/14/22  2107 04/15/22  1005   HGB 5.7* 8.4* 8.5*     Problem List:  1. Upper GI bleeding  2. Acute blood loss anemia  3. History of perforated gastric ulcer  4. OIC     The patient presents with vomiting which progressed to coffee-grounds and anemia with a Hgb of 4.9.  She has not had a bowel movement since her discharge from the hospital over week ago.  CT with no active leak.  Hgb stable after blood transfusion.  Will plan for evaluation with EGD on Monday.  In the meantime would recommend aggressive management of up opioid induced constipation, would recommend a dose of Relistor and scheduled MiraLax bid.  Might also benefit from a dose of Mag citrate.     Recommendations:  - will plan for EGD on Monday  - Trend Hgb q 24 hrs. Transfuse for Hgb <7, unless otherwise indicated  - Maintain IV access with 2 large bore Ivs  - Intravascular resuscitation/support with IVFs   - advance diet, NPO at midnight on Sunday  - Hold all NSAIDs and anticoagulants, unless contraindicated  - Bolus IV pantoprazole 80mg followed by 40mg BID  - Please correct any coagulopathy with platelets and FFP for goal of platelets >50K and INR <2.0  - Please notify GI team if there is significant change in patient's clinical status  - Relistor x1  - MiraLax b.i.d.  - Mag citrate if the above fails    Thank you for involving us in the care of Casie Salvador. Please call with  any additional questions, concerns or changes in the patient's clinical status.    Lisa Ga MD  Gastroenterology Fellow, PGY V

## 2022-04-15 NOTE — ASSESSMENT & PLAN NOTE
TTE on 02/21/2022:  · The left ventricle is normal in size with severely decreased systolic function. The estimated ejection fraction is 20%.  · Normal right ventricular size with normal right ventricular systolic function.  · Grade II left ventricular diastolic dysfunction.  · Mild left atrial enlargement.  · Elevated central venous pressure (15 mmHg).    - outpatient regimen consists of Lopressor 50 mg daily and losartan 50 mg daily in addition to Lasix 20 mg   - currently on room air and does not appear hypervolemic despite blood transfusions  - holding guide line directed medical therapy in the light of hypotension for now   - diabetic cardiac diet with 2 liter volume restriction for now  - strict I/Os and daily weights

## 2022-04-15 NOTE — ASSESSMENT & PLAN NOTE
- bowel regimen ordered with senna-docusate and Miralax  - reportedly has not had a bowel movement since discharge 2 weeks ago  - patient states oral medications not helpful outpatient  - PRN suppository available

## 2022-04-15 NOTE — HOSPITAL COURSE
Patient admitted to Hospital Medicine on 4/14 with upper gastrointestinal bleed with associated anemia with hemoglobin 4.7 on presentation in the setting of known peptic ulcer disease. She underwent CT abdomen & pelvis with oral contrast which did no show evidence of perforation. She received a total of 3 units pRBCs with improvement in her hemoglobin. Gastroenterology following with tentative plans for EGD on 4/18. She is continued on IV Protonix in the mean time and coffee-ground emesis has not recurred and appetite improved. Since patient had been stable for several days, and she expressed a desire to go home, plan for outpatient endoscopy instead with GI cards and PCP follow up. DAPT stopped indefinitely. Hospital course c/b mild hypotension, outpatient GDMT downtitrated.

## 2022-04-15 NOTE — ASSESSMENT & PLAN NOTE
- holding home dose ASA 81 mg daily and Plavix 75 mg daily and continuing with Lipitor 40 mg daily

## 2022-04-15 NOTE — PLAN OF CARE
Pt is AAOx4. Pt remain free from fall and injuries. VS remain stable. Remain NPO due to EGD this AM.  Questions and concerns voiced and answered. Medication compliance. Pain is managed with PRN.  Call light in reach. Bed in low locked position. Side rails x2. Belongings at bedside.

## 2022-04-15 NOTE — ASSESSMENT & PLAN NOTE
57 year old female with a history of gastric ulcer perforation, HFrEF (20%), DM who presents with 1 day of coffee ground emesis. Found to have a hemoglobin 4.7; given severity of anemia and that it was 8.9 2 weeks ago suspect slow bleed on background of reported constipation. CT with oral contrast without evidence of perforation. She is status post 3 units pRBCs since admission.    - continue Protonix 40 mg IV BID  - CBCs Q12H and transfuse for hemoglobin < 7  - Gastroenterology following with tentative plans for EGD on 4/18, made NPO at midnight in preparation  - holding DAPT and NSAIDs in the light of known peptic ulcer disease

## 2022-04-16 VITALS
TEMPERATURE: 96 F | HEART RATE: 65 BPM | BODY MASS INDEX: 28.77 KG/M2 | WEIGHT: 179 LBS | HEIGHT: 66 IN | RESPIRATION RATE: 20 BRPM | OXYGEN SATURATION: 98 % | DIASTOLIC BLOOD PRESSURE: 57 MMHG | SYSTOLIC BLOOD PRESSURE: 104 MMHG

## 2022-04-16 DIAGNOSIS — K27.9 PEPTIC ULCER DISEASE: Primary | ICD-10-CM

## 2022-04-16 PROBLEM — D62 ACUTE BLOOD LOSS ANEMIA: Status: RESOLVED | Noted: 2022-04-15 | Resolved: 2022-04-16

## 2022-04-16 PROBLEM — K59.00 CONSTIPATION: Status: RESOLVED | Noted: 2022-04-14 | Resolved: 2022-04-16

## 2022-04-16 PROBLEM — F17.210 CIGARETTE SMOKER: Chronic | Status: RESOLVED | Noted: 2022-04-02 | Resolved: 2022-04-16

## 2022-04-16 LAB
ALBUMIN SERPL BCP-MCNC: 2.7 G/DL (ref 3.5–5.2)
ALP SERPL-CCNC: 83 U/L (ref 55–135)
ALT SERPL W/O P-5'-P-CCNC: 10 U/L (ref 10–44)
ANION GAP SERPL CALC-SCNC: 8 MMOL/L (ref 8–16)
AST SERPL-CCNC: 11 U/L (ref 10–40)
BASOPHILS # BLD AUTO: 0.05 K/UL (ref 0–0.2)
BASOPHILS NFR BLD: 0.5 % (ref 0–1.9)
BILIRUB SERPL-MCNC: 0.3 MG/DL (ref 0.1–1)
BUN SERPL-MCNC: 17 MG/DL (ref 6–20)
CALCIUM SERPL-MCNC: 8.6 MG/DL (ref 8.7–10.5)
CHLORIDE SERPL-SCNC: 104 MMOL/L (ref 95–110)
CO2 SERPL-SCNC: 25 MMOL/L (ref 23–29)
CREAT SERPL-MCNC: 1 MG/DL (ref 0.5–1.4)
DIFFERENTIAL METHOD: ABNORMAL
EOSINOPHIL # BLD AUTO: 0.8 K/UL (ref 0–0.5)
EOSINOPHIL NFR BLD: 8.2 % (ref 0–8)
ERYTHROCYTE [DISTWIDTH] IN BLOOD BY AUTOMATED COUNT: 20.3 % (ref 11.5–14.5)
EST. GFR  (AFRICAN AMERICAN): >60 ML/MIN/1.73 M^2
EST. GFR  (NON AFRICAN AMERICAN): >60 ML/MIN/1.73 M^2
GLUCOSE SERPL-MCNC: 88 MG/DL (ref 70–110)
HCT VFR BLD AUTO: 28.4 % (ref 37–48.5)
HGB BLD-MCNC: 9 G/DL (ref 12–16)
IMM GRANULOCYTES # BLD AUTO: 0.04 K/UL (ref 0–0.04)
IMM GRANULOCYTES NFR BLD AUTO: 0.4 % (ref 0–0.5)
LYMPHOCYTES # BLD AUTO: 3.4 K/UL (ref 1–4.8)
LYMPHOCYTES NFR BLD: 33.4 % (ref 18–48)
MAGNESIUM SERPL-MCNC: 2.2 MG/DL (ref 1.6–2.6)
MCH RBC QN AUTO: 25.1 PG (ref 27–31)
MCHC RBC AUTO-ENTMCNC: 31.7 G/DL (ref 32–36)
MCV RBC AUTO: 79 FL (ref 82–98)
MONOCYTES # BLD AUTO: 0.8 K/UL (ref 0.3–1)
MONOCYTES NFR BLD: 7.9 % (ref 4–15)
NEUTROPHILS # BLD AUTO: 5 K/UL (ref 1.8–7.7)
NEUTROPHILS NFR BLD: 49.6 % (ref 38–73)
NRBC BLD-RTO: 0 /100 WBC
PHOSPHATE SERPL-MCNC: 3.7 MG/DL (ref 2.7–4.5)
PLATELET # BLD AUTO: 322 K/UL (ref 150–450)
PMV BLD AUTO: 9.9 FL (ref 9.2–12.9)
POCT GLUCOSE: 146 MG/DL (ref 70–110)
POCT GLUCOSE: 242 MG/DL (ref 70–110)
POCT GLUCOSE: 93 MG/DL (ref 70–110)
POTASSIUM SERPL-SCNC: 4.3 MMOL/L (ref 3.5–5.1)
PROT SERPL-MCNC: 6.6 G/DL (ref 6–8.4)
RBC # BLD AUTO: 3.58 M/UL (ref 4–5.4)
SODIUM SERPL-SCNC: 137 MMOL/L (ref 136–145)
WBC # BLD AUTO: 10.15 K/UL (ref 3.9–12.7)

## 2022-04-16 PROCEDURE — C9113 INJ PANTOPRAZOLE SODIUM, VIA: HCPCS | Performed by: STUDENT IN AN ORGANIZED HEALTH CARE EDUCATION/TRAINING PROGRAM

## 2022-04-16 PROCEDURE — 80053 COMPREHEN METABOLIC PANEL: CPT | Performed by: STUDENT IN AN ORGANIZED HEALTH CARE EDUCATION/TRAINING PROGRAM

## 2022-04-16 PROCEDURE — 85025 COMPLETE CBC W/AUTO DIFF WBC: CPT | Performed by: STUDENT IN AN ORGANIZED HEALTH CARE EDUCATION/TRAINING PROGRAM

## 2022-04-16 PROCEDURE — 25000003 PHARM REV CODE 250: Performed by: STUDENT IN AN ORGANIZED HEALTH CARE EDUCATION/TRAINING PROGRAM

## 2022-04-16 PROCEDURE — 84100 ASSAY OF PHOSPHORUS: CPT | Performed by: STUDENT IN AN ORGANIZED HEALTH CARE EDUCATION/TRAINING PROGRAM

## 2022-04-16 PROCEDURE — 63600175 PHARM REV CODE 636 W HCPCS: Performed by: STUDENT IN AN ORGANIZED HEALTH CARE EDUCATION/TRAINING PROGRAM

## 2022-04-16 PROCEDURE — 36415 COLL VENOUS BLD VENIPUNCTURE: CPT | Performed by: STUDENT IN AN ORGANIZED HEALTH CARE EDUCATION/TRAINING PROGRAM

## 2022-04-16 PROCEDURE — 99239 PR HOSPITAL DISCHARGE DAY,>30 MIN: ICD-10-PCS | Mod: ,,, | Performed by: HOSPITALIST

## 2022-04-16 PROCEDURE — 99239 HOSP IP/OBS DSCHRG MGMT >30: CPT | Mod: ,,, | Performed by: HOSPITALIST

## 2022-04-16 PROCEDURE — 83735 ASSAY OF MAGNESIUM: CPT | Performed by: STUDENT IN AN ORGANIZED HEALTH CARE EDUCATION/TRAINING PROGRAM

## 2022-04-16 RX ORDER — PANTOPRAZOLE SODIUM 40 MG/1
40 TABLET, DELAYED RELEASE ORAL 2 TIMES DAILY
Qty: 60 TABLET | Refills: 11 | Status: SHIPPED | OUTPATIENT
Start: 2022-04-16 | End: 2022-10-27 | Stop reason: SDUPTHER

## 2022-04-16 RX ORDER — NAPROXEN SODIUM 220 MG/1
81 TABLET, FILM COATED ORAL DAILY
Status: ON HOLD
Start: 2022-04-16 | End: 2023-10-27 | Stop reason: HOSPADM

## 2022-04-16 RX ORDER — LOSARTAN POTASSIUM 25 MG/1
25 TABLET ORAL DAILY
Qty: 30 TABLET | Refills: 11 | Status: ON HOLD | OUTPATIENT
Start: 2022-04-16 | End: 2023-10-27 | Stop reason: HOSPADM

## 2022-04-16 RX ORDER — METOPROLOL SUCCINATE 25 MG/1
25 TABLET, EXTENDED RELEASE ORAL DAILY
Qty: 30 TABLET | Refills: 11 | Status: ON HOLD | OUTPATIENT
Start: 2022-04-16 | End: 2023-04-16

## 2022-04-16 RX ADMIN — GABAPENTIN 100 MG: 100 CAPSULE ORAL at 08:04

## 2022-04-16 RX ADMIN — OXYCODONE HYDROCHLORIDE AND ACETAMINOPHEN 1 TABLET: 10; 325 TABLET ORAL at 04:04

## 2022-04-16 RX ADMIN — CITALOPRAM HYDROBROMIDE 20 MG: 20 TABLET ORAL at 08:04

## 2022-04-16 RX ADMIN — OXYCODONE HYDROCHLORIDE AND ACETAMINOPHEN 1 TABLET: 10; 325 TABLET ORAL at 12:04

## 2022-04-16 RX ADMIN — OXYCODONE HYDROCHLORIDE AND ACETAMINOPHEN 1 TABLET: 10; 325 TABLET ORAL at 08:04

## 2022-04-16 RX ADMIN — PANTOPRAZOLE SODIUM 40 MG: 40 INJECTION, POWDER, FOR SOLUTION INTRAVENOUS at 08:04

## 2022-04-16 RX ADMIN — SENNOSIDES 8.6 MG: 8.6 TABLET ORAL at 08:04

## 2022-04-16 RX ADMIN — ATORVASTATIN CALCIUM 40 MG: 20 TABLET, FILM COATED ORAL at 08:04

## 2022-04-16 RX ADMIN — POLYETHYLENE GLYCOL 3350 17 G: 17 POWDER, FOR SOLUTION ORAL at 08:04

## 2022-04-16 NOTE — PLAN OF CARE
Received patient lying in bed with no apparent distress  Respiration even and unlabored  Able to make needs known  AAOx4  PIV removed before discharge  All personal belongings given to patient

## 2022-04-16 NOTE — ASSESSMENT & PLAN NOTE
57 year old female with a history of gastric ulcer perforation, HFrEF (20%), DM who presents with 1 day of coffee ground emesis. Found to have a hemoglobin 4.7; given severity of anemia and that it was 8.9 2 weeks ago suspect slow bleed on background of reported constipation. CT with oral contrast without evidence of perforation. She is status post 3 units pRBCs since admission.    - continue Protonix 40 mg IV BID - continue home PO Protonix on discharge  - CBCs Q12H and transfuse for hemoglobin < 7  - Gastroenterology following with tentative plans for EGD on 4/18 - patient wanted to go home, will do EGD outpatient  - holding DAPT and NSAIDs in the light of known peptic ulcer disease

## 2022-04-16 NOTE — PROGRESS NOTES
GI Treatment Plan    Casie Salvador is a 57 y.o. female admitted to hospital 4/13/2022 (Hospital Day: 4) due to Upper GI bleed.     Interval History  The patient had a few bowel movements today that are brown.  Hgb remained stable.    Objective  Temp:  [96.1 °F (35.6 °C)-98 °F (36.7 °C)] 96.1 °F (35.6 °C) (04/16 0743)  Pulse:  [65-71] 65 (04/16 0743)  BP: (102-121)/(55-70) 104/57 (04/16 0743)  Resp:  [16-20] 20 (04/16 1237)  SpO2:  [93 %-98 %] 98 % (04/16 0743)    Laboratory    Recent Labs   Lab 04/15/22  1005 04/15/22  1723 04/16/22  0843   HGB 8.5* 8.4* 9.0*         Assessment and Plan  - since the patient has been stable with no overt GI bleeding with stable Hgb, can plan for EGD as expedited outpatient  - continue PPI p.o. b.i.d.  - avoid NSAIDs  - We are signing-off. Please call with any questions.    Thank you for involving us in the care of Casie Salvador. Please call with any additional questions, concerns or changes in the patient's clinical status.    Lisa Ga MD  Gastroenterology Fellow, PGY V

## 2022-04-16 NOTE — ASSESSMENT & PLAN NOTE
- history of BRYANNA placed 15 years ago on DAPT therapy  - continue holding DAPT therapy in setting of GI bleed until seen in outpatient clinic

## 2022-04-16 NOTE — DISCHARGE INSTRUCTIONS
STOP ASPIRIN AND CLOPIDOGREL AND MOBIC UNTIL YOU SEE GI IN THE CLINIC, AFTER YOUR SCOPE  TAKE PANTOPRAZOLE TWICE A DAY    FOLLOW UP WITH PCP AND WITH A CARDIOLOGIST FOR YOUR HEART FAILURE  LOSARTAN 25MG DAILY  TOPROL-XL 25MG DAILY - STOP TAKING LOPRESSOR

## 2022-04-16 NOTE — PLAN OF CARE
[facility]  Discharge Final Note    Primary Care Provider: Adonis Carey MD    Expected Discharge Date: 4/18/2022    Final Discharge Note (most recent)       Final Note - 04/16/22 1253          Final Note    Assessment Type Final Discharge Note (P)      Anticipated Discharge Disposition Home or Self Care (P)         Post-Acute Status    Post-Acute Authorization Other (P)      Other Status No Post-Acute Service Needs (P)      Discharge Delays None known at this time (P)                      Important Message from Medicare         Sadie Rebolledo LMSW  Case Management Social Worker   [facility] [address]

## 2022-04-16 NOTE — DISCHARGE SUMMARY
[facility] Medicine  Discharge Summary      Patient Name: Casie Salvador  MRN: [id number]  Patient Class: IP- Inpatient  Admission Date: 4/13/2022  Hospital Length of Stay: 2 days  Discharge Date and Time:  04/16/2022 12:27 PM  Attending Physician: Risa Dennis*   Discharging Provider: Vicki Carr MD  Primary Care Provider: Adonis Carey MD  [facility] Medicine Team: [facility] 3 Vicki Carr MD    HPI:   57 year old female with a history of diabetes, coronary artery disease with stents, heart failure with reduced ejection fraction of 20%, bilateral lower extremity amputations secondary to the diabetes, history of perferated gastric ulcer who presents with 1 day of nausea and vomiting.  She reports that she was in her usual state of health when approximately 4:00 p.m. she had 1 episode of ground-glass emesis.  She presented the ED was found to have a hemoglobin of 4.7.  She reports that she has noticed she has been fatigued for the last week after discharge for perforated gastric ulcer.    She denies any fevers, chest pain, shortness of breath, or vaginal bleeding.  She does work but her last bowel movement was 2 weeks ago prior to discharge from the hospital.  She has tried over-the-counter laxatives without success.  Additionally she reports that for the last week she has had no appetite.    On evaluation in the emergency room, she was afebrile, a pulse of 100, blood pressure 130/90 and satting 100% on room air.    Procedure(s) (LRB):  EGD (ESOPHAGOGASTRODUODENOSCOPY) (N/A)  - this will be done outpatient    Hospital Course:   Patient admitted to Hospital Medicine on 4/14 with upper gastrointestinal bleed with associated anemia with hemoglobin 4.7 on presentation in the setting of known peptic ulcer disease. She underwent CT abdomen & pelvis with oral contrast which did no show evidence of perforation. She received a total of 3 units pRBCs with improvement in her hemoglobin.  Gastroenterology following with tentative plans for EGD on 4/18. She is continued on IV Protonix in the mean time and coffee-ground emesis has not recurred and appetite improved. Since patient had been stable for several days, and she expressed a desire to go home, plan for outpatient endoscopy instead with GI cards and PCP follow up. DAPT stopped indefinitely. Hospital course c/b mild hypotension, outpatient GDMT downtitrated.    Changed medications  Pantoprazole twice a day  Losartan 25mg daily  Toprol-XL 25mg daily  Stop Lopressor, aspirin, clopidogrel, and Mobic    Goals of Care Treatment Preferences:  Code Status: Full Code      Consults:   Consults (From admission, onward)        Status Ordering Provider     Inpatient consult to Registered Dietitian/Nutritionist  Once        Provider:  (Not yet assigned)    Acknowledged MADHAVI MILNER     Inpatient consult to Gastroenterology  Once        Provider:  (Not yet assigned)    Completed ARYAN JUAREZ        Physical Exam  Vitals and nursing note reviewed.   Constitutional:       General: She is not in acute distress.     Appearance: Normal appearance. She is normal weight. She is not ill-appearing or toxic-appearing.   HENT:      Head: Normocephalic and atraumatic.      Right Ear: External ear normal.      Left Ear: External ear normal.      Nose: Nose normal. No congestion or rhinorrhea.      Mouth/Throat:      Mouth: Mucous membranes are moist.      Pharynx: Oropharynx is clear. No oropharyngeal exudate or posterior oropharyngeal erythema.   Eyes:      General: No scleral icterus.        Right eye: No discharge.         Left eye: No discharge.      Extraocular Movements: Extraocular movements intact.      Conjunctiva/sclera: Conjunctivae normal.   Cardiovascular:      Rate and Rhythm: Normal rate.      Heart sounds: Murmur heard.     No friction rub. No gallop.   Pulmonary:      Effort: Pulmonary effort is normal. No respiratory distress.      Breath  sounds: No wheezing, rhonchi or rales.   Abdominal:      General: Bowel sounds are normal. There is no distension.      Palpations: Abdomen is soft.      Tenderness: There is no abdominal tenderness.   Musculoskeletal:         General: Deformity present.      Cervical back: Normal range of motion and neck supple. No rigidity.      Left foot: Deformity present.      Comments: Partial amputation of left foot.      Right Lower Extremity: Right leg is amputated below knee.   Skin:     General: Skin is warm and dry.      Coloration: Skin is not jaundiced.   Neurological:      General: No focal deficit present.      Mental Status: She is alert and oriented to person, place, and time. Mental status is at baseline.      Cranial Nerves: No cranial nerve deficit.      Motor: No weakness.   Psychiatric:         Mood and Affect: Mood normal.         Behavior: Behavior normal.       No new Assessment & Plan notes have been filed under this hospital service since the last note was generated.  Service: Hospital Medicine    Final Active Diagnoses:    Diagnosis Date Noted POA    PRINCIPAL PROBLEM:  Upper GI bleed [K92.2] 04/14/2022 Yes    Peptic ulcer disease [K27.9] 04/15/2022 Yes    History of right below knee amputation [Z89.511] 04/02/2022 Not Applicable     Chronic    Peripheral artery disease [I73.9] 04/02/2022 Yes     Chronic    Chronic combined systolic and diastolic congestive heart failure [I50.42] 02/21/2022 Yes     Chronic    Hypertension [I10] 02/20/2022 Yes     Chronic    Coronary artery disease involving native coronary artery of native heart without angina pectoris [I25.10] 02/20/2022 Yes     Chronic    Type 2 diabetes mellitus with diabetic neuropathy, with long-term current use of insulin [E11.40, Z79.4] 02/20/2022 Not Applicable     Chronic    Depression [F32.A] 02/20/2022 Yes     Chronic    Constipation due to opioid therapy [K59.03, T40.2X5A] 02/20/2022 Yes     Chronic      Problems Resolved During  this Admission:    Diagnosis Date Noted Date Resolved POA    Acute blood loss anemia [D62] 04/15/2022 04/16/2022 Yes    Constipation [K59.00] 04/14/2022 04/16/2022 Yes    Cigarette smoker [F17.210] 04/02/2022 04/16/2022 Yes     Chronic       Discharged Condition: stable    Disposition:     Follow Up:    Patient Instructions:      Ambulatory referral/consult to Gastroenterology   Standing Status: Future   Referral Priority: Routine Referral Type: Consultation   Referral Reason: Specialty Services Required   Requested Specialty: Gastroenterology   Number of Visits Requested: 1     Ambulatory referral/consult to Cardiology   Standing Status: Future   Referral Priority: Routine Referral Type: Consultation   Referral Reason: Specialty Services Required   Requested Specialty: Cardiology   Number of Visits Requested: 1     Ambulatory referral/consult to Internal Medicine   Standing Status: Future   Referral Priority: Routine Referral Type: Consultation   Referral Reason: Specialty Services Required   Requested Specialty: Internal Medicine   Number of Visits Requested: 1       Significant Diagnostic Studies: EGD will be done outpatient. CT with contrast with no source of bleeding.    Pending Diagnostic Studies:     Procedure Component Value Units Date/Time    CBC with Automated Differential [[id number]] Collected: 04/15/22 1005    Order Status: Sent Lab Status: In process Updated: 04/15/22 1005    Specimen: Blood          Medications:  Reconciled Home Medications:      Medication List      START taking these medications    metoprolol succinate 25 MG 24 hr tablet  Commonly known as: TOPROL-XL  Take 1 tablet (25 mg total) by mouth once daily.        CHANGE how you take these medications    losartan 25 MG tablet  Commonly known as: COZAAR  Take 1 tablet (25 mg total) by mouth once daily.  What changed:   · medication strength  · how much to take  · additional instructions     pantoprazole 40 MG tablet  Commonly known as:  PROTONIX  Take 1 tablet (40 mg total) by mouth 2 (two) times daily.  What changed: when to take this        CONTINUE taking these medications    atorvastatin 40 MG tablet  Commonly known as: LIPITOR  Take 40 mg by mouth once daily.     ATROVENT HFA 17 mcg/actuation inhaler  Generic drug: ipratropium  Inhale 2 puffs into the lungs once daily.     blood sugar diagnostic Strp  by Misc.(Non-Drug; Combo Route) route.     busPIRone 10 MG tablet  Commonly known as: BUSPAR  Take 10 mg by mouth 2 (two) times daily.     carisoprodoL 350 MG tablet  Commonly known as: SOMA  Take 350 mg by mouth once daily.     citalopram 20 MG tablet  Commonly known as: CeleXA  Take 20 mg by mouth once daily.     cyproheptadine 4 mg tablet  Commonly known as: PERIACTIN  Take 4 mg by mouth every other day.     docusate sodium 100 MG capsule  Commonly known as: COLACE  Take 1 capsule (100 mg total) by mouth 2 (two) times daily.     ergocalciferol 50,000 unit Cap  Commonly known as: ERGOCALCIFEROL  Take 50,000 Units by mouth every 7 days.     ferrous sulfate 325 mg (65 mg iron) Tab tablet  Commonly known as: FEOSOL  Take 325 mg by mouth 2 (two) times daily.     fluticasone propionate 50 mcg/actuation nasal spray  Commonly known as: FLONASE  1 spray by Each Nostril route once daily.     furosemide 20 MG tablet  Commonly known as: LASIX  Take 1 tablet (20 mg total) by mouth 2 (two) times daily. Hold this. Do not take it until you see your doctor.     gabapentin 600 MG tablet  Commonly known as: NEURONTIN  Take 600 mg by mouth 2 (two) times daily.     glipiZIDE 10 MG tablet  Commonly known as: GLUCOTROL  Take 1 tablet (10 mg total) by mouth 2 (two) times daily before meals. Hold this. Do not take it until you see your doctor.     lancets 28 gauge Misc  by Misc.(Non-Drug; Combo Route) route.     LIDOcaine-prilocaine cream  Commonly known as: EMLA  Apply topically once daily.     metFORMIN 1000 MG tablet  Commonly known as: GLUCOPHAGE  Take 1 tablet  "(1,000 mg total) by mouth 2 (two) times daily with meals. Hold this. Do not take it until you see your doctor.     metoclopramide HCl 10 MG tablet  Commonly known as: REGLAN  Take 10 mg by mouth 2 (two) times a day.     montelukast 10 mg tablet  Commonly known as: SINGULAIR  Take 10 mg by mouth every evening.     NIVA-FOL 2.5-25-2 mg Tab  Generic drug: folic acid-vit B6-vit B12 2.5-25-2 mg  Take 1 tablet by mouth once daily.     ondansetron 4 MG Tbdl  Commonly known as: ZOFRAN-ODT  Take 1 tablet (4 mg total) by mouth every 8 (eight) hours as needed.     oxyCODONE-acetaminophen  mg per tablet  Commonly known as: PERCOCET  Take 1 tablet by mouth every 8 (eight) hours as needed for Pain.     * pen needle, diabetic 31 gauge x 3/16" Ndle  by Misc.(Non-Drug; Combo Route) route.     * pen needle, diabetic 31 gauge x 5/16" Ndle  Use to inject insulin into the skin every evening.     potassium chloride 10 MEQ Cpsr  Commonly known as: MICRO-K  Take 1 capsule (10 mEq total) by mouth once daily. Hold this. Do not take it until you see your doctor.     pregabalin 150 MG capsule  Commonly known as: LYRICA  Take 150 mg by mouth 2 (two) times daily.     promethazine 6.25 mg/5 mL syrup  Commonly known as: PHENERGAN  TAKE 10 mls BY MOUTH EVERY 6 HOURS AS NEEDED     zolpidem 10 mg Tab  Commonly known as: AMBIEN  Take 10 mg by mouth nightly as needed.         * This list has 2 medication(s) that are the same as other medications prescribed for you. Read the directions carefully, and ask your doctor or other care provider to review them with you.            STOP taking these medications    amLODIPine 10 MG tablet  Commonly known as: NORVASC     aspirin 81 MG EC tablet  Commonly known as: ECOTRIN     clopidogreL 75 mg tablet  Commonly known as: PLAVIX     hydrALAZINE 50 MG tablet  Commonly known as: APRESOLINE     hyoscyamine 0.125 mg Tab  Commonly known as: ANASPAZ,LEVSIN     insulin glargine 100 unit/mL injection  Commonly known " as: Lantus     LANTUS U-100 INSULIN SUBQ     lisinopriL 2.5 MG tablet  Commonly known as: PRINIVIL,ZESTRIL     meloxicam 7.5 MG tablet  Commonly known as: MOBIC     metoprolol tartrate 50 MG tablet  Commonly known as: LOPRESSOR            Indwelling Lines/Drains at time of discharge:   Lines/Drains/Airways     None                 Time spent on the discharge of patient: 40 minutes         Vicki Carr MD  Department of Hospital Medicine  [facility]

## 2022-04-16 NOTE — PLAN OF CARE
Received patient lying in bed with no apparent distress.   AAOx4  Able to make needs known  Pain management with Oxycodone 10mg PO q4h PRN d/t pain to left foot  BM x1 during this shift; Dr Dennis assessed at bedside  Patient stable enough to go home  Noted increase in Hgb to 9.0 from 8.4  Discharged at this time  PIV removed from LAC with catheter intact   pushed patient off unit in w/c with all personal belongings in her lap.

## 2022-04-16 NOTE — ASSESSMENT & PLAN NOTE
TTE on 02/21/2022:  · The left ventricle is normal in size with severely decreased systolic function. The estimated ejection fraction is 20%.  · Normal right ventricular size with normal right ventricular systolic function.  · Grade II left ventricular diastolic dysfunction.  · Mild left atrial enlargement.  · Elevated central venous pressure (15 mmHg).    - outpatient regimen consists of Lopressor 50 mg daily and losartan 50 mg daily in addition to Lasix 20 mg   - currently on room air and does not appear hypervolemic despite blood transfusions  - holding guide line directed medical therapy in the light of hypotension for now   - diabetic cardiac diet with 2 liter volume restriction for now  - strict I/Os and daily weights  - Discharged on reduced dose of medications - losartan 25mg and toprol-XL 25mg  - F/u in cardiology clinic

## 2022-04-18 ENCOUNTER — PATIENT OUTREACH (OUTPATIENT)
Dept: ADMINISTRATIVE | Facility: CLINIC | Age: 57
End: 2022-04-18
Payer: MEDICAID

## 2022-04-18 ENCOUNTER — TELEPHONE (OUTPATIENT)
Dept: ENDOSCOPY | Facility: HOSPITAL | Age: 57
End: 2022-04-18
Payer: MEDICAID

## 2022-04-18 NOTE — PROGRESS NOTES
Please forward this important TCC information to your provider in order to maximize the post discharge care delivery of this patient.    C3 nurse spoke with Casie Salvador  for a TCC post hospital discharge follow up call. The patient has a scheduled HOSFU appointment with RYLEY Cobb on 04/21/2022  @ [facility].

## 2022-04-18 NOTE — TELEPHONE ENCOUNTER
----- Message from Lisa Ga MD sent at 4/16/2022 12:49 PM CDT -----  Regarding: expedited EGD post hospital dicharge  Can we please schedule the patient for an expedited EGD in the next 1-2 weeks. Needs to be on 2nd floor. thanks

## 2022-04-21 ENCOUNTER — OFFICE VISIT (OUTPATIENT)
Dept: HOME HEALTH SERVICES | Facility: CLINIC | Age: 57
End: 2022-04-21
Payer: MEDICAID

## 2022-04-21 DIAGNOSIS — E11.8 DM (DIABETES MELLITUS), TYPE 2 WITH COMPLICATIONS: ICD-10-CM

## 2022-04-21 DIAGNOSIS — Z89.511 HISTORY OF RIGHT BELOW KNEE AMPUTATION: Chronic | ICD-10-CM

## 2022-04-21 DIAGNOSIS — I10 HYPERTENSION, UNSPECIFIED TYPE: Chronic | ICD-10-CM

## 2022-04-21 DIAGNOSIS — Z72.0 TOBACCO ABUSE: ICD-10-CM

## 2022-04-21 DIAGNOSIS — K92.2 UPPER GI BLEED: ICD-10-CM

## 2022-04-21 PROCEDURE — 99497 PR ADVNCD CARE PLAN 30 MIN: ICD-10-PCS | Mod: S$GLB,,, | Performed by: NURSE PRACTITIONER

## 2022-04-21 PROCEDURE — 3044F HG A1C LEVEL LT 7.0%: CPT | Mod: CPTII,S$GLB,, | Performed by: NURSE PRACTITIONER

## 2022-04-21 PROCEDURE — 3078F PR MOST RECENT DIASTOLIC BLOOD PRESSURE < 80 MM HG: ICD-10-PCS | Mod: CPTII,S$GLB,, | Performed by: NURSE PRACTITIONER

## 2022-04-21 PROCEDURE — 3074F SYST BP LT 130 MM HG: CPT | Mod: CPTII,S$GLB,, | Performed by: NURSE PRACTITIONER

## 2022-04-21 PROCEDURE — 99497 ADVNCD CARE PLAN 30 MIN: CPT | Mod: S$GLB,,, | Performed by: NURSE PRACTITIONER

## 2022-04-21 PROCEDURE — 4010F PR ACE/ARB THEARPY RXD/TAKEN: ICD-10-PCS | Mod: CPTII,S$GLB,, | Performed by: NURSE PRACTITIONER

## 2022-04-21 PROCEDURE — 99350 HOME/RES VST EST HIGH MDM 60: CPT | Mod: S$GLB,,, | Performed by: NURSE PRACTITIONER

## 2022-04-21 PROCEDURE — 99350 PR HOME VISIT,ESTAB PATIENT,LEVEL IV: ICD-10-PCS | Mod: S$GLB,,, | Performed by: NURSE PRACTITIONER

## 2022-04-21 PROCEDURE — 4010F ACE/ARB THERAPY RXD/TAKEN: CPT | Mod: CPTII,S$GLB,, | Performed by: NURSE PRACTITIONER

## 2022-04-21 PROCEDURE — 3074F PR MOST RECENT SYSTOLIC BLOOD PRESSURE < 130 MM HG: ICD-10-PCS | Mod: CPTII,S$GLB,, | Performed by: NURSE PRACTITIONER

## 2022-04-21 PROCEDURE — 3078F DIAST BP <80 MM HG: CPT | Mod: CPTII,S$GLB,, | Performed by: NURSE PRACTITIONER

## 2022-04-21 PROCEDURE — 3044F PR MOST RECENT HEMOGLOBIN A1C LEVEL <7.0%: ICD-10-PCS | Mod: CPTII,S$GLB,, | Performed by: NURSE PRACTITIONER

## 2022-04-22 VITALS
DIASTOLIC BLOOD PRESSURE: 66 MMHG | SYSTOLIC BLOOD PRESSURE: 129 MMHG | OXYGEN SATURATION: 98 % | HEART RATE: 70 BPM | TEMPERATURE: 98 F | RESPIRATION RATE: 20 BRPM

## 2022-04-22 PROBLEM — Z72.0 TOBACCO ABUSE: Status: ACTIVE | Noted: 2022-04-22

## 2022-04-22 PROBLEM — E11.8 DM (DIABETES MELLITUS), TYPE 2 WITH COMPLICATIONS: Status: ACTIVE | Noted: 2022-02-20

## 2022-04-23 NOTE — PATIENT INSTRUCTIONS
Instructions:  - [facility] to schedule home follow-up visit with patient as needed.  - Continue all medications, treatments and therapies as ordered.   - Follow all instructions, recommendations as discussed.  - Maintain Safety Precautions at all times.  - Attend all medical appointments as scheduled.  - For worsening symptoms: call Primary Care Physician or Nurse Practitioner.  - For emergencies, call 911 or immediately report to the nearest emergency room.  - Limit Risks of environmental exposure to coronavirus/COVID-19 as discussed including: social distancing, hand hygiene, and limiting departures from the home for necessities only.

## 2022-04-23 NOTE — PROGRESS NOTES
"[facility]  Transition of Care Home Visit    Visit Date: 4/21/2022  Encounter Provider: Telma Russo NP  PCP:  Adonis Carey MD    PRESENTING HISTORY      Patient ID: Casie Salvador 57 y.o. female.    Consult Requested By:  No ref. provider found  Reason for Consult:  Hospital Follow Up    Chief Complaint: Hospital Follow Up    The patient is being seen at home due to a physical debility that presents a taxing effort to leave the home, to mitigate high risk of hospital readmission or due to the limited availability of reliable or safe options for transportation to the point of access to the provider. The visit meets the criteria for medical necessity as defined by CMS as "health-care services needed to prevent, diagnose, or treat an illness, injury, condition, disease, or its symptoms and that meet accepted standards of medicine." Prior to treatment on this visit the chart was reviewed and patient consent was obtained.    HPI:   57 year old female with a history of diabetes, coronary artery disease with stents, heart failure with reduced ejection fraction of 20%, bilateral lower extremity amputations secondary to the diabetes, history of perferated gastric ulcer who presents with 1 day of nausea and vomiting.  She reports that she was in her usual state of health when approximately 4:00 p.m. she had 1 episode of ground-glass emesis.  She presented the ED was found to have a hemoglobin of 4.7.  She reports that she has noticed she has been fatigued for the last week after discharge for perforated gastric ulcer.   She denies any fevers, chest pain, shortness of breath, or vaginal bleeding.  She does work but her last bowel movement was 2 weeks ago prior to discharge from the hospital.  She has tried over-the-counter laxatives without success.  Additionally she reports that for the last week she has had no appetite.   On evaluation in the emergency room, she was afebrile, a pulse of 100, blood " pressure 130/90 and satting 100% on room air.   Procedure(s) (LRB):  EGD (ESOPHAGOGASTRODUODENOSCOPY) (N/A)  - this will be done outpatient     Hospital Course:   Patient admitted to Hospital Medicine on 4/14 with upper gastrointestinal bleed with associated anemia with hemoglobin 4.7 on presentation in the setting of known peptic ulcer disease. She underwent CT abdomen & pelvis with oral contrast which did no show evidence of perforation. She received a total of 3 units pRBCs with improvement in her hemoglobin. Gastroenterology following with tentative plans for EGD on 4/18. She is continued on IV Protonix in the mean time and coffee-ground emesis has not recurred and appetite improved. Since patient had been stable for several days, and she expressed a desire to go home, plan for outpatient endoscopy instead with GI cards and PCP follow up. DAPT stopped indefinitely. Hospital course c/b mild hypotension, outpatient GDMT downtitrated.      Today:  Ms. Casie Salvador is a 57 y.o. female is being seen and examined at home today for transitional care visit to the home environment post-discharge from inpatient hospitalization encounter described above. Patient is found resting comfortably in wheelchair, no signs of distress.  Denies visual signs of bleeding, chest pain, SOB, fever, chills, cough, congestion, change in bladder habits, change in bowel habits.  Medications reviewed, reports taking medication as prescribed.  She is aware of upcoming appointments and plan on attending.  Reports good appetite, BM pattern, sleep pattern.  She requires assistance with ADLs, she has a PCA through long term care program who comes over daily to assist with care, spouse is also in home. No other needs identified at this time. Risks of environmental exposure to coronavirus discussed including: social distancing, hand hygiene, and limiting departures from the home for necessities only.  Reports understanding and willingness  to comply.     Attestation: Screening criteria to assess the level of the patient's risk for infection with COVID-19 as recommended by the CDC at the time of the above documented home visit concluded appropriateness to proceed. Universal precautions were maintained at all times, including provider use of >60% alcohol gel hand  immediately prior to entry and upon departing the patient's home as well as cleaning of equipment used in home visit with antibacterial/germicidal disposable wipes.    Admission Date: 4/13/2022  Hospital Length of Stay: 2 days  Discharge Date and Time:  04/16/2022 12:27 PM  _________________________________________________________________    Review of Systems   Constitutional: Negative for chills and fever.   HENT: Negative for congestion, postnasal drip and rhinorrhea.    Eyes: Negative for visual disturbance.   Respiratory: Negative for chest tightness and shortness of breath.    Cardiovascular: Negative for chest pain and palpitations.   Gastrointestinal: Negative for abdominal pain, blood in stool, nausea and vomiting.   Musculoskeletal: Positive for gait problem.   Skin: Negative for color change and wound.   Neurological: Negative for dizziness and weakness.   Hematological: Does not bruise/bleed easily.   Psychiatric/Behavioral: Negative for agitation.       Assessments:  · Environmental: single story home, no steps to enter, adequate lighting and temperature control  · Functional Status: Assistance with ADL's/IADL's, wheelchair bound, continent of bowel and bladder  · Safety: Fall Precautions, COVID Precautions/Social Distancing/Mask Use  · Nutritional: Adequate  · Home Health: n/a  · DME/Supplies: wheelchair/right leg prothesis     Ethical / Legal: Advance Care Planning   Capacity to make medical decisions:  Yes, Conflict No  · Surrogate decision maker:  Name Mario, Relationship: spouse  · Advance Directives:  No  · HCPOA: No  · [place]:  No  · Code Status:  Full  code    Advanced Care Directives, HCPOA and [place] forms left in the home for family review, discussion and signing with instructions to return upon their next provider encounter for inclusion to the medical record. Discussed Advance Care Planning for 20 minutes.    PAST HISTORY:     Past Medical History:   Diagnosis Date    Chronic combined systolic and diastolic congestive heart failure     Coronary artery disease     Diabetes mellitus     Encounter for blood transfusion     Gastric ulcer with hemorrhage     Heart attack     History of gastric ulcer 4/2/2022    Hypertension     NSAID induced gastritis 4/2/2022    Perforated viscus 03/14/2022    Peripheral artery disease        Past Surgical History:   Procedure Laterality Date    APPENDECTOMY      BELOW KNEE AMPUTATION OF LOWER EXTREMITY Right 2019    COLONOSCOPY N/A 02/23/2022    Procedure: COLONOSCOPY;  Surgeon: Estefania Bryant MD;  Location: [facility] (2ND FLR);  Service: Endoscopy;  Laterality: N/A;  anemia, melena    CORONARY STENT PLACEMENT      ESOPHAGOGASTRODUODENOSCOPY N/A 02/23/2022    Procedure: EGD (ESOPHAGOGASTRODUODENOSCOPY);  Surgeon: Estefania Bryant MD;  Location: [facility] (2ND FLR);  Service: Endoscopy;  Laterality: N/A;  anemia    ESOPHAGOGASTRODUODENOSCOPY N/A 4/14/2022    Procedure: EGD (ESOPHAGOGASTRODUODENOSCOPY);  Surgeon: First Available Den King;  Location: [facility] (2ND FLR);  Service: Endoscopy;  Laterality: N/A;  please schedule in 8 weeks. done 2/23      EF-20    ESOPHAGOGASTRODUODENOSCOPY  4/14/2022    Procedure: ;  Surgeon: First Available Den King;  [facility]  Service: Endoscopy;;    FOOT AMPUTATION THROUGH METATARSAL Left 2019    TUBAL LIGATION         History reviewed. No pertinent family history.    Social History     Socioeconomic History    Marital status:    Tobacco Use    Smoking status: Current Every Day Smoker     Packs/day: 0.50     Types: Cigarettes    Smokeless  "tobacco: Never Used   Substance and Sexual Activity    Alcohol use: No    Drug use: No       MEDICATIONS & ALLERGIES:     Current Outpatient Medications on File Prior to Visit   Medication Sig Dispense Refill    aspirin 81 MG Chew Take 1 tablet (81 mg total) by mouth once daily.      atorvastatin (LIPITOR) 40 MG tablet Take 40 mg by mouth once daily.      ATROVENT HFA 17 mcg/actuation inhaler Inhale 2 puffs into the lungs once daily.      blood sugar diagnostic Strp by Misc.(Non-Drug; Combo Route) route.      busPIRone (BUSPAR) 10 MG tablet Take 10 mg by mouth 2 (two) times daily.      carisoprodoL (SOMA) 350 MG tablet Take 350 mg by mouth once daily.      citalopram (CELEXA) 20 MG tablet Take 20 mg by mouth once daily.      cyproheptadine (PERIACTIN) 4 mg tablet Take 4 mg by mouth every other day.      docusate sodium (COLACE) 100 MG capsule Take 1 capsule (100 mg total) by mouth 2 (two) times daily. 60 capsule 0    ergocalciferol (ERGOCALCIFEROL) 50,000 unit Cap Take 50,000 Units by mouth every 7 days.      ferrous sulfate (FEOSOL) 325 mg (65 mg iron) Tab tablet Take 325 mg by mouth 2 (two) times daily.      fluticasone propionate (FLONASE) 50 mcg/actuation nasal spray 1 spray by Each Nostril route once daily.      folic acid-vit B6-vit B12 2.5-25-2 mg (FOLBIC OR EQUIV) 2.5-25-2 mg Tab Take 1 tablet by mouth once daily. 90 tablet 3    furosemide (LASIX) 20 MG tablet Take 1 tablet (20 mg total) by mouth 2 (two) times daily. Hold this. Do not take it until you see your doctor. (Patient not taking: Reported on 4/18/2022)      gabapentin (NEURONTIN) 600 MG tablet Take 600 mg by mouth 2 (two) times daily.      glipiZIDE (GLUCOTROL) 10 MG tablet Take 1 tablet (10 mg total) by mouth 2 (two) times daily before meals. Hold this. Do not take it until you see your doctor. (Patient not taking: Reported on 4/18/2022)      insulin needles, disposable, 31 x 3/16 " Ndle by Misc.(Non-Drug; Combo Route) route.   " "   lancets 28 gauge Misc by Misc.(Non-Drug; Combo Route) route.      LIDOcaine-prilocaine (EMLA) cream Apply topically once daily.      losartan (COZAAR) 25 MG tablet Take 1 tablet (25 mg total) by mouth once daily. 30 tablet 11    metFORMIN (GLUCOPHAGE) 1000 MG tablet Take 1 tablet (1,000 mg total) by mouth 2 (two) times daily with meals. Hold this. Do not take it until you see your doctor. (Patient not taking: Reported on 4/18/2022)      metoclopramide HCl (REGLAN) 10 MG tablet Take 10 mg by mouth 2 (two) times a day.      metoprolol succinate (TOPROL-XL) 25 MG 24 hr tablet Take 1 tablet (25 mg total) by mouth once daily. 30 tablet 11    montelukast (SINGULAIR) 10 mg tablet Take 10 mg by mouth every evening.      ondansetron (ZOFRAN-ODT) 4 MG TbDL Take 1 tablet (4 mg total) by mouth every 8 (eight) hours as needed. 20 tablet 0    oxyCODONE-acetaminophen (PERCOCET)  mg per tablet Take 1 tablet by mouth every 8 (eight) hours as needed for Pain.      pantoprazole (PROTONIX) 40 MG tablet Take 1 tablet (40 mg total) by mouth 2 (two) times daily. 60 tablet 11    pen needle, diabetic 31 gauge x 5/16" Ndle Use to inject insulin into the skin every evening. 100 each 0    potassium chloride (MICRO-K) 10 MEQ CpSR Take 1 capsule (10 mEq total) by mouth once daily. Hold this. Do not take it until you see your doctor. (Patient not taking: Reported on 4/18/2022)      pregabalin (LYRICA) 150 MG capsule Take 150 mg by mouth 2 (two) times daily.      promethazine (PHENERGAN) 6.25 mg/5 mL syrup TAKE 10 mls BY MOUTH EVERY 6 HOURS AS NEEDED      zolpidem (AMBIEN) 10 mg Tab Take 10 mg by mouth nightly as needed.      [DISCONTINUED] amLODIPine (NORVASC) 10 MG tablet Take 1 tablet (10 mg total) by mouth once daily. Hold this. Do not take it until you see your doctor.      [DISCONTINUED] hydrALAZINE (APRESOLINE) 50 MG tablet Take 1 tablet (50 mg total) by mouth daily as needed. Hold this. Do not take it until you " see your doctor.      [DISCONTINUED] hyoscyamine (ANASPAZ,LEVSIN) 0.125 mg Tab Take 1 tablet (125 mcg total) by mouth every 6 (six) hours as needed (abdominal cramping). 30 tablet 0    [DISCONTINUED] insulin glargine,hum.rec.anlog (LANTUS U-100 INSULIN SUBQ) Inject 60 Units into the skin every evening.      [DISCONTINUED] lisinopriL (PRINIVIL,ZESTRIL) 2.5 MG tablet Take 1 tablet (2.5 mg total) by mouth once daily. 90 tablet 3    [DISCONTINUED] metoprolol tartrate (LOPRESSOR) 50 MG tablet Take 1 tablet (50 mg total) by mouth 2 (two) times daily. Hold this. Do not take it until you see your doctor.       No current facility-administered medications on file prior to visit.        Review of patient's allergies indicates:   Allergen Reactions    Orange juice Hives    Tomato (solanum lycopersicum) Hives       OBJECTIVE:     Vital Signs:  Vitals:    04/21/22 1300   BP: 129/66   Pulse: 70   Resp: 20   Temp: 98 °F (36.7 °C)     There is no height or weight on file to calculate BMI.       Physical Exam  Constitutional:       Appearance: Normal appearance.   HENT:      Head: Normocephalic and atraumatic.      Nose: No congestion or rhinorrhea.      Mouth/Throat:      Mouth: Mucous membranes are moist.   Cardiovascular:      Rate and Rhythm: Normal rate.      Pulses: Normal pulses.   Pulmonary:      Effort: No respiratory distress.      Breath sounds: Normal breath sounds.   Abdominal:      General: Bowel sounds are normal.      Palpations: Abdomen is soft.   Musculoskeletal:         General: Deformity present.      Cervical back: Normal range of motion and neck supple.   Skin:     General: Skin is warm and dry.   Neurological:      Mental Status: She is alert. Mental status is at baseline.   Psychiatric:         Mood and Affect: Mood normal.         Laboratory  Lab Results   Component Value Date    WBC 10.15 04/16/2022    HGB 9.0 (L) 04/16/2022    HCT 28.4 (L) 04/16/2022    MCV 79 (L) 04/16/2022     04/16/2022      Lab Results   Component Value Date    INR 1.0 04/13/2022    INR 1.0 02/20/2022    INR 1.0 12/10/2012     Lab Results   Component Value Date    HGBA1C 5.8 (H) 02/20/2022     No results for input(s): POCTGLUCOSE in the last 72 hours.    TRANSITION OF CARE:     [facility] Contact Note: 4/18/22    Family and/or Caretaker present at visit?  No.  Diagnostic tests reviewed/disposition: No diagnosic tests pending after this hospitalization.  Disease/illness education: Importance of Compliance with all prescribed medications and treatments, COVID Precautions/Social Distancing/Mask Use  Home health/community services discussion/referrals: Patient does not have home health established from hospital visit. Patient declined HH.   Establishment or re-establishment of referral orders for community resources: No other necessary community resources.   Discussion with other health care providers: No discussion with other health care providers necessary.     Transition of Care Visit:  I have reviewed and updated the history and problem list. I have reconciled the medication list. I have discussed the hospitalization and current medical issues, prognosis and plans with the patient/family.     Medications Reconciliation:   I have reconciled the patient's home medications and discharge medications with the patient/family. I have updated all changes. Refer to After-Visit Medication List.    Discharge plans, follow-up instructions, future appointments, provider contact information, indicators to seek emergency treatment and encouragement to call for any questions, concerns or clarification of the patient's plan of care explained to patient and/or caregiver(s), whom confirm understanding of provided information and endorse willingness to comply.     ASSESSMENT & PLAN:     HIGH RISK CONDITION(S):  Patient has a condition that poses threat to life and bodily function: GI Bleed     Casie Atkins was seen today for transitional  care.    Diagnoses and all orders for this visit:          Problem List Items Addressed This Visit        Cardiac/Vascular    Hypertension (Chronic)    Current Assessment & Plan     Chronic/stable  Continue meds as prescribed  Low Na diet               Endocrine    DM (diabetes mellitus), type 2 with complications    Current Assessment & Plan     Reports blood glucose runs in 130s  Continue medications as prescribed   Advised frequent self blood glucose monitoring. Patient encouraged to document glucose results.  Hypoglycemia precautions discussed  Diabetic diet   Close adherence to lifestyle changes recommended  Follow ups for eye evaluations, foot care suggested              GI    Upper GI bleed    Current Assessment & Plan     Denies any visual signs of bleeding  Continue Protonix as prescribed   Reports she has scheduled follow up with GI next Friday              Orthopedic    History of right below knee amputation (Chronic)    Current Assessment & Plan     Caregiver support               Other    Tobacco abuse    Current Assessment & Plan     Counseled on smoking cessation for 5 minutes.                     Encounter for Support and Coordination of Transition of Care     Instructions:  - [facility] Practition[facility] to schedule home follow-up visit with patient  as needed.  - Continue all medications, treatments and therapies as ordered.   - Follow all instructions, recommendations as discussed.  - Maintain Safety Precautions at all times.  - Attend all medical appointments as scheduled.  - For worsening symptoms: call Primary Care Physician or Nurse Practitioner.  - For emergencies, call 911 or immediately report to the nearest emergency room.  - Limit Risks of environmental exposure to coronavirus/COVID-19 as discussed including: social distancing, hand hygiene, and limiting departures from the home for necessities only.         Were controlled substances prescribed?  No      Scheduled Follow-up :  Future  Appointments   Date Time Provider Department Center   6/1/2022 11:00 AM Seferino Yoo MD [facility]       After Visit Medication List :     Medication List          Accurate as of April 21, 2022 11:59 PM. If you have any questions, ask your nurse or doctor.            CONTINUE taking these medications    aspirin 81 MG Chew  Take 1 tablet (81 mg total) by mouth once daily.     atorvastatin 40 MG tablet  Commonly known as: LIPITOR     ATROVENT HFA 17 mcg/actuation inhaler  Generic drug: ipratropium     blood sugar diagnostic Strp     busPIRone 10 MG tablet  Commonly known as: BUSPAR     carisoprodoL 350 MG tablet  Commonly known as: SOMA     citalopram 20 MG tablet  Commonly known as: CeleXA     cyproheptadine 4 mg tablet  Commonly known as: PERIACTIN     docusate sodium 100 MG capsule  Commonly known as: COLACE  Take 1 capsule (100 mg total) by mouth 2 (two) times daily.     ergocalciferol 50,000 unit Cap  Commonly known as: ERGOCALCIFEROL     ferrous sulfate 325 mg (65 mg iron) Tab tablet  Commonly known as: FEOSOL     fluticasone propionate 50 mcg/actuation nasal spray  Commonly known as: FLONASE     furosemide 20 MG tablet  Commonly known as: LASIX  Take 1 tablet (20 mg total) by mouth 2 (two) times daily. Hold this. Do not take it until you see your doctor.     gabapentin 600 MG tablet  Commonly known as: NEURONTIN     glipiZIDE 10 MG tablet  Commonly known as: GLUCOTROL  Take 1 tablet (10 mg total) by mouth 2 (two) times daily before meals. Hold this. Do not take it until you see your doctor.     lancets 28 gauge Misc     LIDOcaine-prilocaine cream  Commonly known as: EMLA     losartan 25 MG tablet  Commonly known as: COZAAR  Take 1 tablet (25 mg total) by mouth once daily.     metFORMIN 1000 MG tablet  Commonly known as: GLUCOPHAGE  Take 1 tablet (1,000 mg total) by mouth 2 (two) times daily with meals. Hold this. Do not take it until you see your doctor.     metoclopramide HCl 10 MG  "tablet  Commonly known as: REGLAN     metoprolol succinate 25 MG 24 hr tablet  Commonly known as: TOPROL-XL  Take 1 tablet (25 mg total) by mouth once daily.     montelukast 10 mg tablet  Commonly known as: SINGULAIR     NIVA-FOL 2.5-25-2 mg Tab  Generic drug: folic acid-vit B6-vit B12 2.5-25-2 mg  Take 1 tablet by mouth once daily.     ondansetron 4 MG Tbdl  Commonly known as: ZOFRAN-ODT  Take 1 tablet (4 mg total) by mouth every 8 (eight) hours as needed.     oxyCODONE-acetaminophen  mg per tablet  Commonly known as: PERCOCET     pantoprazole 40 MG tablet  Commonly known as: PROTONIX  Take 1 tablet (40 mg total) by mouth 2 (two) times daily.     * pen needle, diabetic 31 gauge x 3/16" Ndle     * pen needle, diabetic 31 gauge x 5/16" Ndle  Use to inject insulin into the skin every evening.     potassium chloride 10 MEQ Cpsr  Commonly known as: MICRO-K  Take 1 capsule (10 mEq total) by mouth once daily. Hold this. Do not take it until you see your doctor.     pregabalin 150 MG capsule  Commonly known as: LYRICA     promethazine 6.25 mg/5 mL syrup  Commonly known as: PHENERGAN     zolpidem 10 mg Tab  Commonly known as: AMBIEN         * This list has 2 medication(s) that are the same as other medications prescribed for you. Read the directions carefully, and ask your doctor or other care provider to review them with you.                Patient consent was obtained prior to treatment on this visit.    Attestation: Screening criteria to assess the level of the patient's risk for infection with COVID-19 as recommended by the CDC at the time of the above documented home visit concluded appropriateness to proceed. Universal precautions were maintained at all times, including provider use of >60% alcohol gel hand  immediately prior to entry and upon departing the patient's home as well as cleaning of equipment used in home visit with antibacterial/germicidal disposable wipes.     Signature:       Telma " PADMINI Russo FNP-C   [facility]    Total Face-to-Face Encounter: 60 minutes with >50% of time spent discussing the care with the patient/family.

## 2022-04-23 NOTE — ASSESSMENT & PLAN NOTE
Reports blood glucose runs in 130s  Continue medications as prescribed   Advised frequent self blood glucose monitoring. Patient encouraged to document glucose results.  Hypoglycemia precautions discussed  Diabetic diet   Close adherence to lifestyle changes recommended  Follow ups for eye evaluations, foot care suggested

## 2022-04-23 NOTE — ASSESSMENT & PLAN NOTE
Denies any visual signs of bleeding  Continue Protonix as prescribed   Reports she has scheduled follow up with GI next Friday

## 2022-04-28 ENCOUNTER — ANESTHESIA EVENT (OUTPATIENT)
Dept: ENDOSCOPY | Facility: HOSPITAL | Age: 57
End: 2022-04-28
Payer: MEDICAID

## 2022-04-28 RX ORDER — SODIUM CHLORIDE 9 MG/ML
INJECTION, SOLUTION INTRAVENOUS CONTINUOUS
Status: CANCELLED | OUTPATIENT
Start: 2022-04-28

## 2022-04-28 RX ORDER — LIDOCAINE HYDROCHLORIDE 10 MG/ML
1 INJECTION, SOLUTION EPIDURAL; INFILTRATION; INTRACAUDAL; PERINEURAL ONCE
Status: CANCELLED | OUTPATIENT
Start: 2022-04-28 | End: 2022-04-28

## 2022-04-29 ENCOUNTER — ANESTHESIA (OUTPATIENT)
Dept: ENDOSCOPY | Facility: HOSPITAL | Age: 57
End: 2022-04-29
Payer: MEDICAID

## 2022-04-29 ENCOUNTER — HOSPITAL ENCOUNTER (OUTPATIENT)
Facility: HOSPITAL | Age: 57
Discharge: HOME | End: 2022-04-29
Attending: INTERNAL MEDICINE | Admitting: INTERNAL MEDICINE
Payer: MEDICAID

## 2022-04-29 VITALS
RESPIRATION RATE: 16 BRPM | TEMPERATURE: 99 F | HEART RATE: 71 BPM | OXYGEN SATURATION: 99 % | SYSTOLIC BLOOD PRESSURE: 123 MMHG | DIASTOLIC BLOOD PRESSURE: 58 MMHG

## 2022-04-29 DIAGNOSIS — K27.9 PEPTIC ULCER DISEASE: ICD-10-CM

## 2022-04-29 DIAGNOSIS — K92.2 UPPER GI BLEED: Primary | ICD-10-CM

## 2022-04-29 PROCEDURE — D9220A PRA ANESTHESIA: ICD-10-PCS | Mod: ANES,,, | Performed by: ANESTHESIOLOGY

## 2022-04-29 PROCEDURE — 88342 CHG IMMUNOCYTOCHEMISTRY: ICD-10-PCS | Mod: 26,,, | Performed by: PATHOLOGY

## 2022-04-29 PROCEDURE — 88342 IMHCHEM/IMCYTCHM 1ST ANTB: CPT | Mod: 26,,, | Performed by: PATHOLOGY

## 2022-04-29 PROCEDURE — 37000008 HC ANESTHESIA 1ST 15 MINUTES: Performed by: INTERNAL MEDICINE

## 2022-04-29 PROCEDURE — 88305 TISSUE EXAM BY PATHOLOGIST: CPT | Mod: 26,,, | Performed by: PATHOLOGY

## 2022-04-29 PROCEDURE — D9220A PRA ANESTHESIA: Mod: ANES,,, | Performed by: ANESTHESIOLOGY

## 2022-04-29 PROCEDURE — D9220A PRA ANESTHESIA: Mod: CRNA,,, | Performed by: NURSE ANESTHETIST, CERTIFIED REGISTERED

## 2022-04-29 PROCEDURE — 37000009 HC ANESTHESIA EA ADD 15 MINS: Performed by: INTERNAL MEDICINE

## 2022-04-29 PROCEDURE — 43239 EGD BIOPSY SINGLE/MULTIPLE: CPT | Performed by: INTERNAL MEDICINE

## 2022-04-29 PROCEDURE — 27201012 HC FORCEPS, HOT/COLD, DISP: Performed by: INTERNAL MEDICINE

## 2022-04-29 PROCEDURE — 88305 TISSUE EXAM BY PATHOLOGIST: CPT | Performed by: PATHOLOGY

## 2022-04-29 PROCEDURE — 25000003 PHARM REV CODE 250: Performed by: NURSE ANESTHETIST, CERTIFIED REGISTERED

## 2022-04-29 PROCEDURE — 0731 HC ANESTHESIA 1ST 15 MINUTES: Performed by: INTERNAL MEDICINE

## 2022-04-29 PROCEDURE — 63600175 PHARM REV CODE 636 W HCPCS: Performed by: NURSE ANESTHETIST, CERTIFIED REGISTERED

## 2022-04-29 PROCEDURE — D9220A PRA ANESTHESIA: ICD-10-PCS | Mod: CRNA,,, | Performed by: NURSE ANESTHETIST, CERTIFIED REGISTERED

## 2022-04-29 PROCEDURE — 0731 HC ANESTHESIA EA ADD 15 MINS: Performed by: INTERNAL MEDICINE

## 2022-04-29 PROCEDURE — 25000003 PHARM REV CODE 250: Performed by: INTERNAL MEDICINE

## 2022-04-29 PROCEDURE — 88305 TISSUE EXAM BY PATHOLOGIST: ICD-10-PCS | Mod: 26,,, | Performed by: PATHOLOGY

## 2022-04-29 PROCEDURE — 43239 PR EGD, FLEX, W/BIOPSY, SGL/MULTI: ICD-10-PCS | Mod: ,,, | Performed by: INTERNAL MEDICINE

## 2022-04-29 PROCEDURE — 43239 EGD BIOPSY SINGLE/MULTIPLE: CPT | Mod: ,,, | Performed by: INTERNAL MEDICINE

## 2022-04-29 PROCEDURE — 88342 IMHCHEM/IMCYTCHM 1ST ANTB: CPT | Mod: 59 | Performed by: PATHOLOGY

## 2022-04-29 RX ORDER — SODIUM CHLORIDE 9 MG/ML
INJECTION, SOLUTION INTRAVENOUS CONTINUOUS
Status: DISCONTINUED | OUTPATIENT
Start: 2022-04-29 | End: 2022-04-29 | Stop reason: HOSPADM

## 2022-04-29 RX ORDER — PROPOFOL 10 MG/ML
VIAL (ML) INTRAVENOUS
Status: DISCONTINUED | OUTPATIENT
Start: 2022-04-29 | End: 2022-04-29

## 2022-04-29 RX ORDER — PROPOFOL 10 MG/ML
INJECTION, EMULSION INTRAVENOUS
Status: DISCONTINUED
Start: 2022-04-29 | End: 2022-04-29 | Stop reason: HOSPADM

## 2022-04-29 RX ORDER — LIDOCAINE HYDROCHLORIDE 20 MG/ML
INJECTION INTRAVENOUS
Status: DISCONTINUED | OUTPATIENT
Start: 2022-04-29 | End: 2022-04-29

## 2022-04-29 RX ORDER — ETOMIDATE 2 MG/ML
INJECTION INTRAVENOUS
Status: DISCONTINUED | OUTPATIENT
Start: 2022-04-29 | End: 2022-04-29

## 2022-04-29 RX ORDER — LIDOCAINE HYDROCHLORIDE 20 MG/ML
INJECTION, SOLUTION EPIDURAL; INFILTRATION; INTRACAUDAL; PERINEURAL
Status: DISCONTINUED
Start: 2022-04-29 | End: 2022-04-29 | Stop reason: HOSPADM

## 2022-04-29 RX ADMIN — SODIUM CHLORIDE: 0.9 INJECTION, SOLUTION INTRAVENOUS at 08:04

## 2022-04-29 RX ADMIN — PROPOFOL 50 MG: 10 INJECTION, EMULSION INTRAVENOUS at 08:04

## 2022-04-29 RX ADMIN — SODIUM CHLORIDE: 0.9 INJECTION, SOLUTION INTRAVENOUS at 07:04

## 2022-04-29 RX ADMIN — LIDOCAINE HYDROCHLORIDE 100 MG: 20 INJECTION, SOLUTION INTRAVENOUS at 08:04

## 2022-04-29 RX ADMIN — ETOMIDATE 10 MG: 2 INJECTION, SOLUTION INTRAVENOUS at 08:04

## 2022-04-29 NOTE — OR NURSING
Pt alert and oriented able to make needs known, denies any pain or distress at this time. Pt Left foot amputated, Right BKA.  Prosthesis with pt.  Spouse at bedside.  Discharge criteria met

## 2022-04-29 NOTE — PROVATION PATIENT INSTRUCTIONS
Discharge Summary/Instructions after an Endoscopic Procedure  Patient Name: Casie Salvador  Patient MRN: [id number]  Patient   YOB: 1965 Friday, April 29, 2022  Estefania Bryant MD  Dear patient,  As a result of recent federal legislation (The Federal Cures Act), you may   receive lab or pathology results from your procedure in your [facility]   account before your physician is able to contact you. Your physician or   their representative will relay the results to you with their   recommendations at their soonest availability.  Thank you,  RESTRICTIONS:  During your procedure today, you received medications for sedation.  These   medications may affect your judgment, balance and coordination.  Therefore,   for 24 hours, you have the following restrictions:   - DO NOT drive a car, operate machinery, make legal/financial decisions,   sign important papers or drink alcohol.    ACTIVITY:  Today: no heavy lifting, straining or running due to procedural   sedation/anesthesia.  The following day: return to full activity including work.  DIET:  Eat and drink normally unless instructed otherwise.     TREATMENT FOR COMMON SIDE EFFECTS:  - Mild abdominal pain, nausea, belching, bloating or excessive gas:  rest,   eat lightly and use a heating pad.  - Sore Throat: treat with throat lozenges and/or gargle with warm salt   water.  - Because air was used during the procedure, expelling large amounts of air   from your rectum or belching is normal.  - If a bowel prep was taken, you may not have a bowel movement for 1-3 days.    This is normal.  SYMPTOMS TO WATCH FOR AND REPORT TO YOUR PHYSICIAN:  1. Abdominal pain or bloating, other than gas cramps.  2. Chest pain.  3. Back pain.  4. Signs of infection such as: chills or fever occurring within 24 hours   after the procedure.  5. Rectal bleeding, which would show as bright red, maroon, or black stools.   (A tablespoon of blood from the rectum is not serious,  especially if   hemorrhoids are present.)  6. Vomiting.  7. Weakness or dizziness.  GO DIRECTLY TO THE NEAREST EMERGENCY ROOM IF YOU HAVE ANY OF THE FOLLOWING:      Difficulty breathing              Chills and/or fever over 101 F   Persistent vomiting and/or vomiting blood   Severe abdominal pain   Severe chest pain   Black, tarry stools   Bleeding- more than one tablespoon   Any other symptom or condition that you feel may need urgent attention  Your doctor recommends these additional instructions:  If any biopsies were taken, your doctors clinic will contact you in 1 to 2   weeks with any results.  - Discharge patient to home.   - Resume previous diet.   - Continue present medications. Continue Protonix 40mg twice per day.  - Avoid NSAIDs.  - Await pathology results.   - Repeat upper endoscopy in 8-12 weeks to check healing.  For questions, problems or results please call your physician - Estefania Bryant MD at Work:  [phone].  [facility] can be reached at [phone]     IF A COMPLICATION OR EMERGENCY SITUATION ARISES AND YOU ARE UNABLE TO REACH   YOUR PHYSICIAN - GO DIRECTLY TO THE EMERGENCY ROOM.  Estefania Bryant MD  4/29/2022 8:31:57 AM  This report has been verified and signed electronically.  Dear patient,  As a result of recent federal legislation (The Federal Cures Act), you may   receive lab or pathology results from your procedure in your [facility]   account before your physician is able to contact you. Your physician or   their representative will relay the results to you with their   recommendations at their soonest availability.  Thank you,  PROVATION

## 2022-04-29 NOTE — H&P
Short Stay Endoscopy History and Physical    PCP - Adonis Carey MD     Procedure - EGD  ASA - per anesthesia  Mallampati - per anesthesia  History of Anesthesia problems - no  Family history Anesthesia problems -  no   Plan of anesthesia - General    HPI:  This is a 57 y.o. female here for evaluation of : coffee ground emesis        ROS:  Constitutional: No fevers, chills, No weight loss  CV: No chest pain  Pulm: No cough, No shortness of breath  Ophtho: No vision changes  GI: see HPI  Derm: No rash    Medical History:  has a past medical history of Chronic combined systolic and diastolic congestive heart failure, Coronary artery disease, Diabetes mellitus, Encounter for blood transfusion, Gastric ulcer with hemorrhage, Heart attack, History of gastric ulcer (4/2/2022), Hypertension, NSAID induced gastritis (4/2/2022), Perforated viscus (03/14/2022), and Peripheral artery disease.    Surgical History:  has a past surgical history that includes Tubal ligation; Appendectomy; Esophagogastroduodenoscopy (N/A, 02/23/2022); Colonoscopy (N/A, 02/23/2022); Below knee amputation of lower extremity (Right, 2019); Coronary stent placement; Foot amputation through metatarsal (Left, 2019); Esophagogastroduodenoscopy (N/A, 4/14/2022); and Esophagogastroduodenoscopy (4/14/2022).    Family History: family history is not on file.. Otherwise no colon cancer, inflammatory bowel disease, or GI malignancies.    Social History:  reports that she has been smoking cigarettes. She has been smoking about 0.50 packs per day. She has never used smokeless tobacco. She reports that she does not drink alcohol and does not use drugs.    Review of patient's allergies indicates:   Allergen Reactions    Orange juice Hives    Tomato (solanum lycopersicum) Hives       Medications:   Medications Prior to Admission   Medication Sig Dispense Refill Last Dose    aspirin 81 MG Chew Take 1 tablet (81 mg total) by mouth once daily.        "atorvastatin (LIPITOR) 40 MG tablet Take 40 mg by mouth once daily.       ATROVENT HFA 17 mcg/actuation inhaler Inhale 2 puffs into the lungs once daily.       blood sugar diagnostic Strp by Misc.(Non-Drug; Combo Route) route.       busPIRone (BUSPAR) 10 MG tablet Take 10 mg by mouth 2 (two) times daily.       carisoprodoL (SOMA) 350 MG tablet Take 350 mg by mouth once daily.       citalopram (CELEXA) 20 MG tablet Take 20 mg by mouth once daily.       cyproheptadine (PERIACTIN) 4 mg tablet Take 4 mg by mouth every other day.       docusate sodium (COLACE) 100 MG capsule Take 1 capsule (100 mg total) by mouth 2 (two) times daily. 60 capsule 0     ergocalciferol (ERGOCALCIFEROL) 50,000 unit Cap Take 50,000 Units by mouth every 7 days.       ferrous sulfate (FEOSOL) 325 mg (65 mg iron) Tab tablet Take 325 mg by mouth 2 (two) times daily.       fluticasone propionate (FLONASE) 50 mcg/actuation nasal spray 1 spray by Each Nostril route once daily.       folic acid-vit B6-vit B12 2.5-25-2 mg (FOLBIC OR EQUIV) 2.5-25-2 mg Tab Take 1 tablet by mouth once daily. 90 tablet 3     furosemide (LASIX) 20 MG tablet Take 1 tablet (20 mg total) by mouth 2 (two) times daily. Hold this. Do not take it until you see your doctor. (Patient not taking: Reported on 4/18/2022)       gabapentin (NEURONTIN) 600 MG tablet Take 600 mg by mouth 2 (two) times daily.       glipiZIDE (GLUCOTROL) 10 MG tablet Take 1 tablet (10 mg total) by mouth 2 (two) times daily before meals. Hold this. Do not take it until you see your doctor. (Patient not taking: Reported on 4/18/2022)       insulin needles, disposable, 31 x 3/16 " Ndle by Misc.(Non-Drug; Combo Route) route.       lancets 28 gauge Misc by Misc.(Non-Drug; Combo Route) route.       LIDOcaine-prilocaine (EMLA) cream Apply topically once daily.       losartan (COZAAR) 25 MG tablet Take 1 tablet (25 mg total) by mouth once daily. 30 tablet 11     metFORMIN (GLUCOPHAGE) 1000 MG " "tablet Take 1 tablet (1,000 mg total) by mouth 2 (two) times daily with meals. Hold this. Do not take it until you see your doctor. (Patient not taking: Reported on 4/18/2022)       metoclopramide HCl (REGLAN) 10 MG tablet Take 10 mg by mouth 2 (two) times a day.       metoprolol succinate (TOPROL-XL) 25 MG 24 hr tablet Take 1 tablet (25 mg total) by mouth once daily. 30 tablet 11     montelukast (SINGULAIR) 10 mg tablet Take 10 mg by mouth every evening.       ondansetron (ZOFRAN-ODT) 4 MG TbDL Take 1 tablet (4 mg total) by mouth every 8 (eight) hours as needed. 20 tablet 0     oxyCODONE-acetaminophen (PERCOCET)  mg per tablet Take 1 tablet by mouth every 8 (eight) hours as needed for Pain.       pantoprazole (PROTONIX) 40 MG tablet Take 1 tablet (40 mg total) by mouth 2 (two) times daily. 60 tablet 11     pen needle, diabetic 31 gauge x 5/16" Ndle Use to inject insulin into the skin every evening. 100 each 0     potassium chloride (MICRO-K) 10 MEQ CpSR Take 1 capsule (10 mEq total) by mouth once daily. Hold this. Do not take it until you see your doctor. (Patient not taking: Reported on 4/18/2022)       pregabalin (LYRICA) 150 MG capsule Take 150 mg by mouth 2 (two) times daily.       promethazine (PHENERGAN) 6.25 mg/5 mL syrup TAKE 10 mls BY MOUTH EVERY 6 HOURS AS NEEDED       zolpidem (AMBIEN) 10 mg Tab Take 10 mg by mouth nightly as needed.          Physical Exam:    Vital Signs: There were no vitals filed for this visit.    Gen: NAD, lying comfortably  HENT: NCAT, oropharynx clear  Eyes: anicteric sclerae, EOMI grossly  Neck: supple, no visible masses/goiter  Cardiac: RRR  Lungs: non-labored breathing  Abd: soft, NT/ND, normoactive BS  Ext: no LE edema, warm, well perfused  Skin: skin intact on exposed body parts, no visible rashes, lesions  Neuro: A&Ox4, neuro exam grossly intact, moves all extremities  Psych: appropriate mood, affect      Labs:  Lab Results   Component Value Date    WBC " 10.15 04/16/2022    HGB 9.0 (L) 04/16/2022    HCT 28.4 (L) 04/16/2022     04/16/2022    TRIG 230 (H) 03/19/2022    ALT 10 04/16/2022    AST 11 04/16/2022     04/16/2022    K 4.3 04/16/2022     04/16/2022    CREATININE 1.0 04/16/2022    BUN 17 04/16/2022    CO2 25 04/16/2022    TSH 2.899 02/20/2022    INR 1.0 04/13/2022    HGBA1C 5.8 (H) 02/20/2022       Plan:  EGD for coffee ground emesis.    I have explained the risks and benefits of endoscopy procedures to the patient including but not limited to bleeding, perforation, infection, and death.  The patient was asked if they understand and allowed to ask any further questions to their satisfaction.      Estefania Bryant MD

## 2022-04-29 NOTE — ANESTHESIA PREPROCEDURE EVALUATION
04/29/2022  Casie Salvador is a 57 y.o., female.      Pre-op Assessment     I have reviewed the Nursing Notes.       Review of Systems  Anesthesia Hx:  No problems with previous Anesthesia    Social:  Smoker    Cardiovascular:   Exercise tolerance: poor Hypertension Past MI CAD   CHF hyperlipidemia    Pulmonary:  Pulmonary Normal    Renal/:  Renal/ Normal     Hepatic/GI:   No Bowel Prep. PUD, GERD Denies Liver Disease. Denies Hepatitis.    Neurological:  Neurology Normal    Endocrine:   Diabetes, using insulin Denies Hypothyroidism. Denies Hyperthyroidism.  Denies Obesity / BMI > 30, Denies Morbid Obesity / BMI > 40  Psych:   Psychiatric History          Physical Exam  General: Well nourished, Cooperative, Alert and Oriented    Airway:  Mallampati: III   Mouth Opening: Normal  TM Distance: Normal  Tongue: Normal        Anesthesia Plan  Type of Anesthesia, risks & benefits discussed:    Anesthesia Type: Gen Natural Airway  Intra-op Monitoring Plan: Standard ASA Monitors  Induction:  IV  Informed Consent: Informed consent signed with the Patient and all parties understand the risks and agree with anesthesia plan.  All questions answered.   ASA Score: 3  Day of Surgery Review of History & Physical: H&P Update referred to the surgeon/provider.    Ready For Surgery From Anesthesia Perspective.     .

## 2022-04-29 NOTE — TRANSFER OF CARE
Anesthesia Transfer of Care Note    Patient: Casie Salvador    Procedure(s) Performed: Procedure(s) (LRB):  EGD (ESOPHAGOGASTRODUODENOSCOPY) (N/A)    Patient location: GI    Anesthesia Type: general    Transport from OR: Transported from OR on room air with adequate spontaneous ventilation    Post pain: adequate analgesia    Post assessment: no apparent anesthetic complications    Post vital signs: stable    Level of consciousness: responds to stimulation and sedated    Nausea/Vomiting: no nausea/vomiting    Complications: none    Transfer of care protocol was followed      Last vitals:   Visit Vitals  /64 (BP Location: Left arm, Patient Position: Lying)   Pulse 66   Temp 37 °C (98.6 °F)   Resp 12   SpO2 99%   Breastfeeding No

## 2022-05-03 LAB
FINAL PATHOLOGIC DIAGNOSIS: NORMAL
GROSS: NORMAL
Lab: NORMAL
SUPPLEMENTAL DIAGNOSIS: NORMAL

## 2022-05-04 ENCOUNTER — TELEPHONE (OUTPATIENT)
Dept: GASTROENTEROLOGY | Facility: CLINIC | Age: 57
End: 2022-05-04
Payer: MEDICAID

## 2022-05-04 NOTE — TELEPHONE ENCOUNTER
----- Message from Clara Magana MA sent at 5/4/2022  1:35 PM CDT -----    ----- Message -----  From: Estefania Bryant MD  Sent: 5/4/2022  11:30 AM CDT  To: Manny Mac Staff    Please let Casie know that the biopsies that I took of her ulcer during her EGD did not show any concerning findings.  I will see her in 8 weeks for her next EGD to check to see if her ulcer has healed.  She should continue to take her Pantoprazole (Protonix) twice per day.

## 2022-05-12 NOTE — ANESTHESIA POSTPROCEDURE EVALUATION
Anesthesia Post Evaluation    Patient: Casie Salvador    Procedure(s) Performed: Procedure(s) (LRB):  EGD (ESOPHAGOGASTRODUODENOSCOPY) (N/A)    Final Anesthesia Type: general      Patient location during evaluation: GI PACU  Patient participation: Yes- Able to Participate  Level of consciousness: awake and alert  Post-procedure vital signs: reviewed and stable  Pain management: adequate  Airway patency: patent    PONV status at discharge: No PONV  Anesthetic complications: no      Cardiovascular status: blood pressure returned to baseline and hemodynamically stable  Respiratory status: unassisted and spontaneous ventilation  Hydration status: euvolemic  Follow-up not needed.          Vitals Value Taken Time   /58 04/29/22 0842   Temp 37 °C (98.6 °F) 04/29/22 0822   Pulse 71 04/29/22 0842   Resp 16 04/29/22 0842   SpO2 99 % 04/29/22 0842         Event Time   Out of Recovery 08:48:00         Pain/Basil Score: No data recorded

## 2022-06-13 LAB
POCT GLUCOSE: 75 MG/DL (ref 70–110)
POCT GLUCOSE: 76 MG/DL (ref 70–110)

## 2022-06-14 ENCOUNTER — OFFICE VISIT (OUTPATIENT)
Dept: CARDIOLOGY | Facility: CLINIC | Age: 57
End: 2022-06-14
Payer: MEDICAID

## 2022-06-14 VITALS
OXYGEN SATURATION: 97 % | WEIGHT: 185.5 LBS | BODY MASS INDEX: 29.81 KG/M2 | HEIGHT: 66 IN | DIASTOLIC BLOOD PRESSURE: 66 MMHG | HEART RATE: 65 BPM | SYSTOLIC BLOOD PRESSURE: 124 MMHG

## 2022-06-14 DIAGNOSIS — I50.42 CHRONIC COMBINED SYSTOLIC AND DIASTOLIC CONGESTIVE HEART FAILURE: Chronic | ICD-10-CM

## 2022-06-14 DIAGNOSIS — K92.2 UPPER GI BLEED: ICD-10-CM

## 2022-06-14 DIAGNOSIS — I25.10 CORONARY ARTERY DISEASE INVOLVING NATIVE CORONARY ARTERY OF NATIVE HEART WITHOUT ANGINA PECTORIS: Chronic | ICD-10-CM

## 2022-06-14 DIAGNOSIS — I73.9 PERIPHERAL ARTERY DISEASE: Primary | Chronic | ICD-10-CM

## 2022-06-14 DIAGNOSIS — I25.10 CORONARY ARTERY DISEASE, UNSPECIFIED VESSEL OR LESION TYPE, UNSPECIFIED WHETHER ANGINA PRESENT, UNSPECIFIED WHETHER NATIVE OR TRANSPLANTED HEART: ICD-10-CM

## 2022-06-14 DIAGNOSIS — Z72.0 TOBACCO ABUSE: ICD-10-CM

## 2022-06-14 DIAGNOSIS — I10 PRIMARY HYPERTENSION: Chronic | ICD-10-CM

## 2022-06-14 DIAGNOSIS — Z89.511 HISTORY OF RIGHT BELOW KNEE AMPUTATION: Chronic | ICD-10-CM

## 2022-06-14 DIAGNOSIS — Z95.5 HISTORY OF HEART ARTERY STENT: ICD-10-CM

## 2022-06-14 PROCEDURE — 1159F PR MEDICATION LIST DOCUMENTED IN MEDICAL RECORD: ICD-10-PCS | Mod: CPTII,,, | Performed by: INTERNAL MEDICINE

## 2022-06-14 PROCEDURE — 99215 OFFICE O/P EST HI 40 MIN: CPT | Mod: PBBFAC,PN | Performed by: INTERNAL MEDICINE

## 2022-06-14 PROCEDURE — 3044F HG A1C LEVEL LT 7.0%: CPT | Mod: CPTII,,, | Performed by: INTERNAL MEDICINE

## 2022-06-14 PROCEDURE — 3008F BODY MASS INDEX DOCD: CPT | Mod: CPTII,,, | Performed by: INTERNAL MEDICINE

## 2022-06-14 PROCEDURE — 3074F PR MOST RECENT SYSTOLIC BLOOD PRESSURE < 130 MM HG: ICD-10-PCS | Mod: CPTII,,, | Performed by: INTERNAL MEDICINE

## 2022-06-14 PROCEDURE — 1159F MED LIST DOCD IN RCRD: CPT | Mod: CPTII,,, | Performed by: INTERNAL MEDICINE

## 2022-06-14 PROCEDURE — 4010F ACE/ARB THERAPY RXD/TAKEN: CPT | Mod: CPTII,,, | Performed by: INTERNAL MEDICINE

## 2022-06-14 PROCEDURE — 3074F SYST BP LT 130 MM HG: CPT | Mod: CPTII,,, | Performed by: INTERNAL MEDICINE

## 2022-06-14 PROCEDURE — 3078F PR MOST RECENT DIASTOLIC BLOOD PRESSURE < 80 MM HG: ICD-10-PCS | Mod: CPTII,,, | Performed by: INTERNAL MEDICINE

## 2022-06-14 PROCEDURE — 3044F PR MOST RECENT HEMOGLOBIN A1C LEVEL <7.0%: ICD-10-PCS | Mod: CPTII,,, | Performed by: INTERNAL MEDICINE

## 2022-06-14 PROCEDURE — 3008F PR BODY MASS INDEX (BMI) DOCUMENTED: ICD-10-PCS | Mod: CPTII,,, | Performed by: INTERNAL MEDICINE

## 2022-06-14 PROCEDURE — 99204 PR OFFICE/OUTPT VISIT, NEW, LEVL IV, 45-59 MIN: ICD-10-PCS | Mod: S$PBB,,, | Performed by: INTERNAL MEDICINE

## 2022-06-14 PROCEDURE — 3078F DIAST BP <80 MM HG: CPT | Mod: CPTII,,, | Performed by: INTERNAL MEDICINE

## 2022-06-14 PROCEDURE — 99999 PR PBB SHADOW E&M-EST. PATIENT-LVL V: ICD-10-PCS | Mod: PBBFAC,,, | Performed by: INTERNAL MEDICINE

## 2022-06-14 PROCEDURE — 99204 OFFICE O/P NEW MOD 45 MIN: CPT | Mod: S$PBB,,, | Performed by: INTERNAL MEDICINE

## 2022-06-14 PROCEDURE — 99999 PR PBB SHADOW E&M-EST. PATIENT-LVL V: CPT | Mod: PBBFAC,,, | Performed by: INTERNAL MEDICINE

## 2022-06-14 PROCEDURE — 4010F PR ACE/ARB THEARPY RXD/TAKEN: ICD-10-PCS | Mod: CPTII,,, | Performed by: INTERNAL MEDICINE

## 2022-06-14 NOTE — PROGRESS NOTES
Cardiology    6/14/2022  3:15 PM    Problem list  Patient Active Problem List   Diagnosis    Hypertension    Coronary artery disease involving native coronary artery of native heart without angina pectoris    DM (diabetes mellitus), type 2 with complications    Chronic pain    Constipation due to opioid therapy    Depression    Insomnia    Chronic combined systolic and diastolic congestive heart failure    Peripheral artery disease    History of right below knee amputation    Chronic, continuous use of opioids    Upper GI bleed    Peptic ulcer disease    Tobacco abuse       CC:  CAD    HPI:  Referred for cardiac follow-up.  Patient has history of MI and LV dysfunction (echo in Feb 2022 showed EF 20%).  She stated that she had her 1st heart attack in the 1990s.  She also has 5 coronary stents.  Her last coronary angiogram and stenting was 7-8 years ago.  She has not follow-up with any cardiologist.  She denies any chest pain, shortness of breath, dyspnea on exertion.  She walks with a walker due to right BKA.  She is still smoking.  Two months ago, she was admitted with GI bleeding with hemoglobin 5.7.    Medications  Current Outpatient Medications   Medication Sig Dispense Refill    aspirin 81 MG Chew Take 1 tablet (81 mg total) by mouth once daily.      atorvastatin (LIPITOR) 40 MG tablet Take 40 mg by mouth once daily.      ATROVENT HFA 17 mcg/actuation inhaler Inhale 2 puffs into the lungs once daily.      blood sugar diagnostic Strp by Misc.(Non-Drug; Combo Route) route.      busPIRone (BUSPAR) 10 MG tablet Take 10 mg by mouth 2 (two) times daily.      carisoprodoL (SOMA) 350 MG tablet Take 350 mg by mouth once daily.      citalopram (CELEXA) 20 MG tablet Take 20 mg by mouth once daily.      docusate sodium (COLACE) 100 MG capsule Take 1 capsule (100 mg total) by mouth 2 (two) times daily. 60 capsule 0    ergocalciferol (ERGOCALCIFEROL) 50,000 unit Cap Take 50,000 Units by mouth  "every 7 days.      ferrous sulfate (FEOSOL) 325 mg (65 mg iron) Tab tablet Take 325 mg by mouth 2 (two) times daily.      fluticasone propionate (FLONASE) 50 mcg/actuation nasal spray 1 spray by Each Nostril route once daily.      folic acid-vit B6-vit B12 2.5-25-2 mg (FOLBIC OR EQUIV) 2.5-25-2 mg Tab Take 1 tablet by mouth once daily. 90 tablet 3    furosemide (LASIX) 20 MG tablet Take 1 tablet (20 mg total) by mouth 2 (two) times daily. Hold this. Do not take it until you see your doctor.      gabapentin (NEURONTIN) 600 MG tablet Take 600 mg by mouth 2 (two) times daily.      glipiZIDE (GLUCOTROL) 10 MG tablet Take 1 tablet (10 mg total) by mouth 2 (two) times daily before meals. Hold this. Do not take it until you see your doctor.      insulin needles, disposable, 31 x 3/16 " Ndle by Misc.(Non-Drug; Combo Route) route.      lancets 28 gauge Misc by Misc.(Non-Drug; Combo Route) route.      LIDOcaine-prilocaine (EMLA) cream Apply topically once daily.      losartan (COZAAR) 25 MG tablet Take 1 tablet (25 mg total) by mouth once daily. 30 tablet 11    metoclopramide HCl (REGLAN) 10 MG tablet Take 10 mg by mouth 2 (two) times a day.      metoprolol succinate (TOPROL-XL) 25 MG 24 hr tablet Take 1 tablet (25 mg total) by mouth once daily. 30 tablet 11    montelukast (SINGULAIR) 10 mg tablet Take 10 mg by mouth every evening.      ondansetron (ZOFRAN-ODT) 4 MG TbDL Take 1 tablet (4 mg total) by mouth every 8 (eight) hours as needed. 20 tablet 0    oxyCODONE-acetaminophen (PERCOCET)  mg per tablet Take 1 tablet by mouth every 8 (eight) hours as needed for Pain.      pantoprazole (PROTONIX) 40 MG tablet Take 1 tablet (40 mg total) by mouth 2 (two) times daily. 60 tablet 11    pen needle, diabetic 31 gauge x 5/16" Ndle Use to inject insulin into the skin every evening. 100 each 0    potassium chloride (MICRO-K) 10 MEQ CpSR Take 1 capsule (10 mEq total) by mouth once daily. Hold this. Do not take it " until you see your doctor.      pregabalin (LYRICA) 150 MG capsule Take 150 mg by mouth 2 (two) times daily.      promethazine (PHENERGAN) 6.25 mg/5 mL syrup TAKE 10 mls BY MOUTH EVERY 6 HOURS AS NEEDED      zolpidem (AMBIEN) 10 mg Tab Take 10 mg by mouth nightly as needed.       No current facility-administered medications for this visit.      Prior to Admission medications    Medication Sig Start Date End Date Taking? Authorizing Provider   aspirin 81 MG Chew Take 1 tablet (81 mg total) by mouth once daily. 4/16/22  Yes Risa Dennis MD   atorvastatin (LIPITOR) 40 MG tablet Take 40 mg by mouth once daily.   Yes Historical Provider   ATROVENT HFA 17 mcg/actuation inhaler Inhale 2 puffs into the lungs once daily. 1/6/22  Yes Historical Provider   blood sugar diagnostic Strp by Misc.(Non-Drug; Combo Route) route.   Yes Historical Provider   busPIRone (BUSPAR) 10 MG tablet Take 10 mg by mouth 2 (two) times daily.   Yes Historical Provider   carisoprodoL (SOMA) 350 MG tablet Take 350 mg by mouth once daily. 2/8/22  Yes Historical Provider   citalopram (CELEXA) 20 MG tablet Take 20 mg by mouth once daily.   Yes Historical Provider   docusate sodium (COLACE) 100 MG capsule Take 1 capsule (100 mg total) by mouth 2 (two) times daily. 2/5/22  Yes Yasir Urias II, MD   ergocalciferol (ERGOCALCIFEROL) 50,000 unit Cap Take 50,000 Units by mouth every 7 days. 3/5/22  Yes Historical Provider   ferrous sulfate (FEOSOL) 325 mg (65 mg iron) Tab tablet Take 325 mg by mouth 2 (two) times daily.   Yes Historical Provider   fluticasone propionate (FLONASE) 50 mcg/actuation nasal spray 1 spray by Each Nostril route once daily. 4/8/22  Yes Historical Provider   folic acid-vit B6-vit B12 2.5-25-2 mg (FOLBIC OR EQUIV) 2.5-25-2 mg Tab Take 1 tablet by mouth once daily. 2/24/22  Yes Chet Baca MD   furosemide (LASIX) 20 MG tablet Take 1 tablet (20 mg total) by mouth 2 (two) times daily. Hold this. Do not take it until  "you see your doctor. 4/3/22  Yes Simon Green MD   gabapentin (NEURONTIN) 600 MG tablet Take 600 mg by mouth 2 (two) times daily.   Yes Historical Provider   glipiZIDE (GLUCOTROL) 10 MG tablet Take 1 tablet (10 mg total) by mouth 2 (two) times daily before meals. Hold this. Do not take it until you see your doctor. 4/3/22  Yes Simon Green MD   insulin needles, disposable, 31 x 3/16 " Ndle by Misc.(Non-Drug; Combo Route) route.   Yes Historical Provider   lancets 28 gauge Misc by Misc.(Non-Drug; Combo Route) route.   Yes Historical Provider   LIDOcaine-prilocaine (EMLA) cream Apply topically once daily. 4/4/22  Yes Historical Provider   losartan (COZAAR) 25 MG tablet Take 1 tablet (25 mg total) by mouth once daily. 4/16/22  Yes Vicki Carr MD   metoclopramide HCl (REGLAN) 10 MG tablet Take 10 mg by mouth 2 (two) times a day.   Yes Historical Provider   metoprolol succinate (TOPROL-XL) 25 MG 24 hr tablet Take 1 tablet (25 mg total) by mouth once daily. 4/16/22 4/16/23 Yes Vicki Carr MD   montelukast (SINGULAIR) 10 mg tablet Take 10 mg by mouth every evening.   Yes Historical Provider   ondansetron (ZOFRAN-ODT) 4 MG TbDL Take 1 tablet (4 mg total) by mouth every 8 (eight) hours as needed. 2/7/22  Yes Brynn Quintero NP   oxyCODONE-acetaminophen (PERCOCET)  mg per tablet Take 1 tablet by mouth every 8 (eight) hours as needed for Pain.   Yes Historical Provider   pantoprazole (PROTONIX) 40 MG tablet Take 1 tablet (40 mg total) by mouth 2 (two) times daily. 4/16/22 4/16/23 Yes Vicki Carr MD   pen needle, diabetic 31 gauge x 5/16" Ndle Use to inject insulin into the skin every evening. 3/23/22  Yes Susan Deleon MD   potassium chloride (MICRO-K) 10 MEQ CpSR Take 1 capsule (10 mEq total) by mouth once daily. Hold this. Do not take it until you see your doctor. 4/3/22  Yes Simon Green MD   pregabalin (LYRICA) 150 MG capsule Take 150 mg by mouth 2 (two) times daily. 4/8/22  Yes Historical " Provider   promethazine (PHENERGAN) 6.25 mg/5 mL syrup TAKE 10 mls BY MOUTH EVERY 6 HOURS AS NEEDED 1/31/22  Yes Historical Provider   zolpidem (AMBIEN) 10 mg Tab Take 10 mg by mouth nightly as needed.   Yes Historical Provider   amLODIPine (NORVASC) 10 MG tablet Take 1 tablet (10 mg total) by mouth once daily. Hold this. Do not take it until you see your doctor. 4/3/22 4/16/22  Simon Green MD   cyproheptadine (PERIACTIN) 4 mg tablet Take 4 mg by mouth every other day.  6/14/22  Historical Provider   hydrALAZINE (APRESOLINE) 50 MG tablet Take 1 tablet (50 mg total) by mouth daily as needed. Hold this. Do not take it until you see your doctor. 4/3/22 4/16/22  Simon Green MD   hyoscyamine (ANASPAZ,LEVSIN) 0.125 mg Tab Take 1 tablet (125 mcg total) by mouth every 6 (six) hours as needed (abdominal cramping). 2/7/22 4/16/22  Brynn Quintero NP   insulin glargine,hum.rec.anlog (LANTUS U-100 INSULIN SUBQ) Inject 60 Units into the skin every evening.  4/16/22  Historical Provider   lisinopriL (PRINIVIL,ZESTRIL) 2.5 MG tablet Take 1 tablet (2.5 mg total) by mouth once daily. 2/24/22 4/3/22  Chet Baca MD   metFORMIN (GLUCOPHAGE) 1000 MG tablet Take 1 tablet (1,000 mg total) by mouth 2 (two) times daily with meals. Hold this. Do not take it until you see your doctor.  Patient not taking: Reported on 4/18/2022 4/3/22 4/29/22  Simon Green MD   metoprolol tartrate (LOPRESSOR) 50 MG tablet Take 1 tablet (50 mg total) by mouth 2 (two) times daily. Hold this. Do not take it until you see your doctor. 4/3/22 4/16/22  Simon Green MD         History  Past Medical History:   Diagnosis Date    Chronic combined systolic and diastolic congestive heart failure     Coronary artery disease     Diabetes mellitus     Encounter for blood transfusion     Gastric ulcer with hemorrhage     Heart attack     History of gastric ulcer 4/2/2022    Hypertension     NSAID induced gastritis 4/2/2022    Perforated viscus  03/14/2022    Peripheral artery disease      Past Surgical History:   Procedure Laterality Date    APPENDECTOMY      BELOW KNEE AMPUTATION OF LOWER EXTREMITY Right 2019    COLONOSCOPY N/A 02/23/2022    Procedure: COLONOSCOPY;  Surgeon: Estefania Bryant MD;  Location: [address]);  Service: Endoscopy;  Laterality: N/A;  anemia, melena    CORONARY STENT PLACEMENT      ESOPHAGOGASTRODUODENOSCOPY N/A 02/23/2022    Procedure: EGD (ESOPHAGOGASTRODUODENOSCOPY);  Surgeon: Estefania Bryant MD;  Location: [facility] ([address] [place] [address]);  Service: Endoscopy;  Laterality: N/A;  anemia    ESOPHAGOGASTRODUODENOSCOPY N/A 4/14/2022    Procedure: EGD (ESOPHAGOGASTRODUODENOSCOPY);  Surgeon: First Available Den King;  Location: [facility] ([address] [place] [address]);  Service: Endoscopy;  Laterality: N/A;  please schedule in 8 weeks. done 2/23      EF-20    ESOPHAGOGASTRODUODENOSCOPY  4/14/2022    Procedure: ;  Surgeon: First Available Den King;  [facility] ([facility]);  Service: Endoscopy;;    ESOPHAGOGASTRODUODENOSCOPY N/A 4/29/2022    Procedure: EGD (ESOPHAGOGASTRODUODENOSCOPY);  Surgeon: Estefania Bryant MD;  Location: [facility];  Service: Endoscopy;  Laterality: N/A;  expedite EGD per Dr Ga      states vaccinated-will bring card-GT    FOOT AMPUTATION THROUGH METATARSAL Left 2019    TUBAL LIGATION       Social History     Socioeconomic History    Marital status:    Tobacco Use    Smoking status: Current Every Day Smoker     Packs/day: 0.50     Types: Cigarettes    Smokeless tobacco: Never Used   Substance and Sexual Activity    Alcohol use: No    Drug use: No         Allergies  Review of patient's allergies indicates:   Allergen Reactions    Orange juice Hives    Tomato (solanum lycopersicum) Hives         Review of Systems   Review of Systems   Constitutional: Negative for decreased appetite, fever and weight loss.   HENT: Negative for congestion and nosebleeds.    Eyes: Negative for double vision, vision loss  in left eye, vision loss in right eye and visual disturbance.   Cardiovascular: Negative for chest pain, claudication, cyanosis, dyspnea on exertion, irregular heartbeat, leg swelling, near-syncope, orthopnea, palpitations, paroxysmal nocturnal dyspnea and syncope.   Respiratory: Negative for cough, hemoptysis, shortness of breath, sleep disturbances due to breathing, snoring, sputum production and wheezing.    Endocrine: Negative for cold intolerance and heat intolerance.   Skin: Negative for nail changes and rash.   Musculoskeletal: Negative for joint pain, muscle cramps, muscle weakness and myalgias.   Gastrointestinal: Negative for change in bowel habit, heartburn, hematemesis, hematochezia, hemorrhoids and melena.   Neurological: Negative for dizziness, focal weakness and headaches.         Physical Exam  Wt Readings from Last 1 Encounters:   06/14/22 84.1 kg (185 lb 8.3 oz)     BP Readings from Last 3 Encounters:   06/14/22 124/66   04/29/22 (!) 123/58   04/21/22 129/66     Pulse Readings from Last 1 Encounters:   06/14/22 65     Body mass index is 29.94 kg/m².    Physical Exam  Vitals reviewed.   Neck:      Vascular: No carotid bruit or JVD.   Cardiovascular:      Rate and Rhythm: Normal rate and regular rhythm.      Pulses:           Carotid pulses are 2+ on the right side and 2+ on the left side.       Radial pulses are 2+ on the right side and 2+ on the left side.        Right popliteal pulse not accessible.        Right dorsalis pedis pulse not accessible.        Right posterior tibial pulse not accessible.      Heart sounds: S1 normal and S2 normal.   Pulmonary:      Breath sounds: Normal breath sounds and air entry.   Musculoskeletal:      Right Lower Extremity: Right leg is amputated below knee.   Neurological:      Mental Status: She is alert.             Assessment  1. History of heart artery stent  stable  - Ambulatory referral/consult to Cardiology    2. Coronary artery disease, unspecified vessel  or lesion type, unspecified whether angina present, unspecified whether native or transplanted heart  Stable, no angina  - Ambulatory referral/consult to Cardiology    3. Peripheral artery disease  Stable, no worsening claudication    4. Coronary artery disease involving native coronary artery of native heart without angina pectoris  stable    5. Chronic combined systolic and diastolic congestive heart failure  stable    6. Primary hypertension  controlled    7. Upper GI bleed  stable    8. History of right below knee amputation  stable    9. Tobacco abuse  unchanged        Plan and Discussion  Discussed her cardiac history.  Discussed her echocardiogram findings which showed ejection fraction of 20%.  She was unaware of her echocardiogram findings.  Discussed that she is on good medical therapy at present.  Discussed ICD for primary prevention.  Will print out some information for her to read and she will then decide.  Continue current meds.  Encouraged to stop smoking.    Follow Up  3 months      Seferino Yoo MD, F.A.C.C, F.S.C.A.I.

## 2022-06-16 NOTE — ASSESSMENT & PLAN NOTE
Counseled on smoking cessation for 5 minutes.   Form was scanned and mom was notified its ready for

## 2022-06-22 ENCOUNTER — HOSPITAL ENCOUNTER (EMERGENCY)
Facility: HOSPITAL | Age: 57
Discharge: HOME | End: 2022-06-22
Attending: EMERGENCY MEDICINE
Payer: MEDICAID

## 2022-06-22 VITALS
RESPIRATION RATE: 18 BRPM | SYSTOLIC BLOOD PRESSURE: 176 MMHG | HEART RATE: 64 BPM | DIASTOLIC BLOOD PRESSURE: 74 MMHG | TEMPERATURE: 98 F | OXYGEN SATURATION: 100 %

## 2022-06-22 DIAGNOSIS — R10.13 EPIGASTRIC ABDOMINAL PAIN: ICD-10-CM

## 2022-06-22 DIAGNOSIS — R10.13 EPIGASTRIC PAIN: ICD-10-CM

## 2022-06-22 DIAGNOSIS — K59.00 CONSTIPATION, UNSPECIFIED CONSTIPATION TYPE: Primary | ICD-10-CM

## 2022-06-22 LAB
ALBUMIN SERPL BCP-MCNC: 3.8 G/DL (ref 3.5–5.2)
ALP SERPL-CCNC: 135 U/L (ref 55–135)
ALT SERPL W/O P-5'-P-CCNC: 15 U/L (ref 10–44)
ANION GAP SERPL CALC-SCNC: 8 MMOL/L (ref 8–16)
AST SERPL-CCNC: 17 U/L (ref 10–40)
BACTERIA #/AREA URNS AUTO: NORMAL /HPF
BASOPHILS # BLD AUTO: 0.06 K/UL (ref 0–0.2)
BASOPHILS NFR BLD: 0.4 % (ref 0–1.9)
BILIRUB SERPL-MCNC: 0.3 MG/DL (ref 0.1–1)
BILIRUB UR QL STRIP: NEGATIVE
BUN SERPL-MCNC: 11 MG/DL (ref 6–20)
BUN SERPL-MCNC: 14 MG/DL (ref 6–30)
CALCIUM SERPL-MCNC: 9.4 MG/DL (ref 8.7–10.5)
CHLORIDE SERPL-SCNC: 97 MMOL/L (ref 95–110)
CHLORIDE SERPL-SCNC: 97 MMOL/L (ref 95–110)
CLARITY UR REFRACT.AUTO: CLEAR
CO2 SERPL-SCNC: 26 MMOL/L (ref 23–29)
COLOR UR AUTO: ABNORMAL
CREAT SERPL-MCNC: 1.1 MG/DL (ref 0.5–1.4)
CREAT SERPL-MCNC: 1.2 MG/DL (ref 0.5–1.4)
DIFFERENTIAL METHOD: ABNORMAL
EOSINOPHIL # BLD AUTO: 0.4 K/UL (ref 0–0.5)
EOSINOPHIL NFR BLD: 3.2 % (ref 0–8)
ERYTHROCYTE [DISTWIDTH] IN BLOOD BY AUTOMATED COUNT: 15.3 % (ref 11.5–14.5)
EST. GFR  (AFRICAN AMERICAN): >60 ML/MIN/1.73 M^2
EST. GFR  (NON AFRICAN AMERICAN): 56.3 ML/MIN/1.73 M^2
GLUCOSE SERPL-MCNC: 118 MG/DL (ref 70–110)
GLUCOSE SERPL-MCNC: 126 MG/DL (ref 70–110)
GLUCOSE UR QL STRIP: NEGATIVE
HCT VFR BLD AUTO: 35.8 % (ref 37–48.5)
HCT VFR BLD CALC: 41 %PCV (ref 36–54)
HGB BLD-MCNC: 11.3 G/DL (ref 12–16)
HGB UR QL STRIP: ABNORMAL
IMM GRANULOCYTES # BLD AUTO: 0.07 K/UL (ref 0–0.04)
IMM GRANULOCYTES NFR BLD AUTO: 0.5 % (ref 0–0.5)
KETONES UR QL STRIP: NEGATIVE
LEUKOCYTE ESTERASE UR QL STRIP: NEGATIVE
LIPASE SERPL-CCNC: 33 U/L (ref 4–60)
LYMPHOCYTES # BLD AUTO: 4.4 K/UL (ref 1–4.8)
LYMPHOCYTES NFR BLD: 32 % (ref 18–48)
MCH RBC QN AUTO: 23.8 PG (ref 27–31)
MCHC RBC AUTO-ENTMCNC: 31.6 G/DL (ref 32–36)
MCV RBC AUTO: 75 FL (ref 82–98)
MICROSCOPIC COMMENT: NORMAL
MONOCYTES # BLD AUTO: 0.7 K/UL (ref 0.3–1)
MONOCYTES NFR BLD: 5.4 % (ref 4–15)
NEUTROPHILS # BLD AUTO: 8 K/UL (ref 1.8–7.7)
NEUTROPHILS NFR BLD: 58.5 % (ref 38–73)
NITRITE UR QL STRIP: NEGATIVE
NRBC BLD-RTO: 0 /100 WBC
PH UR STRIP: 7 [PH] (ref 5–8)
PLATELET # BLD AUTO: 393 K/UL (ref 150–450)
PMV BLD AUTO: 10.8 FL (ref 9.2–12.9)
POC IONIZED CALCIUM: 1.13 MMOL/L (ref 1.06–1.42)
POC TCO2 (MEASURED): 27 MMOL/L (ref 23–29)
POTASSIUM BLD-SCNC: 5.2 MMOL/L (ref 3.5–5.1)
POTASSIUM SERPL-SCNC: 4.9 MMOL/L (ref 3.5–5.1)
PROT SERPL-MCNC: 8.6 G/DL (ref 6–8.4)
PROT UR QL STRIP: NEGATIVE
RBC # BLD AUTO: 4.75 M/UL (ref 4–5.4)
RBC #/AREA URNS AUTO: 0 /HPF (ref 0–4)
SAMPLE: ABNORMAL
SODIUM BLD-SCNC: 131 MMOL/L (ref 136–145)
SODIUM SERPL-SCNC: 131 MMOL/L (ref 136–145)
SP GR UR STRIP: 1 (ref 1–1.03)
SQUAMOUS #/AREA URNS AUTO: 1 /HPF
URN SPEC COLLECT METH UR: ABNORMAL
WBC # BLD AUTO: 13.73 K/UL (ref 3.9–12.7)
WBC #/AREA URNS AUTO: 1 /HPF (ref 0–5)

## 2022-06-22 PROCEDURE — 25000003 PHARM REV CODE 250: Performed by: EMERGENCY MEDICINE

## 2022-06-22 PROCEDURE — 99285 EMERGENCY DEPT VISIT HI MDM: CPT | Mod: ,,, | Performed by: EMERGENCY MEDICINE

## 2022-06-22 PROCEDURE — 85025 COMPLETE CBC W/AUTO DIFF WBC: CPT | Performed by: EMERGENCY MEDICINE

## 2022-06-22 PROCEDURE — 83690 ASSAY OF LIPASE: CPT | Performed by: EMERGENCY MEDICINE

## 2022-06-22 PROCEDURE — 93010 EKG 12-LEAD: ICD-10-PCS | Mod: ,,, | Performed by: INTERNAL MEDICINE

## 2022-06-22 PROCEDURE — 93005 ELECTROCARDIOGRAM TRACING: CPT

## 2022-06-22 PROCEDURE — 99285 PR EMERGENCY DEPT VISIT,LEVEL V: ICD-10-PCS | Mod: ,,, | Performed by: EMERGENCY MEDICINE

## 2022-06-22 PROCEDURE — 82330 ASSAY OF CALCIUM: CPT

## 2022-06-22 PROCEDURE — 99285 EMERGENCY DEPT VISIT HI MDM: CPT | Mod: 25

## 2022-06-22 PROCEDURE — 80053 COMPREHEN METABOLIC PANEL: CPT | Performed by: EMERGENCY MEDICINE

## 2022-06-22 PROCEDURE — 81001 URINALYSIS AUTO W/SCOPE: CPT | Performed by: EMERGENCY MEDICINE

## 2022-06-22 PROCEDURE — 93010 ELECTROCARDIOGRAM REPORT: CPT | Mod: ,,, | Performed by: INTERNAL MEDICINE

## 2022-06-22 PROCEDURE — 80047 BASIC METABLC PNL IONIZED CA: CPT

## 2022-06-22 RX ORDER — MAG HYDROX/ALUMINUM HYD/SIMETH 200-200-20
5 SUSPENSION, ORAL (FINAL DOSE FORM) ORAL
Status: COMPLETED | OUTPATIENT
Start: 2022-06-22 | End: 2022-06-22

## 2022-06-22 RX ORDER — FAMOTIDINE 20 MG/1
20 TABLET, FILM COATED ORAL
Status: COMPLETED | OUTPATIENT
Start: 2022-06-22 | End: 2022-06-22

## 2022-06-22 RX ORDER — LIDOCAINE 50 MG/G
1 PATCH TOPICAL
Status: DISCONTINUED | OUTPATIENT
Start: 2022-06-22 | End: 2022-06-23 | Stop reason: HOSPADM

## 2022-06-22 RX ORDER — POLYETHYLENE GLYCOL 3350 17 G/17G
17 POWDER, FOR SOLUTION ORAL DAILY
Qty: 255 G | Refills: 0 | Status: SHIPPED | OUTPATIENT
Start: 2022-06-22 | End: 2022-07-07

## 2022-06-22 RX ORDER — OXYCODONE AND ACETAMINOPHEN 10; 325 MG/1; MG/1
1 TABLET ORAL
Status: COMPLETED | OUTPATIENT
Start: 2022-06-22 | End: 2022-06-22

## 2022-06-22 RX ADMIN — ALUMINUM HYDROXIDE, MAGNESIUM HYDROXIDE, AND SIMETHICONE 5 ML: 200; 200; 20 SUSPENSION ORAL at 08:06

## 2022-06-22 RX ADMIN — OXYCODONE HYDROCHLORIDE AND ACETAMINOPHEN 1 TABLET: 10; 325 TABLET ORAL at 09:06

## 2022-06-22 RX ADMIN — FAMOTIDINE 20 MG: 20 TABLET ORAL at 08:06

## 2022-06-22 RX ADMIN — LIDOCAINE 1 PATCH: 50 PATCH CUTANEOUS at 08:06

## 2022-06-23 NOTE — ED NOTES
Patient identifiers verified and correct for   LOC: The patient is awake, alert and aware of environment with an appropriate affect, the patient is oriented x 3 and speaking appropriately.   APPEARANCE: Patient appears comfortable and in no acute distress, patient is clean and well groomed.  SKIN: The skin is warm and dry, color consistent with ethnicity, patient has normal skin turgor and moist mucus membranes, skin intact, no breakdown or bruising noted.   MUSCULOSKELETAL: Patient moving all extremities spontaneously, no swelling noted. Rt bka with prosthesis   RESPIRATORY: Airway is open and patent, respirations are spontaneous, patient has a normal effort and rate, no accessory muscle use noted, pt placed on continuous pulse ox with O2 sats noted at 97% on room air.  CARDIAC: Pt placed on cardiac monitor. Patient has a normal rate and regular rhythm, no edema noted, capillary refill < 3 seconds.   GASTRO: Soft and non tender to palpation, no distention noted, normoactive bowel sounds present in all four quadrants. Mid abdominal cramping,pt denies n.v   : Pt denies any pain or frequency with urination.  NEURO: Pt opens eyes spontaneously, behavior appropriate to situation, follows commands, facial expression symmetrical, bilateral hand grasp equal and even, purposeful motor response noted, normal sensation in all extremities when touched with a finger.

## 2022-06-23 NOTE — DISCHARGE INSTRUCTIONS
Constipation is when a person has fewer than 3 bowel movements a week; has difficulty having a bowel movement; or has stools that are dry, hard, or larger than normal. As people grow older, constipation is more common. If you try to fix constipation with medicines that make you have a bowel movement (laxatives), the problem may get worse. Long-term laxative use may cause the muscles of the colon to become weak. A low-fiber diet, not taking in enough fluids, and taking certain medicines may make constipation worse.    CAUSES    * Certain medicines, such as antidepressants, pain medicine, iron supplements, antacids, and water pills.    * Certain diseases, such as diabetes, irritable bowel syndrome (IBS), thyroid disease, or depression.    * Not drinking enough water.    * Not eating enough fiber-rich foods.    * Stress or travel.    * Lack of physical activity or exercise.    * Not going to the restroom when there is the urge to have a bowel movement.    * Ignoring the urge to have a bowel movement.    * Using laxatives too much.    SYMPTOMS    * Having fewer than 3 bowel movements a week.    * Straining to have a bowel movement.    * Having hard, dry, or larger than normal stools.    * Feeling full or bloated.    * Pain in the lower abdomen.    * Not feeling relief after having a bowel movement.    Contact your primary care physician or return to the emergency room if:    * symptoms do not improve or worsen    * fever greater than 101°F    * increased nausea or vomiting    * blood in the in the vomit or stool    * inability to eat drink and/or urinate in greater than 12 hours    * severe abdominal pain    * any other concerning symptoms    Please contact the Emergency Department if there are any questions or concerns.    If no problems in the meantime please schedule an appointment to see your primary care physician within 3 days if symptoms have not improved.    Bowel clean out: take Miralax 17 grams or 1 cap full  with 8 oz of fluid once every 2-4 hours until stool is clear. To help prevent further constipation after clean out, then take 17g or 1 capful once daily. You can increase the daily dose to 17g twice a day if still having constipation. If having diarrhea, please decrease the dose to 8.5 or half a capful daily.    You may take Tylenol 325 - 650 mg as needed every 4 - 6 hours for discomfort at home (do not exceed 4000 mg in 24 hours).    Increase your consumption of fruits/vegetables while also decreasing your intake of meats and fatty foods.    Opioid containing pain medications very commonly cause constipation. Please limit these pain medications as much as possible.    Please ensure adequate hydration by drinking approximately 60 ounces of fluid per day. If you are eating significantly less than normal please use Gatorade or a similar sports drink with sodium/salt and sugar to avoid electrolyte abnormalities.

## 2022-06-23 NOTE — ED PROVIDER NOTES
Encounter Date: 6/22/2022    SCRIBE #1 NOTE: I, Ina Connor, am scribing for, and in the presence of,  Cameron Diop DO. I have scribed the entire note.       History     Chief Complaint   Patient presents with    Abdominal Pain     Today, denies NVD     The history is provided by the patient and medical records. No  was used.      Time patient was seen by the provider: 7:51 PM      The patient is a 57 y.o. female with past medical history of DM type 2, HTN, CAD, peripheral artery disease, chronic combined systolic and diastolic CHF, gastric ulcer, and surgical history of appendectomy, coronary stent placement, who presents to the ED with a complaint of abdominal pain onset 10:00 AM this morning. The patient reports of epigastric abdominal pain. The pain has been constant since this morning. Describes the pain as sharp and crampy in nature. Denies nausea, vomiting, diarrhea, blood in stool, fever, chills. Denies tobacco use.  No other aggravating or alleviating factors.  Denies any chest pain, lightheadedness, dizziness, arm pain, jaw pain, back pain, neck pain or any other symptoms.    Review of patient's allergies indicates:   Allergen Reactions    Orange juice Hives    Tomato (solanum lycopersicum) Hives     Past Medical History:   Diagnosis Date    Chronic combined systolic and diastolic congestive heart failure     Coronary artery disease     Diabetes mellitus     Encounter for blood transfusion     Gastric ulcer with hemorrhage     Heart attack     History of gastric ulcer 4/2/2022    Hypertension     NSAID induced gastritis 4/2/2022    Perforated viscus 03/14/2022    Peripheral artery disease      Past Surgical History:   Procedure Laterality Date    APPENDECTOMY      BELOW KNEE AMPUTATION OF LOWER EXTREMITY Right 2019    COLONOSCOPY N/A 02/23/2022    Procedure: COLONOSCOPY;  Surgeon: Estefania Bryant MD;  Location: [facility] ([address] [place] [address]);  Service: Endoscopy;   Laterality: N/A;  anemia, melena    CORONARY STENT PLACEMENT      ESOPHAGOGASTRODUODENOSCOPY N/A 02/23/2022    Procedure: EGD (ESOPHAGOGASTRODUODENOSCOPY);  Surgeon: Estefania Bryant MD;  Location: [facility] (2ND FLR);  Service: Endoscopy;  Laterality: N/A;  anemia    ESOPHAGOGASTRODUODENOSCOPY N/A 4/14/2022    Procedure: EGD (ESOPHAGOGASTRODUODENOSCOPY);  Surgeon: First Available Den King;  Location: [facility] (2ND FLR);  Service: Endoscopy;  Laterality: N/A;  please schedule in 8 weeks. done 2/23      EF-20    ESOPHAGOGASTRODUODENOSCOPY  4/14/2022    Procedure: ;  Surgeon: First Available Den King;  Location: [facility] ENDO (2ND FLR);  Service: Endoscopy;;    ESOPHAGOGASTRODUODENOSCOPY N/A 4/29/2022    Procedure: EGD (ESOPHAGOGASTRODUODENOSCOPY);  Surgeon: Estefania Bryant MD;  Location: [facility];  Service: Endoscopy;  Laterality: N/A;  expedite EGD per Dr Ga      states vaccinated-will bring card-GT    FOOT AMPUTATION THROUGH METATARSAL Left 2019    TUBAL LIGATION       No family history on file.  Social History     Tobacco Use    Smoking status: Current Every Day Smoker     Packs/day: 0.50     Types: Cigarettes    Smokeless tobacco: Never Used   Substance Use Topics    Alcohol use: No    Drug use: No     Review of Systems   Constitutional: Negative for chills and fever.   HENT: Negative for sore throat.    Respiratory: Negative for shortness of breath.    Cardiovascular: Negative for chest pain.   Gastrointestinal: Positive for abdominal pain. Negative for blood in stool, diarrhea, nausea and vomiting.   Genitourinary: Negative for dysuria.   Musculoskeletal: Negative for back pain.   Skin: Negative for rash.   Neurological: Negative for weakness.   Hematological: Does not bruise/bleed easily.       Physical Exam     Initial Vitals [06/22/22 1945]   BP Pulse Resp Temp SpO2   (!) 184/79 67 16 98.1 °F (36.7 °C) 100 %      MAP       --         Physical Exam    Nursing note and vitals  reviewed.      Constitutional: Well-developed. Well-nourished. Minimal emotional distress.  HENT: OP clear and moist.  NECK: Supple. No cervical LAD.  CARDIAC: RRR. Normal S1/ S2. No murmurs, gallops or rubs. 2+ distal pulses.  PULM: Normal effort. Breath sounds clear - no wheezes, rales, rhonchi.  ABD: Soft, non-distended, normal BS. Negative Delcid sign, no McBurney's point tenderness. No guarding, no rebound. Epigastric tenderness  : No CVA tenderness.   RECTAL: deferred  MS: Full ROM. No edema.   NEURO: Alert and oriented x3., no sensory or motor deficits.  Moving all extremities purposefully.  SKIN: Warm and dry. No rash or lesions.  No cyanosis or jaundice.  PSYCH: Normal judgment. Normal affect.      ED Course   Procedures  Labs Reviewed   CBC W/ AUTO DIFFERENTIAL - Abnormal; Notable for the following components:       Result Value    WBC 13.73 (*)     Hemoglobin 11.3 (*)     Hematocrit 35.8 (*)     MCV 75 (*)     MCH 23.8 (*)     MCHC 31.6 (*)     RDW 15.3 (*)     Gran # (ANC) 8.0 (*)     Immature Grans (Abs) 0.07 (*)     All other components within normal limits   COMPREHENSIVE METABOLIC PANEL - Abnormal; Notable for the following components:    Sodium 131 (*)     Glucose 118 (*)     Total Protein 8.6 (*)     eGFR if non  56.3 (*)     All other components within normal limits   URINALYSIS, REFLEX TO URINE CULTURE - Abnormal; Notable for the following components:    Occult Blood UA 1+ (*)     All other components within normal limits    Narrative:     Specimen Source->Urine   ISTAT PROCEDURE - Abnormal; Notable for the following components:    POC Glucose 126 (*)     POC Sodium 131 (*)     POC Potassium 5.2 (*)     All other components within normal limits   LIPASE   URINALYSIS MICROSCOPIC    Narrative:     Specimen Source->Urine     EKG Readings: (Independently Interpreted)   Initial Reading: No STEMI. Previous EKG: Compared with most recent EKG Rhythm: Normal Sinus Rhythm. Heart Rate: 61.  ST Segments: Non-Specific ST Segment Depression.     ECG Results          EKG 12-lead (Final result)  Result time 06/23/22 11:47:16    Final result by Interface, Lab In [facility] (06/23/22 11:47:16)                 Narrative:    Test Reason : R10.13,    Vent. Rate : 061 BPM     Atrial Rate : 061 BPM     P-R Int : 188 ms          QRS Dur : 100 ms      QT Int : 454 ms       P-R-T Axes : 060 096 005 degrees     QTc Int : 457 ms      Normal sinus rhythm with mild artifact  Lateral infarct ,age undetermined  Abnormal ECG  When compared with ECG of 13-APR-2022 21:25,  Rate has slowed  Lateral infarct is now Present  T wave amplitude has decreased in Lateral leads  Confirmed by Willie RAZA MD ([id number]) on 6/23/2022 11:47:09 AM    Referred By: FRANSISCAERR   SELF           Confirmed By:Willie RAZA MD                            Imaging Results          X-Ray Abdomen Flat And Erect (Final result)  Result time 06/22/22 21:47:15    Final result by Bakari Trejo MD (06/22/22 21:47:15)                 Impression:      Nonobstructive bowel gas pattern.    Mild-to-moderate colonic stool burden.      Electronically signed by: Bakari Trejo MD  Date:    06/22/2022  Time:    21:47             Narrative:    EXAMINATION:  XR ABDOMEN FLAT AND ERECT    CLINICAL HISTORY:  Abdominal Pain;    TECHNIQUE:  Flat and erect frontal views of the abdomen were performed.    COMPARISON:  04/13/2022.    FINDINGS:  Bowel gas pattern is nonobstructive.  No evidence of pneumoperitoneum.  Mild-to-moderate stool burden in the colon.  No large stool burden overlying the rectum.  Surgical clips overlie the left groin region.  Stent overlies the aortoiliac region.  No pathologic calcifications.  Bones are unremarkable.                               X-Ray Chest PA And Lateral (Final result)  Result time 06/22/22 21:44:55    Final result by Bakari Trejo MD (06/22/22 21:44:55)                 Impression:      Coarsened bibasilar interstitial lung markings  "potentially related to interstitial edema or pneumonitis.  No large focal consolidation.      Electronically signed by: Bakari Trejo MD  Date:    06/22/2022  Time:    21:44             Narrative:    EXAMINATION:  XR CHEST PA AND LATERAL    CLINICAL HISTORY:  Provided history is "  Epigastric pain".    TECHNIQUE:  Frontal and lateral views of the chest were performed.    COMPARISON:  04/13/2022.    FINDINGS:  Cardiomediastinal silhouette is mildly enlarged and similar to the prior study.  There are coarsened interstitial lung markings but no large focal area of consolidation.  No large pleural effusion.  No pneumothorax.                              X-Rays:   Independently Interpreted Readings:   Chest X-Ray: Normal heart size.  No infiltrates.  No acute abnormalities. No pneumothorax or free air   Abdomen:   Flat and Erect of Abdomen - Nonspecific bowel gas.  No free air under diaphragm.  No air fluid levels or signs of obstruction. There is moderate stool burden and constipation     Medications   famotidine tablet 20 mg (20 mg Oral Given 6/22/22 2043)   aluminum-magnesium hydroxide-simethicone 200-200-20 mg/5 mL suspension 5 mL (5 mLs Oral Given 6/22/22 2043)   oxyCODONE-acetaminophen  mg per tablet 1 tablet (1 tablet Oral Given 6/22/22 2153)     Medical Decision Making:   History:   Old Medical Records: I decided to obtain old medical records.  Initial Assessment:   The patient is a 57 y.o. female with past medical history of DM type 2, HTN, CAD, peripheral artery disease, chronic combined systolic and diastolic CHF, gastric ulcer, and surgical history of appendectomy, coronary stent placement, who presents to the ED with a complaint of abdominal pain onset 10:00 AM this morning.  Differential Diagnosis:   Constipation, obstruction, perforation, gastritis, pancreatitis, biliary pathology, ACS, enteritis, colitis  Independently Interpreted Test(s):   I have ordered and independently interpreted X-rays - " see prior notes.  I have ordered and independently interpreted EKG Reading(s) - see prior notes  Clinical Tests:   Lab Tests: Ordered and Reviewed  Radiological Study: Ordered and Reviewed  Medical Tests: Ordered and Reviewed    Afebrile vital signs are stable.  Physical exam findings remarkable for mild epigastric tenderness just below the left-sided rib cage at the level of her transverse colon.  No abdominal peritoneal findings, lung sounds clear, cardiac exam unremarkable.  There are no focal neurologic deficits.  Given history of gastritis in the past, will prescribe Pepcid and Maalox for now and re-evaluate.  On re-evaluation pain improved and patient appears comfortable able tolerate p.o..  She is requesting her home pain medication which is oxycodone which she takes nightly for her chronic pain.  This was given to patient.  Labs unremarkable without leukocytosis, biliary pathology, evidence of pancreatitis, evidence of UTI or acute infectious etiology.  X-ray of the abdomen two view shows moderate stool burden especially in the left colon.  This is consistent with the location of her pain.  Do not suspect obstruction or perforation based on imaging and exam.  On re-evaluation symptoms have resolved.  Chest x-ray shows no acute findings on my read.  ECG without ischemia or STEMI, doubt ACS.  Patient instructed to continue her home medications including avoiding alcohol, stimulants, and staying hydrated.  Close PCP follow-up recommended.  Will prescribe MiraLax for constipation with bowel regimen plan in place. Patient agreeable to discharge plan. Strict ED precautions and return instructions discussed at length and patient verbalized understanding. All questions were answered and ample time was given for questions.      Complexity: High - level 5            Scribe Attestation:   Scribe #1: I performed the above scribed service and the documentation accurately describes the services I performed. I attest to  the accuracy of the note.               I, Dr. Cameron Diop, personally performed the services described in this documentation. All medical record entries made by the scribe were at my direction and in my presence.  I have reviewed the chart and agree that the record reflects my personal performance and is accurate and complete.     Clinical Impression:   Final diagnoses:  [R10.13] Epigastric abdominal pain  [R10.13] Epigastric pain  [K59.00] Constipation, unspecified constipation type (Primary)          ED Disposition Condition    Discharge Stable        ED Prescriptions     Medication Sig Dispense Start Date End Date Auth. Provider    polyethylene glycol (GLYCOLAX) 17 gram/dose powder Take 17 g by mouth once daily. for 15 days 255 g 6/22/2022 7/7/2022 Cameron Diop DO        Follow-up Information     Follow up With Specialties Details Why Contact Info    Adonis Carey MD Internal Medicine Schedule an appointment as soon as possible for a visit in 1 week  [address] [place] [id number]  [phone]           Cameron Diop DO, FAAEM  Emergency Staff Physician   Dept of Emergency Medicine   [facility]  Spectralink: [id number]        Disclaimer: This note has been generated using voice-recognition software. There may be typographical errors that have been missed during proof-reading.       Cameron Diop DO  06/23/22 [id number]

## 2022-07-01 ENCOUNTER — PATIENT OUTREACH (OUTPATIENT)
Dept: EMERGENCY MEDICINE | Facility: HOSPITAL | Age: 57
End: 2022-07-01
Payer: MEDICAID

## 2022-07-01 NOTE — PROGRESS NOTES
Patient declined ED navigation assessment and denied any needs at this time.         Latisha Peña, ED Navigator, [facility]  [phone], ext. [phone]

## 2022-07-14 ENCOUNTER — PATIENT OUTREACH (OUTPATIENT)
Dept: EMERGENCY MEDICINE | Facility: HOSPITAL | Age: 57
End: 2022-07-14
Payer: MEDICAID

## 2022-08-08 ENCOUNTER — TELEPHONE (OUTPATIENT)
Dept: ENDOSCOPY | Facility: HOSPITAL | Age: 57
End: 2022-08-08
Payer: MEDICAID

## 2022-08-08 NOTE — TELEPHONE ENCOUNTER
Seferino Yoo MD  to Me         8/8/22 9:30 AM  Note  She can proceed with EGD.               NL    8/8/22 9:05 AM  You routed this conversation to Seferino Yoo MD        [facility]    8/8/22 9:05 AM  Note  Patient has an order for a follow up EGD procedure. Is patient clear from a cardiac standpoint for this procedure?

## 2022-08-08 NOTE — TELEPHONE ENCOUNTER
Patient has an order for a follow up EGD procedure. Is patient clear from a cardiac standpoint for this procedure?

## 2022-10-04 ENCOUNTER — OFFICE VISIT (OUTPATIENT)
Dept: CARDIOLOGY | Facility: CLINIC | Age: 57
End: 2022-10-04
Payer: MEDICAID

## 2022-10-04 VITALS
HEART RATE: 78 BPM | OXYGEN SATURATION: 98 % | BODY MASS INDEX: 30.95 KG/M2 | HEIGHT: 66 IN | WEIGHT: 192.56 LBS | SYSTOLIC BLOOD PRESSURE: 126 MMHG | DIASTOLIC BLOOD PRESSURE: 72 MMHG

## 2022-10-04 DIAGNOSIS — I50.42 CHRONIC COMBINED SYSTOLIC AND DIASTOLIC CONGESTIVE HEART FAILURE: Chronic | ICD-10-CM

## 2022-10-04 DIAGNOSIS — I10 PRIMARY HYPERTENSION: Chronic | ICD-10-CM

## 2022-10-04 DIAGNOSIS — I25.10 CORONARY ARTERY DISEASE INVOLVING NATIVE CORONARY ARTERY OF NATIVE HEART WITHOUT ANGINA PECTORIS: Chronic | ICD-10-CM

## 2022-10-04 DIAGNOSIS — Z72.0 TOBACCO ABUSE: ICD-10-CM

## 2022-10-04 DIAGNOSIS — I73.9 PERIPHERAL ARTERY DISEASE: Primary | Chronic | ICD-10-CM

## 2022-10-04 PROCEDURE — 3008F PR BODY MASS INDEX (BMI) DOCUMENTED: ICD-10-PCS | Mod: CPTII,,, | Performed by: INTERNAL MEDICINE

## 2022-10-04 PROCEDURE — 4010F PR ACE/ARB THEARPY RXD/TAKEN: ICD-10-PCS | Mod: CPTII,,, | Performed by: INTERNAL MEDICINE

## 2022-10-04 PROCEDURE — 99214 PR OFFICE/OUTPT VISIT, EST, LEVL IV, 30-39 MIN: ICD-10-PCS | Mod: S$PBB,,, | Performed by: INTERNAL MEDICINE

## 2022-10-04 PROCEDURE — 1159F PR MEDICATION LIST DOCUMENTED IN MEDICAL RECORD: ICD-10-PCS | Mod: CPTII,,, | Performed by: INTERNAL MEDICINE

## 2022-10-04 PROCEDURE — 3044F HG A1C LEVEL LT 7.0%: CPT | Mod: CPTII,,, | Performed by: INTERNAL MEDICINE

## 2022-10-04 PROCEDURE — 3074F PR MOST RECENT SYSTOLIC BLOOD PRESSURE < 130 MM HG: ICD-10-PCS | Mod: CPTII,,, | Performed by: INTERNAL MEDICINE

## 2022-10-04 PROCEDURE — 99999 PR PBB SHADOW E&M-EST. PATIENT-LVL V: ICD-10-PCS | Mod: PBBFAC,,, | Performed by: INTERNAL MEDICINE

## 2022-10-04 PROCEDURE — 3008F BODY MASS INDEX DOCD: CPT | Mod: CPTII,,, | Performed by: INTERNAL MEDICINE

## 2022-10-04 PROCEDURE — 4010F ACE/ARB THERAPY RXD/TAKEN: CPT | Mod: CPTII,,, | Performed by: INTERNAL MEDICINE

## 2022-10-04 PROCEDURE — 99215 OFFICE O/P EST HI 40 MIN: CPT | Mod: PBBFAC,PN | Performed by: INTERNAL MEDICINE

## 2022-10-04 PROCEDURE — 1159F MED LIST DOCD IN RCRD: CPT | Mod: CPTII,,, | Performed by: INTERNAL MEDICINE

## 2022-10-04 PROCEDURE — 99214 OFFICE O/P EST MOD 30 MIN: CPT | Mod: S$PBB,,, | Performed by: INTERNAL MEDICINE

## 2022-10-04 PROCEDURE — 3078F DIAST BP <80 MM HG: CPT | Mod: CPTII,,, | Performed by: INTERNAL MEDICINE

## 2022-10-04 PROCEDURE — 3074F SYST BP LT 130 MM HG: CPT | Mod: CPTII,,, | Performed by: INTERNAL MEDICINE

## 2022-10-04 PROCEDURE — 3078F PR MOST RECENT DIASTOLIC BLOOD PRESSURE < 80 MM HG: ICD-10-PCS | Mod: CPTII,,, | Performed by: INTERNAL MEDICINE

## 2022-10-04 PROCEDURE — 99999 PR PBB SHADOW E&M-EST. PATIENT-LVL V: CPT | Mod: PBBFAC,,, | Performed by: INTERNAL MEDICINE

## 2022-10-04 PROCEDURE — 3044F PR MOST RECENT HEMOGLOBIN A1C LEVEL <7.0%: ICD-10-PCS | Mod: CPTII,,, | Performed by: INTERNAL MEDICINE

## 2022-10-04 NOTE — PROGRESS NOTES
Cardiology    10/4/2022  2:52 PM    Problem list  Patient Active Problem List   Diagnosis    Hypertension    Coronary artery disease involving native coronary artery of native heart without angina pectoris    DM (diabetes mellitus), type 2 with complications    Chronic pain    Constipation due to opioid therapy    Depression    Insomnia    Chronic combined systolic and diastolic congestive heart failure    Peripheral artery disease    History of right below knee amputation    Chronic, continuous use of opioids    Upper GI bleed    Peptic ulcer disease    Tobacco abuse       CC:  Follow-up    HPI:  She is doing well.  She denies any chest pain, shortness of breath, dyspnea on exertion, paroxysmal nocturnal dyspnea.  At the last visit, we discussed ICD for primary prevention and she is not interested.    Medications  Current Outpatient Medications   Medication Sig Dispense Refill    aspirin 81 MG Chew Take 1 tablet (81 mg total) by mouth once daily.      atorvastatin (LIPITOR) 40 MG tablet Take 40 mg by mouth once daily.      ATROVENT HFA 17 mcg/actuation inhaler Inhale 2 puffs into the lungs once daily.      blood sugar diagnostic Strp by Misc.(Non-Drug; Combo Route) route.      busPIRone (BUSPAR) 10 MG tablet Take 10 mg by mouth 2 (two) times daily.      carisoprodoL (SOMA) 350 MG tablet Take 350 mg by mouth once daily.      citalopram (CELEXA) 20 MG tablet Take 20 mg by mouth once daily.      docusate sodium (COLACE) 100 MG capsule Take 1 capsule (100 mg total) by mouth 2 (two) times daily. 60 capsule 0    ergocalciferol (ERGOCALCIFEROL) 50,000 unit Cap Take 50,000 Units by mouth every 7 days.      ferrous sulfate (FEOSOL) 325 mg (65 mg iron) Tab tablet Take 325 mg by mouth 2 (two) times daily.      folic acid-vit B6-vit B12 2.5-25-2 mg (FOLBIC OR EQUIV) 2.5-25-2 mg Tab Take 1 tablet by mouth once daily. 90 tablet 3    furosemide (LASIX) 20 MG tablet Take 1 tablet (20 mg total) by mouth 2 (two) times daily.  "Hold this. Do not take it until you see your doctor.      gabapentin (NEURONTIN) 600 MG tablet Take 600 mg by mouth 2 (two) times daily.      glipiZIDE (GLUCOTROL) 10 MG tablet Take 1 tablet (10 mg total) by mouth 2 (two) times daily before meals. Hold this. Do not take it until you see your doctor.      insulin needles, disposable, 31 x 3/16 " Ndle by Misc.(Non-Drug; Combo Route) route.      lancets 28 gauge Misc by Misc.(Non-Drug; Combo Route) route.      LIDOcaine-prilocaine (EMLA) cream Apply topically once daily.      losartan (COZAAR) 25 MG tablet Take 1 tablet (25 mg total) by mouth once daily. 30 tablet 11    metoclopramide HCl (REGLAN) 10 MG tablet Take 10 mg by mouth 2 (two) times a day.      metoprolol succinate (TOPROL-XL) 25 MG 24 hr tablet Take 1 tablet (25 mg total) by mouth once daily. 30 tablet 11    montelukast (SINGULAIR) 10 mg tablet Take 10 mg by mouth every evening.      ondansetron (ZOFRAN-ODT) 4 MG TbDL Take 1 tablet (4 mg total) by mouth every 8 (eight) hours as needed. 20 tablet 0    oxyCODONE-acetaminophen (PERCOCET)  mg per tablet Take 1 tablet by mouth every 8 (eight) hours as needed for Pain.      pantoprazole (PROTONIX) 40 MG tablet Take 1 tablet (40 mg total) by mouth 2 (two) times daily. 60 tablet 11    pen needle, diabetic 31 gauge x 5/16" Ndle Use to inject insulin into the skin every evening. 100 each 0    potassium chloride (MICRO-K) 10 MEQ CpSR Take 1 capsule (10 mEq total) by mouth once daily. Hold this. Do not take it until you see your doctor.      pregabalin (LYRICA) 150 MG capsule Take 150 mg by mouth 2 (two) times daily.      promethazine (PHENERGAN) 6.25 mg/5 mL syrup TAKE 10 mls BY MOUTH EVERY 6 HOURS AS NEEDED      zolpidem (AMBIEN) 10 mg Tab Take 10 mg by mouth nightly as needed.      fluticasone propionate (FLONASE) 50 mcg/actuation nasal spray 1 spray by Each Nostril route once daily.       No current facility-administered medications for this visit.      Prior " "to Admission medications    Medication Sig Start Date End Date Taking? Authorizing Provider   aspirin 81 MG Chew Take 1 tablet (81 mg total) by mouth once daily. 4/16/22  Yes Risa Dennis MD   atorvastatin (LIPITOR) 40 MG tablet Take 40 mg by mouth once daily.   Yes Historical Provider   ATROVENT HFA 17 mcg/actuation inhaler Inhale 2 puffs into the lungs once daily. 1/6/22  Yes Historical Provider   blood sugar diagnostic Strp by Misc.(Non-Drug; Combo Route) route.   Yes Historical Provider   busPIRone (BUSPAR) 10 MG tablet Take 10 mg by mouth 2 (two) times daily.   Yes Historical Provider   carisoprodoL (SOMA) 350 MG tablet Take 350 mg by mouth once daily. 2/8/22  Yes Historical Provider   citalopram (CELEXA) 20 MG tablet Take 20 mg by mouth once daily.   Yes Historical Provider   docusate sodium (COLACE) 100 MG capsule Take 1 capsule (100 mg total) by mouth 2 (two) times daily. 2/5/22  Yes Yasir Urias II, MD   ergocalciferol (ERGOCALCIFEROL) 50,000 unit Cap Take 50,000 Units by mouth every 7 days. 3/5/22  Yes Historical Provider   ferrous sulfate (FEOSOL) 325 mg (65 mg iron) Tab tablet Take 325 mg by mouth 2 (two) times daily.   Yes Historical Provider   folic acid-vit B6-vit B12 2.5-25-2 mg (FOLBIC OR EQUIV) 2.5-25-2 mg Tab Take 1 tablet by mouth once daily. 2/24/22  Yes Chet Baca MD   furosemide (LASIX) 20 MG tablet Take 1 tablet (20 mg total) by mouth 2 (two) times daily. Hold this. Do not take it until you see your doctor. 4/3/22  Yes Simon Green MD   gabapentin (NEURONTIN) 600 MG tablet Take 600 mg by mouth 2 (two) times daily.   Yes Historical Provider   glipiZIDE (GLUCOTROL) 10 MG tablet Take 1 tablet (10 mg total) by mouth 2 (two) times daily before meals. Hold this. Do not take it until you see your doctor. 4/3/22  Yes Simon Green MD   insulin needles, disposable, 31 x 3/16 " Ndle by Misc.(Non-Drug; Combo Route) route.   Yes Historical Provider   lancets 28 gauge Misc by " "Misc.(Non-Drug; Combo Route) route.   Yes Historical Provider   LIDOcaine-prilocaine (EMLA) cream Apply topically once daily. 4/4/22  Yes Historical Provider   losartan (COZAAR) 25 MG tablet Take 1 tablet (25 mg total) by mouth once daily. 4/16/22  Yes Vicki Carr MD   metoclopramide HCl (REGLAN) 10 MG tablet Take 10 mg by mouth 2 (two) times a day.   Yes Historical Provider   metoprolol succinate (TOPROL-XL) 25 MG 24 hr tablet Take 1 tablet (25 mg total) by mouth once daily. 4/16/22 4/16/23 Yes Vicki Carr MD   montelukast (SINGULAIR) 10 mg tablet Take 10 mg by mouth every evening.   Yes Historical Provider   ondansetron (ZOFRAN-ODT) 4 MG TbDL Take 1 tablet (4 mg total) by mouth every 8 (eight) hours as needed. 2/7/22  Yes Brynn Quintero NP   oxyCODONE-acetaminophen (PERCOCET)  mg per tablet Take 1 tablet by mouth every 8 (eight) hours as needed for Pain.   Yes Historical Provider   pantoprazole (PROTONIX) 40 MG tablet Take 1 tablet (40 mg total) by mouth 2 (two) times daily. 4/16/22 4/16/23 Yes Vicki Carr MD   pen needle, diabetic 31 gauge x 5/16" Ndle Use to inject insulin into the skin every evening. 3/23/22  Yes Susan Deleon MD   potassium chloride (MICRO-K) 10 MEQ CpSR Take 1 capsule (10 mEq total) by mouth once daily. Hold this. Do not take it until you see your doctor. 4/3/22  Yes Simon Green MD   pregabalin (LYRICA) 150 MG capsule Take 150 mg by mouth 2 (two) times daily. 4/8/22  Yes Historical Provider   promethazine (PHENERGAN) 6.25 mg/5 mL syrup TAKE 10 mls BY MOUTH EVERY 6 HOURS AS NEEDED 1/31/22  Yes Historical Provider   zolpidem (AMBIEN) 10 mg Tab Take 10 mg by mouth nightly as needed.   Yes Historical Provider   fluticasone propionate (FLONASE) 50 mcg/actuation nasal spray 1 spray by Each Nostril route once daily. 4/8/22   Historical Provider   amLODIPine (NORVASC) 10 MG tablet Take 1 tablet (10 mg total) by mouth once daily. Hold this. Do not take it until you see your " doctor. 4/3/22 4/16/22  Simon Green MD   hydrALAZINE (APRESOLINE) 50 MG tablet Take 1 tablet (50 mg total) by mouth daily as needed. Hold this. Do not take it until you see your doctor. 4/3/22 4/16/22  Simon Green MD   hyoscyamine (ANASPAZ,LEVSIN) 0.125 mg Tab Take 1 tablet (125 mcg total) by mouth every 6 (six) hours as needed (abdominal cramping). 2/7/22 4/16/22  Brynn Quintero, NP   insulin glargine,hum.rec.anlog (LANTUS U-100 INSULIN SUBQ) Inject 60 Units into the skin every evening.  4/16/22  Historical Provider   lisinopriL (PRINIVIL,ZESTRIL) 2.5 MG tablet Take 1 tablet (2.5 mg total) by mouth once daily. 2/24/22 4/3/22  Chet Baca MD   metFORMIN (GLUCOPHAGE) 1000 MG tablet Take 1 tablet (1,000 mg total) by mouth 2 (two) times daily with meals. Hold this. Do not take it until you see your doctor.  Patient not taking: Reported on 4/18/2022 4/3/22 4/29/22  Simon Green MD   metoprolol tartrate (LOPRESSOR) 50 MG tablet Take 1 tablet (50 mg total) by mouth 2 (two) times daily. Hold this. Do not take it until you see your doctor. 4/3/22 4/16/22  Simon Green MD         History  Past Medical History:   Diagnosis Date    Chronic combined systolic and diastolic congestive heart failure     Coronary artery disease     Diabetes mellitus     Encounter for blood transfusion     Gastric ulcer with hemorrhage     Heart attack     History of gastric ulcer 4/2/2022    Hypertension     NSAID induced gastritis 4/2/2022    Perforated viscus 03/14/2022    Peripheral artery disease      Past Surgical History:   Procedure Laterality Date    APPENDECTOMY      BELOW KNEE AMPUTATION OF LOWER EXTREMITY Right 2019    COLONOSCOPY N/A 02/23/2022    Procedure: COLONOSCOPY;  Surgeon: Estefania Bryant MD;  Location: [address]);  Service: Endoscopy;  Laterality: N/A;  anemia, melena    CORONARY STENT PLACEMENT      ESOPHAGOGASTRODUODENOSCOPY N/A 02/23/2022    Procedure: EGD (ESOPHAGOGASTRODUODENOSCOPY);   Surgeon: Estefania Bryant MD;  Location: [facility] (2ND FLR);  Service: Endoscopy;  Laterality: N/A;  anemia    ESOPHAGOGASTRODUODENOSCOPY N/A 4/14/2022    Procedure: EGD (ESOPHAGOGASTRODUODENOSCOPY);  Surgeon: First Available Den King;  Location: [facility] ENDO (2ND FLR);  Service: Endoscopy;  Laterality: N/A;  please schedule in 8 weeks. done 2/23      EF-20    ESOPHAGOGASTRODUODENOSCOPY  4/14/2022    Procedure: ;  Surgeon: First Available Den King;  Location: [facility] ENDO (2ND FLR);  Service: Endoscopy;;    ESOPHAGOGASTRODUODENOSCOPY N/A 4/29/2022    Procedure: EGD (ESOPHAGOGASTRODUODENOSCOPY);  Surgeon: Estefania Bryant MD;  Location: [facility];  Service: Endoscopy;  Laterality: N/A;  expedite EGD per Dr Ga      states vaccinated-will bring card-GT    FOOT AMPUTATION THROUGH METATARSAL Left 2019    TUBAL LIGATION       Social History     Socioeconomic History    Marital status:    Tobacco Use    Smoking status: Every Day     Packs/day: 0.50     Types: Cigarettes    Smokeless tobacco: Never   Substance and Sexual Activity    Alcohol use: No    Drug use: No     Social Determinants of Health     Financial Resource Strain: Low Risk     Difficulty of Paying Living Expenses: Not very hard   Food Insecurity: Food Insecurity Present    Worried About Running Out of Food in the Last Year: Sometimes true    Ran Out of Food in the Last Year: Never true   Transportation Needs: No Transportation Needs    Lack of Transportation (Medical): No    Lack of Transportation (Non-Medical): No   Physical Activity: Inactive    Days of Exercise per Week: 0 days    Minutes of Exercise per Session: 0 min   Stress: No Stress Concern Present    Feeling of Stress : Only a little   Social Connections: Unknown    Frequency of Communication with Friends and Family: More than three times a week    Frequency of Social Gatherings with Friends and Family: More than three times a week    Attends Sikhism Services: More than 4 times per year     Active Member of Clubs or Organizations: No    Attends Club or Organization Meetings: Never   Housing Stability: Low Risk     Unable to Pay for Housing in the Last Year: No    Number of Places Lived in the Last Year: 1    Unstable Housing in the Last Year: No         Allergies  Review of patient's allergies indicates:   Allergen Reactions    Orange juice Hives    Tomato (solanum lycopersicum) Hives         Review of Systems   Review of Systems   Constitutional: Negative for decreased appetite, fever and weight loss.   HENT:  Negative for congestion and nosebleeds.    Eyes:  Negative for double vision, vision loss in left eye, vision loss in right eye and visual disturbance.   Cardiovascular:  Negative for chest pain, claudication, cyanosis, dyspnea on exertion, irregular heartbeat, leg swelling, near-syncope, orthopnea, palpitations, paroxysmal nocturnal dyspnea and syncope.   Respiratory:  Negative for cough, hemoptysis, shortness of breath, sleep disturbances due to breathing, snoring, sputum production and wheezing.    Endocrine: Negative for cold intolerance and heat intolerance.   Skin:  Negative for nail changes and rash.   Musculoskeletal:  Negative for joint pain, muscle cramps, muscle weakness and myalgias.   Gastrointestinal:  Negative for change in bowel habit, heartburn, hematemesis, hematochezia, hemorrhoids and melena.   Neurological:  Negative for dizziness, focal weakness and headaches.       Physical Exam  Wt Readings from Last 1 Encounters:   10/04/22 87.4 kg (192 lb 9.2 oz)     BP Readings from Last 3 Encounters:   10/04/22 126/72   06/22/22 (!) 176/74   06/14/22 124/66     Pulse Readings from Last 1 Encounters:   10/04/22 78     Body mass index is 31.08 kg/m².    Physical Exam  Vitals reviewed.   Neck:      Vascular: No carotid bruit or JVD.   Cardiovascular:      Rate and Rhythm: Normal rate and regular rhythm.      Pulses:           Carotid pulses are 2+ on the right side and 2+ on the left  side.       Radial pulses are 2+ on the right side and 2+ on the left side.         Right popliteal pulse not accessible.         Right dorsalis pedis pulse not accessible.         Right posterior tibial pulse not accessible.      Heart sounds: S1 normal and S2 normal.   Pulmonary:      Breath sounds: Normal breath sounds and air entry.   Musculoskeletal:      Right Lower Extremity: Right leg is amputated below knee.   Neurological:      Mental Status: She is alert.           Assessment  1. Peripheral artery disease  Stable    2. Chronic combined systolic and diastolic congestive heart failure  Stable    3. Coronary artery disease involving native coronary artery of native heart without angina pectoris  stable    4. Primary hypertension  controlled    5. Tobacco abuse  unchanged        Plan and Discussion  Continue current medications.  Again, discuss ICD implantation for primary prevention.  Patient handout regarding ICD implant was included in after visit summary.  Patient will think about it.  Encouraged to quit smoking.    Follow Up  6 months      Seferino Yoo MD, F.A.C.C, F.S.C.A.I.

## 2022-10-27 ENCOUNTER — HOSPITAL ENCOUNTER (EMERGENCY)
Facility: HOSPITAL | Age: 57
Discharge: HOME | End: 2022-10-28
Attending: EMERGENCY MEDICINE
Payer: MEDICAID

## 2022-10-27 VITALS
TEMPERATURE: 99 F | WEIGHT: 180 LBS | HEIGHT: 66 IN | RESPIRATION RATE: 18 BRPM | DIASTOLIC BLOOD PRESSURE: 57 MMHG | SYSTOLIC BLOOD PRESSURE: 117 MMHG | BODY MASS INDEX: 28.93 KG/M2 | HEART RATE: 80 BPM | OXYGEN SATURATION: 99 %

## 2022-10-27 DIAGNOSIS — R52 PAIN: ICD-10-CM

## 2022-10-27 DIAGNOSIS — M79.672 LEFT FOOT PAIN: ICD-10-CM

## 2022-10-27 DIAGNOSIS — R10.12 LEFT UPPER QUADRANT ABDOMINAL PAIN: Primary | ICD-10-CM

## 2022-10-27 LAB
ALBUMIN SERPL BCP-MCNC: 3.8 G/DL (ref 3.5–5.2)
ALP SERPL-CCNC: 163 U/L (ref 55–135)
ALT SERPL W/O P-5'-P-CCNC: 14 U/L (ref 10–44)
ANION GAP SERPL CALC-SCNC: 12 MMOL/L (ref 8–16)
AST SERPL-CCNC: 13 U/L (ref 10–40)
BASOPHILS # BLD AUTO: 0.07 K/UL (ref 0–0.2)
BASOPHILS NFR BLD: 0.6 % (ref 0–1.9)
BILIRUB SERPL-MCNC: 0.4 MG/DL (ref 0.1–1)
BILIRUB UR QL STRIP: NEGATIVE
BUN SERPL-MCNC: 12 MG/DL (ref 6–20)
CALCIUM SERPL-MCNC: 9.1 MG/DL (ref 8.7–10.5)
CHLORIDE SERPL-SCNC: 101 MMOL/L (ref 95–110)
CLARITY UR REFRACT.AUTO: CLEAR
CO2 SERPL-SCNC: 20 MMOL/L (ref 23–29)
COLOR UR AUTO: YELLOW
CREAT SERPL-MCNC: 1.3 MG/DL (ref 0.5–1.4)
DIFFERENTIAL METHOD: ABNORMAL
EOSINOPHIL # BLD AUTO: 0.4 K/UL (ref 0–0.5)
EOSINOPHIL NFR BLD: 3.1 % (ref 0–8)
ERYTHROCYTE [DISTWIDTH] IN BLOOD BY AUTOMATED COUNT: 15.5 % (ref 11.5–14.5)
EST. GFR  (NO RACE VARIABLE): 48.3 ML/MIN/1.73 M^2
GLUCOSE SERPL-MCNC: 148 MG/DL (ref 70–110)
GLUCOSE UR QL STRIP: NEGATIVE
HCT VFR BLD AUTO: 38.7 % (ref 37–48.5)
HCV AB SERPL QL IA: NORMAL
HGB BLD-MCNC: 12.4 G/DL (ref 12–16)
HGB UR QL STRIP: NEGATIVE
HIV 1+2 AB+HIV1 P24 AG SERPL QL IA: NORMAL
IMM GRANULOCYTES # BLD AUTO: 0.05 K/UL (ref 0–0.04)
IMM GRANULOCYTES NFR BLD AUTO: 0.4 % (ref 0–0.5)
KETONES UR QL STRIP: NEGATIVE
LEUKOCYTE ESTERASE UR QL STRIP: NEGATIVE
LIPASE SERPL-CCNC: 13 U/L (ref 4–60)
LYMPHOCYTES # BLD AUTO: 3.6 K/UL (ref 1–4.8)
LYMPHOCYTES NFR BLD: 29 % (ref 18–48)
MCH RBC QN AUTO: 23 PG (ref 27–31)
MCHC RBC AUTO-ENTMCNC: 32 G/DL (ref 32–36)
MCV RBC AUTO: 72 FL (ref 82–98)
MONOCYTES # BLD AUTO: 0.6 K/UL (ref 0.3–1)
MONOCYTES NFR BLD: 5.1 % (ref 4–15)
NEUTROPHILS # BLD AUTO: 7.7 K/UL (ref 1.8–7.7)
NEUTROPHILS NFR BLD: 61.8 % (ref 38–73)
NITRITE UR QL STRIP: NEGATIVE
NRBC BLD-RTO: 0 /100 WBC
PH UR STRIP: 8 [PH] (ref 5–8)
PLATELET # BLD AUTO: 385 K/UL (ref 150–450)
PMV BLD AUTO: 10.4 FL (ref 9.2–12.9)
POTASSIUM SERPL-SCNC: 5.4 MMOL/L (ref 3.5–5.1)
PROT SERPL-MCNC: 8.4 G/DL (ref 6–8.4)
PROT UR QL STRIP: ABNORMAL
RBC # BLD AUTO: 5.39 M/UL (ref 4–5.4)
SODIUM SERPL-SCNC: 133 MMOL/L (ref 136–145)
SP GR UR STRIP: 1.02 (ref 1–1.03)
TROPONIN I SERPL DL<=0.01 NG/ML-MCNC: <0.006 NG/ML (ref 0–0.03)
URN SPEC COLLECT METH UR: ABNORMAL
WBC # BLD AUTO: 12.37 K/UL (ref 3.9–12.7)

## 2022-10-27 PROCEDURE — 99285 EMERGENCY DEPT VISIT HI MDM: CPT | Mod: ,,, | Performed by: PHYSICIAN ASSISTANT

## 2022-10-27 PROCEDURE — 83690 ASSAY OF LIPASE: CPT | Performed by: EMERGENCY MEDICINE

## 2022-10-27 PROCEDURE — 86803 HEPATITIS C AB TEST: CPT | Performed by: PHYSICIAN ASSISTANT

## 2022-10-27 PROCEDURE — 84484 ASSAY OF TROPONIN QUANT: CPT | Performed by: EMERGENCY MEDICINE

## 2022-10-27 PROCEDURE — 93010 ELECTROCARDIOGRAM REPORT: CPT | Mod: ,,, | Performed by: INTERNAL MEDICINE

## 2022-10-27 PROCEDURE — 80053 COMPREHEN METABOLIC PANEL: CPT | Performed by: EMERGENCY MEDICINE

## 2022-10-27 PROCEDURE — 96375 TX/PRO/DX INJ NEW DRUG ADDON: CPT

## 2022-10-27 PROCEDURE — 81003 URINALYSIS AUTO W/O SCOPE: CPT | Performed by: EMERGENCY MEDICINE

## 2022-10-27 PROCEDURE — 99285 PR EMERGENCY DEPT VISIT,LEVEL V: ICD-10-PCS | Mod: ,,, | Performed by: PHYSICIAN ASSISTANT

## 2022-10-27 PROCEDURE — 93010 EKG 12-LEAD: ICD-10-PCS | Mod: ,,, | Performed by: INTERNAL MEDICINE

## 2022-10-27 PROCEDURE — 25000003 PHARM REV CODE 250: Performed by: PHYSICIAN ASSISTANT

## 2022-10-27 PROCEDURE — 96374 THER/PROPH/DIAG INJ IV PUSH: CPT

## 2022-10-27 PROCEDURE — 25500020 PHARM REV CODE 255: Performed by: EMERGENCY MEDICINE

## 2022-10-27 PROCEDURE — 99285 EMERGENCY DEPT VISIT HI MDM: CPT | Mod: 25

## 2022-10-27 PROCEDURE — 85025 COMPLETE CBC W/AUTO DIFF WBC: CPT | Performed by: EMERGENCY MEDICINE

## 2022-10-27 PROCEDURE — 93005 ELECTROCARDIOGRAM TRACING: CPT

## 2022-10-27 PROCEDURE — 63600175 PHARM REV CODE 636 W HCPCS: Performed by: PHYSICIAN ASSISTANT

## 2022-10-27 PROCEDURE — 87389 HIV-1 AG W/HIV-1&-2 AB AG IA: CPT | Performed by: PHYSICIAN ASSISTANT

## 2022-10-27 RX ORDER — ONDANSETRON 2 MG/ML
4 INJECTION INTRAMUSCULAR; INTRAVENOUS
Status: COMPLETED | OUTPATIENT
Start: 2022-10-27 | End: 2022-10-27

## 2022-10-27 RX ORDER — ONDANSETRON 4 MG/1
4 TABLET, ORALLY DISINTEGRATING ORAL EVERY 6 HOURS PRN
Qty: 15 TABLET | Refills: 0 | Status: ON HOLD | OUTPATIENT
Start: 2022-10-27 | End: 2022-10-28 | Stop reason: HOSPADM

## 2022-10-27 RX ORDER — PANTOPRAZOLE SODIUM 40 MG/1
40 TABLET, DELAYED RELEASE ORAL 2 TIMES DAILY
Qty: 60 TABLET | Refills: 0 | Status: ON HOLD | OUTPATIENT
Start: 2022-10-27 | End: 2022-10-28 | Stop reason: SDUPTHER

## 2022-10-27 RX ORDER — MAG HYDROX/ALUMINUM HYD/SIMETH 200-200-20
5 SUSPENSION, ORAL (FINAL DOSE FORM) ORAL
Status: COMPLETED | OUTPATIENT
Start: 2022-10-27 | End: 2022-10-27

## 2022-10-27 RX ORDER — MORPHINE SULFATE 4 MG/ML
4 INJECTION, SOLUTION INTRAMUSCULAR; INTRAVENOUS
Status: COMPLETED | OUTPATIENT
Start: 2022-10-27 | End: 2022-10-27

## 2022-10-27 RX ADMIN — ALUMINUM HYDROXIDE, MAGNESIUM HYDROXIDE, AND SIMETHICONE 5 ML: 200; 200; 20 SUSPENSION ORAL at 08:10

## 2022-10-27 RX ADMIN — MORPHINE SULFATE 4 MG: 4 INJECTION INTRAVENOUS at 08:10

## 2022-10-27 RX ADMIN — ONDANSETRON 4 MG: 2 INJECTION INTRAMUSCULAR; INTRAVENOUS at 08:10

## 2022-10-27 RX ADMIN — IOHEXOL 75 ML: 350 INJECTION, SOLUTION INTRAVENOUS at 10:10

## 2022-10-27 NOTE — FIRST PROVIDER EVALUATION
Medical screening examination initiated.  I have conducted a focused provider triage encounter, findings are as follows:    Brief history of present illness:    Left flank, side and LUQ pain started this am, no urinary changes/fever + 1 episode of vomit, no diarrhea/constipation  Left distal foot amputation from midfoot, + intermittent left ankle pain when she puts pressure on the left leg    Chart review: prior CT a/p with gastric ulcer      There were no vitals filed for this visit.    Pertinent physical exam:  uncomfortable  Left flank, left side, luq and lower rib cage ttp no peritoneal signs    Brief workup plan:  CT a/p, ua, labs, ekg, trop, left ankle xr    Preliminary workup initiated; this workup will be continued and followed by the physician or advanced practice provider that is assigned to the patient when roomed.    Triage EKG interpretation performed at 6:17 PM    No STEMI    Claudette Leonard

## 2022-10-28 ENCOUNTER — TELEPHONE (OUTPATIENT)
Dept: GASTROENTEROLOGY | Facility: HOSPITAL | Age: 57
End: 2022-10-28
Payer: MEDICAID

## 2022-10-28 ENCOUNTER — HOSPITAL ENCOUNTER (OUTPATIENT)
Facility: HOSPITAL | Age: 57
Discharge: HOME | End: 2022-10-28
Attending: INTERNAL MEDICINE | Admitting: INTERNAL MEDICINE
Payer: MEDICAID

## 2022-10-28 ENCOUNTER — ANESTHESIA (OUTPATIENT)
Dept: ENDOSCOPY | Facility: HOSPITAL | Age: 57
End: 2022-10-28
Payer: MEDICAID

## 2022-10-28 ENCOUNTER — ANESTHESIA EVENT (OUTPATIENT)
Dept: ENDOSCOPY | Facility: HOSPITAL | Age: 57
End: 2022-10-28
Payer: MEDICAID

## 2022-10-28 VITALS
WEIGHT: 180 LBS | RESPIRATION RATE: 18 BRPM | SYSTOLIC BLOOD PRESSURE: 119 MMHG | OXYGEN SATURATION: 99 % | DIASTOLIC BLOOD PRESSURE: 59 MMHG | TEMPERATURE: 97 F | BODY MASS INDEX: 28.93 KG/M2 | HEIGHT: 66 IN | HEART RATE: 80 BPM

## 2022-10-28 DIAGNOSIS — K25.9 GASTRIC ULCER: ICD-10-CM

## 2022-10-28 DIAGNOSIS — K25.9 GASTRIC ULCER, UNSPECIFIED CHRONICITY, UNSPECIFIED WHETHER GASTRIC ULCER HEMORRHAGE OR PERFORATION PRESENT: Primary | ICD-10-CM

## 2022-10-28 DIAGNOSIS — K59.03 CONSTIPATION DUE TO OPIOID THERAPY: Primary | Chronic | ICD-10-CM

## 2022-10-28 DIAGNOSIS — T40.2X5A CONSTIPATION DUE TO OPIOID THERAPY: Primary | Chronic | ICD-10-CM

## 2022-10-28 DIAGNOSIS — K92.2 UPPER GI BLEED: ICD-10-CM

## 2022-10-28 DIAGNOSIS — Z12.11 COLON CANCER SCREENING: ICD-10-CM

## 2022-10-28 LAB
GLUCOSE SERPL-MCNC: 116 MG/DL (ref 70–110)
POCT GLUCOSE: 112 MG/DL (ref 70–110)
POCT GLUCOSE: 116 MG/DL (ref 70–110)

## 2022-10-28 PROCEDURE — 43239 PR EGD, FLEX, W/BIOPSY, SGL/MULTI: ICD-10-PCS | Mod: ,,, | Performed by: INTERNAL MEDICINE

## 2022-10-28 PROCEDURE — 82962 GLUCOSE BLOOD TEST: CPT | Performed by: INTERNAL MEDICINE

## 2022-10-28 PROCEDURE — 43239 EGD BIOPSY SINGLE/MULTIPLE: CPT | Performed by: INTERNAL MEDICINE

## 2022-10-28 PROCEDURE — 25000003 PHARM REV CODE 250: Performed by: NURSE ANESTHETIST, CERTIFIED REGISTERED

## 2022-10-28 PROCEDURE — 88342 CHG IMMUNOCYTOCHEMISTRY: ICD-10-PCS | Mod: 26,,, | Performed by: PATHOLOGY

## 2022-10-28 PROCEDURE — D9220A PRA ANESTHESIA: Mod: CRNA,,, | Performed by: NURSE ANESTHETIST, CERTIFIED REGISTERED

## 2022-10-28 PROCEDURE — 25000003 PHARM REV CODE 250: Performed by: INTERNAL MEDICINE

## 2022-10-28 PROCEDURE — 88342 IMHCHEM/IMCYTCHM 1ST ANTB: CPT | Mod: 59 | Performed by: PATHOLOGY

## 2022-10-28 PROCEDURE — 00731 ANES UPR GI NDSC PX NOS: CPT | Performed by: INTERNAL MEDICINE

## 2022-10-28 PROCEDURE — 27201012 HC FORCEPS, HOT/COLD, DISP: Performed by: INTERNAL MEDICINE

## 2022-10-28 PROCEDURE — 88305 TISSUE EXAM BY PATHOLOGIST: ICD-10-PCS | Mod: 26,,, | Performed by: PATHOLOGY

## 2022-10-28 PROCEDURE — D9220A PRA ANESTHESIA: Mod: ANES,,, | Performed by: ANESTHESIOLOGY

## 2022-10-28 PROCEDURE — D9220A PRA ANESTHESIA: ICD-10-PCS | Mod: CRNA,,, | Performed by: NURSE ANESTHETIST, CERTIFIED REGISTERED

## 2022-10-28 PROCEDURE — 88305 TISSUE EXAM BY PATHOLOGIST: CPT | Mod: 26,,, | Performed by: PATHOLOGY

## 2022-10-28 PROCEDURE — 37000008 HC ANESTHESIA 1ST 15 MINUTES: Performed by: INTERNAL MEDICINE

## 2022-10-28 PROCEDURE — 88342 IMHCHEM/IMCYTCHM 1ST ANTB: CPT | Mod: 26,,, | Performed by: PATHOLOGY

## 2022-10-28 PROCEDURE — 88305 TISSUE EXAM BY PATHOLOGIST: CPT | Mod: 59 | Performed by: PATHOLOGY

## 2022-10-28 PROCEDURE — D9220A PRA ANESTHESIA: ICD-10-PCS | Mod: ANES,,, | Performed by: ANESTHESIOLOGY

## 2022-10-28 PROCEDURE — 43239 EGD BIOPSY SINGLE/MULTIPLE: CPT | Mod: ,,, | Performed by: INTERNAL MEDICINE

## 2022-10-28 PROCEDURE — 63600175 PHARM REV CODE 636 W HCPCS: Performed by: NURSE ANESTHETIST, CERTIFIED REGISTERED

## 2022-10-28 PROCEDURE — 37000009 HC ANESTHESIA EA ADD 15 MINS: Performed by: INTERNAL MEDICINE

## 2022-10-28 RX ORDER — ETOMIDATE 2 MG/ML
INJECTION INTRAVENOUS
Status: DISCONTINUED | OUTPATIENT
Start: 2022-10-28 | End: 2022-10-28

## 2022-10-28 RX ORDER — SODIUM CHLORIDE 9 MG/ML
INJECTION, SOLUTION INTRAVENOUS CONTINUOUS
Status: DISCONTINUED | OUTPATIENT
Start: 2022-10-28 | End: 2022-10-28 | Stop reason: HOSPADM

## 2022-10-28 RX ORDER — ONDANSETRON 2 MG/ML
4 INJECTION INTRAMUSCULAR; INTRAVENOUS ONCE AS NEEDED
Status: DISCONTINUED | OUTPATIENT
Start: 2022-10-28 | End: 2022-10-28 | Stop reason: HOSPADM

## 2022-10-28 RX ORDER — PROPOFOL 10 MG/ML
VIAL (ML) INTRAVENOUS
Status: DISCONTINUED | OUTPATIENT
Start: 2022-10-28 | End: 2022-10-28

## 2022-10-28 RX ORDER — PANTOPRAZOLE SODIUM 40 MG/1
40 TABLET, DELAYED RELEASE ORAL 2 TIMES DAILY
Qty: 60 TABLET | Refills: 11 | Status: ON HOLD | OUTPATIENT
Start: 2022-10-28 | End: 2023-10-27 | Stop reason: HOSPADM

## 2022-10-28 RX ORDER — SODIUM CHLORIDE 0.9 % (FLUSH) 0.9 %
10 SYRINGE (ML) INJECTION
Status: DISCONTINUED | OUTPATIENT
Start: 2022-10-28 | End: 2022-10-28 | Stop reason: HOSPADM

## 2022-10-28 RX ORDER — LIDOCAINE HYDROCHLORIDE 20 MG/ML
INJECTION INTRAVENOUS
Status: DISCONTINUED | OUTPATIENT
Start: 2022-10-28 | End: 2022-10-28

## 2022-10-28 RX ADMIN — ETOMIDATE 4 MG: 2 INJECTION INTRAVENOUS at 11:10

## 2022-10-28 RX ADMIN — SODIUM CHLORIDE: 0.9 INJECTION, SOLUTION INTRAVENOUS at 10:10

## 2022-10-28 RX ADMIN — PROPOFOL 20 MG: 10 INJECTION, EMULSION INTRAVENOUS at 11:10

## 2022-10-28 RX ADMIN — LIDOCAINE HYDROCHLORIDE 100 MG: 20 INJECTION INTRAVENOUS at 11:10

## 2022-10-28 RX ADMIN — SODIUM CHLORIDE: 9 INJECTION, SOLUTION INTRAVENOUS at 10:10

## 2022-10-28 NOTE — ED TRIAGE NOTES
Casie Salvador, a 57 y.o. female presents to the ED w/ complaint of foot pain and abdominal pain. L foot pain and lower leg pain. Hx of partial foot amputation to site. LUQ abdominal that radiates to back since this AM. Scheduled for endoscopy tomorrow. Vomited this am.     Triage note:  Chief Complaint   Patient presents with    Foot Pain     Left foot pain and lower leg pain, hx partial foot amputation to site.     Abdominal Pain     LUQ that radiates to back since this AM. Vomited this AM. Scheduled for an endoscopy tomorrow.      Review of patient's allergies indicates:   Allergen Reactions    Orange juice Hives    Tomato (solanum lycopersicum) Hives     Past Medical History:   Diagnosis Date    Chronic combined systolic and diastolic congestive heart failure     Coronary artery disease     Diabetes mellitus     Encounter for blood transfusion     Gastric ulcer with hemorrhage     Heart attack     History of gastric ulcer 4/2/2022    Hypertension     NSAID induced gastritis 4/2/2022    Perforated viscus 03/14/2022    Peripheral artery disease

## 2022-10-28 NOTE — PROVATION PATIENT INSTRUCTIONS
Discharge Summary/Instructions after an Endoscopic Procedure  Patient Name: Casie Salvador  Patient MRN: [id number]  Patient YOB: 1965 Friday, October 28, 2022  Estefania Bryant MD  Dear patient,  As a result of recent federal legislation (The Federal Cures Act), you may   receive lab or pathology results from your procedure in your [facility]   account before your physician is able to contact you. Your physician or   their representative will relay the results to you with their   recommendations at their soonest availability.  Thank you,  RESTRICTIONS:  During your procedure today, you received medications for sedation.  These   medications may affect your judgment, balance and coordination.  Therefore,   for 24 hours, you have the following restrictions:   - DO NOT drive a car, operate machinery, make legal/financial decisions,   sign important papers or drink alcohol.    ACTIVITY:  Today: no heavy lifting, straining or running due to procedural   sedation/anesthesia.  The following day: return to full activity including work.  DIET:  Eat and drink normally unless instructed otherwise.     TREATMENT FOR COMMON SIDE EFFECTS:  - Mild abdominal pain, nausea, belching, bloating or excessive gas:  rest,   eat lightly and use a heating pad.  - Sore Throat: treat with throat lozenges and/or gargle with warm salt   water.  - Because air was used during the procedure, expelling large amounts of air   from your rectum or belching is normal.  - If a bowel prep was taken, you may not have a bowel movement for 1-3 days.    This is normal.  SYMPTOMS TO WATCH FOR AND REPORT TO YOUR PHYSICIAN:  1. Abdominal pain or bloating, other than gas cramps.  2. Chest pain.  3. Back pain.  4. Signs of infection such as: chills or fever occurring within 24 hours   after the procedure.  5. Rectal bleeding, which would show as bright red, maroon, or black stools.   (A tablespoon of blood from the rectum is not serious, especially  if   hemorrhoids are present.)  6. Vomiting.  7. Weakness or dizziness.  GO DIRECTLY TO THE NEAREST EMERGENCY ROOM IF YOU HAVE ANY OF THE FOLLOWING:      Difficulty breathing              Chills and/or fever over 101 F   Persistent vomiting and/or vomiting blood   Severe abdominal pain   Severe chest pain   Black, tarry stools   Bleeding- more than one tablespoon   Any other symptom or condition that you feel may need urgent attention  Your doctor recommends these additional instructions:  If any biopsies were taken, your doctors clinic will contact you in 1 to 2   weeks with any results.  - Discharge patient to home.   - Resume previous diet.   - Continue present medications. The patient should resume Protonix 40mg   twice per day.  - Await pathology results.   - Repeat EGD in 3 months to ensure continued ulcer healing.  Plan to perform   colonoscopy at that time as well as the patient is due.  For questions, problems or results please call your physician - Estefania Bryant MD at Work:  [phone].  [facility], EMERGENCY ROOM PHONE NUMBER: [phone]  IF A COMPLICATION OR EMERGENCY SITUATION ARISES AND YOU ARE UNABLE TO REACH   YOUR PHYSICIAN - GO DIRECTLY TO THE EMERGENCY ROOM.  Estefania Bryant MD  10/28/2022 11:35:02 AM  This report has been verified and signed electronically.  Dear patient,  As a result of recent federal legislation (The Federal Cures Act), you may   receive lab or pathology results from your procedure in your [facility]   account before your physician is able to contact you. Your physician or   their representative will relay the results to you with their   recommendations at their soonest availability.  Thank you,  PROVATION

## 2022-10-28 NOTE — ANESTHESIA PREPROCEDURE EVALUATION
10/28/2022  Casie Salvador is a 57 y.o., female   Pre-operative evaluation for Procedure(s) (LRB):  EGD (ESOPHAGOGASTRODUODENOSCOPY) (N/A)    Casie Salvador is a 57 y.o. female     Patient Active Problem List   Diagnosis    Hypertension    Coronary artery disease involving native coronary artery of native heart without angina pectoris    DM (diabetes mellitus), type 2 with complications    Chronic pain    Constipation due to opioid therapy    Depression    Insomnia    Chronic combined systolic and diastolic congestive heart failure    Peripheral artery disease    History of right below knee amputation    Chronic, continuous use of opioids    Upper GI bleed    Peptic ulcer disease    Tobacco abuse       Review of patient's allergies indicates:   Allergen Reactions    Orange juice Hives    Tomato (solanum lycopersicum) Hives       No current facility-administered medications on file prior to encounter.     Current Outpatient Medications on File Prior to Encounter   Medication Sig Dispense Refill    aspirin 81 MG Chew Take 1 tablet (81 mg total) by mouth once daily.      atorvastatin (LIPITOR) 40 MG tablet Take 40 mg by mouth once daily.      ATROVENT HFA 17 mcg/actuation inhaler Inhale 2 puffs into the lungs once daily.      carisoprodoL (SOMA) 350 MG tablet Take 350 mg by mouth once daily.      citalopram (CELEXA) 20 MG tablet Take 20 mg by mouth once daily.      docusate sodium (COLACE) 100 MG capsule Take 1 capsule (100 mg total) by mouth 2 (two) times daily. 60 capsule 0    ergocalciferol (ERGOCALCIFEROL) 50,000 unit Cap Take 50,000 Units by mouth every 7 days.      ferrous sulfate (FEOSOL) 325 mg (65 mg iron) Tab tablet Take 325 mg by mouth 2 (two) times daily.      fluticasone propionate (FLONASE) 50 mcg/actuation nasal spray 1 spray by Each Nostril route once  "daily.      folic acid-vit B6-vit B12 2.5-25-2 mg (FOLBIC OR EQUIV) 2.5-25-2 mg Tab Take 1 tablet by mouth once daily. 90 tablet 3    furosemide (LASIX) 20 MG tablet Take 1 tablet (20 mg total) by mouth 2 (two) times daily. Hold this. Do not take it until you see your doctor.      gabapentin (NEURONTIN) 600 MG tablet Take 600 mg by mouth 2 (two) times daily.      glipiZIDE (GLUCOTROL) 10 MG tablet Take 1 tablet (10 mg total) by mouth 2 (two) times daily before meals. Hold this. Do not take it until you see your doctor.      LIDOcaine-prilocaine (EMLA) cream Apply topically once daily.      losartan (COZAAR) 25 MG tablet Take 1 tablet (25 mg total) by mouth once daily. 30 tablet 11    metoprolol succinate (TOPROL-XL) 25 MG 24 hr tablet Take 1 tablet (25 mg total) by mouth once daily. 30 tablet 11    oxyCODONE-acetaminophen (PERCOCET)  mg per tablet Take 1 tablet by mouth every 8 (eight) hours as needed for Pain.      pantoprazole (PROTONIX) 40 MG tablet Take 1 tablet (40 mg total) by mouth 2 (two) times daily. 60 tablet 0    potassium chloride (MICRO-K) 10 MEQ CpSR Take 1 capsule (10 mEq total) by mouth once daily. Hold this. Do not take it until you see your doctor.      pregabalin (LYRICA) 150 MG capsule Take 150 mg by mouth 2 (two) times daily.      promethazine (PHENERGAN) 6.25 mg/5 mL syrup TAKE 10 mls BY MOUTH EVERY 6 HOURS AS NEEDED      zolpidem (AMBIEN) 10 mg Tab Take 10 mg by mouth nightly as needed.      blood sugar diagnostic Strp by Misc.(Non-Drug; Combo Route) route.      busPIRone (BUSPAR) 10 MG tablet Take 10 mg by mouth 2 (two) times daily.      insulin needles, disposable, 31 x 3/16 " Ndle by Misc.(Non-Drug; Combo Route) route.      lancets 28 gauge Misc by Misc.(Non-Drug; Combo Route) route.      metoclopramide HCl (REGLAN) 10 MG tablet Take 10 mg by mouth 2 (two) times a day.      montelukast (SINGULAIR) 10 mg tablet Take 10 mg by mouth every evening.      ondansetron " "(ZOFRAN-ODT) 4 MG TbDL Take 1 tablet (4 mg total) by mouth every 6 (six) hours as needed. 15 tablet 0    pen needle, diabetic 31 gauge x 5/16" Ndle Use to inject insulin into the skin every evening. 100 each 0    [DISCONTINUED] amLODIPine (NORVASC) 10 MG tablet Take 1 tablet (10 mg total) by mouth once daily. Hold this. Do not take it until you see your doctor.      [DISCONTINUED] hydrALAZINE (APRESOLINE) 50 MG tablet Take 1 tablet (50 mg total) by mouth daily as needed. Hold this. Do not take it until you see your doctor.      [DISCONTINUED] hyoscyamine (ANASPAZ,LEVSIN) 0.125 mg Tab Take 1 tablet (125 mcg total) by mouth every 6 (six) hours as needed (abdominal cramping). 30 tablet 0    [DISCONTINUED] insulin glargine,hum.rec.anlog (LANTUS U-100 INSULIN SUBQ) Inject 60 Units into the skin every evening.      [DISCONTINUED] lisinopriL (PRINIVIL,ZESTRIL) 2.5 MG tablet Take 1 tablet (2.5 mg total) by mouth once daily. 90 tablet 3    [DISCONTINUED] metFORMIN (GLUCOPHAGE) 1000 MG tablet Take 1 tablet (1,000 mg total) by mouth 2 (two) times daily with meals. Hold this. Do not take it until you see your doctor. (Patient not taking: Reported on 4/18/2022)      [DISCONTINUED] metoprolol tartrate (LOPRESSOR) 50 MG tablet Take 1 tablet (50 mg total) by mouth 2 (two) times daily. Hold this. Do not take it until you see your doctor.         Past Surgical History:   Procedure Laterality Date    APPENDECTOMY      BELOW KNEE AMPUTATION OF LOWER EXTREMITY Right 2019    COLONOSCOPY N/A 02/23/2022    Procedure: COLONOSCOPY;  Surgeon: Estefania Bryant MD;  Location: [facility] ([address] [place] [address]);  Service: Endoscopy;  Laterality: N/A;  anemia, melena    CORONARY STENT PLACEMENT      ESOPHAGOGASTRODUODENOSCOPY N/A 02/23/2022    Procedure: EGD (ESOPHAGOGASTRODUODENOSCOPY);  Surgeon: Estefania Bryant MD;  Location: [facility] ([address] [place] [address]);  Service: Endoscopy;  Laterality: N/A;  anemia    ESOPHAGOGASTRODUODENOSCOPY N/A 4/14/2022    " Procedure: EGD (ESOPHAGOGASTRODUODENOSCOPY);  Surgeon: First Available Den King;  Location: [facility] ENDO (2ND FLR);  Service: Endoscopy;  Laterality: N/A;  please schedule in 8 weeks. done 2/23      EF-20    ESOPHAGOGASTRODUODENOSCOPY  4/14/2022    Procedure: ;  Surgeon: First Available Den King;  Location: [facility] ENDO (2ND FLR);  Service: Endoscopy;;    ESOPHAGOGASTRODUODENOSCOPY N/A 4/29/2022    Procedure: EGD (ESOPHAGOGASTRODUODENOSCOPY);  Surgeon: Estefania Bryant MD;  Location: [facility] ENDO;  Service: Endoscopy;  Laterality: N/A;  expedite EGD per Dr Ga      states vaccinated-will bring card-GT    FOOT AMPUTATION THROUGH METATARSAL Left 2019    TUBAL LIGATION         CBC:   Recent Labs     10/27/22  1906   WBC 12.37   RBC 5.39   HGB 12.4   HCT 38.7      MCV 72*   MCH 23.0*   MCHC 32.0       CMP:   Recent Labs     10/27/22  1906   *   K 5.4*      CO2 20*   BUN 12   CREATININE 1.3   *   CALCIUM 9.1   ALBUMIN 3.8   PROT 8.4   ALKPHOS 163*   ALT 14   AST 13   BILITOT 0.4     2/21/2022 TTE Summary     The left ventricle is normal in size with severely decreased systolic function. The estimated ejection fraction is 20%.   Normal right ventricular size with normal right ventricular systolic function.   Grade II left ventricular diastolic dysfunction.   Mild left atrial enlargement.   Elevated central venous pressure (15 mmHg).    Pre-op Assessment    I have reviewed the Patient Summary Reports.     I have reviewed the Nursing Notes. I have reviewed the NPO Status.   I have reviewed the Medications.     Review of Systems  Anesthesia Hx:  No problems with previous Anesthesia Denies Hx of Anesthetic complications  History of prior surgery of interest to airway management or planning: Denies Family Hx of Anesthesia complications.   Denies Personal Hx of Anesthesia complications.   Social:  No Alcohol Use, Non-Smoker    Hematology/Oncology:  Hematology Normal   Oncology Normal      EENT/Dental:EENT/Dental Normal   Cardiovascular:   Exercise tolerance: poor Hypertension Past MI CAD   CHF    Pulmonary:  Pulmonary Normal    Renal/:  Renal/ Normal     Hepatic/GI:   PUD,    Neurological:  Neurology Normal    Endocrine:   Diabetes, type 2    Psych:   depression          Physical Exam  General: Well nourished, Cooperative, Alert and Oriented    Airway:  Mallampati: IV   Mouth Opening: Small, but > 3cm  TM Distance: Normal  Tongue: Normal  Neck ROM: Normal ROM    Dental:  Dentures    Chest/Lungs:  Clear to auscultation    Heart:  Rate: Normal        Anesthesia Plan  Type of Anesthesia, risks & benefits discussed:    Anesthesia Type: Gen ETT, Gen Natural Airway  Intra-op Monitoring Plan: Standard ASA Monitors  Post Op Pain Control Plan: multimodal analgesia and IV/PO Opioids PRN  Induction:  IV  Airway Plan: Direct, Post-Induction  Informed Consent: Informed consent signed with the Patient and all parties understand the risks and agree with anesthesia plan.  All questions answered. Patient consented to blood products? No  ASA Score: 3  Day of Surgery Review of History & Physical: H&P Update referred to the surgeon/provider.    Ready For Surgery From Anesthesia Perspective.     .       Libtayo Counseling- I discussed with the patient the risks of Libtayo including but not limited to nausea, vomiting, diarrhea, and bone or muscle pain.  The patient verbalized understanding of the proper use and possible adverse effects of Libtayo.  All of the patient's questions and concerns were addressed.

## 2022-10-28 NOTE — H&P
Short Stay Endoscopy History and Physical    PCP - Adonis Carey MD     Procedure - EGD  ASA - per anesthesia  Mallampati - per anesthesia  History of Anesthesia problems - no  Family history Anesthesia problems -  no   Plan of anesthesia - General    HPI:  This is a 57 y.o. female here for evaluation of : follow up of a previously noted gastric ulcer.    ROS:  Constitutional: No fevers, chills, No weight loss  CV: No chest pain  Pulm: No cough, No shortness of breath  Ophtho: No vision changes  GI: see HPI  Derm: No rash    Medical History:  has a past medical history of Chronic combined systolic and diastolic congestive heart failure, Coronary artery disease, Diabetes mellitus, Encounter for blood transfusion, Gastric ulcer with hemorrhage, Heart attack, History of gastric ulcer (4/2/2022), Hypertension, NSAID induced gastritis (4/2/2022), Perforated viscus (03/14/2022), and Peripheral artery disease.    Surgical History:  has a past surgical history that includes Tubal ligation; Appendectomy; Esophagogastroduodenoscopy (N/A, 02/23/2022); Colonoscopy (N/A, 02/23/2022); Below knee amputation of lower extremity (Right, 2019); Coronary stent placement; Foot amputation through metatarsal (Left, 2019); Esophagogastroduodenoscopy (N/A, 4/14/2022); Esophagogastroduodenoscopy (4/14/2022); and Esophagogastroduodenoscopy (N/A, 4/29/2022).    Family History: family history is not on file.. Otherwise no colon cancer, inflammatory bowel disease, or GI malignancies.    Social History:  reports that she has been smoking cigarettes. She has been smoking an average of .5 packs per day. She has never used smokeless tobacco. She reports that she does not drink alcohol and does not use drugs.    Review of patient's allergies indicates:   Allergen Reactions    Orange juice Hives    Tomato (solanum lycopersicum) Hives       Medications:   Medications Prior to Admission   Medication Sig Dispense Refill Last Dose    aspirin 81 MG  "Chew Take 1 tablet (81 mg total) by mouth once daily.       atorvastatin (LIPITOR) 40 MG tablet Take 40 mg by mouth once daily.       ATROVENT HFA 17 mcg/actuation inhaler Inhale 2 puffs into the lungs once daily.       blood sugar diagnostic Strp by Misc.(Non-Drug; Combo Route) route.       busPIRone (BUSPAR) 10 MG tablet Take 10 mg by mouth 2 (two) times daily.       carisoprodoL (SOMA) 350 MG tablet Take 350 mg by mouth once daily.       citalopram (CELEXA) 20 MG tablet Take 20 mg by mouth once daily.       docusate sodium (COLACE) 100 MG capsule Take 1 capsule (100 mg total) by mouth 2 (two) times daily. 60 capsule 0     ergocalciferol (ERGOCALCIFEROL) 50,000 unit Cap Take 50,000 Units by mouth every 7 days.       ferrous sulfate (FEOSOL) 325 mg (65 mg iron) Tab tablet Take 325 mg by mouth 2 (two) times daily.       fluticasone propionate (FLONASE) 50 mcg/actuation nasal spray 1 spray by Each Nostril route once daily.       folic acid-vit B6-vit B12 2.5-25-2 mg (FOLBIC OR EQUIV) 2.5-25-2 mg Tab Take 1 tablet by mouth once daily. 90 tablet 3     furosemide (LASIX) 20 MG tablet Take 1 tablet (20 mg total) by mouth 2 (two) times daily. Hold this. Do not take it until you see your doctor.       gabapentin (NEURONTIN) 600 MG tablet Take 600 mg by mouth 2 (two) times daily.       glipiZIDE (GLUCOTROL) 10 MG tablet Take 1 tablet (10 mg total) by mouth 2 (two) times daily before meals. Hold this. Do not take it until you see your doctor.       insulin needles, disposable, 31 x 3/16 " Ndle by Misc.(Non-Drug; Combo Route) route.       lancets 28 gauge Misc by Misc.(Non-Drug; Combo Route) route.       LIDOcaine-prilocaine (EMLA) cream Apply topically once daily.       losartan (COZAAR) 25 MG tablet Take 1 tablet (25 mg total) by mouth once daily. 30 tablet 11     metoclopramide HCl (REGLAN) 10 MG tablet Take 10 mg by mouth 2 (two) times a day.       metoprolol succinate (TOPROL-XL) 25 MG 24 hr tablet Take 1 tablet (25 mg " "total) by mouth once daily. 30 tablet 11     montelukast (SINGULAIR) 10 mg tablet Take 10 mg by mouth every evening.       ondansetron (ZOFRAN-ODT) 4 MG TbDL Take 1 tablet (4 mg total) by mouth every 6 (six) hours as needed. 15 tablet 0     oxyCODONE-acetaminophen (PERCOCET)  mg per tablet Take 1 tablet by mouth every 8 (eight) hours as needed for Pain.       pantoprazole (PROTONIX) 40 MG tablet Take 1 tablet (40 mg total) by mouth 2 (two) times daily. 60 tablet 0     pen needle, diabetic 31 gauge x 5/16" Ndle Use to inject insulin into the skin every evening. 100 each 0     potassium chloride (MICRO-K) 10 MEQ CpSR Take 1 capsule (10 mEq total) by mouth once daily. Hold this. Do not take it until you see your doctor.       pregabalin (LYRICA) 150 MG capsule Take 150 mg by mouth 2 (two) times daily.       promethazine (PHENERGAN) 6.25 mg/5 mL syrup TAKE 10 mls BY MOUTH EVERY 6 HOURS AS NEEDED       zolpidem (AMBIEN) 10 mg Tab Take 10 mg by mouth nightly as needed.          Physical Exam:    Vital Signs: There were no vitals filed for this visit.    Gen: NAD, lying comfortably  HENT: NCAT, oropharynx clear  Eyes: anicteric sclerae, EOMI grossly  Neck: supple, no visible masses/goiter  Cardiac: RRR  Lungs: non-labored breathing  Abd: soft, NT/ND, normoactive BS  Ext: no LE edema, warm, well perfused  Skin: skin intact on exposed body parts, no visible rashes, lesions  Neuro: A&Ox4, neuro exam grossly intact, moves all extremities  Psych: appropriate mood, affect      Labs:  Lab Results   Component Value Date    WBC 12.37 10/27/2022    HGB 12.4 10/27/2022    HCT 38.7 10/27/2022     10/27/2022    TRIG 230 (H) 03/19/2022    ALT 14 10/27/2022    AST 13 10/27/2022     (L) 10/27/2022    K 5.4 (H) 10/27/2022     10/27/2022    CREATININE 1.3 10/27/2022    BUN 12 10/27/2022    CO2 20 (L) 10/27/2022    TSH 2.899 02/20/2022    INR 1.0 04/13/2022    HGBA1C 5.8 (H) 02/20/2022       Plan:  EGD for follow up " of a gastric ulcer.    I have explained the risks and benefits of endoscopy procedures to the patient including but not limited to bleeding, perforation, infection, and death.  The patient was asked if they understand and allowed to ask any further questions to their satisfaction.      Estefania Bryant MD

## 2022-10-28 NOTE — ED PROVIDER NOTES
ED Physician Hand-off Note:    ED Course: I assumed care of patient from off-going ED physician team. Briefly, Patient is a 57-year-old female presents ED for left upper quadrant abdominal pain.    At the time of signout plan was pending CT abdomen pelvis and foot x-ray.    Medications given in the ED:    Medications   aluminum-magnesium hydroxide-simethicone 200-200-20 mg/5 mL suspension 5 mL (5 mLs Oral Given 10/27/22 2038)   morphine injection 4 mg (4 mg Intravenous Given 10/27/22 2039)   ondansetron injection 4 mg (4 mg Intravenous Given 10/27/22 2039)   iohexoL (OMNIPAQUE 350) injection 75 mL (75 mLs Intravenous Given 10/27/22 2216)       Disposition:  Discharge home.  CT abdomen pelvis was reviewed which shows no acute abdominal pathology.  X-ray of the foot shows no acute fractures or dislocations.  Her vital signs are reassuring with a benign abdominal exam.  Has close follow-up tomorrow with GI for EGD for PUD monitoring.    Patient comfortable with discharge. Patient counseled regarding exam, results, diagnosis, treatment, and plan.    Impression:  Left upper quadrant abdominal pain, left foot pain       Rodger Fry PA-C  10/27/22 2179

## 2022-10-28 NOTE — DISCHARGE INSTRUCTIONS
Follow-up with your scheduled GI appointment tomorrow.  Restart the prescribed Protonix as directed for your abdominal pain.      Return to the emergency room for new, worsening, or concerning symptoms.     Future Appointments   Date Time Provider Department Center   4/4/2023 10:20 AM Seferino Yoo MD [facility] [place]

## 2022-10-28 NOTE — ED PROVIDER NOTES
Encounter Date: 10/27/2022       History     Chief Complaint   Patient presents with    Foot Pain     Left foot pain and lower leg pain, hx partial foot amputation to site.     Abdominal Pain     LUQ that radiates to back since this AM. Vomited this AM. Scheduled for an endoscopy tomorrow.      The history is provided by the patient and medical records. No  was used.     Casie Salvador is a 57 y.o. female with medical history of DM type 2, HTN, CAD with stent, peripheral artery disease, CHF (EF 20%), gastric ulcer with upper GI bleed presenting to the ED with the chief complaint of abdominal pain and left foot pain.     Reports waking up today with LUQ abdominal pain that radiates to her back. Reports 1 episode of clear-colored emesis. Reports having a normal bowel movement this morning. Denies melena, hematochezia, hemoptysis. Reports feeling at her baseline health yesterday. She is scheduled to have a follow-up EGD tomorrow for PUD monitoring. Reports taking Percocet for pain at home. No fever, chest pain, SOB, cough, urinary changes. She additionally notes having pain to her L foot and ankle pain that worsens whenever she puts pressure on her leg when walking. Reports this pain is chronic and worsens when the weather changes. Denies lower extremity swelling.     Review of patient's allergies indicates:   Allergen Reactions    Orange juice Hives    Tomato (solanum lycopersicum) Hives     Past Medical History:   Diagnosis Date    Chronic combined systolic and diastolic congestive heart failure     Coronary artery disease     Diabetes mellitus     Encounter for blood transfusion     Gastric ulcer with hemorrhage     Heart attack     History of gastric ulcer 4/2/2022    Hypertension     NSAID induced gastritis 4/2/2022    Perforated viscus 03/14/2022    Peripheral artery disease      Past Surgical History:   Procedure Laterality Date    APPENDECTOMY      BELOW KNEE AMPUTATION OF LOWER  EXTREMITY Right 2019    COLONOSCOPY N/A 02/23/2022    Procedure: COLONOSCOPY;  Surgeon: Estefania Bryant MD;  Location: [facility] (2ND FLR);  Service: Endoscopy;  Laterality: N/A;  anemia, melena    CORONARY STENT PLACEMENT      ESOPHAGOGASTRODUODENOSCOPY N/A 02/23/2022    Procedure: EGD (ESOPHAGOGASTRODUODENOSCOPY);  Surgeon: Estefania Bryant MD;  Location: [facility] (2ND FLR);  Service: Endoscopy;  Laterality: N/A;  anemia    ESOPHAGOGASTRODUODENOSCOPY N/A 4/14/2022    Procedure: EGD (ESOPHAGOGASTRODUODENOSCOPY);  Surgeon: First Available Den mark;  Location: [facility] (2ND FLR);  Service: Endoscopy;  Laterality: N/A;  please schedule in 8 weeks. done 2/23      EF-20    ESOPHAGOGASTRODUODENOSCOPY  4/14/2022    Procedure: ;  Surgeon: First Available Den King;  Location: [facility] (2ND FLR);  Service: Endoscopy;;    ESOPHAGOGASTRODUODENOSCOPY N/A 4/29/2022    Procedure: EGD (ESOPHAGOGASTRODUODENOSCOPY);  Surgeon: Estefania Bryant MD;  Location: [facility];  Service: Endoscopy;  Laterality: N/A;  expedite EGD per Dr Ga      states vaccinated-will bring card-GT    FOOT AMPUTATION THROUGH METATARSAL Left 2019    TUBAL LIGATION       History reviewed. No pertinent family history.  Social History     Tobacco Use    Smoking status: Every Day     Packs/day: 0.50     Types: Cigarettes    Smokeless tobacco: Never   Substance Use Topics    Alcohol use: No    Drug use: No     Review of Systems   Constitutional:  Negative for fever.   HENT:  Negative for sore throat.    Respiratory:  Negative for shortness of breath.    Cardiovascular:  Negative for chest pain.   Gastrointestinal:  Positive for abdominal pain and vomiting. Negative for blood in stool, constipation, diarrhea and nausea.   Genitourinary:  Negative for dysuria.   Musculoskeletal:  Positive for arthralgias. Negative for back pain.   Skin:  Negative for rash.   Neurological:  Negative for weakness.   Hematological:  Does not bruise/bleed easily.     Physical  Exam     Initial Vitals [10/27/22 1758]   BP Pulse Resp Temp SpO2   (!) 160/74 94 18 98.9 °F (37.2 °C) 99 %      MAP       --         Physical Exam    Constitutional: She appears well-developed and well-nourished. She is not diaphoretic. No distress.   Obese female   HENT:   Head: Normocephalic and atraumatic.   Mouth/Throat: Oropharynx is clear and moist. No oropharyngeal exudate.   Eyes: Conjunctivae and EOM are normal. Pupils are equal, round, and reactive to light. No scleral icterus.   Neck: Neck supple.   Normal range of motion.  Cardiovascular:  Normal rate, regular rhythm and intact distal pulses.           Intact PT doppler signal LLE   Pulmonary/Chest: Breath sounds normal. No respiratory distress. She has no wheezes.   Abdominal: Abdomen is soft. She exhibits no distension. There is no abdominal tenderness.   Negative Delcid's  No CVA tenderness There is no rebound.   Genitourinary:    Genitourinary Comments: Rectal - Brown stool on gloved finger. No melena or hematochezia. FOBT negative.     Musculoskeletal:         General: No tenderness or edema. Normal range of motion.      Cervical back: Normal range of motion and neck supple.      Comments: R BKA, L midfoot amputation.  L foot stump pink and warm. No erythema, ecchymosis, dehiscence, expressible discharge.      Neurological: She is alert and oriented to person, place, and time. She has normal strength. No sensory deficit.   Skin: Skin is warm and dry. No rash noted. No erythema.   Psychiatric: She has a normal mood and affect.       ED Course   Procedures  Labs Reviewed   COMPREHENSIVE METABOLIC PANEL - Abnormal; Notable for the following components:       Result Value    Sodium 133 (*)     Potassium 5.4 (*)     CO2 20 (*)     Glucose 148 (*)     Alkaline Phosphatase 163 (*)     eGFR 48.3 (*)     All other components within normal limits    Narrative:     Release to patient->Immediate   CBC W/ AUTO DIFFERENTIAL - Abnormal; Notable for the following  components:    MCV 72 (*)     MCH 23.0 (*)     RDW 15.5 (*)     Immature Grans (Abs) 0.05 (*)     All other components within normal limits    Narrative:     Release to patient->Immediate   HIV 1 / 2 ANTIBODY    Narrative:     Release to patient->Immediate   HEPATITIS C ANTIBODY    Narrative:     Release to patient->Immediate   LIPASE    Narrative:     Release to patient->Immediate   TROPONIN I    Narrative:     Release to patient->Immediate   URINALYSIS, REFLEX TO URINE CULTURE          Imaging Results              X-Ray Ankle Complete Left (In process)                      CT Abdomen Pelvis With Contrast (In process)                      Medications   aluminum-magnesium hydroxide-simethicone 200-200-20 mg/5 mL suspension 5 mL (5 mLs Oral Given 10/27/22 2038)   morphine injection 4 mg (4 mg Intravenous Given 10/27/22 2039)   ondansetron injection 4 mg (4 mg Intravenous Given 10/27/22 2039)   iohexoL (OMNIPAQUE 350) injection 75 mL (75 mLs Intravenous Given 10/27/22 2216)     Medical Decision Making:   History:   Old Medical Records: I decided to obtain old medical records.  Old Records Summarized: records from clinic visits and records from previous admission(s).  Independently Interpreted Test(s):   I have ordered and independently interpreted EKG Reading(s) - see summary below       <> Summary of EKG Reading(s): NSR 87 bpm. LAD. Prolonged QT  Clinical Tests:   Lab Tests: Ordered and Reviewed  Radiological Study: Ordered and Reviewed  Medical Tests: Ordered and Reviewed     APC / Resident Notes:   57 y.o. female with medical history of DM type 2, HTN, CAD with stent, peripheral artery disease, CHF (EF 20%), gastric ulcer with upper GI bleed presenting to the ED c/o 1 day of LUQ abdominal pain with 1 episode of vomiting. Reports having L foot pain at her stump for a few days. DDx includes but not limited to GERD, dyspepsia, PUD, pancreatitis, bowel obstruction, pancreatitis, colitis, UTI, nephrolithiasis. Intact PT  doppler signal to LLE and do not suspect acute limb ischemia. Will check x-ray for further evaluation. No physical exam findings concerning for infectious process.       ED Course as of 10/27/22 2259   Thu Oct 27, 2022   2249 Creatinine: 1.3 [BA]   2249 Potassium(!): 5.4  Mildly eelvated [BA]   2250 Hemoglobin: 12.4 [BA]   2250 Hematocrit: 38.7 [BA]   2250 H/H above baseline. No melena or hematochezia. Do not suspect GI bleed [BA]   2250 AST: 13 [BA]   2250 ALT: 14 [BA]   2250 Alkaline Phosphatase(!): 163 [BA]   2250 BILIRUBIN TOTAL: 0.4 [BA]   2250 Troponin I: <0.006 [BA]   2250 Lipase: 13 [BA]   2250 Patient reports her pain has improved on reassessment. Patient signed out to my colleague at the end of my shift pending CT abd/pelv, X-ray L foot, and UA results. [BA]      ED Course User Index  [BA] Lennox Ross PA-C                 Clinical Impression:   Final diagnoses:  [R52] Pain  [R10.12] Left upper quadrant abdominal pain (Primary)  [M79.672] Left foot pain        ED Disposition Condition    Discharge Stable          ED Prescriptions       Medication Sig Dispense Start Date End Date Auth. Provider    pantoprazole (PROTONIX) 40 MG tablet Take 1 tablet (40 mg total) by mouth 2 (two) times daily. 60 tablet 10/27/2022 11/26/2022 Lennox Ross PA-C    ondansetron (ZOFRAN-ODT) 4 MG TbDL Take 1 tablet (4 mg total) by mouth every 6 (six) hours as needed. 15 tablet 10/27/2022 -- Lennox Ross PA-C          Follow-up Information       Follow up With Specialties Details Why Contact Info Additional Information    [facility] 4[facility]   [address]  [facility] [address] [phone] [facility] - Main Building, 4th Floor Please park in [facility] and take [facility] elevator             Lennox Ross PA-C  10/27/22 4205

## 2022-10-28 NOTE — TRANSFER OF CARE
"Anesthesia Transfer of Care Note    Patient: Casie Salvador    Procedure(s) Performed: Procedure(s) (LRB):  EGD (ESOPHAGOGASTRODUODENOSCOPY) (N/A)    Patient location: PACU    Anesthesia Type: general    Transport from OR: Transported from OR on room air with adequate spontaneous ventilation    Post pain: adequate analgesia    Post assessment: no apparent anesthetic complications and tolerated procedure well    Post vital signs: stable    Level of consciousness: awake, alert and oriented    Nausea/Vomiting: no nausea/vomiting    Complications: none    Transfer of care protocol was followed      Last vitals:   Visit Vitals  /87   Pulse 79   Temp 98   Resp 18   Ht 5' 6" (1.676 m)   Wt 81.6 kg (180 lb)   SpO2 99%   Breastfeeding No   BMI 29.05 kg/m²     "

## 2022-10-31 NOTE — ANESTHESIA POSTPROCEDURE EVALUATION
Anesthesia Post Evaluation    Patient: Casie Salvador    Procedure(s) Performed: Procedure(s) (LRB):  EGD (ESOPHAGOGASTRODUODENOSCOPY) (N/A)    Final Anesthesia Type: general      Patient location during evaluation: GI PACU  Patient participation: Yes- Able to Participate  Level of consciousness: awake and alert and oriented  Post-procedure vital signs: reviewed and stable  Pain management: adequate  Airway patency: patent    PONV status at discharge: No PONV  Anesthetic complications: no      Cardiovascular status: blood pressure returned to baseline and hemodynamically stable  Respiratory status: unassisted, spontaneous ventilation and room air  Hydration status: euvolemic  Follow-up not needed.          Vitals Value Taken Time   /59 10/28/22 1218   Temp 36.1 °C (97 °F) 10/28/22 1129   Pulse 84 10/28/22 1222   Resp 35 10/28/22 1210   SpO2 97 % 10/28/22 1222   Vitals shown include unvalidated device data.      No case tracking events are documented in the log.      Pain/Basil Score: No data recorded

## 2022-11-04 LAB
FINAL PATHOLOGIC DIAGNOSIS: NORMAL
GROSS: NORMAL
Lab: NORMAL

## 2023-03-01 ENCOUNTER — HOSPITAL ENCOUNTER (EMERGENCY)
Facility: HOSPITAL | Age: 58
Discharge: HOME | End: 2023-03-01
Attending: EMERGENCY MEDICINE
Payer: MEDICAID

## 2023-03-01 VITALS
OXYGEN SATURATION: 96 % | DIASTOLIC BLOOD PRESSURE: 85 MMHG | SYSTOLIC BLOOD PRESSURE: 156 MMHG | BODY MASS INDEX: 28.25 KG/M2 | RESPIRATION RATE: 20 BRPM | HEART RATE: 96 BPM | WEIGHT: 175 LBS | TEMPERATURE: 98 F

## 2023-03-01 DIAGNOSIS — R73.9 HYPERGLYCEMIA: ICD-10-CM

## 2023-03-01 DIAGNOSIS — K31.84 GASTROPARESIS: Primary | ICD-10-CM

## 2023-03-01 LAB
ALBUMIN SERPL BCP-MCNC: 4 G/DL (ref 3.5–5.2)
ALLENS TEST: ABNORMAL
ALP SERPL-CCNC: 144 U/L (ref 55–135)
ALT SERPL W/O P-5'-P-CCNC: 15 U/L (ref 10–44)
ANION GAP SERPL CALC-SCNC: 8 MMOL/L (ref 8–16)
AST SERPL-CCNC: 21 U/L (ref 10–40)
B-OH-BUTYR BLD STRIP-SCNC: 0.1 MMOL/L (ref 0–0.5)
BASOPHILS # BLD AUTO: 0.04 K/UL (ref 0–0.2)
BASOPHILS NFR BLD: 0.2 % (ref 0–1.9)
BILIRUB SERPL-MCNC: 0.6 MG/DL (ref 0.1–1)
BUN SERPL-MCNC: 31 MG/DL (ref 6–20)
BUN SERPL-MCNC: 41 MG/DL (ref 6–30)
CALCIUM SERPL-MCNC: 10 MG/DL (ref 8.7–10.5)
CHLORIDE SERPL-SCNC: 96 MMOL/L (ref 95–110)
CHLORIDE SERPL-SCNC: 97 MMOL/L (ref 95–110)
CO2 SERPL-SCNC: 25 MMOL/L (ref 23–29)
CREAT SERPL-MCNC: 1.5 MG/DL (ref 0.5–1.4)
CREAT SERPL-MCNC: 1.5 MG/DL (ref 0.5–1.4)
DELSYS: ABNORMAL
DIFFERENTIAL METHOD: ABNORMAL
EOSINOPHIL # BLD AUTO: 0 K/UL (ref 0–0.5)
EOSINOPHIL NFR BLD: 0.1 % (ref 0–8)
ERYTHROCYTE [DISTWIDTH] IN BLOOD BY AUTOMATED COUNT: 14.7 % (ref 11.5–14.5)
EST. GFR  (NO RACE VARIABLE): 40.4 ML/MIN/1.73 M^2
GLUCOSE SERPL-MCNC: 219 MG/DL (ref 70–110)
GLUCOSE SERPL-MCNC: 222 MG/DL (ref 70–110)
HCO3 UR-SCNC: 29.5 MMOL/L (ref 24–28)
HCT VFR BLD AUTO: 41.4 % (ref 37–48.5)
HCT VFR BLD CALC: 44 %PCV (ref 36–54)
HCT VFR BLD CALC: 44 %PCV (ref 36–54)
HGB BLD-MCNC: 13.4 G/DL (ref 12–16)
IMM GRANULOCYTES # BLD AUTO: 0.07 K/UL (ref 0–0.04)
IMM GRANULOCYTES NFR BLD AUTO: 0.4 % (ref 0–0.5)
LACTATE SERPL-SCNC: 2.1 MMOL/L (ref 0.5–2.2)
LIPASE SERPL-CCNC: 13 U/L (ref 4–60)
LYMPHOCYTES # BLD AUTO: 3.2 K/UL (ref 1–4.8)
LYMPHOCYTES NFR BLD: 18.7 % (ref 18–48)
MCH RBC QN AUTO: 22.7 PG (ref 27–31)
MCHC RBC AUTO-ENTMCNC: 32.4 G/DL (ref 32–36)
MCV RBC AUTO: 70 FL (ref 82–98)
MONOCYTES # BLD AUTO: 1 K/UL (ref 0.3–1)
MONOCYTES NFR BLD: 6 % (ref 4–15)
NEUTROPHILS # BLD AUTO: 12.6 K/UL (ref 1.8–7.7)
NEUTROPHILS NFR BLD: 74.6 % (ref 38–73)
NRBC BLD-RTO: 0 /100 WBC
PCO2 BLDA: 42.4 MMHG (ref 35–45)
PH SMN: 7.45 [PH] (ref 7.35–7.45)
PLATELET # BLD AUTO: 370 K/UL (ref 150–450)
PMV BLD AUTO: 10.2 FL (ref 9.2–12.9)
PO2 BLDA: 30 MMHG (ref 40–60)
POC BE: 6 MMOL/L
POC IONIZED CALCIUM: 1.03 MMOL/L (ref 1.06–1.42)
POC IONIZED CALCIUM: 1.18 MMOL/L (ref 1.06–1.42)
POC SATURATED O2: 61 % (ref 95–100)
POC TCO2 (MEASURED): 25 MMOL/L (ref 23–29)
POC TCO2: 31 MMOL/L (ref 24–29)
POTASSIUM BLD-SCNC: 5.8 MMOL/L (ref 3.5–5.1)
POTASSIUM BLD-SCNC: 6 MMOL/L (ref 3.5–5.1)
POTASSIUM SERPL-SCNC: 4.8 MMOL/L (ref 3.5–5.1)
PROT SERPL-MCNC: 9 G/DL (ref 6–8.4)
RBC # BLD AUTO: 5.91 M/UL (ref 4–5.4)
SAMPLE: ABNORMAL
SAMPLE: ABNORMAL
SITE: ABNORMAL
SODIUM BLD-SCNC: 129 MMOL/L (ref 136–145)
SODIUM BLD-SCNC: 130 MMOL/L (ref 136–145)
SODIUM SERPL-SCNC: 129 MMOL/L (ref 136–145)
WBC # BLD AUTO: 16.89 K/UL (ref 3.9–12.7)

## 2023-03-01 PROCEDURE — 63600175 PHARM REV CODE 636 W HCPCS

## 2023-03-01 PROCEDURE — 82962 GLUCOSE BLOOD TEST: CPT

## 2023-03-01 PROCEDURE — 93010 ELECTROCARDIOGRAM REPORT: CPT | Mod: ,,, | Performed by: INTERNAL MEDICINE

## 2023-03-01 PROCEDURE — 85025 COMPLETE CBC W/AUTO DIFF WBC: CPT | Performed by: EMERGENCY MEDICINE

## 2023-03-01 PROCEDURE — 93010 EKG 12-LEAD: ICD-10-PCS | Mod: ,,, | Performed by: INTERNAL MEDICINE

## 2023-03-01 PROCEDURE — 83605 ASSAY OF LACTIC ACID: CPT | Performed by: EMERGENCY MEDICINE

## 2023-03-01 PROCEDURE — 83690 ASSAY OF LIPASE: CPT | Performed by: EMERGENCY MEDICINE

## 2023-03-01 PROCEDURE — 82010 KETONE BODYS QUAN: CPT | Performed by: EMERGENCY MEDICINE

## 2023-03-01 PROCEDURE — 99284 EMERGENCY DEPT VISIT MOD MDM: CPT | Mod: 25

## 2023-03-01 PROCEDURE — 99284 EMERGENCY DEPT VISIT MOD MDM: CPT | Mod: ,,, | Performed by: EMERGENCY MEDICINE

## 2023-03-01 PROCEDURE — 96374 THER/PROPH/DIAG INJ IV PUSH: CPT

## 2023-03-01 PROCEDURE — 80053 COMPREHEN METABOLIC PANEL: CPT | Performed by: EMERGENCY MEDICINE

## 2023-03-01 PROCEDURE — 99284 PR EMERGENCY DEPT VISIT,LEVEL IV: ICD-10-PCS | Mod: ,,, | Performed by: EMERGENCY MEDICINE

## 2023-03-01 PROCEDURE — 63600175 PHARM REV CODE 636 W HCPCS: Performed by: EMERGENCY MEDICINE

## 2023-03-01 PROCEDURE — 93005 ELECTROCARDIOGRAM TRACING: CPT

## 2023-03-01 RX ORDER — DROPERIDOL 2.5 MG/ML
0.62 INJECTION, SOLUTION INTRAMUSCULAR; INTRAVENOUS ONCE
Status: COMPLETED | OUTPATIENT
Start: 2023-03-01 | End: 2023-03-01

## 2023-03-01 RX ADMIN — DROPERIDOL 0.62 MG: 2.5 INJECTION, SOLUTION INTRAMUSCULAR; INTRAVENOUS at 07:03

## 2023-03-01 RX ADMIN — SODIUM CHLORIDE, POTASSIUM CHLORIDE, SODIUM LACTATE AND CALCIUM CHLORIDE 1000 ML: 600; 310; 30; 20 INJECTION, SOLUTION INTRAVENOUS at 07:03

## 2023-03-02 LAB — POCT GLUCOSE: 233 MG/DL (ref 70–110)

## 2023-03-02 NOTE — ED TRIAGE NOTES
CC not being able to keep any food days for 2-3 days associated with increased tiredness and urination.

## 2023-03-02 NOTE — ED PROVIDER NOTES
Encounter Date: 3/1/2023       History     Chief Complaint   Patient presents with    Abdominal Pain    Vomiting     X2 days    Hyperglycemia     In the 500s this AM      58F with HTN, CAD, DMII, depression, HFrEF, PAD, peptic ulcer disease here with nausea and vomiting with elevated blood glucose readings for the past 2 days. Reports being at her baseline health until 2 days ago she started having post-prandial vomiting. Has been unable to tolerate solid foods since then. Denies blood in her vomit or coffee ground emesis. States she typically vomits up liquid with undigested food. Has been able to hold down some liquids, including juice. States that she has been regularly checking her blood sugars and that they were as high as the 500s. Reports compliance with her insulin regimen but is unsure of how much she takes. She has experienced increased fatigue during this time and increased urinary frequency. Denies dysuria. No chest pain, SOB, diarrhea or HA. Decided to come to the ED for evaluation when the symptoms did not improve. States she took some insulin a few hours prior to presentation but is unsure how much.     The history is provided by the patient and medical records.   Review of patient's allergies indicates:   Allergen Reactions    Orange juice Hives    Tomato (solanum lycopersicum) Hives     Past Medical History:   Diagnosis Date    Chronic combined systolic and diastolic congestive heart failure     Coronary artery disease     Diabetes mellitus     Encounter for blood transfusion     Gastric ulcer with hemorrhage     Heart attack     History of gastric ulcer 4/2/2022    Hypertension     NSAID induced gastritis 4/2/2022    Perforated viscus 03/14/2022    Peripheral artery disease      Past Surgical History:   Procedure Laterality Date    APPENDECTOMY      BELOW KNEE AMPUTATION OF LOWER EXTREMITY Right 2019    COLONOSCOPY N/A 02/23/2022    Procedure: COLONOSCOPY;  Surgeon: Estefania Bryant MD;  Location:  [facility] (2ND FLR);  Service: Endoscopy;  Laterality: N/A;  anemia, melena    CORONARY STENT PLACEMENT      ESOPHAGOGASTRODUODENOSCOPY N/A 02/23/2022    Procedure: EGD (ESOPHAGOGASTRODUODENOSCOPY);  Surgeon: Estefania Bryant MD;  Location: [facility] (2ND FLR);  Service: Endoscopy;  Laterality: N/A;  anemia    ESOPHAGOGASTRODUODENOSCOPY N/A 4/14/2022    Procedure: EGD (ESOPHAGOGASTRODUODENOSCOPY);  Surgeon: First Available Den King;  Location: [facility] (2ND FLR);  Service: Endoscopy;  Laterality: N/A;  please schedule in 8 weeks. done 2/23      EF-20    ESOPHAGOGASTRODUODENOSCOPY  4/14/2022    Procedure: ;  Surgeon: First Available Den King;  Location: [facility] (2ND FLR);  Service: Endoscopy;;    ESOPHAGOGASTRODUODENOSCOPY N/A 4/29/2022    Procedure: EGD (ESOPHAGOGASTRODUODENOSCOPY);  Surgeon: Estefania Bryant MD;  Location: [facility];  Service: Endoscopy;  Laterality: N/A;  expedite EGD per Dr Ga      states vaccinated-will bring card-GT    ESOPHAGOGASTRODUODENOSCOPY N/A 10/28/2022    Procedure: EGD (ESOPHAGOGASTRODUODENOSCOPY);  Surgeon: Estefania Bryant MD;  Location: [facility] (2ND FLR);  Service: Endoscopy;  Laterality: N/A;    FOOT AMPUTATION THROUGH METATARSAL Left 2019    TUBAL LIGATION       History reviewed. No pertinent family history.  Social History     Tobacco Use    Smoking status: Every Day     Packs/day: 0.50     Types: Cigarettes    Smokeless tobacco: Never   Substance Use Topics    Alcohol use: No    Drug use: No     Review of Systems   Constitutional:  Positive for fatigue. Negative for chills and fever.   HENT:  Negative for sore throat and trouble swallowing.    Eyes:  Negative for pain and visual disturbance.   Respiratory:  Negative for cough and shortness of breath.    Cardiovascular:  Negative for chest pain and palpitations.   Gastrointestinal:  Positive for abdominal pain, nausea and vomiting. Negative for blood in stool and diarrhea.   Genitourinary:  Negative for dysuria and  frequency.   Musculoskeletal:  Negative for arthralgias and back pain.   Skin:  Negative for color change and pallor.   Neurological:  Negative for dizziness and headaches.   Psychiatric/Behavioral:  Negative for agitation and confusion.      Physical Exam     Initial Vitals   BP Pulse Resp Temp SpO2   03/01/23 1753 03/01/23 1753 03/01/23 1753 03/01/23 1754 03/01/23 1753   (!) 141/84 87 16 98.4 °F (36.9 °C) 95 %      MAP       --                Physical Exam    Vitals reviewed.  Constitutional: She appears well-developed and well-nourished. No distress.   HENT:   Head: Normocephalic and atraumatic.   Nose: Nose normal.   Eyes: Conjunctivae are normal. No scleral icterus.   Neck: No JVD present.   Normal range of motion.  Cardiovascular:  Normal rate, regular rhythm and normal heart sounds.           No murmur heard.  Pulmonary/Chest: Breath sounds normal. No respiratory distress. She has no wheezes.   Abdominal: Abdomen is soft. There is no abdominal tenderness. There is no rebound.   Musculoskeletal:         General: No tenderness or edema. Normal range of motion.      Cervical back: Normal range of motion.      Comments: S/p R BKA  S/p L TMA     Neurological: She is alert and oriented to person, place, and time.   Skin: Skin is warm and dry.   Psychiatric: She has a normal mood and affect. Thought content normal.       ED Course   Procedures  Labs Reviewed   CBC W/ AUTO DIFFERENTIAL - Abnormal; Notable for the following components:       Result Value    WBC 16.89 (*)     RBC 5.91 (*)     MCV 70 (*)     MCH 22.7 (*)     RDW 14.7 (*)     Gran # (ANC) 12.6 (*)     Immature Grans (Abs) 0.07 (*)     Gran % 74.6 (*)     All other components within normal limits   COMPREHENSIVE METABOLIC PANEL - Abnormal; Notable for the following components:    Sodium 129 (*)     Glucose 222 (*)     BUN 31 (*)     Creatinine 1.5 (*)     Total Protein 9.0 (*)     Alkaline Phosphatase 144 (*)     eGFR 40.4 (*)     All other components  within normal limits   ISTAT PROCEDURE - Abnormal; Notable for the following components:    POC PO2 30 (*)     POC HCO3 29.5 (*)     POC SATURATED O2 61 (*)     POC Sodium 130 (*)     POC Potassium 5.8 (*)     POC TCO2 31 (*)     All other components within normal limits   ISTAT PROCEDURE - Abnormal; Notable for the following components:    POC Glucose 219 (*)     POC BUN 41 (*)     POC Creatinine 1.5 (*)     POC Sodium 129 (*)     POC Potassium 6.0 (*)     POC Ionized Calcium 1.03 (*)     All other components within normal limits   BETA - HYDROXYBUTYRATE, SERUM   LIPASE   LACTIC ACID, PLASMA   URINALYSIS, REFLEX TO URINE CULTURE   POCT GLUCOSE MONITORING CONTINUOUS   ISTAT CHEM8          Imaging Results    None          Medications   lactated ringers bolus 1,000 mL (1,000 mLs Intravenous New Bag 3/1/23 1923)   droperidoL injection 0.625 mg (0.625 mg Intravenous Given 3/1/23 1932)     Medical Decision Making:   Initial Assessment:   58F with poorly controlled DMII here with N/V and elevated BG readings. Overall presentation concerning for DKA vs HHS. History also consistent with likely gastroparesis as the cause of her vomiting. Lower suspicion for gastritis or gastroenteritis given lack of constant abdominal pain or preceding symptoms. Will obtain CBC, CMP, B hydroxybutyrate, VBG to assess for DKA / HHS. UA to check for UTI. Giving droperidol for nausea and 1L IV fluids. Anticipate admission to hospital.    UA  CBC, CMP  B hydroxybutyrate  Blood glucose checks  EKG     Clinical Tests:   Lab Tests: Ordered  Radiological Study: Ordered  Medical Tests: Ordered           ED Course as of 03/01/23 2108   Wed Mar 01, 2023   1951 Beta - Hydroxybutyrate, Serum  Not consistent with DKA [TG]   2057 Creatinine(!): 1.5 [AS]   2057 Anion Gap: 8 [AS]   2057 Patient this is on her labs but otherwise relatively unremarkable and stable for patient.  On reassessment she reports feeling much better.  Her vital signs are stable.  She  states that her family can come and get her.  Will plan to discharge home [AS]      ED Course User Index  [AS] Linda Rizo MD  [TG] Angus Gibbons MD                 Clinical Impression:   Final diagnoses:  [R73.9] Hyperglycemia  [K31.84] Gastroparesis (Primary)        ED Disposition Condition    Discharge Stable          ED Prescriptions    None       Follow-up Information       Follow up With Specialties Details Why Contact Info    Adonis Carey MD Internal Medicine Call   [address] [address]  [phone]               Angus Gibbons MD  Resident  03/01/23 7189

## 2023-03-02 NOTE — DISCHARGE INSTRUCTIONS
You were seen in the ED for nausea and vomiting. As we discussed, your symptoms are most likely due to gastroparesis, which is a complication from diabetes that can cause vomiting. You should follow up with your PCP to help better manage your diabetes and for continued treatment of gastroparesis. Your labs did not show any evidence of a serious complication of high blood sugar. Please continue to take your medicines as prescribed.

## 2023-04-03 ENCOUNTER — TELEPHONE (OUTPATIENT)
Dept: CARDIOLOGY | Facility: CLINIC | Age: 58
End: 2023-04-03
Payer: MEDICAID

## 2023-10-22 ENCOUNTER — HOSPITAL ENCOUNTER (INPATIENT)
Facility: HOSPITAL | Age: 58
LOS: 5 days | Discharge: HOME | DRG: 280 | End: 2023-10-27
Attending: EMERGENCY MEDICINE | Admitting: EMERGENCY MEDICINE
Payer: MEDICAID

## 2023-10-22 DIAGNOSIS — I50.9 CHF (CONGESTIVE HEART FAILURE): ICD-10-CM

## 2023-10-22 DIAGNOSIS — I51.3 MURAL THROMBUS OF CARDIAC APEX: ICD-10-CM

## 2023-10-22 DIAGNOSIS — R00.0 TACHYCARDIA: ICD-10-CM

## 2023-10-22 DIAGNOSIS — N17.9 AKI (ACUTE KIDNEY INJURY): ICD-10-CM

## 2023-10-22 DIAGNOSIS — R57.0 CARDIOGENIC SHOCK: Primary | ICD-10-CM

## 2023-10-22 DIAGNOSIS — R07.9 CHEST PAIN, RULE OUT ACUTE MYOCARDIAL INFARCTION: ICD-10-CM

## 2023-10-22 DIAGNOSIS — R94.31 QT PROLONGATION: ICD-10-CM

## 2023-10-22 DIAGNOSIS — R07.9 CHEST PAIN: ICD-10-CM

## 2023-10-22 PROBLEM — I99.8 LOWER LIMB ISCHEMIA: Status: ACTIVE | Noted: 2018-11-28

## 2023-10-22 PROBLEM — I50.43 ACUTE ON CHRONIC COMBINED SYSTOLIC AND DIASTOLIC CONGESTIVE HEART FAILURE: Status: ACTIVE | Noted: 2022-02-21

## 2023-10-22 PROBLEM — I25.119 CORONARY ARTERY DISEASE INVOLVING NATIVE CORONARY ARTERY OF NATIVE HEART WITH ANGINA PECTORIS: Status: ACTIVE | Noted: 2022-02-20

## 2023-10-22 PROBLEM — Z89.511 S/P BKA (BELOW KNEE AMPUTATION), RIGHT: Status: ACTIVE | Noted: 2023-10-22

## 2023-10-22 PROBLEM — E78.5 DYSLIPIDEMIA: Status: ACTIVE | Noted: 2017-11-22

## 2023-10-22 LAB
ALBUMIN SERPL BCP-MCNC: 4 G/DL (ref 3.5–5.2)
ALP SERPL-CCNC: 93 U/L (ref 55–135)
ALT SERPL W/O P-5'-P-CCNC: 11 U/L (ref 10–44)
ANION GAP SERPL CALC-SCNC: 13 MMOL/L (ref 8–16)
AST SERPL-CCNC: 19 U/L (ref 10–40)
BASOPHILS # BLD AUTO: 0.04 K/UL (ref 0–0.2)
BASOPHILS NFR BLD: 0.3 % (ref 0–1.9)
BILIRUB SERPL-MCNC: 0.8 MG/DL (ref 0.1–1)
BILIRUB UR QL STRIP: NEGATIVE
BNP SERPL-MCNC: 1122 PG/ML (ref 0–99)
BUN SERPL-MCNC: 32 MG/DL (ref 6–20)
CALCIUM SERPL-MCNC: 10.1 MG/DL (ref 8.7–10.5)
CHLORIDE SERPL-SCNC: 98 MMOL/L (ref 95–110)
CLARITY UR REFRACT.AUTO: CLEAR
CO2 SERPL-SCNC: 23 MMOL/L (ref 23–29)
COLOR UR AUTO: COLORLESS
CREAT SERPL-MCNC: 1.5 MG/DL (ref 0.5–1.4)
DIFFERENTIAL METHOD: ABNORMAL
EOSINOPHIL # BLD AUTO: 0.1 K/UL (ref 0–0.5)
EOSINOPHIL NFR BLD: 0.5 % (ref 0–8)
ERYTHROCYTE [DISTWIDTH] IN BLOOD BY AUTOMATED COUNT: 14.3 % (ref 11.5–14.5)
EST. GFR  (NO RACE VARIABLE): 40.4 ML/MIN/1.73 M^2
GLUCOSE SERPL-MCNC: 130 MG/DL (ref 70–110)
GLUCOSE SERPL-MCNC: 145 MG/DL (ref 70–110)
GLUCOSE UR QL STRIP: NEGATIVE
HCT VFR BLD AUTO: 42.5 % (ref 37–48.5)
HGB BLD-MCNC: 13.8 G/DL (ref 12–16)
HGB UR QL STRIP: NEGATIVE
IMM GRANULOCYTES # BLD AUTO: 0.04 K/UL (ref 0–0.04)
IMM GRANULOCYTES NFR BLD AUTO: 0.3 % (ref 0–0.5)
KETONES UR QL STRIP: NEGATIVE
LEUKOCYTE ESTERASE UR QL STRIP: NEGATIVE
LYMPHOCYTES # BLD AUTO: 4.5 K/UL (ref 1–4.8)
LYMPHOCYTES NFR BLD: 35.1 % (ref 18–48)
MAGNESIUM SERPL-MCNC: 2.3 MG/DL (ref 1.6–2.6)
MCH RBC QN AUTO: 22.4 PG (ref 27–31)
MCHC RBC AUTO-ENTMCNC: 32.5 G/DL (ref 32–36)
MCV RBC AUTO: 69 FL (ref 82–98)
MONOCYTES # BLD AUTO: 0.8 K/UL (ref 0.3–1)
MONOCYTES NFR BLD: 6.5 % (ref 4–15)
NEUTROPHILS # BLD AUTO: 7.3 K/UL (ref 1.8–7.7)
NEUTROPHILS NFR BLD: 57.3 % (ref 38–73)
NITRITE UR QL STRIP: NEGATIVE
NRBC BLD-RTO: 0 /100 WBC
PH UR STRIP: 7 [PH] (ref 5–8)
PLATELET # BLD AUTO: 287 K/UL (ref 150–450)
PMV BLD AUTO: 11.3 FL (ref 9.2–12.9)
POC CARDIAC TROPONIN I: 0.03 NG/ML (ref 0–0.08)
POC CARDIAC TROPONIN I: 0.04 NG/ML (ref 0–0.08)
POCT GLUCOSE: 145 MG/DL (ref 70–110)
POTASSIUM SERPL-SCNC: 4.1 MMOL/L (ref 3.5–5.1)
PROT SERPL-MCNC: 8.3 G/DL (ref 6–8.4)
PROT UR QL STRIP: NEGATIVE
RBC # BLD AUTO: 6.15 M/UL (ref 4–5.4)
SAMPLE: NORMAL
SAMPLE: NORMAL
SODIUM SERPL-SCNC: 134 MMOL/L (ref 136–145)
SP GR UR STRIP: 1.01 (ref 1–1.03)
TROPONIN I SERPL DL<=0.01 NG/ML-MCNC: 0.05 NG/ML (ref 0–0.03)
TROPONIN I SERPL DL<=0.01 NG/ML-MCNC: 0.06 NG/ML (ref 0–0.03)
TROPONIN I SERPL DL<=0.01 NG/ML-MCNC: 0.06 NG/ML (ref 0–0.03)
URN SPEC COLLECT METH UR: ABNORMAL
WBC # BLD AUTO: 12.68 K/UL (ref 3.9–12.7)

## 2023-10-22 PROCEDURE — 25000003 PHARM REV CODE 250

## 2023-10-22 PROCEDURE — 63600175 PHARM REV CODE 636 W HCPCS

## 2023-10-22 PROCEDURE — 25000003 PHARM REV CODE 250: Performed by: PHYSICIAN ASSISTANT

## 2023-10-22 PROCEDURE — 96372 THER/PROPH/DIAG INJ SC/IM: CPT | Performed by: PHYSICIAN ASSISTANT

## 2023-10-22 PROCEDURE — G0378 HOSPITAL OBSERVATION PER HR: HCPCS

## 2023-10-22 PROCEDURE — 93010 ELECTROCARDIOGRAM REPORT: CPT | Mod: ,,, | Performed by: INTERNAL MEDICINE

## 2023-10-22 PROCEDURE — 99285 EMERGENCY DEPT VISIT HI MDM: CPT | Mod: 25

## 2023-10-22 PROCEDURE — 84484 ASSAY OF TROPONIN QUANT: CPT | Mod: 91

## 2023-10-22 PROCEDURE — 85025 COMPLETE CBC W/AUTO DIFF WBC: CPT

## 2023-10-22 PROCEDURE — 83880 ASSAY OF NATRIURETIC PEPTIDE: CPT

## 2023-10-22 PROCEDURE — 93010 EKG 12-LEAD: ICD-10-PCS | Mod: ,,, | Performed by: INTERNAL MEDICINE

## 2023-10-22 PROCEDURE — 12000002 HC ACUTE/MED SURGE SEMI-PRIVATE ROOM

## 2023-10-22 PROCEDURE — 93005 ELECTROCARDIOGRAM TRACING: CPT

## 2023-10-22 PROCEDURE — 83735 ASSAY OF MAGNESIUM: CPT

## 2023-10-22 PROCEDURE — 80053 COMPREHEN METABOLIC PANEL: CPT

## 2023-10-22 PROCEDURE — 84484 ASSAY OF TROPONIN QUANT: CPT

## 2023-10-22 PROCEDURE — 84484 ASSAY OF TROPONIN QUANT: CPT | Performed by: PHYSICIAN ASSISTANT

## 2023-10-22 PROCEDURE — 81003 URINALYSIS AUTO W/O SCOPE: CPT | Performed by: PHYSICIAN ASSISTANT

## 2023-10-22 RX ORDER — CLOPIDOGREL BISULFATE 75 MG/1
75 TABLET ORAL
Status: ON HOLD | COMMUNITY
Start: 2023-10-02 | End: 2023-10-27 | Stop reason: SDUPTHER

## 2023-10-22 RX ORDER — METOPROLOL SUCCINATE 25 MG/1
25 TABLET, EXTENDED RELEASE ORAL DAILY
Status: DISCONTINUED | OUTPATIENT
Start: 2023-10-23 | End: 2023-10-23

## 2023-10-22 RX ORDER — ONDANSETRON 8 MG/1
8 TABLET, ORALLY DISINTEGRATING ORAL EVERY 8 HOURS PRN
Status: DISCONTINUED | OUTPATIENT
Start: 2023-10-22 | End: 2023-10-27 | Stop reason: HOSPADM

## 2023-10-22 RX ORDER — SODIUM CHLORIDE 0.9 % (FLUSH) 0.9 %
10 SYRINGE (ML) INJECTION
Status: DISCONTINUED | OUTPATIENT
Start: 2023-10-22 | End: 2023-10-27 | Stop reason: HOSPADM

## 2023-10-22 RX ORDER — INSULIN ASPART 100 [IU]/ML
5 INJECTION, SOLUTION INTRAVENOUS; SUBCUTANEOUS
Status: DISCONTINUED | OUTPATIENT
Start: 2023-10-23 | End: 2023-10-27 | Stop reason: HOSPADM

## 2023-10-22 RX ORDER — PROMETHAZINE HYDROCHLORIDE 25 MG/1
25 TABLET ORAL EVERY 6 HOURS PRN
Status: DISCONTINUED | OUTPATIENT
Start: 2023-10-22 | End: 2023-10-27 | Stop reason: HOSPADM

## 2023-10-22 RX ORDER — IBUPROFEN 200 MG
24 TABLET ORAL
Status: DISCONTINUED | OUTPATIENT
Start: 2023-10-22 | End: 2023-10-27 | Stop reason: HOSPADM

## 2023-10-22 RX ORDER — LOSARTAN POTASSIUM 25 MG/1
25 TABLET ORAL DAILY
Status: DISCONTINUED | OUTPATIENT
Start: 2023-10-23 | End: 2023-10-22

## 2023-10-22 RX ORDER — INSULIN GLARGINE 100 [IU]/ML
INJECTION, SOLUTION SUBCUTANEOUS
Status: ON HOLD | COMMUNITY
Start: 2023-10-02 | End: 2023-10-27 | Stop reason: SDUPTHER

## 2023-10-22 RX ORDER — PREGABALIN 75 MG/1
150 CAPSULE ORAL 2 TIMES DAILY
Status: DISCONTINUED | OUTPATIENT
Start: 2023-10-22 | End: 2023-10-24

## 2023-10-22 RX ORDER — ACETAMINOPHEN 500 MG
1000 TABLET ORAL
Status: COMPLETED | OUTPATIENT
Start: 2023-10-22 | End: 2023-10-22

## 2023-10-22 RX ORDER — BISACODYL 10 MG
10 SUPPOSITORY, RECTAL RECTAL DAILY PRN
Status: DISCONTINUED | OUTPATIENT
Start: 2023-10-22 | End: 2023-10-27 | Stop reason: HOSPADM

## 2023-10-22 RX ORDER — TALC
6 POWDER (GRAM) TOPICAL NIGHTLY PRN
Status: DISCONTINUED | OUTPATIENT
Start: 2023-10-22 | End: 2023-10-27 | Stop reason: HOSPADM

## 2023-10-22 RX ORDER — METHOCARBAMOL 500 MG/1
500 TABLET, FILM COATED ORAL ONCE
Status: COMPLETED | OUTPATIENT
Start: 2023-10-22 | End: 2023-10-22

## 2023-10-22 RX ORDER — BUSPIRONE HYDROCHLORIDE 10 MG/1
10 TABLET ORAL 2 TIMES DAILY
Status: ON HOLD | COMMUNITY
Start: 2023-10-02

## 2023-10-22 RX ORDER — NAPROXEN SODIUM 220 MG/1
81 TABLET, FILM COATED ORAL DAILY
Status: DISCONTINUED | OUTPATIENT
Start: 2023-10-23 | End: 2023-10-27 | Stop reason: HOSPADM

## 2023-10-22 RX ORDER — POLYETHYLENE GLYCOL 3350 17 G/17G
17 POWDER, FOR SOLUTION ORAL DAILY PRN
Status: DISCONTINUED | OUTPATIENT
Start: 2023-10-22 | End: 2023-10-22

## 2023-10-22 RX ORDER — IBUPROFEN 200 MG
1 TABLET ORAL DAILY PRN
Status: DISCONTINUED | OUTPATIENT
Start: 2023-10-22 | End: 2023-10-27 | Stop reason: HOSPADM

## 2023-10-22 RX ORDER — BUDESONIDE AND FORMOTEROL FUMARATE DIHYDRATE 160; 4.5 UG/1; UG/1
2 AEROSOL RESPIRATORY (INHALATION) 2 TIMES DAILY
Status: ON HOLD | COMMUNITY
Start: 2023-10-02

## 2023-10-22 RX ORDER — DORZOLAMIDE HYDROCHLORIDE AND TIMOLOL MALEATE 20; 5 MG/ML; MG/ML
1 SOLUTION/ DROPS OPHTHALMIC 2 TIMES DAILY
Status: ON HOLD | COMMUNITY
Start: 2023-05-27

## 2023-10-22 RX ORDER — MAG HYDROX/ALUMINUM HYD/SIMETH 200-200-20
30 SUSPENSION, ORAL (FINAL DOSE FORM) ORAL EVERY 6 HOURS PRN
Status: DISCONTINUED | OUTPATIENT
Start: 2023-10-22 | End: 2023-10-27 | Stop reason: HOSPADM

## 2023-10-22 RX ORDER — ASPIRIN 325 MG
325 TABLET ORAL
Status: COMPLETED | OUTPATIENT
Start: 2023-10-22 | End: 2023-10-22

## 2023-10-22 RX ORDER — FLUTICASONE FUROATE AND VILANTEROL 100; 25 UG/1; UG/1
1 POWDER RESPIRATORY (INHALATION) DAILY
Status: DISCONTINUED | OUTPATIENT
Start: 2023-10-23 | End: 2023-10-27 | Stop reason: HOSPADM

## 2023-10-22 RX ORDER — FUROSEMIDE 10 MG/ML
80 INJECTION INTRAMUSCULAR; INTRAVENOUS
Status: COMPLETED | OUTPATIENT
Start: 2023-10-22 | End: 2023-10-22

## 2023-10-22 RX ORDER — OXYCODONE AND ACETAMINOPHEN 10; 325 MG/1; MG/1
1 TABLET ORAL EVERY 8 HOURS PRN
Status: DISCONTINUED | OUTPATIENT
Start: 2023-10-23 | End: 2023-10-26

## 2023-10-22 RX ORDER — IBUPROFEN 200 MG
16 TABLET ORAL
Status: DISCONTINUED | OUTPATIENT
Start: 2023-10-22 | End: 2023-10-27 | Stop reason: HOSPADM

## 2023-10-22 RX ORDER — ACETAMINOPHEN 325 MG/1
650 TABLET ORAL EVERY 4 HOURS PRN
Status: DISCONTINUED | OUTPATIENT
Start: 2023-10-22 | End: 2023-10-27 | Stop reason: HOSPADM

## 2023-10-22 RX ORDER — INSULIN ASPART 100 [IU]/ML
0-5 INJECTION, SOLUTION INTRAVENOUS; SUBCUTANEOUS
Status: DISCONTINUED | OUTPATIENT
Start: 2023-10-22 | End: 2023-10-27 | Stop reason: HOSPADM

## 2023-10-22 RX ORDER — ENOXAPARIN SODIUM 100 MG/ML
40 INJECTION SUBCUTANEOUS EVERY 24 HOURS
Status: DISCONTINUED | OUTPATIENT
Start: 2023-10-23 | End: 2023-10-23

## 2023-10-22 RX ORDER — ATORVASTATIN CALCIUM 20 MG/1
40 TABLET, FILM COATED ORAL DAILY
Status: DISCONTINUED | OUTPATIENT
Start: 2023-10-23 | End: 2023-10-27

## 2023-10-22 RX ORDER — NIFEDIPINE 30 MG/1
30 TABLET, EXTENDED RELEASE ORAL DAILY
Status: DISCONTINUED | OUTPATIENT
Start: 2023-10-22 | End: 2023-10-23

## 2023-10-22 RX ORDER — PANTOPRAZOLE SODIUM 40 MG/1
40 TABLET, DELAYED RELEASE ORAL 2 TIMES DAILY
Status: DISCONTINUED | OUTPATIENT
Start: 2023-10-22 | End: 2023-10-27

## 2023-10-22 RX ORDER — GLUCAGON 1 MG
1 KIT INJECTION
Status: DISCONTINUED | OUTPATIENT
Start: 2023-10-22 | End: 2023-10-27 | Stop reason: HOSPADM

## 2023-10-22 RX ORDER — POLYETHYLENE GLYCOL 3350 17 G/17G
17 POWDER, FOR SOLUTION ORAL DAILY
Status: DISCONTINUED | OUTPATIENT
Start: 2023-10-23 | End: 2023-10-27 | Stop reason: HOSPADM

## 2023-10-22 RX ORDER — CLOPIDOGREL BISULFATE 75 MG/1
75 TABLET ORAL DAILY
Status: DISCONTINUED | OUTPATIENT
Start: 2023-10-23 | End: 2023-10-27 | Stop reason: HOSPADM

## 2023-10-22 RX ADMIN — FUROSEMIDE 80 MG: 10 INJECTION, SOLUTION INTRAVENOUS at 08:10

## 2023-10-22 RX ADMIN — PANTOPRAZOLE SODIUM 40 MG: 40 TABLET, DELAYED RELEASE ORAL at 10:10

## 2023-10-22 RX ADMIN — ASPIRIN 325 MG ORAL TABLET 325 MG: 325 PILL ORAL at 08:10

## 2023-10-22 RX ADMIN — METHOCARBAMOL 500 MG: 500 TABLET ORAL at 10:10

## 2023-10-22 RX ADMIN — INSULIN DETEMIR 15 UNITS: 100 INJECTION, SOLUTION SUBCUTANEOUS at 11:10

## 2023-10-22 RX ADMIN — ACETAMINOPHEN 1000 MG: 500 TABLET ORAL at 06:10

## 2023-10-22 RX ADMIN — NIFEDIPINE 30 MG: 30 TABLET, FILM COATED, EXTENDED RELEASE ORAL at 11:10

## 2023-10-22 RX ADMIN — PREGABALIN 150 MG: 75 CAPSULE ORAL at 11:10

## 2023-10-22 NOTE — ED PROVIDER NOTES
Encounter Date: 10/22/2023       History     Chief Complaint   Patient presents with    Chest Pain     Pt with epigastric type pain for 3 days, unable to eat or hold anything down     58F with HTN, CAD, DMII, depression, HFrEF, PAD, peptic ulcer disease here with intermittent epigastric pain.  Patient is unable to give good history and unable to provide any associated factors.  States that she is tried multiple things at home without any improvement however stable to describe what thinks he was trying.  She points to her epigastrium with radiation to her chest.  States that she is had many heart attacks in the past but is unable to describe the symptoms that she felt at that time.  Denies back pain, denies shortness of breath however in the ER she became immediately short of breath with any exertional activity.  Patient does admit having a cough as well as vomiting food and medicine yesterday evening.  Patient states that the cough is productive with yellowish green phlegm.  Patient states that she does take her medications at home but is unable to reconcile which medications she is taking.    No other complaints at this time    The history is provided by the patient and medical records.     Review of patient's allergies indicates:   Allergen Reactions    Orange juice Hives    Tomato (solanum lycopersicum) Hives     Past Medical History:   Diagnosis Date    Chronic combined systolic and diastolic congestive heart failure     Coronary artery disease     Diabetes mellitus     Encounter for blood transfusion     Gastric ulcer with hemorrhage     Heart attack     History of gastric ulcer 4/2/2022    Hypertension     NSAID induced gastritis 4/2/2022    Perforated viscus 03/14/2022    Peripheral artery disease      Past Surgical History:   Procedure Laterality Date    APPENDECTOMY      BELOW KNEE AMPUTATION OF LOWER EXTREMITY Right 2019    COLONOSCOPY N/A 02/23/2022    Procedure: COLONOSCOPY;  Surgeon: Estefania Bryant,  MD;  Location: [facility] (2ND FLR);  Service: Endoscopy;  Laterality: N/A;  anemia, melena    CORONARY STENT PLACEMENT      ESOPHAGOGASTRODUODENOSCOPY N/A 02/23/2022    Procedure: EGD (ESOPHAGOGASTRODUODENOSCOPY);  Surgeon: Estefania Bryant MD;  Location: [facility] (2ND FLR);  Service: Endoscopy;  Laterality: N/A;  anemia    ESOPHAGOGASTRODUODENOSCOPY N/A 4/14/2022    Procedure: EGD (ESOPHAGOGASTRODUODENOSCOPY);  Surgeon: First Available Den King;  Location: [facility] (2ND FLR);  Service: Endoscopy;  Laterality: N/A;  please schedule in 8 weeks. done 2/23      EF-20    ESOPHAGOGASTRODUODENOSCOPY  4/14/2022    Procedure: ;  Surgeon: First Available Den King;  Location: [facility] (2ND FLR);  Service: Endoscopy;;    ESOPHAGOGASTRODUODENOSCOPY N/A 4/29/2022    Procedure: EGD (ESOPHAGOGASTRODUODENOSCOPY);  Surgeon: Estefania Bryant MD;  Location: [facility];  Service: Endoscopy;  Laterality: N/A;  expedite EGD per Dr Ga      states vaccinated-will bring card-GT    ESOPHAGOGASTRODUODENOSCOPY N/A 10/28/2022    Procedure: EGD (ESOPHAGOGASTRODUODENOSCOPY);  Surgeon: Estefania Bryant MD;  Location: [facility] (2ND FLR);  Service: Endoscopy;  Laterality: N/A;    FOOT AMPUTATION THROUGH METATARSAL Left 2019    TUBAL LIGATION       History reviewed. No pertinent family history.  Social History     Tobacco Use    Smoking status: Every Day     Current packs/day: 0.50     Types: Cigarettes    Smokeless tobacco: Never   Substance Use Topics    Alcohol use: No    Drug use: No     Review of Systems  ROS negative except as noted in HPI    Physical Exam     Initial Vitals [10/22/23 1504]   BP Pulse Resp Temp SpO2   (!) 180/98 80 16 98.5 °F (36.9 °C) 99 %      MAP       --         Physical Exam    Nursing note and vitals reviewed.  Constitutional: She is cooperative.  Non-toxic appearance. She does not have a sickly appearance. She does not appear ill. No distress.   HENT:   Head: Normocephalic.   Right Ear: External ear normal.    Left Ear: External ear normal.   Nose: Nose normal.   Mouth/Throat: Mucous membranes are dry. No oropharyngeal exudate.   Neck: Neck supple. No JVD present.   Cardiovascular:  Normal rate, regular rhythm, S1 normal, S2 normal, intact distal pulses and normal pulses.     Exam reveals gallop and S3.       No murmur heard.  Pulmonary/Chest: Effort normal. No apnea and no tachypnea. No respiratory distress. She has rales in the right lower field and the left lower field.   Abdominal: Abdomen is soft. She exhibits no distension. There is no abdominal tenderness. There is no rebound.   Musculoskeletal:         General: No tenderness. Normal range of motion.      Cervical back: Neck supple.      Comments: Amputation of the right lower leg as well as the left lower foot     Neurological: She is alert. She has normal strength. No cranial nerve deficit or sensory deficit.   Skin: Skin is warm. Capillary refill takes less than 2 seconds.   Psychiatric: She has a normal mood and affect.         ED Course   Procedures  Labs Reviewed   CBC W/ AUTO DIFFERENTIAL - Abnormal; Notable for the following components:       Result Value    RBC 6.15 (*)     MCV 69 (*)     MCH 22.4 (*)     All other components within normal limits   COMPREHENSIVE METABOLIC PANEL - Abnormal; Notable for the following components:    Sodium 134 (*)     Glucose 130 (*)     BUN 32 (*)     Creatinine 1.5 (*)     eGFR 40.4 (*)     All other components within normal limits   TROPONIN I - Abnormal; Notable for the following components:    Troponin I 0.057 (*)     All other components within normal limits   TROPONIN I - Abnormal; Notable for the following components:    Troponin I 0.053 (*)     All other components within normal limits   B-TYPE NATRIURETIC PEPTIDE - Abnormal; Notable for the following components:    BNP 1,122 (*)     All other components within normal limits   TROPONIN I - Abnormal; Notable for the following components:    Troponin I 0.062 (*)      All other components within normal limits   URINALYSIS, REFLEX TO URINE CULTURE - Abnormal; Notable for the following components:    Color, UA Colorless (*)     All other components within normal limits    Narrative:     Specimen Source->Urine   HEMOGLOBIN A1C - Abnormal; Notable for the following components:    Hemoglobin A1C 5.7 (*)     All other components within normal limits   BASIC METABOLIC PANEL - Abnormal; Notable for the following components:    Sodium 133 (*)     CO2 21 (*)     BUN 31 (*)     eGFR 43.9 (*)     All other components within normal limits   CBC W/ AUTO DIFFERENTIAL - Abnormal; Notable for the following components:    WBC 13.60 (*)     RBC 6.10 (*)     MCV 69 (*)     MCH 22.5 (*)     Immature Grans (Abs) 0.05 (*)     Lymph # 6.1 (*)     All other components within normal limits   LIPID PANEL - Abnormal; Notable for the following components:    Cholesterol 250 (*)     Triglycerides 162 (*)     HDL 29 (*)     LDL Cholesterol 188.6 (*)     HDL/Cholesterol Ratio 11.6 (*)     Total Cholesterol/HDL Ratio 8.6 (*)     All other components within normal limits   TROPONIN I - Abnormal; Notable for the following components:    Troponin I 0.056 (*)     All other components within normal limits   POCT GLUCOSE, HAND-HELD DEVICE - Abnormal; Notable for the following components:    POC Glucose 145 (*)     All other components within normal limits   POCT GLUCOSE - Abnormal; Notable for the following components:    POCT Glucose 145 (*)     All other components within normal limits   POCT GLUCOSE - Abnormal; Notable for the following components:    POCT Glucose 146 (*)     All other components within normal limits   MAGNESIUM   TROPONIN ISTAT   TROPONIN ISTAT   MAGNESIUM   PHOSPHORUS   TSH   POCT GLUCOSE, HAND-HELD DEVICE   POCT GLUCOSE, HAND-HELD DEVICE   POCT GLUCOSE   POCT TROPONIN   POCT TROPONIN        ECG Results              EKG 12-lead (Chest Pain) Age >30 (Final result)  Result time 10/23/23 08:48:33       Final result by Interface, Lab In [facility] (10/23/23 08:48:33)                   Narrative:    Test Reason : R07.9,    Vent. Rate : 080 BPM     Atrial Rate : 080 BPM     P-R Int : 164 ms          QRS Dur : 100 ms      QT Int : 418 ms       P-R-T Axes : 063 024 002 degrees     QTc Int : 482 ms    Normal sinus rhythm  Nonspecific T wave abnormality  Prolonged QT  Abnormal ECG  When compared with ECG of 01-MAR-2023 19:13,  No significant change was found  Confirmed by Willie RAZA MD ([id number]) on 10/23/2023 8:48:20 AM    Referred By: [email or web address]   SELF           Confirmed By:Willie RAZA MD                                  Imaging Results              US Lower Extremity Veins Left (Final result)  Result time 10/23/23 00:53:48      Final result by Santhosh Pelaez MD (10/23/23 00:53:48)                   Impression:      No evidence of deep venous thrombosis in the left lower extremity.      Electronically signed by: Santhosh Pelaez MD  Date:    10/23/2023  Time:    00:53               Narrative:    EXAMINATION:  US LOWER EXTREMITY VEINS LEFT    CLINICAL HISTORY:  rule out DVT;    TECHNIQUE:  Duplex and color flow Doppler evaluation and graded compression of the left lower extremity veins was performed.    COMPARISON:  None    FINDINGS:  Left thigh veins: The common femoral, femoral, popliteal, upper greater saphenous, and deep femoral veins are patent and free of thrombus. The veins are normally compressible and have normal phasic flow and augmentation response.    Left calf veins: The visualized calf veins are patent.    Contralateral CFV: The contralateral (right) common femoral vein is patent and free of thrombus.    Miscellaneous: None                                       X-Ray Chest AP Portable (Final result)  Result time 10/22/23 17:55:22      Final result by Lennox Sheppard MD (10/22/23 17:55:22)                   Impression:      No detrimental change or radiographic acute intrathoracic process seen on this  single view.      Electronically signed by: Lennox Sheppard MD  Date:    10/22/2023  Time:    17:55               Narrative:    EXAMINATION:  XR CHEST AP PORTABLE    CLINICAL HISTORY:  Chest pain, unspecified    TECHNIQUE:  Single frontal view of the chest was performed.    COMPARISON:  Chest radiograph 06/22/2022, CT abdomen and pelvis 10/27/2022, CT thorax 03/14/2022    FINDINGS:  Patient is slightly rotated.  Resolution is limited by body habitus with underpenetration.    Cardiomediastinal silhouette is midline and prominent similar to prior without convincing evidence of failure.  Pulmonary vasculature and hilar contours are within normal limits.  Bibasilar minimal platelike scarring versus atelectasis.  The lungs are otherwise symmetrically well expanded without consolidation, pleural effusion or pneumothorax.  No acute osseous process seen.  PA and lateral views can be obtained.                                       Medications   aluminum-magnesium hydroxide-simethicone 200-200-20 mg/5 mL suspension 30 mL (has no administration in time range)   sodium chloride 0.9% flush 10 mL (has no administration in time range)   melatonin tablet 6 mg (6 mg Oral Given 10/25/23 2328)   sodium chloride 0.9% flush 10 mL (has no administration in time range)   bisacodyL suppository 10 mg (has no administration in time range)   acetaminophen tablet 650 mg (650 mg Oral Not Given 10/26/23 0915)   glucose chewable tablet 16 g (has no administration in time range)   glucose chewable tablet 24 g (has no administration in time range)   glucagon (human recombinant) injection 1 mg (has no administration in time range)   nicotine 14 mg/24 hr 1 patch (has no administration in time range)   ondansetron disintegrating tablet 8 mg (has no administration in time range)   promethazine tablet 25 mg (has no administration in time range)   insulin aspart U-100 pen 0-5 Units (1 Units Subcutaneous Given 10/25/23 2106)   dextrose 10% bolus 125 mL 125  mL (has no administration in time range)   dextrose 10% bolus 250 mL 250 mL (has no administration in time range)   aspirin chewable tablet 81 mg (81 mg Oral Given 10/26/23 0904)   atorvastatin tablet 40 mg (40 mg Oral Given 10/26/23 0905)   clopidogreL tablet 75 mg (75 mg Oral Given 10/26/23 0905)   folic acid-vit B6-vit B12 2.5-25-2 mg tablet 1 tablet (1 tablet Oral Given 10/26/23 0905)   pantoprazole EC tablet 40 mg (40 mg Oral Given 10/26/23 0905)   fluticasone furoate-vilanteroL 100-25 mcg/dose diskus inhaler 1 puff (1 puff Inhalation Given 10/26/23 0925)   insulin detemir U-100 (Levemir) pen 15 Units (15 Units Subcutaneous Given 10/26/23 0921)   insulin aspart U-100 pen 5 Units (5 Units Subcutaneous Given 10/26/23 0904)   polyethylene glycol packet 17 g (17 g Oral Given 10/26/23 0904)   NORepinephrine bitartrate-D5W 4 mg/250 mL (16 mcg/mL) PERIPHERAL access infusion (0 mcg/kg/min × 83.5 kg Intravenous Stopped 10/24/23 0619)   mupirocin 2 % ointment ( Nasal Given 10/26/23 0922)   pregabalin capsule 75 mg (75 mg Oral Given 10/25/23 2101)   hydrALAZINE tablet 10 mg (10 mg Oral Given 10/26/23 0522)   isosorbide dinitrate tablet 10 mg (10 mg Oral Given 10/26/23 0904)   oxyCODONE-acetaminophen  mg per tablet 1 tablet (1 tablet Oral Given 10/26/23 0920)   torsemide tablet 20 mg (20 mg Oral Given 10/26/23 1048)   acetaminophen tablet 1,000 mg (1,000 mg Oral Given 10/22/23 1846)   furosemide injection 80 mg (80 mg Intravenous Given 10/22/23 2017)   aspirin tablet 325 mg (325 mg Oral Given 10/22/23 2017)   methocarbamoL tablet 500 mg (500 mg Oral Given 10/22/23 2202)   sodium chloride 0.9% bolus 250 mL 250 mL (0 mLs Intravenous Stopped 10/23/23 0740)   perflutren protein-A microsphr 0.22 mg/mL IV susp (0.11 mg Intravenous Given 10/23/23 0700)   lactated ringers bolus 250 mL (0 mLs Intravenous Stopped 10/23/23 1609)   lactated ringers bolus 250 mL (0 mLs Intravenous Stopped 10/23/23 1759)   furosemide injection 100  mg (100 mg Intravenous Given 10/23/23 3256)   furosemide injection 100 mg (100 mg Intravenous Given 10/24/23 0121)   potassium chloride SA CR tablet 20 mEq (20 mEq Oral Given 10/24/23 0542)   acetaminophen tablet 650 mg (650 mg Oral Given 10/26/23 7015)   sodium zirconium cyclosilicate packet 5 g (5 g Oral Given 10/26/23 0797)     Medical Decision Making  58F with HTN, CAD, DMII, depression, HFrEF, PAD, peptic ulcer disease here with intermittent epigastric pain.     On initial evaluation patient was virally stable, in no acute distress however was a very difficult historian.  Only stating that she had pain in her epigastrium.    Differential: Given her past medical history of significant CAD unable to rule out ACS given a history of epigastric pain in her age and gender.  We will consider ACS versus CHF exacerbation.  Considering peptic ulcer disease not treated with home medications.  Doubt intra-abdominal abscess or infection.  Doubt cholecystitis or appendicitis.  Low suspicion for aortic pathologies.     Interventions: Given Mylanta as well as aspirin.    Workup to include cardiac labs, basic labs, chest x-ray and EKG.    EKG did not demonstrate any evidence of acute infarction.  Workup did demonstrate elevated troponin and BNP concerning for CHF exacerbation leading to demand.    Suspect patient will benefit from admission for further diuresis and risk stratification.  Discussed with hospital Medicine who agreed for on admission.    Amount and/or Complexity of Data Reviewed  Labs: ordered. Decision-making details documented in ED Course.  Radiology: ordered and independent interpretation performed. Decision-making details documented in ED Course.  ECG/medicine tests: ordered and independent interpretation performed. Decision-making details documented in ED Course.    Risk  OTC drugs.  Prescription drug management.              Attending Attestation:   Physician Attestation Statement for Resident:  As the  supervising MD   Physician Attestation Statement: I have personally seen and examined this patient.   I agree with the above history.  -:   As the supervising MD I agree with the above PE.     As the supervising MD I agree with the above treatment, course, plan, and disposition.    I have reviewed and agree with the residents interpretation of the following: lab data, x-rays and EKG.         Attending Critical Care:   Critical Care Times:   Direct Patient Care (initial evaluation, reassessments, and time considering the case)................................................................15 minutes.   Ordering, Reviewing, and Interpreting Diagnostic Studies...............................................................................................................5 minutes.   Documentation..................................................................................................................................................................................5 minutes.   ==============================================================  Total Critical Care Time - exclusive of procedural time: 25 minutes.  ==============================================================  Critical care was necessary to treat or prevent imminent or life-threatening deterioration of the following conditions: congestive heart failure.   Critical care was time spent personally by me on the following activities: obtaining history from patient or relative, examination of patient, review of old charts, ordering lab, x-rays, and/or EKG, development of treatment plan with patient or relative, ordering and performing treatments and interventions, evaluation of patient's response to treatment, re-evaluation of patient's conition and interpretation of cardiac measurements.   Critical Care Condition: potentially life-threatening       Attending ED Notes:   STAFF ATTENDING PHYSICIAN NOTE:  I have individually/jointly evaluated Casie Berry  [place] and discussed their ED management with the resident physician. I have also reviewed their notes, assessments, and procedures documented.  I was present during all critical portions of any procedure(s) performed on Casie Salvador.   ____________________  Nader Yao MD, FAAEM  Emergency Medicine Staff        ED Course as of 10/26/23 1129   Sun Oct 22, 2023   1829 Troponin I #1(!)  First trop elevated [TK]   1832 B-Type natriuretic peptide (BNP)(!)  BNP significantly elevated [TK]      ED Course User Index  [TK] Shamar Mclean DO                    Clinical Impression:   Final diagnoses:  [R07.9] Chest pain  [I50.9] CHF (congestive heart failure)        ED Disposition Condition    Observation Stable                Shamar Mclean DO  Resident  10/23/23 0054       Parag Yao MD  10/26/23 1129

## 2023-10-22 NOTE — ED TRIAGE NOTES
Patient presents to the ED today via EMS with reports of intermittent chest pain onset Tuesday. Hx of multiple MI. Patient denies SOB at this time

## 2023-10-22 NOTE — Clinical Note
Diagnosis: CHF (congestive heart failure) [197293]   Future Attending Provider: GALA ENGLE [[id number]]   Admitting Provider:: BONITA MEYER [[id number]]

## 2023-10-23 ENCOUNTER — DOCUMENTATION ONLY (OUTPATIENT)
Dept: CARDIOLOGY | Facility: CLINIC | Age: 58
End: 2023-10-23
Payer: MEDICAID

## 2023-10-23 DIAGNOSIS — R07.9 CHEST PAIN, UNSPECIFIED TYPE: Primary | ICD-10-CM

## 2023-10-23 PROBLEM — I51.3 MURAL THROMBUS OF CARDIAC APEX: Status: ACTIVE | Noted: 2023-10-23

## 2023-10-23 LAB
ALLENS TEST: ABNORMAL
ALLENS TEST: NORMAL
ANION GAP SERPL CALC-SCNC: 14 MMOL/L (ref 8–16)
APTT PPP: 21.7 SEC (ref 21–32)
ASCENDING AORTA: 2.79 CM
AV INDEX (PROSTH): 1.18
AV MEAN GRADIENT: 1 MMHG
AV PEAK GRADIENT: 2 MMHG
AV VALVE AREA BY VELOCITY RATIO: 3.54 CM²
AV VALVE AREA: 4.37 CM²
AV VELOCITY RATIO: 0.96
BASOPHILS # BLD AUTO: 0.04 K/UL (ref 0–0.2)
BASOPHILS NFR BLD: 0.3 % (ref 0–1.9)
BSA FOR ECHO PROCEDURE: 1.92 M2
BUN SERPL-MCNC: 31 MG/DL (ref 6–20)
CALCIUM SERPL-MCNC: 9.8 MG/DL (ref 8.7–10.5)
CHLORIDE SERPL-SCNC: 98 MMOL/L (ref 95–110)
CHOLEST SERPL-MCNC: 250 MG/DL (ref 120–199)
CHOLEST/HDLC SERPL: 8.6 {RATIO} (ref 2–5)
CO2 SERPL-SCNC: 21 MMOL/L (ref 23–29)
CREAT SERPL-MCNC: 1.4 MG/DL (ref 0.5–1.4)
CV ECHO LV RWT: 0.34 CM
DELSYS: ABNORMAL
DELSYS: NORMAL
DIFFERENTIAL METHOD: ABNORMAL
DOP CALC AO PEAK VEL: 0.7 M/S
DOP CALC AO VTI: 8.28 CM
DOP CALC LVOT AREA: 3.7 CM2
DOP CALC LVOT DIAMETER: 2.17 CM
DOP CALC LVOT PEAK VEL: 0.67 M/S
DOP CALC LVOT STROKE VOLUME: 36.15 CM3
DOP CALCLVOT PEAK VEL VTI: 9.78 CM
E WAVE DECELERATION TIME: 165.54 MSEC
E/A RATIO: 0.28
E/E' RATIO: 4 M/S
ECHO LV POSTERIOR WALL: 0.79 CM (ref 0.6–1.1)
EOSINOPHIL # BLD AUTO: 0.1 K/UL (ref 0–0.5)
EOSINOPHIL NFR BLD: 0.7 % (ref 0–8)
ERYTHROCYTE [DISTWIDTH] IN BLOOD BY AUTOMATED COUNT: 14.1 % (ref 11.5–14.5)
EST. GFR  (NO RACE VARIABLE): 43.9 ML/MIN/1.73 M^2
ESTIMATED AVG GLUCOSE: 117 MG/DL (ref 68–131)
FIO2: 21
FIO2: 21
FRACTIONAL SHORTENING: 7 % (ref 28–44)
GLUCOSE SERPL-MCNC: 103 MG/DL (ref 70–110)
HBA1C MFR BLD: 5.7 % (ref 4–5.6)
HCO3 UR-SCNC: 30.9 MMOL/L (ref 24–28)
HCT VFR BLD AUTO: 41.8 % (ref 37–48.5)
HCT VFR BLD CALC: 41 %PCV (ref 36–54)
HDLC SERPL-MCNC: 29 MG/DL (ref 40–75)
HDLC SERPL: 11.6 % (ref 20–50)
HGB BLD-MCNC: 13.7 G/DL (ref 12–16)
IMM GRANULOCYTES # BLD AUTO: 0.05 K/UL (ref 0–0.04)
IMM GRANULOCYTES NFR BLD AUTO: 0.4 % (ref 0–0.5)
INR PPP: 1 (ref 0.8–1.2)
INTERVENTRICULAR SEPTUM: 0.92 CM (ref 0.6–1.1)
IVRT: 139.87 MSEC
LA MAJOR: 5.24 CM
LA MINOR: 5.15 CM
LA WIDTH: 3.58 CM
LACTATE SERPL-SCNC: 2.7 MMOL/L (ref 0.5–2.2)
LDH SERPL L TO P-CCNC: 2.05 MMOL/L (ref 0.5–2.2)
LDLC SERPL CALC-MCNC: 188.6 MG/DL (ref 63–159)
LEFT ATRIUM SIZE: 3.04 CM
LEFT ATRIUM VOLUME INDEX MOD: 23.5 ML/M2
LEFT ATRIUM VOLUME INDEX: 25.4 ML/M2
LEFT ATRIUM VOLUME MOD: 44.41 CM3
LEFT ATRIUM VOLUME: 48.05 CM3
LEFT INTERNAL DIMENSION IN SYSTOLE: 4.3 CM (ref 2.1–4)
LEFT VENTRICLE DIASTOLIC VOLUME INDEX: 36.02 ML/M2
LEFT VENTRICLE DIASTOLIC VOLUME: 68.08 ML
LEFT VENTRICLE MASS INDEX: 68 G/M2
LEFT VENTRICLE SYSTOLIC VOLUME INDEX: 29 ML/M2
LEFT VENTRICLE SYSTOLIC VOLUME: 54.83 ML
LEFT VENTRICULAR INTERNAL DIMENSION IN DIASTOLE: 4.6 CM (ref 3.5–6)
LEFT VENTRICULAR MASS: 128.65 G
LV LATERAL E/E' RATIO: 5.5 M/S
LV SEPTAL E/E' RATIO: 3.14 M/S
LYMPHOCYTES # BLD AUTO: 6.1 K/UL (ref 1–4.8)
LYMPHOCYTES NFR BLD: 44.9 % (ref 18–48)
MAGNESIUM SERPL-MCNC: 2.4 MG/DL (ref 1.6–2.6)
MCH RBC QN AUTO: 22.5 PG (ref 27–31)
MCHC RBC AUTO-ENTMCNC: 32.8 G/DL (ref 32–36)
MCV RBC AUTO: 69 FL (ref 82–98)
MODE: ABNORMAL
MODE: NORMAL
MONOCYTES # BLD AUTO: 0.8 K/UL (ref 0.3–1)
MONOCYTES NFR BLD: 6.1 % (ref 4–15)
MV PEAK A VEL: 0.8 M/S
MV PEAK E VEL: 0.22 M/S
MV STENOSIS PRESSURE HALF TIME: 48.01 MS
MV VALVE AREA P 1/2 METHOD: 4.58 CM2
NEUTROPHILS # BLD AUTO: 6.5 K/UL (ref 1.8–7.7)
NEUTROPHILS NFR BLD: 47.6 % (ref 38–73)
NONHDLC SERPL-MCNC: 221 MG/DL
NRBC BLD-RTO: 0 /100 WBC
PCO2 BLDA: 48.6 MMHG (ref 35–45)
PH SMN: 7.41 [PH] (ref 7.35–7.45)
PHOSPHATE SERPL-MCNC: 3.3 MG/DL (ref 2.7–4.5)
PLATELET # BLD AUTO: 293 K/UL (ref 150–450)
PMV BLD AUTO: 10.2 FL (ref 9.2–12.9)
PO2 BLDA: 17 MMHG (ref 40–60)
POC BE: 6 MMOL/L
POC IONIZED CALCIUM: 1.16 MMOL/L (ref 1.06–1.42)
POC SATURATED O2: 24 % (ref 95–100)
POC TCO2: 32 MMOL/L (ref 24–29)
POCT GLUCOSE: 116 MG/DL (ref 70–110)
POCT GLUCOSE: 146 MG/DL (ref 70–110)
POCT GLUCOSE: 160 MG/DL (ref 70–110)
POCT GLUCOSE: 92 MG/DL (ref 70–110)
POCT GLUCOSE: 99 MG/DL (ref 70–110)
POTASSIUM BLD-SCNC: 4.1 MMOL/L (ref 3.5–5.1)
POTASSIUM SERPL-SCNC: 3.8 MMOL/L (ref 3.5–5.1)
PROTHROMBIN TIME: 10.6 SEC (ref 9–12.5)
RA MAJOR: 4.39 CM
RA PRESSURE ESTIMATED: 15 MMHG
RA WIDTH: 3.38 CM
RBC # BLD AUTO: 6.1 M/UL (ref 4–5.4)
RIGHT VENTRICULAR END-DIASTOLIC DIMENSION: 3.2 CM
SAMPLE: ABNORMAL
SAMPLE: NORMAL
SINUS: 2.71 CM
SITE: ABNORMAL
SITE: NORMAL
SODIUM BLD-SCNC: 132 MMOL/L (ref 136–145)
SODIUM SERPL-SCNC: 133 MMOL/L (ref 136–145)
SP02: 96
SP02: 96
STJ: 1.9 CM
TDI LATERAL: 0.04 M/S
TDI SEPTAL: 0.07 M/S
TDI: 0.06 M/S
TRICUSPID ANNULAR PLANE SYSTOLIC EXCURSION: 1.78 CM
TRIGL SERPL-MCNC: 162 MG/DL (ref 30–150)
TROPONIN I SERPL DL<=0.01 NG/ML-MCNC: 0.06 NG/ML (ref 0–0.03)
TSH SERPL DL<=0.005 MIU/L-ACNC: 2.01 UIU/ML (ref 0.4–4)
WBC # BLD AUTO: 13.6 K/UL (ref 3.9–12.7)
Z-SCORE OF LEFT VENTRICULAR DIMENSION IN END DIASTOLE: -1.4
Z-SCORE OF LEFT VENTRICULAR DIMENSION IN END SYSTOLE: 2.19

## 2023-10-23 PROCEDURE — 83735 ASSAY OF MAGNESIUM: CPT | Performed by: PHYSICIAN ASSISTANT

## 2023-10-23 PROCEDURE — 99900035 HC TECH TIME PER 15 MIN (STAT)

## 2023-10-23 PROCEDURE — 63600175 PHARM REV CODE 636 W HCPCS

## 2023-10-23 PROCEDURE — 25500020 PHARM REV CODE 255: Performed by: HOSPITALIST

## 2023-10-23 PROCEDURE — 83605 ASSAY OF LACTIC ACID: CPT | Performed by: PHYSICIAN ASSISTANT

## 2023-10-23 PROCEDURE — 84100 ASSAY OF PHOSPHORUS: CPT | Performed by: PHYSICIAN ASSISTANT

## 2023-10-23 PROCEDURE — 85025 COMPLETE CBC W/AUTO DIFF WBC: CPT | Performed by: PHYSICIAN ASSISTANT

## 2023-10-23 PROCEDURE — 93010 EKG 12-LEAD: ICD-10-PCS | Mod: ,,, | Performed by: INTERNAL MEDICINE

## 2023-10-23 PROCEDURE — 25000003 PHARM REV CODE 250

## 2023-10-23 PROCEDURE — 85730 THROMBOPLASTIN TIME PARTIAL: CPT | Performed by: PHYSICIAN ASSISTANT

## 2023-10-23 PROCEDURE — 84484 ASSAY OF TROPONIN QUANT: CPT | Performed by: PHYSICIAN ASSISTANT

## 2023-10-23 PROCEDURE — 80048 BASIC METABOLIC PNL TOTAL CA: CPT | Performed by: PHYSICIAN ASSISTANT

## 2023-10-23 PROCEDURE — 25000242 PHARM REV CODE 250 ALT 637 W/ HCPCS: Performed by: PHYSICIAN ASSISTANT

## 2023-10-23 PROCEDURE — 36415 COLL VENOUS BLD VENIPUNCTURE: CPT | Performed by: PHYSICIAN ASSISTANT

## 2023-10-23 PROCEDURE — 85610 PROTHROMBIN TIME: CPT | Performed by: PHYSICIAN ASSISTANT

## 2023-10-23 PROCEDURE — 25000003 PHARM REV CODE 250: Performed by: PHYSICIAN ASSISTANT

## 2023-10-23 PROCEDURE — 84443 ASSAY THYROID STIM HORMONE: CPT | Performed by: PHYSICIAN ASSISTANT

## 2023-10-23 PROCEDURE — 82803 BLOOD GASES ANY COMBINATION: CPT

## 2023-10-23 PROCEDURE — 20600001 HC STEP DOWN PRIVATE ROOM

## 2023-10-23 PROCEDURE — 93010 ELECTROCARDIOGRAM REPORT: CPT | Mod: ,,, | Performed by: INTERNAL MEDICINE

## 2023-10-23 PROCEDURE — 94761 N-INVAS EAR/PLS OXIMETRY MLT: CPT

## 2023-10-23 PROCEDURE — 93005 ELECTROCARDIOGRAM TRACING: CPT

## 2023-10-23 PROCEDURE — 83605 ASSAY OF LACTIC ACID: CPT

## 2023-10-23 PROCEDURE — 63600175 PHARM REV CODE 636 W HCPCS: Performed by: PHYSICIAN ASSISTANT

## 2023-10-23 PROCEDURE — 83036 HEMOGLOBIN GLYCOSYLATED A1C: CPT | Performed by: PHYSICIAN ASSISTANT

## 2023-10-23 PROCEDURE — 21400001 HC TELEMETRY ROOM

## 2023-10-23 PROCEDURE — 80061 LIPID PANEL: CPT | Performed by: PHYSICIAN ASSISTANT

## 2023-10-23 RX ORDER — HEPARIN SODIUM 5000 [USP'U]/ML
5000 INJECTION, SOLUTION INTRAVENOUS; SUBCUTANEOUS EVERY 8 HOURS
Status: DISCONTINUED | OUTPATIENT
Start: 2023-10-23 | End: 2023-10-23

## 2023-10-23 RX ORDER — HEPARIN SODIUM,PORCINE/D5W 25000/250
0-40 INTRAVENOUS SOLUTION INTRAVENOUS CONTINUOUS
Status: DISCONTINUED | OUTPATIENT
Start: 2023-10-23 | End: 2023-10-25

## 2023-10-23 RX ORDER — FUROSEMIDE 10 MG/ML
100 INJECTION INTRAMUSCULAR; INTRAVENOUS ONCE
Status: COMPLETED | OUTPATIENT
Start: 2023-10-23 | End: 2023-10-23

## 2023-10-23 RX ORDER — FUROSEMIDE 10 MG/ML
100 INJECTION INTRAMUSCULAR; INTRAVENOUS ONCE
Status: DISCONTINUED | OUTPATIENT
Start: 2023-10-23 | End: 2023-10-23

## 2023-10-23 RX ADMIN — PANTOPRAZOLE SODIUM 40 MG: 40 TABLET, DELAYED RELEASE ORAL at 08:10

## 2023-10-23 RX ADMIN — ACETAMINOPHEN 650 MG: 325 TABLET ORAL at 10:10

## 2023-10-23 RX ADMIN — PANTOPRAZOLE SODIUM 40 MG: 40 TABLET, DELAYED RELEASE ORAL at 10:10

## 2023-10-23 RX ADMIN — OXYCODONE AND ACETAMINOPHEN 1 TABLET: 10; 325 TABLET ORAL at 03:10

## 2023-10-23 RX ADMIN — SODIUM CHLORIDE 250 ML: 9 INJECTION, SOLUTION INTRAVENOUS at 05:10

## 2023-10-23 RX ADMIN — ATORVASTATIN CALCIUM 40 MG: 40 TABLET, FILM COATED ORAL at 08:10

## 2023-10-23 RX ADMIN — INSULIN DETEMIR 15 UNITS: 100 INJECTION, SOLUTION SUBCUTANEOUS at 10:10

## 2023-10-23 RX ADMIN — ASPIRIN 81 MG CHEWABLE TABLET 81 MG: 81 TABLET CHEWABLE at 08:10

## 2023-10-23 RX ADMIN — FOLIC ACID-PYRIDOXINE-CYANOCOBALAMIN TAB 2.5-25-2 MG 1 TABLET: 2.5-25-2 TAB at 08:10

## 2023-10-23 RX ADMIN — FUROSEMIDE 100 MG: 10 INJECTION, SOLUTION INTRAMUSCULAR; INTRAVENOUS at 09:10

## 2023-10-23 RX ADMIN — PREGABALIN 150 MG: 75 CAPSULE ORAL at 10:10

## 2023-10-23 RX ADMIN — HEPARIN SODIUM 5000 UNITS: 5000 INJECTION INTRAVENOUS; SUBCUTANEOUS at 04:10

## 2023-10-23 RX ADMIN — FLUTICASONE FUROATE AND VILANTEROL TRIFENATATE 1 PUFF: 100; 25 POWDER RESPIRATORY (INHALATION) at 08:10

## 2023-10-23 RX ADMIN — SODIUM CHLORIDE, POTASSIUM CHLORIDE, SODIUM LACTATE AND CALCIUM CHLORIDE 250 ML: 600; 310; 30; 20 INJECTION, SOLUTION INTRAVENOUS at 03:10

## 2023-10-23 RX ADMIN — FUROSEMIDE 10 MG/HR: 10 INJECTION, SOLUTION INTRAMUSCULAR; INTRAVENOUS at 10:10

## 2023-10-23 RX ADMIN — SODIUM CHLORIDE, POTASSIUM CHLORIDE, SODIUM LACTATE AND CALCIUM CHLORIDE 250 ML: 600; 310; 30; 20 INJECTION, SOLUTION INTRAVENOUS at 04:10

## 2023-10-23 RX ADMIN — PREGABALIN 150 MG: 75 CAPSULE ORAL at 08:10

## 2023-10-23 RX ADMIN — CLOPIDOGREL BISULFATE 75 MG: 75 TABLET ORAL at 08:10

## 2023-10-23 RX ADMIN — HUMAN ALBUMIN MICROSPHERES AND PERFLUTREN 0.11 MG: 10; .22 INJECTION, SOLUTION INTRAVENOUS at 07:10

## 2023-10-23 RX ADMIN — INSULIN ASPART 5 UNITS: 100 INJECTION, SOLUTION INTRAVENOUS; SUBCUTANEOUS at 06:10

## 2023-10-23 NOTE — NURSING
Nurses Note -- 4 Eyes      10/23/2023   3:44 PM      Skin assessed during: Admit      [x] No Altered Skin Integrity Present    []Prevention Measures Documented      [] Yes- Altered Skin Integrity Present or Discovered   [] LDA Added if Not in Epic (Describe Wound)   [] New Altered Skin Integrity was Present on Admit and Documented in LDA   [] Wound Image Taken    Wound Care Consulted? No    Attending Nurse:  Justa Wilks RN/Staff Member:   Annelise

## 2023-10-23 NOTE — ASSESSMENT & PLAN NOTE
Coronary artery disease involving native coronary artery of native heart with angina pectoris  - suspect 2/2 uncontrolled HTN and slight volume overload  - EKG without acute ST/T wave changes  - trop 0.057>>0.053>> 0.062 >> 0.056  - echo results above  - diuresis and BP control as below  - continue ASA, statin, plavix   - monitor tele   - plan for PET stress tomorrow  - NPO at midnight

## 2023-10-23 NOTE — PROGRESS NOTES
10/23/23 1445   Vital Signs   BP (!) 70/58   BP Location Left arm   BP Method Manual   Patient Position Lying     Notified MARISSA Delacruz of pt hypotension. . Administering 250 ml LR bolus to gravity per PA order. No distress noted. Plan of care continues.

## 2023-10-23 NOTE — PROGRESS NOTES
10/23/23 1430   Vital Signs   Temp 97.6 °F (36.4 °C)   Temp Source Axillary   Pulse 86   Resp 20   SpO2 96 %   BP (!) 77/45   BP Location Right arm   BP Method Automatic   Patient Position Lying     Pt arrived AAOX4. No distress noted. Bed in lowest position. Side rails up x2. Call bell and personal belongs within reach. Telemetry maintained. Safety precautions maintained. Pt free of falls or injuries. No further issues or concerns at this time. Plan of care continues.

## 2023-10-23 NOTE — PROGRESS NOTES
Attempted to enroll this PT in the [facility], PT fast asleep, lying on her back with chest rising and falling gently and mouth open. PT doesn't respond to loud verbal cues Xs 3. Will visit PT later in the day to attempt enrollment.

## 2023-10-23 NOTE — PLAN OF CARE
"[facility]  Initial Discharge Assessment       Primary Care Provider: Adonis Carey MD    Admission Diagnosis: CHF (congestive heart failure) [I50.9]    Admission Date: 10/22/2023  Expected Discharge Date:     Transition of Care Barriers: (P) None    Payor: MEDICAID / Plan: AETNA [facility] / Product Type: Managed Medicaid /     Extended Emergency Contact Information  Primary Emergency Contact: Mario Salvador  Address: [address] [place] [address] [address]  Home Phone: [phone]  Mobile Phone: [phone]  Relation: Spouse  Secondary Emergency Contact: Leonila Perez  Mobile Phone: [phone]  Relation: Relative    Discharge Plan A: (P) Home, Home with family, Home Health  Discharge Plan B: (P) Home, Home with family, Home Health      [facility] - [place], [place] - [address]  [address]  [facility] [address]  Phone: [phone] Fax: [phone]      Initial Assessment (most recent)       Adult Discharge Assessment - 10/22/23 2045          Discharge Assessment    Assessment Type Discharge Planning Assessment (P)      Confirmed/corrected address, phone number and insurance Yes (P)      Confirmed Demographics Correct on Facesheet (P)      Source of Information patient (P)      When was your last doctors appointment? -- (P)    last month    Does patient/caregiver understand observation status Yes (P)      Communicated SOCO with patient/caregiver Yes (P)      Reason For Admission "Chest and foot pain" (P)      People in Home significant other (P)      Facility Arrived From: Home (P)      Do you expect to return to your current living situation? Yes (P)      Do you have help at home or someone to help you manage your care at home? No (P)      Prior to hospitilization cognitive status: Alert/Oriented (P)      Current cognitive status: Alert/Oriented (P)      Walking or Climbing Stairs ambulation " "difficulty, requires equipment;stair climbing difficulty, requires equipment;transferring difficulty, requires equipment (P)      Mobility Management electric wheelchair and rollater (P)      Dressing/Bathing bathing difficulty, requires equipment (P)      Dressing/Bathing Management bath chair (P)      Home Accessibility wheelchair accessible (P)      Home Layout Able to live on 1st floor (P)      Equipment Currently Used at Home shower chair;power chair (P)      Readmission within 30 days? No (P)      Patient currently being followed by outpatient case management? No (P)      Do you currently have service(s) that help you manage your care at home? Yes (P)      Name and Contact number of agency HH "doesnt know the name" (P)      Is the pt/caregiver preference to resume services with current agency Yes (P)      Do you take prescription medications? Yes (P)      Do you have prescription coverage? Yes (P)      Coverage Medicaid (P)      Do you have any problems affording any of your prescribed medications? No (P)      Is the patient taking medications as prescribed? yes (P)      Who is going to help you get home at discharge? Family Member (P)      How do you get to doctors appointments? family or friend will provide (P)      Are you on dialysis? No (P)      Do you take coumadin? No (P)      DME Needed Upon Discharge  none (P)      Discharge Plan discussed with: Patient (P)      Transition of Care Barriers None (P)      Discharge Plan A Home;Home with family;Home Health (P)      Discharge Plan B Home;Home with family;Home Health (P)         Physical Activity    On average, how many days per week do you engage in moderate to strenuous exercise (like a brisk walk)? 3 days (P)      On average, how many minutes do you engage in exercise at this level? 30 min (P)         Financial Resource Strain    How hard is it for you to pay for the very basics like food, housing, medical care, and heating? Not hard at all (P)         " Housing Stability    In the last 12 months, was there a time when you were not able to pay the mortgage or rent on time? No (P)      In the last 12 months, how many places have you lived? 1 (P)      In the last 12 months, was there a time when you did not have a steady place to sleep or slept in a shelter (including now)? No (P)         Transportation Needs    In the past 12 months, has lack of transportation kept you from medical appointments or from getting medications? No (P)      In the past 12 months, has lack of transportation kept you from meetings, work, or from getting things needed for daily living? No (P)         Food Insecurity    Within the past 12 months, you worried that your food would run out before you got the money to buy more. Never true (P)      Within the past 12 months, the food you bought just didn't last and you didn't have money to get more. Never true (P)         Stress    Do you feel stress - tense, restless, nervous, or anxious, or unable to sleep at night because your mind is troubled all the time - these days? To some extent (P)         Social Connections    In a typical week, how many times do you talk on the phone with family, friends, or neighbors? More than three times a week (P)      How often do you get together with friends or relatives? Three times a week (P)      How often do you attend Confucianism or Zoroastrianism services? More than 4 times per year (P)      Do you belong to any clubs or organizations such as Confucianism groups, unions, fraternal or athletic groups, or school groups? No (P)      How often do you attend meetings of the clubs or organizations you belong to? Never (P)      Are you , , , , never , or living with a partner? Living with partner (P)         Alcohol Use    Q1: How often do you have a drink containing alcohol? Never (P)      Q2: How many drinks containing alcohol do you have on a typical day when you are drinking? Patient does  not drink (P)      Q3: How often do you have six or more drinks on one occasion? Never (P)         OTHER    Name(s) of People in Home Significant other (P)

## 2023-10-23 NOTE — ASSESSMENT & PLAN NOTE
- appears euvolemic on exam but may hide fluid given EF 20%  - BNP 1122, CXR clear  - s/p 80mg lasix IVP given in the ED  - further IV lasix pending response   - patients BP decreased following IV diuresis, will hold on additional diuresis until BP improves  - optimize GDMT as indicated/ tolerated  - strict I/Os, daily weights  - Results for orders placed during the hospital encounter of 10/22/23    Echo    Interpretation Summary    Left Ventricle: The left ventricle is normal in size. Normal wall thickness. regional wall motion abnormalities present. There is severely reduced systolic function with a visually estimated ejection fraction of 20 - 25%. There is diastolic dysfunction. .    Right Ventricle: Normal right ventricular cavity size. Wall thickness is normal. Right ventricle wall motion  is normal. Systolic function is normal.    IVC/SVC: Elevated venous pressure at 15 mmHg.    There is a small thrombus present in the left ventricular apex

## 2023-10-23 NOTE — PROGRESS NOTES
[facility] Dept  [facility] Medicine  Progress Note    Patient Name: Casie Salvador  MRN: [id number]  Patient Class: OP- Observation   Admission Date: 10/22/2023  Length of Stay: 0 days  Attending Physician: Ajit Hastings MD  Primary Care Provider: Adonis Carey MD        Subjective:     Principal Problem:Chest pain, rule out acute myocardial infarction        HPI:  Casie Salvador is a 58 y.o. female with a PMHx of T2DM, CAD, CHF, PUD, hx gastric ulcer, HTN, HLD who presents to [facility] for evaluation of chest pain. Patient is a poor historian. She reports intermittent left sided chest pain described as a pressure for the past 2-3 days. She is unable to further describe the pain but says episodes occur randomly and pain does not worsen with exertion. No radiation. She is unable to say whether or not pain is similar to prior heart attacks in the past. ED provider patient with significant dyspnea on exertion while walked in the ED, however patient denies any SOB or SALAZAR. She does complain of left lower extremity pain which is an acute on chronic issue. Denies any LE swelling, HA, vision changes, N/V, abdominal pain, confusion, or syncope.    ED: hypertensive to /98, vitals otherwise stable. No leukocytosis or electrolyte abnormalities. Cr 1.5, baseline ~1.1. BNP 1122, trop 0.057>>0.053. EKG without acute ST/T wave changes. CXR negative for acute findings.       Overview/Hospital Course:  Casie Salvador is a 58 y.o. female who was admitted to hospital medicine with complaints of chest pain. Troponin 0.057 >> 0.053 >> 0.062 >> 0.056. Plan for PET stress tomorrow. NPO at midnight. BNP 1112, given 80 of IV lasix in the ED. Updated echo with  EF of 20-25% as well as diastolic function, as well as a small thrombus in the left ventricular apex. Spoke cardiology, recommending continuing home plavix and starting heparin. Elevated CVP at 15, Cr 1.5 > 1.4 (baseline ~1.1) following  diuresis. LE US negative DVT. Plan to discharge home when medically ready.      Interval History: NAEON, plan for PET stress tomorrow. NPO at midnight     Review of Systems   Constitutional:  Negative for chills, fatigue and fever.   HENT:  Negative for congestion, trouble swallowing and voice change.    Eyes:  Negative for photophobia and visual disturbance.   Respiratory:  Positive for chest tightness. Negative for cough, shortness of breath and wheezing.    Cardiovascular:  Positive for chest pain. Negative for palpitations and leg swelling.   Gastrointestinal:  Negative for abdominal pain, constipation, nausea and vomiting.   Genitourinary:  Negative for decreased urine volume, difficulty urinating, dysuria, frequency and hematuria.   Musculoskeletal:  Positive for arthralgias. Negative for back pain and gait problem.   Skin:  Negative for color change and wound.   Neurological:  Negative for dizziness, syncope, speech difficulty, weakness, light-headedness and numbness.   Psychiatric/Behavioral:  Negative for behavioral problems, confusion and decreased concentration.      Objective:     Vital Signs (Most Recent):  Temp: 98.2 °F (36.8 °C) (10/23/23 1131)  Pulse: 79 (10/23/23 1320)  Resp: 20 (10/23/23 1131)  BP: (!) 95/55 (10/23/23 1131)  SpO2: 95 % (10/23/23 1131) Vital Signs (24h Range):  Temp:  [98.2 °F (36.8 °C)-98.8 °F (37.1 °C)] 98.2 °F (36.8 °C)  Pulse:  [75-87] 79  Resp:  [15-20] 20  SpO2:  [95 %-99 %] 95 %  BP: ()/(45-98) 95/55     Weight: 79.4 kg (175 lb)  Body mass index is 28.25 kg/m².  No intake or output data in the 24 hours ending 10/23/23 1403      Physical Exam  Vitals and nursing note reviewed.   Constitutional:       General: She is not in acute distress.     Appearance: She is well-developed.   HENT:      Head: Normocephalic and atraumatic.      Mouth/Throat:      Pharynx: No oropharyngeal exudate.   Eyes:      General: No scleral icterus.     Conjunctiva/sclera: Conjunctivae normal.    Cardiovascular:      Rate and Rhythm: Normal rate and regular rhythm.      Heart sounds: Normal heart sounds.   Pulmonary:      Effort: Pulmonary effort is normal. No respiratory distress.      Breath sounds: No wheezing.      Comments: Diminished BS to BLL  Abdominal:      General: Bowel sounds are normal. There is no distension.      Palpations: Abdomen is soft.      Tenderness: There is no abdominal tenderness.   Musculoskeletal:         General: Tenderness (RLE) present. Normal range of motion.      Cervical back: Normal range of motion and neck supple.      Comments: S/p L BKA   Lymphadenopathy:      Cervical: No cervical adenopathy.   Skin:     General: Skin is warm and dry.      Capillary Refill: Capillary refill takes less than 2 seconds.      Findings: No rash.   Neurological:      Mental Status: She is alert and oriented to person, place, and time.      Cranial Nerves: No cranial nerve deficit.      Sensory: No sensory deficit.      Coordination: Coordination normal.   Psychiatric:         Behavior: Behavior normal.         Thought Content: Thought content normal.         Judgment: Judgment normal.             Significant Labs: All pertinent labs within the past 24 hours have been reviewed.  CBC:   Recent Labs   Lab 10/22/23  1628 10/23/23  0314   WBC 12.68 13.60*   HGB 13.8 13.7   HCT 42.5 41.8    293     CMP:   Recent Labs   Lab 10/22/23  1628 10/23/23  0314   * 133*   K 4.1 3.8   CL 98 98   CO2 23 21*   * 103   BUN 32* 31*   CREATININE 1.5* 1.4   CALCIUM 10.1 9.8   PROT 8.3  --    ALBUMIN 4.0  --    BILITOT 0.8  --    ALKPHOS 93  --    AST 19  --    ALT 11  --    ANIONGAP 13 14     Troponin:   Recent Labs   Lab 10/22/23  1936 10/22/23  2237 10/23/23  0314   TROPONINI 0.053* 0.062* 0.056*       Significant Imaging: I have reviewed all pertinent imaging results/findings within the past 24 hours.      Assessment/Plan:      * Chest pain, rule out acute myocardial infarction  Coronary  artery disease involving native coronary artery of native heart with angina pectoris  - suspect 2/2 uncontrolled HTN and slight volume overload  - EKG without acute ST/T wave changes  - trop 0.057>>0.053>> 0.062 >> 0.056  - echo results above  - diuresis and BP control as below  - continue ASA, statin, plavix   - monitor tele   - plan for PET stress tomorrow  - NPO at midnight     Mural thrombus of cardiac apex  - echo with small thrombus present in the left ventricular apex  - discussed with cardiology, recommending continuing plavix and starting heparin   - plan to follow up with cardiology in clinic following d/c     BRENDA (acute kidney injury)  - Cr 1.5 >> 1.4, baseline ~1.2  - suspect cardiorenal  - IV diuresis as above  - UA noninfectious  - avoid nephrotoxins, renally dose meds    S/P BKA (below knee amputation), right  - 2/2 PAD  - no acute issues  - turn q2h    Peptic ulcer disease  History of gastric ulcer  - continue PPI    Acute on chronic combined systolic and diastolic congestive heart failure  - appears euvolemic on exam but may hide fluid given EF 20%  - BNP 1122, CXR clear  - s/p 80mg lasix IVP given in the ED  - further IV lasix pending response   - patients BP decreased following IV diuresis, will hold on additional diuresis until BP improves  - optimize GDMT as indicated/ tolerated  - strict I/Os, daily weights  - Results for orders placed during the hospital encounter of 10/22/23    Echo    Interpretation Summary    Left Ventricle: The left ventricle is normal in size. Normal wall thickness. regional wall motion abnormalities present. There is severely reduced systolic function with a visually estimated ejection fraction of 20 - 25%. There is diastolic dysfunction. .    Right Ventricle: Normal right ventricular cavity size. Wall thickness is normal. Right ventricle wall motion  is normal. Systolic function is normal.    IVC/SVC: Elevated venous pressure at 15 mmHg.    There is a small thrombus  present in the left ventricular apex    Constipation due to opioid therapy  - chronic issuse  - daily miralax    Chronic pain  - acute on chronic pain of LLE  - LE US negative for DVT  - trial robaxin   - continue home lyrica and PRN percocet    DM (diabetes mellitus), type 2 with complications  - hold home regimen  - start IP regimen: detemir 15U BID + aspart 5U TIDWM  - LDSSI, ACHS accuchecks  - repeat a1c  Lab Results   Component Value Date    HGBA1C 5.8 (H) 02/20/2022       Hypertension  - uncontrolled on admit, home regimen unclear  - hold losartan given BRENDA  - start nifedepine 30mg daily  - continue MTP 25mg daily       VTE Risk Mitigation (From admission, onward)         Ordered     heparin (porcine) injection 5,000 Units  Every 8 hours         10/23/23 1357     IP VTE HIGH RISK PATIENT  Once         10/23/23 1357     Place sequential compression device  Until discontinued         10/22/23 2018     Place sequential compression device  Until discontinued         10/22/23 2018                Discharge Planning   SOCO: 10/24/2023     Code Status: Full Code   Is the patient medically ready for discharge?: No    Reason for patient still in hospital (select all that apply): Patient new problem, Patient trending condition, Laboratory test, Treatment and Imaging  Discharge Plan A: Home, Home with family, Home Health                  Nubia Bhandari PA-C  Department of Hospital Medicine   [facility] - [facility] Dept

## 2023-10-23 NOTE — ASSESSMENT & PLAN NOTE
- appears euvolemic on exam but may hide fluid given EF 20%  - BNP 1122, CXR clear  - s/p 80mg lasix IVP given in the ED  - further IV lasix pending response  - repeat echo  - optimize GDMT as indicated/ tolerated  - strict I/Os, daily weights

## 2023-10-23 NOTE — ASSESSMENT & PLAN NOTE
- echo with small thrombus present in the left ventricular apex  - discussed with cardiology, recommending continuing plavix and starting heparin   - plan to follow up with cardiology in clinic following d/c

## 2023-10-23 NOTE — SUBJECTIVE & OBJECTIVE
Past Medical History:   Diagnosis Date    Chronic combined systolic and diastolic congestive heart failure     Coronary artery disease     Diabetes mellitus     Encounter for blood transfusion     Gastric ulcer with hemorrhage     Heart attack     History of gastric ulcer 4/2/2022    Hypertension     NSAID induced gastritis 4/2/2022    Perforated viscus 03/14/2022    Peripheral artery disease        Past Surgical History:   Procedure Laterality Date    APPENDECTOMY      BELOW KNEE AMPUTATION OF LOWER EXTREMITY Right 2019    COLONOSCOPY N/A 02/23/2022    Procedure: COLONOSCOPY;  Surgeon: Estefania Bryant MD;  [facility]  Service: Endoscopy;  Laterality: N/A;  anemia, melena    CORONARY STENT PLACEMENT      ESOPHAGOGASTRODUODENOSCOPY N/A 02/23/2022    Procedure: EGD (ESOPHAGOGASTRODUODENOSCOPY);  Surgeon: Estefania Bryant MD;  Location: [facility] ([address] [place] [address]);  Service: Endoscopy;  Laterality: N/A;  anemia    ESOPHAGOGASTRODUODENOSCOPY N/A 4/14/2022    Procedure: EGD (ESOPHAGOGASTRODUODENOSCOPY);  Surgeon: First Available Den King;  Location: [facility] (2ND FLR);  Service: Endoscopy;  Laterality: N/A;  please schedule in 8 weeks. done 2/23      EF-20    ESOPHAGOGASTRODUODENOSCOPY  4/14/2022    Procedure: ;  Surgeon: First Available Den King;  [facility]  Service: Endoscopy;;    ESOPHAGOGASTRODUODENOSCOPY N/A 4/29/2022    Procedure: EGD (ESOPHAGOGASTRODUODENOSCOPY);  Surgeon: Estefania Bryant MD;  Location: [facility];  Service: Endoscopy;  Laterality: N/A;  expedite EGD per Dr Ga      states vaccinated-will bring card-GT    ESOPHAGOGASTRODUODENOSCOPY N/A 10/28/2022    Procedure: EGD (ESOPHAGOGASTRODUODENOSCOPY);  Surgeon: Estefania Bryant MD;  Location: [facility] ([address] [place] [address]);  Service: Endoscopy;  Laterality: N/A;    FOOT AMPUTATION THROUGH METATARSAL Left 2019    TUBAL LIGATION         Review of patient's allergies indicates:   Allergen Reactions    Orange juice Hives     "Tomato (solanum lycopersicum) Hives       No current facility-administered medications on file prior to encounter.     Current Outpatient Medications on File Prior to Encounter   Medication Sig    aspirin 81 MG Chew Take 1 tablet (81 mg total) by mouth once daily.    atorvastatin (LIPITOR) 40 MG tablet Take 40 mg by mouth once daily.    ATROVENT HFA 17 mcg/actuation inhaler Inhale 2 puffs into the lungs once daily.    blood sugar diagnostic Strp by Misc.(Non-Drug; Combo Route) route.    busPIRone (BUSPAR) 10 MG tablet Take 10 mg by mouth 2 (two) times daily.    carisoprodoL (SOMA) 350 MG tablet Take 350 mg by mouth once daily.    citalopram (CELEXA) 20 MG tablet Take 20 mg by mouth once daily.    clopidogreL (PLAVIX) 75 mg tablet Take 75 mg by mouth.    docusate sodium (COLACE) 100 MG capsule Take 1 capsule (100 mg total) by mouth 2 (two) times daily.    dorzolamide-timolol 2-0.5% (COSOPT) 22.3-6.8 mg/mL ophthalmic solution Place 1 drop into both eyes 2 (two) times daily.    ergocalciferol (ERGOCALCIFEROL) 50,000 unit Cap Take 50,000 Units by mouth every 7 days.    ferrous sulfate (FEOSOL) 325 mg (65 mg iron) Tab tablet Take 325 mg by mouth 2 (two) times daily.    fluticasone propionate (FLONASE) 50 mcg/actuation nasal spray 1 spray by Each Nostril route once daily.    folic acid-vit B6-vit B12 2.5-25-2 mg (FOLBIC OR EQUIV) 2.5-25-2 mg Tab Take 1 tablet by mouth once daily.    furosemide (LASIX) 20 MG tablet Take 1 tablet (20 mg total) by mouth 2 (two) times daily. Hold this. Do not take it until you see your doctor.    gabapentin (NEURONTIN) 600 MG tablet Take 600 mg by mouth 2 (two) times daily.    glipiZIDE (GLUCOTROL) 10 MG tablet Take 1 tablet (10 mg total) by mouth 2 (two) times daily before meals. Hold this. Do not take it until you see your doctor.    insulin needles, disposable, 31 x 3/16 " Ndle by Misc.(Non-Drug; Combo Route) route.    lancets 28 gauge Misc by Misc.(Non-Drug; Combo Route) route.    LANTUS " "SOLOSTAR U-100 INSULIN glargine 100 units/mL SubQ pen SMARTSI Unit(s) SUB-Q Every Evening    LIDOcaine-prilocaine (EMLA) cream Apply topically once daily.    losartan (COZAAR) 25 MG tablet Take 1 tablet (25 mg total) by mouth once daily.    metoprolol succinate (TOPROL-XL) 25 MG 24 hr tablet Take 1 tablet (25 mg total) by mouth once daily.    oxyCODONE-acetaminophen (PERCOCET)  mg per tablet Take 1 tablet by mouth every 8 (eight) hours as needed for Pain.    pantoprazole (PROTONIX) 40 MG tablet Take 1 tablet (40 mg total) by mouth 2 (two) times daily.    pen needle, diabetic 31 gauge x 5/16" Ndle Use to inject insulin into the skin every evening.    potassium chloride (MICRO-K) 10 MEQ CpSR Take 1 capsule (10 mEq total) by mouth once daily. Hold this. Do not take it until you see your doctor.    pregabalin (LYRICA) 150 MG capsule Take 150 mg by mouth 2 (two) times daily.    promethazine (PHENERGAN) 6.25 mg/5 mL syrup TAKE 10 mls BY MOUTH EVERY 6 HOURS AS NEEDED    SYMBICORT 160-4.5 mcg/actuation HFAA 2 puffs 2 (two) times daily.    zolpidem (AMBIEN) 10 mg Tab Take 10 mg by mouth nightly as needed.    [DISCONTINUED] amLODIPine (NORVASC) 10 MG tablet Take 1 tablet (10 mg total) by mouth once daily. Hold this. Do not take it until you see your doctor.    [DISCONTINUED] hydrALAZINE (APRESOLINE) 50 MG tablet Take 1 tablet (50 mg total) by mouth daily as needed. Hold this. Do not take it until you see your doctor.    [DISCONTINUED] hyoscyamine (ANASPAZ,LEVSIN) 0.125 mg Tab Take 1 tablet (125 mcg total) by mouth every 6 (six) hours as needed (abdominal cramping).    [DISCONTINUED] lisinopriL (PRINIVIL,ZESTRIL) 2.5 MG tablet Take 1 tablet (2.5 mg total) by mouth once daily.    [DISCONTINUED] metFORMIN (GLUCOPHAGE) 1000 MG tablet Take 1 tablet (1,000 mg total) by mouth 2 (two) times daily with meals. Hold this. Do not take it until you see your doctor. (Patient not taking: Reported on 2022)    [DISCONTINUED] " metoprolol tartrate (LOPRESSOR) 50 MG tablet Take 1 tablet (50 mg total) by mouth 2 (two) times daily. Hold this. Do not take it until you see your doctor.     Family History    None       Tobacco Use    Smoking status: Every Day     Current packs/day: 0.50     Types: Cigarettes    Smokeless tobacco: Never   Substance and Sexual Activity    Alcohol use: No    Drug use: No    Sexual activity: Not on file     Review of Systems   Constitutional:  Negative for chills, fatigue and fever.   HENT:  Negative for congestion, trouble swallowing and voice change.    Eyes:  Negative for photophobia and visual disturbance.   Respiratory:  Positive for chest tightness. Negative for cough, shortness of breath and wheezing.    Cardiovascular:  Positive for chest pain. Negative for palpitations and leg swelling.   Gastrointestinal:  Negative for abdominal pain, constipation, nausea and vomiting.   Genitourinary:  Negative for decreased urine volume, difficulty urinating, dysuria, frequency and hematuria.   Musculoskeletal:  Positive for arthralgias. Negative for back pain and gait problem.   Skin:  Negative for color change and wound.   Neurological:  Negative for dizziness, syncope, speech difficulty, weakness, light-headedness and numbness.   Psychiatric/Behavioral:  Negative for behavioral problems, confusion and decreased concentration.      Objective:     Vital Signs (Most Recent):  Temp: 98.2 °F (36.8 °C) (10/22/23 2208)  Pulse: 81 (10/22/23 2208)  Resp: 16 (10/22/23 2208)  BP: (!) 170/87 (10/22/23 2208)  SpO2: 96 % (10/22/23 2208) Vital Signs (24h Range):  Temp:  [98.2 °F (36.8 °C)-98.5 °F (36.9 °C)] 98.2 °F (36.8 °C)  Pulse:  [76-81] 81  Resp:  [15-16] 16  SpO2:  [96 %-99 %] 96 %  BP: (170-180)/(86-98) 170/87     Weight: 79.4 kg (175 lb)  Body mass index is 28.25 kg/m².     Physical Exam  Vitals and nursing note reviewed.   Constitutional:       General: She is not in acute distress.     Appearance: She is well-developed.    HENT:      Head: Normocephalic and atraumatic.      Mouth/Throat:      Pharynx: No oropharyngeal exudate.   Eyes:      General: No scleral icterus.     Conjunctiva/sclera: Conjunctivae normal.   Cardiovascular:      Rate and Rhythm: Normal rate and regular rhythm.      Heart sounds: Normal heart sounds.   Pulmonary:      Effort: Pulmonary effort is normal. No respiratory distress.      Breath sounds: No wheezing.      Comments: Diminished BS to BLL  Abdominal:      General: Bowel sounds are normal. There is no distension.      Palpations: Abdomen is soft.      Tenderness: There is no abdominal tenderness.   Musculoskeletal:         General: Tenderness (RLE) present. Normal range of motion.      Cervical back: Normal range of motion and neck supple.      Comments: S/p L BKA   Lymphadenopathy:      Cervical: No cervical adenopathy.   Skin:     General: Skin is warm and dry.      Capillary Refill: Capillary refill takes less than 2 seconds.      Findings: No rash.   Neurological:      Mental Status: She is alert and oriented to person, place, and time.      Cranial Nerves: No cranial nerve deficit.      Sensory: No sensory deficit.      Coordination: Coordination normal.   Psychiatric:         Behavior: Behavior normal.         Thought Content: Thought content normal.         Judgment: Judgment normal.                Significant Labs: All pertinent labs within the past 24 hours have been reviewed.  CBC:   Recent Labs   Lab 10/22/23  1628   WBC 12.68   HGB 13.8   HCT 42.5        CMP:   Recent Labs   Lab 10/22/23  1628   *   K 4.1   CL 98   CO2 23   *   BUN 32*   CREATININE 1.5*   CALCIUM 10.1   PROT 8.3   ALBUMIN 4.0   BILITOT 0.8   ALKPHOS 93   AST 19   ALT 11   ANIONGAP 13       Significant Imaging: I have reviewed all pertinent imaging results/findings within the past 24 hours.  X-Ray Chest AP Portable  Narrative: EXAMINATION:  XR CHEST AP PORTABLE    CLINICAL HISTORY:  Chest pain,  unspecified    TECHNIQUE:  Single frontal view of the chest was performed.    COMPARISON:  Chest radiograph 06/22/2022, CT abdomen and pelvis 10/27/2022, CT thorax 03/14/2022    FINDINGS:  Patient is slightly rotated.  Resolution is limited by body habitus with underpenetration.    Cardiomediastinal silhouette is midline and prominent similar to prior without convincing evidence of failure.  Pulmonary vasculature and hilar contours are within normal limits.  Bibasilar minimal platelike scarring versus atelectasis.  The lungs are otherwise symmetrically well expanded without consolidation, pleural effusion or pneumothorax.  No acute osseous process seen.  PA and lateral views can be obtained.  Impression: No detrimental change or radiographic acute intrathoracic process seen on this single view.    Electronically signed by: Lennox Sheppard MD  Date:    10/22/2023  Time:    17:55

## 2023-10-23 NOTE — ASSESSMENT & PLAN NOTE
- acute on chronic pain of RLE  - LE  ordered  - trial robaxin   - continue home lyrica and PRN percocet

## 2023-10-23 NOTE — ASSESSMENT & PLAN NOTE
- Cr 1.5 >> 1.4, baseline ~1.2  - suspect cardiorenal  - IV diuresis as above  - UA noninfectious  - avoid nephrotoxins, renally dose meds

## 2023-10-23 NOTE — PROGRESS NOTES
"[facility]) nurse navigator notified of [facility] candidate in need of education and introduction to 4-6 week program.      PT aao x 3 while lying in beD. Introduced self to pt as [facility] nurse navigator.     Patient given "[facility] Home Care Guide" which includes "Daily weight and symptom tracker".  Encouraged pt to review information.      Reviewed the following key points of [facility] program with pt:              1.) Take your medications as directed. Call Ms Gabriel if any health Care Professional changes your Heart/Fluid medications.               2.) Weight yourself daily. Daily Dry Weights. Upon waking ,empty your bladder, weigh with as little clothes as possible before eating/drinking. Record weight in Symptom Tracker and compare these weights for fluid gain. Weight gain overnight of 3-4 lbs is Fluid, also a gain of 5 lbs in 3 days is Fluid as well. Call US!              3.) Follow low salt and limited fluid diet. Salt/Sodium Restriction 0060-0762 mg, see page 4. Sodium makes you hold onto Fluid. High Sodium Foods;Deli Meat/Cheese, Sausages/Hot Dogs, Fast Food/Restaurant Food, Anything in a box, bottle or can. Cook with Fresh or Frozen ingredients and use seasonings that are labeled NO SALT. Check Portion Sizes, Salt is reported for 1 portion. A can may contain 2-3 portions. Fluid Restriction 1.5 -2 Liters/Day, see page 6, measure all of your Fluid, the milk in your cereal, broth in your soup and ice cream because anything that melts at room temp is a liquid.              4.) Stop smoking and start exercising. Brisk walking is good, don't walk like you are going to the [employer] and DON"T WALK IN THE HEAT! Start low and increase as tolerated. Remember if you don't use it you lose it.              5.) Go to your appointments and call your team. Have your weight and BP/P ready when we call to do the Phone Check ins and call us if you have fluid gain or " questions      Pt reminded to follow Symptom tracker and to call at the onset of symptoms according to tracker.     Reviewed plan for follow up once discharged to include phone calls, in person and virtual visits to assist pt optimizing their heart failure medication regimen and encouraging healthy lifestyle modifications.  Reminded pt that program will assist them over the next 4-6 weeks and then patient will be transferred to long term care provider .  Reminded pt how to contact [facility] navigator via phone and or via [employer].     Pt instructed appointment will be printed on hospital discharge paperwork.     Pt also reminded RN will call 48-72 hours after discharge to check on them.     PT Can verbalize read back of some of the information given.  Encouraged pt to read over information often and contact team with any questions or concerns.      PT states she likes morning appts. please

## 2023-10-23 NOTE — SUBJECTIVE & OBJECTIVE
Interval History: NAEON, plan for PET stress tomorrow. NPO at midnight     Review of Systems   Constitutional:  Negative for chills, fatigue and fever.   HENT:  Negative for congestion, trouble swallowing and voice change.    Eyes:  Negative for photophobia and visual disturbance.   Respiratory:  Positive for chest tightness. Negative for cough, shortness of breath and wheezing.    Cardiovascular:  Positive for chest pain. Negative for palpitations and leg swelling.   Gastrointestinal:  Negative for abdominal pain, constipation, nausea and vomiting.   Genitourinary:  Negative for decreased urine volume, difficulty urinating, dysuria, frequency and hematuria.   Musculoskeletal:  Positive for arthralgias. Negative for back pain and gait problem.   Skin:  Negative for color change and wound.   Neurological:  Negative for dizziness, syncope, speech difficulty, weakness, light-headedness and numbness.   Psychiatric/Behavioral:  Negative for behavioral problems, confusion and decreased concentration.      Objective:     Vital Signs (Most Recent):  Temp: 98.2 °F (36.8 °C) (10/23/23 1131)  Pulse: 79 (10/23/23 1320)  Resp: 20 (10/23/23 1131)  BP: (!) 95/55 (10/23/23 1131)  SpO2: 95 % (10/23/23 1131) Vital Signs (24h Range):  Temp:  [98.2 °F (36.8 °C)-98.8 °F (37.1 °C)] 98.2 °F (36.8 °C)  Pulse:  [75-87] 79  Resp:  [15-20] 20  SpO2:  [95 %-99 %] 95 %  BP: ()/(45-98) 95/55     Weight: 79.4 kg (175 lb)  Body mass index is 28.25 kg/m².  No intake or output data in the 24 hours ending 10/23/23 1403      Physical Exam  Vitals and nursing note reviewed.   Constitutional:       General: She is not in acute distress.     Appearance: She is well-developed.   HENT:      Head: Normocephalic and atraumatic.      Mouth/Throat:      Pharynx: No oropharyngeal exudate.   Eyes:      General: No scleral icterus.     Conjunctiva/sclera: Conjunctivae normal.   Cardiovascular:      Rate and Rhythm: Normal rate and regular rhythm.      Heart  sounds: Normal heart sounds.   Pulmonary:      Effort: Pulmonary effort is normal. No respiratory distress.      Breath sounds: No wheezing.      Comments: Diminished BS to BLL  Abdominal:      General: Bowel sounds are normal. There is no distension.      Palpations: Abdomen is soft.      Tenderness: There is no abdominal tenderness.   Musculoskeletal:         General: Tenderness (RLE) present. Normal range of motion.      Cervical back: Normal range of motion and neck supple.      Comments: S/p L BKA   Lymphadenopathy:      Cervical: No cervical adenopathy.   Skin:     General: Skin is warm and dry.      Capillary Refill: Capillary refill takes less than 2 seconds.      Findings: No rash.   Neurological:      Mental Status: She is alert and oriented to person, place, and time.      Cranial Nerves: No cranial nerve deficit.      Sensory: No sensory deficit.      Coordination: Coordination normal.   Psychiatric:         Behavior: Behavior normal.         Thought Content: Thought content normal.         Judgment: Judgment normal.             Significant Labs: All pertinent labs within the past 24 hours have been reviewed.  CBC:   Recent Labs   Lab 10/22/23  1628 10/23/23  0314   WBC 12.68 13.60*   HGB 13.8 13.7   HCT 42.5 41.8    293     CMP:   Recent Labs   Lab 10/22/23  1628 10/23/23  0314   * 133*   K 4.1 3.8   CL 98 98   CO2 23 21*   * 103   BUN 32* 31*   CREATININE 1.5* 1.4   CALCIUM 10.1 9.8   PROT 8.3  --    ALBUMIN 4.0  --    BILITOT 0.8  --    ALKPHOS 93  --    AST 19  --    ALT 11  --    ANIONGAP 13 14     Troponin:   Recent Labs   Lab 10/22/23  1936 10/22/23  2237 10/23/23  0314   TROPONINI 0.053* 0.062* 0.056*       Significant Imaging: I have reviewed all pertinent imaging results/findings within the past 24 hours.

## 2023-10-23 NOTE — ASSESSMENT & PLAN NOTE
- Cr 1.5, baseline ~1.2  - suspect cardiorenal  - IV diuresis as above  - UA ordered  - avoid nephrotoxins, renally dose meds

## 2023-10-23 NOTE — H&P
[facility] - Emergency Dept  [facility]  History & Physical    Patient Name: Casie Salvador  MRN: [id number]  Patient Class: OP- Observation  Admission Date: 10/22/2023  Attending Physician: Ajit Hastings MD   Primary Care Provider: Adonis Carey MD         Patient information was obtained from patient, past medical records and ER records.     Subjective:     Principal Problem:Chest pain, rule out acute myocardial infarction    Chief Complaint:   Chief Complaint   Patient presents with    Chest Pain     Pt with epigastric type pain for 3 days, unable to eat or hold anything down        HPI: Casie Salvador is a 58 y.o. female with a PMHx of T2DM, CAD, CHF, PUD, hx gastric ulcer, HTN, HLD who presents to [facility] for evaluation of chest pain. Patient is a poor historian. She reports intermittent left sided chest pain described as a pressure for the past 2-3 days. She is unable to further describe the pain but says episodes occur randomly and pain does not worsen with exertion. No radiation. She is unable to say whether or not pain is similar to prior heart attacks in the past. ED provider patient with significant dyspnea on exertion while walked in the ED, however patient denies any SOB or SALAZAR. She does complain of left lower extremity pain which is an acute on chronic issue. Denies any LE swelling, HA, vision changes, N/V, abdominal pain, confusion, or syncope.    ED: hypertensive to /98, vitals otherwise stable. No leukocytosis or electrolyte abnormalities. Cr 1.5, baseline ~1.1. BNP 1122, trop 0.057>>0.053. EKG without acute ST/T wave changes. CXR negative for acute findings.       Past Medical History:   Diagnosis Date    Chronic combined systolic and diastolic congestive heart failure     Coronary artery disease     Diabetes mellitus     Encounter for blood transfusion     Gastric ulcer with hemorrhage     Heart attack     History of gastric ulcer 4/2/2022    Hypertension     NSAID  induced gastritis 4/2/2022    Perforated viscus 03/14/2022    Peripheral artery disease        Past Surgical History:   Procedure Laterality Date    APPENDECTOMY      BELOW KNEE AMPUTATION OF LOWER EXTREMITY Right 2019    COLONOSCOPY N/A 02/23/2022    Procedure: COLONOSCOPY;  Surgeon: Estefania Bryant MD;  Location: [facility] (2ND FLR);  Service: Endoscopy;  Laterality: N/A;  anemia, melena    CORONARY STENT PLACEMENT      ESOPHAGOGASTRODUODENOSCOPY N/A 02/23/2022    Procedure: EGD (ESOPHAGOGASTRODUODENOSCOPY);  Surgeon: Estefania Bryant MD;  Location: [facility] (2ND FLR);  Service: Endoscopy;  Laterality: N/A;  anemia    ESOPHAGOGASTRODUODENOSCOPY N/A 4/14/2022    Procedure: EGD (ESOPHAGOGASTRODUODENOSCOPY);  Surgeon: First Available [facility];  Location: [facility] (2ND FLR);  Service: Endoscopy;  Laterality: N/A;  please schedule in 8 weeks. done 2/23      EF-20    ESOPHAGOGASTRODUODENOSCOPY  4/14/2022    Procedure: ;  Surgeon: First Available [facility];  Location: [facility] (2ND FLR);  Service: Endoscopy;;    ESOPHAGOGASTRODUODENOSCOPY N/A 4/29/2022    Procedure: EGD (ESOPHAGOGASTRODUODENOSCOPY);  Surgeon: Estefania Bryant MD;  Location: [facility];  Service: Endoscopy;  Laterality: N/A;  expedite EGD per Dr Ga      states vaccinated-will bring card-GT    ESOPHAGOGASTRODUODENOSCOPY N/A 10/28/2022    Procedure: EGD (ESOPHAGOGASTRODUODENOSCOPY);  Surgeon: Estefania Bryant MD;  [facility]  Service: Endoscopy;  Laterality: N/A;    FOOT AMPUTATION THROUGH METATARSAL Left 2019    TUBAL LIGATION         Review of patient's allergies indicates:   Allergen Reactions    Orange juice Hives    Tomato (solanum lycopersicum) Hives       No current facility-administered medications on file prior to encounter.     Current Outpatient Medications on File Prior to Encounter   Medication Sig    aspirin 81 MG Chew Take 1 tablet (81 mg total) by mouth once daily.    atorvastatin (LIPITOR) 40 MG tablet Take  "40 mg by mouth once daily.    ATROVENT HFA 17 mcg/actuation inhaler Inhale 2 puffs into the lungs once daily.    blood sugar diagnostic Strp by Misc.(Non-Drug; Combo Route) route.    busPIRone (BUSPAR) 10 MG tablet Take 10 mg by mouth 2 (two) times daily.    carisoprodoL (SOMA) 350 MG tablet Take 350 mg by mouth once daily.    citalopram (CELEXA) 20 MG tablet Take 20 mg by mouth once daily.    clopidogreL (PLAVIX) 75 mg tablet Take 75 mg by mouth.    docusate sodium (COLACE) 100 MG capsule Take 1 capsule (100 mg total) by mouth 2 (two) times daily.    dorzolamide-timolol 2-0.5% (COSOPT) 22.3-6.8 mg/mL ophthalmic solution Place 1 drop into both eyes 2 (two) times daily.    ergocalciferol (ERGOCALCIFEROL) 50,000 unit Cap Take 50,000 Units by mouth every 7 days.    ferrous sulfate (FEOSOL) 325 mg (65 mg iron) Tab tablet Take 325 mg by mouth 2 (two) times daily.    fluticasone propionate (FLONASE) 50 mcg/actuation nasal spray 1 spray by Each Nostril route once daily.    folic acid-vit B6-vit B12 2.5-25-2 mg (FOLBIC OR EQUIV) 2.5-25-2 mg Tab Take 1 tablet by mouth once daily.    furosemide (LASIX) 20 MG tablet Take 1 tablet (20 mg total) by mouth 2 (two) times daily. Hold this. Do not take it until you see your doctor.    gabapentin (NEURONTIN) 600 MG tablet Take 600 mg by mouth 2 (two) times daily.    glipiZIDE (GLUCOTROL) 10 MG tablet Take 1 tablet (10 mg total) by mouth 2 (two) times daily before meals. Hold this. Do not take it until you see your doctor.    insulin needles, disposable, 31 x 3/16 " Ndle by Misc.(Non-Drug; Combo Route) route.    lancets 28 gauge Misc by Misc.(Non-Drug; Combo Route) route.    LANTUS SOLOSTAR U-100 INSULIN glargine 100 units/mL SubQ pen SMARTSI Unit(s) SUB-Q Every Evening    LIDOcaine-prilocaine (EMLA) cream Apply topically once daily.    losartan (COZAAR) 25 MG tablet Take 1 tablet (25 mg total) by mouth once daily.    metoprolol succinate (TOPROL-XL) 25 MG 24 hr tablet Take 1 " "tablet (25 mg total) by mouth once daily.    oxyCODONE-acetaminophen (PERCOCET)  mg per tablet Take 1 tablet by mouth every 8 (eight) hours as needed for Pain.    pantoprazole (PROTONIX) 40 MG tablet Take 1 tablet (40 mg total) by mouth 2 (two) times daily.    pen needle, diabetic 31 gauge x 5/16" Ndle Use to inject insulin into the skin every evening.    potassium chloride (MICRO-K) 10 MEQ CpSR Take 1 capsule (10 mEq total) by mouth once daily. Hold this. Do not take it until you see your doctor.    pregabalin (LYRICA) 150 MG capsule Take 150 mg by mouth 2 (two) times daily.    promethazine (PHENERGAN) 6.25 mg/5 mL syrup TAKE 10 mls BY MOUTH EVERY 6 HOURS AS NEEDED    SYMBICORT 160-4.5 mcg/actuation HFAA 2 puffs 2 (two) times daily.    zolpidem (AMBIEN) 10 mg Tab Take 10 mg by mouth nightly as needed.    [DISCONTINUED] amLODIPine (NORVASC) 10 MG tablet Take 1 tablet (10 mg total) by mouth once daily. Hold this. Do not take it until you see your doctor.    [DISCONTINUED] hydrALAZINE (APRESOLINE) 50 MG tablet Take 1 tablet (50 mg total) by mouth daily as needed. Hold this. Do not take it until you see your doctor.    [DISCONTINUED] hyoscyamine (ANASPAZ,LEVSIN) 0.125 mg Tab Take 1 tablet (125 mcg total) by mouth every 6 (six) hours as needed (abdominal cramping).    [DISCONTINUED] lisinopriL (PRINIVIL,ZESTRIL) 2.5 MG tablet Take 1 tablet (2.5 mg total) by mouth once daily.    [DISCONTINUED] metFORMIN (GLUCOPHAGE) 1000 MG tablet Take 1 tablet (1,000 mg total) by mouth 2 (two) times daily with meals. Hold this. Do not take it until you see your doctor. (Patient not taking: Reported on 4/18/2022)    [DISCONTINUED] metoprolol tartrate (LOPRESSOR) 50 MG tablet Take 1 tablet (50 mg total) by mouth 2 (two) times daily. Hold this. Do not take it until you see your doctor.     Family History    None       Tobacco Use    Smoking status: Every Day     Current packs/day: 0.50     Types: Cigarettes    Smokeless tobacco: " Never   Substance and Sexual Activity    Alcohol use: No    Drug use: No    Sexual activity: Not on file     Review of Systems   Constitutional:  Negative for chills, fatigue and fever.   HENT:  Negative for congestion, trouble swallowing and voice change.    Eyes:  Negative for photophobia and visual disturbance.   Respiratory:  Positive for chest tightness. Negative for cough, shortness of breath and wheezing.    Cardiovascular:  Positive for chest pain. Negative for palpitations and leg swelling.   Gastrointestinal:  Negative for abdominal pain, constipation, nausea and vomiting.   Genitourinary:  Negative for decreased urine volume, difficulty urinating, dysuria, frequency and hematuria.   Musculoskeletal:  Positive for arthralgias. Negative for back pain and gait problem.   Skin:  Negative for color change and wound.   Neurological:  Negative for dizziness, syncope, speech difficulty, weakness, light-headedness and numbness.   Psychiatric/Behavioral:  Negative for behavioral problems, confusion and decreased concentration.      Objective:     Vital Signs (Most Recent):  Temp: 98.2 °F (36.8 °C) (10/22/23 2208)  Pulse: 81 (10/22/23 2208)  Resp: 16 (10/22/23 2208)  BP: (!) 170/87 (10/22/23 2208)  SpO2: 96 % (10/22/23 2208) Vital Signs (24h Range):  Temp:  [98.2 °F (36.8 °C)-98.5 °F (36.9 °C)] 98.2 °F (36.8 °C)  Pulse:  [76-81] 81  Resp:  [15-16] 16  SpO2:  [96 %-99 %] 96 %  BP: (170-180)/(86-98) 170/87     Weight: 79.4 kg (175 lb)  Body mass index is 28.25 kg/m².     Physical Exam  Vitals and nursing note reviewed.   Constitutional:       General: She is not in acute distress.     Appearance: She is well-developed.   HENT:      Head: Normocephalic and atraumatic.      Mouth/Throat:      Pharynx: No oropharyngeal exudate.   Eyes:      General: No scleral icterus.     Conjunctiva/sclera: Conjunctivae normal.   Cardiovascular:      Rate and Rhythm: Normal rate and regular rhythm.      Heart sounds: Normal heart  sounds.   Pulmonary:      Effort: Pulmonary effort is normal. No respiratory distress.      Breath sounds: No wheezing.      Comments: Diminished BS to BLL  Abdominal:      General: Bowel sounds are normal. There is no distension.      Palpations: Abdomen is soft.      Tenderness: There is no abdominal tenderness.   Musculoskeletal:         General: Tenderness (RLE) present. Normal range of motion.      Cervical back: Normal range of motion and neck supple.      Comments: S/p L BKA   Lymphadenopathy:      Cervical: No cervical adenopathy.   Skin:     General: Skin is warm and dry.      Capillary Refill: Capillary refill takes less than 2 seconds.      Findings: No rash.   Neurological:      Mental Status: She is alert and oriented to person, place, and time.      Cranial Nerves: No cranial nerve deficit.      Sensory: No sensory deficit.      Coordination: Coordination normal.   Psychiatric:         Behavior: Behavior normal.         Thought Content: Thought content normal.         Judgment: Judgment normal.                Significant Labs: All pertinent labs within the past 24 hours have been reviewed.  CBC:   Recent Labs   Lab 10/22/23  1628   WBC 12.68   HGB 13.8   HCT 42.5        CMP:   Recent Labs   Lab 10/22/23  1628   *   K 4.1   CL 98   CO2 23   *   BUN 32*   CREATININE 1.5*   CALCIUM 10.1   PROT 8.3   ALBUMIN 4.0   BILITOT 0.8   ALKPHOS 93   AST 19   ALT 11   ANIONGAP 13       Significant Imaging: I have reviewed all pertinent imaging results/findings within the past 24 hours.  X-Ray Chest AP Portable  Narrative: EXAMINATION:  XR CHEST AP PORTABLE    CLINICAL HISTORY:  Chest pain, unspecified    TECHNIQUE:  Single frontal view of the chest was performed.    COMPARISON:  Chest radiograph 06/22/2022, CT abdomen and pelvis 10/27/2022, CT thorax 03/14/2022    FINDINGS:  Patient is slightly rotated.  Resolution is limited by body habitus with underpenetration.    Cardiomediastinal silhouette  is midline and prominent similar to prior without convincing evidence of failure.  Pulmonary vasculature and hilar contours are within normal limits.  Bibasilar minimal platelike scarring versus atelectasis.  The lungs are otherwise symmetrically well expanded without consolidation, pleural effusion or pneumothorax.  No acute osseous process seen.  PA and lateral views can be obtained.  Impression: No detrimental change or radiographic acute intrathoracic process seen on this single view.    Electronically signed by: Lennox Sheppard MD  Date:    10/22/2023  Time:    17:55      Assessment/Plan:     * Chest pain, rule out acute myocardial infarction  Coronary artery disease involving native coronary artery of native heart with angina pectoris  - suspect 2/2 uncontrolled HTN and slight volume overload  - EKG without acute ST/T wave changes  - trop 0.057>>0.053>> trend  - echo ordered  - diuresis and BP control as below  - continue ASA, statin, plavix   - monitor tele   - would benefit from stress test IP vs OP once volume status and BP optimized    Acute on chronic combined systolic and diastolic congestive heart failure  - appears euvolemic on exam but may hide fluid given EF 20%  - BNP 1122, CXR clear  - s/p 80mg lasix IVP given in the ED  - further IV lasix pending response  - repeat echo  - optimize GDMT as indicated/ tolerated  - strict I/Os, daily weights    BRENDA (acute kidney injury)  - Cr 1.5, baseline ~1.2  - suspect cardiorenal  - IV diuresis as above  - UA ordered  - avoid nephrotoxins, renally dose meds    S/P BKA (below knee amputation), right  - 2/2 PAD  - no acute issues  - turn q2h    Peptic ulcer disease  History of gastric ulcer  - continue PPI    Constipation due to opioid therapy  - chronic issuse  - daily miralax    Chronic pain  - acute on chronic pain of LLE  - LE  ordered  - trial robaxin   - continue home lyrica and PRN percocet    DM (diabetes mellitus), type 2 with complications  - hold home  regimen  - start IP regimen: detemir 15U BID + aspart 5U TIDWM  - LDSSI, ACHS accuchecks  - repeat a1c  Lab Results   Component Value Date    HGBA1C 5.8 (H) 02/20/2022       Hypertension  - uncontrolled on admit, home regimen unclear  - hold losartan given BRENDA  - start nifedepine 30mg daily  - continue MTP 25mg daily       VTE Risk Mitigation (From admission, onward)           Ordered     enoxaparin injection 40 mg  Daily         10/22/23 2023     IP VTE HIGH RISK PATIENT  Once         10/22/23 2023     Place sequential compression device  Until discontinued         10/22/23 2018     Place sequential compression device  Until discontinued         10/22/23 2018                         On 10/22/2023, patient should be placed in hospital observation services under my care in collaboration with Dr. Lennox Coello.          Anne Sylvester PA-C  Department of Hospital Medicine  [facility] - Emergency Dept

## 2023-10-23 NOTE — ASSESSMENT & PLAN NOTE
Coronary artery disease involving native coronary artery of native heart with angina pectoris  - suspect 2/2 uncontrolled HTN and slight volume overload  - EKG without acute ST/T wave changes  - trop 0.057>>0.053>> trend  - echo ordered  - diuresis and BP control as below  - continue ASA, statin, plavix   - monitor tele   - would benefit from stress test IP vs OP once volume status and BP optimized

## 2023-10-23 NOTE — HPI
Casie Salvador is a 58 y.o. female with a PMHx of T2DM, CAD, CHF, PUD, hx gastric ulcer, HTN, HLD who presents to [facility] [place] [place] for evaluation of chest pain. Patient is a poor historian. She reports intermittent left sided chest pain described as a pressure for the past 2-3 days. She is unable to further describe the pain but says episodes occur randomly and pain does not worsen with exertion. No radiation. She is unable to say whether or not pain is similar to prior heart attacks in the past. ED provider patient with significant dyspnea on exertion while walked in the ED, however patient denies any SOB or SALAZAR. She does complain of left lower extremity pain which is an acute on chronic issue. Denies any LE swelling, HA, vision changes, N/V, abdominal pain, confusion, or syncope.    ED: hypertensive to /98, vitals otherwise stable. No leukocytosis or electrolyte abnormalities. Cr 1.5, baseline ~1.1. BNP 1122, trop 0.057>>0.053. EKG without acute ST/T wave changes. CXR negative for acute findings.

## 2023-10-23 NOTE — ASSESSMENT & PLAN NOTE
- uncontrolled on admit, home regimen unclear  - hold losartan given BRENDA  - start nifedepine 30mg daily  - continue MTP 25mg daily

## 2023-10-23 NOTE — PLAN OF CARE
"Sw followed up with pt regarding home health  Pt stated she currently has home health coming into her home but could not recall the name of the company.  Pt stated it was "something like [employer]".      Pt stated she can d/c home with no further post acute needs        Allie Marroquin, JUANY, MSW, LMSW, RSW   Case Management  [facility]  Email: [email or web address]    "

## 2023-10-23 NOTE — ASSESSMENT & PLAN NOTE
- acute on chronic pain of LLE  - LE US negative for DVT  - trial robaxin   - continue home lyrica and PRN percocet

## 2023-10-23 NOTE — PROGRESS NOTES
10/23/23 1630   Vital Signs   BP (!) 82/60   BP Location Left arm   BP Method Manual   Patient Position Lying     Reassessed BP post bolus. Administered additional 250 ml LR bolus per PA order. Plan of care continues.

## 2023-10-23 NOTE — HOSPITAL COURSE
Casie Salvador is a 58 y.o. female who was admitted to hospital medicine with complaints of chest pain. Troponin 0.057 >> 0.053 >> 0.062 >> 0.056. Plan for PET stress tomorrow. NPO at midnight. BNP 1112, given 80 of IV lasix in the ED. Updated echo with  EF of 20-25% as well as diastolic function, as well as a small thrombus in the left ventricular apex. Spoke cardiology, recommending continuing home plavix and starting heparin. Elevated CVP at 15, Cr 1.5 > 1.4 (baseline ~1.1) following diuresis. LE US negative DVT. Plan to discharge home when medically ready.

## 2023-10-23 NOTE — ASSESSMENT & PLAN NOTE
- hold home regimen  - start IP regimen: detemir 15U BID + aspart 5U TIDWM  - DARYL, ACHS accuchecks  - repeat a1c  Lab Results   Component Value Date    HGBA1C 5.8 (H) 02/20/2022

## 2023-10-24 PROBLEM — Z51.5 PALLIATIVE CARE ENCOUNTER: Status: ACTIVE | Noted: 2023-10-24

## 2023-10-24 PROBLEM — R57.0 CARDIOGENIC SHOCK: Status: ACTIVE | Noted: 2023-10-24

## 2023-10-24 LAB
ANION GAP SERPL CALC-SCNC: 12 MMOL/L (ref 8–16)
ANION GAP SERPL CALC-SCNC: 13 MMOL/L (ref 8–16)
ANION GAP SERPL CALC-SCNC: 13 MMOL/L (ref 8–16)
ANION GAP SERPL CALC-SCNC: 14 MMOL/L (ref 8–16)
ANISOCYTOSIS BLD QL SMEAR: SLIGHT
ANISOCYTOSIS BLD QL SMEAR: SLIGHT
APTT PPP: 112.2 SEC (ref 21–32)
APTT PPP: 27 SEC (ref 21–32)
BASO STIPL BLD QL SMEAR: ABNORMAL
BASO STIPL BLD QL SMEAR: ABNORMAL
BASOPHILS # BLD AUTO: ABNORMAL K/UL (ref 0–0.2)
BASOPHILS # BLD AUTO: ABNORMAL K/UL (ref 0–0.2)
BASOPHILS NFR BLD: 0 % (ref 0–1.9)
BASOPHILS NFR BLD: 0 % (ref 0–1.9)
BUN SERPL-MCNC: 38 MG/DL (ref 6–20)
BUN SERPL-MCNC: 41 MG/DL (ref 6–20)
BUN SERPL-MCNC: 44 MG/DL (ref 6–20)
BUN SERPL-MCNC: 46 MG/DL (ref 6–20)
BURR CELLS BLD QL SMEAR: ABNORMAL
BURR CELLS BLD QL SMEAR: ABNORMAL
CALCIUM SERPL-MCNC: 8.5 MG/DL (ref 8.7–10.5)
CALCIUM SERPL-MCNC: 8.5 MG/DL (ref 8.7–10.5)
CALCIUM SERPL-MCNC: 8.7 MG/DL (ref 8.7–10.5)
CALCIUM SERPL-MCNC: 9.1 MG/DL (ref 8.7–10.5)
CHLORIDE SERPL-SCNC: 94 MMOL/L (ref 95–110)
CHLORIDE SERPL-SCNC: 94 MMOL/L (ref 95–110)
CHLORIDE SERPL-SCNC: 96 MMOL/L (ref 95–110)
CHLORIDE SERPL-SCNC: 97 MMOL/L (ref 95–110)
CO2 SERPL-SCNC: 19 MMOL/L (ref 23–29)
CO2 SERPL-SCNC: 22 MMOL/L (ref 23–29)
CREAT SERPL-MCNC: 2.7 MG/DL (ref 0.5–1.4)
CREAT SERPL-MCNC: 2.9 MG/DL (ref 0.5–1.4)
CREAT SERPL-MCNC: 3 MG/DL (ref 0.5–1.4)
CREAT SERPL-MCNC: 3.2 MG/DL (ref 0.5–1.4)
DIFFERENTIAL METHOD: ABNORMAL
DIFFERENTIAL METHOD: ABNORMAL
EOSINOPHIL # BLD AUTO: ABNORMAL K/UL (ref 0–0.5)
EOSINOPHIL # BLD AUTO: ABNORMAL K/UL (ref 0–0.5)
EOSINOPHIL NFR BLD: 4 % (ref 0–8)
EOSINOPHIL NFR BLD: 4 % (ref 0–8)
ERYTHROCYTE [DISTWIDTH] IN BLOOD BY AUTOMATED COUNT: 14.1 % (ref 11.5–14.5)
ERYTHROCYTE [DISTWIDTH] IN BLOOD BY AUTOMATED COUNT: 14.1 % (ref 11.5–14.5)
EST. GFR  (NO RACE VARIABLE): 16.3 ML/MIN/1.73 M^2
EST. GFR  (NO RACE VARIABLE): 17.6 ML/MIN/1.73 M^2
EST. GFR  (NO RACE VARIABLE): 18.3 ML/MIN/1.73 M^2
EST. GFR  (NO RACE VARIABLE): 20 ML/MIN/1.73 M^2
GLUCOSE SERPL-MCNC: 109 MG/DL (ref 70–110)
GLUCOSE SERPL-MCNC: 169 MG/DL (ref 70–110)
GLUCOSE SERPL-MCNC: 180 MG/DL (ref 70–110)
GLUCOSE SERPL-MCNC: 226 MG/DL (ref 70–110)
HCT VFR BLD AUTO: 36.5 % (ref 37–48.5)
HCT VFR BLD AUTO: 36.5 % (ref 37–48.5)
HGB BLD-MCNC: 11.8 G/DL (ref 12–16)
HGB BLD-MCNC: 11.8 G/DL (ref 12–16)
IMM GRANULOCYTES # BLD AUTO: ABNORMAL K/UL (ref 0–0.04)
IMM GRANULOCYTES # BLD AUTO: ABNORMAL K/UL (ref 0–0.04)
IMM GRANULOCYTES NFR BLD AUTO: ABNORMAL % (ref 0–0.5)
IMM GRANULOCYTES NFR BLD AUTO: ABNORMAL % (ref 0–0.5)
LACTATE SERPL-SCNC: 1.4 MMOL/L (ref 0.5–2.2)
LACTATE SERPL-SCNC: 1.7 MMOL/L (ref 0.5–2.2)
LACTATE SERPL-SCNC: 1.7 MMOL/L (ref 0.5–2.2)
LACTATE SERPL-SCNC: 2.1 MMOL/L (ref 0.5–2.2)
LYMPHOCYTES # BLD AUTO: ABNORMAL K/UL (ref 1–4.8)
LYMPHOCYTES # BLD AUTO: ABNORMAL K/UL (ref 1–4.8)
LYMPHOCYTES NFR BLD: 48 % (ref 18–48)
LYMPHOCYTES NFR BLD: 48 % (ref 18–48)
MAGNESIUM SERPL-MCNC: 2.7 MG/DL (ref 1.6–2.6)
MCH RBC QN AUTO: 22.6 PG (ref 27–31)
MCH RBC QN AUTO: 22.6 PG (ref 27–31)
MCHC RBC AUTO-ENTMCNC: 32.3 G/DL (ref 32–36)
MCHC RBC AUTO-ENTMCNC: 32.3 G/DL (ref 32–36)
MCV RBC AUTO: 70 FL (ref 82–98)
MCV RBC AUTO: 70 FL (ref 82–98)
MONOCYTES # BLD AUTO: ABNORMAL K/UL (ref 0.3–1)
MONOCYTES # BLD AUTO: ABNORMAL K/UL (ref 0.3–1)
MONOCYTES NFR BLD: 5 % (ref 4–15)
MONOCYTES NFR BLD: 5 % (ref 4–15)
NEUTROPHILS NFR BLD: 43 % (ref 38–73)
NEUTROPHILS NFR BLD: 43 % (ref 38–73)
NRBC BLD-RTO: 0 /100 WBC
NRBC BLD-RTO: 0 /100 WBC
OVALOCYTES BLD QL SMEAR: ABNORMAL
OVALOCYTES BLD QL SMEAR: ABNORMAL
PHOSPHATE SERPL-MCNC: 4 MG/DL (ref 2.7–4.5)
PLATELET # BLD AUTO: 299 K/UL (ref 150–450)
PLATELET # BLD AUTO: 299 K/UL (ref 150–450)
PLATELET BLD QL SMEAR: ABNORMAL
PLATELET BLD QL SMEAR: ABNORMAL
PMV BLD AUTO: 11.6 FL (ref 9.2–12.9)
PMV BLD AUTO: 11.6 FL (ref 9.2–12.9)
POCT GLUCOSE: 133 MG/DL (ref 70–110)
POCT GLUCOSE: 142 MG/DL (ref 70–110)
POCT GLUCOSE: 172 MG/DL (ref 70–110)
POCT GLUCOSE: 72 MG/DL (ref 70–110)
POIKILOCYTOSIS BLD QL SMEAR: SLIGHT
POIKILOCYTOSIS BLD QL SMEAR: SLIGHT
POLYCHROMASIA BLD QL SMEAR: ABNORMAL
POLYCHROMASIA BLD QL SMEAR: ABNORMAL
POTASSIUM SERPL-SCNC: 3.6 MMOL/L (ref 3.5–5.1)
POTASSIUM SERPL-SCNC: 4.3 MMOL/L (ref 3.5–5.1)
POTASSIUM SERPL-SCNC: 4.5 MMOL/L (ref 3.5–5.1)
POTASSIUM SERPL-SCNC: 4.6 MMOL/L (ref 3.5–5.1)
RBC # BLD AUTO: 5.21 M/UL (ref 4–5.4)
RBC # BLD AUTO: 5.21 M/UL (ref 4–5.4)
SODIUM SERPL-SCNC: 129 MMOL/L (ref 136–145)
SODIUM SERPL-SCNC: 129 MMOL/L (ref 136–145)
SODIUM SERPL-SCNC: 130 MMOL/L (ref 136–145)
SODIUM SERPL-SCNC: 130 MMOL/L (ref 136–145)
WBC # BLD AUTO: 16.23 K/UL (ref 3.9–12.7)
WBC # BLD AUTO: 16.23 K/UL (ref 3.9–12.7)

## 2023-10-24 PROCEDURE — 20000000 HC ICU ROOM

## 2023-10-24 PROCEDURE — 85007 BL SMEAR W/DIFF WBC COUNT: CPT | Performed by: PHYSICIAN ASSISTANT

## 2023-10-24 PROCEDURE — 99497 ADVNCD CARE PLAN 30 MIN: CPT | Mod: 25,,, | Performed by: STUDENT IN AN ORGANIZED HEALTH CARE EDUCATION/TRAINING PROGRAM

## 2023-10-24 PROCEDURE — 85027 COMPLETE CBC AUTOMATED: CPT | Performed by: PHYSICIAN ASSISTANT

## 2023-10-24 PROCEDURE — 25000003 PHARM REV CODE 250: Performed by: STUDENT IN AN ORGANIZED HEALTH CARE EDUCATION/TRAINING PROGRAM

## 2023-10-24 PROCEDURE — 85730 THROMBOPLASTIN TIME PARTIAL: CPT | Performed by: PHYSICIAN ASSISTANT

## 2023-10-24 PROCEDURE — 84100 ASSAY OF PHOSPHORUS: CPT | Performed by: PHYSICIAN ASSISTANT

## 2023-10-24 PROCEDURE — 83605 ASSAY OF LACTIC ACID: CPT | Mod: 91 | Performed by: STUDENT IN AN ORGANIZED HEALTH CARE EDUCATION/TRAINING PROGRAM

## 2023-10-24 PROCEDURE — 25000003 PHARM REV CODE 250: Performed by: PHYSICIAN ASSISTANT

## 2023-10-24 PROCEDURE — 85730 THROMBOPLASTIN TIME PARTIAL: CPT | Mod: 91 | Performed by: INTERNAL MEDICINE

## 2023-10-24 PROCEDURE — 99223 1ST HOSP IP/OBS HIGH 75: CPT | Mod: ,,, | Performed by: STUDENT IN AN ORGANIZED HEALTH CARE EDUCATION/TRAINING PROGRAM

## 2023-10-24 PROCEDURE — 83605 ASSAY OF LACTIC ACID: CPT | Performed by: PHYSICIAN ASSISTANT

## 2023-10-24 PROCEDURE — 63600175 PHARM REV CODE 636 W HCPCS: Performed by: PHYSICIAN ASSISTANT

## 2023-10-24 PROCEDURE — 85060 PATHOLOGIST REVIEW: ICD-10-PCS | Mod: ,,, | Performed by: PATHOLOGY

## 2023-10-24 PROCEDURE — 85060 BLOOD SMEAR INTERPRETATION: CPT | Mod: ,,, | Performed by: PATHOLOGY

## 2023-10-24 PROCEDURE — 80048 BASIC METABOLIC PNL TOTAL CA: CPT | Performed by: PHYSICIAN ASSISTANT

## 2023-10-24 PROCEDURE — 99291 PR CRITICAL CARE, E/M 30-74 MINUTES: ICD-10-PCS | Mod: ,,, | Performed by: INTERNAL MEDICINE

## 2023-10-24 PROCEDURE — 99223 PR INITIAL HOSPITAL CARE,LEVL III: ICD-10-PCS | Mod: ,,, | Performed by: STUDENT IN AN ORGANIZED HEALTH CARE EDUCATION/TRAINING PROGRAM

## 2023-10-24 PROCEDURE — 63600175 PHARM REV CODE 636 W HCPCS: Performed by: STUDENT IN AN ORGANIZED HEALTH CARE EDUCATION/TRAINING PROGRAM

## 2023-10-24 PROCEDURE — 99291 CRITICAL CARE FIRST HOUR: CPT | Mod: ,,, | Performed by: INTERNAL MEDICINE

## 2023-10-24 PROCEDURE — 25000003 PHARM REV CODE 250: Performed by: INTERNAL MEDICINE

## 2023-10-24 PROCEDURE — 99900035 HC TECH TIME PER 15 MIN (STAT)

## 2023-10-24 PROCEDURE — 94761 N-INVAS EAR/PLS OXIMETRY MLT: CPT

## 2023-10-24 PROCEDURE — 80048 BASIC METABOLIC PNL TOTAL CA: CPT | Mod: 91 | Performed by: STUDENT IN AN ORGANIZED HEALTH CARE EDUCATION/TRAINING PROGRAM

## 2023-10-24 PROCEDURE — 25000003 PHARM REV CODE 250

## 2023-10-24 PROCEDURE — 83735 ASSAY OF MAGNESIUM: CPT | Performed by: PHYSICIAN ASSISTANT

## 2023-10-24 PROCEDURE — 99497 PR ADVNCD CARE PLAN 30 MIN: ICD-10-PCS | Mod: 25,,, | Performed by: STUDENT IN AN ORGANIZED HEALTH CARE EDUCATION/TRAINING PROGRAM

## 2023-10-24 RX ORDER — NOREPINEPHRINE BITARTRATE/D5W 4MG/250ML
0-.2 PLASTIC BAG, INJECTION (ML) INTRAVENOUS CONTINUOUS
Status: DISPENSED | OUTPATIENT
Start: 2023-10-24 | End: 2023-10-25

## 2023-10-24 RX ORDER — MUPIROCIN 20 MG/G
OINTMENT TOPICAL 2 TIMES DAILY
Status: DISCONTINUED | OUTPATIENT
Start: 2023-10-24 | End: 2023-10-27 | Stop reason: HOSPADM

## 2023-10-24 RX ORDER — NOREPINEPHRINE BITARTRATE/D5W 4MG/250ML
0-3 PLASTIC BAG, INJECTION (ML) INTRAVENOUS CONTINUOUS
Status: DISCONTINUED | OUTPATIENT
Start: 2023-10-24 | End: 2023-10-24

## 2023-10-24 RX ORDER — FUROSEMIDE 10 MG/ML
100 INJECTION INTRAMUSCULAR; INTRAVENOUS ONCE
Status: COMPLETED | OUTPATIENT
Start: 2023-10-24 | End: 2023-10-24

## 2023-10-24 RX ORDER — NOREPINEPHRINE BITARTRATE/D5W 4MG/250ML
PLASTIC BAG, INJECTION (ML) INTRAVENOUS
Status: COMPLETED
Start: 2023-10-24 | End: 2023-10-24

## 2023-10-24 RX ORDER — DOBUTAMINE HYDROCHLORIDE 400 MG/100ML
2.5 INJECTION INTRAVENOUS CONTINUOUS
Status: DISCONTINUED | OUTPATIENT
Start: 2023-10-24 | End: 2023-10-25

## 2023-10-24 RX ORDER — MELOXICAM 7.5 MG/1
7.5 TABLET ORAL DAILY
Status: ON HOLD | COMMUNITY
Start: 2023-10-02 | End: 2023-10-27 | Stop reason: HOSPADM

## 2023-10-24 RX ORDER — POTASSIUM CHLORIDE 20 MEQ/1
20 TABLET, EXTENDED RELEASE ORAL ONCE
Status: COMPLETED | OUTPATIENT
Start: 2023-10-24 | End: 2023-10-24

## 2023-10-24 RX ORDER — PREGABALIN 75 MG/1
75 CAPSULE ORAL NIGHTLY
Status: DISCONTINUED | OUTPATIENT
Start: 2023-10-24 | End: 2023-10-27 | Stop reason: HOSPADM

## 2023-10-24 RX ADMIN — ASPIRIN 81 MG CHEWABLE TABLET 81 MG: 81 TABLET CHEWABLE at 09:10

## 2023-10-24 RX ADMIN — POLYETHYLENE GLYCOL 3350 17 G: 17 POWDER, FOR SOLUTION ORAL at 09:10

## 2023-10-24 RX ADMIN — DOBUTAMINE HYDROCHLORIDE 2.5 MCG/KG/MIN: 400 INJECTION INTRAVENOUS at 10:10

## 2023-10-24 RX ADMIN — INSULIN ASPART 5 UNITS: 100 INJECTION, SOLUTION INTRAVENOUS; SUBCUTANEOUS at 05:10

## 2023-10-24 RX ADMIN — PANTOPRAZOLE SODIUM 40 MG: 40 TABLET, DELAYED RELEASE ORAL at 09:10

## 2023-10-24 RX ADMIN — FUROSEMIDE 100 MG: 10 INJECTION, SOLUTION INTRAMUSCULAR; INTRAVENOUS at 01:10

## 2023-10-24 RX ADMIN — PANTOPRAZOLE SODIUM 40 MG: 40 TABLET, DELAYED RELEASE ORAL at 08:10

## 2023-10-24 RX ADMIN — HEPARIN SODIUM 18 UNITS/KG/HR: 10000 INJECTION, SOLUTION INTRAVENOUS at 01:10

## 2023-10-24 RX ADMIN — FOLIC ACID-PYRIDOXINE-CYANOCOBALAMIN TAB 2.5-25-2 MG 1 TABLET: 2.5-25-2 TAB at 09:10

## 2023-10-24 RX ADMIN — HEPARIN SODIUM 14 UNITS/KG/HR: 10000 INJECTION, SOLUTION INTRAVENOUS at 07:10

## 2023-10-24 RX ADMIN — INSULIN ASPART 5 UNITS: 100 INJECTION, SOLUTION INTRAVENOUS; SUBCUTANEOUS at 12:10

## 2023-10-24 RX ADMIN — Medication 0.04 MCG/KG/MIN: at 01:10

## 2023-10-24 RX ADMIN — CLOPIDOGREL BISULFATE 75 MG: 75 TABLET ORAL at 09:10

## 2023-10-24 RX ADMIN — INSULIN DETEMIR 15 UNITS: 100 INJECTION, SOLUTION SUBCUTANEOUS at 09:10

## 2023-10-24 RX ADMIN — PREGABALIN 75 MG: 75 CAPSULE ORAL at 08:10

## 2023-10-24 RX ADMIN — POTASSIUM CHLORIDE 20 MEQ: 1500 TABLET, EXTENDED RELEASE ORAL at 05:10

## 2023-10-24 RX ADMIN — OXYCODONE AND ACETAMINOPHEN 1 TABLET: 10; 325 TABLET ORAL at 01:10

## 2023-10-24 RX ADMIN — INSULIN DETEMIR 15 UNITS: 100 INJECTION, SOLUTION SUBCUTANEOUS at 08:10

## 2023-10-24 RX ADMIN — OXYCODONE AND ACETAMINOPHEN 1 TABLET: 10; 325 TABLET ORAL at 09:10

## 2023-10-24 RX ADMIN — ATORVASTATIN CALCIUM 40 MG: 40 TABLET, FILM COATED ORAL at 09:10

## 2023-10-24 RX ADMIN — MUPIROCIN: 20 OINTMENT TOPICAL at 08:10

## 2023-10-24 RX ADMIN — MUPIROCIN: 20 OINTMENT TOPICAL at 09:10

## 2023-10-24 RX ADMIN — NOREPINEPHRINE BITARTRATE 0.04 MCG/KG/MIN: 4 INJECTION, SOLUTION INTRAVENOUS at 01:10

## 2023-10-24 RX ADMIN — FUROSEMIDE 10 MG/HR: 10 INJECTION, SOLUTION INTRAMUSCULAR; INTRAVENOUS at 01:10

## 2023-10-24 NOTE — ASSESSMENT & PLAN NOTE
Pt is a 58 y.o female with ho CAD, iCM with EF 20-25% who presents to the hospital with decompensated HF.  BNP 1122 on arrival.   TTE on 10/22: with EF 20-25%, CVP 15 mmHg,   On 10/23 she received total of 750 cc fluid bolus.  At around 930 pt became hypotensive.    Initial lactate was 2.7 and follow up was 2.2  BP recovered shortly too.    Home GDMT: Toprol, Lisinopril, and losartan? ( have to check with pharmacy), lasix 20 mg po bid    Plan:  -- given complexity of care for HM service will admit to CCU  -- given improvement of BP and lactate will admit to step down  -- will keep a very close eye on pt overnight and if needed will step up to ICU floor  -- repeat lactate in 4 hrs  -- placed pt on lasix gtt at rate of 10/hr after a bolus of 100 mg.  -- will reintroduce appropriate GDMT when more stable

## 2023-10-24 NOTE — ASSESSMENT & PLAN NOTE
Patient with longstanding history of chronic pain managed with pregabalin, oxycodone, managed by PCP Dr Carey    -recommend miralax +/- senna to prevent OIC  -goal BM every 1-2 days

## 2023-10-24 NOTE — NURSING
No acute shift events. Pt VSS and AAOx4. Plan of care discussed with pt and family. Dobutamine IV started today. IV lasix not administered today per Md. Will reassess after 8 pm BMP.

## 2023-10-24 NOTE — SUBJECTIVE & OBJECTIVE
Past Medical History:   Diagnosis Date    Chronic combined systolic and diastolic congestive heart failure     Coronary artery disease     Diabetes mellitus     Encounter for blood transfusion     Gastric ulcer with hemorrhage     Heart attack     History of gastric ulcer 4/2/2022    Hypertension     NSAID induced gastritis 4/2/2022    Perforated viscus 03/14/2022    Peripheral artery disease        Past Surgical History:   Procedure Laterality Date    APPENDECTOMY      BELOW KNEE AMPUTATION OF LOWER EXTREMITY Right 2019    COLONOSCOPY N/A 02/23/2022    Procedure: COLONOSCOPY;  Surgeon: Estefania Bryant MD;  [facility]  Service: Endoscopy;  Laterality: N/A;  anemia, melena    CORONARY STENT PLACEMENT      ESOPHAGOGASTRODUODENOSCOPY N/A 02/23/2022    Procedure: EGD (ESOPHAGOGASTRODUODENOSCOPY);  Surgeon: Estefania Bryant MD;  Location: [facility] ([address] [place] [address]);  Service: Endoscopy;  Laterality: N/A;  anemia    ESOPHAGOGASTRODUODENOSCOPY N/A 4/14/2022    Procedure: EGD (ESOPHAGOGASTRODUODENOSCOPY);  Surgeon: First Available Den King;  Location: [facility] (2ND FLR);  Service: Endoscopy;  Laterality: N/A;  please schedule in 8 weeks. done 2/23      EF-20    ESOPHAGOGASTRODUODENOSCOPY  4/14/2022    Procedure: ;  Surgeon: First Available Den King;  [facility]  Service: Endoscopy;;    ESOPHAGOGASTRODUODENOSCOPY N/A 4/29/2022    Procedure: EGD (ESOPHAGOGASTRODUODENOSCOPY);  Surgeon: Estefania Bryant MD;  Location: [facility];  Service: Endoscopy;  Laterality: N/A;  expedite EGD per Dr Ga      states vaccinated-will bring card-GT    ESOPHAGOGASTRODUODENOSCOPY N/A 10/28/2022    Procedure: EGD (ESOPHAGOGASTRODUODENOSCOPY);  Surgeon: Estefania Bryant MD;  Location: [facility] ([address] [place] [address]);  Service: Endoscopy;  Laterality: N/A;    FOOT AMPUTATION THROUGH METATARSAL Left 2019    TUBAL LIGATION         Review of patient's allergies indicates:   Allergen Reactions    Orange juice Hives     "Tomato (solanum lycopersicum) Hives       No current facility-administered medications on file prior to encounter.     Current Outpatient Medications on File Prior to Encounter   Medication Sig    aspirin 81 MG Chew Take 1 tablet (81 mg total) by mouth once daily.    atorvastatin (LIPITOR) 40 MG tablet Take 40 mg by mouth once daily.    ATROVENT HFA 17 mcg/actuation inhaler Inhale 2 puffs into the lungs once daily.    blood sugar diagnostic Strp by Misc.(Non-Drug; Combo Route) route.    busPIRone (BUSPAR) 10 MG tablet Take 10 mg by mouth 2 (two) times daily.    carisoprodoL (SOMA) 350 MG tablet Take 350 mg by mouth once daily.    citalopram (CELEXA) 20 MG tablet Take 20 mg by mouth once daily.    clopidogreL (PLAVIX) 75 mg tablet Take 75 mg by mouth.    docusate sodium (COLACE) 100 MG capsule Take 1 capsule (100 mg total) by mouth 2 (two) times daily.    dorzolamide-timolol 2-0.5% (COSOPT) 22.3-6.8 mg/mL ophthalmic solution Place 1 drop into both eyes 2 (two) times daily.    ergocalciferol (ERGOCALCIFEROL) 50,000 unit Cap Take 50,000 Units by mouth every 7 days.    ferrous sulfate (FEOSOL) 325 mg (65 mg iron) Tab tablet Take 325 mg by mouth 2 (two) times daily.    fluticasone propionate (FLONASE) 50 mcg/actuation nasal spray 1 spray by Each Nostril route once daily.    folic acid-vit B6-vit B12 2.5-25-2 mg (FOLBIC OR EQUIV) 2.5-25-2 mg Tab Take 1 tablet by mouth once daily.    furosemide (LASIX) 20 MG tablet Take 1 tablet (20 mg total) by mouth 2 (two) times daily. Hold this. Do not take it until you see your doctor.    gabapentin (NEURONTIN) 600 MG tablet Take 600 mg by mouth 2 (two) times daily.    glipiZIDE (GLUCOTROL) 10 MG tablet Take 1 tablet (10 mg total) by mouth 2 (two) times daily before meals. Hold this. Do not take it until you see your doctor.    insulin needles, disposable, 31 x 3/16 " Ndle by Misc.(Non-Drug; Combo Route) route.    lancets 28 gauge Misc by Misc.(Non-Drug; Combo Route) route.    LANTUS " "SOLOSTAR U-100 INSULIN glargine 100 units/mL SubQ pen SMARTSI Unit(s) SUB-Q Every Evening    LIDOcaine-prilocaine (EMLA) cream Apply topically once daily.    losartan (COZAAR) 25 MG tablet Take 1 tablet (25 mg total) by mouth once daily.    metoprolol succinate (TOPROL-XL) 25 MG 24 hr tablet Take 1 tablet (25 mg total) by mouth once daily.    oxyCODONE-acetaminophen (PERCOCET)  mg per tablet Take 1 tablet by mouth every 8 (eight) hours as needed for Pain.    pantoprazole (PROTONIX) 40 MG tablet Take 1 tablet (40 mg total) by mouth 2 (two) times daily.    pen needle, diabetic 31 gauge x 5/16" Ndle Use to inject insulin into the skin every evening.    potassium chloride (MICRO-K) 10 MEQ CpSR Take 1 capsule (10 mEq total) by mouth once daily. Hold this. Do not take it until you see your doctor.    pregabalin (LYRICA) 150 MG capsule Take 150 mg by mouth 2 (two) times daily.    promethazine (PHENERGAN) 6.25 mg/5 mL syrup TAKE 10 mls BY MOUTH EVERY 6 HOURS AS NEEDED    SYMBICORT 160-4.5 mcg/actuation HFAA 2 puffs 2 (two) times daily.    zolpidem (AMBIEN) 10 mg Tab Take 10 mg by mouth nightly as needed.    [DISCONTINUED] amLODIPine (NORVASC) 10 MG tablet Take 1 tablet (10 mg total) by mouth once daily. Hold this. Do not take it until you see your doctor.    [DISCONTINUED] hydrALAZINE (APRESOLINE) 50 MG tablet Take 1 tablet (50 mg total) by mouth daily as needed. Hold this. Do not take it until you see your doctor.    [DISCONTINUED] hyoscyamine (ANASPAZ,LEVSIN) 0.125 mg Tab Take 1 tablet (125 mcg total) by mouth every 6 (six) hours as needed (abdominal cramping).    [DISCONTINUED] lisinopriL (PRINIVIL,ZESTRIL) 2.5 MG tablet Take 1 tablet (2.5 mg total) by mouth once daily.    [DISCONTINUED] metFORMIN (GLUCOPHAGE) 1000 MG tablet Take 1 tablet (1,000 mg total) by mouth 2 (two) times daily with meals. Hold this. Do not take it until you see your doctor. (Patient not taking: Reported on 2022)    [DISCONTINUED] " metoprolol tartrate (LOPRESSOR) 50 MG tablet Take 1 tablet (50 mg total) by mouth 2 (two) times daily. Hold this. Do not take it until you see your doctor.     Family History    None       Tobacco Use    Smoking status: Every Day     Current packs/day: 0.50     Types: Cigarettes    Smokeless tobacco: Never   Substance and Sexual Activity    Alcohol use: No    Drug use: No    Sexual activity: Not on file     Review of Systems   Constitutional: Negative for fever and night sweats.   HENT: Negative.     Eyes: Negative.    Cardiovascular:  Positive for dyspnea on exertion and leg swelling. Negative for chest pain, irregular heartbeat, near-syncope, orthopnea, palpitations and syncope.   Respiratory:  Positive for shortness of breath. Negative for cough, hemoptysis and sputum production.    Skin:  Negative for rash.   Gastrointestinal:  Negative for abdominal pain, change in bowel habit, hematemesis, hematochezia, melena, nausea and vomiting.     Objective:     Vital Signs (Most Recent):  Temp: 97.4 °F (36.3 °C) (10/23/23 2039)  Pulse: 72 (10/23/23 2249)  Resp: 20 (10/23/23 2132)  BP: (!) 107/55 (10/23/23 2249)  SpO2: 96 % (10/23/23 2132) Vital Signs (24h Range):  Temp:  [97.4 °F (36.3 °C)-98.8 °F (37.1 °C)] 97.4 °F (36.3 °C)  Pulse:  [70-87] 72  Resp:  [18-20] 20  SpO2:  [94 %-98 %] 96 %  BP: ()/(42-96) 107/55     Weight: 83.5 kg (184 lb 1.4 oz)  Body mass index is 29.71 kg/m².    SpO2: 96 %         Intake/Output Summary (Last 24 hours) at 10/23/2023 2303  Last data filed at 10/23/2023 1659  Gross per 24 hour   Intake 750 ml   Output 250 ml   Net 500 ml       Lines/Drains/Airways       Peripheral Intravenous Line  Duration                  Peripheral IV - Single Lumen 10/22/23 2236 20 G Right Antecubital 1 day                     Physical Exam  Vitals reviewed.   Constitutional:       Appearance: Normal appearance. She is not ill-appearing.   HENT:      Head: Normocephalic and atraumatic.   Eyes:      General: No  scleral icterus.     Conjunctiva/sclera: Conjunctivae normal.   Cardiovascular:      Rate and Rhythm: Normal rate and regular rhythm.   Pulmonary:      Effort: Pulmonary effort is normal.   Abdominal:      General: There is no distension.   Musculoskeletal:      Right lower leg: Edema present.      Left lower leg: Edema present.      Comments: R BKA   Skin:     Comments: Slightly cool to touch   Neurological:      Mental Status: She is alert.   Psychiatric:         Mood and Affect: Mood normal.          Significant Labs: All pertinent lab results from the last 24 hours have been reviewed.    Significant Imaging: Echocardiogram: Transthoracic echo (TTE) complete (Cupid Only):   Results for orders placed or performed during the hospital encounter of 10/22/23   Echo   Result Value Ref Range    Ascending aorta 2.79 cm    STJ 1.90 cm    AV mean gradient 1 mmHg    Ao peak danny 0.70 m/s    Ao VTI 8.28 cm    IVRT 139.87 msec    IVS 0.92 0.6 - 1.1 cm    LA size 3.04 cm    Left Atrium Major Axis 5.24 cm    Left Atrium Minor Axis 5.15 cm    LVIDd 4.6 3.5 - 6.0 cm    LVIDs 4.3 (A) 2.1 - 4.0 cm    LVOT diameter 2.17 cm    LVOT peak VTI 9.78 cm    Posterior Wall 0.79 0.6 - 1.1 cm    MV Peak A Danny 0.80 m/s    E wave deceleration time 165.54 msec    MV Peak E Danny 0.22 m/s    RA Major Axis 4.39 cm    RA Width 3.38 cm    RVDD 3.20 cm    Sinus 2.71 cm    TAPSE 1.78 cm    LA WIDTH 3.58 cm    MV stenosis pressure 1/2 time 48.01 ms    LV Diastolic Volume 68.08 mL    LV Systolic Volume 54.83 mL    LVOT peak danny 0.67 m/s    TDI LATERAL 0.04 m/s    TDI SEPTAL 0.07 m/s    LA volume (mod) 44.41 cm3    LV LATERAL E/E' RATIO 5.50 m/s    LV SEPTAL E/E' RATIO 3.14 m/s    FS 7 %    LA volume 48.05 cm3    LV mass 128.65 g    Left Ventricle Relative Wall Thickness 0.34 cm    AV valve area 4.37 cm²    AV Velocity Ratio 0.96     AV index (prosthetic) 1.18     MV valve area p 1/2 method 4.58 cm2    E/A ratio 0.28     Mean e' 0.06 m/s    LVOT area 3.7  cm2    LVOT stroke volume 36.15 cm3    AV peak gradient 2 mmHg    E/E' ratio 4.00 m/s    NEEL by Velocity Ratio 3.54 cm²    BSA 1.92 m2    LV Systolic Volume Index 29.0 mL/m2    LV Diastolic Volume Index 36.02 mL/m2    LV Mass Index 68 g/m2    LA Volume Index 25.4 mL/m2    LA Volume Index (Mod) 23.5 mL/m2    ZLVIDS 2.19     ZLVIDD -1.40     Est. RA pres 15 mmHg    Narrative      Left Ventricle: The left ventricle is normal in size. Normal wall   thickness. regional wall motion abnormalities present. There is severely   reduced systolic function with a visually estimated ejection fraction of   20 - 25%. There is diastolic dysfunction. .    Right Ventricle: Normal right ventricular cavity size. Wall thickness   is normal. Right ventricle wall motion  is normal. Systolic function is   normal.    IVC/SVC: Elevated venous pressure at 15 mmHg.    There is a small thrombus present in the left ventricular apex

## 2023-10-24 NOTE — NURSING
Notified MD Karen of AM lab values, per MD give 20mEqs of PO potassium, notified MD that creatinine is up to 3 and UOP has dropped off to 35ccs last hour, MD verbalized understanding and wishes to proceed with potassium.

## 2023-10-24 NOTE — HPI
pt is a 58 y.o female with h/o HTN, HLD, DMII, CAD with pci to OM2 in 2014 and LAD in 2004, former tobacco smoker ( over 20 yrs ago), PAD s/p R BKA and L foot amputation, iCM with EF 20-25%. She presented to the hospital yesterday with substernal Chest discomfort X2-3 days for.  She states that for the past month she had been feeling nauseous, constipated, and felt bloated and heavy. Yesterday AM she started to have chest discomfort at rest for which she came to the hospital for. Also reported some sob/steven.  on arrival to the hospital she was hypertensive with MAPs 180/98, 99% on 2L NC. cr 1.5; BNP 1122; Trop 0.6->0.05  admitted to the hospital for decompensated HF and NSTEMI. was given 80 mg iv lasix with undocumented urine output.   TTE today with EF 20-25%, CVP 15mmHg, and small LV thrombus.  during the day she became slightly hypotensive for which she was given 3 bouses of 250 cc IVF  At night i was called by primary team regarding concen for shock as pt was becoming hypotensive.    on arrival to bedside she appeared alert and oriented. she was conversaive. Her extremities were slightly cool to touch.   plasma lactate obtained 9:15 pm was 2.7 and poc lactate obtained at 9:32 ( when plasma had not resulted yet) was 2.05.  BP had improved by the time i was at bedside with .    Decision was made to transfer pt to CCU for closer monitoring.

## 2023-10-24 NOTE — CARE UPDATE
RAPID RESPONSE NURSE NOTE        Admit Date: 10/22/2023  LOS: 0  Code Status: Full Code   Date of Consult: 10/23/2023  : 1965  Age: 58 y.o.  Weight:   Wt Readings from Last 1 Encounters:   10/23/23 83.5 kg (184 lb 1.4 oz)     Sex: female  Race: Black or    Bed: [address] [place] [address] A:   MRN: [id number]  Time Rapid Response Team page Received:   Time Rapid Response Team at Bedside:   Time Rapid Response Team left Bedside:   Was the patient discharged from an ICU this admission? No   Was the patient discharged from a PACU within last 24 hours? No   Did the patient receive conscious sedation/general anesthesia in last 24 hours? No  Was the patient in the ED within the past 24 hours? Yes  Was the patient on NIPPV within the past 24 hours? No   Did this progress into an ARC or CPA: No  Attending Physician: Hans Gamez MD  Primary Service: [facility] MED        SITUATION    Notified by  MARISSA Rivas .  Reason for alert: HypoTN, SBP 70s  Called to evaluate the patient for Circulatory, SBP 70s, pt not symptomatic. Alert and oriented x3.     BACKGROUND     Why is the patient in the hospital?: Chest pain, rule out acute myocardial infarction    Patient has a past medical history of Chronic combined systolic and diastolic congestive heart failure, Coronary artery disease, Diabetes mellitus, Encounter for blood transfusion, Gastric ulcer with hemorrhage, Heart attack, History of gastric ulcer, Hypertension, NSAID induced gastritis, Perforated viscus, and Peripheral artery disease.    Last Vitals:  Temp: 97.4 °F (36.3 °C) (10/23 2039)  Pulse: 75 (10/23 2227)  Resp: 18 (10/23 2039)  BP: 115/59 (10/23 2227)  SpO2: 94 % (10/23 2039)    24 Hours Vitals Range:  Temp:  [97.4 °F (36.3 °C)-98.8 °F (37.1 °C)]   Pulse:  [72-87]   Resp:  [18-20]   BP: ()/(42-96)   SpO2:  [94 %-98 %]     Labs:  Recent Labs     10/22/23  1628 10/23/23  0314 10/23/23  2128   WBC 12.68 13.60*  --    HGB 13.8 13.7  --     HCT 42.5 41.8 41    293  --        Recent Labs     10/22/23  1628 10/23/23  0314 10/23/23  2115   * 133* 130*   K 4.1 3.8 4.6   CL 98 98 97   CO2 23 21* 19*   BUN 32* 31*  --    CREATININE 1.5* 1.4  --    * 103 109   PHOS  --  3.3  --    MG 2.3 2.4  --         Recent Labs     10/23/23  2128   PH 7.410   PCO2 48.6*   PO2 17*   HCO3 30.9*   POCSATURATED 24   BE 6*        ASSESSMENT    Physical Exam  HENT:      Mouth/Throat:      Mouth: Mucous membranes are dry.   Cardiovascular:      Rate and Rhythm: Normal rate.      Pulses:           Radial pulses are 1+ on the right side and 1+ on the left side.      Heart sounds: Normal heart sounds.   Pulmonary:      Effort: Pulmonary effort is normal.   Musculoskeletal:      Right Lower Extremity: Right leg is amputated above knee.      Left Lower Extremity: Left leg is amputated above knee.   Skin:     General: Skin is warm and dry.      Capillary Refill: Capillary refill takes less than 2 seconds.   Neurological:      General: No focal deficit present.      Mental Status: She is alert and oriented to person, place, and time.      GCS: GCS eye subscore is 4. GCS verbal subscore is 5. GCS motor subscore is 6.   Psychiatric:         Mood and Affect: Mood normal.         Speech: Speech normal.         Behavior: Behavior is cooperative.         Cognition and Memory: Cognition normal.         Judgment: Judgment normal.       INTERVENTIONS    The patient was seen for Medical problem. Staff concerns included hypoTN, SBP 70s. The following interventions were performed: lactic acid, CBC, and continuous pulse ox monitoring . Obtained EKG and ABG    RECOMMENDATIONS    We recommend: continuous monitoring     PROVIDER ESCALATION    Orders received and case discussed with  Anne Aguilar  .    Primary team arrival time: 2050    Disposition: Remain in room 1103.    FOLLOW UP    Bedside nurseAyleen  updated on plan of care. Instructed to call the Rapid Response Nurse,  Annie Peña RN at [phone] for additional questions or concerns.

## 2023-10-24 NOTE — ASSESSMENT & PLAN NOTE
See ACP 10/24    -goal remain clear; patient and  willing to consider any interventions that may allow her to live longer and better including temporary CVC, inotropes, ICD  -recommend clear, consistent, and patient discussion regarding indication and need for such interventions; also recommend  involvement in these decisions as he appears to be a guiding and supportive presence in decision-making

## 2023-10-24 NOTE — ASSESSMENT & PLAN NOTE
S/p PCI of ramus intermedius 2015 that thrombosed a week later resulted in a STEMI, OM2 in 2014, LAD in 2004  Presents to the hospital with chest discomfort  Trop flat at 0.05  Currently with decompensated HF.    Will cont ASA, PLAVIX, and Lipitor for now  Prn ntg fo chest discomfort.    Outpatient PET

## 2023-10-24 NOTE — SUBJECTIVE & OBJECTIVE
Past Medical History:   Diagnosis Date    Chronic combined systolic and diastolic congestive heart failure     Coronary artery disease     Diabetes mellitus     Encounter for blood transfusion     Gastric ulcer with hemorrhage     Heart attack     History of gastric ulcer 4/2/2022    Hypertension     NSAID induced gastritis 4/2/2022    Perforated viscus 03/14/2022    Peripheral artery disease        Past Surgical History:   Procedure Laterality Date    APPENDECTOMY      BELOW KNEE AMPUTATION OF LOWER EXTREMITY Right 2019    COLONOSCOPY N/A 02/23/2022    Procedure: COLONOSCOPY;  Surgeon: Estefania Bryant MD;  [facility]  Service: Endoscopy;  Laterality: N/A;  anemia, melena    CORONARY STENT PLACEMENT      ESOPHAGOGASTRODUODENOSCOPY N/A 02/23/2022    Procedure: EGD (ESOPHAGOGASTRODUODENOSCOPY);  Surgeon: Estefania Bryant MD;  Location: [facility] ([address] [place] [address]);  Service: Endoscopy;  Laterality: N/A;  anemia    ESOPHAGOGASTRODUODENOSCOPY N/A 4/14/2022    Procedure: EGD (ESOPHAGOGASTRODUODENOSCOPY);  Surgeon: First Available [facility];  Location: [facility] (2ND FLR);  Service: Endoscopy;  Laterality: N/A;  please schedule in 8 weeks. done 2/23      EF-20    ESOPHAGOGASTRODUODENOSCOPY  4/14/2022    Procedure: ;  Surgeon: First Available [facility];  [facility]  Service: Endoscopy;;    ESOPHAGOGASTRODUODENOSCOPY N/A 4/29/2022    Procedure: EGD (ESOPHAGOGASTRODUODENOSCOPY);  Surgeon: Estefania Bryant MD;  Location: [facility];  Service: Endoscopy;  Laterality: N/A;  expedite EGD per Dr Ga      states vaccinated-will bring card-GT    ESOPHAGOGASTRODUODENOSCOPY N/A 10/28/2022    Procedure: EGD (ESOPHAGOGASTRODUODENOSCOPY);  Surgeon: Estefania Bryant MD;  Location: [facility] ([address] [place] [address]);  Service: Endoscopy;  Laterality: N/A;    FOOT AMPUTATION THROUGH METATARSAL Left 2019    TUBAL LIGATION         Review of patient's allergies indicates:   Allergen Reactions    Orange juice Hives     "Tomato (solanum lycopersicum) Hives       No current facility-administered medications on file prior to encounter.     Current Outpatient Medications on File Prior to Encounter   Medication Sig    aspirin 81 MG Chew Take 1 tablet (81 mg total) by mouth once daily.    atorvastatin (LIPITOR) 40 MG tablet Take 40 mg by mouth once daily.    ATROVENT HFA 17 mcg/actuation inhaler Inhale 2 puffs into the lungs once daily.    blood sugar diagnostic Strp by Misc.(Non-Drug; Combo Route) route.    busPIRone (BUSPAR) 10 MG tablet Take 10 mg by mouth 2 (two) times daily.    carisoprodoL (SOMA) 350 MG tablet Take 350 mg by mouth once daily.    citalopram (CELEXA) 20 MG tablet Take 20 mg by mouth once daily.    clopidogreL (PLAVIX) 75 mg tablet Take 75 mg by mouth.    docusate sodium (COLACE) 100 MG capsule Take 1 capsule (100 mg total) by mouth 2 (two) times daily.    dorzolamide-timolol 2-0.5% (COSOPT) 22.3-6.8 mg/mL ophthalmic solution Place 1 drop into both eyes 2 (two) times daily.    ergocalciferol (ERGOCALCIFEROL) 50,000 unit Cap Take 50,000 Units by mouth every 7 days.    ferrous sulfate (FEOSOL) 325 mg (65 mg iron) Tab tablet Take 325 mg by mouth 2 (two) times daily.    fluticasone propionate (FLONASE) 50 mcg/actuation nasal spray 1 spray by Each Nostril route once daily.    folic acid-vit B6-vit B12 2.5-25-2 mg (FOLBIC OR EQUIV) 2.5-25-2 mg Tab Take 1 tablet by mouth once daily.    furosemide (LASIX) 20 MG tablet Take 1 tablet (20 mg total) by mouth 2 (two) times daily. Hold this. Do not take it until you see your doctor.    gabapentin (NEURONTIN) 600 MG tablet Take 600 mg by mouth 2 (two) times daily.    glipiZIDE (GLUCOTROL) 10 MG tablet Take 1 tablet (10 mg total) by mouth 2 (two) times daily before meals. Hold this. Do not take it until you see your doctor.    insulin needles, disposable, 31 x 3/16 " Ndle by Misc.(Non-Drug; Combo Route) route.    lancets 28 gauge Misc by Misc.(Non-Drug; Combo Route) route.    LANTUS " "SOLOSTAR U-100 INSULIN glargine 100 units/mL SubQ pen SMARTSI Unit(s) SUB-Q Every Evening    LIDOcaine-prilocaine (EMLA) cream Apply topically once daily.    losartan (COZAAR) 25 MG tablet Take 1 tablet (25 mg total) by mouth once daily.    metoprolol succinate (TOPROL-XL) 25 MG 24 hr tablet Take 1 tablet (25 mg total) by mouth once daily.    oxyCODONE-acetaminophen (PERCOCET)  mg per tablet Take 1 tablet by mouth every 8 (eight) hours as needed for Pain.    pantoprazole (PROTONIX) 40 MG tablet Take 1 tablet (40 mg total) by mouth 2 (two) times daily.    pen needle, diabetic 31 gauge x 5/16" Ndle Use to inject insulin into the skin every evening.    potassium chloride (MICRO-K) 10 MEQ CpSR Take 1 capsule (10 mEq total) by mouth once daily. Hold this. Do not take it until you see your doctor.    pregabalin (LYRICA) 150 MG capsule Take 150 mg by mouth 2 (two) times daily.    promethazine (PHENERGAN) 6.25 mg/5 mL syrup TAKE 10 mls BY MOUTH EVERY 6 HOURS AS NEEDED    SYMBICORT 160-4.5 mcg/actuation HFAA 2 puffs 2 (two) times daily.    zolpidem (AMBIEN) 10 mg Tab Take 10 mg by mouth nightly as needed.    [DISCONTINUED] amLODIPine (NORVASC) 10 MG tablet Take 1 tablet (10 mg total) by mouth once daily. Hold this. Do not take it until you see your doctor.    [DISCONTINUED] hydrALAZINE (APRESOLINE) 50 MG tablet Take 1 tablet (50 mg total) by mouth daily as needed. Hold this. Do not take it until you see your doctor.    [DISCONTINUED] hyoscyamine (ANASPAZ,LEVSIN) 0.125 mg Tab Take 1 tablet (125 mcg total) by mouth every 6 (six) hours as needed (abdominal cramping).    [DISCONTINUED] lisinopriL (PRINIVIL,ZESTRIL) 2.5 MG tablet Take 1 tablet (2.5 mg total) by mouth once daily.    [DISCONTINUED] metFORMIN (GLUCOPHAGE) 1000 MG tablet Take 1 tablet (1,000 mg total) by mouth 2 (two) times daily with meals. Hold this. Do not take it until you see your doctor. (Patient not taking: Reported on 2022)    [DISCONTINUED] " metoprolol tartrate (LOPRESSOR) 50 MG tablet Take 1 tablet (50 mg total) by mouth 2 (two) times daily. Hold this. Do not take it until you see your doctor.     Family History    None       Tobacco Use    Smoking status: Every Day     Current packs/day: 0.50     Types: Cigarettes    Smokeless tobacco: Never   Substance and Sexual Activity    Alcohol use: No    Drug use: No    Sexual activity: Not on file     Review of Systems   Constitutional: Negative for fever and night sweats.   HENT: Negative.     Eyes: Negative.    Cardiovascular:  Positive for dyspnea on exertion and leg swelling. Negative for chest pain, irregular heartbeat, near-syncope, orthopnea, palpitations and syncope.   Respiratory:  Positive for shortness of breath. Negative for cough, hemoptysis and sputum production.    Skin:  Negative for rash.   Gastrointestinal:  Negative for abdominal pain, change in bowel habit, hematemesis, hematochezia, melena, nausea and vomiting.     Objective:     Vital Signs (Most Recent):  Temp: 97.4 °F (36.3 °C) (10/23/23 2039)  Pulse: 83 (10/23/23 2308)  Resp: 20 (10/23/23 2132)  BP: (!) 107/55 (10/23/23 2249)  SpO2: 96 % (10/23/23 2132) Vital Signs (24h Range):  Temp:  [97.4 °F (36.3 °C)-98.8 °F (37.1 °C)] 97.4 °F (36.3 °C)  Pulse:  [70-87] 83  Resp:  [18-20] 20  SpO2:  [94 %-98 %] 96 %  BP: ()/(42-96) 107/55     Weight: 83.5 kg (184 lb 1.4 oz)  Body mass index is 29.71 kg/m².    SpO2: 96 %         Intake/Output Summary (Last 24 hours) at 10/23/2023 2334  Last data filed at 10/23/2023 1659  Gross per 24 hour   Intake 750 ml   Output 250 ml   Net 500 ml         Lines/Drains/Airways       Peripheral Intravenous Line  Duration                  Peripheral IV - Single Lumen 10/22/23 2236 20 G Right Antecubital 1 day                     Physical Exam  Vitals reviewed.   Constitutional:       Appearance: Normal appearance. She is not ill-appearing.   HENT:      Head: Normocephalic and atraumatic.   Eyes:      General:  No scleral icterus.     Conjunctiva/sclera: Conjunctivae normal.   Cardiovascular:      Rate and Rhythm: Normal rate and regular rhythm.   Pulmonary:      Effort: Pulmonary effort is normal.   Abdominal:      General: There is no distension.   Musculoskeletal:      Right lower leg: Edema present.      Left lower leg: Edema present.      Comments: R BKA   Skin:     Comments: Slightly cool to touch   Neurological:      Mental Status: She is alert.   Psychiatric:         Mood and Affect: Mood normal.          Significant Labs: All pertinent lab results from the last 24 hours have been reviewed.    Significant Imaging: Echocardiogram: Transthoracic echo (TTE) complete (Cupid Only):   Results for orders placed or performed during the hospital encounter of 10/22/23   Echo   Result Value Ref Range    Ascending aorta 2.79 cm    STJ 1.90 cm    AV mean gradient 1 mmHg    Ao peak danny 0.70 m/s    Ao VTI 8.28 cm    IVRT 139.87 msec    IVS 0.92 0.6 - 1.1 cm    LA size 3.04 cm    Left Atrium Major Axis 5.24 cm    Left Atrium Minor Axis 5.15 cm    LVIDd 4.6 3.5 - 6.0 cm    LVIDs 4.3 (A) 2.1 - 4.0 cm    LVOT diameter 2.17 cm    LVOT peak VTI 9.78 cm    Posterior Wall 0.79 0.6 - 1.1 cm    MV Peak A Danny 0.80 m/s    E wave deceleration time 165.54 msec    MV Peak E Danny 0.22 m/s    RA Major Axis 4.39 cm    RA Width 3.38 cm    RVDD 3.20 cm    Sinus 2.71 cm    TAPSE 1.78 cm    LA WIDTH 3.58 cm    MV stenosis pressure 1/2 time 48.01 ms    LV Diastolic Volume 68.08 mL    LV Systolic Volume 54.83 mL    LVOT peak danny 0.67 m/s    TDI LATERAL 0.04 m/s    TDI SEPTAL 0.07 m/s    LA volume (mod) 44.41 cm3    LV LATERAL E/E' RATIO 5.50 m/s    LV SEPTAL E/E' RATIO 3.14 m/s    FS 7 %    LA volume 48.05 cm3    LV mass 128.65 g    Left Ventricle Relative Wall Thickness 0.34 cm    AV valve area 4.37 cm²    AV Velocity Ratio 0.96     AV index (prosthetic) 1.18     MV valve area p 1/2 method 4.58 cm2    E/A ratio 0.28     Mean e' 0.06 m/s    LVOT area 3.7  cm2    LVOT stroke volume 36.15 cm3    AV peak gradient 2 mmHg    E/E' ratio 4.00 m/s    NEEL by Velocity Ratio 3.54 cm²    BSA 1.92 m2    LV Systolic Volume Index 29.0 mL/m2    LV Diastolic Volume Index 36.02 mL/m2    LV Mass Index 68 g/m2    LA Volume Index 25.4 mL/m2    LA Volume Index (Mod) 23.5 mL/m2    ZLVIDS 2.19     ZLVIDD -1.40     Est. RA pres 15 mmHg    Narrative      Left Ventricle: The left ventricle is normal in size. Normal wall   thickness. regional wall motion abnormalities present. There is severely   reduced systolic function with a visually estimated ejection fraction of   20 - 25%. There is diastolic dysfunction. .    Right Ventricle: Normal right ventricular cavity size. Wall thickness   is normal. Right ventricle wall motion  is normal. Systolic function is   normal.    IVC/SVC: Elevated venous pressure at 15 mmHg.    There is a small thrombus present in the left ventricular apex

## 2023-10-24 NOTE — ASSESSMENT & PLAN NOTE
Home GDMT: Toprol, Lisinopril, and losartan? ( have to check with pharmacy), lasix 20 mg po bid    Plan:  -- continue lasix 20mg/hr  -- will reintroduce appropriate GDMT when more stable

## 2023-10-24 NOTE — SUBJECTIVE & OBJECTIVE
Interval History: Chart reviewed including 24h medication use. Patient seen at bedside with  present during visit. Now off vasopressors, remains on  2.5, UOP somewhat improved.     Palliative ROS:  PRNs: -  Pain -  Dyspnea -  Nausea -  Vomiting -  Constipation -  Anxiety -  Agitation -  Anorexia -        Past Medical History:   Diagnosis Date    Chronic combined systolic and diastolic congestive heart failure     Coronary artery disease     Diabetes mellitus     Encounter for blood transfusion     Gastric ulcer with hemorrhage     Heart attack     History of gastric ulcer 4/2/2022    Hypertension     NSAID induced gastritis 4/2/2022    Perforated viscus 03/14/2022    Peripheral artery disease        Past Surgical History:   Procedure Laterality Date    APPENDECTOMY      BELOW KNEE AMPUTATION OF LOWER EXTREMITY Right 2019    COLONOSCOPY N/A 02/23/2022    Procedure: COLONOSCOPY;  Surgeon: Estefania Bryant MD;  Location: [facility] (2ND FLR);  Service: Endoscopy;  Laterality: N/A;  anemia, melena    CORONARY STENT PLACEMENT      ESOPHAGOGASTRODUODENOSCOPY N/A 02/23/2022    Procedure: EGD (ESOPHAGOGASTRODUODENOSCOPY);  Surgeon: Estefania Bryant MD;  Location: [facility] (2ND FLR);  Service: Endoscopy;  Laterality: N/A;  anemia    ESOPHAGOGASTRODUODENOSCOPY N/A 4/14/2022    Procedure: EGD (ESOPHAGOGASTRODUODENOSCOPY);  Surgeon: First Available Den King;  Location: [facility] (2ND FLR);  Service: Endoscopy;  Laterality: N/A;  please schedule in 8 weeks. done 2/23      EF-20    ESOPHAGOGASTRODUODENOSCOPY  4/14/2022    Procedure: ;  Surgeon: First Available Den King;  Location: [facility] (2ND FLR);  Service: Endoscopy;;    ESOPHAGOGASTRODUODENOSCOPY N/A 4/29/2022    Procedure: EGD (ESOPHAGOGASTRODUODENOSCOPY);  Surgeon: Estefania Bryant MD;  Location: [facility];  Service: Endoscopy;  Laterality: N/A;  expedite EGD per Dr Ga      states vaccinated-will bring card-GT    ESOPHAGOGASTRODUODENOSCOPY N/A  10/28/2022    Procedure: EGD (ESOPHAGOGASTRODUODENOSCOPY);  Surgeon: Estefania Bryant MD;  Location: [facility] ([address] [place] [address]);  Service: Endoscopy;  Laterality: N/A;    FOOT AMPUTATION THROUGH METATARSAL Left 2019    TUBAL LIGATION         Review of patient's allergies indicates:   Allergen Reactions    Orange juice Hives    Tomato (solanum lycopersicum) Hives       Medications:  Continuous Infusions:   DOBUTamine IV infusion (non-titrating) 2.5 mcg/kg/min (10/24/23 1400)    furosemide (LASIX) 500 mg in 50 mL infusion (conc: 10 mg/mL) 20 mg/hr (10/24/23 1429)    heparin (porcine) in D5W Stopped (10/24/23 1049)    NORepinephrine bitartrate-D5W 0.02 mcg/kg/min (10/24/23 0500)     Scheduled Meds:   aspirin  81 mg Oral Daily    atorvastatin  40 mg Oral Daily    chlorothiazide (DIURIL) 250 mg in dextrose 5 % (D5W) 50 mL IVPB  250 mg Intravenous Once    clopidogreL  75 mg Oral Daily    fluticasone furoate-vilanteroL  1 puff Inhalation Daily    folic acid-vit B6-vit B12 2.5-25-2 mg  1 tablet Oral Daily    insulin aspart U-100  5 Units Subcutaneous TIDWM    insulin detemir U-100  15 Units Subcutaneous BID    mupirocin   Nasal BID    pantoprazole  40 mg Oral BID    polyethylene glycol  17 g Oral Daily    pregabalin  75 mg Oral QHS     PRN Meds:acetaminophen, aluminum-magnesium hydroxide-simethicone, bisacodyL, dextrose 10%, dextrose 10%, glucagon (human recombinant), glucose, glucose, heparin (PORCINE), heparin (PORCINE), insulin aspart U-100, melatonin, nicotine, ondansetron, oxyCODONE-acetaminophen, promethazine, sodium chloride 0.9%, sodium chloride 0.9%    Family History    None       Tobacco Use    Smoking status: Every Day     Current packs/day: 0.50     Types: Cigarettes    Smokeless tobacco: Never   Substance and Sexual Activity    Alcohol use: No    Drug use: No    Sexual activity: Not on file         Objective:     Vital Signs (Most Recent):  Temp: 96.6 °F (35.9 °C) (10/24/23 1100)  Pulse: 86 (10/24/23 1400)  Resp: 20  (10/24/23 0940)  BP: 115/65 (10/24/23 1400)  SpO2: (!) 89 % (10/24/23 1400) Vital Signs (24h Range):  Temp:  [96.6 °F (35.9 °C)-98.5 °F (36.9 °C)] 96.6 °F (35.9 °C)  Pulse:  [61-87] 86  Resp:  [17-31] 20  SpO2:  [87 %-100 %] 89 %  BP: ()/(44-67) 115/65     Weight: 77 kg (169 lb 12.1 oz)  Body mass index is 27.4 kg/m².       Physical Exam  Vitals and nursing note reviewed.   Constitutional:       Appearance: She is not toxic-appearing.      Comments: Middle-aged female lying in bed, awake and interactive, no observed pain behaviors   HENT:      Mouth/Throat:      Mouth: Mucous membranes are moist.   Eyes:      General: No scleral icterus.     Extraocular Movements: Extraocular movements intact.   Pulmonary:      Effort: Pulmonary effort is normal. No respiratory distress.   Abdominal:      General: Abdomen is flat.      Palpations: Abdomen is soft.   Musculoskeletal:         General: Normal range of motion.      Right lower leg: No edema.      Left lower leg: No edema.   Skin:     General: Skin is warm and dry.   Neurological:      General: No focal deficit present.      Mental Status: She is alert and oriented to person, place, and time.   Psychiatric:         Mood and Affect: Mood normal.         Behavior: Behavior normal.         Thought Content: Thought content normal.         Judgment: Judgment normal.              Advance Care Planning   Advance Directives:     Decision Making:  Patient answered questions and Family answered questions  Goals of Care: Engaged patient and  at bedside is supportive conversation. Reviewed her current disease understanding and she is able to acknowledge that her heart is weak due to vessel disease. 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 Fairly independent with ADLs now, enjoys watching movies in her free time. Discussed ongoing care  and the potential need for things like central lines for these medicines which can help her heart. Discussed risks and benefits of undergoing CVC placement and also reinforced the temporary nature of these catheters. She states clearly she would be willing to undergo this procedure if needed as well as other interventions which might help her live longer and better. I also mentioned previous documentation of her not wanting ICD but she and  note they would be open  to furture conversations about this.          CBC:   Recent Labs   Lab 10/24/23  0255   WBC 16.23*  16.23*   HGB 11.8*  11.8*   HCT 36.5*  36.5*   MCV 70*  70*     299     BMP:  Recent Labs   Lab 10/24/23  0255   *   *   K 3.6   CL 96   CO2 22*   BUN 41*   CREATININE 3.0*   CALCIUM 8.7   MG 2.7*     LFT:  Lab Results   Component Value Date    AST 19 10/22/2023    ALKPHOS 93 10/22/2023    BILITOT 0.8 10/22/2023     Albumin:   Albumin   Date Value Ref Range Status   10/22/2023 4.0 3.5 - 5.2 g/dL Final     Protein:   Total Protein   Date Value Ref Range Status   10/22/2023 8.3 6.0 - 8.4 g/dL Final     Lactic acid:   Lab Results   Component Value Date    LACTATE 1.7 10/24/2023    LACTATE 1.7 10/24/2023       Significant Imaging: Reviewed TTE results 10/23 showing EF 20-25% and small LV thrombus; EF not significantly reduced from prior

## 2023-10-24 NOTE — ASSESSMENT & PLAN NOTE
58f with ICM and HFrEF here with heart failure exacerbation complicated by BRENDA admitted to CCU on  and briefly on levophed.     -continue current level of care in line with current goals/values  -patient amenable to interventions that will help her heart and quality of life  -would re-engage patient about ICD with  present likely on outpatient basis

## 2023-10-24 NOTE — SIGNIFICANT EVENT
Pt became hypotensive once again while on the floor.   Placed on low dose levophed and transferred to the ICU floor.  She is refusing central line at the time and expresses understanding of the potential risks of not having it in addition to why it is needed.  Plan is to keep her on peripheral levophed for the time being.  Repeat lactic at 1:30.  If lactate higher, she has agreed to the RIJ.    Will cont to closely monitor.    Darlene Jones MD  Cardiovascular medicine; PGY5  [facility]  [address] [place] [address]

## 2023-10-24 NOTE — PLAN OF CARE
Recommendations     1.) Recommend continuing with Cardiac diet as tolerated, fluid per MD.      2.) If PO intake <50%, recommend Boost plus BID to help optimize PRO/Kcal intake.      3.) RD to monitor wt, PO intake, skin, labs.        Goals: to meet % of EEN/EPN by next RD f/u  Nutrition Goal Status: new  Communication of RD Recs:  (POC)

## 2023-10-24 NOTE — CONSULTS
[facility]  Adult Nutrition  Consult Note    SUMMARY     Recommendations    1.) Recommend continuing with Cardiac diet as tolerated, fluid per MD.     2.) If PO intake <50%, recommend Boost plus BID to help optimize PRO/Kcal intake.     3.) RD to monitor wt, PO intake, skin, labs.      Goals: to meet % of EEN/EPN by next RD f/u  Nutrition Goal Status: new  Communication of RD Recs:  (POC)    Assessment and Plan    Nutrition Problem  Increased PRO needs    Related to (etiology):   Increased physiological needs    Signs and Symptoms (as evidenced by):   Cardiogenic shock     Interventions/Recommendations (treatment strategy):  Collaboration with nutritional care with other providers.   Low Na and FR diet edu provided on 10/24.    Nutrition Diagnosis Status:   New     Reason for Assessment    Reason For Assessment: consult  Diagnosis: cardiac disease (Acute on chronic combined systolic and diastolic congestive heart failure)  Relevant Medical History: HTN, HLD, DMII, CAD, former tobacco smoker, PAD s/p R BKA and L foot amputation  Interdisciplinary Rounds: did not attend  General Information Comments: RD consulted for Low Na and Fluid restriction diet edu. Pt denies n/v/d/c. Pt endorses good appetite, stating to RD that they are consuming % of the meals provided. Pt accepted the diet education. All questions and concerns were addressed. Pt stated that their #, #. Pt appears nourished, NFPE not warranted. RD to continue to monitor.  Nutrition Discharge Planning: Low Na and Fluid Restriction edu provided on 10/24    Nutrition Risk Screen    Nutrition Risk Screen: no indicators present    Nutrition/Diet History    Patient Reported Diet/Restrictions/Preferences: general  Typical Food/Fluid Intake: 2-3 meals per day  Spiritual, Cultural Beliefs, Taoist Practices, Values that Affect Care: no  Food Allergies: other (see comments) (tomato and  "oranges)    Anthropometrics    Temp: 97 °F (36.1 °C)  Height: 5' 6" (167.6 cm)  Height (inches): 66 in  Weight Method: Bed Scale  Weight: 77 kg (169 lb 12.1 oz)  Weight (lb): 169.76 lb  Ideal Body Weight (IBW), Female: 130 lb  % Ideal Body Weight, Female (lb): 141.61 %  BMI (Calculated): 27.4  BMI Grade: 25 - 29.9 - overweight    Lab/Procedures/Meds    Pertinent Labs Reviewed: reviewed  Pertinent Labs Comments: Na: 130, BUN: 41, cr: 3.0, gluc: 180, M.7 (on 10/23, Chol: 250/tri)  Pertinent Medications Reviewed: reviewed  Pertinent Medications Comments: statin, folic acid-vit B6-vit B12, insulin, pantoprazole, polyethylene glycol, dobutamine, lasix, heparin, nor-epi    Estimated/Assessed Needs    Weight Used For Calorie Calculations: 77 kg (169 lb 12.1 oz)  Energy Calorie Requirements (kcal): 1646 kcal  Energy Need Method: [place] (MSJ x1.2)  Protein Requirements: 85- 100g (1.1-1.3g/kg)  Weight Used For Protein Calculations: 77 kg (169 lb 12.1 oz)  Fluid Requirements (mL): 1ml/1kcal or per MD  Estimated Fluid Requirement Method: RDA Method  RDA Method (mL): 1646    Nutrition Prescription Ordered    Current Diet Order: Cardiac diet (1.5L FR)    Evaluation of Received Nutrient/Fluid Intake    I/O: +500ml since admit  Fluid Required:  (as per MD)  Comments: LBM 10/16  Tolerance: tolerating  % Intake of Estimated Energy Needs: 75 - 100 %  % Meal Intake: 75 - 100 %    Nutrition Risk    Level of Risk/Frequency of Follow-up:  (RD to f/u x1/week)     Monitor and Evaluation    Food and Nutrient Intake: energy intake, food and beverage intake  Food and Nutrient Adminstration: diet order  Knowledge/Beliefs/Attitudes: food and nutrition knowledge/skill, beliefs and attitudes  Physical Activity and Function: nutrition-related ADLs and IADLs  Anthropometric Measurements: weight, weight change, body mass index  Biochemical Data, Medical Tests and Procedures: electrolyte and renal panel, gastrointestinal profile, " glucose/endocrine profile, inflammatory profile, lipid profile  Nutrition-Focused Physical Findings: overall appearance, skin     Nutrition Follow-Up    RD Follow-up?: Yes

## 2023-10-24 NOTE — ASSESSMENT & PLAN NOTE
Worsening renal function in setting of cardiogenic shock likely 2/2 ATN.  -- continue to diurese  -- monitor for kidney function improvement  -- discussed potential need for dialysis if function does not improve in the next few days

## 2023-10-24 NOTE — SIGNIFICANT EVENT
Significant Event  Hospital Medicine    Significant Events:  Called urgently to the bedside by nurse to evaluate patient for hypotension. BP 68/44 on dynamap, 76/54 manual. Rapid response called. Patient awake and alert, noted to have very cool LLE on exam. Concern for cardiogenic shock as patient received 750cc IVFs earlier today for hypotension, however EF 20% and CVP 15 on echo. Cardiology called and evaluated patient at bedside- concerned for cardiogenic shock as well. Stat lactic 2.7. Initially planned to transfer to CICU, however BP improved to SBP 100s without intervention. Giving 100mg lasix IVP then starting lasix gtt @ 10mg/hr. Plan to repeat lactic in 4 hrs and reassess. If BP drops again, will move to CICU. [place] placed for accurate I/Os. Also, heparin recommended by cardiology earlier today for thrombus noted on echo but never started. Will start heparin gtt now.        Uninterrupted Critical Care/Counseling Time (not including procedures):  60 minutes    Anne Sylvester PA-C  Hospital Medicine Department  Staff: Lennox Coello

## 2023-10-24 NOTE — SUBJECTIVE & OBJECTIVE
Interval History: Admitted to CCU overnight for cardiogenic shock requiring levophed. Levophed weaned off. Patient hesitant to have central line inserted. No complaints this AM.    Review of Systems   Constitutional: Negative for fever and night sweats.   HENT: Negative.     Eyes: Negative.    Cardiovascular:  Negative for chest pain, irregular heartbeat, near-syncope, orthopnea, palpitations and syncope.   Respiratory:  Negative for cough, hemoptysis, shortness of breath and sputum production.    Skin:  Negative for rash.   Gastrointestinal:  Negative for abdominal pain, change in bowel habit, hematemesis, hematochezia, melena, nausea and vomiting.   Neurological:  Negative for headaches.     Objective:     Vital Signs (Most Recent):  Temp: 96.6 °F (35.9 °C) (10/24/23 1100)  Pulse: 80 (10/24/23 1200)  Resp: 20 (10/24/23 0940)  BP: (!) 72/49 (10/24/23 1200)  SpO2: 95 % (10/24/23 1200) Vital Signs (24h Range):  Temp:  [96.6 °F (35.9 °C)-98.5 °F (36.9 °C)] 96.6 °F (35.9 °C)  Pulse:  [61-86] 80  Resp:  [17-31] 20  SpO2:  [88 %-100 %] 95 %  BP: ()/(42-67) 72/49     Weight: 77 kg (169 lb 12.1 oz)  Body mass index is 27.4 kg/m².     SpO2: 95 %         Intake/Output Summary (Last 24 hours) at 10/24/2023 1302  Last data filed at 10/24/2023 1200  Gross per 24 hour   Intake 616.79 ml   Output 1365 ml   Net -748.21 ml       Lines/Drains/Airways       Drain  Duration                  Urethral Catheter 10/24/23 <1 day              Peripheral Intravenous Line  Duration                  Peripheral IV - Single Lumen 10/24/23 0000 18 G Left Antecubital <1 day         Peripheral IV - Single Lumen 10/24/23 1149 18 G;1 3/4 in Anterior;Left Upper Arm <1 day                       Physical Exam  Vitals reviewed.   Constitutional:       Appearance: Normal appearance. She is not ill-appearing.   HENT:      Head: Normocephalic and atraumatic.      Nose: No rhinorrhea.   Eyes:      General: No scleral icterus.     Conjunctiva/sclera:  Conjunctivae normal.   Cardiovascular:      Rate and Rhythm: Normal rate and regular rhythm.   Pulmonary:      Effort: Pulmonary effort is normal.   Abdominal:      General: There is no distension.   Musculoskeletal:      Right lower leg: Edema present.      Left lower leg: Edema present.      Comments: R BKA  L trans-metatarsal foot amputation   Skin:     Comments: Slightly cool to touch   Neurological:      Mental Status: She is alert and oriented to person, place, and time.   Psychiatric:         Mood and Affect: Mood normal.            Significant Labs: CMP   Recent Labs   Lab 10/22/23  1628 10/23/23  0314 10/23/23  2115 10/24/23  0255   * 133* 130* 130*   K 4.1 3.8 4.6 3.6   CL 98 98 97 96   CO2 23 21* 19* 22*   * 103 109 180*   BUN 32* 31* 38* 41*   CREATININE 1.5* 1.4 2.7* 3.0*   CALCIUM 10.1 9.8 9.1 8.7   PROT 8.3  --   --   --    ALBUMIN 4.0  --   --   --    BILITOT 0.8  --   --   --    ALKPHOS 93  --   --   --    AST 19  --   --   --    ALT 11  --   --   --    ANIONGAP 13 14 14 12    and CBC   Recent Labs   Lab 10/22/23  1628 10/23/23  0314 10/23/23  2128 10/24/23  0255   WBC 12.68 13.60*  --  16.23*  16.23*   HGB 13.8 13.7  --  11.8*  11.8*   HCT 42.5 41.8   < > 36.5*  36.5*    293  --  299  299    < > = values in this interval not displayed.       Significant Imaging: Echocardiogram: Transthoracic echo (TTE) complete (Cupid Only):   Results for orders placed or performed during the hospital encounter of 10/22/23   Echo   Result Value Ref Range    Ascending aorta 2.79 cm    STJ 1.90 cm    AV mean gradient 1 mmHg    Ao peak danny 0.70 m/s    Ao VTI 8.28 cm    IVRT 139.87 msec    IVS 0.92 0.6 - 1.1 cm    LA size 3.04 cm    Left Atrium Major Axis 5.24 cm    Left Atrium Minor Axis 5.15 cm    LVIDd 4.6 3.5 - 6.0 cm    LVIDs 4.3 (A) 2.1 - 4.0 cm    LVOT diameter 2.17 cm    LVOT peak VTI 9.78 cm    Posterior Wall 0.79 0.6 - 1.1 cm    MV Peak A Danny 0.80 m/s    E wave deceleration time 165.54  msec    MV Peak E Danny 0.22 m/s    RA Major Axis 4.39 cm    RA Width 3.38 cm    RVDD 3.20 cm    Sinus 2.71 cm    TAPSE 1.78 cm    LA WIDTH 3.58 cm    MV stenosis pressure 1/2 time 48.01 ms    LV Diastolic Volume 68.08 mL    LV Systolic Volume 54.83 mL    LVOT peak danny 0.67 m/s    TDI LATERAL 0.04 m/s    TDI SEPTAL 0.07 m/s    LA volume (mod) 44.41 cm3    LV LATERAL E/E' RATIO 5.50 m/s    LV SEPTAL E/E' RATIO 3.14 m/s    FS 7 %    LA volume 48.05 cm3    LV mass 128.65 g    Left Ventricle Relative Wall Thickness 0.34 cm    AV valve area 4.37 cm²    AV Velocity Ratio 0.96     AV index (prosthetic) 1.18     MV valve area p 1/2 method 4.58 cm2    E/A ratio 0.28     Mean e' 0.06 m/s    LVOT area 3.7 cm2    LVOT stroke volume 36.15 cm3    AV peak gradient 2 mmHg    E/E' ratio 4.00 m/s    NEEL by Velocity Ratio 3.54 cm²    BSA 1.92 m2    LV Systolic Volume Index 29.0 mL/m2    LV Diastolic Volume Index 36.02 mL/m2    LV Mass Index 68 g/m2    LA Volume Index 25.4 mL/m2    LA Volume Index (Mod) 23.5 mL/m2    ZLVIDS 2.19     ZLVIDD -1.40     Est. RA pres 15 mmHg    Narrative      Left Ventricle: The left ventricle is normal in size. Normal wall   thickness. regional wall motion abnormalities present. There is severely   reduced systolic function with a visually estimated ejection fraction of   20 - 25%. There is diastolic dysfunction. .    Right Ventricle: Normal right ventricular cavity size. Wall thickness   is normal. Right ventricle wall motion  is normal. Systolic function is   normal.    IVC/SVC: Elevated venous pressure at 15 mmHg.    There is a small thrombus present in the left ventricular apex

## 2023-10-24 NOTE — ASSESSMENT & PLAN NOTE
Last Hgb A1C 5.7  On home lantus 60 qhs and 45 qam  At hospital she has been receiving 15 units bid and 5 U aspart TIDWM    Plan:  -- will dec long acting insulin to 12 U bid with goal -180 while in hospital  -- cont 5U TID WM

## 2023-10-24 NOTE — HPI
pt is a 58 y.o female with h/o HTN, HLD, DMII, CAD with pci to OM2 in 2014 and LAD in 2004, former tobacco smoker ( over 20 yrs ago), PAD s/p R BKA and L foot amputation, iCM with EF 20-25%. She presented to the hospital yesterday with substernal Chest discomfort X2-3 days for.  She states that for the past month she had been feeling nauseous, constipated, and felt bloated and heavy. Yesterday AM she started to have chest discomfort at rest for which she came to the hospital for. Also reported some sob/steven.  on arrival to the hospital she was hypertensive with MAPs 180/98, 99% on 2L NC. cr 1.5; BNP 1122; Trop 0.6->0.05  admitted to the hospital for decompensated HF and NSTEMI. was given 80 mg iv lasix with undocumented urine output.   TTE today with EF 20-25%, CVP 15mmHg, and small LV thrombus.  during the day she became slightly hypotensive for which she was given 3 bouses of 250 cc IVF  At night i was called by primary team regarding concen for shock as pt was becoming hypotensive.    on arrival to bedside she appeared alert and oriented. Her extremities were slightly cool to touch.   plasma lactate obtained 9:15 pm was 2.7 and poc lactate obtained at 9:32 ( when plasma had not resulted yet) was 2.05.  BP had improved by the time i was at bedside with .    Decision was made to transfer pt to CCU for closer monitoring.

## 2023-10-24 NOTE — HPI
Ms Yamel Salvador is a 58 year old female with HTN, HLD, DMII, CAD with pci to OM2 in 2014 and LAD in 2004, former tobacco smoker ( over 20 yrs ago), PAD s/p R BKA and L foot amputation, iCM with EF 20-25% admitted to CCU on 10/23 with presumed heart failure exacerbation and BRENDA requiring IV diuresis and dobutamine. Palliative care consulted for goals of care.

## 2023-10-24 NOTE — HOSPITAL COURSE
Patient admitted to CCU for cardiogenic shock 2/2 acute decompensated HF and NSTEMI. Trop flat at 0.05. Patient briefly on peripheral levophed then dobutamine for MAP down to 52. Developed worsening kidney function likely 2/2 cardiorenal syndrome. Patient hesitant for further invasive care, palliative care consulted to help elucidate GOC. Patient and spouse expressed wanting to receive further treatment. Patient off levophed and dobutamine and maintaining good MAP. GDMT initiated with hydralazine and isosorbide dinitrate and tolerated well inpatient. Patient adamant about discharging, and was discharged in stable condition with eliquis for possible LV thrombus, zetia, and with referral to follow up in heart failure transitional clinic for further titration of GDMT.

## 2023-10-24 NOTE — PLAN OF CARE
[facility]  Discharge Reassessment    Primary Care Provider: Adonis Carey MD    Expected Discharge Date: 10/24/2023    Reassessment (most recent)       Discharge Reassessment - 10/24/23 0758          Discharge Reassessment    Assessment Type Discharge Planning Reassessment     Did the patient's condition or plan change since previous assessment? Yes     Discharge Plan discussed with: Patient     Communicated SOCO with patient/caregiver Date not available/Unable to determine        Post-Acute Status    Discharge Delays None known at this time                 Patient stepped up to ICU

## 2023-10-24 NOTE — H&P
[facility] - [facility]H  Cardiology  History and Physical     Patient Name: Casie Salvador  MRN: [id number]  Admission Date: 10/22/2023  Code Status: Full Code   Attending Provider: Hans Gamez MD   Primary Care Physician: Adonis Carey MD  Principal Problem:Acute on chronic combined systolic and diastolic congestive heart failure    Patient information was obtained from patient, past medical records and ER records.     Subjective:     Chief Complaint:       HPI:  pt is a 58 y.o female with h/o HTN, HLD, DMII, CAD with pci to OM2 in 2014 and LAD in 2004, former tobacco smoker ( over 20 yrs ago), PAD s/p R BKA and L foot amputation, iCM with EF 20-25%. She presented to the hospital yesterday with substernal Chest discomfort X2-3 days for.  She states that for the past month she had been feeling nauseous, constipated, and felt bloated and heavy. Yesterday AM she started to have chest discomfort at rest for which she came to the hospital for. Also reported some sob/steven.  on arrival to the hospital she was hypertensive with MAPs 180/98, 99% on 2L NC. cr 1.5; BNP 1122; Trop 0.6->0.05  admitted to the hospital for decompensated HF and NSTEMI. was given 80 mg iv lasix with undocumented urine output.   TTE today with EF 20-25%, CVP 15mmHg, and small LV thrombus.  during the day she became slightly hypotensive for which she was given 3 bouses of 250 cc IVF  At night i was called by primary team regarding concen for shock as pt was becoming hypotensive.    on arrival to bedside she appeared alert and oriented. Her extremities were slightly cool to touch.   plasma lactate obtained 9:15 pm was 2.7 and poc lactate obtained at 9:32 ( when plasma had not resulted yet) was 2.05.  BP had improved by the time i was at bedside with .    Decision was made to transfer pt to CCU for closer monitoring.        Past Medical History:   Diagnosis Date    Chronic combined systolic and diastolic congestive heart  failure     Coronary artery disease     Diabetes mellitus     Encounter for blood transfusion     Gastric ulcer with hemorrhage     Heart attack     History of gastric ulcer 4/2/2022    Hypertension     NSAID induced gastritis 4/2/2022    Perforated viscus 03/14/2022    Peripheral artery disease        Past Surgical History:   Procedure Laterality Date    APPENDECTOMY      BELOW KNEE AMPUTATION OF LOWER EXTREMITY Right 2019    COLONOSCOPY N/A 02/23/2022    Procedure: COLONOSCOPY;  Surgeon: Estefania Bryant MD;  Location: [facility] (2ND FLR);  Service: Endoscopy;  Laterality: N/A;  anemia, melena    CORONARY STENT PLACEMENT      ESOPHAGOGASTRODUODENOSCOPY N/A 02/23/2022    Procedure: EGD (ESOPHAGOGASTRODUODENOSCOPY);  Surgeon: Estefania Bryant MD;  [facility]  Service: Endoscopy;  Laterality: N/A;  anemia    ESOPHAGOGASTRODUODENOSCOPY N/A 4/14/2022    Procedure: EGD (ESOPHAGOGASTRODUODENOSCOPY);  Surgeon: First Available [facility];  Location: [facility] (2ND FLR);  Service: Endoscopy;  Laterality: N/A;  please schedule in 8 weeks. done 2/23      EF-20    ESOPHAGOGASTRODUODENOSCOPY  4/14/2022    Procedure: ;  Surgeon: First Available [facility];  Location: [facility] (2ND FLR);  Service: Endoscopy;;    ESOPHAGOGASTRODUODENOSCOPY N/A 4/29/2022    Procedure: EGD (ESOPHAGOGASTRODUODENOSCOPY);  Surgeon: Estefania Bryant MD;  Location: [facility];  Service: Endoscopy;  Laterality: N/A;  expedite EGD per Dr Ga      states vaccinated-will bring card-GT    ESOPHAGOGASTRODUODENOSCOPY N/A 10/28/2022    Procedure: EGD (ESOPHAGOGASTRODUODENOSCOPY);  Surgeon: Estefania Bryant MD;  [facility]  Service: Endoscopy;  Laterality: N/A;    FOOT AMPUTATION THROUGH METATARSAL Left 2019    TUBAL LIGATION         Review of patient's allergies indicates:   Allergen Reactions    Orange juice Hives    Tomato (solanum lycopersicum) Hives       No current facility-administered  "medications on file prior to encounter.     Current Outpatient Medications on File Prior to Encounter   Medication Sig    aspirin 81 MG Chew Take 1 tablet (81 mg total) by mouth once daily.    atorvastatin (LIPITOR) 40 MG tablet Take 40 mg by mouth once daily.    ATROVENT HFA 17 mcg/actuation inhaler Inhale 2 puffs into the lungs once daily.    blood sugar diagnostic Strp by Misc.(Non-Drug; Combo Route) route.    busPIRone (BUSPAR) 10 MG tablet Take 10 mg by mouth 2 (two) times daily.    carisoprodoL (SOMA) 350 MG tablet Take 350 mg by mouth once daily.    citalopram (CELEXA) 20 MG tablet Take 20 mg by mouth once daily.    clopidogreL (PLAVIX) 75 mg tablet Take 75 mg by mouth.    docusate sodium (COLACE) 100 MG capsule Take 1 capsule (100 mg total) by mouth 2 (two) times daily.    dorzolamide-timolol 2-0.5% (COSOPT) 22.3-6.8 mg/mL ophthalmic solution Place 1 drop into both eyes 2 (two) times daily.    ergocalciferol (ERGOCALCIFEROL) 50,000 unit Cap Take 50,000 Units by mouth every 7 days.    ferrous sulfate (FEOSOL) 325 mg (65 mg iron) Tab tablet Take 325 mg by mouth 2 (two) times daily.    fluticasone propionate (FLONASE) 50 mcg/actuation nasal spray 1 spray by Each Nostril route once daily.    folic acid-vit B6-vit B12 2.5-25-2 mg (FOLBIC OR EQUIV) 2.5-25-2 mg Tab Take 1 tablet by mouth once daily.    furosemide (LASIX) 20 MG tablet Take 1 tablet (20 mg total) by mouth 2 (two) times daily. Hold this. Do not take it until you see your doctor.    gabapentin (NEURONTIN) 600 MG tablet Take 600 mg by mouth 2 (two) times daily.    glipiZIDE (GLUCOTROL) 10 MG tablet Take 1 tablet (10 mg total) by mouth 2 (two) times daily before meals. Hold this. Do not take it until you see your doctor.    insulin needles, disposable, 31 x 3/16 " Ndle by Misc.(Non-Drug; Combo Route) route.    lancets 28 gauge Misc by Misc.(Non-Drug; Combo Route) route.    LANTUS SOLOSTAR U-100 INSULIN glargine 100 units/mL SubQ pen " "SMARTSI Unit(s) SUB-Q Every Evening    LIDOcaine-prilocaine (EMLA) cream Apply topically once daily.    losartan (COZAAR) 25 MG tablet Take 1 tablet (25 mg total) by mouth once daily.    metoprolol succinate (TOPROL-XL) 25 MG 24 hr tablet Take 1 tablet (25 mg total) by mouth once daily.    oxyCODONE-acetaminophen (PERCOCET)  mg per tablet Take 1 tablet by mouth every 8 (eight) hours as needed for Pain.    pantoprazole (PROTONIX) 40 MG tablet Take 1 tablet (40 mg total) by mouth 2 (two) times daily.    pen needle, diabetic 31 gauge x 5/16" Ndle Use to inject insulin into the skin every evening.    potassium chloride (MICRO-K) 10 MEQ CpSR Take 1 capsule (10 mEq total) by mouth once daily. Hold this. Do not take it until you see your doctor.    pregabalin (LYRICA) 150 MG capsule Take 150 mg by mouth 2 (two) times daily.    promethazine (PHENERGAN) 6.25 mg/5 mL syrup TAKE 10 mls BY MOUTH EVERY 6 HOURS AS NEEDED    SYMBICORT 160-4.5 mcg/actuation HFAA 2 puffs 2 (two) times daily.    zolpidem (AMBIEN) 10 mg Tab Take 10 mg by mouth nightly as needed.    [DISCONTINUED] amLODIPine (NORVASC) 10 MG tablet Take 1 tablet (10 mg total) by mouth once daily. Hold this. Do not take it until you see your doctor.    [DISCONTINUED] hydrALAZINE (APRESOLINE) 50 MG tablet Take 1 tablet (50 mg total) by mouth daily as needed. Hold this. Do not take it until you see your doctor.    [DISCONTINUED] hyoscyamine (ANASPAZ,LEVSIN) 0.125 mg Tab Take 1 tablet (125 mcg total) by mouth every 6 (six) hours as needed (abdominal cramping).    [DISCONTINUED] lisinopriL (PRINIVIL,ZESTRIL) 2.5 MG tablet Take 1 tablet (2.5 mg total) by mouth once daily.    [DISCONTINUED] metFORMIN (GLUCOPHAGE) 1000 MG tablet Take 1 tablet (1,000 mg total) by mouth 2 (two) times daily with meals. Hold this. Do not take it until you see your doctor. (Patient not taking: Reported on 2022)    [DISCONTINUED] metoprolol tartrate (LOPRESSOR) 50 MG " tablet Take 1 tablet (50 mg total) by mouth 2 (two) times daily. Hold this. Do not take it until you see your doctor.     Family History    None       Tobacco Use    Smoking status: Every Day     Current packs/day: 0.50     Types: Cigarettes    Smokeless tobacco: Never   Substance and Sexual Activity    Alcohol use: No    Drug use: No    Sexual activity: Not on file     Review of Systems   Constitutional: Negative for fever and night sweats.   HENT: Negative.     Eyes: Negative.    Cardiovascular:  Positive for dyspnea on exertion and leg swelling. Negative for chest pain, irregular heartbeat, near-syncope, orthopnea, palpitations and syncope.   Respiratory:  Positive for shortness of breath. Negative for cough, hemoptysis and sputum production.    Skin:  Negative for rash.   Gastrointestinal:  Negative for abdominal pain, change in bowel habit, hematemesis, hematochezia, melena, nausea and vomiting.     Objective:     Vital Signs (Most Recent):  Temp: 97.4 °F (36.3 °C) (10/23/23 2039)  Pulse: 83 (10/23/23 2308)  Resp: 20 (10/23/23 2132)  BP: (!) 107/55 (10/23/23 2249)  SpO2: 96 % (10/23/23 2132) Vital Signs (24h Range):  Temp:  [97.4 °F (36.3 °C)-98.8 °F (37.1 °C)] 97.4 °F (36.3 °C)  Pulse:  [70-87] 83  Resp:  [18-20] 20  SpO2:  [94 %-98 %] 96 %  BP: ()/(42-96) 107/55     Weight: 83.5 kg (184 lb 1.4 oz)  Body mass index is 29.71 kg/m².    SpO2: 96 %         Intake/Output Summary (Last 24 hours) at 10/23/2023 2334  Last data filed at 10/23/2023 1659  Gross per 24 hour   Intake 750 ml   Output 250 ml   Net 500 ml         Lines/Drains/Airways       Peripheral Intravenous Line  Duration                  Peripheral IV - Single Lumen 10/22/23 2236 20 G Right Antecubital 1 day                     Physical Exam  Vitals reviewed.   Constitutional:       Appearance: Normal appearance. She is not ill-appearing.   HENT:      Head: Normocephalic and atraumatic.   Eyes:      General: No scleral icterus.      Conjunctiva/sclera: Conjunctivae normal.   Cardiovascular:      Rate and Rhythm: Normal rate and regular rhythm.   Pulmonary:      Effort: Pulmonary effort is normal.   Abdominal:      General: There is no distension.   Musculoskeletal:      Right lower leg: Edema present.      Left lower leg: Edema present.      Comments: CHARLIE NEELY   Skin:     Comments: Slightly cool to touch   Neurological:      Mental Status: She is alert.   Psychiatric:         Mood and Affect: Mood normal.          Significant Labs: All pertinent lab results from the last 24 hours have been reviewed.    Significant Imaging: Echocardiogram: Transthoracic echo (TTE) complete (Cupid Only):   Results for orders placed or performed during the hospital encounter of 10/22/23   Echo   Result Value Ref Range    Ascending aorta 2.79 cm    STJ 1.90 cm    AV mean gradient 1 mmHg    Ao peak danny 0.70 m/s    Ao VTI 8.28 cm    IVRT 139.87 msec    IVS 0.92 0.6 - 1.1 cm    LA size 3.04 cm    Left Atrium Major Axis 5.24 cm    Left Atrium Minor Axis 5.15 cm    LVIDd 4.6 3.5 - 6.0 cm    LVIDs 4.3 (A) 2.1 - 4.0 cm    LVOT diameter 2.17 cm    LVOT peak VTI 9.78 cm    Posterior Wall 0.79 0.6 - 1.1 cm    MV Peak A Danny 0.80 m/s    E wave deceleration time 165.54 msec    MV Peak E Danny 0.22 m/s    RA Major Axis 4.39 cm    RA Width 3.38 cm    RVDD 3.20 cm    Sinus 2.71 cm    TAPSE 1.78 cm    LA WIDTH 3.58 cm    MV stenosis pressure 1/2 time 48.01 ms    LV Diastolic Volume 68.08 mL    LV Systolic Volume 54.83 mL    LVOT peak danny 0.67 m/s    TDI LATERAL 0.04 m/s    TDI SEPTAL 0.07 m/s    LA volume (mod) 44.41 cm3    LV LATERAL E/E' RATIO 5.50 m/s    LV SEPTAL E/E' RATIO 3.14 m/s    FS 7 %    LA volume 48.05 cm3    LV mass 128.65 g    Left Ventricle Relative Wall Thickness 0.34 cm    AV valve area 4.37 cm²    AV Velocity Ratio 0.96     AV index (prosthetic) 1.18     MV valve area p 1/2 method 4.58 cm2    E/A ratio 0.28     Mean e' 0.06 m/s    LVOT area 3.7 cm2    LVOT stroke  volume 36.15 cm3    AV peak gradient 2 mmHg    E/E' ratio 4.00 m/s    NEEL by Velocity Ratio 3.54 cm²    BSA 1.92 m2    LV Systolic Volume Index 29.0 mL/m2    LV Diastolic Volume Index 36.02 mL/m2    LV Mass Index 68 g/m2    LA Volume Index 25.4 mL/m2    LA Volume Index (Mod) 23.5 mL/m2    ZLVIDS 2.19     ZLVIDD -1.40     Est. RA pres 15 mmHg    Narrative      Left Ventricle: The left ventricle is normal in size. Normal wall   thickness. regional wall motion abnormalities present. There is severely   reduced systolic function with a visually estimated ejection fraction of   20 - 25%. There is diastolic dysfunction. .    Right Ventricle: Normal right ventricular cavity size. Wall thickness   is normal. Right ventricle wall motion  is normal. Systolic function is   normal.    IVC/SVC: Elevated venous pressure at 15 mmHg.    There is a small thrombus present in the left ventricular apex       Assessment and Plan:     * Acute on chronic combined systolic and diastolic congestive heart failure  Pt is a 58 y.o female with ho CAD, iCM with EF 20-25% who presents to the hospital with decompensated HF.  BNP 1122 on arrival.   TTE on 10/22: with EF 20-25%, CVP 15 mmHg,   On 10/23 she received total of 750 cc fluid bolus.  At around 930 pt became hypotensive.    Initial lactate was 2.7 and follow up was 2.2  BP recovered shortly too.    Home GDMT: Toprol, Lisinopril, and losartan? ( have to check with pharmacy), lasix 20 mg po bid    Plan:  -- given complexity of care for  service will admit to CCU  -- given improvement of BP and lactate will admit to step down  -- will keep a very close eye on pt overnight and if needed will step up to ICU floor  -- repeat lactate in 4 hrs  -- placed pt on lasix gtt at rate of 10/hr after a bolus of 100 mg.  -- will reintroduce appropriate GDMT when more stable    Mural thrombus of cardiac apex  Heparin gtt for now  When able to transition will transition to eliquis     Peripheral artery  disease  R foot BKA and L foot transmetatarsal amputation in 2018  Will cont ASA, and lipitor    DM (diabetes mellitus), type 2 with complications  Last Hgb A1C 5.7  On home lantus 60 qhs and 45 qam  At hospital she has been receiving 15 units bid and 5 U aspart TIDWM    Plan:  -- will dec long acting insulin to 12 U bid with goal -180 while in hospital  -- cont 5U TID WM    Coronary artery disease involving native coronary artery of native heart with angina pectoris  S/p PCI of OM2 in 2014 and LAD in 2004  Presents to the hospital with chest discomfort  Trop flat at 0.05  Currently with decompensated HF.    Will cont ASA, PLAVIX, and Lipitor for now  Prn ntg fo chest discomfort.    Outpatient PET        VTE Risk Mitigation (From admission, onward)         Ordered     heparin 25,000 units in dextrose 5% (100 units/ml) IV bolus from bag - ADDITIONAL PRN BOLUS - 60 units/kg  As needed (PRN)        Question:  Heparin Infusion Adjustment (DO NOT MODIFY ANSWER)  Answer:  [email or web address]\Images\[email or web address]\HeparinInfusions\heparin HIGH INTENSITY nomogram for [facility] [id number]    10/23/23 2128     heparin 25,000 units in dextrose 5% (100 units/ml) IV bolus from bag - ADDITIONAL PRN BOLUS - 30 units/kg  As needed (PRN)        Question:  Heparin Infusion Adjustment (DO NOT MODIFY ANSWER)  Answer:  [email or web address]\Images\Pharmacy\HeparinInfusions\heparin HIGH INTENSITY nomogram for [facility] [id number]    10/23/23 2128     heparin 25,000 units in dextrose 5% (100 units/ml) IV bolus from bag INITIAL BOLUS  Once        Question:  Heparin Infusion Adjustment (DO NOT MODIFY ANSWER)  Answer:  [email or web address]\Images\Pharmacy\HeparinInfusions\heparin HIGH INTENSITY nomogram for [facility] [id number]    10/23/23 2128     heparin 25,000 units in dextrose 5% 250 mL (100 units/mL) infusion HIGH INTENSITY nomogram - [facility]  Continuous        Question:  Begin at (units/kg/hr)  Answer:  18    10/23/23 2128     IP VTE HIGH RISK PATIENT  Once          10/23/23 1357     Place sequential compression device  Until discontinued         10/22/23 2018     Place sequential compression device  Until discontinued         10/22/23 2018                Darlene Jones MD  Cardiology   [facility] - Observation 11H

## 2023-10-24 NOTE — PROGRESS NOTES
[facility]  Cardiology  Progress Note    Patient Name: Casie Salvador  MRN: [id number]  Admission Date: 10/22/2023  Hospital Length of Stay: 1 days  Code Status: DNR   Attending Physician: Hans Gamez MD   Primary Care Physician: Adonis Carey MD  Expected Discharge Date: 10/24/2023  Principal Problem:Cardiogenic shock    Subjective:     Hospital Course:   Patient admitted to CCU for cardiogenic shock 2/2 acute decompensated HF and NSTEMI. Trop flat at 0.05. Patient briefly on peripheral levophed for MAP down to 52. Developed worsening kidney function likely 2/2 ATN. Patient hesitant for further invasive care, palliative care consulted to help elucidate GOC.      Interval History: Admitted to CCU overnight for cardiogenic shock requiring levophed. Levophed weaned off. Patient hesitant to have central line inserted. No complaints this AM.    Review of Systems   Constitutional: Negative for fever and night sweats.   HENT: Negative.     Eyes: Negative.    Cardiovascular:  Negative for chest pain, irregular heartbeat, near-syncope, orthopnea, palpitations and syncope.   Respiratory:  Negative for cough, hemoptysis, shortness of breath and sputum production.    Skin:  Negative for rash.   Gastrointestinal:  Negative for abdominal pain, change in bowel habit, hematemesis, hematochezia, melena, nausea and vomiting.   Neurological:  Negative for headaches.     Objective:     Vital Signs (Most Recent):  Temp: 96.6 °F (35.9 °C) (10/24/23 1100)  Pulse: 80 (10/24/23 1200)  Resp: 20 (10/24/23 0940)  BP: (!) 72/49 (10/24/23 1200)  SpO2: 95 % (10/24/23 1200) Vital Signs (24h Range):  Temp:  [96.6 °F (35.9 °C)-98.5 °F (36.9 °C)] 96.6 °F (35.9 °C)  Pulse:  [61-86] 80  Resp:  [17-31] 20  SpO2:  [88 %-100 %] 95 %  BP: ()/(42-67) 72/49     Weight: 77 kg (169 lb 12.1 oz)  Body mass index is 27.4 kg/m².     SpO2: 95 %         Intake/Output Summary (Last 24 hours) at 10/24/2023 1302  Last  data filed at 10/24/2023 1200  Gross per 24 hour   Intake 616.79 ml   Output 1365 ml   Net -748.21 ml       Lines/Drains/Airways       Drain  Duration                  Urethral Catheter 10/24/23 <1 day              Peripheral Intravenous Line  Duration                  Peripheral IV - Single Lumen 10/24/23 0000 18 G Left Antecubital <1 day         Peripheral IV - Single Lumen 10/24/23 1149 18 G;1 3/4 in Anterior;Left Upper Arm <1 day                       Physical Exam  Vitals reviewed.   Constitutional:       Appearance: Normal appearance. She is not ill-appearing.   HENT:      Head: Normocephalic and atraumatic.      Nose: No rhinorrhea.   Eyes:      General: No scleral icterus.     Conjunctiva/sclera: Conjunctivae normal.   Cardiovascular:      Rate and Rhythm: Normal rate and regular rhythm.   Pulmonary:      Effort: Pulmonary effort is normal.   Abdominal:      General: There is no distension.   Musculoskeletal:      Right lower leg: Edema present.      Left lower leg: Edema present.      Comments: R BKA  L trans-metatarsal foot amputation   Skin:     Comments: Slightly cool to touch   Neurological:      Mental Status: She is alert and oriented to person, place, and time.   Psychiatric:         Mood and Affect: Mood normal.            Significant Labs: CMP   Recent Labs   Lab 10/22/23  1628 10/23/23  0314 10/23/23  2115 10/24/23  0255   * 133* 130* 130*   K 4.1 3.8 4.6 3.6   CL 98 98 97 96   CO2 23 21* 19* 22*   * 103 109 180*   BUN 32* 31* 38* 41*   CREATININE 1.5* 1.4 2.7* 3.0*   CALCIUM 10.1 9.8 9.1 8.7   PROT 8.3  --   --   --    ALBUMIN 4.0  --   --   --    BILITOT 0.8  --   --   --    ALKPHOS 93  --   --   --    AST 19  --   --   --    ALT 11  --   --   --    ANIONGAP 13 14 14 12    and CBC   Recent Labs   Lab 10/22/23  1628 10/23/23  0314 10/23/23  2128 10/24/23  0255   WBC 12.68 13.60*  --  16.23*  16.23*   HGB 13.8 13.7  --  11.8*  11.8*   HCT 42.5 41.8   < > 36.5*  36.5*    293   --  299  299    < > = values in this interval not displayed.       Significant Imaging: Echocardiogram: Transthoracic echo (TTE) complete ([facility] Only):   Results for orders placed or performed during the hospital encounter of 10/22/23   Echo   Result Value Ref Range    Ascending aorta 2.79 cm    STJ 1.90 cm    AV mean gradient 1 mmHg    Ao peak danny 0.70 m/s    Ao VTI 8.28 cm    IVRT 139.87 msec    IVS 0.92 0.6 - 1.1 cm    LA size 3.04 cm    Left Atrium Major Axis 5.24 cm    Left Atrium Minor Axis 5.15 cm    LVIDd 4.6 3.5 - 6.0 cm    LVIDs 4.3 (A) 2.1 - 4.0 cm    LVOT diameter 2.17 cm    LVOT peak VTI 9.78 cm    Posterior Wall 0.79 0.6 - 1.1 cm    MV Peak A Danny 0.80 m/s    E wave deceleration time 165.54 msec    MV Peak E Danny 0.22 m/s    RA Major Axis 4.39 cm    RA Width 3.38 cm    RVDD 3.20 cm    Sinus 2.71 cm    TAPSE 1.78 cm    LA WIDTH 3.58 cm    MV stenosis pressure 1/2 time 48.01 ms    LV Diastolic Volume 68.08 mL    LV Systolic Volume 54.83 mL    LVOT peak danny 0.67 m/s    TDI LATERAL 0.04 m/s    TDI SEPTAL 0.07 m/s    LA volume (mod) 44.41 cm3    LV LATERAL E/E' RATIO 5.50 m/s    LV SEPTAL E/E' RATIO 3.14 m/s    FS 7 %    LA volume 48.05 cm3    LV mass 128.65 g    Left Ventricle Relative Wall Thickness 0.34 cm    AV valve area 4.37 cm²    AV Velocity Ratio 0.96     AV index (prosthetic) 1.18     MV valve area p 1/2 method 4.58 cm2    E/A ratio 0.28     Mean e' 0.06 m/s    LVOT area 3.7 cm2    LVOT stroke volume 36.15 cm3    AV peak gradient 2 mmHg    E/E' ratio 4.00 m/s    NEEL by Velocity Ratio 3.54 cm²    BSA 1.92 m2    LV Systolic Volume Index 29.0 mL/m2    LV Diastolic Volume Index 36.02 mL/m2    LV Mass Index 68 g/m2    LA Volume Index 25.4 mL/m2    LA Volume Index (Mod) 23.5 mL/m2    ZLVIDS 2.19     ZLVIDD -1.40     Est. RA pres 15 mmHg    Narrative      Left Ventricle: The left ventricle is normal in size. Normal wall   thickness. regional wall motion abnormalities present. There is severely   reduced  systolic function with a visually estimated ejection fraction of   20 - 25%. There is diastolic dysfunction. .    Right Ventricle: Normal right ventricular cavity size. Wall thickness   is normal. Right ventricle wall motion  is normal. Systolic function is   normal.    IVC/SVC: Elevated venous pressure at 15 mmHg.    There is a small thrombus present in the left ventricular apex       Assessment and Plan:       * Cardiogenic shock  Pt is a 58 y.o female with ho CAD, iCM with EF 20-25% who presents to the hospital with decompensated HF and NSTEMI  BNP 1122 on arrival.   TTE on 10/22: with EF 20-25%, CVP 15 mmHg,   On 10/23 she received total of 750 cc fluid bolus for hypotension  MAP down to 52, admitted to CCU with cardiogenic shock in setting of decompensated HF and required levophed briefly  Initial lactate was 2.7 and follow up was 2.2, now 1.7  Kidney function worsening with Cr to 3 (baseline ~1)    Plan:  -- hypotensive requiring levophed overnight, admitted to CCU, patient declining central line  -- levophed weaned off  -- repeat lactate WNL  -- dobutamine 2.5mcg/kg/min  -- continue to diurese with lasix gtt    Mural thrombus of cardiac apex  Heparin gtt for now  When able to transition will transition to eliquis     BRENDA (acute kidney injury)  Worsening renal function in setting of cardiogenic shock likely 2/2 ATN.  -- continue to diurese  -- monitor for kidney function improvement  -- discussed potential need for dialysis if function does not improve in the next few days    Peripheral artery disease  R foot BKA and L foot transmetatarsal amputation in 2018  Will cont ASA, and lipitor    History of gastric ulcer  Continue pantoprazole.    Acute on chronic combined systolic and diastolic congestive heart failure  Home GDMT: Toprol, Lisinopril, and losartan? ( have to check with pharmacy), lasix 20 mg po bid    Plan:  -- continue lasix 20mg/hr  -- will reintroduce appropriate GDMT when more  stable    Constipation due to opioid therapy  Continue miralax    Chronic pain  Lyrica for neuropathic pain in setting of bilateral amputations and PAD.    DM (diabetes mellitus), type 2 with complications  Last Hgb A1C 5.7  On home lantus 60 qhs and 45 qam  At hospital she has been receiving 15 units bid and 5 U aspart TIDWM    Plan:  -- 15 U detemir bid with goal -180 while in hospital  -- cont 5U TID WM    Coronary artery disease involving native coronary artery of native heart with angina pectoris  S/p PCI of ramus intermedius 2015 that thrombosed a week later resulted in a STEMI, OM2 in 2014, LAD in 2004  Presents to the hospital with chest discomfort  Trop flat at 0.05  Currently with decompensated HF.    Will cont ASA, PLAVIX, and Lipitor for now  Prn ntg fo chest discomfort.    Outpatient PET    Hypertension  Holding home antihypertensives in setting of cardiogenic shock.      VTE Risk Mitigation (From admission, onward)         Ordered     heparin 25,000 units in dextrose 5% (100 units/ml) IV bolus from bag - ADDITIONAL PRN BOLUS - 60 units/kg  As needed (PRN)        Question:  Heparin Infusion Adjustment (DO NOT MODIFY ANSWER)  Answer:  [email or web address]\Images\Pharmacy\HeparinInfusions\heparin HIGH INTENSITY nomogram for [facility] [id number]    10/23/23 2128     heparin 25,000 units in dextrose 5% (100 units/ml) IV bolus from bag - ADDITIONAL PRN BOLUS - 30 units/kg  As needed (PRN)        Question:  Heparin Infusion Adjustment (DO NOT MODIFY ANSWER)  Answer:  [email or web address]\epic\Images\Pharmacy\HeparinInfusions\heparin HIGH INTENSITY nomogram for [facility] [id number]    10/23/23 2128     heparin 25,000 units in dextrose 5% 250 mL (100 units/mL) infusion HIGH INTENSITY nomogram - [facility]  Continuous        Question:  Begin at (units/kg/hr)  Answer:  18    10/23/23 2128     IP VTE HIGH RISK PATIENT  Once         10/23/23 1357     Place sequential compression device  Until discontinued         10/22/23 2018     Place  sequential compression device  Until discontinued         10/22/23 2018                Haley Mauro MD  Cardiology  [facility]

## 2023-10-24 NOTE — CONSULTS
Consult acknowledged.  Pt admitted to CCU for decompensated heart failure.  Please refer to my H&P for assessment and plan.        Darlene Jones MD  Cardiovascular medicine; PGY5  [facility]  [address] [place] [address]

## 2023-10-24 NOTE — NURSING
Physician and Charge Nurse notified as BP is 76/58 and all extremities are cold. Physician came at bedside along with [facility].

## 2023-10-24 NOTE — ASSESSMENT & PLAN NOTE
S/p PCI of OM2 in 2014 and LAD in 2004  Presents to the hospital with chest discomfort  Trop flat at 0.05  Currently with decompensated HF.    Will cont ASA, PLAVIX, and Lipitor for now  Prn ntg fo chest discomfort.    Outpatient PET

## 2023-10-24 NOTE — NURSING
Pt admitted from CSU to 10089 with levophed gtt infusing at 0.04 and lasix gtt and heparin gtt disconnected, per MD heparin gtt restarted and lasix gtt increased from 10mg/hr to 20mg/hr with an additional 100mg IVP of lasix administered, repeat lactic acid for 0130, pt refusing to allow MD to place central line and also refusing to let Rns place additional IV access despite explanations about the indications for additional lines for emergencies. MAPs on the BP cuff in the 70s, sats >90% on RA, HR 60s-70s NSR, pt c/o of pain in residual limbs, PRN med given, pt oriented to room and call light within reach

## 2023-10-24 NOTE — ASSESSMENT & PLAN NOTE
Pt is a 58 y.o female with ho CAD, iCM with EF 20-25% who presents to the hospital with decompensated HF and NSTEMI  BNP 1122 on arrival.   TTE on 10/22: with EF 20-25%, CVP 15 mmHg,   On 10/23 she received total of 750 cc fluid bolus for hypotension  MAP down to 52, admitted to CCU with cardiogenic shock in setting of decompensated HF and required levophed briefly  Initial lactate was 2.7 and follow up was 2.2, now 1.7  Kidney function worsening with Cr to 3 (baseline ~1)    Plan:  -- hypotensive requiring levophed overnight, admitted to CCU, patient declining central line  -- levophed weaned off  -- repeat lactate WNL  -- dobutamine 2.5mcg/kg/min  -- continue to diurese with lasix gtt

## 2023-10-24 NOTE — CONSULTS
[facility]  Palliative Medicine  Consult Note    Patient Name: Casie Salvador  MRN: [id number]  Admission Date: 10/22/2023  Hospital Length of Stay: 1 days  Code Status: DNR   Attending Provider: Hans Gamez MD  Consulting Provider: Kishor Capps MD  Primary Care Physician: Adonis Carey MD  Principal Problem:Cardiogenic shock    Patient information was obtained from patient, spouse/SO, past medical records and primary team.      Inpatient consult to Palliative Care  Consult performed by: Kishor Capps MD  Consult ordered by: Peng Tracy MD        Assessment/Plan:     Neuro  Chronic pain  Patient with longstanding history of chronic pain managed with pregabalin, oxycodone, managed by PCP Dr Carey    -recommend miralax +/- senna to prevent OIC  -goal BM every 1-2 days     Cardiac/Vascular  Acute on chronic combined systolic and diastolic congestive heart failure  58f with ICM and HFrEF here with heart failure exacerbation complicated by BRENDA admitted to CCU on  and briefly on levophed.     -continue current level of care in line with current goals/values  -patient amenable to interventions that will help her heart and quality of life  -would re-engage patient about ICD with  present likely on outpatient basis     Palliative Care  Palliative care encounter  See ACP 10/24    -goal remain clear; patient and  willing to consider any interventions that may allow her to live longer and better including temporary CVC, inotropes, ICD  -recommend clear, consistent, and patient discussion regarding indication and need for such interventions; also recommend  involvement in these decisions as he appears to be a guiding and supportive presence in decision-making       Thank you for your consult. I will sign off. Please contact us if you have any additional questions.    Subjective:     HPI:   Ms Yamel Salvador is a 58 year old female with HTN,  HLD, DMII, CAD with pci to OM2 in 2014 and LAD in 2004, former tobacco smoker ( over 20 yrs ago), PAD s/p R BKA and L foot amputation, iCM with EF 20-25% admitted to CCU on 10/23 with presumed heart failure exacerbation and BRENDA requiring IV diuresis and dobutamine. Palliative care consulted for goals of care.    Interval History: Chart reviewed including 24h medication use. Patient seen at bedside with  present during visit. Now off vasopressors, remains on  2.5, UOP somewhat improved.     Palliative ROS:  PRNs: -  Pain -  Dyspnea -  Nausea -  Vomiting -  Constipation -  Anxiety -  Agitation -  Anorexia -        Past Medical History:   Diagnosis Date    Chronic combined systolic and diastolic congestive heart failure     Coronary artery disease     Diabetes mellitus     Encounter for blood transfusion     Gastric ulcer with hemorrhage     Heart attack     History of gastric ulcer 4/2/2022    Hypertension     NSAID induced gastritis 4/2/2022    Perforated viscus 03/14/2022    Peripheral artery disease        Past Surgical History:   Procedure Laterality Date    APPENDECTOMY      BELOW KNEE AMPUTATION OF LOWER EXTREMITY Right 2019    COLONOSCOPY N/A 02/23/2022    Procedure: COLONOSCOPY;  Surgeon: Estefania Bryant MD;  Location: [facility] ([address] [place] [address]);  Service: Endoscopy;  Laterality: N/A;  anemia, melena    CORONARY STENT PLACEMENT      ESOPHAGOGASTRODUODENOSCOPY N/A 02/23/2022    Procedure: EGD (ESOPHAGOGASTRODUODENOSCOPY);  Surgeon: Estefania Bryant MD;  Location: [address]);  Service: Endoscopy;  Laterality: N/A;  anemia    ESOPHAGOGASTRODUODENOSCOPY N/A 4/14/2022    Procedure: EGD (ESOPHAGOGASTRODUODENOSCOPY);  Surgeon: First Available [facility];  Location: [facility] ([address] [place] [address]);  Service: Endoscopy;  Laterality: N/A;  please schedule in 8 weeks. done 2/23      EF-20    ESOPHAGOGASTRODUODENOSCOPY  4/14/2022    Procedure: ;  Surgeon: First Available [facility];  Location: [facility]  (2ND FLR);  Service: Endoscopy;;    ESOPHAGOGASTRODUODENOSCOPY N/A 4/29/2022    Procedure: EGD (ESOPHAGOGASTRODUODENOSCOPY);  Surgeon: Estefania Bryant MD;  Location: [facility] ENDO;  Service: Endoscopy;  Laterality: N/A;  expedite EGD per Dr Ga      states vaccinated-will bring card-GT    ESOPHAGOGASTRODUODENOSCOPY N/A 10/28/2022    Procedure: EGD (ESOPHAGOGASTRODUODENOSCOPY);  Surgeon: Estefania Bryant MD;  Location: [facility] (2ND FLR);  Service: Endoscopy;  Laterality: N/A;    FOOT AMPUTATION THROUGH METATARSAL Left 2019    TUBAL LIGATION         Review of patient's allergies indicates:   Allergen Reactions    Orange juice Hives    Tomato (solanum lycopersicum) Hives       Medications:  Continuous Infusions:   DOBUTamine IV infusion (non-titrating) 2.5 mcg/kg/min (10/24/23 1400)    furosemide (LASIX) 500 mg in 50 mL infusion (conc: 10 mg/mL) 20 mg/hr (10/24/23 1429)    heparin (porcine) in D5W Stopped (10/24/23 1049)    NORepinephrine bitartrate-D5W 0.02 mcg/kg/min (10/24/23 0500)     Scheduled Meds:   aspirin  81 mg Oral Daily    atorvastatin  40 mg Oral Daily    chlorothiazide (DIURIL) 250 mg in dextrose 5 % (D5W) 50 mL IVPB  250 mg Intravenous Once    clopidogreL  75 mg Oral Daily    fluticasone furoate-vilanteroL  1 puff Inhalation Daily    folic acid-vit B6-vit B12 2.5-25-2 mg  1 tablet Oral Daily    insulin aspart U-100  5 Units Subcutaneous TIDWM    insulin detemir U-100  15 Units Subcutaneous BID    mupirocin   Nasal BID    pantoprazole  40 mg Oral BID    polyethylene glycol  17 g Oral Daily    pregabalin  75 mg Oral QHS     PRN Meds:acetaminophen, aluminum-magnesium hydroxide-simethicone, bisacodyL, dextrose 10%, dextrose 10%, glucagon (human recombinant), glucose, glucose, heparin (PORCINE), heparin (PORCINE), insulin aspart U-100, melatonin, nicotine, ondansetron, oxyCODONE-acetaminophen, promethazine, sodium chloride 0.9%, sodium chloride 0.9%    Family History    None        Tobacco Use    Smoking status: Every Day     Current packs/day: 0.50     Types: Cigarettes    Smokeless tobacco: Never   Substance and Sexual Activity    Alcohol use: No    Drug use: No    Sexual activity: Not on file         Objective:     Vital Signs (Most Recent):  Temp: 96.6 °F (35.9 °C) (10/24/23 1100)  Pulse: 86 (10/24/23 1400)  Resp: 20 (10/24/23 0940)  BP: 115/65 (10/24/23 1400)  SpO2: (!) 89 % (10/24/23 1400) Vital Signs (24h Range):  Temp:  [96.6 °F (35.9 °C)-98.5 °F (36.9 °C)] 96.6 °F (35.9 °C)  Pulse:  [61-87] 86  Resp:  [17-31] 20  SpO2:  [87 %-100 %] 89 %  BP: ()/(44-67) 115/65     Weight: 77 kg (169 lb 12.1 oz)  Body mass index is 27.4 kg/m².       Physical Exam  Vitals and nursing note reviewed.   Constitutional:       Appearance: She is not toxic-appearing.      Comments: Middle-aged female lying in bed, awake and interactive, no observed pain behaviors   HENT:      Mouth/Throat:      Mouth: Mucous membranes are moist.   Eyes:      General: No scleral icterus.     Extraocular Movements: Extraocular movements intact.   Pulmonary:      Effort: Pulmonary effort is normal. No respiratory distress.   Abdominal:      General: Abdomen is flat.      Palpations: Abdomen is soft.   Musculoskeletal:         General: Normal range of motion.      Right lower leg: No edema.      Left lower leg: No edema.   Skin:     General: Skin is warm and dry.   Neurological:      General: No focal deficit present.      Mental Status: She is alert and oriented to person, place, and time.   Psychiatric:         Mood and Affect: Mood normal.         Behavior: Behavior normal.         Thought Content: Thought content normal.         Judgment: Judgment normal.              Advance Care Planning  Advance Directives:     Decision Making:  Patient answered questions and Family answered questions  Goals of Care: Engaged patient and  at bedside is supportive conversation. Reviewed her current disease understanding  and she is able to acknowledge that her heart is weak due to vessel disease. 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 Fairly independent with ADLs now, enjoys watching movies in her free time. Discussed ongoing care and the potential need for things like central lines for these medicines which can help her heart. Discussed risks and benefits of undergoing CVC placement and also reinforced the temporary nature of these catheters. She states clearly she would be willing to undergo this procedure if needed as well as other interventions which might help her live longer and better. I also mentioned previous documentation of her not wanting ICD but she and  note they would be open  to furture conversations about this.          CBC:   Recent Labs   Lab 10/24/23  025   WBC 16.23*  16.23*   HGB 11.8*  11.8*   HCT 36.5*  36.5*   MCV 70*  70*     299     BMP:  Recent Labs   Lab 10/24/23  0255   *   *   K 3.6   CL 96   CO2 22*   BUN 41*   CREATININE 3.0*   CALCIUM 8.7   MG 2.7*     LFT:  Lab Results   Component Value Date    AST 19 10/22/2023    ALKPHOS 93 10/22/2023    BILITOT 0.8 10/22/2023     Albumin:   Albumin   Date Value Ref Range Status   10/22/2023 4.0 3.5 - 5.2 g/dL Final     Protein:   Total Protein   Date Value Ref Range Status   10/22/2023 8.3 6.0 - 8.4 g/dL Final     Lactic acid:   Lab Results   Component Value Date    LACTATE 1.7 10/24/2023    LACTATE 1.7 10/24/2023       Significant Imaging: Reviewed TTE results 10/23 showing EF 20-25% and small LV thrombus; EF not significantly reduced from prior        In my care of this patient with acute on chronic severe illness with threat to life and/or bodily function, I am recommending goal-concordant care as noted above. I spent a significant amount of time reviewing external records/ recommendations  of other providers (prior cardiology clinic notes, [place]-), reviewing recent test results (TTE), and discussed care with other subspecialists involved in patient's care    In addition to above, I spent 20 minutes specifically discussing advance care planning and goals of care with patient and family at bedside.     The above recommendations communicated directly to primary team on 10/24      Kishor Capps MD  Palliative Medicine  [facility] - Surgical Intensive Care

## 2023-10-24 NOTE — ASSESSMENT & PLAN NOTE
Last Hgb A1C 5.7  On home lantus 60 qhs and 45 qam  At hospital she has been receiving 15 units bid and 5 U aspart TIDWM    Plan:  -- 15 U detemir bid with goal -180 while in hospital  -- cont 5U TID WM

## 2023-10-25 LAB
ACANTHOCYTES BLD QL SMEAR: ABNORMAL
ACANTHOCYTES BLD QL SMEAR: ABNORMAL
ANION GAP SERPL CALC-SCNC: 10 MMOL/L (ref 8–16)
ANION GAP SERPL CALC-SCNC: 11 MMOL/L (ref 8–16)
ANION GAP SERPL CALC-SCNC: 12 MMOL/L (ref 8–16)
ANISOCYTOSIS BLD QL SMEAR: ABNORMAL
ANISOCYTOSIS BLD QL SMEAR: ABNORMAL
APTT PPP: 26.2 SEC (ref 21–32)
AUER BODIES BLD QL SMEAR: ABNORMAL
AUER BODIES BLD QL SMEAR: ABNORMAL
BASO STIPL BLD QL SMEAR: ABNORMAL
BASO STIPL BLD QL SMEAR: ABNORMAL
BASOPHILS # BLD AUTO: 0.04 K/UL (ref 0–0.2)
BASOPHILS # BLD AUTO: ABNORMAL K/UL (ref 0–0.2)
BASOPHILS # BLD AUTO: ABNORMAL K/UL (ref 0–0.2)
BASOPHILS NFR BLD: 0.3 % (ref 0–1.9)
BASOPHILS NFR BLD: ABNORMAL % (ref 0–1.9)
BASOPHILS NFR BLD: ABNORMAL % (ref 0–1.9)
BLASTS NFR BLD MANUAL: ABNORMAL %
BLASTS NFR BLD MANUAL: ABNORMAL %
BUN SERPL-MCNC: 41 MG/DL (ref 6–20)
BUN SERPL-MCNC: 41 MG/DL (ref 6–20)
BUN SERPL-MCNC: 42 MG/DL (ref 6–20)
BURR CELLS BLD QL SMEAR: ABNORMAL
BURR CELLS BLD QL SMEAR: ABNORMAL
CABOT RINGS BLD QL SMEAR: ABNORMAL
CABOT RINGS BLD QL SMEAR: ABNORMAL
CALCIUM SERPL-MCNC: 8.2 MG/DL (ref 8.7–10.5)
CALCIUM SERPL-MCNC: 8.4 MG/DL (ref 8.7–10.5)
CALCIUM SERPL-MCNC: 8.7 MG/DL (ref 8.7–10.5)
CHLORIDE SERPL-SCNC: 95 MMOL/L (ref 95–110)
CHLORIDE SERPL-SCNC: 97 MMOL/L (ref 95–110)
CHLORIDE SERPL-SCNC: 97 MMOL/L (ref 95–110)
CO2 SERPL-SCNC: 21 MMOL/L (ref 23–29)
CO2 SERPL-SCNC: 23 MMOL/L (ref 23–29)
CO2 SERPL-SCNC: 24 MMOL/L (ref 23–29)
CREAT SERPL-MCNC: 2.1 MG/DL (ref 0.5–1.4)
CREAT SERPL-MCNC: 2.1 MG/DL (ref 0.5–1.4)
CREAT SERPL-MCNC: 2.4 MG/DL (ref 0.5–1.4)
DACRYOCYTES BLD QL SMEAR: ABNORMAL
DACRYOCYTES BLD QL SMEAR: ABNORMAL
DIFFERENTIAL METHOD: ABNORMAL
DOHLE BOD BLD QL SMEAR: ABNORMAL
DOHLE BOD BLD QL SMEAR: ABNORMAL
EOSINOPHIL # BLD AUTO: 0.2 K/UL (ref 0–0.5)
EOSINOPHIL # BLD AUTO: ABNORMAL K/UL (ref 0–0.5)
EOSINOPHIL # BLD AUTO: ABNORMAL K/UL (ref 0–0.5)
EOSINOPHIL NFR BLD: 1.7 % (ref 0–8)
EOSINOPHIL NFR BLD: ABNORMAL % (ref 0–8)
EOSINOPHIL NFR BLD: ABNORMAL % (ref 0–8)
ERYTHROCYTE [DISTWIDTH] IN BLOOD BY AUTOMATED COUNT: 13.9 % (ref 11.5–14.5)
ERYTHROCYTE [DISTWIDTH] IN BLOOD BY AUTOMATED COUNT: ABNORMAL % (ref 11.5–14.5)
ERYTHROCYTE [DISTWIDTH] IN BLOOD BY AUTOMATED COUNT: ABNORMAL % (ref 11.5–14.5)
EST. GFR  (NO RACE VARIABLE): 23 ML/MIN/1.73 M^2
EST. GFR  (NO RACE VARIABLE): 27 ML/MIN/1.73 M^2
EST. GFR  (NO RACE VARIABLE): 27 ML/MIN/1.73 M^2
GIANT PLATELETS BLD QL SMEAR: ABNORMAL
GIANT PLATELETS BLD QL SMEAR: ABNORMAL
GLUCOSE SERPL-MCNC: 224 MG/DL (ref 70–110)
GLUCOSE SERPL-MCNC: 269 MG/DL (ref 70–110)
GLUCOSE SERPL-MCNC: 95 MG/DL (ref 70–110)
HCT VFR BLD AUTO: 34.7 % (ref 37–48.5)
HCT VFR BLD AUTO: ABNORMAL % (ref 37–48.5)
HCT VFR BLD AUTO: ABNORMAL % (ref 37–48.5)
HEINZ BOD BLD QL SMEAR: ABNORMAL
HEINZ BOD BLD QL SMEAR: ABNORMAL
HGB BLD-MCNC: 11.2 G/DL (ref 12–16)
HGB BLD-MCNC: ABNORMAL G/DL (ref 12–16)
HGB BLD-MCNC: ABNORMAL G/DL (ref 12–16)
HGB C CRY RBC QL MICRO: ABNORMAL
HGB C CRY RBC QL MICRO: ABNORMAL
HOWELL-JOLLY BOD BLD QL SMEAR: ABNORMAL
HOWELL-JOLLY BOD BLD QL SMEAR: ABNORMAL
HYPOCHROMIA BLD QL SMEAR: ABNORMAL
HYPOCHROMIA BLD QL SMEAR: ABNORMAL
IMM GRANULOCYTES # BLD AUTO: 0.05 K/UL (ref 0–0.04)
IMM GRANULOCYTES # BLD AUTO: ABNORMAL K/UL (ref 0–0.04)
IMM GRANULOCYTES # BLD AUTO: ABNORMAL K/UL (ref 0–0.04)
IMM GRANULOCYTES NFR BLD AUTO: 0.4 % (ref 0–0.5)
IMM GRANULOCYTES NFR BLD AUTO: ABNORMAL % (ref 0–0.5)
IMM GRANULOCYTES NFR BLD AUTO: ABNORMAL % (ref 0–0.5)
LACTATE SERPL-SCNC: 1.5 MMOL/L (ref 0.5–2.2)
LYMPHOCYTES # BLD AUTO: 5.7 K/UL (ref 1–4.8)
LYMPHOCYTES # BLD AUTO: ABNORMAL K/UL (ref 1–4.8)
LYMPHOCYTES # BLD AUTO: ABNORMAL K/UL (ref 1–4.8)
LYMPHOCYTES NFR BLD: 43.5 % (ref 18–48)
LYMPHOCYTES NFR BLD: ABNORMAL % (ref 18–48)
LYMPHOCYTES NFR BLD: ABNORMAL % (ref 18–48)
MAGNESIUM SERPL-MCNC: 2.8 MG/DL (ref 1.6–2.6)
MCH RBC QN AUTO: 22.6 PG (ref 27–31)
MCH RBC QN AUTO: ABNORMAL PG (ref 27–31)
MCH RBC QN AUTO: ABNORMAL PG (ref 27–31)
MCHC RBC AUTO-ENTMCNC: 32.3 G/DL (ref 32–36)
MCHC RBC AUTO-ENTMCNC: ABNORMAL G/DL (ref 32–36)
MCHC RBC AUTO-ENTMCNC: ABNORMAL G/DL (ref 32–36)
MCV RBC AUTO: 70 FL (ref 82–98)
MCV RBC AUTO: ABNORMAL FL (ref 82–98)
MCV RBC AUTO: ABNORMAL FL (ref 82–98)
MEGAKARYOCYTIC FRAGMENTS: ABNORMAL
MEGAKARYOCYTIC FRAGMENTS: ABNORMAL
METAMYELOCYTES NFR BLD MANUAL: ABNORMAL %
METAMYELOCYTES NFR BLD MANUAL: ABNORMAL %
MONOCYTES # BLD AUTO: 1 K/UL (ref 0.3–1)
MONOCYTES # BLD AUTO: ABNORMAL K/UL (ref 0.3–1)
MONOCYTES # BLD AUTO: ABNORMAL K/UL (ref 0.3–1)
MONOCYTES NFR BLD: 7.9 % (ref 4–15)
MONOCYTES NFR BLD: ABNORMAL % (ref 4–15)
MONOCYTES NFR BLD: ABNORMAL % (ref 4–15)
MYELOCYTES NFR BLD MANUAL: ABNORMAL %
MYELOCYTES NFR BLD MANUAL: ABNORMAL %
NEUTROPHILS # BLD AUTO: 6.1 K/UL (ref 1.8–7.7)
NEUTROPHILS # BLD AUTO: ABNORMAL K/UL (ref 1.8–7.7)
NEUTROPHILS # BLD AUTO: ABNORMAL K/UL (ref 1.8–7.7)
NEUTROPHILS NFR BLD: 46.2 % (ref 38–73)
NEUTROPHILS NFR BLD: ABNORMAL % (ref 38–73)
NEUTROPHILS NFR BLD: ABNORMAL % (ref 38–73)
NEUTS BAND NFR BLD MANUAL: ABNORMAL %
NEUTS BAND NFR BLD MANUAL: ABNORMAL %
NRBC BLD-RTO: 0 /100 WBC
NRBC BLD-RTO: ABNORMAL /100 WBC
NRBC BLD-RTO: ABNORMAL /100 WBC
OVALOCYTES BLD QL SMEAR: ABNORMAL
OVALOCYTES BLD QL SMEAR: ABNORMAL
PAPPENHEIMER BOD BLD QL SMEAR: ABNORMAL
PAPPENHEIMER BOD BLD QL SMEAR: ABNORMAL
PHOSPHATE SERPL-MCNC: 4 MG/DL (ref 2.7–4.5)
PLASMODIUM BLD QL SMEAR: ABNORMAL
PLASMODIUM BLD QL SMEAR: ABNORMAL
PLATELET # BLD AUTO: 252 K/UL (ref 150–450)
PLATELET # BLD AUTO: ABNORMAL K/UL (ref 150–450)
PLATELET # BLD AUTO: ABNORMAL K/UL (ref 150–450)
PLATELET BLD QL SMEAR: ABNORMAL
PLATELET BLD QL SMEAR: ABNORMAL
PMV BLD AUTO: 11 FL (ref 9.2–12.9)
PMV BLD AUTO: ABNORMAL FL (ref 9.2–12.9)
PMV BLD AUTO: ABNORMAL FL (ref 9.2–12.9)
POCT GLUCOSE: 149 MG/DL (ref 70–110)
POCT GLUCOSE: 199 MG/DL (ref 70–110)
POCT GLUCOSE: 220 MG/DL (ref 70–110)
POCT GLUCOSE: 275 MG/DL (ref 70–110)
POIKILOCYTOSIS BLD QL SMEAR: ABNORMAL
POIKILOCYTOSIS BLD QL SMEAR: ABNORMAL
POLYCHROMASIA BLD QL SMEAR: ABNORMAL
POLYCHROMASIA BLD QL SMEAR: ABNORMAL
POTASSIUM SERPL-SCNC: 4.1 MMOL/L (ref 3.5–5.1)
POTASSIUM SERPL-SCNC: 4.8 MMOL/L (ref 3.5–5.1)
POTASSIUM SERPL-SCNC: 5.1 MMOL/L (ref 3.5–5.1)
PROMYELOCYTES NFR BLD MANUAL: ABNORMAL %
PROMYELOCYTES NFR BLD MANUAL: ABNORMAL %
RBC # BLD AUTO: 4.95 M/UL (ref 4–5.4)
RBC # BLD AUTO: ABNORMAL M/UL (ref 4–5.4)
RBC # BLD AUTO: ABNORMAL M/UL (ref 4–5.4)
RBC AGGLUT BLD QL: ABNORMAL
RBC AGGLUT BLD QL: ABNORMAL
ROULEAUX BLD QL SMEAR: ABNORMAL
ROULEAUX BLD QL SMEAR: ABNORMAL
SCHISTOCYTES BLD QL SMEAR: ABNORMAL
SICKLE CELLS BLD QL SMEAR: ABNORMAL
SICKLE CELLS BLD QL SMEAR: ABNORMAL
SMUDGE CELLS BLD QL SMEAR: ABNORMAL
SMUDGE CELLS BLD QL SMEAR: ABNORMAL
SODIUM SERPL-SCNC: 129 MMOL/L (ref 136–145)
SODIUM SERPL-SCNC: 130 MMOL/L (ref 136–145)
SODIUM SERPL-SCNC: 131 MMOL/L (ref 136–145)
SPHEROCYTES BLD QL SMEAR: ABNORMAL
SPHEROCYTES BLD QL SMEAR: ABNORMAL
STOMATOCYTES BLD QL SMEAR: ABNORMAL
STOMATOCYTES BLD QL SMEAR: ABNORMAL
TARGETS BLD QL SMEAR: ABNORMAL
TARGETS BLD QL SMEAR: ABNORMAL
TOXIC GRANULES BLD QL SMEAR: ABNORMAL
TOXIC GRANULES BLD QL SMEAR: ABNORMAL
WBC # BLD AUTO: 13.16 K/UL (ref 3.9–12.7)
WBC # BLD AUTO: ABNORMAL K/UL (ref 3.9–12.7)
WBC # BLD AUTO: ABNORMAL K/UL (ref 3.9–12.7)
WBC NRBC COR # BLD: ABNORMAL K/UL (ref 3.9–12.7)
WBC NRBC COR # BLD: ABNORMAL K/UL (ref 3.9–12.7)
WBC OTHER NFR BLD MANUAL: ABNORMAL %
WBC OTHER NFR BLD MANUAL: ABNORMAL %
WBC TOXIC VACUOLES BLD QL SMEAR: ABNORMAL
WBC TOXIC VACUOLES BLD QL SMEAR: ABNORMAL

## 2023-10-25 PROCEDURE — 83605 ASSAY OF LACTIC ACID: CPT

## 2023-10-25 PROCEDURE — 84100 ASSAY OF PHOSPHORUS: CPT | Performed by: PHYSICIAN ASSISTANT

## 2023-10-25 PROCEDURE — 20000000 HC ICU ROOM

## 2023-10-25 PROCEDURE — 99291 CRITICAL CARE FIRST HOUR: CPT | Mod: ,,, | Performed by: INTERNAL MEDICINE

## 2023-10-25 PROCEDURE — 99900035 HC TECH TIME PER 15 MIN (STAT)

## 2023-10-25 PROCEDURE — 85730 THROMBOPLASTIN TIME PARTIAL: CPT | Performed by: INTERNAL MEDICINE

## 2023-10-25 PROCEDURE — 25000003 PHARM REV CODE 250: Performed by: PHYSICIAN ASSISTANT

## 2023-10-25 PROCEDURE — 80048 BASIC METABOLIC PNL TOTAL CA: CPT | Performed by: PHYSICIAN ASSISTANT

## 2023-10-25 PROCEDURE — 83735 ASSAY OF MAGNESIUM: CPT | Performed by: PHYSICIAN ASSISTANT

## 2023-10-25 PROCEDURE — 99291 PR CRITICAL CARE, E/M 30-74 MINUTES: ICD-10-PCS | Mod: ,,, | Performed by: INTERNAL MEDICINE

## 2023-10-25 PROCEDURE — 85025 COMPLETE CBC W/AUTO DIFF WBC: CPT | Performed by: INTERNAL MEDICINE

## 2023-10-25 PROCEDURE — 63600175 PHARM REV CODE 636 W HCPCS: Performed by: PHYSICIAN ASSISTANT

## 2023-10-25 PROCEDURE — 80048 BASIC METABOLIC PNL TOTAL CA: CPT | Mod: 91

## 2023-10-25 PROCEDURE — 25000003 PHARM REV CODE 250: Performed by: STUDENT IN AN ORGANIZED HEALTH CARE EDUCATION/TRAINING PROGRAM

## 2023-10-25 PROCEDURE — 25000003 PHARM REV CODE 250: Performed by: EMERGENCY MEDICINE

## 2023-10-25 PROCEDURE — 80048 BASIC METABOLIC PNL TOTAL CA: CPT | Mod: 91 | Performed by: STUDENT IN AN ORGANIZED HEALTH CARE EDUCATION/TRAINING PROGRAM

## 2023-10-25 PROCEDURE — 94761 N-INVAS EAR/PLS OXIMETRY MLT: CPT

## 2023-10-25 RX ORDER — HEPARIN SODIUM,PORCINE/D5W 25000/250
0-40 INTRAVENOUS SOLUTION INTRAVENOUS CONTINUOUS
Status: DISCONTINUED | OUTPATIENT
Start: 2023-10-25 | End: 2023-10-25

## 2023-10-25 RX ORDER — ISOSORBIDE DINITRATE 10 MG/1
10 TABLET ORAL 3 TIMES DAILY
Status: DISCONTINUED | OUTPATIENT
Start: 2023-10-25 | End: 2023-10-27 | Stop reason: HOSPADM

## 2023-10-25 RX ORDER — HYDRALAZINE HYDROCHLORIDE 10 MG/1
10 TABLET, FILM COATED ORAL EVERY 8 HOURS
Status: DISCONTINUED | OUTPATIENT
Start: 2023-10-25 | End: 2023-10-27

## 2023-10-25 RX ADMIN — ATORVASTATIN CALCIUM 40 MG: 40 TABLET, FILM COATED ORAL at 09:10

## 2023-10-25 RX ADMIN — HYDRALAZINE HYDROCHLORIDE 10 MG: 10 TABLET ORAL at 09:10

## 2023-10-25 RX ADMIN — INSULIN DETEMIR 15 UNITS: 100 INJECTION, SOLUTION SUBCUTANEOUS at 09:10

## 2023-10-25 RX ADMIN — OXYCODONE AND ACETAMINOPHEN 1 TABLET: 10; 325 TABLET ORAL at 04:10

## 2023-10-25 RX ADMIN — PREGABALIN 75 MG: 75 CAPSULE ORAL at 09:10

## 2023-10-25 RX ADMIN — INSULIN ASPART 2 UNITS: 100 INJECTION, SOLUTION INTRAVENOUS; SUBCUTANEOUS at 12:10

## 2023-10-25 RX ADMIN — PANTOPRAZOLE SODIUM 40 MG: 40 TABLET, DELAYED RELEASE ORAL at 09:10

## 2023-10-25 RX ADMIN — Medication 6 MG: at 11:10

## 2023-10-25 RX ADMIN — MUPIROCIN: 20 OINTMENT TOPICAL at 09:10

## 2023-10-25 RX ADMIN — ISOSORBIDE DINITRATE 10 MG: 10 TABLET ORAL at 09:10

## 2023-10-25 RX ADMIN — OXYCODONE AND ACETAMINOPHEN 1 TABLET: 10; 325 TABLET ORAL at 11:10

## 2023-10-25 RX ADMIN — ISOSORBIDE DINITRATE 10 MG: 10 TABLET ORAL at 12:10

## 2023-10-25 RX ADMIN — INSULIN ASPART 1 UNITS: 100 INJECTION, SOLUTION INTRAVENOUS; SUBCUTANEOUS at 09:10

## 2023-10-25 RX ADMIN — FOLIC ACID-PYRIDOXINE-CYANOCOBALAMIN TAB 2.5-25-2 MG 1 TABLET: 2.5-25-2 TAB at 09:10

## 2023-10-25 RX ADMIN — OXYCODONE AND ACETAMINOPHEN 1 TABLET: 10; 325 TABLET ORAL at 09:10

## 2023-10-25 RX ADMIN — CLOPIDOGREL BISULFATE 75 MG: 75 TABLET ORAL at 09:10

## 2023-10-25 RX ADMIN — INSULIN ASPART 5 UNITS: 100 INJECTION, SOLUTION INTRAVENOUS; SUBCUTANEOUS at 04:10

## 2023-10-25 RX ADMIN — HYDRALAZINE HYDROCHLORIDE 10 MG: 10 TABLET ORAL at 12:10

## 2023-10-25 RX ADMIN — INSULIN ASPART 5 UNITS: 100 INJECTION, SOLUTION INTRAVENOUS; SUBCUTANEOUS at 08:10

## 2023-10-25 RX ADMIN — INSULIN ASPART 5 UNITS: 100 INJECTION, SOLUTION INTRAVENOUS; SUBCUTANEOUS at 12:10

## 2023-10-25 RX ADMIN — ASPIRIN 81 MG CHEWABLE TABLET 81 MG: 81 TABLET CHEWABLE at 09:10

## 2023-10-25 RX ADMIN — POLYETHYLENE GLYCOL 3350 17 G: 17 POWDER, FOR SOLUTION ORAL at 09:10

## 2023-10-25 NOTE — PROGRESS NOTES
[facility]  Cardiology  Progress Note    Patient Name: Casie Salvador  MRN: [id number]  Admission Date: 10/22/2023  Hospital Length of Stay: 2 days  Code Status: DNR   Attending Physician: Hans Gamez MD   Primary Care Physician: Adonis Carey MD  Expected Discharge Date: 10/27/2023  Principal Problem:Cardiogenic shock    Subjective:     Hospital Course:   Patient admitted to CCU for cardiogenic shock 2/2 acute decompensated HF and NSTEMI. Trop flat at 0.05. Patient briefly on peripheral levophed then dobutamine for MAP down to 52. Developed worsening kidney function likely 2/2 ATN. Patient hesitant for further invasive care, palliative care consulted to help elucidate GOC. Patient and spouse expressed wanting to receive further treatment. Patient off levophed and dobutamine and maintaining good MAP. GDMT initiated.      Interval History: Levophed and dobutamine off. Patient expressing wanting to leave today. Discussed possibility of needing to go back on these medications and need to watch her heart for a few more days. Initiating GDMT.    Objective:     Vital Signs (Most Recent):  Temp: 97.7 °F (36.5 °C) (10/24/23 2300)  Pulse: 73 (10/25/23 1045)  Resp: 12 (10/25/23 1156)  BP: 119/61 (10/25/23 1030)  SpO2: 99 % (10/25/23 1045) Vital Signs (24h Range):  Temp:  [97 °F (36.1 °C)-97.7 °F (36.5 °C)] 97.7 °F (36.5 °C)  Pulse:  [71-93] 73  Resp:  [7-26] 12  SpO2:  [74 %-100 %] 99 %  BP: ()/(51-79) 119/61     Weight: 77 kg (169 lb 12.1 oz)  Body mass index is 27.4 kg/m².     SpO2: 99 %         Intake/Output Summary (Last 24 hours) at 10/25/2023 1244  Last data filed at 10/25/2023 1000  Gross per 24 hour   Intake 801.97 ml   Output 920 ml   Net -118.03 ml       Lines/Drains/Airways       Drain  Duration                  Urethral Catheter 10/24/23 1 day              Peripheral Intravenous Line  Duration                  Peripheral IV - Single Lumen 10/24/23 0000 18 G Left  Antecubital 1 day         Peripheral IV - Single Lumen 10/24/23 1149 18 G;1 3/4 in Anterior;Left Upper Arm 1 day                       Physical Exam  Vitals reviewed.   Constitutional:       Appearance: Normal appearance. She is not ill-appearing.   HENT:      Head: Normocephalic and atraumatic.      Nose: No rhinorrhea.   Eyes:      General: No scleral icterus.     Conjunctiva/sclera: Conjunctivae normal.   Cardiovascular:      Rate and Rhythm: Normal rate and regular rhythm.   Pulmonary:      Effort: Pulmonary effort is normal.   Abdominal:      General: There is no distension.   Musculoskeletal:      Right lower leg: Edema present.      Left lower leg: Edema present.      Comments: R BKA  L trans-metatarsal foot amputation   Neurological:      Mental Status: She is alert and oriented to person, place, and time.   Psychiatric:         Mood and Affect: Mood normal.            Significant Labs: CMP   Recent Labs   Lab 10/24/23  1423 10/24/23  1958 10/25/23  0303   * 129* 131*   K 4.5 4.3 4.1   CL 94* 94* 97   CO2 22* 22* 23   * 169* 95   BUN 44* 46* 42*   CREATININE 3.2* 2.9* 2.4*   CALCIUM 8.5* 8.5* 8.4*   ANIONGAP 13 13 11    and CBC   Recent Labs   Lab 10/24/23  0255 10/25/23  0303 10/25/23  0536   WBC 16.23*  16.23* SEE COMMENT  SEE COMMENT 13.16*   HGB 11.8*  11.8* SEE COMMENT  SEE COMMENT 11.2*   HCT 36.5*  36.5* SEE COMMENT  SEE COMMENT 34.7*     299 SEE COMMENT  SEE COMMENT 252       Significant Imaging:  none    Assessment and Plan:       * Cardiogenic shock  Pt is a 58 y.o female with ho CAD, iCM with EF 20-25% who presents to the hospital with decompensated HF and NSTEMI  BNP 1122 on arrival.   TTE on 10/22: with EF 20-25%, CVP 15 mmHg,   On 10/23 she received total of 750 cc fluid bolus for hypotension  MAP down to 52, admitted to CCU with cardiogenic shock in setting of decompensated HF and required levophed briefly  Initial lactate was 2.7 and follow up was 2.2, now  1.7  Kidney function worsening with Cr to 3 (baseline ~1)    Plan:  -- hypotensive requiring levophed overnight, admitted to CCU, patient declining central line  -- levophed and dobutamine weaned off  -- repeat lactate WNL  -- lasix gtt d/c    Mural thrombus of cardiac apex  Heparin gtt for now  When able to transition will transition to eliquis     BRENDA (acute kidney injury)  Worsening renal function in setting of cardiogenic shock likely 2/2 ATN.  -- lasix d/c  -- showing kidney function improvement  -- discussed potential need for dialysis if function does not improve in the next few days    Peripheral artery disease  R foot BKA and L foot transmetatarsal amputation in 2018  Will cont ASA, and lipitor    History of gastric ulcer  Continue pantoprazole.    Acute on chronic combined systolic and diastolic congestive heart failure  Home GDMT: Toprol, Lisinopril, and losartan? ( have to check with pharmacy), lasix 20 mg po bid    Plan:  -- lasix gtt d/c with good UOP and improvement in Cr  -- introducing GDMT with isosorbide dinitrate and hydralazine    Constipation due to opioid therapy  Continue miralax    Chronic pain  Lyrica for neuropathic pain in setting of bilateral amputations and PAD.    DM (diabetes mellitus), type 2 with complications  Last Hgb A1C 5.7  On home lantus 60 qhs and 45 qam  At hospital she has been receiving 15 units bid and 5 U aspart TIDWM    Plan:  -- 15 U detemir bid with goal -180 while in hospital  -- cont 5U TID WM    Coronary artery disease involving native coronary artery of native heart with angina pectoris  S/p PCI of ramus intermedius 2015 that thrombosed a week later resulted in a STEMI, OM2 in 2014, LAD in 2004  Presents to the hospital with chest discomfort  Trop flat at 0.05  Currently with decompensated HF.    Will cont ASA, PLAVIX, and Lipitor for now  Prn ntg fo chest discomfort.    Outpatient PET    Hypertension  Held home antihypertensives in setting of cardiogenic  shock.  Starting GDMT with isosorbide dinitrate 10 and hydralazine 10.        VTE Risk Mitigation (From admission, onward)         Ordered     heparin 25,000 units in dextrose 5% (100 units/ml) IV bolus from bag - ADDITIONAL PRN BOLUS - 60 units/kg  As needed (PRN)        Question:  Heparin Infusion Adjustment (DO NOT MODIFY ANSWER)  Answer:  [email or web address]\Images\[email or web address]\HeparinInfusions\heparin HIGH INTENSITY nomogram for [facility] [email or web address]    10/23/23 2128     heparin 25,000 units in dextrose 5% (100 units/ml) IV bolus from bag - ADDITIONAL PRN BOLUS - 30 units/kg  As needed (PRN)        Question:  Heparin Infusion Adjustment (DO NOT MODIFY ANSWER)  Answer:  [email or web address]\Images\Pharmacy\HeparinInfusions\heparin HIGH INTENSITY nomogram for [facility] [id number]    10/23/23 2128     heparin 25,000 units in dextrose 5% 250 mL (100 units/mL) infusion HIGH INTENSITY nomogram - [facility]  Continuous        Question:  Begin at (units/kg/hr)  Answer:  18    10/23/23 2128     IP VTE HIGH RISK PATIENT  Once         10/23/23 1357     Place sequential compression device  Until discontinued         10/22/23 2018     Place sequential compression device  Until discontinued         10/22/23 2018                Haley Mauro MD  Cardiology  [facility] - Surgical Intensive Care

## 2023-10-25 NOTE — PLAN OF CARE
Evaluated patient at bedsite.  She awake, alert, and oriented  MAPS 65-71 while on  2.5  Levo was discontinued.  JVP appeared to be 3 cm above the level of clavicle  Breathing comfortably and saturating well on RA.    Plasma lactate 1.4    No changes made.   Will cont to monitor closely.    Darlene Jones MD  Cardiovascular medicine; PGY5  [facility]  [address] [place] [address]

## 2023-10-25 NOTE — NURSING
"      SICU PLAN OF CARE NOTE    Dx: Cardiogenic shock    Goals of Care:   MAP >65  SBP >90    Vital Signs (last 12 hours):   Temp:  [97 °F (36.1 °C)-97.7 °F (36.5 °C)]   Pulse:  [71-89]   Resp:  [10-15]   BP: ()/(51-79)   SpO2:  [74 %-100 %]      Neuro: AAO x4, Arouses to Voice, Follows Commands, and Moves All Extremities    Cardiac: NSR 70's-80's    Respiratory: Room Air     Gtts: Dobutamine @ 2.5    Urine Output: Urinary Catheter 620 cc/shift    Diet: Cardiac Diet     Labs/Accuchecks: Daily labs.    Skin: No new altered skin integrity. Pt turned independently, with assistance as needed.      Shift Events: NAEON. Lasix and heparin gtt D/C. Pt refused to get out of bed to chair in the AM. Pt states "I just want to sleep a few more hours."      "

## 2023-10-25 NOTE — SUBJECTIVE & OBJECTIVE
Interval History: Levophed and dobutamine off. Patient expressing wanting to leave today. Discussed possibility of needing to go back on these medications and need to watch her heart for a few more days. Initiating GDMT.    Objective:     Vital Signs (Most Recent):  Temp: 97.7 °F (36.5 °C) (10/24/23 2300)  Pulse: 73 (10/25/23 1045)  Resp: 12 (10/25/23 1156)  BP: 119/61 (10/25/23 1030)  SpO2: 99 % (10/25/23 1045) Vital Signs (24h Range):  Temp:  [97 °F (36.1 °C)-97.7 °F (36.5 °C)] 97.7 °F (36.5 °C)  Pulse:  [71-93] 73  Resp:  [7-26] 12  SpO2:  [74 %-100 %] 99 %  BP: ()/(51-79) 119/61     Weight: 77 kg (169 lb 12.1 oz)  Body mass index is 27.4 kg/m².     SpO2: 99 %         Intake/Output Summary (Last 24 hours) at 10/25/2023 1244  Last data filed at 10/25/2023 1000  Gross per 24 hour   Intake 801.97 ml   Output 920 ml   Net -118.03 ml       Lines/Drains/Airways       Drain  Duration                  Urethral Catheter 10/24/23 1 day              Peripheral Intravenous Line  Duration                  Peripheral IV - Single Lumen 10/24/23 0000 18 G Left Antecubital 1 day         Peripheral IV - Single Lumen 10/24/23 1149 18 G;1 3/4 in Anterior;Left Upper Arm 1 day                       Physical Exam  Vitals reviewed.   Constitutional:       Appearance: Normal appearance. She is not ill-appearing.   HENT:      Head: Normocephalic and atraumatic.      Nose: No rhinorrhea.   Eyes:      General: No scleral icterus.     Conjunctiva/sclera: Conjunctivae normal.   Cardiovascular:      Rate and Rhythm: Normal rate and regular rhythm.   Pulmonary:      Effort: Pulmonary effort is normal.   Abdominal:      General: There is no distension.   Musculoskeletal:      Right lower leg: Edema present.      Left lower leg: Edema present.      Comments: R BKA  L trans-metatarsal foot amputation   Neurological:      Mental Status: She is alert and oriented to person, place, and time.   Psychiatric:         Mood and Affect: Mood normal.             Significant Labs: CMP   Recent Labs   Lab 10/24/23  1423 10/24/23  1958 10/25/23  0303   * 129* 131*   K 4.5 4.3 4.1   CL 94* 94* 97   CO2 22* 22* 23   * 169* 95   BUN 44* 46* 42*   CREATININE 3.2* 2.9* 2.4*   CALCIUM 8.5* 8.5* 8.4*   ANIONGAP 13 13 11    and CBC   Recent Labs   Lab 10/24/23  0255 10/25/23  0303 10/25/23  0536   WBC 16.23*  16.23* SEE COMMENT  SEE COMMENT 13.16*   HGB 11.8*  11.8* SEE COMMENT  SEE COMMENT 11.2*   HCT 36.5*  36.5* SEE COMMENT  SEE COMMENT 34.7*     299 SEE COMMENT  SEE COMMENT 252       Significant Imaging:  none

## 2023-10-25 NOTE — ASSESSMENT & PLAN NOTE
Pt is a 58 y.o female with ho CAD, iCM with EF 20-25% who presents to the hospital with decompensated HF and NSTEMI  BNP 1122 on arrival.   TTE on 10/22: with EF 20-25%, CVP 15 mmHg,   On 10/23 she received total of 750 cc fluid bolus for hypotension  MAP down to 52, admitted to CCU with cardiogenic shock in setting of decompensated HF and required levophed briefly  Initial lactate was 2.7 and follow up was 2.2, now 1.7  Kidney function worsening with Cr to 3 (baseline ~1)    Plan:  -- hypotensive requiring levophed overnight, admitted to CCU, patient declining central line  -- levophed and dobutamine weaned off  -- repeat lactate WNL  -- lasix gtt d/c

## 2023-10-25 NOTE — ASSESSMENT & PLAN NOTE
Home GDMT: Toprol, Lisinopril, and losartan? ( have to check with pharmacy), lasix 20 mg po bid    Plan:  -- lasix gtt d/c with good UOP and improvement in Cr  -- introducing GDMT with isosorbide dinitrate and hydralazine

## 2023-10-25 NOTE — ASSESSMENT & PLAN NOTE
Worsening renal function in setting of cardiogenic shock likely 2/2 ATN.  -- lasix d/c  -- showing kidney function improvement  -- discussed potential need for dialysis if function does not improve in the next few days

## 2023-10-25 NOTE — ASSESSMENT & PLAN NOTE
Held home antihypertensives in setting of cardiogenic shock.  Starting GDMT with isosorbide dinitrate 10 and hydralazine 10.

## 2023-10-25 NOTE — NURSING
No significant shift events. Pt Aaox4, VSS. Pt dobutamine gtt turned off. MD will determine if patient is to be on Heparin gtt. Pt has not had bowel movement in over 5 days per pt- bowel regimen to be ordered. Plan of care reviewed with patient and all questions addressed and answered.

## 2023-10-26 LAB
ANION GAP SERPL CALC-SCNC: 10 MMOL/L (ref 8–16)
ANION GAP SERPL CALC-SCNC: 9 MMOL/L (ref 8–16)
BASOPHILS # BLD AUTO: 0.03 K/UL (ref 0–0.2)
BASOPHILS NFR BLD: 0.3 % (ref 0–1.9)
BUN SERPL-MCNC: 32 MG/DL (ref 6–20)
BUN SERPL-MCNC: 35 MG/DL (ref 6–20)
CALCIUM SERPL-MCNC: 7.9 MG/DL (ref 8.7–10.5)
CALCIUM SERPL-MCNC: 8.6 MG/DL (ref 8.7–10.5)
CHLORIDE SERPL-SCNC: 105 MMOL/L (ref 95–110)
CHLORIDE SERPL-SCNC: 98 MMOL/L (ref 95–110)
CO2 SERPL-SCNC: 21 MMOL/L (ref 23–29)
CO2 SERPL-SCNC: 24 MMOL/L (ref 23–29)
CREAT SERPL-MCNC: 1.5 MG/DL (ref 0.5–1.4)
CREAT SERPL-MCNC: 1.8 MG/DL (ref 0.5–1.4)
DIFFERENTIAL METHOD: ABNORMAL
EOSINOPHIL # BLD AUTO: 0.2 K/UL (ref 0–0.5)
EOSINOPHIL NFR BLD: 2.2 % (ref 0–8)
ERYTHROCYTE [DISTWIDTH] IN BLOOD BY AUTOMATED COUNT: 14.4 % (ref 11.5–14.5)
EST. GFR  (NO RACE VARIABLE): 32.5 ML/MIN/1.73 M^2
EST. GFR  (NO RACE VARIABLE): 40.4 ML/MIN/1.73 M^2
GLUCOSE SERPL-MCNC: 130 MG/DL (ref 70–110)
GLUCOSE SERPL-MCNC: 215 MG/DL (ref 70–110)
HCT VFR BLD AUTO: 32 % (ref 37–48.5)
HGB BLD-MCNC: 10.4 G/DL (ref 12–16)
IMM GRANULOCYTES # BLD AUTO: 0.05 K/UL (ref 0–0.04)
IMM GRANULOCYTES NFR BLD AUTO: 0.5 % (ref 0–0.5)
LYMPHOCYTES # BLD AUTO: 4.8 K/UL (ref 1–4.8)
LYMPHOCYTES NFR BLD: 44.5 % (ref 18–48)
MCH RBC QN AUTO: 22.8 PG (ref 27–31)
MCHC RBC AUTO-ENTMCNC: 32.5 G/DL (ref 32–36)
MCV RBC AUTO: 70 FL (ref 82–98)
MONOCYTES # BLD AUTO: 0.7 K/UL (ref 0.3–1)
MONOCYTES NFR BLD: 6.7 % (ref 4–15)
NEUTROPHILS # BLD AUTO: 4.9 K/UL (ref 1.8–7.7)
NEUTROPHILS NFR BLD: 45.8 % (ref 38–73)
NRBC BLD-RTO: 0 /100 WBC
PATH REV BLD -IMP: NORMAL
PLATELET # BLD AUTO: 270 K/UL (ref 150–450)
PMV BLD AUTO: 12.7 FL (ref 9.2–12.9)
POCT GLUCOSE: 121 MG/DL (ref 70–110)
POCT GLUCOSE: 167 MG/DL (ref 70–110)
POCT GLUCOSE: 179 MG/DL (ref 70–110)
POCT GLUCOSE: 184 MG/DL (ref 70–110)
POTASSIUM SERPL-SCNC: 4.4 MMOL/L (ref 3.5–5.1)
POTASSIUM SERPL-SCNC: 5.6 MMOL/L (ref 3.5–5.1)
RBC # BLD AUTO: 4.56 M/UL (ref 4–5.4)
SODIUM SERPL-SCNC: 131 MMOL/L (ref 136–145)
SODIUM SERPL-SCNC: 136 MMOL/L (ref 136–145)
WBC # BLD AUTO: 10.68 K/UL (ref 3.9–12.7)

## 2023-10-26 PROCEDURE — 99233 SBSQ HOSP IP/OBS HIGH 50: CPT | Mod: ,,, | Performed by: INTERNAL MEDICINE

## 2023-10-26 PROCEDURE — 25000003 PHARM REV CODE 250: Performed by: INTERNAL MEDICINE

## 2023-10-26 PROCEDURE — 25000003 PHARM REV CODE 250

## 2023-10-26 PROCEDURE — 99233 PR SUBSEQUENT HOSPITAL CARE,LEVL III: ICD-10-PCS | Mod: ,,, | Performed by: INTERNAL MEDICINE

## 2023-10-26 PROCEDURE — 25000003 PHARM REV CODE 250: Performed by: STUDENT IN AN ORGANIZED HEALTH CARE EDUCATION/TRAINING PROGRAM

## 2023-10-26 PROCEDURE — 25000242 PHARM REV CODE 250 ALT 637 W/ HCPCS: Performed by: PHYSICIAN ASSISTANT

## 2023-10-26 PROCEDURE — 25000003 PHARM REV CODE 250: Performed by: PHYSICIAN ASSISTANT

## 2023-10-26 PROCEDURE — 20600001 HC STEP DOWN PRIVATE ROOM

## 2023-10-26 PROCEDURE — 80048 BASIC METABOLIC PNL TOTAL CA: CPT | Mod: 91

## 2023-10-26 PROCEDURE — 80048 BASIC METABOLIC PNL TOTAL CA: CPT | Performed by: PHYSICIAN ASSISTANT

## 2023-10-26 PROCEDURE — 85025 COMPLETE CBC W/AUTO DIFF WBC: CPT | Performed by: INTERNAL MEDICINE

## 2023-10-26 RX ORDER — ACETAMINOPHEN 325 MG/1
650 TABLET ORAL ONCE
Status: COMPLETED | OUTPATIENT
Start: 2023-10-26 | End: 2023-10-26

## 2023-10-26 RX ORDER — OXYCODONE AND ACETAMINOPHEN 10; 325 MG/1; MG/1
1 TABLET ORAL EVERY 4 HOURS PRN
Status: DISCONTINUED | OUTPATIENT
Start: 2023-10-26 | End: 2023-10-27 | Stop reason: HOSPADM

## 2023-10-26 RX ORDER — TORSEMIDE 20 MG/1
20 TABLET ORAL DAILY
Status: DISCONTINUED | OUTPATIENT
Start: 2023-10-26 | End: 2023-10-27

## 2023-10-26 RX ADMIN — OXYCODONE AND ACETAMINOPHEN 1 TABLET: 10; 325 TABLET ORAL at 02:10

## 2023-10-26 RX ADMIN — PANTOPRAZOLE SODIUM 40 MG: 40 TABLET, DELAYED RELEASE ORAL at 09:10

## 2023-10-26 RX ADMIN — INSULIN ASPART 5 UNITS: 100 INJECTION, SOLUTION INTRAVENOUS; SUBCUTANEOUS at 05:10

## 2023-10-26 RX ADMIN — INSULIN ASPART 5 UNITS: 100 INJECTION, SOLUTION INTRAVENOUS; SUBCUTANEOUS at 09:10

## 2023-10-26 RX ADMIN — POLYETHYLENE GLYCOL 3350 17 G: 17 POWDER, FOR SOLUTION ORAL at 09:10

## 2023-10-26 RX ADMIN — PREGABALIN 75 MG: 75 CAPSULE ORAL at 09:10

## 2023-10-26 RX ADMIN — OXYCODONE AND ACETAMINOPHEN 1 TABLET: 10; 325 TABLET ORAL at 05:10

## 2023-10-26 RX ADMIN — CLOPIDOGREL BISULFATE 75 MG: 75 TABLET ORAL at 09:10

## 2023-10-26 RX ADMIN — FLUTICASONE FUROATE AND VILANTEROL TRIFENATATE 1 PUFF: 100; 25 POWDER RESPIRATORY (INHALATION) at 09:10

## 2023-10-26 RX ADMIN — ISOSORBIDE DINITRATE 10 MG: 10 TABLET ORAL at 02:10

## 2023-10-26 RX ADMIN — HYDRALAZINE HYDROCHLORIDE 10 MG: 10 TABLET ORAL at 02:10

## 2023-10-26 RX ADMIN — INSULIN DETEMIR 15 UNITS: 100 INJECTION, SOLUTION SUBCUTANEOUS at 09:10

## 2023-10-26 RX ADMIN — OXYCODONE AND ACETAMINOPHEN 1 TABLET: 10; 325 TABLET ORAL at 07:10

## 2023-10-26 RX ADMIN — ASPIRIN 81 MG CHEWABLE TABLET 81 MG: 81 TABLET CHEWABLE at 09:10

## 2023-10-26 RX ADMIN — HYDRALAZINE HYDROCHLORIDE 10 MG: 10 TABLET ORAL at 09:10

## 2023-10-26 RX ADMIN — HYDRALAZINE HYDROCHLORIDE 10 MG: 10 TABLET ORAL at 05:10

## 2023-10-26 RX ADMIN — MUPIROCIN: 20 OINTMENT TOPICAL at 09:10

## 2023-10-26 RX ADMIN — FOLIC ACID-PYRIDOXINE-CYANOCOBALAMIN TAB 2.5-25-2 MG 1 TABLET: 2.5-25-2 TAB at 09:10

## 2023-10-26 RX ADMIN — INSULIN ASPART 5 UNITS: 100 INJECTION, SOLUTION INTRAVENOUS; SUBCUTANEOUS at 02:10

## 2023-10-26 RX ADMIN — ISOSORBIDE DINITRATE 10 MG: 10 TABLET ORAL at 09:10

## 2023-10-26 RX ADMIN — OXYCODONE AND ACETAMINOPHEN 1 TABLET: 10; 325 TABLET ORAL at 09:10

## 2023-10-26 RX ADMIN — ATORVASTATIN CALCIUM 40 MG: 40 TABLET, FILM COATED ORAL at 09:10

## 2023-10-26 RX ADMIN — ACETAMINOPHEN 650 MG: 325 TABLET ORAL at 04:10

## 2023-10-26 RX ADMIN — SODIUM ZIRCONIUM CYCLOSILICATE 5 G: 5 POWDER, FOR SUSPENSION ORAL at 05:10

## 2023-10-26 RX ADMIN — TORSEMIDE 20 MG: 20 TABLET ORAL at 10:10

## 2023-10-26 NOTE — NURSING
SICU PLAN OF CARE NOTE    Dx: Cardiogenic shock    Goals of Care:   MAP > 65  SBP >90    Vital Signs (last 12 hours):   Pulse:  [68-82]   Resp:  [11-34]   BP: ()/(51-89)   SpO2:  [88 %-100 %]      Neuro: Follows Commands and Moves All Extremities    Cardiac: NSR. S1, S2.    Respiratory: Room Air    Urine Output: Urinary Catheter 1300 cc/shift    Diet: Cardiac Diet and Diabetic Diet     Labs/Accuchecks: Daily labs. ACHS accuchecks.    Skin: No altered skin integrity. Pt turned independently    Shift Events: NAEON. Potassium shifted. Pt turned independently. Plan of care reviewed with pt and all questions answered. Pain controlled with PRN oxycodone-acetaminophine. Melatonin administered as ordered PRN.

## 2023-10-26 NOTE — PROGRESS NOTES
[facility]  Cardiology  Progress Note    Patient Name: Casie Salvador  MRN: [id number]  Admission Date: 10/22/2023  Hospital Length of Stay: 3 days  Code Status: DNR   Attending Physician: aHns Gamez MD   Primary Care Physician: Adonis Carey MD  Expected Discharge Date: 10/27/2023  Principal Problem:Cardiogenic shock    Subjective:     Hospital Course:   Patient admitted to CCU for cardiogenic shock 2/2 acute decompensated HF and NSTEMI. Trop flat at 0.05. Patient briefly on peripheral levophed then dobutamine for MAP down to 52. Developed worsening kidney function likely 2/2 ATN. Patient hesitant for further invasive care, palliative care consulted to help elucidate GOC. Patient and spouse expressed wanting to receive further treatment. Patient off levophed and dobutamine and maintaining good MAP. GDMT initiated.      Interval History: NAEON. Patient continues to feel well and have good urine output. No CP, SOB.      Objective:     Vital Signs (Most Recent):  Temp: 97.4 °F (36.3 °C) (10/26/23 1122)  Pulse: 63 (10/26/23 1130)  Resp: 20 (10/26/23 1122)  BP: (!) 120/59 (10/26/23 1122)  SpO2: 97 % (10/26/23 1122) Vital Signs (24h Range):  Temp:  [97.4 °F (36.3 °C)-97.6 °F (36.4 °C)] 97.4 °F (36.3 °C)  Pulse:  [63-83] 63  Resp:  [11-54] 20  SpO2:  [88 %-100 %] 97 %  BP: ()/(51-89) 120/59     Weight: 77 kg (169 lb 12.1 oz)  Body mass index is 27.4 kg/m².     SpO2: 97 %         Intake/Output Summary (Last 24 hours) at 10/26/2023 1316  Last data filed at 10/26/2023 1000  Gross per 24 hour   Intake 357.13 ml   Output 1943 ml   Net -1585.87 ml       Lines/Drains/Airways       Drain  Duration                  Urethral Catheter 10/24/23 2 days              Peripheral Intravenous Line  Duration                  Peripheral IV - Single Lumen 10/24/23 0000 18 G Left Antecubital 2 days         Peripheral IV - Single Lumen 10/24/23 1149 18 G;1 3/4 in Anterior;Left Upper Arm 2 days                        Physical Exam  Vitals reviewed.   Constitutional:       Appearance: Normal appearance. She is not ill-appearing.   HENT:      Head: Normocephalic and atraumatic.      Nose: No rhinorrhea.   Eyes:      General: No scleral icterus.     Conjunctiva/sclera: Conjunctivae normal.   Cardiovascular:      Rate and Rhythm: Normal rate and regular rhythm.   Pulmonary:      Effort: Pulmonary effort is normal.   Abdominal:      General: There is no distension.   Musculoskeletal:      Comments: R BKA  L trans-metatarsal foot amputation   Neurological:      Mental Status: She is alert and oriented to person, place, and time.   Psychiatric:         Mood and Affect: Mood normal.            Significant Labs: CMP   Recent Labs   Lab 10/25/23  2014 10/26/23  0353 10/26/23  1002   * 131* 136   K 5.1 5.6* 4.4   CL 97 98 105   CO2 21* 24 21*   * 215* 130*   BUN 41* 35* 32*   CREATININE 2.1* 1.8* 1.5*   CALCIUM 8.2* 8.6* 7.9*   ANIONGAP 12 9 10    and CBC   Recent Labs   Lab 10/25/23  0303 10/25/23  0536 10/26/23  0353   WBC SEE COMMENT  SEE COMMENT 13.16* 10.68   HGB SEE COMMENT  SEE COMMENT 11.2* 10.4*   HCT SEE COMMENT  SEE COMMENT 34.7* 32.0*   PLT SEE COMMENT  SEE COMMENT 252 270       Significant Imaging:  none    Assessment and Plan:       * Cardiogenic shock  Pt is a 58 y.o female with ho CAD, iCM with EF 20-25% who presents to the hospital with decompensated HF and NSTEMI  BNP 1122 on arrival.   TTE on 10/22: with EF 20-25%, CVP 15 mmHg,   On 10/23 she received total of 750 cc fluid bolus for hypotension  MAP down to 52, admitted to CCU with cardiogenic shock in setting of decompensated HF and required levophed briefly  Initial lactate was 2.7 and follow up was 2.2, now cleared  Kidney function improving after Cr up to 3.2 (baseline ~1)    Plan:  -- hypotensive requiring levophed overnight, admitted to CCU, patient declining central line  -- levophed and dobutamine weaned off  -- repeat lactate  WNL, kidney function improving  -- lasix gtt d/c    Mural thrombus of cardiac apex  Was on heparin gtt, d/c    BRENDA (acute kidney injury)  Worsening renal function in setting of cardiogenic shock likely 2/2 ATN.  -- lasix d/c  -- showing kidney function improvement  -- starting torsemide 20    Peripheral artery disease  R foot BKA and L foot transmetatarsal amputation in 2018  Will cont ASA, and lipitor    History of gastric ulcer  Continue pantoprazole.    Acute on chronic combined systolic and diastolic congestive heart failure  Home GDMT: Toprol, Lisinopril, and losartan? ( have to check with pharmacy), lasix 20 mg po bid    Plan:  -- lasix gtt d/c with good UOP and improvement in Cr  -- starting torsemide  -- introducing GDMT with isosorbide dinitrate and hydralazine    Constipation due to opioid therapy  Continue miralax    Chronic pain  Lyrica for neuropathic pain in setting of bilateral amputations and PAD.    DM (diabetes mellitus), type 2 with complications  Last Hgb A1C 5.7  On home lantus 60 qhs and 45 qam  At hospital she has been receiving 15 units bid and 5 U aspart TIDWM    Plan:  -- 15 U detemir bid with goal -180 while in hospital  -- cont 5U TID WM    Coronary artery disease involving native coronary artery of native heart with angina pectoris  S/p PCI of ramus intermedius 2015 that thrombosed a week later resulted in a STEMI, OM2 in 2014, LAD in 2004  Presents to the hospital with chest discomfort  Trop flat at 0.05  Currently with decompensated HF.    Will cont ASA, PLAVIX, and Lipitor for now  Prn ntg fo chest discomfort.    Outpatient PET    Hypertension  Held home antihypertensives in setting of cardiogenic shock.  Starting GDMT with isosorbide dinitrate 10 and hydralazine 10.        VTE Risk Mitigation (From admission, onward)         Ordered     IP VTE HIGH RISK PATIENT  Once         10/23/23 1357     Place sequential compression device  Until discontinued         10/22/23 2018      Place sequential compression device  Until discontinued         10/22/23 2018                Haley Mauro MD  Cardiology  [facility]

## 2023-10-26 NOTE — SUBJECTIVE & OBJECTIVE
Interval History: NAEON. Patient continues to feel well and have good urine output. No CP, SOB.      Objective:     Vital Signs (Most Recent):  Temp: 97.4 °F (36.3 °C) (10/26/23 1122)  Pulse: 63 (10/26/23 1130)  Resp: 20 (10/26/23 1122)  BP: (!) 120/59 (10/26/23 1122)  SpO2: 97 % (10/26/23 1122) Vital Signs (24h Range):  Temp:  [97.4 °F (36.3 °C)-97.6 °F (36.4 °C)] 97.4 °F (36.3 °C)  Pulse:  [63-83] 63  Resp:  [11-54] 20  SpO2:  [88 %-100 %] 97 %  BP: ()/(51-89) 120/59     Weight: 77 kg (169 lb 12.1 oz)  Body mass index is 27.4 kg/m².     SpO2: 97 %         Intake/Output Summary (Last 24 hours) at 10/26/2023 1316  Last data filed at 10/26/2023 1000  Gross per 24 hour   Intake 357.13 ml   Output 1943 ml   Net -1585.87 ml       Lines/Drains/Airways       Drain  Duration                  Urethral Catheter 10/24/23 2 days              Peripheral Intravenous Line  Duration                  Peripheral IV - Single Lumen 10/24/23 0000 18 G Left Antecubital 2 days         Peripheral IV - Single Lumen 10/24/23 1149 18 G;1 3/4 in Anterior;Left Upper Arm 2 days                       Physical Exam  Vitals reviewed.   Constitutional:       Appearance: Normal appearance. She is not ill-appearing.   HENT:      Head: Normocephalic and atraumatic.      Nose: No rhinorrhea.   Eyes:      General: No scleral icterus.     Conjunctiva/sclera: Conjunctivae normal.   Cardiovascular:      Rate and Rhythm: Normal rate and regular rhythm.   Pulmonary:      Effort: Pulmonary effort is normal.   Abdominal:      General: There is no distension.   Musculoskeletal:      Comments: R BKA  L trans-metatarsal foot amputation   Neurological:      Mental Status: She is alert and oriented to person, place, and time.   Psychiatric:         Mood and Affect: Mood normal.            Significant Labs: CMP   Recent Labs   Lab 10/25/23  2014 10/26/23  0353 10/26/23  1002   * 131* 136   K 5.1 5.6* 4.4   CL 97 98 105   CO2 21* 24 21*   * 215*  130*   BUN 41* 35* 32*   CREATININE 2.1* 1.8* 1.5*   CALCIUM 8.2* 8.6* 7.9*   ANIONGAP 12 9 10    and CBC   Recent Labs   Lab 10/25/23  0303 10/25/23  0536 10/26/23  0353   WBC SEE COMMENT  SEE COMMENT 13.16* 10.68   HGB SEE COMMENT  SEE COMMENT 11.2* 10.4*   HCT SEE COMMENT  SEE COMMENT 34.7* 32.0*   PLT SEE COMMENT  SEE COMMENT 264 108       Significant Imaging:  none

## 2023-10-26 NOTE — ASSESSMENT & PLAN NOTE
Worsening renal function in setting of cardiogenic shock likely 2/2 ATN.  -- lasix d/c  -- showing kidney function improvement  -- starting torsemide 20

## 2023-10-26 NOTE — ASSESSMENT & PLAN NOTE
Pt is a 58 y.o female with ho CAD, iCM with EF 20-25% who presents to the hospital with decompensated HF and NSTEMI  BNP 1122 on arrival.   TTE on 10/22: with EF 20-25%, CVP 15 mmHg,   On 10/23 she received total of 750 cc fluid bolus for hypotension  MAP down to 52, admitted to CCU with cardiogenic shock in setting of decompensated HF and required levophed briefly  Initial lactate was 2.7 and follow up was 2.2, now cleared  Kidney function improving after Cr up to 3.2 (baseline ~1)    Plan:  -- hypotensive requiring levophed overnight, admitted to CCU, patient declining central line  -- levophed and dobutamine weaned off  -- repeat lactate WNL, kidney function improving  -- lasix gtt d/c

## 2023-10-26 NOTE — ASSESSMENT & PLAN NOTE
Chief Complaint:   Cough, Fever, and Diarrhea      History of Present Illness   Source of history provided by:  patient. Mamadou Krishnan is a 35 y.o. old male with a past medical history of:   Past Medical History:   Diagnosis Date    Emerson-Parkinson-White (WPW) syndrome         Pt presents to the [facility] care with a cough/congestion/fevers/diarrhea for the past 7 days. States the cough is yellow productive. Subjective fever noted. Denies any N/V,  abdominal pain, CP, progressive SOB, dizziness, or lethargy. Pt is tolerating PO well. Pt is requesting a work excuse today      ROS    Unless otherwise stated in this report or unable to obtain because of the patient's clinical or mental status as evidenced by the medical record, this patients's positive and negative responses for Review of Systems, constitutional, psych, eyes, ENT, cardiovascular, respiratory, gastrointestinal, neurological, genitourinary, musculoskeletal, integument systems and systems related to the presenting problem are either stated in the preceding or were not pertinent or were negative for the symptoms and/or complaints related to the medical problem. Past Surgical History:  has a past surgical history that includes Atrial ablation surgery. Social History:  reports that he has never smoked. He has never used smokeless tobacco. He reports current alcohol use. He reports that he does not use drugs. Family History: family history is not on file. Allergies: Patient has no known allergies. Physical Exam         VS:  /81   Pulse 88   Temp 97.1 °F (36.2 °C)    Oxygen Saturation Interpretation: Normal.    Constitutional:  Alert, development consistent with age. Ears:  External Ears: Normal bilateral pinna. TM's & External Canals: TM's normal bilaterally without perforation. Canals without erythema or drainage.     Nose:   There is no obvious septal defect  Moderate redness/edema  Mouth:  Moist bucca mucosa and normal Last Hgb A1C 5.7  On home lantus 60 qhs and 45 qam  At hospital she has been receiving 15 units bid and 5 U aspart TIDWM    Plan:  -- 15 U detemir bid with goal -180 while in hospital  -- cont 5U TID WM   tongue. Throat: Mild posterior pharyngeal erythema without exudates or lesions. Neck:  Supple. There is no obvious adenopathy or neck tenderness. Lungs:   Breath sounds: Normal chest expansion and breath sounds noted throughout. No wheezes, rales, or rhonchi noted. Heart:  Regular rate and rhythm, normal heart sounds, without pathological murmurs, ectopy, gallops, or rubs. Skin:  Normal turgor. Warm, dry, without visible rash. Neurological:  Oriented. Motor functions intact. Lab / Imaging Results   (All laboratory and radiology results have been personally reviewed by myself)  Labs:  Results for orders placed or performed in visit on 01/03/23   POCT Influenza A/B Antigen   Result Value Ref Range    Inflenza A Ag NEGATIVE     Influenza B Ag NEGATIVE    POCT COVID-19, Antigen   Result Value Ref Range    SARS-COV-2, POC Not-Detected Not Detected    Lot Number [id number]     QC Pass/Fail PASS     Performing Instrument BD Veritor        Imaging: All Radiology results interpreted by Radiologist unless otherwise noted. No orders to display         Assessment / Plan     Impression(s):  1. Acute URI    2.  Productive cough      Disposition:  Disposition: Advised Covid and Influenza A/B are negative, start Zithromax and Bromfed as ordered, stay well hydrated, return to walk in care as needed

## 2023-10-26 NOTE — ASSESSMENT & PLAN NOTE
Home GDMT: Toprol, Lisinopril, and losartan? ( have to check with pharmacy), lasix 20 mg po bid    Plan:  -- lasix gtt d/c with good UOP and improvement in Cr  -- starting torsemide  -- introducing GDMT with isosorbide dinitrate and hydralazine

## 2023-10-26 NOTE — NURSING TRANSFER
Nursing Transfer Note      10/26/2023   11:18 AM    Nurse giving handoff: Landen Wei  Nurse receiving handoff: Deepa    Reason patient is being transferred: Step down from ICU     Transfer To: 306    Transfer via wheelchair    Transfer with cardiac monitoring    Transported by RN x2    Telemetry: Yes connected to TeleBox, Telemetry department notified     Order for Tele Monitor? Yes    Medicines sent: yes    Patient belongings transferred with patient: Yes    Chart send with patient: Yes    Notified: Family at bedside     Upon arrival to floor: patient oriented to room, call bell in reach, and bed in lowest position

## 2023-10-27 VITALS
SYSTOLIC BLOOD PRESSURE: 117 MMHG | BODY MASS INDEX: 27.28 KG/M2 | TEMPERATURE: 99 F | OXYGEN SATURATION: 96 % | WEIGHT: 169.75 LBS | HEIGHT: 66 IN | HEART RATE: 73 BPM | DIASTOLIC BLOOD PRESSURE: 58 MMHG | RESPIRATION RATE: 18 BRPM

## 2023-10-27 LAB
ANION GAP SERPL CALC-SCNC: 11 MMOL/L (ref 8–16)
BNP SERPL-MCNC: 322 PG/ML (ref 0–99)
BUN SERPL-MCNC: 29 MG/DL (ref 6–20)
CALCIUM SERPL-MCNC: 9 MG/DL (ref 8.7–10.5)
CHLORIDE SERPL-SCNC: 100 MMOL/L (ref 95–110)
CO2 SERPL-SCNC: 25 MMOL/L (ref 23–29)
CREAT SERPL-MCNC: 1.8 MG/DL (ref 0.5–1.4)
EST. GFR  (NO RACE VARIABLE): 32.5 ML/MIN/1.73 M^2
GLUCOSE SERPL-MCNC: 132 MG/DL (ref 70–110)
POCT GLUCOSE: 109 MG/DL (ref 70–110)
POCT GLUCOSE: 139 MG/DL (ref 70–110)
POTASSIUM SERPL-SCNC: 4 MMOL/L (ref 3.5–5.1)
SODIUM SERPL-SCNC: 136 MMOL/L (ref 136–145)

## 2023-10-27 PROCEDURE — 93005 ELECTROCARDIOGRAM TRACING: CPT

## 2023-10-27 PROCEDURE — 94640 AIRWAY INHALATION TREATMENT: CPT

## 2023-10-27 PROCEDURE — 80048 BASIC METABOLIC PNL TOTAL CA: CPT | Performed by: PHYSICIAN ASSISTANT

## 2023-10-27 PROCEDURE — 25000003 PHARM REV CODE 250: Performed by: STUDENT IN AN ORGANIZED HEALTH CARE EDUCATION/TRAINING PROGRAM

## 2023-10-27 PROCEDURE — 83880 ASSAY OF NATRIURETIC PEPTIDE: CPT | Performed by: STUDENT IN AN ORGANIZED HEALTH CARE EDUCATION/TRAINING PROGRAM

## 2023-10-27 PROCEDURE — 99239 PR HOSPITAL DISCHARGE DAY,>30 MIN: ICD-10-PCS | Mod: ,,, | Performed by: INTERNAL MEDICINE

## 2023-10-27 PROCEDURE — 93010 ELECTROCARDIOGRAM REPORT: CPT | Mod: ,,, | Performed by: INTERNAL MEDICINE

## 2023-10-27 PROCEDURE — 25000003 PHARM REV CODE 250

## 2023-10-27 PROCEDURE — 25000003 PHARM REV CODE 250: Performed by: PHYSICIAN ASSISTANT

## 2023-10-27 PROCEDURE — 99239 HOSP IP/OBS DSCHRG MGMT >30: CPT | Mod: ,,, | Performed by: INTERNAL MEDICINE

## 2023-10-27 PROCEDURE — 93010 EKG 12-LEAD: ICD-10-PCS | Mod: ,,, | Performed by: INTERNAL MEDICINE

## 2023-10-27 PROCEDURE — 36415 COLL VENOUS BLD VENIPUNCTURE: CPT | Performed by: STUDENT IN AN ORGANIZED HEALTH CARE EDUCATION/TRAINING PROGRAM

## 2023-10-27 PROCEDURE — 36415 COLL VENOUS BLD VENIPUNCTURE: CPT | Performed by: PHYSICIAN ASSISTANT

## 2023-10-27 RX ORDER — PANTOPRAZOLE SODIUM 40 MG/1
40 TABLET, DELAYED RELEASE ORAL
Status: DISCONTINUED | OUTPATIENT
Start: 2023-10-28 | End: 2023-10-27 | Stop reason: HOSPADM

## 2023-10-27 RX ORDER — HYDRALAZINE HYDROCHLORIDE 10 MG/1
10 TABLET, FILM COATED ORAL 3 TIMES DAILY
Qty: 90 TABLET | Refills: 11 | Status: ON HOLD | OUTPATIENT
Start: 2023-10-27 | End: 2024-10-26

## 2023-10-27 RX ORDER — PANTOPRAZOLE SODIUM 40 MG/1
40 TABLET, DELAYED RELEASE ORAL
Qty: 30 TABLET | Refills: 5 | Status: ON HOLD | OUTPATIENT
Start: 2023-10-28 | End: 2024-10-27

## 2023-10-27 RX ORDER — INSULIN ASPART 100 [IU]/ML
5 INJECTION, SOLUTION INTRAVENOUS; SUBCUTANEOUS
Qty: 4.5 ML | Refills: 11 | Status: ON HOLD | OUTPATIENT
Start: 2023-10-27 | End: 2024-10-26

## 2023-10-27 RX ORDER — DEXTROSE 4 G
TABLET,CHEWABLE ORAL
Qty: 1 EACH | Refills: 0 | Status: ON HOLD | OUTPATIENT
Start: 2023-10-27

## 2023-10-27 RX ORDER — FUROSEMIDE 20 MG/1
20 TABLET ORAL DAILY
Qty: 40 TABLET | Refills: 5 | Status: ON HOLD | OUTPATIENT
Start: 2023-10-27

## 2023-10-27 RX ORDER — METOCLOPRAMIDE 10 MG/1
10-20 TABLET ORAL 2 TIMES DAILY
Status: ON HOLD | COMMUNITY
Start: 2023-10-02 | End: 2023-10-27 | Stop reason: HOSPADM

## 2023-10-27 RX ORDER — INSULIN GLARGINE 100 [IU]/ML
30 INJECTION, SOLUTION SUBCUTANEOUS NIGHTLY
Qty: 9 ML | Refills: 5 | Status: ON HOLD | OUTPATIENT
Start: 2023-10-27 | End: 2024-04-24

## 2023-10-27 RX ORDER — HYDRALAZINE HYDROCHLORIDE 10 MG/1
10 TABLET, FILM COATED ORAL 3 TIMES DAILY
Qty: 90 TABLET | Refills: 5 | Status: ON HOLD | OUTPATIENT
Start: 2023-10-27 | End: 2024-10-26

## 2023-10-27 RX ORDER — EZETIMIBE 10 MG/1
10 TABLET ORAL NIGHTLY
Qty: 30 TABLET | Refills: 5 | Status: ON HOLD | OUTPATIENT
Start: 2023-10-27 | End: 2024-10-26

## 2023-10-27 RX ORDER — METOPROLOL SUCCINATE 25 MG/1
25 TABLET, EXTENDED RELEASE ORAL DAILY
Status: DISCONTINUED | OUTPATIENT
Start: 2023-10-27 | End: 2023-10-27 | Stop reason: HOSPADM

## 2023-10-27 RX ORDER — BLOOD-GLUCOSE CONTROL, NORMAL
EACH MISCELLANEOUS
Qty: 200 EACH | Refills: 11 | Status: ON HOLD | OUTPATIENT
Start: 2023-10-27

## 2023-10-27 RX ORDER — EZETIMIBE 10 MG/1
10 TABLET ORAL NIGHTLY
Qty: 90 TABLET | Refills: 3 | Status: ON HOLD | OUTPATIENT
Start: 2023-10-27 | End: 2024-10-26

## 2023-10-27 RX ORDER — HYDRALAZINE HYDROCHLORIDE 10 MG/1
10 TABLET, FILM COATED ORAL 3 TIMES DAILY
Status: DISCONTINUED | OUTPATIENT
Start: 2023-10-27 | End: 2023-10-27 | Stop reason: HOSPADM

## 2023-10-27 RX ORDER — ATORVASTATIN CALCIUM 80 MG/1
80 TABLET, FILM COATED ORAL DAILY
Qty: 30 TABLET | Refills: 11 | Status: ON HOLD | OUTPATIENT
Start: 2023-10-27

## 2023-10-27 RX ORDER — PREGABALIN 75 MG/1
75 CAPSULE ORAL NIGHTLY
Qty: 30 CAPSULE | Refills: 3 | Status: ON HOLD | OUTPATIENT
Start: 2023-10-27

## 2023-10-27 RX ORDER — ATORVASTATIN CALCIUM 20 MG/1
80 TABLET, FILM COATED ORAL DAILY
Status: DISCONTINUED | OUTPATIENT
Start: 2023-10-28 | End: 2023-10-27 | Stop reason: HOSPADM

## 2023-10-27 RX ORDER — ISOSORBIDE DINITRATE 10 MG/1
10 TABLET ORAL 3 TIMES DAILY
Qty: 90 TABLET | Refills: 5 | Status: ON HOLD | OUTPATIENT
Start: 2023-10-27 | End: 2024-10-26

## 2023-10-27 RX ORDER — CLOPIDOGREL BISULFATE 75 MG/1
75 TABLET ORAL DAILY
Qty: 30 TABLET | Refills: 11 | Status: ON HOLD | OUTPATIENT
Start: 2023-10-27

## 2023-10-27 RX ORDER — ISOSORBIDE DINITRATE 10 MG/1
10 TABLET ORAL 3 TIMES DAILY
Qty: 90 TABLET | Refills: 11 | Status: ON HOLD | OUTPATIENT
Start: 2023-10-27 | End: 2024-10-26

## 2023-10-27 RX ORDER — SEMAGLUTIDE 1.34 MG/ML
1 INJECTION, SOLUTION SUBCUTANEOUS
Status: ON HOLD | COMMUNITY
Start: 2023-09-05

## 2023-10-27 RX ORDER — LANCING DEVICE
EACH MISCELLANEOUS
Qty: 1 EACH | Refills: 0 | Status: ON HOLD | OUTPATIENT
Start: 2023-10-27

## 2023-10-27 RX ORDER — INSULIN ASPART 100 [IU]/ML
5 INJECTION, SOLUTION INTRAVENOUS; SUBCUTANEOUS
Qty: 6 ML | Refills: 5 | Status: ON HOLD | OUTPATIENT
Start: 2023-10-27 | End: 2024-10-26

## 2023-10-27 RX ORDER — EZETIMIBE 10 MG/1
10 TABLET ORAL NIGHTLY
Status: DISCONTINUED | OUTPATIENT
Start: 2023-10-27 | End: 2023-10-27 | Stop reason: HOSPADM

## 2023-10-27 RX ORDER — PEN NEEDLE, DIABETIC 30 GX3/16"
NEEDLE, DISPOSABLE MISCELLANEOUS
Qty: 200 EACH | Refills: 11 | Status: ON HOLD | OUTPATIENT
Start: 2023-10-27

## 2023-10-27 RX ADMIN — MUPIROCIN: 20 OINTMENT TOPICAL at 08:10

## 2023-10-27 RX ADMIN — POLYETHYLENE GLYCOL 3350 17 G: 17 POWDER, FOR SOLUTION ORAL at 08:10

## 2023-10-27 RX ADMIN — TORSEMIDE 20 MG: 20 TABLET ORAL at 08:10

## 2023-10-27 RX ADMIN — PANTOPRAZOLE SODIUM 40 MG: 40 TABLET, DELAYED RELEASE ORAL at 08:10

## 2023-10-27 RX ADMIN — OXYCODONE AND ACETAMINOPHEN 1 TABLET: 10; 325 TABLET ORAL at 12:10

## 2023-10-27 RX ADMIN — INSULIN ASPART 5 UNITS: 100 INJECTION, SOLUTION INTRAVENOUS; SUBCUTANEOUS at 12:10

## 2023-10-27 RX ADMIN — APIXABAN 5 MG: 5 TABLET, FILM COATED ORAL at 09:10

## 2023-10-27 RX ADMIN — FLUTICASONE FUROATE AND VILANTEROL TRIFENATATE 1 PUFF: 100; 25 POWDER RESPIRATORY (INHALATION) at 08:10

## 2023-10-27 RX ADMIN — INSULIN DETEMIR 15 UNITS: 100 INJECTION, SOLUTION SUBCUTANEOUS at 08:10

## 2023-10-27 RX ADMIN — INSULIN ASPART 5 UNITS: 100 INJECTION, SOLUTION INTRAVENOUS; SUBCUTANEOUS at 08:10

## 2023-10-27 RX ADMIN — ATORVASTATIN CALCIUM 40 MG: 40 TABLET, FILM COATED ORAL at 08:10

## 2023-10-27 RX ADMIN — METOPROLOL SUCCINATE 25 MG: 25 TABLET, EXTENDED RELEASE ORAL at 09:10

## 2023-10-27 RX ADMIN — HYDRALAZINE HYDROCHLORIDE 10 MG: 10 TABLET, FILM COATED ORAL at 08:10

## 2023-10-27 RX ADMIN — HYDRALAZINE HYDROCHLORIDE 10 MG: 10 TABLET ORAL at 05:10

## 2023-10-27 RX ADMIN — CLOPIDOGREL BISULFATE 75 MG: 75 TABLET ORAL at 08:10

## 2023-10-27 RX ADMIN — ASPIRIN 81 MG CHEWABLE TABLET 81 MG: 81 TABLET CHEWABLE at 08:10

## 2023-10-27 RX ADMIN — FOLIC ACID-PYRIDOXINE-CYANOCOBALAMIN TAB 2.5-25-2 MG 1 TABLET: 2.5-25-2 TAB at 08:10

## 2023-10-27 RX ADMIN — ISOSORBIDE DINITRATE 10 MG: 10 TABLET ORAL at 08:10

## 2023-10-27 NOTE — ASSESSMENT & PLAN NOTE
Home GDMT: Toprol, Lisinopril, and losartan? ( have to check with pharmacy), lasix 20 mg po bid. Concern for compliance issues.    Plan:  -- lasix gtt d/c with good UOP and improvement in Cr  -- starting torsemide  -- introducing GDMT with isosorbide dinitrate and hydralazine, further titration outpatient in HF transitional clinic  -- holding losartan in setting of kidney injury, BMP in a week to assess recovery

## 2023-10-27 NOTE — NURSING
Pharmacy prescriptions sent to [facility] pharmacy per patient request; dr. Mauro confirmed with patient

## 2023-10-27 NOTE — ASSESSMENT & PLAN NOTE
S/p PCI of ramus intermedius 2015 that thrombosed a week later resulted in a STEMI, OM2 in 2014, LAD in 2004  Presents to the hospital with chest discomfort  Trop flat at 0.05  Currently with decompensated HF.    Will cont ASA, PLAVIX, and Lipitor, adding zetia    Outpatient PET

## 2023-10-27 NOTE — ASSESSMENT & PLAN NOTE
Worsening renal function likely 2/2 cardiorenal syndrome  -- diuresed on lasix and torsemide  -- showing kidney function improvement  -- continue furosemide outpatient with additional dose for weight gain

## 2023-10-27 NOTE — PLAN OF CARE
[facility] Card[facility] Stepdown  Discharge Final Note    Primary Care Provider: Adonis Carey MD    Expected Discharge Date: 10/27/2023    Final Discharge Note (most recent)       Final Note - 10/27/23 1200          Final Note    Assessment Type Final Discharge Note     Anticipated Discharge Disposition Home or Self Care                   Tg Peters LMSW  [facility]  [id number]      Future Appointments   Date Time Provider Department Center   10/31/2023 10:00 AM LAB, APPOINTMENT [facility] LAB VNP [facility]   10/31/2023 10:30 AM Leona José NP [facility]   10/31/2023 10:30 AM [facility] HEART FAILURE NURSE [facility]       Contact Info       Adonis Carey MD   Specialty: Internal Medicine   Relationship: PCP - General    [address] [place] [id number]   Phone: [phone]       Next Steps: Follow up on 10/27/2023

## 2023-10-27 NOTE — PLAN OF CARE
Problem: Adult Inpatient Plan of Care  Goal: Plan of Care Review  Outcome: Ongoing, Progressing  Goal: Patient-Specific Goal (Individualized)  Outcome: Ongoing, Progressing  Goal: Absence of Hospital-Acquired Illness or Injury  Outcome: Ongoing, Progressing  Goal: Optimal Comfort and Wellbeing  Outcome: Ongoing, Progressing  Goal: Readiness for Transition of Care  Outcome: Ongoing, Progressing     Problem: Infection  Goal: Absence of Infection Signs and Symptoms  Outcome: Ongoing, Progressing     Problem: Diabetes Comorbidity  Goal: Blood Glucose Level Within Targeted Range  Outcome: Ongoing, Progressing     Problem: Fluid and Electrolyte Imbalance (Acute Kidney Injury/Impairment)  Goal: Fluid and Electrolyte Balance  Outcome: Ongoing, Progressing     Problem: Oral Intake Inadequate (Acute Kidney Injury/Impairment)  Goal: Optimal Nutrition Intake  Outcome: Ongoing, Progressing     Problem: Renal Function Impairment (Acute Kidney Injury/Impairment)  Goal: Effective Renal Function  Outcome: Ongoing, Progressing     Problem: Coping Ineffective  Goal: Effective Coping  Outcome: Ongoing, Progressing

## 2023-10-27 NOTE — DISCHARGE SUMMARY
[facility] - Card[facility] Stepdown  Cardiology  Discharge Summary      Patient Name: Casie Salvador  MRN: [id number]  Admission Date: 10/22/2023  Hospital Length of Stay: 4 days  Discharge Date and Time:  10/27/2023 1:51 PM  Attending Physician: Rajni Vernon MD    Discharging Provider: Haley Mauro MD  Primary Care Physician: Adonis Carey MD    HPI:   pt is a 58 y.o female with h/o HTN, HLD, DMII, CAD with pci to OM2 in 2014 and LAD in 2004, former tobacco smoker ( over 20 yrs ago), PAD s/p R BKA and L foot amputation, iCM with EF 20-25%. She presented to the hospital yesterday with substernal Chest discomfort X2-3 days for.  She states that for the past month she had been feeling nauseous, constipated, and felt bloated and heavy. Yesterday AM she started to have chest discomfort at rest for which she came to the hospital for. Also reported some sob/steven.  on arrival to the hospital she was hypertensive with MAPs 180/98, 99% on 2L NC. cr 1.5; BNP 1122; Trop 0.6->0.05  admitted to the hospital for decompensated HF and NSTEMI. was given 80 mg iv lasix with undocumented urine output.   TTE today with EF 20-25%, CVP 15mmHg, and small LV thrombus.  during the day she became slightly hypotensive for which she was given 3 bouses of 250 cc IVF  At night i was called by primary team regarding concen for shock as pt was becoming hypotensive.    on arrival to bedside she appeared alert and oriented. Her extremities were slightly cool to touch.   plasma lactate obtained 9:15 pm was 2.7 and poc lactate obtained at 9:32 ( when plasma had not resulted yet) was 2.05.  BP had improved by the time i was at bedside with .    Decision was made to transfer pt to CCU for closer monitoring.        * No surgery found *     Indwelling Lines/Drains at time of discharge:  Lines/Drains/Airways       None                   Hospital Course:  Patient admitted to CCU for cardiogenic shock 2/2 acute decompensated HF and  NSTEMI. Trop flat at 0.05. Patient briefly on peripheral levophed then dobutamine for MAP down to 52. Developed worsening kidney function likely 2/2 cardiorenal syndrome. Patient hesitant for further invasive care, palliative care consulted to help elucidate GOC. Patient and spouse expressed wanting to receive further treatment. Patient off levophed and dobutamine and maintaining good MAP. GDMT initiated with hydralazine and isosorbide dinitrate and tolerated well inpatient. Patient adamant about discharging, and was discharged in stable condition with eliquis for possible LV thrombus, zetia, and with referral to follow up in heart failure transitional clinic for further titration of GDMT.        Physical Exam  Vitals reviewed.   Vitals:    10/27/23 0842 10/27/23 0947 10/27/23 1139 10/27/23 1154   BP:  (!) 144/67 (!) 117/58    BP Location:       Patient Position:   Lying    Pulse: 78  69 73   Resp: 16  18    Temp:   98.7 °F (37.1 °C)    TempSrc:   Oral    SpO2: (!) 94%  96%    Weight:       Height:          Constitutional:       Appearance: Normal appearance. She is not ill-appearing.   HENT:      Head: Normocephalic and atraumatic.      Nose: No rhinorrhea.   Eyes:      General: No scleral icterus.     Conjunctiva/sclera: Conjunctivae normal.   Cardiovascular:      Rate and Rhythm: Normal rate and regular rhythm.   Pulmonary:      Effort: Pulmonary effort is normal.   Abdominal:      General: There is no distension.   Musculoskeletal:      Comments: R BKA  L trans-metatarsal foot amputation   Neurological:      Mental Status: She is alert and oriented to person, place, and time.   Psychiatric:         Mood and Affect: Mood normal.     Goals of Care Treatment Preferences:  Code Status: DNR      Consults:   Consults (From admission, onward)          Status Ordering Provider     Inpatient consult to Palliative Care  Once        Provider:  (Not yet assigned)    Completed SARITHA VALDEZ     Inpatient consult to  Midline team  Once        Provider:  (Not yet assigned)    Completed KWADWO GONSALEZ     Inpatient consult to Cardiology  Once        Provider:  (Not yet assigned)    Completed DUSTIN UGARTE     Inpatient consult to Social Work/Case Management  Once        Provider:  (Not yet assigned)    Acknowledged DUSTIN UGARTE     Inpatient consult to Registered Dietitian/Nutritionist  Once        Provider:  (Not yet assigned)    Completed DUSTIN UGARTE            Significant Diagnostic Studies:   Echo    Result Date: 10/23/2023    Left Ventricle: The left ventricle is normal in size. Normal wall   thickness. regional wall motion abnormalities present. There is severely   reduced systolic function with a visually estimated ejection fraction of   20 - 25%. There is diastolic dysfunction. .    Right Ventricle: Normal right ventricular cavity size. Wall thickness   is normal. Right ventricle wall motion  is normal. Systolic function is   normal.    IVC/SVC: Elevated venous pressure at 15 mmHg.    There is a small thrombus present in the left ventricular apex          Pending Diagnostic Studies:       None            Final Active Diagnoses:    Diagnosis Date Noted POA    PRINCIPAL PROBLEM:  Cardiogenic shock [R57.0] 10/24/2023 Yes    Palliative care encounter [Z51.5] 10/24/2023 Not Applicable    Mural thrombus of cardiac apex [I51.3] 10/23/2023 Yes    S/P BKA (below knee amputation), right [Z89.511] 10/22/2023 Not Applicable    Chest pain, rule out acute myocardial infarction [R07.9] 10/22/2023 Yes    BRENDA (acute kidney injury) [N17.9] 10/22/2023 Yes    Peptic ulcer disease [K27.9] 04/15/2022 Yes    History of gastric ulcer [Z87.11] 04/02/2022 Not Applicable    Peripheral artery disease [I73.9] 04/02/2022 Yes     Chronic    Acute on chronic combined systolic and diastolic congestive heart failure [I50.43] 02/21/2022 Yes    Hypertension [I10] 02/20/2022 Yes     Chronic    Coronary artery disease involving  native coronary artery of native heart with angina pectoris [I25.119] 02/20/2022 Yes    Chronic pain [G89.29] 02/20/2022 Yes     Chronic    Constipation due to opioid therapy [K59.03, T40.2X5A] 02/20/2022 Yes     Chronic    DM (diabetes mellitus), type 2 with complications [E11.8] 02/20/2022 Yes      Problems Resolved During this Admission:     Neuro  Chronic pain  Lyrica for neuropathic pain in setting of bilateral amputations and PAD.    Cardiac/Vascular  * Cardiogenic shock  Pt is a 58 y.o female with ho CAD, iCM with EF 20-25% who presents to the hospital with decompensated HF and NSTEMI  BNP 1122 on arrival.   TTE on 10/22: with EF 20-25%, CVP 15 mmHg,   On 10/23 she received total of 750 cc fluid bolus for hypotension  MAP down to 52, admitted to CCU with cardiogenic shock in setting of decompensated HF and required levophed briefly  Initial lactate was 2.7 and follow up was 2.2, now cleared  Kidney function improving after Cr up to 3.2 (baseline ~1)    Plan:  -- hypotensive requiring levophed overnight, admitted to CCU, patient declining central line  -- levophed and dobutamine weaned off  -- repeat lactate WNL, kidney function improving  -- lasix gtt d/c    Mural thrombus of cardiac apex  Was on heparin gtt, will continue eliquis outpatient and can reassess with repeat echo    Peripheral artery disease  R foot BKA and L foot transmetatarsal amputation in 2018  Will cont ASA, and lipitor    Acute on chronic combined systolic and diastolic congestive heart failure  Home GDMT: Toprol, Lisinopril, and losartan? ( have to check with pharmacy), lasix 20 mg po bid. Concern for compliance issues.    Plan:  -- lasix gtt d/c with good UOP and improvement in Cr  -- starting torsemide  -- introducing GDMT with isosorbide dinitrate and hydralazine, further titration outpatient in HF transitional clinic  -- holding losartan in setting of kidney injury, BMP in a week to assess recovery    Coronary artery disease involving  native coronary artery of native heart with angina pectoris  S/p PCI of ramus intermedius 2015 that thrombosed a week later resulted in a STEMI, OM2 in 2014, LAD in 2004  Presents to the hospital with chest discomfort  Trop flat at 0.05  Currently with decompensated HF.    Will cont ASA, PLAVIX, and Lipitor, adding zetia    Outpatient PET    Hypertension  Held home antihypertensives in setting of cardiogenic shock.  Starting GDMT with isosorbide dinitrate 10 and hydralazine 10.    Renal/  BRENDA (acute kidney injury)  Worsening renal function likely 2/2 cardiorenal syndrome  -- diuresed on lasix and torsemide  -- showing kidney function improvement  -- continue furosemide outpatient with additional dose for weight gain    Endocrine  DM (diabetes mellitus), type 2 with complications  Last Hgb A1C 5.7  On home lantus 60 qhs and 45 qam  At hospital she has been receiving 15 units bid and 5 U aspart TIDWM    Plan:  -- 15 U detemir bid with goal -180 while in hospital  -- cont 5U TID WM    GI  History of gastric ulcer  Continue pantoprazole.    Constipation due to opioid therapy  Continue miralax        Discharged Condition: stable    Disposition: Home or Self Care    Follow Up:   Follow-up Information       Adonis Carey MD Follow up on 10/27/2023.    Specialty: Internal Medicine  Contact information:  [address] [place] [address] [phone]                           Patient Instructions:      BASIC METABOLIC PANEL   Standing Status: Future Standing Exp. Date: 12/25/24     Ambulatory referral/consult to Heart Failure Transitional Care Clinic   Standing Status: Future   Referral Priority: Routine Referral Type: Consultation   Referral Reason: Specialty Services Required   Requested Specialty: Cardiology   Number of Visits Requested: 1     Medications:  Reconciled Home Medications:      Medication List        START taking these medications      blood-glucose meter Misc  Use as instructed     ELIQUIS  5 mg Tab  Generic drug: apixaban  Take 1 tablet (5 mg total) by mouth 2 (two) times daily.     ezetimibe 10 mg tablet  Commonly known as: ZETIA  Take 1 tablet (10 mg total) by mouth every evening.     hydrALAZINE 10 MG tablet  Commonly known as: APRESOLINE  Take 1 tablet (10 mg total) by mouth 3 (three) times daily.     insulin aspart U-100 100 unit/mL (3 mL) Inpn pen  Commonly known as: NovoLOG  Inject 5 Units into the skin 3 (three) times daily with meals.     isosorbide dinitrate 10 MG tablet  Commonly known as: ISORDIL  Take 1 tablet (10 mg total) by mouth 3 (three) times daily.     lancets Misc  Use to test blood glucose four times daily before meals and nightly  Replaces: lancets 28 gauge Misc     lancing device Misc  Use to test blood glucose four times daily before meals and nightly            CHANGE how you take these medications      atorvastatin 80 MG tablet  Commonly known as: LIPITOR  Take 1 tablet (80 mg total) by mouth once daily.  What changed:   medication strength  how much to take     blood sugar diagnostic Strp  Use to test blood glucose four times daily before meals and nightly  What changed:   how to take this  additional instructions     clopidogreL 75 mg tablet  Commonly known as: PLAVIX  Take 1 tablet (75 mg total) by mouth once daily.  What changed: when to take this     furosemide 20 MG tablet  Commonly known as: LASIX  Take 1 tablet by mouth once daily. Weigh yourself every morning take an extra 20mg tablet if you gain 2-3 pounds in 24 hours or 5 pounds in a week until you return to your dry weight and notify your cardiologist.  What changed:   when to take this  additional instructions     LANTUS SOLOSTAR U-100 INSULIN glargine 100 units/mL SubQ pen  Generic drug: insulin  Inject 30 Units into the skin every evening.  What changed: See the new instructions.     pantoprazole 40 MG tablet  Commonly known as: PROTONIX  Take 1 tablet (40 mg total) by mouth before breakfast.  Start taking on:  "October 28, 2023  What changed: when to take this     pen needle, diabetic 31 gauge x 5/16" Ndle  Use to inject insulin  What changed:   additional instructions  Another medication with the same name was removed. Continue taking this medication, and follow the directions you see here.     pregabalin 75 MG capsule  Commonly known as: LYRICA  Take 1 capsule (75 mg total) by mouth every evening.  What changed:   medication strength  how much to take  when to take this            CONTINUE taking these medications      busPIRone 10 MG tablet  Commonly known as: BUSPAR  Take 10 mg by mouth 2 (two) times daily.     docusate sodium 100 MG capsule  Commonly known as: COLACE  Take 1 capsule (100 mg total) by mouth 2 (two) times daily.     dorzolamide-timolol 2-0.5% 22.3-6.8 mg/mL ophthalmic solution  Commonly known as: COSOPT  Place 1 drop into both eyes 2 (two) times daily.     ergocalciferol 50,000 unit Cap  Commonly known as: ERGOCALCIFEROL  Take 50,000 Units by mouth every 7 days.     ferrous sulfate 325 mg (65 mg iron) Tab tablet  Commonly known as: FEOSOL  Take 325 mg by mouth 2 (two) times daily.     fluticasone propionate 50 mcg/actuation nasal spray  Commonly known as: FLONASE  1 spray by Each Nostril route once daily.     gabapentin 600 MG tablet  Commonly known as: NEURONTIN  Take 600 mg by mouth 2 (two) times daily.     metoprolol succinate 25 MG 24 hr tablet  Commonly known as: TOPROL-XL  Take 1 tablet (25 mg total) by mouth once daily.     NIVA-FOL 2.5-25-2 mg Tab  Generic drug: folic acid-vit B6-vit B12 2.5-25-2 mg  Take 1 tablet by mouth once daily.     OZEMPIC 1 mg/dose (4 mg/3 mL)  Generic drug: semaglutide  Inject 1 mg into the skin every 7 days.     SYMBICORT 160-4.5 mcg/actuation Hfaa  Generic drug: budesonide-formoterol 160-4.5 mcg  2 puffs 2 (two) times daily.     zolpidem 10 mg Tab  Commonly known as: AMBIEN  Take 10 mg by mouth nightly as needed.            STOP taking these medications      aspirin " 81 MG Chew     ATROVENT HFA 17 mcg/actuation inhaler  Generic drug: ipratropium     carisoprodoL 350 MG tablet  Commonly known as: SOMA     citalopram 20 MG tablet  Commonly known as: CeleXA     glipiZIDE 10 MG tablet  Commonly known as: GLUCOTROL     lancets 28 gauge Misc  Replaced by: lancets Misc     LIDOcaine-prilocaine cream  Commonly known as: EMLA     losartan 25 MG tablet  Commonly known as: COZAAR     meloxicam 7.5 MG tablet  Commonly known as: MOBIC     metoclopramide HCl 10 MG tablet  Commonly known as: REGLAN     oxyCODONE-acetaminophen  mg per tablet  Commonly known as: PERCOCET     potassium chloride 10 MEQ Cpsr  Commonly known as: MICRO-K     promethazine 6.25 mg/5 mL syrup  Commonly known as: PHENERGAN              Time spent on the discharge of patient: >45 minutes    Haley Mauro MD  Cardiology  [facility] - Cardiology Stepdown

## 2023-10-27 NOTE — PLAN OF CARE
Patient's Primary Care clinic is currently closed. I left a voice message for provider. I will call back on Monday to scheduled patient with hospital follow up.

## 2023-10-30 ENCOUNTER — TELEPHONE (OUTPATIENT)
Dept: CARDIOLOGY | Facility: CLINIC | Age: 58
End: 2023-10-30
Payer: MEDICAID

## 2023-10-30 NOTE — TELEPHONE ENCOUNTER
"[facility]) hospital discharge 48-72 hour phone follow up completed.     Most Recent Hospital Discharge Date: 10-  Last admission Diagnosis/chief complaint: SS CP Xs 3 Days    TCC RN Navigator spoke with  PT    Current Patient reported weight:  PT not weighing yet.    Current Patient reported blood pressure and heart rate:  PT not checking BP yet.    Pt reports the following:  []  Shortness of Breath with Activity  []  Shortness of Breath at rest   []  Fatigue  []  Edema   [] Chest pain or tightness  [] Weight Increase since discharge  [x] None of the above    Medications:   Discharge medication reviewed with pt.  Pt reports having medication list available and has all medications at home for use per list.     Education:   Confirmed pt received "Home Care Guide for Heart Failure Patients" while admitted. the Booklet  PT states doesn't have the Booklet Reviewed key points as listed below.     Recommend 2 -3 gram sodium restriction and 1500 cc-2000 cc fluid restriction.  Encourage physical activity with graded exercise program.  Requested patient to weigh themselves daily, and to notify us if their weight increases by more than 3 lbs in 1 day or 5 lbs in 3 days.   Reminded patient to use "Daily weight and symptom tracker" to record and guide patient on when and how to call [facility]FT[facility]. PT may also use symptom tracker if no scale available  Pt reports being in the GREEN(color) Zone. If in yellow/red, reminded that they should be calling [facility] today or now.     Watch for these Signs and Symptoms: If any of these occur, contact [facility] immediately:   Increase in shortness of breath with movement   Increase in swelling in your legs and ankles   Weight gain of more than 3 pounds in a day or 5 pounds in 3 days.   Difficulty breathing when you are lying down   Worsening fatigue or tiredness   Stomach bloating, a full feeling or a loss of appetite   Increased coughing--especially when you " are lying down      Pt was able to verbalize back to RN in their own words correct diet/fluid restrictions, necessity for exercise, warning signs and symptoms, when and how to contact their TCC team.      Pt educated on follow-up plan while in HFT[facility] program to include:   Week 1 -  F/u appt with Provider and RN (Date)  Appt 10-31-23 @ 10:30.   Week 2-5 - In person/ Virtual/ phone call check ins    Week 5-7 - Pt will discharge from [facility] and transition to longterm care provider (Cardiology/PCP/ Advanced Heart Failure).      Patient active on myChart? Yes      Pt given the following contact information for ease of communication: [phone] (Mon-Fri, 8a-5p) & for urgent issues on the weekend to page the Heart Transplant MD on call.  Pt also encouraged utilize [facility] messaging as well.      Will follow up with pt at first clinic visit and RN navigator available for pt questions, issues or concerns.     PT will be given another H[facility] Booklet when she comes for her visit if she did not find hers. Education will be repeated in an effort to get PT to do Daily Dry Weights and Bps. PT aware of appt. Date and time and will be here then.

## 2023-10-31 ENCOUNTER — LAB VISIT (OUTPATIENT)
Dept: LAB | Facility: HOSPITAL | Age: 58
End: 2023-10-31
Payer: MEDICAID

## 2023-10-31 ENCOUNTER — DOCUMENTATION ONLY (OUTPATIENT)
Dept: CARDIOLOGY | Facility: CLINIC | Age: 58
End: 2023-10-31

## 2023-10-31 ENCOUNTER — OFFICE VISIT (OUTPATIENT)
Dept: CARDIOLOGY | Facility: CLINIC | Age: 58
End: 2023-10-31
Payer: MEDICAID

## 2023-10-31 VITALS
HEART RATE: 74 BPM | WEIGHT: 190 LBS | DIASTOLIC BLOOD PRESSURE: 60 MMHG | HEIGHT: 66 IN | BODY MASS INDEX: 30.53 KG/M2 | OXYGEN SATURATION: 98 % | SYSTOLIC BLOOD PRESSURE: 133 MMHG

## 2023-10-31 DIAGNOSIS — I50.42 CHRONIC COMBINED SYSTOLIC AND DIASTOLIC HEART FAILURE: Primary | ICD-10-CM

## 2023-10-31 DIAGNOSIS — R07.9 CHEST PAIN, UNSPECIFIED TYPE: ICD-10-CM

## 2023-10-31 LAB
ALBUMIN SERPL BCP-MCNC: 3.4 G/DL (ref 3.5–5.2)
ALP SERPL-CCNC: 74 U/L (ref 55–135)
ALT SERPL W/O P-5'-P-CCNC: 12 U/L (ref 10–44)
ANION GAP SERPL CALC-SCNC: 7 MMOL/L (ref 8–16)
AST SERPL-CCNC: 18 U/L (ref 10–40)
BASOPHILS # BLD AUTO: 0.03 K/UL (ref 0–0.2)
BASOPHILS NFR BLD: 0.4 % (ref 0–1.9)
BILIRUB SERPL-MCNC: 0.3 MG/DL (ref 0.1–1)
BNP SERPL-MCNC: 783 PG/ML (ref 0–99)
BUN SERPL-MCNC: 13 MG/DL (ref 6–20)
CALCIUM SERPL-MCNC: 9.4 MG/DL (ref 8.7–10.5)
CHLORIDE SERPL-SCNC: 110 MMOL/L (ref 95–110)
CO2 SERPL-SCNC: 27 MMOL/L (ref 23–29)
CREAT SERPL-MCNC: 1.3 MG/DL (ref 0.5–1.4)
DIFFERENTIAL METHOD: ABNORMAL
EOSINOPHIL # BLD AUTO: 0.2 K/UL (ref 0–0.5)
EOSINOPHIL NFR BLD: 2.7 % (ref 0–8)
ERYTHROCYTE [DISTWIDTH] IN BLOOD BY AUTOMATED COUNT: 15.2 % (ref 11.5–14.5)
EST. GFR  (NO RACE VARIABLE): 48 ML/MIN/1.73 M^2
GLUCOSE SERPL-MCNC: 45 MG/DL (ref 70–110)
HCT VFR BLD AUTO: 33.3 % (ref 37–48.5)
HGB BLD-MCNC: 10.3 G/DL (ref 12–16)
IMM GRANULOCYTES # BLD AUTO: 0.02 K/UL (ref 0–0.04)
IMM GRANULOCYTES NFR BLD AUTO: 0.3 % (ref 0–0.5)
LYMPHOCYTES # BLD AUTO: 2.9 K/UL (ref 1–4.8)
LYMPHOCYTES NFR BLD: 41 % (ref 18–48)
MAGNESIUM SERPL-MCNC: 2.1 MG/DL (ref 1.6–2.6)
MCH RBC QN AUTO: 22.5 PG (ref 27–31)
MCHC RBC AUTO-ENTMCNC: 30.9 G/DL (ref 32–36)
MCV RBC AUTO: 73 FL (ref 82–98)
MONOCYTES # BLD AUTO: 0.5 K/UL (ref 0.3–1)
MONOCYTES NFR BLD: 6.6 % (ref 4–15)
NEUTROPHILS # BLD AUTO: 3.5 K/UL (ref 1.8–7.7)
NEUTROPHILS NFR BLD: 49 % (ref 38–73)
NRBC BLD-RTO: 0 /100 WBC
PHOSPHATE SERPL-MCNC: 3.9 MG/DL (ref 2.7–4.5)
PLATELET # BLD AUTO: 324 K/UL (ref 150–450)
PMV BLD AUTO: 10.9 FL (ref 9.2–12.9)
POTASSIUM SERPL-SCNC: 4 MMOL/L (ref 3.5–5.1)
PROT SERPL-MCNC: 7.2 G/DL (ref 6–8.4)
RBC # BLD AUTO: 4.58 M/UL (ref 4–5.4)
SODIUM SERPL-SCNC: 144 MMOL/L (ref 136–145)
WBC # BLD AUTO: 7.15 K/UL (ref 3.9–12.7)

## 2023-10-31 PROCEDURE — 3075F PR MOST RECENT SYSTOLIC BLOOD PRESS GE 130-139MM HG: ICD-10-PCS | Mod: CPTII,,, | Performed by: NURSE PRACTITIONER

## 2023-10-31 PROCEDURE — 83735 ASSAY OF MAGNESIUM: CPT

## 2023-10-31 PROCEDURE — 1111F PR DISCHARGE MEDS RECONCILED W/ CURRENT OUTPATIENT MED LIST: ICD-10-PCS | Mod: CPTII,,, | Performed by: NURSE PRACTITIONER

## 2023-10-31 PROCEDURE — 3075F SYST BP GE 130 - 139MM HG: CPT | Mod: CPTII,,, | Performed by: NURSE PRACTITIONER

## 2023-10-31 PROCEDURE — 80053 COMPREHEN METABOLIC PANEL: CPT

## 2023-10-31 PROCEDURE — 36415 COLL VENOUS BLD VENIPUNCTURE: CPT

## 2023-10-31 PROCEDURE — 3078F DIAST BP <80 MM HG: CPT | Mod: CPTII,,, | Performed by: NURSE PRACTITIONER

## 2023-10-31 PROCEDURE — 99215 OFFICE O/P EST HI 40 MIN: CPT | Mod: PBBFAC | Performed by: NURSE PRACTITIONER

## 2023-10-31 PROCEDURE — 84100 ASSAY OF PHOSPHORUS: CPT

## 2023-10-31 PROCEDURE — 1111F DSCHRG MED/CURRENT MED MERGE: CPT | Mod: CPTII,,, | Performed by: NURSE PRACTITIONER

## 2023-10-31 PROCEDURE — 85025 COMPLETE CBC W/AUTO DIFF WBC: CPT

## 2023-10-31 PROCEDURE — 3008F PR BODY MASS INDEX (BMI) DOCUMENTED: ICD-10-PCS | Mod: CPTII,,, | Performed by: NURSE PRACTITIONER

## 2023-10-31 PROCEDURE — 1159F MED LIST DOCD IN RCRD: CPT | Mod: CPTII,,, | Performed by: NURSE PRACTITIONER

## 2023-10-31 PROCEDURE — 99999 PR PBB SHADOW E&M-EST. PATIENT-LVL V: ICD-10-PCS | Mod: PBBFAC,,, | Performed by: NURSE PRACTITIONER

## 2023-10-31 PROCEDURE — 99203 PR OFFICE/OUTPT VISIT, NEW, LEVL III, 30-44 MIN: ICD-10-PCS | Mod: S$PBB,,, | Performed by: NURSE PRACTITIONER

## 2023-10-31 PROCEDURE — 3078F PR MOST RECENT DIASTOLIC BLOOD PRESSURE < 80 MM HG: ICD-10-PCS | Mod: CPTII,,, | Performed by: NURSE PRACTITIONER

## 2023-10-31 PROCEDURE — 4010F PR ACE/ARB THEARPY RXD/TAKEN: ICD-10-PCS | Mod: CPTII,,, | Performed by: NURSE PRACTITIONER

## 2023-10-31 PROCEDURE — 3044F PR MOST RECENT HEMOGLOBIN A1C LEVEL <7.0%: ICD-10-PCS | Mod: CPTII,,, | Performed by: NURSE PRACTITIONER

## 2023-10-31 PROCEDURE — 3008F BODY MASS INDEX DOCD: CPT | Mod: CPTII,,, | Performed by: NURSE PRACTITIONER

## 2023-10-31 PROCEDURE — 4010F ACE/ARB THERAPY RXD/TAKEN: CPT | Mod: CPTII,,, | Performed by: NURSE PRACTITIONER

## 2023-10-31 PROCEDURE — 99999 PR PBB SHADOW E&M-EST. PATIENT-LVL V: CPT | Mod: PBBFAC,,, | Performed by: NURSE PRACTITIONER

## 2023-10-31 PROCEDURE — 83880 ASSAY OF NATRIURETIC PEPTIDE: CPT

## 2023-10-31 PROCEDURE — 1159F PR MEDICATION LIST DOCUMENTED IN MEDICAL RECORD: ICD-10-PCS | Mod: CPTII,,, | Performed by: NURSE PRACTITIONER

## 2023-10-31 PROCEDURE — 3044F HG A1C LEVEL LT 7.0%: CPT | Mod: CPTII,,, | Performed by: NURSE PRACTITIONER

## 2023-10-31 PROCEDURE — 99203 OFFICE O/P NEW LOW 30 MIN: CPT | Mod: S$PBB,,, | Performed by: NURSE PRACTITIONER

## 2023-10-31 NOTE — PROGRESS NOTES
HF [facility] Provider Note (Initial Clinic) Consult Note    Date of original referral: 10/27/23  Age: 58 y.o.  Gender: female  Ethnicity: Not  or /a   Number of admissions for CHF within the preceding year: 1  Duration of CHF:   Type of Congestive Heart Failure: Combined   Etiology: Ischemic   Social history: Smoking  Enrolled in Infusion suite: no    Diagnostic Labs:   EKG - 10/27/2023  CXR - 10/27/2023  ECHO - 10/23/2023  Stress test -   Stress echo -   Pharmacologic stress -   Cardiac catheterization -    Cardiac MRI -     Lab Results   Component Value Date     10/27/2023     10/26/2023    K 4.0 10/27/2023    K 4.4 10/26/2023     10/27/2023     10/26/2023    CO2 25 10/27/2023    CO2 21 (L) 10/26/2023     (H) 10/27/2023     (H) 10/26/2023    BUN 29 (H) 10/27/2023    BUN 32 (H) 10/26/2023    CREATININE 1.8 (H) 10/27/2023    CREATININE 1.5 (H) 10/26/2023    CALCIUM 9.0 10/27/2023    CALCIUM 7.9 (L) 10/26/2023    PROT 8.3 10/22/2023    PROT 9.0 (H) 03/01/2023    ALBUMIN 4.0 10/22/2023    ALBUMIN 4.0 03/01/2023    BILITOT 0.8 10/22/2023    BILITOT 0.6 03/01/2023    ALKPHOS 93 10/22/2023    ALKPHOS 144 (H) 03/01/2023    AST 19 10/22/2023    AST 21 03/01/2023    ALT 11 10/22/2023    ALT 15 03/01/2023    ANIONGAP 11 10/27/2023    ANIONGAP 10 10/26/2023    ESTGFRAFRICA >60.0 06/22/2022    ESTGFRAFRICA >60.0 04/16/2022    EGFRNONAA 56.3 (A) 06/22/2022    EGFRNONAA >60.0 04/16/2022       Lab Results   Component Value Date    WBC 10.68 10/26/2023    WBC 13.16 (H) 10/25/2023    RBC 4.56 10/26/2023    RBC 4.95 10/25/2023    HGB 10.4 (L) 10/26/2023    HGB 11.2 (L) 10/25/2023    HCT 32.0 (L) 10/26/2023    HCT 34.7 (L) 10/25/2023    MCV 70 (L) 10/26/2023    MCV 70 (L) 10/25/2023    MCH 22.8 (L) 10/26/2023    MCH 22.6 (L) 10/25/2023    MCHC 32.5 10/26/2023    MCHC 32.3 10/25/2023    RDW 14.4 10/26/2023    RDW 13.9 10/25/2023     10/26/2023     10/25/2023    MPV 12.7  10/26/2023    MPV 11.0 10/25/2023    IMMGR 0.5 10/26/2023    IMMGR 0.4 10/25/2023    IGABS 0.05 (H) 10/26/2023    IGABS 0.05 (H) 10/25/2023    LYMPH 4.8 10/26/2023    LYMPH 44.5 10/26/2023    MONO 0.7 10/26/2023    MONO 6.7 10/26/2023    EOS 0.2 10/26/2023    EOS 0.2 10/25/2023    BASO 0.03 10/26/2023    BASO 0.04 10/25/2023    NRBC 0 10/26/2023    NRBC 0 10/25/2023    GRAN 4.9 10/26/2023    GRAN 45.8 10/26/2023    EOSINOPHIL 2.2 10/26/2023    EOSINOPHIL 1.7 10/25/2023    BASOPHIL 0.3 10/26/2023    BASOPHIL 0.3 10/25/2023    PLTEST SEE COMMENT 10/25/2023    PLTEST SEE COMMENT 10/25/2023    ANISO SEE COMMENT 10/25/2023    ANISO SEE COMMENT 10/25/2023    HYPO SEE COMMENT 10/25/2023    HYPO SEE COMMENT 10/25/2023       Lab Results   Component Value Date     (H) 10/27/2023    BNP 1,122 (H) 10/22/2023    MG 2.8 (H) 10/25/2023    MG 2.7 (H) 10/24/2023    PHOS 4.0 10/25/2023    PHOS 4.0 10/24/2023    TROPONINI 0.056 (H) 10/23/2023    TROPONINI 0.062 (H) 10/22/2023    HGBA1C 5.7 (H) 10/23/2023    HGBA1C 5.8 (H) 02/20/2022    TSH 2.007 10/23/2023    TSH 2.899 02/20/2022       Lab Results   Component Value Date    IRON 104 02/20/2022    TIBC 228 (L) 02/20/2022    FERRITIN 68 02/20/2022    CHOL 250 (H) 10/23/2023    TRIG 162 (H) 10/23/2023    HDL 29 (L) 10/23/2023    LDLCALC 188.6 (H) 10/23/2023    CHOLHDL 11.6 (L) 10/23/2023    TOTALCHOLEST 8.6 (H) 10/23/2023    NONHDLCHOL 221 10/23/2023    COLORU Colorless (A) 10/22/2023    APPEARANCEUA Clear 10/22/2023    PHUR 7.0 10/22/2023    SPECGRAV 1.010 10/22/2023    PROTEINUA Negative 10/22/2023    GLUCUA Negative 10/22/2023    KETONESU Negative 10/22/2023    BILIRUBINUA Negative 10/22/2023    OCCULTUA Negative 10/22/2023    NITRITE Negative 10/22/2023    LEUKOCYTESUR Negative 10/22/2023       List all implanted cardiac devices:   Pacemaker: No  Implanted cardio-defibrillator: No  Loop recorder: no  Cardiomems: no    Current Outpatient Medications on File Prior to Visit    Medication Sig Dispense Refill    apixaban (ELIQUIS) 5 mg Tab Take 1 tablet (5 mg total) by mouth 2 (two) times daily. 60 tablet 5    apixaban (ELIQUIS) 5 mg Tab Take 1 tablet (5 mg total) by mouth 2 (two) times daily. 60 tablet 0    atorvastatin (LIPITOR) 80 MG tablet Take 1 tablet (80 mg total) by mouth once daily. 30 tablet 11    blood sugar diagnostic Strp Use to test blood glucose four times daily before meals and nightly 200 strip 11    blood-glucose meter Misc Use as instructed 1 each 0    busPIRone (BUSPAR) 10 MG tablet Take 10 mg by mouth 2 (two) times daily.      clopidogreL (PLAVIX) 75 mg tablet Take 1 tablet (75 mg total) by mouth once daily. 30 tablet 11    docusate sodium (COLACE) 100 MG capsule Take 1 capsule (100 mg total) by mouth 2 (two) times daily. 60 capsule 0    dorzolamide-timolol 2-0.5% (COSOPT) 22.3-6.8 mg/mL ophthalmic solution Place 1 drop into both eyes 2 (two) times daily.      ergocalciferol (ERGOCALCIFEROL) 50,000 unit Cap Take 50,000 Units by mouth every 7 days.      ezetimibe (ZETIA) 10 mg tablet Take 1 tablet (10 mg total) by mouth every evening. 30 tablet 5    ezetimibe (ZETIA) 10 mg tablet Take 1 tablet (10 mg total) by mouth every evening. 90 tablet 3    ferrous sulfate (FEOSOL) 325 mg (65 mg iron) Tab tablet Take 325 mg by mouth 2 (two) times daily.      fluticasone propionate (FLONASE) 50 mcg/actuation nasal spray 1 spray by Each Nostril route once daily.      folic acid-vit B6-vit B12 2.5-25-2 mg (FOLBIC OR EQUIV) 2.5-25-2 mg Tab Take 1 tablet by mouth once daily. 90 tablet 3    furosemide (LASIX) 20 MG tablet Take 1 tablet by mouth once daily. Weigh yourself every morning take an extra 20mg tablet if you gain 2-3 pounds in 24 hours or 5 pounds in a week until you return to your dry weight and notify your cardiologist. 40 tablet 5    gabapentin (NEURONTIN) 600 MG tablet Take 600 mg by mouth 2 (two) times daily.      hydrALAZINE (APRESOLINE) 10 MG tablet Take 1 tablet (10 mg  "total) by mouth 3 (three) times daily. 90 tablet 5    hydrALAZINE (APRESOLINE) 10 MG tablet Take 1 tablet (10 mg total) by mouth 3 (three) times daily. 90 tablet 11    insulin aspart U-100 (NOVOLOG U-100 INSULIN ASPART) 100 unit/mL injection Inject 5 Units into the skin 3 (three) times daily before meals. 4.5 mL 11    insulin aspart U-100 (NOVOLOG) 100 unit/mL (3 mL) InPn pen Inject 5 Units into the skin 3 (three) times daily with meals. 6 mL 5    isosorbide dinitrate (ISORDIL) 10 MG tablet Take 1 tablet (10 mg total) by mouth 3 (three) times daily. 90 tablet 5    isosorbide dinitrate (ISORDIL) 10 MG tablet Take 1 tablet (10 mg total) by mouth 3 (three) times daily. 90 tablet 11    lancets 30 gauge Misc Use to test blood glucose four times daily before meals and nightly 200 each 11    lancing device Misc Use to test blood glucose four times daily before meals and nightly 1 each 0    LANTUS SOLOSTAR U-100 INSULIN glargine 100 units/mL SubQ pen Inject 30 Units into the skin every evening. 9 mL 5    metoprolol succinate (TOPROL-XL) 25 MG 24 hr tablet Take 1 tablet (25 mg total) by mouth once daily. 30 tablet 11    OZEMPIC 1 mg/dose (4 mg/3 mL) Inject 1 mg into the skin every 7 days.      pantoprazole (PROTONIX) 40 MG tablet Take 1 tablet (40 mg total) by mouth before breakfast. 30 tablet 5    pen needle, diabetic 31 gauge x 5/16" Ndle Use to inject insulin 3 times daily 200 each 11    pregabalin (LYRICA) 75 MG capsule Take 1 capsule (75 mg total) by mouth every evening. 30 capsule 3    SYMBICORT 160-4.5 mcg/actuation HFAA 2 puffs 2 (two) times daily.      zolpidem (AMBIEN) 10 mg Tab Take 10 mg by mouth nightly as needed.      [DISCONTINUED] amLODIPine (NORVASC) 10 MG tablet Take 1 tablet (10 mg total) by mouth once daily. Hold this. Do not take it until you see your doctor.      [DISCONTINUED] hyoscyamine (ANASPAZ,LEVSIN) 0.125 mg Tab Take 1 tablet (125 mcg total) by mouth every 6 (six) hours as needed (abdominal " cramping). 30 tablet 0    [DISCONTINUED] lisinopriL (PRINIVIL,ZESTRIL) 2.5 MG tablet Take 1 tablet (2.5 mg total) by mouth once daily. 90 tablet 3    [DISCONTINUED] metFORMIN (GLUCOPHAGE) 1000 MG tablet Take 1 tablet (1,000 mg total) by mouth 2 (two) times daily with meals. Hold this. Do not take it until you see your doctor. (Patient not taking: Reported on 4/18/2022)      [DISCONTINUED] metoprolol tartrate (LOPRESSOR) 50 MG tablet Take 1 tablet (50 mg total) by mouth 2 (two) times daily. Hold this. Do not take it until you see your doctor.       No current facility-administered medications on file prior to visit.         HPI:  Patient can walk: Wheelchair bound   Patient sleeps on 1 number of pillows   Patient wakes up SOB, has to get out of bed, associated cough, sputum and          color   Palpitations - No   Dizzy, light-headed, pre-syncope or syncope: No   Since discharge frequency of performing weights, home weight and weight change: unable to stand and weight     Other information felt pertinent to HPI  Patient with ICM with combined systolic and diastolic heart failure, LVADD 4.6 and LVEF 20-25%, CAD with h/o PCI's, no ICD, DM, PAD, s/p right BKA and left foot amputation, CKD 3 and new finding of LV thrombus by TTE 10/23, now on Eliquis, admitted to CCU for cardiogenic shock 2/2 acute decompensated HF and NSTEMI on 10/22. Trop flat at 0.05. Patient briefly on peripheral levophed then dobutamine for MAP down to 52. Developed worsening kidney function likely 2/2 cardiorenal syndrome. Patient hesitant for further invasive care, palliative care consulted to help elucidate GOC. Patient and spouse expressed wanting to receive further treatment. Patient off levophed and dobutamine and maintaining good MAP. GDMT initiated with hydralazine and isosorbide dinitrate and tolerated well inpatient. Patient adamant about discharging, and was discharged in stable condition with eliquis for possible LV thrombus, zetia, and  with referral to follow up in heart failure transitional clinic for further titration of GDMT.    She is currently taking Hydralazine and Isordil. Not on ACE or ARB presumably 2/2 renal dysfunction, and I do not have there results of today's labs. Takes Lasix 40 mg po qd and is euvolemic on exam. No SOB or chest pain since discharge. Smells of tobacco smoke but denies active use or any use by others in her home (reports she quit a year ago).           PHYSICAL: There were no vitals filed for this visit.   Wt Readings from Last 3 Encounters:   10/24/23 77 kg (169 lb 12.1 oz)   03/01/23 79.4 kg (175 lb)   10/28/22 81.6 kg (180 lb)       JVD: no,    Heart rhythm: regular  Cardiac murmur: No    S3: no  S4: no  Lungs: clear  Hepatojugular reflux: no  Edema: no, right BKA and left foot amputation with prosthesis in place     ASSESSMENT:    PLAN:      Patient Instructions:   Instruct the patient to notify this clinic if HH, a physician or an advanced care provider wants to change medication one of their HF medications   Activity and Diet restrictions:   Recommend 2-3 gram sodium restriction and 1500cc- 2000cc fluid restriction.  Encourage physical activity with graded exercise program.  Requested patient to weigh themselves daily, and to notify us if their weight increases by more than 3 lbs in 1 day or 5 lbs in 3 days.    Assigned dry weight on home scale: Does not have home scale and is unable to stand and weight  Medication changes No changes today. Consider adding ARB at next visit pending renal function  Upcoming labs and date anticipated: CMP, Mg+ and BNP in 2 weeks  Other diagnostic tests ordered: None

## 2023-10-31 NOTE — PROGRESS NOTES
"11:18 am Call from Liz from the Chemistry Lab to advise me the she has a Critical glucose of 45. PT has left the [facility]. At 11:19 am I place a  call to PT and she is advised of the Low Glucose of 45. I ask if I can run some juice and peanut butter crackers down to her but they are already "down the road". Mr. Salvador states that he is pulling into a corner grocery store now and will get his wife something sweet to drink and some peanut butter crackers. He thanks me for the call.    Leona José NP is advised of the Critical Glucose before I make the telephone call but the PT has already left the building.    "

## 2023-10-31 NOTE — PROGRESS NOTES
"PT here for her first HFTCC visit. Hospital Education reinforced as follows.    Reviewed the following key points of HFTCC program with PT:              1.) Take your medications as directed. Call Ms Rios if any health Care Professional changes your Heart/Fluid medications.               2.) Weight yourself daily. Daily Dry Weights. Upon waking ,empty your bladder, weigh with as little clothes as possible before eating/drinking. Record weight in Symptom Tracker and compare these weights for fluid gain. Weight gain overnight of 3-4 lbs is Fluid, also a gain of 5 lbs in 3 days is Fluid as well. Call US!              3.) Follow low salt and limited fluid diet. Salt/Sodium Restriction 8335-3684 mg, see page 4. Sodium makes you hold onto Fluid. High Sodium Foods;Deli Meat/Cheese, Sausages/Hot Dogs, Fast Food/Restaurant Food, Anything in a box, bottle or can. Cook with Fresh or Frozen ingredients and use seasonings that are labeled NO SALT. Check Portion Sizes, Salt is reported for 1 portion. A can may contain 2-3 portions. Fluid Restriction 1.5 -2 Liters/Day, see page 6, measure all of your Fluid, the milk in your cereal, broth in your soup and ice cream because anything that melts at room temp is a liquid.              4.) Stop smoking and start exercising. Brisk walking is good, don't walk like you are going to the [employer] and DON"T WALK IN THE HEAT! Start low and increase as tolerated. Remember if you don't use it you lose it.              5.) Go to your appointments and call your team. Have your weight and BP/P ready when we call to do the Phone Check ins and call us if you have fluid gain or questions      PT given another HFTCC Home Care Guide as she did not have it when she got home from the Hospital. PT has no questions at this time. Stressed answering the weekly phone call check ins so that we can help her monitor her fluid.        "

## 2023-11-07 ENCOUNTER — TELEPHONE (OUTPATIENT)
Dept: CARDIOLOGY | Facility: CLINIC | Age: 58
End: 2023-11-07
Payer: MEDICAID

## 2023-11-07 NOTE — TELEPHONE ENCOUNTER
"Pt asked for return call on 11/8/23 states "she just made it back in the door." RN agreed to call on 11/8/2023.   "

## 2023-11-10 ENCOUNTER — TELEPHONE (OUTPATIENT)
Dept: CARDIOLOGY | Facility: CLINIC | Age: 58
End: 2023-11-10
Payer: MEDICAID

## 2023-11-10 NOTE — TELEPHONE ENCOUNTER
"Heart [facility] Clinic([facility]) weekly phone follow up / triage call completed.     TCC RN Navigator spoke with pt, Ms Jason    Current Patient reported weight: Pt unable to weigh self, can not stand safely     Recent Patient reported blood pressure and heart rate: Pt not monitoring VS at this time.     Pt reports the following:  []  Shortness of Breath with Activity  []  Shortness of Breath at rest   []  Fatigue  []  Edema   [] Chest pain or tightness  [] Weight Increase since discharge  [x] None of the above    Pt unable to stand safely alone to weigh herself.    Pt reports using "Daily weight and symptom tracker".    Pt reports being in the GREEN Zone. If in yellow/red, reminded that they should be calling HFTCC today or now.     Medications:   Medication compliance reviewed with pt.  Pt reports having medication list available and has all medications at home for use per list.   Pt denies needs for and questions about medications.  Education:   Confirmed pt still has "Heart Failure Transitional Care Clinic Home Care Guide"  .     Reminded of key points as listed below.     Recommend 2 -3 gram sodium restriction and 1500 cc-2000 cc fluid restriction.  Encourage physical activity with graded exercise program.  Requested patient to weigh themselves daily, and to notify us if their weight increases by more than 3 lbs in 1 day or 5 lbs in 3 days.   Reminded to use "Daily weight and symptom tracker".  Even if pt does not have a scale, to use symptom tracker.       Watch for these Signs and Symptoms: If any of these occur, contact HFTCC immediately:   Increase in shortness of breath with movement   Increase in swelling in your legs and ankles   Weight gain of more than 3 pounds in a day or 5 pounds in 3 days.   Difficulty breathing when you are lying down   Worsening fatigue or tiredness   Stomach bloating, a full feeling or a loss of appetite   Increased coughing--especially when you are lying " down      Pt was able to verbalize back to RN in their own words correct diet/fluid restrictions, necessity for exercise, warning signs and symptoms, when and how to contact their HFTCC team.      Pt reminded of upcoming appointment.  PT reports they will attend. Pt appt 11/14 at 10:30 AM.      Pt reminded of how and when to contact [facility]:  [phone] (Mon-Fri, 8a-5p) & for urgent issues on the weekend to page the Heart Transplant MD on call.  Pt also encouraged utilize [facility] messaging as well.      Pt  verbalized understanding and in agreement of plan.       Will follow up with pt at next clinic visit and RN navigator available for pt questions, issues or concerns.

## 2023-11-28 ENCOUNTER — TELEPHONE (OUTPATIENT)
Dept: CARDIOLOGY | Facility: CLINIC | Age: 58
End: 2023-11-28
Payer: MEDICAID

## 2023-11-28 NOTE — TELEPHONE ENCOUNTER
"[facility]) weekly phone follow up / triage call completed.     TCC RN Navigator spoke with PT    Current Patient reported weight: Yesterday was 170 Lbs.   Patient Goal Weight: (Unknown)  Recent Patient reported blood pressure and heart rate: Doesn't have a cuff    Pt reports the following:  []  Shortness of Breath with Activity  []  Shortness of Breath at rest   []  Fatigue  []  Edema   [] Chest pain or tightness  [] Weight Increase since discharge  [x] None of the above    Pt reports using "Daily weight and symptom tracker".    Pt reports being in the GREEN(color) Zone. If in yellow/red, reminded that they should be calling HFTCC today or now.     Medications:   Medication compliance reviewed with pt.  Pt reports having medication list available and has all medications at home for use per list.     Education:   Confirmed pt still has "Heart Failure Transitional Care Clinic Home Care Guide"  . YES    Reminded of key points as listed below.     Recommend 2 -3 gram sodium restriction and 1500 cc-2000 cc fluid restriction.  Encourage physical activity with graded exercise program.  Requested patient to weigh themselves daily, and to notify us if their weight increases by more than 3 lbs in 1 day or 5 lbs in 3 days.   Reminded to use "Daily weight and symptom tracker".  Even if pt does not have a scale, to use symptom tracker.       Watch for these Signs and Symptoms: If any of these occur, contact HFTCC immediately:   Increase in shortness of breath with movement   Increase in swelling in your legs and ankles   Weight gain of more than 3 pounds in a day or 5 pounds in 3 days.   Difficulty breathing when you are lying down   Worsening fatigue or tiredness   Stomach bloating, a full feeling or a loss of appetite   Increased coughing--especially when you are lying down      Pt was able to verbalize back to RN in their own words correct diet/fluid restrictions, necessity for exercise, " "warning signs and symptoms, when and how to contact their HFTCC team.      Pt reminded of missed appointments (11-14 & 11-28).  PT reports they don't want an appointment , only phone calls.       Pt reminded of how and when to contact HFTCC:  [phone] (Mon-Fri, 8a-5p) & for urgent issues on the weekend to page the Heart Transplant MD on call.  Pt also encouraged utilize [facility] messaging as well.      Pt  verbalized understanding and in agreement of plan.       Will follow up with pt at next phone call and RN navigator available for pt questions, issues or concerns.     Next Phone Check can be a D/C as well. PT hurries RN off the phone as she says she still has the "Book with the phone number ", I let her know we will call next Tuesday December 5th and then close her out.  "

## 2023-11-29 ENCOUNTER — TELEPHONE (OUTPATIENT)
Dept: CARDIOLOGY | Facility: CLINIC | Age: 58
End: 2023-11-29
Payer: MEDICAID

## 2023-11-29 NOTE — TELEPHONE ENCOUNTER
Attempted to reschedule missed appt with HFTCC. PT not interested in HFTCC appt. PT doesn't have a cardiologist and doesn't want one preferring to see only her PCP.

## 2024-01-22 PROBLEM — N17.9 AKI (ACUTE KIDNEY INJURY): Status: RESOLVED | Noted: 2023-10-22 | Resolved: 2024-01-22

## 2024-02-29 PROCEDURE — 93005 ELECTROCARDIOGRAM TRACING: CPT

## 2024-02-29 PROCEDURE — 93010 ELECTROCARDIOGRAM REPORT: CPT | Mod: 59,,, | Performed by: INTERNAL MEDICINE

## 2024-02-29 PROCEDURE — 99285 EMERGENCY DEPT VISIT HI MDM: CPT | Mod: 25

## 2024-02-29 PROCEDURE — 93010 ELECTROCARDIOGRAM REPORT: CPT | Mod: ,,, | Performed by: INTERNAL MEDICINE

## 2024-03-01 ENCOUNTER — HOSPITAL ENCOUNTER (EMERGENCY)
Facility: HOSPITAL | Age: 59
Discharge: HOME | End: 2024-03-01
Attending: EMERGENCY MEDICINE
Payer: MEDICAID

## 2024-03-01 VITALS
WEIGHT: 175 LBS | SYSTOLIC BLOOD PRESSURE: 141 MMHG | DIASTOLIC BLOOD PRESSURE: 62 MMHG | RESPIRATION RATE: 18 BRPM | OXYGEN SATURATION: 97 % | HEART RATE: 92 BPM | HEIGHT: 66 IN | TEMPERATURE: 98 F | BODY MASS INDEX: 28.12 KG/M2

## 2024-03-01 DIAGNOSIS — R10.9 ABDOMINAL PAIN: ICD-10-CM

## 2024-03-01 DIAGNOSIS — N39.0 URINARY TRACT INFECTION WITHOUT HEMATURIA, SITE UNSPECIFIED: ICD-10-CM

## 2024-03-01 DIAGNOSIS — K25.3 ACUTE GASTRIC ULCER WITHOUT HEMORRHAGE OR PERFORATION: Primary | ICD-10-CM

## 2024-03-01 LAB
ALBUMIN SERPL BCP-MCNC: 3.8 G/DL (ref 3.5–5.2)
ALP SERPL-CCNC: 101 U/L (ref 55–135)
ALT SERPL W/O P-5'-P-CCNC: 7 U/L (ref 10–44)
ANION GAP SERPL CALC-SCNC: 12 MMOL/L (ref 8–16)
AST SERPL-CCNC: 16 U/L (ref 10–40)
BACTERIA #/AREA URNS AUTO: ABNORMAL /HPF
BASOPHILS # BLD AUTO: 0.03 K/UL (ref 0–0.2)
BASOPHILS NFR BLD: 0.4 % (ref 0–1.9)
BILIRUB SERPL-MCNC: 0.4 MG/DL (ref 0.1–1)
BILIRUB UR QL STRIP: NEGATIVE
BUN SERPL-MCNC: 9 MG/DL (ref 6–20)
CALCIUM SERPL-MCNC: 9.9 MG/DL (ref 8.7–10.5)
CHLORIDE SERPL-SCNC: 94 MMOL/L (ref 95–110)
CLARITY UR REFRACT.AUTO: ABNORMAL
CO2 SERPL-SCNC: 28 MMOL/L (ref 23–29)
COLOR UR AUTO: YELLOW
CREAT SERPL-MCNC: 1.2 MG/DL (ref 0.5–1.4)
DIFFERENTIAL METHOD BLD: ABNORMAL
EOSINOPHIL # BLD AUTO: 0.1 K/UL (ref 0–0.5)
EOSINOPHIL NFR BLD: 1.2 % (ref 0–8)
ERYTHROCYTE [DISTWIDTH] IN BLOOD BY AUTOMATED COUNT: 14.5 % (ref 11.5–14.5)
EST. GFR  (NO RACE VARIABLE): 52.5 ML/MIN/1.73 M^2
GLUCOSE SERPL-MCNC: 132 MG/DL (ref 70–110)
GLUCOSE UR QL STRIP: NEGATIVE
HCT VFR BLD AUTO: 40.7 % (ref 37–48.5)
HGB BLD-MCNC: 13.5 G/DL (ref 12–16)
HGB UR QL STRIP: NEGATIVE
IMM GRANULOCYTES # BLD AUTO: 0.02 K/UL (ref 0–0.04)
IMM GRANULOCYTES NFR BLD AUTO: 0.2 % (ref 0–0.5)
KETONES UR QL STRIP: NEGATIVE
LEUKOCYTE ESTERASE UR QL STRIP: ABNORMAL
LIPASE SERPL-CCNC: 11 U/L (ref 4–60)
LYMPHOCYTES # BLD AUTO: 2.9 K/UL (ref 1–4.8)
LYMPHOCYTES NFR BLD: 34.5 % (ref 18–48)
MCH RBC QN AUTO: 22.5 PG (ref 27–31)
MCHC RBC AUTO-ENTMCNC: 33.2 G/DL (ref 32–36)
MCV RBC AUTO: 68 FL (ref 82–98)
MICROSCOPIC COMMENT: ABNORMAL
MONOCYTES # BLD AUTO: 0.5 K/UL (ref 0.3–1)
MONOCYTES NFR BLD: 6 % (ref 4–15)
NEUTROPHILS # BLD AUTO: 4.8 K/UL (ref 1.8–7.7)
NEUTROPHILS NFR BLD: 57.7 % (ref 38–73)
NITRITE UR QL STRIP: NEGATIVE
NRBC BLD-RTO: 0 /100 WBC
OHS QRS DURATION: 110 MS
OHS QRS DURATION: 110 MS
OHS QTC CALCULATION: 506 MS
OHS QTC CALCULATION: 508 MS
PH UR STRIP: 8 [PH] (ref 5–8)
PLATELET # BLD AUTO: 386 K/UL (ref 150–450)
PMV BLD AUTO: 10.5 FL (ref 9.2–12.9)
POTASSIUM SERPL-SCNC: 3.5 MMOL/L (ref 3.5–5.1)
PROT SERPL-MCNC: 8.1 G/DL (ref 6–8.4)
PROT UR QL STRIP: ABNORMAL
RBC # BLD AUTO: 6 M/UL (ref 4–5.4)
RBC #/AREA URNS AUTO: 2 /HPF (ref 0–4)
SARS-COV-2 RDRP RESP QL NAA+PROBE: NEGATIVE
SODIUM SERPL-SCNC: 134 MMOL/L (ref 136–145)
SP GR UR STRIP: 1.01 (ref 1–1.03)
SQUAMOUS #/AREA URNS AUTO: 8 /HPF
TROPONIN I SERPL DL<=0.01 NG/ML-MCNC: 0.03 NG/ML (ref 0–0.03)
TROPONIN I SERPL DL<=0.01 NG/ML-MCNC: 0.05 NG/ML (ref 0–0.03)
URN SPEC COLLECT METH UR: ABNORMAL
WBC # BLD AUTO: 8.34 K/UL (ref 3.9–12.7)
WBC #/AREA URNS AUTO: 6 /HPF (ref 0–5)

## 2024-03-01 PROCEDURE — U0002 COVID-19 LAB TEST NON-CDC: HCPCS

## 2024-03-01 PROCEDURE — 63600175 PHARM REV CODE 636 W HCPCS

## 2024-03-01 PROCEDURE — 81001 URINALYSIS AUTO W/SCOPE: CPT | Performed by: EMERGENCY MEDICINE

## 2024-03-01 PROCEDURE — 96365 THER/PROPH/DIAG IV INF INIT: CPT | Mod: 59

## 2024-03-01 PROCEDURE — 85025 COMPLETE CBC W/AUTO DIFF WBC: CPT | Performed by: EMERGENCY MEDICINE

## 2024-03-01 PROCEDURE — 25000003 PHARM REV CODE 250

## 2024-03-01 PROCEDURE — 80053 COMPREHEN METABOLIC PANEL: CPT | Performed by: EMERGENCY MEDICINE

## 2024-03-01 PROCEDURE — 25500020 PHARM REV CODE 255: Performed by: EMERGENCY MEDICINE

## 2024-03-01 PROCEDURE — 84484 ASSAY OF TROPONIN QUANT: CPT | Mod: 91

## 2024-03-01 PROCEDURE — 25000003 PHARM REV CODE 250: Performed by: EMERGENCY MEDICINE

## 2024-03-01 PROCEDURE — 83690 ASSAY OF LIPASE: CPT | Performed by: EMERGENCY MEDICINE

## 2024-03-01 PROCEDURE — 84484 ASSAY OF TROPONIN QUANT: CPT | Performed by: EMERGENCY MEDICINE

## 2024-03-01 RX ORDER — DICYCLOMINE HYDROCHLORIDE 10 MG/1
20 CAPSULE ORAL
Status: COMPLETED | OUTPATIENT
Start: 2024-03-01 | End: 2024-03-01

## 2024-03-01 RX ORDER — ONDANSETRON HYDROCHLORIDE 2 MG/ML
4 INJECTION, SOLUTION INTRAVENOUS ONCE
Status: COMPLETED | OUTPATIENT
Start: 2024-03-01 | End: 2024-03-01

## 2024-03-01 RX ORDER — HYDROCODONE BITARTRATE AND ACETAMINOPHEN 5; 325 MG/1; MG/1
1 TABLET ORAL
Status: COMPLETED | OUTPATIENT
Start: 2024-03-01 | End: 2024-03-01

## 2024-03-01 RX ORDER — ALUMINUM HYDROXIDE, MAGNESIUM HYDROXIDE, AND SIMETHICONE 1200; 120; 1200 MG/30ML; MG/30ML; MG/30ML
30 SUSPENSION ORAL ONCE
Status: DISCONTINUED | OUTPATIENT
Start: 2024-03-01 | End: 2024-03-01 | Stop reason: HOSPADM

## 2024-03-01 RX ORDER — PANTOPRAZOLE SODIUM 40 MG/1
40 TABLET, DELAYED RELEASE ORAL DAILY
Qty: 10 TABLET | Refills: 0 | Status: SHIPPED | OUTPATIENT
Start: 2024-03-01 | End: 2024-03-11

## 2024-03-01 RX ORDER — GABAPENTIN 300 MG/1
300 CAPSULE ORAL ONCE
Status: COMPLETED | OUTPATIENT
Start: 2024-03-01 | End: 2024-03-01

## 2024-03-01 RX ORDER — PANTOPRAZOLE SODIUM 40 MG/1
40 TABLET, DELAYED RELEASE ORAL DAILY
Status: DISCONTINUED | OUTPATIENT
Start: 2024-03-01 | End: 2024-03-01 | Stop reason: HOSPADM

## 2024-03-01 RX ORDER — DICYCLOMINE HYDROCHLORIDE 10 MG/1
10 CAPSULE ORAL
Status: DISCONTINUED | OUTPATIENT
Start: 2024-03-01 | End: 2024-03-01

## 2024-03-01 RX ORDER — CEPHALEXIN 500 MG/1
500 CAPSULE ORAL EVERY 12 HOURS
Qty: 13 CAPSULE | Refills: 0 | Status: ON HOLD | OUTPATIENT
Start: 2024-03-01 | End: 2024-04-04

## 2024-03-01 RX ORDER — LIDOCAINE HYDROCHLORIDE 20 MG/ML
15 SOLUTION OROPHARYNGEAL ONCE
Status: DISCONTINUED | OUTPATIENT
Start: 2024-03-01 | End: 2024-03-01 | Stop reason: HOSPADM

## 2024-03-01 RX ORDER — CEPHALEXIN 500 MG/1
500 CAPSULE ORAL
Status: COMPLETED | OUTPATIENT
Start: 2024-03-01 | End: 2024-03-01

## 2024-03-01 RX ORDER — CEPHALEXIN 500 MG/1
500 CAPSULE ORAL
Status: DISCONTINUED | OUTPATIENT
Start: 2024-03-01 | End: 2024-03-01

## 2024-03-01 RX ADMIN — ONDANSETRON 4 MG: 2 INJECTION INTRAMUSCULAR; INTRAVENOUS at 02:03

## 2024-03-01 RX ADMIN — IOHEXOL 75 ML: 350 INJECTION, SOLUTION INTRAVENOUS at 04:03

## 2024-03-01 RX ADMIN — GABAPENTIN 300 MG: 300 CAPSULE ORAL at 02:03

## 2024-03-01 RX ADMIN — CEPHALEXIN 500 MG: 500 CAPSULE ORAL at 06:03

## 2024-03-01 RX ADMIN — HYDROCODONE BITARTRATE AND ACETAMINOPHEN 1 TABLET: 5; 325 TABLET ORAL at 07:03

## 2024-03-01 RX ADMIN — DICYCLOMINE HYDROCHLORIDE 20 MG: 10 CAPSULE ORAL at 01:03

## 2024-03-01 NOTE — ED PROVIDER NOTES
Encounter Date: 2/29/2024       History     Chief Complaint   Patient presents with    Abdominal Pain     +N/V     The history is provided by the patient.     Ms. Salvador is a 58 y.o female with h/o HTN, HLD, DMII, CAD with pci to OM2 in 2014 and LAD in 2004, former tobacco smoker ( over 20 yrs ago), PAD s/p R BKA and L foot amputation, ischemic cardiomyopathy with EF 20-25% who presents due to abdominal pain and nausea/vomiting.  She states that she has had symptoms for approximately 2 weeks, but that got worse this evening prompting her to come to the emergency department.  She states that she has had difficulty holding solids down, but that she is continued to be able to consume fluids.  With regards to the pain, she states that it is in the midepigastric region, but does not radiate anywhere.  She has continued to have bowel movements, with her most recent bowel movement being yesterday.  She described the bowel movement is lose, but denies diarrhea.  She has no problems with urination.  Patient also notes some mild pain in her left lower extremity around the stub of where her foot amputation is. Denies any chest pain, fevers or shortness of breath.    Review of patient's allergies indicates:   Allergen Reactions    Orange juice Hives    Tomato (solanum lycopersicum) Hives     Past Medical History:   Diagnosis Date    Chronic combined systolic and diastolic congestive heart failure     Coronary artery disease     Diabetes mellitus     Encounter for blood transfusion     Gastric ulcer with hemorrhage     Heart attack     History of gastric ulcer 4/2/2022    Hypertension     NSAID induced gastritis 4/2/2022    Perforated viscus 03/14/2022    Peripheral artery disease      Past Surgical History:   Procedure Laterality Date    APPENDECTOMY      BELOW KNEE AMPUTATION OF LOWER EXTREMITY Right 2019    COLONOSCOPY N/A 02/23/2022    Procedure: COLONOSCOPY;  Surgeon: Estefania Bryant MD;  Location: [facility] ([address] [place] [address]);   Service: Endoscopy;  Laterality: N/A;  anemia, melena    CORONARY STENT PLACEMENT      ESOPHAGOGASTRODUODENOSCOPY N/A 02/23/2022    Procedure: EGD (ESOPHAGOGASTRODUODENOSCOPY);  Surgeon: Estefania Bryant MD;  Location: [facility] (2ND FLR);  Service: Endoscopy;  Laterality: N/A;  anemia    ESOPHAGOGASTRODUODENOSCOPY N/A 4/14/2022    Procedure: EGD (ESOPHAGOGASTRODUODENOSCOPY);  Surgeon: First Available Den King;  Location: [facility] (2ND FLR);  Service: Endoscopy;  Laterality: N/A;  please schedule in 8 weeks. done 2/23      EF-20    ESOPHAGOGASTRODUODENOSCOPY  4/14/2022    Procedure: ;  Surgeon: First Available Den King;  Location: [facility] (2ND FLR);  Service: Endoscopy;;    ESOPHAGOGASTRODUODENOSCOPY N/A 4/29/2022    Procedure: EGD (ESOPHAGOGASTRODUODENOSCOPY);  Surgeon: Estefania Bryant MD;  Location: [facility];  Service: Endoscopy;  Laterality: N/A;  expedite EGD per Dr Ga      states vaccinated-will bring card-GT    ESOPHAGOGASTRODUODENOSCOPY N/A 10/28/2022    Procedure: EGD (ESOPHAGOGASTRODUODENOSCOPY);  Surgeon: Estefania Bryant MD;  Location: [facility] (2ND FLR);  Service: Endoscopy;  Laterality: N/A;    FOOT AMPUTATION THROUGH METATARSAL Left 2019    TUBAL LIGATION       No family history on file.  Social History     Tobacco Use    Smoking status: Every Day     Current packs/day: 0.50     Types: Cigarettes    Smokeless tobacco: Never   Substance Use Topics    Alcohol use: No    Drug use: No       Physical Exam     Initial Vitals [02/29/24 2341]   BP Pulse Resp Temp SpO2   (!) 146/91 97 18 98.3 °F (36.8 °C) 97 %      MAP       --         Physical Exam    Nursing note and vitals reviewed.  Constitutional: She appears well-developed. No distress.   HENT:   Head: Normocephalic and atraumatic.   Mouth/Throat: Oropharynx is clear and moist and mucous membranes are normal.   Eyes: EOM are normal. Pupils are equal, round, and reactive to light.   Neck:   Normal range of motion.  Cardiovascular:  Normal  rate, regular rhythm and normal heart sounds.           No murmur heard.  Pulmonary/Chest: Breath sounds normal. No respiratory distress. She has no wheezes. She has no rhonchi. She has no rales.   Abdominal:   Mild generalized epigastric pain noted with palpation.  No guarding, rebound tenderness or peritoneal signs present   Musculoskeletal:      Cervical back: Normal range of motion.      Comments: Above the knee amputation noted on the right, partial foot amputation noted on the left.     Neurological: She is alert and oriented to person, place, and time.   Skin: Skin is warm.   Psychiatric: She has a normal mood and affect. Her behavior is normal. Thought content normal.         ED Course   Procedures  Labs Reviewed   CBC W/ AUTO DIFFERENTIAL   COMPREHENSIVE METABOLIC PANEL   LIPASE   URINALYSIS, REFLEX TO URINE CULTURE   SARS-COV-2 RNA AMPLIFICATION, QUAL   TROPONIN I     EKG Readings: (Independently Interpreted)   Normal sinus rhythm, normal axis, left bundle branch block noted, OK interval within normal limits, QRS complex is narrow, QT segment within normal limits, no STEMI.       Imaging Results    None          Medications   gabapentin capsule 300 mg (has no administration in time range)   ondansetron injection 4 mg (has no administration in time range)   dicyclomine capsule 20 mg (has no administration in time range)     Medical Decision Making  [place] is a 59-year-old female who presents due to generalized abdominal pain, nausea and vomiting.  Differential diagnosis includes but is not limited to gastroenteritis, colitis, diverticulitis, UTI, pancreatitis and COVID. Upon my initial evaluation of the patient, she would overall reassuring vital signs as well as a reassuring physical exam.  Part of evaluation of my differential, we will obtain basic labs, urinalysis, lipase, COVID swab and a CT abdomen.    CT abdomen was significant for a gastric ulcer without signs of hemorrhage/perforation.  For this,  she was given a dose of Protonix as well as a GI cocktail.  Urinalysis was significant for for UTI, for which I gave a dose of Keflex here in the ED as well as a prescription for her to take at home.  The entirety of the rest of our laboratory workup was found to be unremarkable, other than a slight rise in her troponin which we believe is secondary to the UTI.  However she will follow up with Cardiology about this.    Patient given a referral to see GI outpatient for further evaluation of the gastric ulcer.  Return precautions given to which the patient verbalized understanding.  Stable for discharge at this time.        Amount and/or Complexity of Data Reviewed  External Data Reviewed: notes.     Details: Previous medical records, specifically the details surrounding her most recent hospital admission in 10/2023 was reviewed to better understand the patient's current/past medical problems.  Labs: ordered.  Radiology: ordered.    Risk  OTC drugs.  Prescription drug management.                                      Clinical Impression:  Final diagnoses:  [R10.9] Abdominal pain                 Landen Hernandez MD  Resident  03/01/24 0737

## 2024-03-01 NOTE — DISCHARGE INSTRUCTIONS
You have been given a prescription for Keflex, an antibiotic that you will take once in the morning and once in the evening for the next 7 days.  You were given the 1st dose here, see may take the 1st dose this evening.  You have also been given a prescription for Protonix, please take 1 dose every morning for the next few days into you were able to follow up with Gastroenterology.  You have been given a referral to see the gastroenterology doctors, they will be giving you a phone call to set up an appointment.

## 2024-03-01 NOTE — ED TRIAGE NOTES
Casie Jamal Salvador, a 59 y.o. female presents to the ED w/ complaint of ABD PAIN. Hx perf'd ulcer 2 years ago requiring surgical repair.    + n, v, abdominal pain. Patient in moderate emotional distress d/t her discomfort. No active vomiting. Complex medical hx including DM, CHF, HTN, patient demonstrates low health literacy.     Triage note:  Chief Complaint   Patient presents with    Abdominal Pain     +N/V     Review of patient's allergies indicates:   Allergen Reactions    Orange juice Hives    Tomato (solanum lycopersicum) Hives     Past Medical History:   Diagnosis Date    Chronic combined systolic and diastolic congestive heart failure     Coronary artery disease     Diabetes mellitus     Encounter for blood transfusion     Gastric ulcer with hemorrhage     Heart attack     History of gastric ulcer 4/2/2022    Hypertension     NSAID induced gastritis 4/2/2022    Perforated viscus 03/14/2022    Peripheral artery disease

## 2024-03-11 ENCOUNTER — HOSPITAL ENCOUNTER (EMERGENCY)
Facility: OTHER | Age: 59
Discharge: HOME | End: 2024-03-11
Attending: EMERGENCY MEDICINE
Payer: MEDICAID

## 2024-03-11 VITALS
TEMPERATURE: 98 F | HEART RATE: 70 BPM | SYSTOLIC BLOOD PRESSURE: 137 MMHG | WEIGHT: 170 LBS | RESPIRATION RATE: 18 BRPM | OXYGEN SATURATION: 100 % | HEIGHT: 66 IN | BODY MASS INDEX: 27.32 KG/M2 | DIASTOLIC BLOOD PRESSURE: 89 MMHG

## 2024-03-11 DIAGNOSIS — E11.649 TYPE 2 DIABETES MELLITUS WITH HYPOGLYCEMIA WITHOUT COMA, WITH LONG-TERM CURRENT USE OF INSULIN: Primary | ICD-10-CM

## 2024-03-11 DIAGNOSIS — Z79.4 TYPE 2 DIABETES MELLITUS WITH HYPOGLYCEMIA WITHOUT COMA, WITH LONG-TERM CURRENT USE OF INSULIN: Primary | ICD-10-CM

## 2024-03-11 DIAGNOSIS — E16.2 HYPOGLYCEMIA: ICD-10-CM

## 2024-03-11 LAB
ALBUMIN SERPL BCP-MCNC: 3.5 G/DL (ref 3.5–5.2)
ALP SERPL-CCNC: 76 U/L (ref 55–135)
ALT SERPL W/O P-5'-P-CCNC: 8 U/L (ref 10–44)
ANION GAP SERPL CALC-SCNC: 10 MMOL/L (ref 8–16)
AST SERPL-CCNC: 14 U/L (ref 10–40)
BASOPHILS # BLD AUTO: 0.03 K/UL (ref 0–0.2)
BASOPHILS NFR BLD: 0.4 % (ref 0–1.9)
BILIRUB SERPL-MCNC: 0.4 MG/DL (ref 0.1–1)
BUN SERPL-MCNC: 8 MG/DL (ref 6–20)
CALCIUM SERPL-MCNC: 9.3 MG/DL (ref 8.7–10.5)
CHLORIDE SERPL-SCNC: 101 MMOL/L (ref 95–110)
CO2 SERPL-SCNC: 28 MMOL/L (ref 23–29)
CREAT SERPL-MCNC: 1.2 MG/DL (ref 0.5–1.4)
DIFFERENTIAL METHOD BLD: ABNORMAL
EOSINOPHIL # BLD AUTO: 0.1 K/UL (ref 0–0.5)
EOSINOPHIL NFR BLD: 0.7 % (ref 0–8)
ERYTHROCYTE [DISTWIDTH] IN BLOOD BY AUTOMATED COUNT: 15 % (ref 11.5–14.5)
EST. GFR  (NO RACE VARIABLE): 52 ML/MIN/1.73 M^2
GLUCOSE SERPL-MCNC: 47 MG/DL (ref 70–110)
HCT VFR BLD AUTO: 35.1 % (ref 37–48.5)
HGB BLD-MCNC: 11.5 G/DL (ref 12–16)
IMM GRANULOCYTES # BLD AUTO: 0.02 K/UL (ref 0–0.04)
IMM GRANULOCYTES NFR BLD AUTO: 0.3 % (ref 0–0.5)
LYMPHOCYTES # BLD AUTO: 2.5 K/UL (ref 1–4.8)
LYMPHOCYTES NFR BLD: 35.8 % (ref 18–48)
MCH RBC QN AUTO: 22.7 PG (ref 27–31)
MCHC RBC AUTO-ENTMCNC: 32.8 G/DL (ref 32–36)
MCV RBC AUTO: 69 FL (ref 82–98)
MONOCYTES # BLD AUTO: 0.4 K/UL (ref 0.3–1)
MONOCYTES NFR BLD: 5.7 % (ref 4–15)
NEUTROPHILS # BLD AUTO: 4 K/UL (ref 1.8–7.7)
NEUTROPHILS NFR BLD: 57.1 % (ref 38–73)
NRBC BLD-RTO: 0 /100 WBC
PLATELET # BLD AUTO: 343 K/UL (ref 150–450)
PMV BLD AUTO: 10.2 FL (ref 9.2–12.9)
POCT GLUCOSE: 119 MG/DL (ref 70–110)
POCT GLUCOSE: 148 MG/DL (ref 70–110)
POCT GLUCOSE: 195 MG/DL (ref 70–110)
POCT GLUCOSE: 254 MG/DL (ref 70–110)
POCT GLUCOSE: 35 MG/DL (ref 70–110)
POTASSIUM SERPL-SCNC: 3.8 MMOL/L (ref 3.5–5.1)
PROT SERPL-MCNC: 7.2 G/DL (ref 6–8.4)
RBC # BLD AUTO: 5.07 M/UL (ref 4–5.4)
SODIUM SERPL-SCNC: 139 MMOL/L (ref 136–145)
TROPONIN I SERPL DL<=0.01 NG/ML-MCNC: <0.006 NG/ML (ref 0–0.03)
WBC # BLD AUTO: 7.01 K/UL (ref 3.9–12.7)

## 2024-03-11 PROCEDURE — 25000003 PHARM REV CODE 250

## 2024-03-11 PROCEDURE — 84484 ASSAY OF TROPONIN QUANT: CPT

## 2024-03-11 PROCEDURE — 80053 COMPREHEN METABOLIC PANEL: CPT

## 2024-03-11 PROCEDURE — 96374 THER/PROPH/DIAG INJ IV PUSH: CPT

## 2024-03-11 PROCEDURE — 93010 ELECTROCARDIOGRAM REPORT: CPT | Mod: ,,, | Performed by: INTERNAL MEDICINE

## 2024-03-11 PROCEDURE — 93005 ELECTROCARDIOGRAM TRACING: CPT

## 2024-03-11 PROCEDURE — 85025 COMPLETE CBC W/AUTO DIFF WBC: CPT

## 2024-03-11 PROCEDURE — 96365 THER/PROPH/DIAG IV INF INIT: CPT

## 2024-03-11 PROCEDURE — 99284 EMERGENCY DEPT VISIT MOD MDM: CPT | Mod: 25

## 2024-03-11 PROCEDURE — 82962 GLUCOSE BLOOD TEST: CPT

## 2024-03-11 RX ORDER — OXYCODONE AND ACETAMINOPHEN 5; 325 MG/1; MG/1
2 TABLET ORAL
Status: COMPLETED | OUTPATIENT
Start: 2024-03-11 | End: 2024-03-11

## 2024-03-11 RX ADMIN — DEXTROSE MONOHYDRATE 250 ML: 100 INJECTION, SOLUTION INTRAVENOUS at 01:03

## 2024-03-11 RX ADMIN — OXYCODONE HYDROCHLORIDE AND ACETAMINOPHEN 2 TABLET: 5; 325 TABLET ORAL at 01:03

## 2024-03-11 NOTE — ED PROVIDER NOTES
Encounter Date: 3/11/2024       History     Chief Complaint   Patient presents with    Hypoglycemia     Patient found altered by  this morning. On EMS arrival patient GCS 5 and CBG 25, given 1 amp of D50 with an improvement in mental status and CBG to 153 then repeat 113 and placed on D5 drip.      This is a 59-year-old female with a past medical history of IDDM, CAD, MI, and CHF who presents to the ED for evaluation of hypoglycemia. This morning the patient was found to be unresponsive in bed by her , prompting him to call EMS. Per EMS, patient was found to have a blood glucose of 25 upon arrival and was treated with 1 amp D50 with an improvement in mental status and CBG to 153. EMS reports repeat CBG decreased to 113, after which she was placed on a D5 drip.  She reports that her  gives her 30 units of insulin nightly. Last night she ate two bowls of gumbo and then received her insulin. She did not check her blood glucose prior to receiving insulin, stating she has not checked her blood sugar in about a month. Currently she states she is at her baseline mental status. She denies chest pain, shortness of breath, dysuria, nausea, vomiting.     The history is provided by the patient and the EMS personnel. No  was used.     Review of patient's allergies indicates:   Allergen Reactions    Orange juice Hives    Tomato (solanum lycopersicum) Hives     Past Medical History:   Diagnosis Date    Chronic combined systolic and diastolic congestive heart failure     Coronary artery disease     Diabetes mellitus     Encounter for blood transfusion     Gastric ulcer with hemorrhage     Heart attack     History of gastric ulcer 4/2/2022    Hypertension     NSAID induced gastritis 4/2/2022    Perforated viscus 03/14/2022    Peripheral artery disease      Past Surgical History:   Procedure Laterality Date    APPENDECTOMY      BELOW KNEE AMPUTATION OF LOWER EXTREMITY Right 2019    COLONOSCOPY  N/A 02/23/2022    Procedure: COLONOSCOPY;  Surgeon: Estefania Bryant MD;  Location: [facility] (2ND FLR);  Service: Endoscopy;  Laterality: N/A;  anemia, melena    CORONARY STENT PLACEMENT      ESOPHAGOGASTRODUODENOSCOPY N/A 02/23/2022    Procedure: EGD (ESOPHAGOGASTRODUODENOSCOPY);  Surgeon: Estefania Bryant MD;  Location: [facility] (2ND FLR);  Service: Endoscopy;  Laterality: N/A;  anemia    ESOPHAGOGASTRODUODENOSCOPY N/A 4/14/2022    Procedure: EGD (ESOPHAGOGASTRODUODENOSCOPY);  Surgeon: First Available Den King;  Location: [facility] (2ND FLR);  Service: Endoscopy;  Laterality: N/A;  please schedule in 8 weeks. done 2/23      EF-20    ESOPHAGOGASTRODUODENOSCOPY  4/14/2022    Procedure: ;  Surgeon: First Available Den King;  Location: [facility] (2ND FLR);  Service: Endoscopy;;    ESOPHAGOGASTRODUODENOSCOPY N/A 4/29/2022    Procedure: EGD (ESOPHAGOGASTRODUODENOSCOPY);  Surgeon: Estefania Bryant MD;  Location: [facility];  Service: Endoscopy;  Laterality: N/A;  expedite EGD per Dr Ga      states vaccinated-will bring card-GT    ESOPHAGOGASTRODUODENOSCOPY N/A 10/28/2022    Procedure: EGD (ESOPHAGOGASTRODUODENOSCOPY);  Surgeon: Estefania Bryant MD;  Location: [facility] (2ND FLR);  Service: Endoscopy;  Laterality: N/A;    FOOT AMPUTATION THROUGH METATARSAL Left 2019    TUBAL LIGATION       History reviewed. No pertinent family history.  Social History     Tobacco Use    Smoking status: Every Day     Current packs/day: 0.50     Types: Cigarettes    Smokeless tobacco: Never   Substance Use Topics    Alcohol use: No    Drug use: No     Review of Systems   Constitutional:  Negative for fever.   HENT:  Negative for sore throat.    Respiratory:  Negative for shortness of breath.    Cardiovascular:  Negative for chest pain.   Gastrointestinal:  Negative for nausea.   Genitourinary:  Negative for dysuria.   Musculoskeletal:  Negative for back pain.   Skin:  Negative for rash.   Neurological:  Negative for weakness.    Hematological:  Does not bruise/bleed easily.       Physical Exam     Initial Vitals [03/11/24 1125]   BP Pulse Resp Temp SpO2   137/89 70 16 97.9 °F (36.6 °C) 100 %      MAP       --         Physical Exam    Constitutional: She appears well-developed and well-nourished.  Non-toxic appearance. She does not appear ill. No distress.   HENT:   Head: Normocephalic and atraumatic.   Eyes: Conjunctivae are normal.   Neck: Neck supple.   Cardiovascular:  Normal rate and regular rhythm.           Murmur heard.  Pulmonary/Chest: Effort normal. No tachypnea. No respiratory distress.   Musculoskeletal:      Cervical back: Neck supple.      Comments: Right BKA. Left partial foot amputation     Neurological: She is alert and oriented to person, place, and time.   Skin: Skin is warm, dry and intact.   Psychiatric: She has a normal mood and affect. Her speech is normal and behavior is normal.         ED Course   Procedures  Labs Reviewed   CBC W/ AUTO DIFFERENTIAL - Abnormal; Notable for the following components:       Result Value    Hemoglobin 11.5 (*)     Hematocrit 35.1 (*)     MCV 69 (*)     MCH 22.7 (*)     RDW 15.0 (*)     All other components within normal limits   COMPREHENSIVE METABOLIC PANEL - Abnormal; Notable for the following components:    Glucose 47 (*)     ALT 8 (*)     eGFR 52 (*)     All other components within normal limits    Narrative:     Glucose critical result(s) called and verbal readback obtained from   Jennifer Sainz RN by [facility] 03/11/2024 13:26   POCT GLUCOSE - Abnormal; Notable for the following components:    POCT Glucose 35 (*)     All other components within normal limits   POCT GLUCOSE - Abnormal; Notable for the following components:    POCT Glucose 254 (*)     All other components within normal limits   POCT GLUCOSE - Abnormal; Notable for the following components:    POCT Glucose 195 (*)     All other components within normal limits   POCT GLUCOSE - Abnormal; Notable for the following  components:    POCT Glucose 119 (*)     All other components within normal limits   POCT GLUCOSE - Abnormal; Notable for the following components:    POCT Glucose 148 (*)     All other components within normal limits   TROPONIN I   URINALYSIS, REFLEX TO URINE CULTURE          Imaging Results    None          Medications   dextrose 10% bolus 125 mL 125 mL (has no administration in time range)   dextrose 10% bolus 250 mL 250 mL (0 mLs Intravenous Stopped 3/11/24 1347)   oxyCODONE-acetaminophen 5-325 mg per tablet 2 tablet (2 tablets Oral Given 3/11/24 1357)     Medical Decision Making  Urgent evaluation of a 59-year-old female following an episode of altered mental status with hypoglycemia. Appropriate response and return to mental baseline after treatment with D50 push and glucose drip prior to arrival. On my evaluation, patient at her mental baseline, confirmed by patient's son at bedside. She is without complaints of weakness, N/V/D, or focal neurological deficits. Do not suspect CVA/TIA or seizure at this time. Plan for basic labs and treat hypoglycemia appropriately.    Differential Diagnosis includes, but is not limited to:  Decreased PO intake, nausea/vomiting, dehydration, metabolic derangement, infection/sepsis, UTI, pneumonia, medication side effect, medication non-compliance, device malfunction, DKA, ACS/MI, alcohol/drug abuse.        Amount and/or Complexity of Data Reviewed  Labs: ordered. Decision-making details documented in ED Course.  ECG/medicine tests: ordered and independent interpretation performed.     Details: Normal sinus rhythm at rate of 71 beats per minute.  Borderline left axis deviation.  Narrow QRS.  T-wave inversions in inferior and lateral leads.  The elevation.  No STEMI.  When compared to prior EKG dated 02/29/2024, new T-wave inversions in lateral leads.    Risk  Prescription drug management.               ED Course as of 03/11/24 1634   Mon Mar 11, 2024   1317 POCT Glucose(!!):  35  D10 bolus given. Patient at baseline without symptoms. Will also replenish orally with food and juice as patient states she is hungry. [GRACE]   1319 Troponin I: <0.006 [GRACE]   1626 POCT Glucose(!): 148  Patient maintaining appropriate blood glucose level. Has not been on dextrose for over 2 hours and has been tolerating p.o without difficulty. Patient instructed on measuring her blood sugar and adjusting the insulin dose depending on her blood sugar level. She was also instructed on following up with her PCP.  After taking into careful account the patient's history, physical exam findings, as well as empirical and objective data obtained throughout ED workup, I feel no emergent medical condition has been identified. No further evaluation or admission was felt to be required, and the patient is stable for discharge from the ED. The patient was updated with test results, overall clinical impression, and recommended further plan of care, including discharge instructions as provided and outpatient follow-up for continued evaluation and management as needed. All questions were answered. The patient expressed understanding and agreed with current plan for discharge and follow-up plan of care. Strict ED return precautions were provided, including return/worsening of current symptoms, new symptoms, or any other concerns. [GRACE]      ED Course User Index  [GRACE] Zoey Whitman, GUEVARA                             Clinical Impression:  Final diagnoses:  [E16.2] Hypoglycemia  [E11.649, Z79.4] Type 2 diabetes mellitus with hypoglycemia without coma, with long-term current use of insulin (Primary)          ED Disposition Condition    Discharge Stable          ED Prescriptions    None       Follow-up Information       Follow up With Specialties Details Why Contact Info    Adonis Carey MD Internal Medicine Schedule an appointment as soon as possible for a visit   [address] [place] [id number] [phone]       [facility] - Emergency Dept Emergency Medicine Go to  If symptoms worsen [address]  [facility] [id number] [phone]             Zoey Whitman PA-C  03/11/24 3749

## 2024-03-11 NOTE — ED NOTES
Pt incontinent of urine; cleaned.  New brief, gown, and linens provided.  Pt repositioned on stretcher for comfort.  Bed locked in lowest position; side rails up and locked x 2; call light, bedside table, and personal belongings within reach .

## 2024-03-12 LAB
OHS QRS DURATION: 110 MS
OHS QTC CALCULATION: 473 MS

## 2024-04-02 ENCOUNTER — HOSPITAL ENCOUNTER (INPATIENT)
Facility: HOSPITAL | Age: 59
LOS: 3 days | Discharge: HOME | DRG: 683 | End: 2024-04-05
Attending: EMERGENCY MEDICINE | Admitting: HOSPITALIST
Payer: MEDICAID

## 2024-04-02 DIAGNOSIS — Z89.511 S/P BKA (BELOW KNEE AMPUTATION), RIGHT: ICD-10-CM

## 2024-04-02 DIAGNOSIS — N39.0 E. COLI UTI (URINARY TRACT INFECTION): ICD-10-CM

## 2024-04-02 DIAGNOSIS — N17.9 AKI (ACUTE KIDNEY INJURY): ICD-10-CM

## 2024-04-02 DIAGNOSIS — R10.9 ABDOMINAL PAIN, UNSPECIFIED ABDOMINAL LOCATION: ICD-10-CM

## 2024-04-02 DIAGNOSIS — R94.31 ST ELEVATION: ICD-10-CM

## 2024-04-02 DIAGNOSIS — N39.0 URINARY TRACT INFECTION WITHOUT HEMATURIA, SITE UNSPECIFIED: Primary | ICD-10-CM

## 2024-04-02 DIAGNOSIS — R07.9 CHEST PAIN: ICD-10-CM

## 2024-04-02 DIAGNOSIS — R11.2 NAUSEA AND VOMITING, UNSPECIFIED VOMITING TYPE: ICD-10-CM

## 2024-04-02 DIAGNOSIS — B96.20 E. COLI UTI (URINARY TRACT INFECTION): ICD-10-CM

## 2024-04-02 PROBLEM — N10 ACUTE PYELONEPHRITIS: Status: ACTIVE | Noted: 2024-04-02

## 2024-04-02 LAB
ALBUMIN SERPL BCP-MCNC: 4 G/DL (ref 3.5–5.2)
ALLENS TEST: ABNORMAL
ALP SERPL-CCNC: 113 U/L (ref 55–135)
ALT SERPL W/O P-5'-P-CCNC: 9 U/L (ref 10–44)
ANION GAP SERPL CALC-SCNC: 15 MMOL/L (ref 8–16)
AST SERPL-CCNC: 12 U/L (ref 10–40)
BACTERIA #/AREA URNS AUTO: ABNORMAL /HPF
BASOPHILS # BLD AUTO: 0.02 K/UL (ref 0–0.2)
BASOPHILS NFR BLD: 0.2 % (ref 0–1.9)
BILIRUB SERPL-MCNC: 0.9 MG/DL (ref 0.1–1)
BILIRUB UR QL STRIP: NEGATIVE
BUN SERPL-MCNC: 10 MG/DL (ref 6–20)
CALCIUM SERPL-MCNC: 10 MG/DL (ref 8.7–10.5)
CHLORIDE SERPL-SCNC: 98 MMOL/L (ref 95–110)
CLARITY UR REFRACT.AUTO: ABNORMAL
CO2 SERPL-SCNC: 22 MMOL/L (ref 23–29)
COLOR UR AUTO: YELLOW
CREAT SERPL-MCNC: 1.1 MG/DL (ref 0.5–1.4)
DIFFERENTIAL METHOD BLD: ABNORMAL
EOSINOPHIL # BLD AUTO: 0 K/UL (ref 0–0.5)
EOSINOPHIL NFR BLD: 0.1 % (ref 0–8)
ERYTHROCYTE [DISTWIDTH] IN BLOOD BY AUTOMATED COUNT: 16.2 % (ref 11.5–14.5)
EST. GFR  (NO RACE VARIABLE): 58.2 ML/MIN/1.73 M^2
ESTIMATED AVG GLUCOSE: 105 MG/DL (ref 68–131)
GLUCOSE SERPL-MCNC: 202 MG/DL (ref 70–110)
GLUCOSE UR QL STRIP: NEGATIVE
HBA1C MFR BLD: 5.3 % (ref 4–5.6)
HCT VFR BLD AUTO: 38.6 % (ref 37–48.5)
HCV AB SERPL QL IA: NORMAL
HGB BLD-MCNC: 13.1 G/DL (ref 12–16)
HGB UR QL STRIP: ABNORMAL
HIV 1+2 AB+HIV1 P24 AG SERPL QL IA: NORMAL
HYALINE CASTS UR QL AUTO: 0 /LPF
IMM GRANULOCYTES # BLD AUTO: 0.03 K/UL (ref 0–0.04)
IMM GRANULOCYTES NFR BLD AUTO: 0.3 % (ref 0–0.5)
KETONES UR QL STRIP: ABNORMAL
LACTATE SERPL-SCNC: 1.3 MMOL/L (ref 0.5–2.2)
LDH SERPL L TO P-CCNC: 2.28 MMOL/L (ref 0.5–2.2)
LEUKOCYTE ESTERASE UR QL STRIP: ABNORMAL
LIPASE SERPL-CCNC: 10 U/L (ref 4–60)
LYMPHOCYTES # BLD AUTO: 1.6 K/UL (ref 1–4.8)
LYMPHOCYTES NFR BLD: 18.4 % (ref 18–48)
MCH RBC QN AUTO: 23.8 PG (ref 27–31)
MCHC RBC AUTO-ENTMCNC: 33.9 G/DL (ref 32–36)
MCV RBC AUTO: 70 FL (ref 82–98)
MICROSCOPIC COMMENT: ABNORMAL
MONOCYTES # BLD AUTO: 0.2 K/UL (ref 0.3–1)
MONOCYTES NFR BLD: 2.3 % (ref 4–15)
NEUTROPHILS # BLD AUTO: 6.9 K/UL (ref 1.8–7.7)
NEUTROPHILS NFR BLD: 78.7 % (ref 38–73)
NITRITE UR QL STRIP: NEGATIVE
NRBC BLD-RTO: 0 /100 WBC
PH UR STRIP: 7 [PH] (ref 5–8)
PLATELET # BLD AUTO: 414 K/UL (ref 150–450)
PMV BLD AUTO: 10.9 FL (ref 9.2–12.9)
POCT GLUCOSE: 176 MG/DL (ref 70–110)
POTASSIUM SERPL-SCNC: 3.8 MMOL/L (ref 3.5–5.1)
PROT SERPL-MCNC: 8.7 G/DL (ref 6–8.4)
PROT UR QL STRIP: ABNORMAL
RBC # BLD AUTO: 5.51 M/UL (ref 4–5.4)
RBC #/AREA URNS AUTO: 4 /HPF (ref 0–4)
SAMPLE: ABNORMAL
SITE: ABNORMAL
SODIUM SERPL-SCNC: 135 MMOL/L (ref 136–145)
SP GR UR STRIP: 1.02 (ref 1–1.03)
SQUAMOUS #/AREA URNS AUTO: 6 /HPF
TROPONIN I SERPL DL<=0.01 NG/ML-MCNC: <0.006 NG/ML (ref 0–0.03)
URN SPEC COLLECT METH UR: ABNORMAL
WBC # BLD AUTO: 8.82 K/UL (ref 3.9–12.7)
WBC #/AREA URNS AUTO: >100 /HPF (ref 0–5)

## 2024-04-02 PROCEDURE — 63600175 PHARM REV CODE 636 W HCPCS: Performed by: EMERGENCY MEDICINE

## 2024-04-02 PROCEDURE — 83935 ASSAY OF URINE OSMOLALITY: CPT | Performed by: NURSE PRACTITIONER

## 2024-04-02 PROCEDURE — 25000003 PHARM REV CODE 250: Performed by: PHYSICIAN ASSISTANT

## 2024-04-02 PROCEDURE — 63600175 PHARM REV CODE 636 W HCPCS: Performed by: PHYSICIAN ASSISTANT

## 2024-04-02 PROCEDURE — 96365 THER/PROPH/DIAG IV INF INIT: CPT

## 2024-04-02 PROCEDURE — 96375 TX/PRO/DX INJ NEW DRUG ADDON: CPT

## 2024-04-02 PROCEDURE — 99285 EMERGENCY DEPT VISIT HI MDM: CPT | Mod: 25

## 2024-04-02 PROCEDURE — 86803 HEPATITIS C AB TEST: CPT | Performed by: PHYSICIAN ASSISTANT

## 2024-04-02 PROCEDURE — 83036 HEMOGLOBIN GLYCOSYLATED A1C: CPT | Performed by: PHYSICIAN ASSISTANT

## 2024-04-02 PROCEDURE — 80053 COMPREHEN METABOLIC PANEL: CPT | Performed by: NURSE PRACTITIONER

## 2024-04-02 PROCEDURE — 87077 CULTURE AEROBIC IDENTIFY: CPT | Performed by: NURSE PRACTITIONER

## 2024-04-02 PROCEDURE — 87389 HIV-1 AG W/HIV-1&-2 AB AG IA: CPT | Performed by: PHYSICIAN ASSISTANT

## 2024-04-02 PROCEDURE — 83605 ASSAY OF LACTIC ACID: CPT

## 2024-04-02 PROCEDURE — 84300 ASSAY OF URINE SODIUM: CPT | Performed by: NURSE PRACTITIONER

## 2024-04-02 PROCEDURE — 85025 COMPLETE CBC W/AUTO DIFF WBC: CPT | Performed by: NURSE PRACTITIONER

## 2024-04-02 PROCEDURE — 12000002 HC ACUTE/MED SURGE SEMI-PRIVATE ROOM

## 2024-04-02 PROCEDURE — 82570 ASSAY OF URINE CREATININE: CPT | Performed by: NURSE PRACTITIONER

## 2024-04-02 PROCEDURE — 87088 URINE BACTERIA CULTURE: CPT | Performed by: NURSE PRACTITIONER

## 2024-04-02 PROCEDURE — 99900035 HC TECH TIME PER 15 MIN (STAT)

## 2024-04-02 PROCEDURE — 82962 GLUCOSE BLOOD TEST: CPT

## 2024-04-02 PROCEDURE — 87186 SC STD MICRODIL/AGAR DIL: CPT | Performed by: NURSE PRACTITIONER

## 2024-04-02 PROCEDURE — 96376 TX/PRO/DX INJ SAME DRUG ADON: CPT

## 2024-04-02 PROCEDURE — 84484 ASSAY OF TROPONIN QUANT: CPT | Performed by: NURSE PRACTITIONER

## 2024-04-02 PROCEDURE — 83605 ASSAY OF LACTIC ACID: CPT | Performed by: PHYSICIAN ASSISTANT

## 2024-04-02 PROCEDURE — 96372 THER/PROPH/DIAG INJ SC/IM: CPT | Performed by: EMERGENCY MEDICINE

## 2024-04-02 PROCEDURE — G0378 HOSPITAL OBSERVATION PER HR: HCPCS

## 2024-04-02 PROCEDURE — 87086 URINE CULTURE/COLONY COUNT: CPT | Performed by: NURSE PRACTITIONER

## 2024-04-02 PROCEDURE — 25000003 PHARM REV CODE 250: Performed by: EMERGENCY MEDICINE

## 2024-04-02 PROCEDURE — 93010 ELECTROCARDIOGRAM REPORT: CPT | Mod: ,,, | Performed by: INTERNAL MEDICINE

## 2024-04-02 PROCEDURE — 81001 URINALYSIS AUTO W/SCOPE: CPT | Performed by: NURSE PRACTITIONER

## 2024-04-02 PROCEDURE — 25000003 PHARM REV CODE 250: Performed by: STUDENT IN AN ORGANIZED HEALTH CARE EDUCATION/TRAINING PROGRAM

## 2024-04-02 PROCEDURE — 96366 THER/PROPH/DIAG IV INF ADDON: CPT

## 2024-04-02 PROCEDURE — 83690 ASSAY OF LIPASE: CPT | Performed by: NURSE PRACTITIONER

## 2024-04-02 PROCEDURE — 93005 ELECTROCARDIOGRAM TRACING: CPT

## 2024-04-02 PROCEDURE — 96361 HYDRATE IV INFUSION ADD-ON: CPT

## 2024-04-02 RX ORDER — ONDANSETRON 4 MG/1
4 TABLET, ORALLY DISINTEGRATING ORAL EVERY 6 HOURS PRN
Status: DISPENSED | OUTPATIENT
Start: 2024-04-02 | End: 2024-04-03

## 2024-04-02 RX ORDER — MORPHINE SULFATE 4 MG/ML
4 INJECTION, SOLUTION INTRAMUSCULAR; INTRAVENOUS
Status: COMPLETED | OUTPATIENT
Start: 2024-04-02 | End: 2024-04-02

## 2024-04-02 RX ORDER — METOPROLOL SUCCINATE 25 MG/1
25 TABLET, EXTENDED RELEASE ORAL DAILY
Status: DISCONTINUED | OUTPATIENT
Start: 2024-04-02 | End: 2024-04-05 | Stop reason: HOSPADM

## 2024-04-02 RX ORDER — FUROSEMIDE 20 MG/1
20 TABLET ORAL DAILY
Status: DISCONTINUED | OUTPATIENT
Start: 2024-04-03 | End: 2024-04-03

## 2024-04-02 RX ORDER — OXYCODONE AND ACETAMINOPHEN 5; 325 MG/1; MG/1
1 TABLET ORAL
Status: DISCONTINUED | OUTPATIENT
Start: 2024-04-02 | End: 2024-04-02

## 2024-04-02 RX ORDER — PANTOPRAZOLE SODIUM 40 MG/1
40 TABLET, DELAYED RELEASE ORAL
Status: DISCONTINUED | OUTPATIENT
Start: 2024-04-03 | End: 2024-04-05 | Stop reason: HOSPADM

## 2024-04-02 RX ORDER — ONDANSETRON HYDROCHLORIDE 2 MG/ML
4 INJECTION, SOLUTION INTRAVENOUS
Status: COMPLETED | OUTPATIENT
Start: 2024-04-02 | End: 2024-04-02

## 2024-04-02 RX ORDER — GABAPENTIN 300 MG/1
600 CAPSULE ORAL 2 TIMES DAILY
Status: DISCONTINUED | OUTPATIENT
Start: 2024-04-02 | End: 2024-04-05 | Stop reason: HOSPADM

## 2024-04-02 RX ORDER — INSULIN ASPART 100 [IU]/ML
0-5 INJECTION, SOLUTION INTRAVENOUS; SUBCUTANEOUS
Status: DISCONTINUED | OUTPATIENT
Start: 2024-04-02 | End: 2024-04-05

## 2024-04-02 RX ORDER — CLOPIDOGREL BISULFATE 75 MG/1
75 TABLET ORAL DAILY
Status: DISCONTINUED | OUTPATIENT
Start: 2024-04-03 | End: 2024-04-05 | Stop reason: HOSPADM

## 2024-04-02 RX ORDER — ZOLPIDEM TARTRATE 5 MG/1
5 TABLET ORAL NIGHTLY PRN
Status: DISCONTINUED | OUTPATIENT
Start: 2024-04-02 | End: 2024-04-02 | Stop reason: SDUPTHER

## 2024-04-02 RX ORDER — ISOSORBIDE DINITRATE 10 MG/1
10 TABLET ORAL 3 TIMES DAILY
Status: DISCONTINUED | OUTPATIENT
Start: 2024-04-02 | End: 2024-04-04

## 2024-04-02 RX ORDER — IBUPROFEN 200 MG
16 TABLET ORAL
Status: DISCONTINUED | OUTPATIENT
Start: 2024-04-02 | End: 2024-04-03

## 2024-04-02 RX ORDER — GLUCAGON 1 MG
1 KIT INJECTION
Status: DISCONTINUED | OUTPATIENT
Start: 2024-04-02 | End: 2024-04-03

## 2024-04-02 RX ORDER — SODIUM CHLORIDE 0.9 % (FLUSH) 0.9 %
10 SYRINGE (ML) INJECTION
Status: DISCONTINUED | OUTPATIENT
Start: 2024-04-02 | End: 2024-04-05 | Stop reason: HOSPADM

## 2024-04-02 RX ORDER — ATORVASTATIN CALCIUM 40 MG/1
80 TABLET, FILM COATED ORAL DAILY
Status: DISCONTINUED | OUTPATIENT
Start: 2024-04-03 | End: 2024-04-05 | Stop reason: HOSPADM

## 2024-04-02 RX ORDER — ZOLPIDEM TARTRATE 5 MG/1
5 TABLET ORAL NIGHTLY PRN
Status: DISCONTINUED | OUTPATIENT
Start: 2024-04-02 | End: 2024-04-05 | Stop reason: HOSPADM

## 2024-04-02 RX ORDER — NALOXONE HCL 0.4 MG/ML
0.4 VIAL (ML) INJECTION
Status: DISCONTINUED | OUTPATIENT
Start: 2024-04-02 | End: 2024-04-03

## 2024-04-02 RX ORDER — DICYCLOMINE HYDROCHLORIDE 10 MG/ML
20 INJECTION INTRAMUSCULAR
Status: COMPLETED | OUTPATIENT
Start: 2024-04-02 | End: 2024-04-02

## 2024-04-02 RX ORDER — IBUPROFEN 200 MG
24 TABLET ORAL
Status: DISCONTINUED | OUTPATIENT
Start: 2024-04-02 | End: 2024-04-03

## 2024-04-02 RX ORDER — OXYCODONE AND ACETAMINOPHEN 10; 325 MG/1; MG/1
1 TABLET ORAL EVERY 4 HOURS PRN
Status: DISCONTINUED | OUTPATIENT
Start: 2024-04-02 | End: 2024-04-05 | Stop reason: HOSPADM

## 2024-04-02 RX ORDER — HYDRALAZINE HYDROCHLORIDE 10 MG/1
10 TABLET, FILM COATED ORAL 3 TIMES DAILY
Status: DISCONTINUED | OUTPATIENT
Start: 2024-04-02 | End: 2024-04-04

## 2024-04-02 RX ORDER — DIPHENHYDRAMINE HCL 25 MG
25 CAPSULE ORAL EVERY 6 HOURS PRN
Status: ACTIVE | OUTPATIENT
Start: 2024-04-02 | End: 2024-04-03

## 2024-04-02 RX ADMIN — ZOLPIDEM TARTRATE 5 MG: 5 TABLET ORAL at 06:04

## 2024-04-02 RX ADMIN — ISOSORBIDE DINITRATE 10 MG: 10 TABLET ORAL at 06:04

## 2024-04-02 RX ADMIN — MORPHINE SULFATE 4 MG: 4 INJECTION INTRAVENOUS at 12:04

## 2024-04-02 RX ADMIN — METOPROLOL SUCCINATE 25 MG: 25 TABLET, EXTENDED RELEASE ORAL at 06:04

## 2024-04-02 RX ADMIN — APIXABAN 5 MG: 5 TABLET, FILM COATED ORAL at 09:04

## 2024-04-02 RX ADMIN — SODIUM CHLORIDE, POTASSIUM CHLORIDE, SODIUM LACTATE AND CALCIUM CHLORIDE 250 ML: 600; 310; 30; 20 INJECTION, SOLUTION INTRAVENOUS at 09:04

## 2024-04-02 RX ADMIN — CEFTRIAXONE 1 G: 1 INJECTION, POWDER, FOR SOLUTION INTRAMUSCULAR; INTRAVENOUS at 05:04

## 2024-04-02 RX ADMIN — ONDANSETRON 4 MG: 2 INJECTION INTRAMUSCULAR; INTRAVENOUS at 12:04

## 2024-04-02 RX ADMIN — ONDANSETRON 4 MG: 4 TABLET, ORALLY DISINTEGRATING ORAL at 06:04

## 2024-04-02 RX ADMIN — DICYCLOMINE HYDROCHLORIDE 20 MG: 20 INJECTION, SOLUTION INTRAMUSCULAR at 12:04

## 2024-04-02 RX ADMIN — OXYCODONE AND ACETAMINOPHEN 1 TABLET: 10; 325 TABLET ORAL at 06:04

## 2024-04-02 RX ADMIN — ONDANSETRON 4 MG: 2 INJECTION INTRAMUSCULAR; INTRAVENOUS at 05:04

## 2024-04-02 RX ADMIN — HYDRALAZINE HYDROCHLORIDE 10 MG: 10 TABLET, FILM COATED ORAL at 06:04

## 2024-04-02 NOTE — H&P
ED Observation Unit  History and Physical      I assumed care of this patient from the Main ED at onset of observation time, 5:27 PM on 04/02/2024.       History of Present Illness:    Casie Salvador is a 59 y.o. female with medical history of IDDM, HTN, CAD, PAD (R BKA and L foot amputation) on Plavix, Mural thrombus of cardiac apex on Eliquis, Combined HF (EF 20-25%), Chronic opioid therapy presenting to the ED with the chief complaint of fatigue and nausea.     Reports 2 days of non-bloody vomiting and generalized abdominal pain. Unsure if she is holding down her daily medications. Denies diarrhea and does not recall her last BM. +Flatulence. Reports associated fatigue, generalized weakness, mental fog, decreased appetite. Reports having foul-smelling urine. No hematuria or flank pain. No fever, chest pain, SOB, cough, syncopal episodes.  brought her to the ED from home. Denies ETOH and recreational drug use. She is on Percocet  for chronic lower extremity pain that she has been out of for the past 2 days. She is scheduled to see her PCP tomorrow at 11:30am for a refill.     In the ED, UA +3 leuk >100 WBC neg nitrite consistent with UTI. WBC 8.8, H/H 13/38, Crt 1.1, Trop <0.006, Lactate mildly elevated 2.28. ECG sinus tachycardia 101 with incomplete RBBB. She received Bentyl 20mg IV, Zofran 4mg IV, 1L LR IV, Morphine 4mg IV, Rocephin 1g IV, Zofran 4mg IV.     I reviewed the ED Provider Note dated 4/2/24 prior to my evaluation of this patient.  I reviewed all labs and imaging performed in the Main ED, prior to patient being placed in Observation. Patient was placed in the ED Observation Unit for Acute pyelonephritis.    PMHx   Past Medical History:   Diagnosis Date    Chronic combined systolic and diastolic congestive heart failure     Coronary artery disease     Diabetes mellitus     Encounter for blood transfusion     Gastric ulcer with hemorrhage     Heart attack     History of gastric  ulcer 4/2/2022    Hypertension     NSAID induced gastritis 4/2/2022    Perforated viscus 03/14/2022    Peripheral artery disease       Past Surgical History:   Procedure Laterality Date    APPENDECTOMY      BELOW KNEE AMPUTATION OF LOWER EXTREMITY Right 2019    COLONOSCOPY N/A 02/23/2022    Procedure: COLONOSCOPY;  Surgeon: Estefania Bryant MD;  Location: [facility] (2ND FLR);  Service: Endoscopy;  Laterality: N/A;  anemia, melena    CORONARY STENT PLACEMENT      ESOPHAGOGASTRODUODENOSCOPY N/A 02/23/2022    Procedure: EGD (ESOPHAGOGASTRODUODENOSCOPY);  Surgeon: Estefania Bryant MD;  Location: [facility] (2ND FLR);  Service: Endoscopy;  Laterality: N/A;  anemia    ESOPHAGOGASTRODUODENOSCOPY N/A 4/14/2022    Procedure: EGD (ESOPHAGOGASTRODUODENOSCOPY);  Surgeon: First Available Den King;  Location: [facility] (2ND FLR);  Service: Endoscopy;  Laterality: N/A;  please schedule in 8 weeks. done 2/23      EF-20    ESOPHAGOGASTRODUODENOSCOPY  4/14/2022    Procedure: ;  Surgeon: First Available Den King;  Location: [facility] (2ND FLR);  Service: Endoscopy;;    ESOPHAGOGASTRODUODENOSCOPY N/A 4/29/2022    Procedure: EGD (ESOPHAGOGASTRODUODENOSCOPY);  Surgeon: Estefania Bryant MD;  Location: [facility];  Service: Endoscopy;  Laterality: N/A;  expedite EGD per Dr Ga      states vaccinated-will bring card-GT    ESOPHAGOGASTRODUODENOSCOPY N/A 10/28/2022    Procedure: EGD (ESOPHAGOGASTRODUODENOSCOPY);  Surgeon: Estefania Bryant MD;  Location: [facility] (2ND [facility]);  Service: Endoscopy;  Laterality: N/A;    FOOT AMPUTATION THROUGH METATARSAL Left 2019    TUBAL LIGATION          Family Hx   No family history on file.     Social Hx   Social History     Socioeconomic History    Marital status:    Tobacco Use    Smoking status: Every Day     Current packs/day: 0.50     Types: Cigarettes    Smokeless tobacco: Never   Substance and Sexual Activity    Alcohol use: No    Drug use: No     Social Determinants of Health  "    Financial Resource Strain: Low Risk  (10/22/2023)    Overall Financial Resource Strain (CARDIA)     Difficulty of Paying Living Expenses: Not hard at all   Food Insecurity: No Food Insecurity (10/22/2023)    Hunger Vital Sign     Worried About Running Out of Food in the Last Year: Never true     Ran Out of Food in the Last Year: Never true   Transportation Needs: No Transportation Needs (10/22/2023)    PRAPARE - Transportation     Lack of Transportation (Medical): No     Lack of Transportation (Non-Medical): No   Physical Activity: Insufficiently Active (10/22/2023)    Exercise Vital Sign     Days of Exercise per Week: 3 days     Minutes of Exercise per Session: 30 min   Stress: Stress Concern Present (10/22/2023)    [employer] [place] [employer] - Occupational Stress Questionnaire     Feeling of Stress : To some extent   Social Connections: Moderately Integrated (10/22/2023)    Social Connection and Isolation Panel [NHANES]     Frequency of Communication with Friends and Family: More than three times a week     Frequency of Social Gatherings with Friends and Family: Three times a week     Attends [employer] Services: More than 4 times per year     Active Member of Clubs or Organizations: No     Attends Club or Organization Meetings: Never     Marital Status: Living with partner   Housing Stability: Low Risk  (10/22/2023)    Housing Stability Vital Sign     Unable to Pay for Housing in the Last Year: No     Number of Places Lived in the Last Year: 1     Unstable Housing in the Last Year: No        Vital Signs   Vitals:    04/02/24 1100 04/02/24 1235 04/02/24 1240 04/02/24 1245   BP: (!) 201/119 (!) 217/100  (!) 192/82   Pulse: 100 94  96   Resp: (!) 24 20 20 20   Temp: 98.5 °F (36.9 °C)      TempSrc: Oral      SpO2: 99% 96%  97%   Weight: 77.1 kg (170 lb)      Height: 5' 6" (1.676 m)           Review of Systems  Review of Systems   Constitutional:  Negative for fever.   Gastrointestinal:  Positive for " nausea and vomiting.       Physical Exam  Physical Exam  Constitutional:       Appearance: Normal appearance.   HENT:      Head: Normocephalic and atraumatic.      Mouth/Throat:      Mouth: Mucous membranes are moist.   Cardiovascular:      Rate and Rhythm: Normal rate and regular rhythm.      Comments: No lower extremity edema  Pulmonary:      Effort: No respiratory distress.      Breath sounds: No wheezing.      Comments: POx 98% on RA. No respiratory distress. Speaking full sentences without difficulty  Abdominal:      General: Abdomen is flat. There is no distension.      Palpations: There is no mass.      Tenderness: There is no abdominal tenderness.      Comments: No CVA tenderness   Musculoskeletal:      Comments: R BKA and L partial foot amp. Warm to touch. No ulcerations.    Neurological:      Mental Status: She is alert.       Medications:   Scheduled Meds:   apixaban  5 mg Oral BID    [START ON 4/3/2024] atorvastatin  80 mg Oral Daily    [START ON 4/3/2024] cefTRIAXone (Rocephin) IV (PEDS and ADULTS)  1 g Intravenous ED 1 Time    [START ON 4/3/2024] clopidogreL  75 mg Oral Daily    [START ON 4/3/2024] furosemide  20 mg Oral Daily    gabapentin  600 mg Oral BID    hydrALAZINE  10 mg Oral TID    isosorbide dinitrate  10 mg Oral TID    metoprolol succinate  25 mg Oral Daily    [START ON 4/3/2024] pantoprazole  40 mg Oral Before breakfast     Continuous Infusions:  PRN Meds:.      Assessment/Plan:  Pyelonephritis  -Reports 2 days of generalized abdominal pain and N/V. Unable to take daily home medications  -Symptoms improved after Morphine, Bentyl, Zofran, IVF in the ED  -Lactate slightly elevated 2.28. Will repeat after IVF complete  -UA consistent with UTI. Received Rocephin in the ED. Will plan for 2nd IV dose tomorrow and discharge on Keflex. Prior UCx reviewed.  -Abdomen soft and non-tender on exam. Deferring abdominal imaging at this time. No renal colicky pain, hematuria, flank pain and do not suspect  obstructive nephrolithiasis.   -Normal WBC and do not suspect mesenteric ischemia  -Possible opiate withdrawal factoring into N/V as she has been out of her Percocet for the past 2 days? Scheduled to see her PCP tomorrow at 11:30am for refill.   -Dispo pending repeat AM labs, repeat lactate, and PO tolerance    IDDM  -Sliding scale insulin  -+2 Ketonuria, but normal AG and bicarb 22. Lower suspicion for DKA    Hypertension  -Continue home Metoprolol XL, Isordil, Hydralazine    PAD  -History of R BKA and L foot amputation  -F/B vascular surgery at [facility]  -Continue Plavix    Mural thrombus of cardiac apex  -Continue Eliquis    Case was discussed with the ED provider, Dr. Saenz.

## 2024-04-02 NOTE — PROVIDER PROGRESS NOTES - EMERGENCY DEPT.
Encounter Date: 4/2/2024    ED Physician Progress Notes          ED staff SIGN OUT NOTE    Patient s/o to me by Dr. Mora pending Lactate [> 2], UA [with evidence of UTI]    Patient with persistent nausea vomiting in the emergency department, however no reproducible abdominal pain to suggest surgical emergency.    Given patient with evidence of UTI and systemic symptoms, concern for pyelonephritis.    Will plan for IV antibiotics, symptomatic treatment, repeat lactate with plan if patient's symptoms improve, she is able to tolerate p.o. and lactate improves then she may be discharged home in the morning with p.o. antibiotics for pyelonephritis.

## 2024-04-02 NOTE — ED PROVIDER NOTES
Encounter Date: 4/2/2024       History     Chief Complaint   Patient presents with    Fatigue    Nausea     + cardiac hx     The patient is a 59-year-old female who presents to the emergency department with abdominal pain.  She states that this has been ongoing for 2 days.  It is constant.  It is in the left upper quadrant.  It is associated with significant nausea and vomiting.  She has been unable to tolerate any oral intake over this time.  She states that she has not had a bowel movement in quite some time.  However, she is passing gas.  Associated with her abdominal pain, nausea, and vomiting, she also reports generalized weakness, dizziness, and a decrease in appetite.  She also reports foul-smelling urine.  She denies chest pain, shortness of breath, dysuria, hematuria, headache, or visual changes.  She has no other complaints.      Review of patient's allergies indicates:   Allergen Reactions    Orange juice Hives    Tomato (solanum lycopersicum) Hives     Past Medical History:   Diagnosis Date    Chronic combined systolic and diastolic congestive heart failure     Coronary artery disease     Diabetes mellitus     Encounter for blood transfusion     Gastric ulcer with hemorrhage     Heart attack     History of gastric ulcer 4/2/2022    Hypertension     NSAID induced gastritis 4/2/2022    Perforated viscus 03/14/2022    Peripheral artery disease      Past Surgical History:   Procedure Laterality Date    APPENDECTOMY      BELOW KNEE AMPUTATION OF LOWER EXTREMITY Right 2019    COLONOSCOPY N/A 02/23/2022    Procedure: COLONOSCOPY;  Surgeon: Estefania Bryant MD;  Location: [address]);  Service: Endoscopy;  Laterality: N/A;  anemia, melena    CORONARY STENT PLACEMENT      ESOPHAGOGASTRODUODENOSCOPY N/A 02/23/2022    Procedure: EGD (ESOPHAGOGASTRODUODENOSCOPY);  Surgeon: Estefania Bryant MD;  Location: [address]);  Service: Endoscopy;  Laterality: N/A;  anemia    ESOPHAGOGASTRODUODENOSCOPY N/A  4/14/2022    Procedure: EGD (ESOPHAGOGASTRODUODENOSCOPY);  Surgeon: First Available Den King;  Location: [facility] ENDO (2ND FLR);  Service: Endoscopy;  Laterality: N/A;  please schedule in 8 weeks. done 2/23      EF-20    ESOPHAGOGASTRODUODENOSCOPY  4/14/2022    Procedure: ;  Surgeon: First Available Den King;  Location: [facility] ENDO (2ND FLR);  Service: Endoscopy;;    ESOPHAGOGASTRODUODENOSCOPY N/A 4/29/2022    Procedure: EGD (ESOPHAGOGASTRODUODENOSCOPY);  Surgeon: Estefania Bryant MD;  Location: [facility] ENDO;  Service: Endoscopy;  Laterality: N/A;  expedite EGD per Dr Ga      states vaccinated-will bring card-GT    ESOPHAGOGASTRODUODENOSCOPY N/A 10/28/2022    Procedure: EGD (ESOPHAGOGASTRODUODENOSCOPY);  Surgeon: Estefania Bryant MD;  Location: [facility] (2ND FLR);  Service: Endoscopy;  Laterality: N/A;    FOOT AMPUTATION THROUGH METATARSAL Left 2019    TUBAL LIGATION       History reviewed. No pertinent family history.  Social History     Tobacco Use    Smoking status: Every Day     Current packs/day: 0.50     Types: Cigarettes    Smokeless tobacco: Never   Substance Use Topics    Alcohol use: No    Drug use: No     Review of Systems  General: Denies fever.  Denies chills.  Reports generalized weakness.  HENT: Denies sore throat.  Denies earache.  Denies rhinorrhea.  Eyes: Denies visual changes.  Denies eye pain.  Denies drainage.  Cardiovascular: Denies chest pain.  Denies shortness of breath.  Denies orthopnea.  Denies dyspnea on exertion.  Respiratory: Denies shortness of breath.  Denies wheezing.  Denies coughing.  GI:  Reports left upper quadrant abdominal pain.  Reports nausea.  Reports vomiting.  Denies diarrhea.  Reports constipation.  Reports normal flatus.  Denies melena.  Denies hematochezia.  : Denies dysuria.  Denies hematuria.  Reports foul-smelling urine.  Denies flank pain.  Skin: Denies rashes.  Denies lesions.  Denies pallor.  Neuro: Denies headache.  Denies head trauma.  Denies numbness.   Denies focal weakness.  Reports dizziness.  Musculoskeletal: Denies neck pain.  Denies back pain.  Denies extremity pain.  Denies extremity swelling.  Reports chronic left foot pain.        Physical Exam     Initial Vitals [04/02/24 1100]   BP Pulse Resp Temp SpO2   (!) 201/119 100 (!) 24 98.5 °F (36.9 °C) 99 %      MAP       --         Physical Exam  General: Mild distress with pain and nausea.  Well-nourished.  Well-developed.  Alert and oriented x3.  Nontoxic in appearance.  HENT:  Dry mucous membranes.  Normocephalic atraumatic.  Oropharynx clear.    Eyes: Pupils equally round and reactive to light.  Extraocular movements intact.  No scleral icterus.  No conjunctival pallor.  Cardiovascular:  Mild tachycardia noted.  Regular rhythm.  No murmurs, rubs, or gallops.    Respiratory: Clear to auscultation bilaterally.  No wheezes, rales, or rhonchi.  No respiratory distress.  Abdomen: Soft.  Nontender.  Nondistended.  No guarding.  No rebound.  No masses.  No abdominal bruit auscultated.  :  No CVA tenderness.  Skin: No rashes.  No lesions.  No pallor.  No jaundice.  Neuro: Cranial nerves II through XII grossly intact.  Moving all extremities equally.  No sensory deficits.  Strength 4 out of 5 in all 4 extremities.  Normal finger-to-nose.  No pronator drift.  Musculoskeletal: Neck supple.  AKA noted on the right.  Partial foot amputation noted on the left.  Full range of motion of the extremities.    ED Course   Procedures  Labs Reviewed   CBC W/ AUTO DIFFERENTIAL - Abnormal; Notable for the following components:       Result Value    RBC 5.51 (*)     MCV 70 (*)     MCH 23.8 (*)     RDW 16.2 (*)     Mono # 0.2 (*)     Gran % 78.7 (*)     Mono % 2.3 (*)     All other components within normal limits    Narrative:     Release to patient->Immediate   COMPREHENSIVE METABOLIC PANEL - Abnormal; Notable for the following components:    Sodium 135 (*)     CO2 22 (*)     Glucose 202 (*)     Total Protein 8.7 (*)     ALT 9  (*)     eGFR 58.2 (*)     All other components within normal limits    Narrative:     add on lipas and trop per  /order#[id number] and [id number] @   04/02/2024  12:12         Release to patient->Immediate   URINALYSIS, REFLEX TO URINE CULTURE - Abnormal; Notable for the following components:    Protein, UA 2+ (*)     Ketones, UA 2+ (*)     Occult Blood UA 1+ (*)     Leukocytes, UA 3+ (*)     All other components within normal limits    Narrative:     Specimen Source->Urine   URINALYSIS MICROSCOPIC - Abnormal; Notable for the following components:    WBC, UA >100 (*)     All other components within normal limits    Narrative:     Specimen Source->Urine   BASIC METABOLIC PANEL - Abnormal; Notable for the following components:    Glucose 155 (*)     Creatinine 1.7 (*)     eGFR 34.5 (*)     All other components within normal limits   CBC W/ AUTO DIFFERENTIAL - Abnormal; Notable for the following components:    Hemoglobin 11.2 (*)     Hematocrit 34.0 (*)     MCV 70 (*)     MCH 22.9 (*)     RDW 15.5 (*)     Immature Grans (Abs) 0.05 (*)     Mono # 1.1 (*)     All other components within normal limits   ISTAT LACTATE - Abnormal; Notable for the following components:    POC Lactate 2.28 (*)     All other components within normal limits   POCT GLUCOSE - Abnormal; Notable for the following components:    POCT Glucose 176 (*)     All other components within normal limits   CULTURE, URINE   HIV 1 / 2 ANTIBODY    Narrative:     add on lipas and trop per  /order#[id number] and [id number] @   04/02/2024  12:12         Release to patient->Immediate   HEPATITIS C ANTIBODY    Narrative:     add on lipas and trop per  /order#[id number] and [id number] @   04/02/2024  12:12         Release to patient->Immediate   LACTIC ACID, PLASMA   TROPONIN I    Narrative:     add on lipas and trop per  /order#[id number] and [id number] @   04/02/2024  12:12         Release to patient->Immediate   LIPASE    Narrative:     add on  lipas and trop per  /order#[id number] and [id number] @   04/02/2024  12:12         Release to patient->Immediate   LACTIC ACID, PLASMA   HEMOGLOBIN A1C   HEMOGLOBIN A1C    Narrative:     Add-on GHGB to [id number] per Kacey Saenz MD on  04/02/2024  21:11     add on lipas and trop per  /order#[id number] and [id number] @   04/02/2024  12:12         Release to patient->Immediate   CREATININE, URINE, RANDOM   OSMOLALITY, URINE RANDOM   SODIUM, URINE, RANDOM   POCT GLUCOSE MONITORING CONTINUOUS     EKG Readings: (Independently Interpreted)   I independently interpreted this EKG.  Sinus tachycardia with a rate of 101.  Left axis deviation.  Incomplete left bundle-branch block with a QRS of 106 milliseconds inferior T-wave flattening noted.     ECG Results              EKG 12-lead (Final result)        Collection Time Result Time QRS Duration OHS QTC Calculation    04/02/24 11:04:22 04/03/24 00:26:07 106 513                     Final result by Interface, Lab In [facility] (04/03/24 00:26:16)                   Narrative:    Test Reason : R53.83,    Vent. Rate : 101 BPM     Atrial Rate : 101 BPM     P-R Int : 178 ms          QRS Dur : 106 ms      QT Int : 396 ms       P-R-T Axes : 078 -13 054 degrees     QTc Int : 513 ms    Sinus tachycardia  Right atrial enlargement  Incomplete left bundle branch block  Minimal voltage criteria for LVH, may be normal variant ( Jeffry product )  Nonspecific ST and T wave abnormality  Abnormal ECG  When compared with ECG of 11-MAR-2024 12:52,  ST elevation now present in Inferior leads  T wave inversion no longer evident in Inferior leads  T wave inversion no longer evident in Anterior leads  Confirmed by TAYE VIDAL, HOMEYAR ([id number]) on 4/3/2024 12:26:03 AM    Referred By: [email or web address]   SELF           Confirmed By:ABIOLA KOTHARI MD                                  Imaging Results    None          Medications   apixaban tablet 5 mg (5 mg Oral Given 4/3/24 0815)   atorvastatin tablet 80 mg  (80 mg Oral Given 4/3/24 0815)   clopidogreL tablet 75 mg (75 mg Oral Given 4/3/24 0815)   hydrALAZINE tablet 10 mg (10 mg Oral Given 4/3/24 0815)   isosorbide dinitrate tablet 10 mg (10 mg Oral Given 4/3/24 0815)   metoprolol succinate (TOPROL-XL) 24 hr tablet 25 mg (25 mg Oral Given 4/3/24 0814)   pantoprazole EC tablet 40 mg (40 mg Oral Given 4/3/24 0644)   zolpidem tablet 5 mg (5 mg Oral Given 4/2/24 1846)   oxyCODONE-acetaminophen  mg per tablet 1 tablet (1 tablet Oral Given 4/3/24 0854)   diphenhydrAMINE capsule 25 mg (has no administration in time range)   ondansetron disintegrating tablet 4 mg (4 mg Oral Given 4/2/24 1858)   sodium chloride 0.9% flush 10 mL (has no administration in time range)   glucose chewable tablet 16 g (has no administration in time range)   glucose chewable tablet 24 g (has no administration in time range)   glucagon (human recombinant) injection 1 mg (has no administration in time range)   insulin aspart U-100 pen 0-5 Units (has no administration in time range)   dextrose 10% bolus 125 mL 125 mL (has no administration in time range)   dextrose 10% bolus 250 mL 250 mL (has no administration in time range)   gabapentin capsule 600 mg (600 mg Oral Given 4/3/24 0815)   naloxone 0.4 mg/mL injection 0.4 mg (has no administration in time range)   sodium chloride 0.9% bolus 500 mL 500 mL (500 mLs Intravenous New Bag 4/3/24 0818)   lactated ringers bolus 1,000 mL (0 mLs Intravenous Stopped 4/2/24 1500)   ondansetron injection 4 mg (4 mg Intravenous Given 4/2/24 1231)   dicyclomine injection 20 mg (20 mg Intramuscular Given 4/2/24 1231)   morphine injection 4 mg (4 mg Intravenous Given 4/2/24 1240)   cefTRIAXone (Rocephin) 1 g in dextrose 5 % in water (D5W) 100 mL IVPB (MB+) (0 g Intravenous Stopped 4/2/24 1903)   ondansetron injection 4 mg (4 mg Intravenous Given 4/2/24 1705)   lactated ringers bolus 250 mL (0 mLs Intravenous Stopped 4/2/24 2220)   cefTRIAXone (Rocephin) 1 g in dextrose  5 % in water (D5W) 100 mL IVPB (MB+) (0 g Intravenous Stopped 4/3/24 8687)     Medical Decision Making  This is an emergent evaluation.  The etiology of the patient's symptoms is unclear.  A lactic acid level has been ordered to assess for mesenteric ischemia.  The patient has no abdominal tenderness to palpation, guarding, or rebound.  However, she is complaining of abdominal pain, nausea, and vomiting.  Laboratory studies have been ordered to assess for dehydration, electrolyte derangement, acute kidney injury, pancreatitis, and acute liver dysfunction.  Urinalysis has been ordered to assess for UTI.  I will also check a troponin and screening EKG to assess for ACS.  In the meantime, I will provide the patient with IM Bentyl, IV Zofran and IV fluids.  I will reassess.    12:56 p.m.   Lactic acid level is pending.  Urinalysis is also pending.  The patient's laboratory studies that have resulted show no clinically significant abnormalities.  She states that her abdominal pain and nausea have resolved.  She is requesting pain medication for her chronic foot pain.  Incidentally, her blood pressure is also elevated.  A dose of morphine has been provided.  I will reassess.    1:54 p.m.   The lactic acid is still pending.  Urinalysis is also pending.  If the lactic acid level is elevated, I believe the patient will require a CT scan of the abdomen and pelvis.  If negative, I am comfortable discharging the patient from an abdominal pain and vomiting perspective with a prescription for Bentyl and Zofran.  Urinalysis should not change the disposition.  However, if positive for infection, the patient will need antibiotics.  This will be relayed to to my colleague at shift change.    Continuation:  After shift change, the patient's lactic acid and urinalysis resulted.  The lactic acid appears to be elevated and the urinalysis appears to indicate a UTI.  Because of this, antibiotics were provided and the patient was placed in  observation.    Amount and/or Complexity of Data Reviewed  Labs: ordered.  Radiology: ordered.    Risk  OTC drugs.  Prescription drug management.                                      Clinical Impression:  Final diagnoses:  [N39.0] Urinary tract infection without hematuria, site unspecified (Primary)  [R11.2] Nausea and vomiting, unspecified vomiting type  [R10.9] Abdominal pain, unspecified abdominal location          ED Disposition Condition    Observation Stable                Tripp Mora MD  04/03/24 0956

## 2024-04-02 NOTE — FIRST PROVIDER EVALUATION
Emergency Department TeleTriage Encounter Note      CHIEF COMPLAINT    Chief Complaint   Patient presents with    Fatigue    Nausea     + cardiac hx       VITAL SIGNS   Initial Vitals [04/02/24 1100]   BP Pulse Resp Temp SpO2   (!) 201/119 100 (!) 24 98.5 °F (36.9 °C) 99 %      MAP       --            ALLERGIES    Review of patient's allergies indicates:   Allergen Reactions    Orange juice Hives    Tomato (solanum lycopersicum) Hives       PROVIDER TRIAGE NOTE  Verbal consent for the teletriage evaluation was given by the patient at the start of the evaluation.  All efforts will be made to maintain patient's privacy during the evaluation.      This is a teletriage evaluation of a 59 y.o. female presenting to the ED with c/o nausea, vomiting, decreased appetite, dizziness, abdominal pain, and fatigue for 2 days. Limited physical exam via telehealth: The patient is awake, alert, answering questions appropriately and is not in respiratory distress.  As the Teletriage provider, I performed an initial assessment and ordered appropriate labs and imaging studies, if any, to facilitate the patient's care once placed in the ED. Once a room is available, care and a full evaluation will be completed by an alternate ED provider.  Any additional orders and the final disposition will be determined by that provider.  All imaging and labs will not be followed-up by the Teletriage Team, including myself.          ORDERS  Labs Reviewed   HIV 1 / 2 ANTIBODY   HEPATITIS C ANTIBODY   CBC W/ AUTO DIFFERENTIAL   COMPREHENSIVE METABOLIC PANEL   URINALYSIS, REFLEX TO URINE CULTURE   POCT GLUCOSE MONITORING CONTINUOUS       ED Orders (720h ago, onward)      Start Ordered     Status Ordering Provider    04/02/24 1122 04/02/24 1121  CBC auto differential  STAT         Ordered DEBRA HEATON    04/02/24 1122 04/02/24 1121  Comprehensive metabolic panel  STAT         Ordered DEBRA HEATON    04/02/24 1122 04/02/24 1121  POCT glucose  Once          Ordered DEBRA HEATON    04/02/24 1122 04/02/24 1121  Urinalysis, Reflex to Urine Culture Urine, Clean Catch  STAT         Ordered DEBRA HEATON    04/02/24 1121 04/02/24 1121  Saline lock IV  Once         Ordered DEBRA HEATON    04/02/24 1121 04/02/24 1121  Pulse Oximetry Continuous  Continuous         Ordered DEBRA HEATON    04/02/24 1121 04/02/24 1121  Cardiac Monitoring - Adult  Continuous        Comments: Notify Physician If:    Ordered DEBRA HEATON    04/02/24 1121 04/02/24 1121  EKG 12-lead  Once         Ordered DEBRA HEATON    04/02/24 1103 04/02/24 1102  HIV 1/2 Ag/Ab (4th Gen)  STAT         Acknowledged TRACE STYLES    04/02/24 1103 04/02/24 1102  Hepatitis C Antibody  STAT         Acknowledged TRACE STYLES    04/02/24 1103 04/02/24 1103  EKG 12-lead  Once         Completed by SURJIT MILLER on 4/2/2024 at 11:09 AM [facility]              Virtual Visit Note: The provider triage portion of this emergency department evaluation and documentation was performed via Cycle, a HIPAA-compliant telemedicine application, in concert with a tele-presenter in the room. A face to face patient evaluation with one of my colleagues will occur once the patient is placed in an emergency department room.      DISCLAIMER: This note was prepared with [employer] voice recognition transcription software. Garbled syntax, mangled pronouns, and other bizarre constructions may be attributed to that software system.

## 2024-04-03 PROBLEM — R07.9 CHEST PAIN, RULE OUT ACUTE MYOCARDIAL INFARCTION: Status: RESOLVED | Noted: 2023-10-22 | Resolved: 2024-04-03

## 2024-04-03 PROBLEM — I25.119 CORONARY ARTERY DISEASE INVOLVING NATIVE CORONARY ARTERY OF NATIVE HEART WITH ANGINA PECTORIS: Chronic | Status: ACTIVE | Noted: 2022-02-20

## 2024-04-03 PROBLEM — E11.59 TYPE 2 DIABETES MELLITUS WITH CIRCULATORY DISORDER, WITH LONG-TERM CURRENT USE OF INSULIN: Chronic | Status: ACTIVE | Noted: 2022-02-20

## 2024-04-03 PROBLEM — N10 ACUTE PYELONEPHRITIS: Status: RESOLVED | Noted: 2024-04-02 | Resolved: 2024-04-03

## 2024-04-03 PROBLEM — I50.43 ACUTE ON CHRONIC COMBINED SYSTOLIC AND DIASTOLIC CONGESTIVE HEART FAILURE: Status: RESOLVED | Noted: 2022-02-21 | Resolved: 2024-04-03

## 2024-04-03 PROBLEM — N39.0 UTI (URINARY TRACT INFECTION): Status: ACTIVE | Noted: 2024-04-03

## 2024-04-03 PROBLEM — E11.8 DM (DIABETES MELLITUS), TYPE 2 WITH COMPLICATIONS: Chronic | Status: ACTIVE | Noted: 2022-02-20

## 2024-04-03 PROBLEM — R57.0 CARDIOGENIC SHOCK: Status: RESOLVED | Noted: 2023-10-24 | Resolved: 2024-04-03

## 2024-04-03 PROBLEM — Z89.432 HISTORY OF TRANSMETATARSAL AMPUTATION OF LEFT FOOT: Chronic | Status: ACTIVE | Noted: 2019-03-08

## 2024-04-03 PROBLEM — Z89.511 S/P BKA (BELOW KNEE AMPUTATION), RIGHT: Chronic | Status: ACTIVE | Noted: 2023-10-22

## 2024-04-03 PROBLEM — I50.42 CHRONIC COMBINED SYSTOLIC AND DIASTOLIC CONGESTIVE HEART FAILURE: Chronic | Status: ACTIVE | Noted: 2023-10-22

## 2024-04-03 LAB
ANION GAP SERPL CALC-SCNC: 13 MMOL/L (ref 8–16)
ANION GAP SERPL CALC-SCNC: 9 MMOL/L (ref 8–16)
BASOPHILS # BLD AUTO: 0.04 K/UL (ref 0–0.2)
BASOPHILS NFR BLD: 0.3 % (ref 0–1.9)
BUN SERPL-MCNC: 14 MG/DL (ref 6–20)
BUN SERPL-MCNC: 19 MG/DL (ref 6–20)
CALCIUM SERPL-MCNC: 8.8 MG/DL (ref 8.7–10.5)
CALCIUM SERPL-MCNC: 9.1 MG/DL (ref 8.7–10.5)
CHLORIDE SERPL-SCNC: 100 MMOL/L (ref 95–110)
CHLORIDE SERPL-SCNC: 99 MMOL/L (ref 95–110)
CO2 SERPL-SCNC: 23 MMOL/L (ref 23–29)
CO2 SERPL-SCNC: 25 MMOL/L (ref 23–29)
CREAT SERPL-MCNC: 1.7 MG/DL (ref 0.5–1.4)
CREAT SERPL-MCNC: 2 MG/DL (ref 0.5–1.4)
CREAT UR-MCNC: 106 MG/DL (ref 15–325)
DIFFERENTIAL METHOD BLD: ABNORMAL
EOSINOPHIL # BLD AUTO: 0 K/UL (ref 0–0.5)
EOSINOPHIL NFR BLD: 0.2 % (ref 0–8)
ERYTHROCYTE [DISTWIDTH] IN BLOOD BY AUTOMATED COUNT: 15.5 % (ref 11.5–14.5)
EST. GFR  (NO RACE VARIABLE): 28.4 ML/MIN/1.73 M^2
EST. GFR  (NO RACE VARIABLE): 34.5 ML/MIN/1.73 M^2
GLUCOSE SERPL-MCNC: 144 MG/DL (ref 70–110)
GLUCOSE SERPL-MCNC: 155 MG/DL (ref 70–110)
HCT VFR BLD AUTO: 34 % (ref 37–48.5)
HGB BLD-MCNC: 11.2 G/DL (ref 12–16)
IMM GRANULOCYTES # BLD AUTO: 0.05 K/UL (ref 0–0.04)
IMM GRANULOCYTES NFR BLD AUTO: 0.4 % (ref 0–0.5)
LYMPHOCYTES # BLD AUTO: 3.3 K/UL (ref 1–4.8)
LYMPHOCYTES NFR BLD: 27.4 % (ref 18–48)
MCH RBC QN AUTO: 22.9 PG (ref 27–31)
MCHC RBC AUTO-ENTMCNC: 32.9 G/DL (ref 32–36)
MCV RBC AUTO: 70 FL (ref 82–98)
MONOCYTES # BLD AUTO: 1.1 K/UL (ref 0.3–1)
MONOCYTES NFR BLD: 8.7 % (ref 4–15)
NEUTROPHILS # BLD AUTO: 7.6 K/UL (ref 1.8–7.7)
NEUTROPHILS NFR BLD: 63 % (ref 38–73)
NRBC BLD-RTO: 0 /100 WBC
OHS QRS DURATION: 106 MS
OHS QTC CALCULATION: 513 MS
OSMOLALITY UR: 640 MOSM/KG (ref 50–1200)
PLATELET # BLD AUTO: 338 K/UL (ref 150–450)
PMV BLD AUTO: 10.9 FL (ref 9.2–12.9)
POTASSIUM SERPL-SCNC: 3.5 MMOL/L (ref 3.5–5.1)
POTASSIUM SERPL-SCNC: 3.6 MMOL/L (ref 3.5–5.1)
RBC # BLD AUTO: 4.89 M/UL (ref 4–5.4)
SODIUM SERPL-SCNC: 133 MMOL/L (ref 136–145)
SODIUM SERPL-SCNC: 136 MMOL/L (ref 136–145)
SODIUM UR-SCNC: 212 MMOL/L (ref 20–250)
TROPONIN I SERPL DL<=0.01 NG/ML-MCNC: 0.03 NG/ML (ref 0–0.03)
WBC # BLD AUTO: 12.06 K/UL (ref 3.9–12.7)

## 2024-04-03 PROCEDURE — 21400001 HC TELEMETRY ROOM

## 2024-04-03 PROCEDURE — 63600175 PHARM REV CODE 636 W HCPCS: Performed by: STUDENT IN AN ORGANIZED HEALTH CARE EDUCATION/TRAINING PROGRAM

## 2024-04-03 PROCEDURE — 63600175 PHARM REV CODE 636 W HCPCS: Performed by: PHYSICIAN ASSISTANT

## 2024-04-03 PROCEDURE — 25000003 PHARM REV CODE 250

## 2024-04-03 PROCEDURE — 84484 ASSAY OF TROPONIN QUANT: CPT | Performed by: STUDENT IN AN ORGANIZED HEALTH CARE EDUCATION/TRAINING PROGRAM

## 2024-04-03 PROCEDURE — 80048 BASIC METABOLIC PNL TOTAL CA: CPT | Performed by: PHYSICIAN ASSISTANT

## 2024-04-03 PROCEDURE — 93005 ELECTROCARDIOGRAM TRACING: CPT

## 2024-04-03 PROCEDURE — G0378 HOSPITAL OBSERVATION PER HR: HCPCS

## 2024-04-03 PROCEDURE — 25000003 PHARM REV CODE 250: Performed by: STUDENT IN AN ORGANIZED HEALTH CARE EDUCATION/TRAINING PROGRAM

## 2024-04-03 PROCEDURE — 25000003 PHARM REV CODE 250: Performed by: PHYSICIAN ASSISTANT

## 2024-04-03 PROCEDURE — 80048 BASIC METABOLIC PNL TOTAL CA: CPT | Mod: 91

## 2024-04-03 PROCEDURE — 85025 COMPLETE CBC W/AUTO DIFF WBC: CPT | Performed by: PHYSICIAN ASSISTANT

## 2024-04-03 PROCEDURE — 93010 ELECTROCARDIOGRAM REPORT: CPT | Mod: ,,, | Performed by: INTERNAL MEDICINE

## 2024-04-03 PROCEDURE — 96361 HYDRATE IV INFUSION ADD-ON: CPT

## 2024-04-03 PROCEDURE — 96366 THER/PROPH/DIAG IV INF ADDON: CPT

## 2024-04-03 RX ORDER — GLUCAGON 1 MG
1 KIT INJECTION
Status: DISCONTINUED | OUTPATIENT
Start: 2024-04-03 | End: 2024-04-05

## 2024-04-03 RX ORDER — IBUPROFEN 200 MG
16 TABLET ORAL
Status: DISCONTINUED | OUTPATIENT
Start: 2024-04-03 | End: 2024-04-05

## 2024-04-03 RX ORDER — IBUPROFEN 200 MG
24 TABLET ORAL
Status: DISCONTINUED | OUTPATIENT
Start: 2024-04-03 | End: 2024-04-05

## 2024-04-03 RX ORDER — SODIUM CHLORIDE 0.9 % (FLUSH) 0.9 %
10 SYRINGE (ML) INJECTION EVERY 12 HOURS PRN
Status: DISCONTINUED | OUTPATIENT
Start: 2024-04-03 | End: 2024-04-05 | Stop reason: HOSPADM

## 2024-04-03 RX ORDER — NALOXONE HCL 0.4 MG/ML
0.02 VIAL (ML) INJECTION
Status: DISCONTINUED | OUTPATIENT
Start: 2024-04-03 | End: 2024-04-05 | Stop reason: HOSPADM

## 2024-04-03 RX ADMIN — GABAPENTIN 600 MG: 300 CAPSULE ORAL at 09:04

## 2024-04-03 RX ADMIN — OXYCODONE AND ACETAMINOPHEN 1 TABLET: 10; 325 TABLET ORAL at 04:04

## 2024-04-03 RX ADMIN — OXYCODONE AND ACETAMINOPHEN 1 TABLET: 10; 325 TABLET ORAL at 08:04

## 2024-04-03 RX ADMIN — ISOSORBIDE DINITRATE 10 MG: 10 TABLET ORAL at 08:04

## 2024-04-03 RX ADMIN — HYDRALAZINE HYDROCHLORIDE 10 MG: 10 TABLET, FILM COATED ORAL at 08:04

## 2024-04-03 RX ADMIN — APIXABAN 5 MG: 5 TABLET, FILM COATED ORAL at 09:04

## 2024-04-03 RX ADMIN — SODIUM CHLORIDE, POTASSIUM CHLORIDE, SODIUM LACTATE AND CALCIUM CHLORIDE 250 ML: 600; 310; 30; 20 INJECTION, SOLUTION INTRAVENOUS at 06:04

## 2024-04-03 RX ADMIN — FUROSEMIDE 20 MG: 20 TABLET ORAL at 08:04

## 2024-04-03 RX ADMIN — PANTOPRAZOLE SODIUM 40 MG: 40 TABLET, DELAYED RELEASE ORAL at 06:04

## 2024-04-03 RX ADMIN — OXYCODONE AND ACETAMINOPHEN 1 TABLET: 10; 325 TABLET ORAL at 11:04

## 2024-04-03 RX ADMIN — OXYCODONE AND ACETAMINOPHEN 1 TABLET: 10; 325 TABLET ORAL at 03:04

## 2024-04-03 RX ADMIN — APIXABAN 5 MG: 5 TABLET, FILM COATED ORAL at 08:04

## 2024-04-03 RX ADMIN — CEFTRIAXONE 1 G: 1 INJECTION, POWDER, FOR SOLUTION INTRAMUSCULAR; INTRAVENOUS at 06:04

## 2024-04-03 RX ADMIN — ZOLPIDEM TARTRATE 5 MG: 5 TABLET ORAL at 11:04

## 2024-04-03 RX ADMIN — METOPROLOL SUCCINATE 25 MG: 25 TABLET, EXTENDED RELEASE ORAL at 08:04

## 2024-04-03 RX ADMIN — SODIUM CHLORIDE 500 ML: 9 INJECTION, SOLUTION INTRAVENOUS at 08:04

## 2024-04-03 RX ADMIN — ATORVASTATIN CALCIUM 80 MG: 40 TABLET, FILM COATED ORAL at 08:04

## 2024-04-03 RX ADMIN — GABAPENTIN 600 MG: 300 CAPSULE ORAL at 08:04

## 2024-04-03 RX ADMIN — CLOPIDOGREL BISULFATE 75 MG: 75 TABLET ORAL at 08:04

## 2024-04-03 NOTE — PROVIDER PROGRESS NOTES - EMERGENCY DEPT.
ED Observation Unit  Progress Note      HPI   Casie Salvador is a 59 y.o. female with medical history of IDDM, HTN, CAD, PAD (R BKA and L foot amputation) on Plavix, Mural thrombus of cardiac apex on Eliquis, Combined HF (EF 20-25%), Chronic opioid therapy presenting to the ED with the chief complaint of fatigue and nausea.      Reports 2 days of non-bloody vomiting and generalized abdominal pain. Unsure if she is holding down her daily medications. Denies diarrhea and does not recall her last BM. +Flatulence. Reports associated fatigue, generalized weakness, mental fog, decreased appetite. Reports having foul-smelling urine. No hematuria or flank pain. No fever, chest pain, SOB, cough, syncopal episodes.  brought her to the ED from home. Denies ETOH and recreational drug use. She is on Percocet  for chronic lower extremity pain that she has been out of for the past 2 days. She is scheduled to see her PCP tomorrow at 11:30am for a refill.      In the ED, UA +3 leuk >100 WBC neg nitrite consistent with UTI. WBC 8.8, H/H 13/38, Crt 1.1, Trop <0.006, Lactate mildly elevated 2.28. ECG sinus tachycardia 101 with incomplete RBBB. She received Bentyl 20mg IV, Zofran 4mg IV, 1L LR IV, Morphine 4mg IV, Rocephin 1g IV, Zofran 4mg IV.      I reviewed the ED Provider Note dated 4/2/24 prior to my evaluation of this patient.  I reviewed all labs and imaging performed in the Main ED, prior to patient being placed in Observation. Patient was placed in the ED Observation Unit for Acute pyelonephritis.    Interval History   Patient seen and examined this morning. VSSAF, NAEON. Patient reports feeing much better this am. Cr bumped from 1.1 to 1.7. Will give 500mL IVF over 3 hours given hx of HFrEF. Holding lasix. Urine studies ordered. Will repeat BMP this afternoon.     PMHx   Past Medical History:   Diagnosis Date    Chronic combined systolic and diastolic congestive heart failure     Coronary artery disease      Diabetes mellitus     Encounter for blood transfusion     Gastric ulcer with hemorrhage     Heart attack     History of gastric ulcer 4/2/2022    Hypertension     NSAID induced gastritis 4/2/2022    Perforated viscus 03/14/2022    Peripheral artery disease       Past Surgical History:   Procedure Laterality Date    APPENDECTOMY      BELOW KNEE AMPUTATION OF LOWER EXTREMITY Right 2019    COLONOSCOPY N/A 02/23/2022    Procedure: COLONOSCOPY;  Surgeon: Estefania Bryant MD;  Location: [facility] (2ND FLR);  Service: Endoscopy;  Laterality: N/A;  anemia, melena    CORONARY STENT PLACEMENT      ESOPHAGOGASTRODUODENOSCOPY N/A 02/23/2022    Procedure: EGD (ESOPHAGOGASTRODUODENOSCOPY);  Surgeon: Estefania Bryant MD;  [facility]  Service: Endoscopy;  Laterality: N/A;  anemia    ESOPHAGOGASTRODUODENOSCOPY N/A 4/14/2022    Procedure: EGD (ESOPHAGOGASTRODUODENOSCOPY);  Surgeon: First Available [facility];  Location: [facility] (2ND FLR);  Service: Endoscopy;  Laterality: N/A;  please schedule in 8 weeks. done 2/23      EF-20    ESOPHAGOGASTRODUODENOSCOPY  4/14/2022    Procedure: ;  Surgeon: First Available [facility];  Location: [facility] (2ND FLR);  Service: Endoscopy;;    ESOPHAGOGASTRODUODENOSCOPY N/A 4/29/2022    Procedure: EGD (ESOPHAGOGASTRODUODENOSCOPY);  Surgeon: Estefania Bryant MD;  Location: [facility];  Service: Endoscopy;  Laterality: N/A;  expedite EGD per Dr Ga      states vaccinated-will bring card-GT    ESOPHAGOGASTRODUODENOSCOPY N/A 10/28/2022    Procedure: EGD (ESOPHAGOGASTRODUODENOSCOPY);  Surgeon: Estefania Bryant MD;  [facility]  Service: Endoscopy;  Laterality: N/A;    FOOT AMPUTATION THROUGH METATARSAL Left 2019    TUBAL LIGATION          Family Hx   History reviewed. No pertinent family history.     Social Hx   Social History     Socioeconomic History    Marital status:    Tobacco Use    Smoking status: Every Day     Current packs/day: 0.50     Types:  Cigarettes    Smokeless tobacco: Never   Substance and Sexual Activity    Alcohol use: No    Drug use: No     Social Determinants of Health     Financial Resource Strain: Low Risk  (4/2/2024)    Overall Financial Resource Strain (CARDIA)     Difficulty of Paying Living Expenses: Not hard at all   Food Insecurity: No Food Insecurity (4/2/2024)    Hunger Vital Sign     Worried About Running Out of Food in the Last Year: Never true     Ran Out of Food in the Last Year: Never true   Transportation Needs: No Transportation Needs (4/2/2024)    PRAPARE - Transportation     Lack of Transportation (Medical): No     Lack of Transportation (Non-Medical): No   Physical Activity: Inactive (4/2/2024)    Exercise Vital Sign     Days of Exercise per Week: 0 days     Minutes of Exercise per Session: 0 min   Stress: No Stress Concern Present (4/2/2024)    [employer] - Occupational Stress Questionnaire     Feeling of Stress : Not at all   Social Connections: Unknown (4/2/2024)    Social Connection and Isolation Panel [NHANES]     Frequency of Communication with Friends and Family: Never     Frequency of Social Gatherings with Friends and Family: More than three times a week     Attends [employer] Services: Patient unable to answer     Active Member of Clubs or Organizations: No     Attends Club or Organization Meetings: Never     Marital Status:    Housing Stability: Low Risk  (4/2/2024)    Housing Stability Vital Sign     Unable to Pay for Housing in the Last Year: No     Number of Places Lived in the Last Year: 1     Unstable Housing in the Last Year: No        Vital Signs   Vitals:    04/03/24 0316 04/03/24 0614 04/03/24 0721 04/03/24 0854   BP:  129/75 121/65    Pulse:  77 69    Resp: 17   18   Temp:  98.9 °F (37.2 °C) 99.2 °F (37.3 °C)    TempSrc:  Oral Oral    SpO2:  98% 97%    Weight:       Height:            Review of Systems  Review of Systems   Constitutional:  Negative for fever.   HENT:   Negative for congestion and sinus pain.    Eyes:  Negative for blurred vision.   Respiratory:  Negative for shortness of breath.    Cardiovascular:  Negative for chest pain and leg swelling.   Gastrointestinal:  Negative for abdominal pain, nausea and vomiting.   Genitourinary:  Negative for dysuria and urgency.       Brief Physical Exam/Reassessment   Physical Exam  Vitals and nursing note reviewed.   Constitutional:       General: She is not in acute distress.     Appearance: Normal appearance. She is not diaphoretic.   HENT:      Head: Normocephalic and atraumatic.      Mouth/Throat:      Mouth: Mucous membranes are moist.   Eyes:      Extraocular Movements: Extraocular movements intact.   Neck:      Vascular: No carotid bruit, hepatojugular reflux or JVD.   Cardiovascular:      Rate and Rhythm: Normal rate and regular rhythm.      Pulses: Normal pulses.   Pulmonary:      Effort: Pulmonary effort is normal. No respiratory distress.      Breath sounds: Normal breath sounds. No wheezing.   Abdominal:      General: Abdomen is flat. Bowel sounds are normal.      Tenderness: There is no abdominal tenderness.   Musculoskeletal:      Right lower leg: No edema (s/p L BKA).      Left lower leg: No edema.   Skin:     General: Skin is warm and dry.      Capillary Refill: Capillary refill takes less than 2 seconds.      Findings: No rash.   Neurological:      General: No focal deficit present.      Mental Status: She is alert and oriented to person, place, and time.   Psychiatric:         Mood and Affect: Mood normal.         Labs/Imaging   Labs Reviewed   CBC W/ AUTO DIFFERENTIAL - Abnormal; Notable for the following components:       Result Value    RBC 5.51 (*)     MCV 70 (*)     MCH 23.8 (*)     RDW 16.2 (*)     Mono # 0.2 (*)     Gran % 78.7 (*)     Mono % 2.3 (*)     All other components within normal limits    Narrative:     Release to patient->Immediate   COMPREHENSIVE METABOLIC PANEL - Abnormal; Notable for the  following components:    Sodium 135 (*)     CO2 22 (*)     Glucose 202 (*)     Total Protein 8.7 (*)     ALT 9 (*)     eGFR 58.2 (*)     All other components within normal limits    Narrative:     add on lipas and trop per  /order#[id number] and [id number] @   04/02/2024  12:12         Release to patient->Immediate   URINALYSIS, REFLEX TO URINE CULTURE - Abnormal; Notable for the following components:    Protein, UA 2+ (*)     Ketones, UA 2+ (*)     Occult Blood UA 1+ (*)     Leukocytes, UA 3+ (*)     All other components within normal limits    Narrative:     Specimen Source->Urine   URINALYSIS MICROSCOPIC - Abnormal; Notable for the following components:    WBC, UA >100 (*)     All other components within normal limits    Narrative:     Specimen Source->Urine   BASIC METABOLIC PANEL - Abnormal; Notable for the following components:    Glucose 155 (*)     Creatinine 1.7 (*)     eGFR 34.5 (*)     All other components within normal limits   CBC W/ AUTO DIFFERENTIAL - Abnormal; Notable for the following components:    Hemoglobin 11.2 (*)     Hematocrit 34.0 (*)     MCV 70 (*)     MCH 22.9 (*)     RDW 15.5 (*)     Immature Grans (Abs) 0.05 (*)     Mono # 1.1 (*)     All other components within normal limits   ISTAT LACTATE - Abnormal; Notable for the following components:    POC Lactate 2.28 (*)     All other components within normal limits   POCT GLUCOSE - Abnormal; Notable for the following components:    POCT Glucose 176 (*)     All other components within normal limits   CULTURE, URINE   HIV 1 / 2 ANTIBODY    Narrative:     add on lipas and trop per  /order#[id number] and [id number] @   04/02/2024  12:12         Release to patient->Immediate   HEPATITIS C ANTIBODY    Narrative:     add on lipas and trop per  /order#[id number] and [id number] @   04/02/2024  12:12         Release to patient->Immediate   LACTIC ACID, PLASMA   TROPONIN I    Narrative:     add on lipas and trop per  /order#[id number]  and [id number] @   04/02/2024  12:12         Release to patient->Immediate   LIPASE    Narrative:     add on lipas and trop per  /order#[id number] and [id number] @   04/02/2024  12:12         Release to patient->Immediate   LACTIC ACID, PLASMA   HEMOGLOBIN A1C   HEMOGLOBIN A1C    Narrative:     Add-on GHGB to [id number] per Kacey Saenz MD on  04/02/2024  21:11     add on lipas and trop per  /order#[id number] and [id number] @   04/02/2024  12:12         Release to patient->Immediate   POCT GLUCOSE MONITORING CONTINUOUS      Imaging Results    None          I reviewed all labs, imaging, EKGs.     Plan   Pyelonephritis  -Reports 2 days of generalized abdominal pain and N/V. Unable to take daily home medications  -Symptoms improved after Morphine, Bentyl, Zofran, IVF in the ED  -Lactate slightly elevated 2.28. Will repeat after IVF complete  -UA consistent with UTI. Received Rocephin in the ED. Will plan for 2nd IV dose tomorrow and discharge on Keflex. Prior UCx reviewed.  -Abdomen soft and non-tender on exam. Deferring abdominal imaging at this time. No renal colicky pain, hematuria, flank pain and do not suspect obstructive nephrolithiasis.   -Normal WBC and do not suspect mesenteric ischemia  -Possible opiate withdrawal factoring into N/V as she has been out of her Percocet for the past 2 days? Scheduled to see her PCP tomorrow at 11:30am for refill.   -Dispo pending repeat AM labs, repeat lactate, and PO tolerance    BRENDA  - suspect due to volume depletion in setting of pyelonephritis  - urine studies ordered  - will give 500mL NS over 3h and repeat BMP this afternoon     IDDM  -Sliding scale insulin  -+2 Ketonuria, but normal AG and bicarb 22. Lower suspicion for DKA     Hypertension  -Continue home Metoprolol XL, Isordil, Hydralazine     PAD  -History of R BKA and L foot amputation  -F/B vascular surgery at [facility]  -Continue Plavix     Mural thrombus of cardiac apex  -Continue Eliquis    I have discussed  this case with Lennox Ross PA-C.

## 2024-04-03 NOTE — PLAN OF CARE
[facility] - Emergency Dept  Initial Discharge Assessment       Primary Care Provider: Adonis Carey MD    Admission Diagnosis: Fatigue [R53.83]    Admission Date: 4/2/2024  Expected Discharge Date: 04/03/2024    Sw met pt at bedside, pt ambulates with a walker, and a  prosthetic leg. Pt stated she has a shower chair and she pt has family support. Pt has no acute case management needs at this time.      Ida Roberts LMSW  Case Management  Emergency Department  [phone]     Transition of Care Barriers: (P) None    Payor: MEDICAID / Plan: [facility] / Product Type: Managed Medicaid /     Extended Emergency Contact Information  Primary Emergency Contact: Mario Salvador  Address: [address] [place] [address] [facility]  Home Phone: [phone]  Mobile Phone: [phone]  Relation: Spouse  Secondary Emergency Contact: Leonila Perez  Mobile Phone: [phone]  Relation: Relative    Discharge Plan A: (P) Home with family  Discharge Plan B: (P) Home      [facility] - [place], [place] - [address]  [address]  [facility] [address]  Phone: [phone] Fax: [phone]      Initial Assessment (most recent)       Adult Discharge Assessment - 04/02/24 2210          Discharge Assessment    Assessment Type Discharge Planning Assessment (P)      Confirmed/corrected address, phone number and insurance Yes (P)      Confirmed Demographics Correct on Facesheet (P)      Source of Information patient (P)      Does patient/caregiver understand observation status Yes (P)      Communicated SOCO with patient/caregiver Date not available/Unable to determine (P)      People in Home spouse (P)      Do you expect to return to your current living situation? Yes (P)      Do you have help at home or someone to help you manage your care at home? No (P)      Prior to hospitilization cognitive status: Alert/Oriented (P)      Current cognitive  status: Alert/Oriented (P)      Walking or Climbing Stairs Difficulty yes (P)      Walking or Climbing Stairs ambulation difficulty, requires equipment (P)      Mobility Management electric wheelchair, walker (P)      Dressing/Bathing Difficulty yes (P)      Dressing/Bathing bathing difficulty, requires equipment (P)      Dressing/Bathing Management bath chair (P)      Home Accessibility wheelchair accessible (P)      Home Layout Able to live on 1st floor (P)      Equipment Currently Used at Home walker, standard;cane, straight;shower chair (P)    pt has a prosthetic leg    Readmission within 30 days? No (P)      Patient currently being followed by outpatient case management? No (P)      Do you currently have service(s) that help you manage your care at home? No (P)      Do you take prescription medications? Yes (P)      Do you have prescription coverage? Yes (P)      Do you have any problems affording any of your prescribed medications? No (P)      Is the patient taking medications as prescribed? no (P)      How do you get to doctors appointments? family or friend will provide (P)      Are you on dialysis? No (P)      Do you take coumadin? -- (P)    plavix    Discharge Plan A Home with family (P)      Discharge Plan B Home (P)      DME Needed Upon Discharge  walker, standard;shower chair (P)    power wheelchair    Discharge Plan discussed with: Patient (P)      Transition of Care Barriers None (P)         Physical Activity    On average, how many days per week do you engage in moderate to strenuous exercise (like a brisk walk)? 0 days (P)      On average, how many minutes do you engage in exercise at this level? 0 min (P)         Financial Resource Strain    How hard is it for you to pay for the very basics like food, housing, medical care, and heating? Not hard at all (P)         Housing Stability    In the last 12 months, was there a time when you were not able to pay the mortgage or rent on time? No (P)      In  the last 12 months, how many places have you lived? 1 (P)      In the last 12 months, was there a time when you did not have a steady place to sleep or slept in a shelter (including now)? No (P)         Transportation Needs    In the past 12 months, has lack of transportation kept you from medical appointments or from getting medications? No (P)      In the past 12 months, has lack of transportation kept you from meetings, work, or from getting things needed for daily living? No (P)         Food Insecurity    Within the past 12 months, you worried that your food would run out before you got the money to buy more. Never true (P)      Within the past 12 months, the food you bought just didn't last and you didn't have money to get more. Never true (P)         Stress    Do you feel stress - tense, restless, nervous, or anxious, or unable to sleep at night because your mind is troubled all the time - these days? Not at all (P)         Social Connections    In a typical week, how many times do you talk on the phone with family, friends, or neighbors? Never (P)      How often do you get together with friends or relatives? More than three times a week (P)      How often do you attend Shinto or Bahai services? Patient unable to answer (P)      Do you belong to any clubs or organizations such as Shinto groups, unions, fraternal or athletic groups, or school groups? No (P)      How often do you attend meetings of the clubs or organizations you belong to? Never (P)      Are you , , , , never , or living with a partner?  (P)         Alcohol Use    Q1: How often do you have a drink containing alcohol? Never (P)      Q2: How many drinks containing alcohol do you have on a typical day when you are drinking? Patient does not drink (P)      Q3: How often do you have six or more drinks on one occasion? Never (P)

## 2024-04-03 NOTE — ED NOTES
MD notified of BP order for 250 ml bolus. Pt is awake alert and oriented. Resp even and unlabored. Lower extremities elevated

## 2024-04-03 NOTE — PLAN OF CARE
Discharge Planning Assessment:  Patient admitted on: 4-2-24  Chart reviewed, Care plan discussed with treatment team,     DME at home: self reports a missing rollator, multiple staff here in the e/d and edou have looked, still unknown location  Also, we ordered a new rollator if her insurance states she is eligable and meets criteria  Current Dispo: will admit to medicine team today  Case management to follow       [facility] MED Bettie Patricio MD      Team Attending     Since 4/3/2024     [phone]

## 2024-04-03 NOTE — PLAN OF CARE
"[facility] - Emergency Dept  Discharge Reassessment    Primary Care Provider: Adonis Carey MD    Expected Discharge Date:     Reassessment (most recent)       Discharge Reassessment - 04/03/24 1515          Discharge Reassessment    Assessment Type Discharge Planning Reassessment (P)      Did the patient's condition or plan change since previous assessment? Yes (P)      Discharge Plan discussed with: Patient (P)      Communicated SOCO with patient/caregiver Yes (P)      Discharge Plan A Home with family (P)      Discharge Plan B Home Health (P)      DME Needed Upon Discharge  rollator (P)    reports rollator here is "missing"    Transition of Care Barriers None (P)      Why the patient remains in the hospital Requires continued medical care (P)         Post-Acute Status    Discharge Delays None known at this time (P)                        "

## 2024-04-03 NOTE — HPI
Casie Salvador is a 59 year old Black woman with cigarette smoking, hypertension, dyslipidemia, diabetes mellitus type 2 (treated with insulin) with polyneuropathy, chronic systolic and diastolic congestive heart failure, coronary artery disease, history of left ventricular apical thrombus on 10/23/2023 anticoagulated on apixaban), peripheral artery disease, history of right below-knee amputation on 12/4/2018, history of left transmetatarsal amputation on 3/8/2019, history of NSAID induced gastritis and gastric ulcer on 2/23/2022, duodenal ulcers on 20/28/2022, gastric ulcer on 3/1/2024, chronic pain treated with opioids, opioid-induced constipation, depression. She lives in [facility]. She is . Her primary care physician is Dr. Adonis Carey.    She presented to [facility] Emergency Department on 4/2/2024 with fatigue and nausea associated with nonbloody nonbilious emesis and generalized abdominal pain for the past 2 days.    In the emergency department, she was initially hypertensive at 201/119. Urinalysis showed 2+ protein, 2+ ketones, >100 WBC/hpf, negative nitrite. Troponin was <0.006 ng/mL. Lactate was 2.28 mmol/L. EKG was concerning for ST segment elevations, but repeat EKG showed nonspecific T-waves. She was given dicyclomine 20 mg IV, ondansetron 4 mg IV, 1 liter of lactated Ringer's solution, morphine 4 mg IV, ceftriaxone 1 gram IV, another dose of ondansetron 4 mg IV. She was going to be discharged with an antibiotic prescription, but repeat labs showed rise in creatinine to 1.7 mg/dL the next morning from 1.1 mg/dL on initial labs. Creatinine was 2.0 later in the day. She was admitted to Hospital Medicine Team [facility]

## 2024-04-03 NOTE — PLAN OF CARE
The mobility limitation cannot be sufficiently resolved by the use of a cane. Patient's functional mobility deficit can be sufficiently resolved with the use of a Rolling Walker with Seat. Patient's mobility limitation significantly impairs their ability to participate in one of more activities of daily living. The use of a Rolling Walker with Seat will significantly improve the patient's ability to participate in MRADLS and the patient will use it on regular basis in the home.     Devora Hernandez PA-C  Department of Emergency Medicine

## 2024-04-03 NOTE — ED NOTES
Patient identifiers for Casie Salvador 59 y.o. female checked and correct.  Chief Complaint   Patient presents with    Fatigue    Nausea     + cardiac hx     Past Medical History:   Diagnosis Date    Chronic combined systolic and diastolic congestive heart failure     Coronary artery disease     Diabetes mellitus     Encounter for blood transfusion     Gastric ulcer with hemorrhage     Heart attack     History of gastric ulcer 4/2/2022    Hypertension     NSAID induced gastritis 4/2/2022    Perforated viscus 03/14/2022    Peripheral artery disease      Allergies reported:   Review of patient's allergies indicates:   Allergen Reactions    Orange juice Hives    Tomato (solanum lycopersicum) Hives         LOC: Patient is awake, alert, and aware of environment with an appropriate affect. Patient is oriented x 4 and speaking appropriately.  APPEARANCE: Patient resting comfortably and in no acute distress. Patient is clean and well groomed, patient's clothing is properly fastened.  SKIN: The skin is warm and dry. Patient has normal skin turgor and moist mucus membranes.   MUSKULOSKELETAL: Patient is moving all extremities well, no obvious deformities noted. Pulses intact. Right BKA and left foot amputation  RESPIRATORY: Airway is open and patent. Respirations are spontaneous and non-labored with normal effort and rate.  CARDIAC: Patient has a normal rate and rhythm. Normal sinus on cardiac monitor. No peripheral edema noted.   ABDOMEN: No distention noted. Soft and non-tender upon palpation.  NEUROLOGICAL:  PERRL. Facial expression is symmetrical. Hand grasps are equal bilaterally. Normal sensation in all extremities when touched with finger.

## 2024-04-03 NOTE — ED TRIAGE NOTES
Casie Salvador, a 59 y.o. female presents to the ED w/ complaint of nausea and fatigue    Adult Physical Assessment  LOC: Casie Salvador, 59 y.o. female verified via two identifiers.  The patient is awake, alert, oriented and speaking appropriately at this time.  APPEARANCE: Patient resting comfortably and appears to be in no acute distress at this time. Patient is clean and well groomed, patient's clothing is properly fastened.  SKIN:The skin is warm and dry, color consistent with ethnicity, patient has normal skin turgor and moist mucus membranes, skin intact, no breakdown or brusing noted.  MUSCULOSKELETAL: Patient moving all extremities well. Reports generalized weakness. Right BKA, L foot amputation, has a prosthetic and walks with walker at baseline  RESPIRATORY: Airway is open and patent, respirations are spontaneous, patient has a normal effort and rate, no accessory muscle use noted.  CARDIAC: Patient has a normal rate and rhythm, no periphreal edema noted in any extremity, capillary refill < 3 seconds in all extremities  ABDOMEN: Soft and non tender to palpation, no abdominal distention noted. Bowel sounds present in all four quadrants. Reports nausea for the last 3 days  NEUROLOGIC: Eyes open spontaneously, behavior appropriate to situation, follows commands, facial expression symmetrical, bilateral hand grasp equal and even, purposeful motor response noted, normal sensation in all extremities when touched with a finger.      Triage note:  Chief Complaint   Patient presents with    Fatigue    Nausea     + cardiac hx     Review of patient's allergies indicates:   Allergen Reactions    Orange juice Hives    Tomato (solanum lycopersicum) Hives     Past Medical History:   Diagnosis Date    Chronic combined systolic and diastolic congestive heart failure     Coronary artery disease     Diabetes mellitus     Encounter for blood transfusion     Gastric ulcer with hemorrhage     Heart attack      History of gastric ulcer 4/2/2022    Hypertension     NSAID induced gastritis 4/2/2022    Perforated viscus 03/14/2022    Peripheral artery disease

## 2024-04-04 PROBLEM — B96.20 E. COLI UTI (URINARY TRACT INFECTION): Status: ACTIVE | Noted: 2024-04-03

## 2024-04-04 PROBLEM — E78.5 DYSLIPIDEMIA: Chronic | Status: ACTIVE | Noted: 2017-11-22

## 2024-04-04 LAB
ANION GAP SERPL CALC-SCNC: 10 MMOL/L (ref 8–16)
ANION GAP SERPL CALC-SCNC: 17 MMOL/L (ref 8–16)
BACTERIA UR CULT: ABNORMAL
BASOPHILS # BLD AUTO: 0.04 K/UL (ref 0–0.2)
BASOPHILS NFR BLD: 0.4 % (ref 0–1.9)
BNP SERPL-MCNC: 162 PG/ML (ref 0–99)
BUN SERPL-MCNC: 21 MG/DL (ref 6–20)
BUN SERPL-MCNC: 24 MG/DL (ref 6–20)
CALCIUM SERPL-MCNC: 8.5 MG/DL (ref 8.7–10.5)
CALCIUM SERPL-MCNC: 8.6 MG/DL (ref 8.7–10.5)
CHLORIDE SERPL-SCNC: 102 MMOL/L (ref 95–110)
CHLORIDE SERPL-SCNC: 98 MMOL/L (ref 95–110)
CO2 SERPL-SCNC: 16 MMOL/L (ref 23–29)
CO2 SERPL-SCNC: 22 MMOL/L (ref 23–29)
CREAT SERPL-MCNC: 2.3 MG/DL (ref 0.5–1.4)
CREAT SERPL-MCNC: 2.4 MG/DL (ref 0.5–1.4)
DIFFERENTIAL METHOD BLD: ABNORMAL
EOSINOPHIL # BLD AUTO: 0.1 K/UL (ref 0–0.5)
EOSINOPHIL NFR BLD: 1.3 % (ref 0–8)
ERYTHROCYTE [DISTWIDTH] IN BLOOD BY AUTOMATED COUNT: 15.5 % (ref 11.5–14.5)
EST. GFR  (NO RACE VARIABLE): 22.8 ML/MIN/1.73 M^2
EST. GFR  (NO RACE VARIABLE): 24 ML/MIN/1.73 M^2
GLUCOSE SERPL-MCNC: 126 MG/DL (ref 70–110)
GLUCOSE SERPL-MCNC: 150 MG/DL (ref 70–110)
HCT VFR BLD AUTO: 31.7 % (ref 37–48.5)
HGB BLD-MCNC: 10.5 G/DL (ref 12–16)
IMM GRANULOCYTES # BLD AUTO: 0.04 K/UL (ref 0–0.04)
IMM GRANULOCYTES NFR BLD AUTO: 0.4 % (ref 0–0.5)
LYMPHOCYTES # BLD AUTO: 3.3 K/UL (ref 1–4.8)
LYMPHOCYTES NFR BLD: 31.9 % (ref 18–48)
MCH RBC QN AUTO: 23.4 PG (ref 27–31)
MCHC RBC AUTO-ENTMCNC: 33.1 G/DL (ref 32–36)
MCV RBC AUTO: 71 FL (ref 82–98)
MONOCYTES # BLD AUTO: 0.8 K/UL (ref 0.3–1)
MONOCYTES NFR BLD: 7.6 % (ref 4–15)
NEUTROPHILS # BLD AUTO: 6.1 K/UL (ref 1.8–7.7)
NEUTROPHILS NFR BLD: 58.4 % (ref 38–73)
NRBC BLD-RTO: 0 /100 WBC
OHS QRS DURATION: 108 MS
OHS QTC CALCULATION: 491 MS
PLATELET # BLD AUTO: 306 K/UL (ref 150–450)
PMV BLD AUTO: 11.7 FL (ref 9.2–12.9)
POCT GLUCOSE: 132 MG/DL (ref 70–110)
POCT GLUCOSE: 144 MG/DL (ref 70–110)
POCT GLUCOSE: 144 MG/DL (ref 70–110)
POCT GLUCOSE: 197 MG/DL (ref 70–110)
POTASSIUM SERPL-SCNC: 3.5 MMOL/L (ref 3.5–5.1)
POTASSIUM SERPL-SCNC: 3.7 MMOL/L (ref 3.5–5.1)
RBC # BLD AUTO: 4.48 M/UL (ref 4–5.4)
SODIUM SERPL-SCNC: 130 MMOL/L (ref 136–145)
SODIUM SERPL-SCNC: 135 MMOL/L (ref 136–145)
WBC # BLD AUTO: 10.41 K/UL (ref 3.9–12.7)

## 2024-04-04 PROCEDURE — 83880 ASSAY OF NATRIURETIC PEPTIDE: CPT | Performed by: HOSPITALIST

## 2024-04-04 PROCEDURE — 36415 COLL VENOUS BLD VENIPUNCTURE: CPT | Performed by: STUDENT IN AN ORGANIZED HEALTH CARE EDUCATION/TRAINING PROGRAM

## 2024-04-04 PROCEDURE — 80048 BASIC METABOLIC PNL TOTAL CA: CPT | Mod: 91 | Performed by: HOSPITALIST

## 2024-04-04 PROCEDURE — 85025 COMPLETE CBC W/AUTO DIFF WBC: CPT | Performed by: STUDENT IN AN ORGANIZED HEALTH CARE EDUCATION/TRAINING PROGRAM

## 2024-04-04 PROCEDURE — 80048 BASIC METABOLIC PNL TOTAL CA: CPT | Performed by: STUDENT IN AN ORGANIZED HEALTH CARE EDUCATION/TRAINING PROGRAM

## 2024-04-04 PROCEDURE — 21400001 HC TELEMETRY ROOM

## 2024-04-04 PROCEDURE — 25000003 PHARM REV CODE 250: Performed by: STUDENT IN AN ORGANIZED HEALTH CARE EDUCATION/TRAINING PROGRAM

## 2024-04-04 PROCEDURE — 25000003 PHARM REV CODE 250: Performed by: HOSPITALIST

## 2024-04-04 PROCEDURE — 63600175 PHARM REV CODE 636 W HCPCS: Performed by: STUDENT IN AN ORGANIZED HEALTH CARE EDUCATION/TRAINING PROGRAM

## 2024-04-04 PROCEDURE — 36415 COLL VENOUS BLD VENIPUNCTURE: CPT | Performed by: HOSPITALIST

## 2024-04-04 PROCEDURE — 25000003 PHARM REV CODE 250: Performed by: PHYSICIAN ASSISTANT

## 2024-04-04 RX ORDER — METOPROLOL TARTRATE 50 MG/1
50 TABLET ORAL
COMMUNITY

## 2024-04-04 RX ORDER — LOSARTAN POTASSIUM 50 MG/1
50 TABLET ORAL EVERY OTHER DAY
Status: ON HOLD | COMMUNITY
End: 2024-04-05

## 2024-04-04 RX ORDER — ASPIRIN 81 MG/1
81 TABLET ORAL NIGHTLY
COMMUNITY

## 2024-04-04 RX ORDER — AMLODIPINE BESYLATE 10 MG/1
10 TABLET ORAL DAILY
Status: ON HOLD | COMMUNITY
End: 2024-04-05

## 2024-04-04 RX ORDER — OXYCODONE AND ACETAMINOPHEN 10; 325 MG/1; MG/1
1 TABLET ORAL EVERY 6 HOURS PRN
COMMUNITY

## 2024-04-04 RX ORDER — SODIUM CHLORIDE 9 MG/ML
INJECTION, SOLUTION INTRAVENOUS CONTINUOUS
Status: ACTIVE | OUTPATIENT
Start: 2024-04-04 | End: 2024-04-04

## 2024-04-04 RX ORDER — METFORMIN HYDROCHLORIDE 1000 MG/1
1000 TABLET ORAL EVERY MORNING
COMMUNITY

## 2024-04-04 RX ADMIN — OXYCODONE AND ACETAMINOPHEN 1 TABLET: 10; 325 TABLET ORAL at 04:04

## 2024-04-04 RX ADMIN — METOPROLOL SUCCINATE 25 MG: 25 TABLET, EXTENDED RELEASE ORAL at 09:04

## 2024-04-04 RX ADMIN — OXYCODONE AND ACETAMINOPHEN 1 TABLET: 10; 325 TABLET ORAL at 11:04

## 2024-04-04 RX ADMIN — APIXABAN 5 MG: 5 TABLET, FILM COATED ORAL at 09:04

## 2024-04-04 RX ADMIN — GABAPENTIN 600 MG: 300 CAPSULE ORAL at 08:04

## 2024-04-04 RX ADMIN — OXYCODONE AND ACETAMINOPHEN 1 TABLET: 10; 325 TABLET ORAL at 10:04

## 2024-04-04 RX ADMIN — OXYCODONE AND ACETAMINOPHEN 1 TABLET: 10; 325 TABLET ORAL at 03:04

## 2024-04-04 RX ADMIN — PANTOPRAZOLE SODIUM 40 MG: 40 TABLET, DELAYED RELEASE ORAL at 06:04

## 2024-04-04 RX ADMIN — ZOLPIDEM TARTRATE 5 MG: 5 TABLET ORAL at 07:04

## 2024-04-04 RX ADMIN — APIXABAN 5 MG: 5 TABLET, FILM COATED ORAL at 08:04

## 2024-04-04 RX ADMIN — ATORVASTATIN CALCIUM 80 MG: 40 TABLET, FILM COATED ORAL at 09:04

## 2024-04-04 RX ADMIN — SODIUM CHLORIDE: 9 INJECTION, SOLUTION INTRAVENOUS at 09:04

## 2024-04-04 RX ADMIN — GABAPENTIN 600 MG: 300 CAPSULE ORAL at 09:04

## 2024-04-04 RX ADMIN — CLOPIDOGREL BISULFATE 75 MG: 75 TABLET ORAL at 09:04

## 2024-04-04 RX ADMIN — CEFTRIAXONE SODIUM 2 G: 2 INJECTION, POWDER, FOR SOLUTION INTRAMUSCULAR; INTRAVENOUS at 09:04

## 2024-04-04 RX ADMIN — OXYCODONE AND ACETAMINOPHEN 1 TABLET: 10; 325 TABLET ORAL at 07:04

## 2024-04-04 NOTE — ASSESSMENT & PLAN NOTE
Patient presented with foul-smelling urine.    UA positive for leukocyte esterase and nitrites.    Urine cultures grew GNR, pending susceptibilities   Continue ceftriaxone

## 2024-04-04 NOTE — PROGRESS NOTES
[facility] Medicine  Progress Note    Patient Name: Casie Salvador  MRN: [id number]  Patient Class: OP- Observation   Admission Date: 4/2/2024  Length of Stay: 0 days  Attending Physician: Simon Green MD  Primary Care Provider: Adonis Carey MD        Subjective:     Principal Problem:BRENDA (acute kidney injury)        HPI:  Casie Salvador is a 59 year old Black woman with cigarette smoking, hypertension, dyslipidemia, diabetes mellitus type 2 (treated with insulin), chronic systolic and diastolic congestive heart failure, coronary artery disease, history of left ventricular apical thrombus on 10/23/2023 anticoagulated on apixaban), peripheral artery disease, history of right below-knee amputation on 12/4/2018, history of left transmetatarsal amputation on 3/8/2019, history of NSAID induced gastritis and gastric ulcer on 2/23/2022, duodenal ulcers on 20/28/2022, chronic pain treated with opioids, opioid-induced constipation, depression. She lives in [facility]. She is . Her primary care physician is Dr. Adonis Carey.    She presented to [facility] Emergency Department on 4/2/2024 with fatigue and nausea associated with nonbloody nonbilious emesis and generalized abdominal pain for the past 2 days.    In the emergency department, she was initially hypertensive at 201/119. Urinalysis showed 2+ protein, 2+ ketones, >100 WBC/hpf, negative nitrite. Troponin was <0.006 ng/mL. Lactate was 2.28 mmol/L. EKG was concerning for ST segment elevations, but repeat EKG showed nonspecific T-waves. She was given dicyclomine 20 mg IV, ondansetron 4 mg IV, 1 liter of lactated Ringer's solution, morphine 4 mg IV, ceftriaxone 1 gram IV, another dose of ondansetron 4 mg IV. She was going to be discharged with an antibiotic prescription, but repeat labs showed rise in creatinine to 1.7 mg/dL the next morning from 1.1 mg/dL on initial labs. Creatinine was 2.0  later in the day. She was admitted to Hospital Medicine Team R.      Overview/Hospital Course:  Her blood pressures were relatively low, as low as 80/51. BUN and creatinine increased to 21 and 2.3.     Interval History: She feels well and wants to go home. I explained the danger of not monitoring acute kidney injury closely.    Review of Systems   Constitutional:  Negative for chills and fever.   Gastrointestinal:  Negative for nausea and vomiting.   Neurological:  Negative for seizures and syncope.     Objective:     Vital Signs (Most Recent):  Temp: 97.9 °F (36.6 °C) (04/04/24 0807)  Pulse: 63 (04/04/24 1102)  Resp: 17 (04/04/24 1044)  BP: 108/60 (04/04/24 1044)  SpO2: 96 % (04/04/24 0807) Vital Signs (24h Range):  Temp:  [97.7 °F (36.5 °C)-98.1 °F (36.7 °C)] 97.9 °F (36.6 °C)  Pulse:  [63-74] 63  Resp:  [16-19] 17  SpO2:  [96 %-97 %] 96 %  BP: ()/(51-64) 108/60     Weight: 77.1 kg (170 lb)  Body mass index is 27.44 kg/m².    Intake/Output Summary (Last 24 hours) at 4/4/2024 1130  Last data filed at 4/3/2024 1940  Gross per 24 hour   Intake 250 ml   Output --   Net 250 ml         Physical Exam  Vitals and nursing note reviewed.   Constitutional:       General: She is not in acute distress.     Appearance: She is well-developed. She is not toxic-appearing or diaphoretic.      Interventions: She is not intubated.  Pulmonary:      Effort: Pulmonary effort is normal. No accessory muscle usage or respiratory distress. She is not intubated.   Neurological:      Mental Status: She is alert and oriented to person, place, and time. Mental status is at baseline.      Motor: No seizure activity.   Psychiatric:         Attention and Perception: Attention normal.         Mood and Affect: Affect normal.         Behavior: Behavior is not aggressive. Behavior is cooperative.             Significant Labs: All pertinent labs within the past 24 hours have been reviewed.  CMP:   Recent Labs   Lab 04/03/24  0322 04/03/24  1302  04/04/24  0221    133* 130*   K 3.5 3.6 3.5    99 98   CO2 23 25 22*   * 144* 150*   BUN 14 19 21*   CREATININE 1.7* 2.0* 2.3*   CALCIUM 9.1 8.8 8.5*   ANIONGAP 13 9 10       Significant Imaging: I have reviewed all pertinent imaging results/findings within the past 24 hours.    Assessment/Plan:      * BRENDA (acute kidney injury)  Ketones in urine may suggest volume depletion. Give IV fluids. Check BNP to make sure she is not fluid overloaded, which can cause cardiorenal syndrome. Repeat BMP.    UTI (urinary tract infection)  Giving ceftriaxone.      S/P BKA (below knee amputation), right  Has a prosthesis.    Dyslipidemia  Continue home atorvastatin. Holding home ezetimibe.    Chronic combined systolic and diastolic congestive heart failure  No evidence of fluid overload. Continue home metoprolol. Holding home hydralazine and isosorbide dinitrate.    Constipation due to opioid therapy  Continue home docusate and polyethylene glycol.    Type 2 diabetes mellitus with circulatory disorder, with long-term current use of insulin  She takes insulin glargine and aspart, Ozempic. Insulin sliding scale. Monitor glucose.    Hypertension  Blood pressures are relatively low. Continue home metoprolol. Hold home hydralazine and isosorbide dinitrate. Monitor BP.      VTE Risk Mitigation (From admission, onward)           Ordered     apixaban tablet 5 mg  2 times daily         04/02/24 1831     IP VTE HIGH RISK PATIENT  Once         04/02/24 1831     Place sequential compression device  Until discontinued         04/02/24 1831                    Discharge Planning   SOCO: 4/6/2024     Code Status: Full Code   Is the patient medically ready for discharge?:     Reason for patient still in hospital (select all that apply): Patient trending condition, Laboratory test, and Treatment  Discharge Plan A: Home with family   Discharge Delays: None known at this time              Simon Green MD  Department of Hospital  Medicine   [facility]

## 2024-04-04 NOTE — ASSESSMENT & PLAN NOTE
Blood pressures are relatively low. Continue home metoprolol. Hold home hydralazine and isosorbide dinitrate. Monitor BP.

## 2024-04-04 NOTE — NURSING
Nurses Note -- 4 Eyes      4/3/2024   11:43 PM      Skin assessed during: Admit      [x] No Altered Skin Integrity Present    [x]Prevention Measures Documented      [] Yes- Altered Skin Integrity Present or Discovered   [] LDA Added if Not in Epic (Describe Wound)   [] New Altered Skin Integrity was Present on Admit and Documented in LDA   [] Wound Image Taken    Wound Care Consulted? No    Attending Nurse:  Theo Fountain RN    Second RN/Staff Member:   Miguel A Alejo RN-OC

## 2024-04-04 NOTE — ASSESSMENT & PLAN NOTE
Chronic, controlled. Latest blood pressure and vitals reviewed-     Temp:  [98.9 °F (37.2 °C)-99.5 °F (37.5 °C)]   Pulse:  [66-83]   Resp:  [16-18]   BP: ()/(51-75)   SpO2:  [94 %-98 %] .   Home meds for hypertension were reviewed and noted below.   Hypertension Medications               furosemide (LASIX) 20 MG tablet Take 1 tablet by mouth once daily. Weigh yourself every morning take an extra 20mg tablet if you gain 2-3 pounds in 24 hours or 5 pounds in a week until you return to your dry weight and notify your cardiologist.    hydrALAZINE (APRESOLINE) 10 MG tablet Take 1 tablet (10 mg total) by mouth 3 (three) times daily.    hydrALAZINE (APRESOLINE) 10 MG tablet Take 1 tablet (10 mg total) by mouth 3 (three) times daily.    isosorbide dinitrate (ISORDIL) 10 MG tablet Take 1 tablet (10 mg total) by mouth 3 (three) times daily.    isosorbide dinitrate (ISORDIL) 10 MG tablet Take 1 tablet (10 mg total) by mouth 3 (three) times daily.    metoprolol succinate (TOPROL-XL) 25 MG 24 hr tablet Take 1 tablet (25 mg total) by mouth once daily.            While in the hospital, will manage blood pressure as follows; Adjust home antihypertensive regimen as follows- holding home blood pressure medication the setting of hypotension

## 2024-04-04 NOTE — HOSPITAL COURSE
She was given ceftriaxone while in the hospital. Her blood pressures were relatively low, as low as 80/51. Her hypertension medications were held except for metoprolol. BUN and creatinine increased to 21 and 2.3. creatinine peaked at 2.4 on 4/4/2024. On 4/5/2024 it decreased to 1.5. Urine culture grew >595802 cfu/mL E coli. She was discharged home and prescribed cephalexin. Although both the emergency provider and admitting hospitalist both say she reported no flank pain, she reported at discharge that she had been having flank pain since she came to the hospital. She was prescribed dicyclomine. She was advised to continue holding losartan and amlodipine until she followed up with her primary care physician.

## 2024-04-04 NOTE — H&P
[facility]  [facility]  History & Physical    Patient Name: aCsie Salvador  MRN: [id number]  Patient Class: OP- Observation  Admission Date: 4/2/2024  Attending Physician: Bettie Murray MD   Primary Care Provider: Adonis Carey MD         Patient information was obtained from patient, past medical records, and ER records.     Subjective:     Principal Problem:UTI (urinary tract infection)    Chief Complaint:   Chief Complaint   Patient presents with    Fatigue    Nausea     + cardiac hx        HPI: Casie Salvador is a 59 y.o. female with medical history of IDDM, HTN, CAD, PAD (R BKA and L foot amputation) on Plavix, Mural thrombus of cardiac apex on Eliquis, Combined HF (EF 20-25%), Chronic opioid therapy presenting to the ED with the chief complaint of fatigue and nausea.     Patient presented with nausea associated with nonbloody nonbilious emesis and generalized abdominal pain for past 2 days.  Patient denies dysuria or hematuria.  However complains of foul-smelling urine.    No hematuria or flank pain. No fever, chest pain, SOB, cough, syncopal episodes..      In the ED, initially patient was hypertensive, blood pressure improved.  Patient was afebrile, saturating well on room air.  UA +3 leuk >100 WBC neg nitrite consistent with UTI. Trop <0.006, Lactate mildly elevated 2.28. . She received Bentyl 20mg IV, Zofran 4mg IV, 1L LR IV, Morphine 4mg IV, Rocephin 1g IV, Zofran 4mg IV.  There was an EKG concerning for ST segment elevations, repeated EKG showed nonspecific T-waves.      Initial plan was to discharge patient however repeat blood work showed elevated creatinine due to 2.  Patient admitted to inpatient service for management of BRENDA and UTI.           No new subjective & objective note has been filed under this hospital service since the last note was generated.    Assessment/Plan:     * UTI (urinary tract infection)  Patient presented with foul-smelling  "urine.    UA positive for leukocyte esterase and nitrites.    Urine cultures grew GNR, pending susceptibilities   Continue ceftriaxone      BRENDA (acute kidney injury)  Patient with acute kidney injury/acute renal failure likely due to pre-renal azotemia due to dehydration BRENDA is currently stable. Baseline creatinine  1  - Labs reviewed- Renal function/electrolytes with Estimated Creatinine Clearance: 32.1 mL/min (A) (based on SCr of 2 mg/dL (H)). according to latest data. Monitor urine output and serial BMP and adjust therapy as needed. Avoid nephrotoxins and renally dose meds for GFR listed above.  FENA:  1.6  Previous Echo showed an EF of 20-25%, patient saturating well on room air, has no signs of volume overload.  Plan to cautiously administer IV fluids.    S/P BKA (below knee amputation), right  - 2/2 PAD  - no acute issues  - turn q2h         Dyslipidemia        Constipation due to opioid therapy    Continue MiraLax and docusate    DM (diabetes mellitus), type 2 with complications  Patient's FSGs are controlled on current medication regimen.  Last A1c reviewed-   Lab Results   Component Value Date    HGBA1C 5.3 04/02/2024     Most recent fingerstick glucose reviewed- No results for input(s): "POCTGLUCOSE" in the last 24 hours.  Current correctional scale  Low  Maintain anti-hyperglycemic dose as follows-   Antihyperglycemics (From admission, onward)      Start     Stop Route Frequency Ordered    04/02/24 1928  insulin aspart U-100 pen 0-5 Units         -- SubQ Before meals & nightly PRN 04/02/24 1831          Hold Oral hypoglycemics while patient is in the hospital.      Hypertension  Chronic, controlled. Latest blood pressure and vitals reviewed-     Temp:  [98.9 °F (37.2 °C)-99.5 °F (37.5 °C)]   Pulse:  [66-83]   Resp:  [16-18]   BP: ()/(51-75)   SpO2:  [94 %-98 %] .   Home meds for hypertension were reviewed and noted below.   Hypertension Medications               furosemide (LASIX) 20 MG tablet Take " 1 tablet by mouth once daily. Weigh yourself every morning take an extra 20mg tablet if you gain 2-3 pounds in 24 hours or 5 pounds in a week until you return to your dry weight and notify your cardiologist.    hydrALAZINE (APRESOLINE) 10 MG tablet Take 1 tablet (10 mg total) by mouth 3 (three) times daily.    hydrALAZINE (APRESOLINE) 10 MG tablet Take 1 tablet (10 mg total) by mouth 3 (three) times daily.    isosorbide dinitrate (ISORDIL) 10 MG tablet Take 1 tablet (10 mg total) by mouth 3 (three) times daily.    isosorbide dinitrate (ISORDIL) 10 MG tablet Take 1 tablet (10 mg total) by mouth 3 (three) times daily.    metoprolol succinate (TOPROL-XL) 25 MG 24 hr tablet Take 1 tablet (25 mg total) by mouth once daily.            While in the hospital, will manage blood pressure as follows; Adjust home antihypertensive regimen as follows- holding home blood pressure medication the setting of hypotension          VTE Risk Mitigation (From admission, onward)           Ordered     apixaban tablet 5 mg  2 times daily         04/02/24 1831     IP VTE HIGH RISK PATIENT  Once         04/02/24 1831     Place sequential compression device  Until discontinued         04/02/24 1831                       On 04/03/2024, patient should be placed in hospital observation services under my care.             Bettie Murray MD  Department of Hospital Medicine  [facility] - Emergency Dept

## 2024-04-04 NOTE — ASSESSMENT & PLAN NOTE
No evidence of fluid overload. Continue home metoprolol. Holding home hydralazine and isosorbide dinitrate.

## 2024-04-04 NOTE — ASSESSMENT & PLAN NOTE
Ketones in urine may suggest volume depletion. Give IV fluids. Check BNP to make sure she is not fluid overloaded, which can cause cardiorenal syndrome. Repeat BMP.

## 2024-04-04 NOTE — PHARMACY MED REC
"Admission Medication History     The home medication history was taken by Sara Love.    You may go to "Admission" then "Reconcile Home Medications" tabs to review and/or act upon these items.     The home medication list has been updated by the Pharmacy department.   Please read ALL comments highlighted in yellow.   Please address this information as you see fit.    Feel free to contact us if you have any questions or require assistance.      The medications listed below were removed from the home medication list. Please reorder if appropriate:  Patient reports no longer taking the following medication(s):  Apixaban (ELIQUIS) 5 mg Tab  Buspirone 10 mg tablet  Cephalexin 500 mg capsule   Docusate sodium 100 mg capsule   Dorzolamide-timolol 2-0.05 % ophthalmic solution  Metoprolol succinate (TOPROL-XL) 25 MG 24 hr tablet  Pregabalin 75 mg capsule   Zolpidem 10 mg tablet  Lisinopril 2.5 mg tablet  Gabapentin 600 mg tablet   Furosemide 20 mg tablet   Fluticasone Propionate 50 mcg/actuation nasal spray   Folic-acid-vi-B12 2.5-25-2 mg tablet   Insulin Aspart U-100 100 unit/ml    Medications listed below were obtained from: Patient/family and Analytic software- [employer]  PTA Medications   Medication Sig    amLODIPine (NORVASC) 10 MG tablet   Take 10 mg by mouth once daily.    aspirin (ECOTRIN) 81 MG EC tablet   Take 81 mg by mouth every evening.    atorvastatin (LIPITOR) 80 MG tablet   Take 1 tablet (80 mg total) by mouth once daily.    clopidogreL (PLAVIX) 75 mg tablet   Take 1 tablet (75 mg total) by mouth once daily.    ergocalciferol (ERGOCALCIFEROL) 50,000 unit Cap   Take 50,000 Units by mouth every 7 days. Tuesdays    ezetimibe (ZETIA) 10 mg tablet  Take 10 mg by mouth every 7 days.   Thursdays      ferrous sulfate (FEOSOL) 325 mg (65 mg iron) Tab tablet Take 325 mg by mouth every 7 days.   Wednesday      hydrALAZINE (APRESOLINE) 10 MG tablet  Take 10 mg by mouth 2 (two) times a day.        isosorbide dinitrate " "(ISORDIL) 10 MG tablet Take 1 tablet (10 mg total) by mouth 3 (three) times daily.      LANTUS SOLOSTAR U-100 INSULIN glargine 100 units/mL SubQ pen   Inject 30 Units into the skin every evening.    losartan (COZAAR) 50 MG tablet   Take 50 mg by mouth every other day.    metFORMIN (GLUCOPHAGE) 1000 MG tablet   Take 1,000 mg by mouth every morning.    metoprolol tartrate (LOPRESSOR) 50 MG tablet   Take 50 mg by mouth every 7 days.   Wednesday    oxyCODONE-acetaminophen (PERCOCET)  mg per tablet   Take 1 tablet by mouth every 6 (six) hours as needed for Pain.    OZEMPIC 1 mg/dose (4 mg/3 mL) Inject 1 mg into the skin every 7 days.   Tuesdays      pantoprazole (PROTONIX) 40 MG tablet Take 1 tablet (40 mg total) by mouth before breakfast.      SYMBICORT 160-4.5 mcg/actuation HFAA Inhale 2 puffs into the lungs as needed (shortness of breath/ wheezing).      blood sugar diagnostic Strp Use to test blood glucose four times daily before meals and nightly      blood-glucose meter Misc Use as instructed      lancets 30 gauge Misc Use to test blood glucose four times daily before meals and nightly      lancing device Misc Use to test blood glucose four times daily before meals and nightly      pen needle, diabetic 31 gauge x 5/16" Ndle Use to inject insulin 3 times daily     Potential issues to be addressed PRIOR TO DISCHARGE  Patient reported not taking the following medications: (Insulin Aspart U-100 (NOVOLOG) 100 unit/ml). These medications remain on the home medication list. Please address accordingly.   Please discuss with the patient barriers to adherence with medication treatment plans  Patient requires education regarding drug therapies   Patient states she take certain medications only one day a week or every other day. She states "The medications irritates she stomach" please advise.    Sara Love  EXT [id number]                 .          "

## 2024-04-04 NOTE — PLAN OF CARE
Problem: Adult Inpatient Plan of Care  Goal: Plan of Care Review  Outcome: Ongoing, Progressing  Goal: Patient-Specific Goal (Individualized)  Outcome: Ongoing, Progressing  Goal: Absence of Hospital-Acquired Illness or Injury  Outcome: Ongoing, Progressing  Goal: Optimal Comfort and Wellbeing  Outcome: Ongoing, Progressing  Goal: Readiness for Transition of Care  Outcome: Ongoing, Progressing     Problem: Diabetes Comorbidity  Goal: Blood Glucose Level Within Targeted Range  Outcome: Ongoing, Progressing     Problem: Fluid and Electrolyte Imbalance (Acute Kidney Injury/Impairment)  Goal: Fluid and Electrolyte Balance  Outcome: Ongoing, Progressing     Problem: Oral Intake Inadequate (Acute Kidney Injury/Impairment)  Goal: Optimal Nutrition Intake  Outcome: Ongoing, Progressing     Problem: Renal Function Impairment (Acute Kidney Injury/Impairment)  Goal: Effective Renal Function  Outcome: Ongoing, Progressing     Problem: Infection  Goal: Absence of Infection Signs and Symptoms  Outcome: Ongoing, Progressing     Problem: Pain Acute  Goal: Acceptable Pain Control and Functional Ability  Outcome: Ongoing, Progressing     Problem: Fall Injury Risk  Goal: Absence of Fall and Fall-Related Injury  Outcome: Ongoing, Progressing     Problem: Fatigue  Goal: Improved Activity Tolerance  Outcome: Ongoing, Progressing

## 2024-04-04 NOTE — SUBJECTIVE & OBJECTIVE
Interval History: She feels well and wants to go home. I explained the danger of not monitoring acute kidney injury closely.    Review of Systems   Constitutional:  Negative for chills and fever.   Gastrointestinal:  Negative for nausea and vomiting.   Neurological:  Negative for seizures and syncope.     Objective:     Vital Signs (Most Recent):  Temp: 97.9 °F (36.6 °C) (04/04/24 0807)  Pulse: 63 (04/04/24 1102)  Resp: 17 (04/04/24 1044)  BP: 108/60 (04/04/24 1044)  SpO2: 96 % (04/04/24 0807) Vital Signs (24h Range):  Temp:  [97.7 °F (36.5 °C)-98.1 °F (36.7 °C)] 97.9 °F (36.6 °C)  Pulse:  [63-74] 63  Resp:  [16-19] 17  SpO2:  [96 %-97 %] 96 %  BP: ()/(51-64) 108/60     Weight: 77.1 kg (170 lb)  Body mass index is 27.44 kg/m².    Intake/Output Summary (Last 24 hours) at 4/4/2024 1130  Last data filed at 4/3/2024 1940  Gross per 24 hour   Intake 250 ml   Output --   Net 250 ml         Physical Exam  Vitals and nursing note reviewed.   Constitutional:       General: She is not in acute distress.     Appearance: She is well-developed. She is not toxic-appearing or diaphoretic.      Interventions: She is not intubated.  Pulmonary:      Effort: Pulmonary effort is normal. No accessory muscle usage or respiratory distress. She is not intubated.   Neurological:      Mental Status: She is alert and oriented to person, place, and time. Mental status is at baseline.      Motor: No seizure activity.   Psychiatric:         Attention and Perception: Attention normal.         Mood and Affect: Affect normal.         Behavior: Behavior is not aggressive. Behavior is cooperative.             Significant Labs: All pertinent labs within the past 24 hours have been reviewed.  CMP:   Recent Labs   Lab 04/03/24  0322 04/03/24  1302 04/04/24  0221    133* 130*   K 3.5 3.6 3.5    99 98   CO2 23 25 22*   * 144* 150*   BUN 14 19 21*   CREATININE 1.7* 2.0* 2.3*   CALCIUM 9.1 8.8 8.5*   ANIONGAP 13 9 10        Significant Imaging: I have reviewed all pertinent imaging results/findings within the past 24 hours.

## 2024-04-04 NOTE — SUBJECTIVE & OBJECTIVE
Past Medical History:   Diagnosis Date    Chronic combined systolic and diastolic congestive heart failure     Coronary artery disease     Diabetes mellitus     Encounter for blood transfusion     Gastric ulcer with hemorrhage     Heart attack     History of gastric ulcer 4/2/2022    Hypertension     NSAID induced gastritis 4/2/2022    Perforated viscus 03/14/2022    Peripheral artery disease        Past Surgical History:   Procedure Laterality Date    APPENDECTOMY      BELOW KNEE AMPUTATION OF LOWER EXTREMITY Right 2019    COLONOSCOPY N/A 02/23/2022    Procedure: COLONOSCOPY;  Surgeon: Estefania Bryant MD;  [facility]  Service: Endoscopy;  Laterality: N/A;  anemia, melena    CORONARY STENT PLACEMENT      ESOPHAGOGASTRODUODENOSCOPY N/A 02/23/2022    Procedure: EGD (ESOPHAGOGASTRODUODENOSCOPY);  Surgeon: Estefania Bryant MD;  Location: [facility] ([address] [place] [address]);  Service: Endoscopy;  Laterality: N/A;  anemia    ESOPHAGOGASTRODUODENOSCOPY N/A 4/14/2022    Procedure: EGD (ESOPHAGOGASTRODUODENOSCOPY);  Surgeon: First Available Den King;  Location: [facility] (2ND FLR);  Service: Endoscopy;  Laterality: N/A;  please schedule in 8 weeks. done 2/23      EF-20    ESOPHAGOGASTRODUODENOSCOPY  4/14/2022    Procedure: ;  Surgeon: First Available Den King;  [facility]  Service: Endoscopy;;    ESOPHAGOGASTRODUODENOSCOPY N/A 4/29/2022    Procedure: EGD (ESOPHAGOGASTRODUODENOSCOPY);  Surgeon: Estefania Bryant MD;  Location: [facility];  Service: Endoscopy;  Laterality: N/A;  expedite EGD per Dr Ga      states vaccinated-will bring card-GT    ESOPHAGOGASTRODUODENOSCOPY N/A 10/28/2022    Procedure: EGD (ESOPHAGOGASTRODUODENOSCOPY);  Surgeon: Estefania Bryant MD;  Location: [facility] ([address] [place] [address]);  Service: Endoscopy;  Laterality: N/A;    FOOT AMPUTATION THROUGH METATARSAL Left 2019    TUBAL LIGATION         Review of patient's allergies indicates:   Allergen Reactions    Orange juice Hives     Tomato (solanum lycopersicum) Hives       No current facility-administered medications on file prior to encounter.     Current Outpatient Medications on File Prior to Encounter   Medication Sig    apixaban (ELIQUIS) 5 mg Tab Take 1 tablet (5 mg total) by mouth 2 (two) times daily.    apixaban (ELIQUIS) 5 mg Tab Take 1 tablet (5 mg total) by mouth 2 (two) times daily.    atorvastatin (LIPITOR) 80 MG tablet Take 1 tablet (80 mg total) by mouth once daily.    blood sugar diagnostic Strp Use to test blood glucose four times daily before meals and nightly    blood-glucose meter Misc Use as instructed    busPIRone (BUSPAR) 10 MG tablet Take 10 mg by mouth 2 (two) times daily.    cephALEXin (KEFLEX) 500 MG capsule Take 1 capsule (500 mg total) by mouth every 12 (twelve) hours.    clopidogreL (PLAVIX) 75 mg tablet Take 1 tablet (75 mg total) by mouth once daily.    docusate sodium (COLACE) 100 MG capsule Take 1 capsule (100 mg total) by mouth 2 (two) times daily.    dorzolamide-timolol 2-0.5% (COSOPT) 22.3-6.8 mg/mL ophthalmic solution Place 1 drop into both eyes 2 (two) times daily.    ergocalciferol (ERGOCALCIFEROL) 50,000 unit Cap Take 50,000 Units by mouth every 7 days.    ezetimibe (ZETIA) 10 mg tablet Take 1 tablet (10 mg total) by mouth every evening.    ezetimibe (ZETIA) 10 mg tablet Take 1 tablet (10 mg total) by mouth every evening.    ferrous sulfate (FEOSOL) 325 mg (65 mg iron) Tab tablet Take 325 mg by mouth 2 (two) times daily.    fluticasone propionate (FLONASE) 50 mcg/actuation nasal spray 1 spray by Each Nostril route once daily.    folic acid-vit B6-vit B12 2.5-25-2 mg (FOLBIC OR EQUIV) 2.5-25-2 mg Tab Take 1 tablet by mouth once daily.    furosemide (LASIX) 20 MG tablet Take 1 tablet by mouth once daily. Weigh yourself every morning take an extra 20mg tablet if you gain 2-3 pounds in 24 hours or 5 pounds in a week until you return to your dry weight and notify your cardiologist.    gabapentin (NEURONTIN)  "600 MG tablet Take 600 mg by mouth 2 (two) times daily.    hydrALAZINE (APRESOLINE) 10 MG tablet Take 1 tablet (10 mg total) by mouth 3 (three) times daily.    hydrALAZINE (APRESOLINE) 10 MG tablet Take 1 tablet (10 mg total) by mouth 3 (three) times daily.    insulin aspart U-100 (NOVOLOG U-100 INSULIN ASPART) 100 unit/mL injection Inject 5 Units into the skin 3 (three) times daily before meals.    insulin aspart U-100 (NOVOLOG) 100 unit/mL (3 mL) InPn pen Inject 5 Units into the skin 3 (three) times daily with meals.    isosorbide dinitrate (ISORDIL) 10 MG tablet Take 1 tablet (10 mg total) by mouth 3 (three) times daily.    isosorbide dinitrate (ISORDIL) 10 MG tablet Take 1 tablet (10 mg total) by mouth 3 (three) times daily.    lancets 30 gauge Misc Use to test blood glucose four times daily before meals and nightly    lancing device Misc Use to test blood glucose four times daily before meals and nightly    LANTUS SOLOSTAR U-100 INSULIN glargine 100 units/mL SubQ pen Inject 30 Units into the skin every evening.    metoprolol succinate (TOPROL-XL) 25 MG 24 hr tablet Take 1 tablet (25 mg total) by mouth once daily.    OZEMPIC 1 mg/dose (4 mg/3 mL) Inject 1 mg into the skin every 7 days.    pantoprazole (PROTONIX) 40 MG tablet Take 1 tablet (40 mg total) by mouth before breakfast.    pen needle, diabetic 31 gauge x 5/16" Ndle Use to inject insulin 3 times daily    pregabalin (LYRICA) 75 MG capsule Take 1 capsule (75 mg total) by mouth every evening.    SYMBICORT 160-4.5 mcg/actuation HFAA 2 puffs 2 (two) times daily.    zolpidem (AMBIEN) 10 mg Tab Take 10 mg by mouth nightly as needed.    [DISCONTINUED] amLODIPine (NORVASC) 10 MG tablet Take 1 tablet (10 mg total) by mouth once daily. Hold this. Do not take it until you see your doctor.    [DISCONTINUED] hyoscyamine (ANASPAZ,LEVSIN) 0.125 mg Tab Take 1 tablet (125 mcg total) by mouth every 6 (six) hours as needed (abdominal cramping).    [DISCONTINUED] lisinopriL " (PRINIVIL,ZESTRIL) 2.5 MG tablet Take 1 tablet (2.5 mg total) by mouth once daily.    [DISCONTINUED] metFORMIN (GLUCOPHAGE) 1000 MG tablet Take 1 tablet (1,000 mg total) by mouth 2 (two) times daily with meals. Hold this. Do not take it until you see your doctor. (Patient not taking: Reported on 4/18/2022)    [DISCONTINUED] metoprolol tartrate (LOPRESSOR) 50 MG tablet Take 1 tablet (50 mg total) by mouth 2 (two) times daily. Hold this. Do not take it until you see your doctor.     Family History    None       Tobacco Use    Smoking status: Every Day     Current packs/day: 0.50     Types: Cigarettes    Smokeless tobacco: Never   Substance and Sexual Activity    Alcohol use: No    Drug use: No    Sexual activity: Not on file     Review of Systems   Constitutional:  Positive for fatigue. Negative for chills and fever.   HENT:  Negative for rhinorrhea and sore throat.    Respiratory:  Negative for cough, shortness of breath and wheezing.    Cardiovascular:  Negative for chest pain, palpitations and leg swelling.   Gastrointestinal:  Positive for abdominal pain, nausea and vomiting.   Endocrine: Negative for cold intolerance, polydipsia and polyphagia.   Genitourinary:  Negative for dysuria and hematuria.   Musculoskeletal: Negative.    Neurological: Negative.    Psychiatric/Behavioral: Negative.       Objective:     Vital Signs (Most Recent):  Temp: 99.1 °F (37.3 °C) (04/03/24 1107)  Pulse: 67 (04/03/24 1813)  Resp: 18 (04/03/24 1652)  BP: (!) 80/51 (04/03/24 1813)  SpO2: 96 % (04/03/24 1107) Vital Signs (24h Range):  Temp:  [98.9 °F (37.2 °C)-99.5 °F (37.5 °C)] 99.1 °F (37.3 °C)  Pulse:  [66-83] 67  Resp:  [16-18] 18  SpO2:  [94 %-98 %] 96 %  BP: ()/(51-75) 80/51     Weight: 77.1 kg (170 lb)  Body mass index is 27.44 kg/m².     Physical Exam  Constitutional:       Appearance: Normal appearance.   HENT:      Mouth/Throat:      Mouth: Mucous membranes are moist.      Pharynx: Oropharynx is clear.   Eyes:       Conjunctiva/sclera: Conjunctivae normal.      Pupils: Pupils are equal, round, and reactive to light.   Cardiovascular:      Rate and Rhythm: Normal rate and regular rhythm.   Pulmonary:      Effort: Pulmonary effort is normal.      Breath sounds: Normal breath sounds.   Abdominal:      General: Bowel sounds are normal.      Palpations: Abdomen is soft.   Musculoskeletal:         General: No swelling or tenderness. Normal range of motion.      Cervical back: Normal range of motion and neck supple.      Comments: R BKA and L partial foot amp. Warm to touch. No ulcerations   Neurological:      General: No focal deficit present.      Mental Status: She is alert and oriented to person, place, and time.   Psychiatric:         Mood and Affect: Mood normal.         Behavior: Behavior normal.              CRANIAL NERVES     CN III, IV, VI   Pupils are equal, round, and reactive to light.       Significant Labs: All pertinent labs within the past 24 hours have been reviewed.  Recent Lab Results         04/03/24  1302   04/03/24  0322   04/02/24  2045        Anion Gap 9   13         Baso #   0.04         Basophil %   0.3         BUN 19   14         Calcium 8.8   9.1         Chloride 99   100         CO2 25   23         Creatinine 2.0   1.7         Differential Method   Automated         eGFR 28.4   34.5         Eos #   0.0         Eos %   0.2         Glucose 144   155         Gran # (ANC)   7.6         Gran %   63.0         Hematocrit   34.0         Hemoglobin   11.2         Immature Grans (Abs)   0.05  Comment: Mild elevation in immature granulocytes is non specific and   can be seen in a variety of conditions including stress response,   acute inflammation, trauma and pregnancy. Correlation with other   laboratory and clinical findings is essential.           Immature Granulocytes   0.4         Lactic Acid Level     1.3  Comment: Falsely low lactic acid results can be found in samples   containing >=13.0 mg/dL total  bilirubin and/or >=3.5 mg/dL   direct bilirubin.         Lymph #   3.3         Lymph %   27.4         MCH   22.9         MCHC   32.9         MCV   70         Mono #   1.1         Mono %   8.7         MPV   10.9         nRBC   0         Platelet Count   338         Potassium 3.6   3.5         RBC   4.89         RDW   15.5         Sodium 133   136         WBC   12.06                 Significant Imaging: I have reviewed all pertinent imaging results/findings within the past 24 hours.

## 2024-04-05 VITALS
OXYGEN SATURATION: 100 % | DIASTOLIC BLOOD PRESSURE: 76 MMHG | HEART RATE: 64 BPM | BODY MASS INDEX: 27.32 KG/M2 | HEIGHT: 66 IN | WEIGHT: 170 LBS | RESPIRATION RATE: 18 BRPM | TEMPERATURE: 97 F | SYSTOLIC BLOOD PRESSURE: 136 MMHG

## 2024-04-05 LAB
ANION GAP SERPL CALC-SCNC: 7 MMOL/L (ref 8–16)
BASOPHILS # BLD AUTO: 0.05 K/UL (ref 0–0.2)
BASOPHILS NFR BLD: 0.6 % (ref 0–1.9)
BUN SERPL-MCNC: 20 MG/DL (ref 6–20)
CALCIUM SERPL-MCNC: 8.8 MG/DL (ref 8.7–10.5)
CHLORIDE SERPL-SCNC: 102 MMOL/L (ref 95–110)
CO2 SERPL-SCNC: 23 MMOL/L (ref 23–29)
CREAT SERPL-MCNC: 1.5 MG/DL (ref 0.5–1.4)
DIFFERENTIAL METHOD BLD: ABNORMAL
EOSINOPHIL # BLD AUTO: 0.2 K/UL (ref 0–0.5)
EOSINOPHIL NFR BLD: 2.8 % (ref 0–8)
ERYTHROCYTE [DISTWIDTH] IN BLOOD BY AUTOMATED COUNT: 16 % (ref 11.5–14.5)
EST. GFR  (NO RACE VARIABLE): 40.1 ML/MIN/1.73 M^2
GLUCOSE SERPL-MCNC: 108 MG/DL (ref 70–110)
HCT VFR BLD AUTO: 30.8 % (ref 37–48.5)
HGB BLD-MCNC: 9.9 G/DL (ref 12–16)
IMM GRANULOCYTES # BLD AUTO: 0.01 K/UL (ref 0–0.04)
IMM GRANULOCYTES NFR BLD AUTO: 0.1 % (ref 0–0.5)
LYMPHOCYTES # BLD AUTO: 4.4 K/UL (ref 1–4.8)
LYMPHOCYTES NFR BLD: 50.7 % (ref 18–48)
MCH RBC QN AUTO: 23.1 PG (ref 27–31)
MCHC RBC AUTO-ENTMCNC: 32.1 G/DL (ref 32–36)
MCV RBC AUTO: 72 FL (ref 82–98)
MONOCYTES # BLD AUTO: 0.9 K/UL (ref 0.3–1)
MONOCYTES NFR BLD: 10 % (ref 4–15)
NEUTROPHILS # BLD AUTO: 3.1 K/UL (ref 1.8–7.7)
NEUTROPHILS NFR BLD: 35.8 % (ref 38–73)
NRBC BLD-RTO: 0 /100 WBC
PLATELET # BLD AUTO: 283 K/UL (ref 150–450)
PMV BLD AUTO: 11.7 FL (ref 9.2–12.9)
POTASSIUM SERPL-SCNC: 4 MMOL/L (ref 3.5–5.1)
RBC # BLD AUTO: 4.29 M/UL (ref 4–5.4)
SODIUM SERPL-SCNC: 132 MMOL/L (ref 136–145)
WBC # BLD AUTO: 8.6 K/UL (ref 3.9–12.7)

## 2024-04-05 PROCEDURE — 80048 BASIC METABOLIC PNL TOTAL CA: CPT | Performed by: STUDENT IN AN ORGANIZED HEALTH CARE EDUCATION/TRAINING PROGRAM

## 2024-04-05 PROCEDURE — 85025 COMPLETE CBC W/AUTO DIFF WBC: CPT | Performed by: STUDENT IN AN ORGANIZED HEALTH CARE EDUCATION/TRAINING PROGRAM

## 2024-04-05 PROCEDURE — 36415 COLL VENOUS BLD VENIPUNCTURE: CPT | Performed by: STUDENT IN AN ORGANIZED HEALTH CARE EDUCATION/TRAINING PROGRAM

## 2024-04-05 PROCEDURE — 25000003 PHARM REV CODE 250: Performed by: STUDENT IN AN ORGANIZED HEALTH CARE EDUCATION/TRAINING PROGRAM

## 2024-04-05 PROCEDURE — 25000003 PHARM REV CODE 250: Performed by: PHYSICIAN ASSISTANT

## 2024-04-05 RX ORDER — DICYCLOMINE HYDROCHLORIDE 10 MG/1
10 CAPSULE ORAL 4 TIMES DAILY PRN
Qty: 30 CAPSULE | Refills: 0 | Status: SHIPPED | OUTPATIENT
Start: 2024-04-05 | End: 2024-04-13

## 2024-04-05 RX ORDER — HYDRALAZINE HYDROCHLORIDE 10 MG/1
10 TABLET, FILM COATED ORAL 2 TIMES DAILY
Start: 2024-04-05 | End: 2025-04-05

## 2024-04-05 RX ORDER — AMLODIPINE BESYLATE 10 MG/1
10 TABLET ORAL DAILY
Start: 2024-04-05

## 2024-04-05 RX ORDER — CEPHALEXIN 500 MG/1
500 CAPSULE ORAL EVERY 6 HOURS
Qty: 12 CAPSULE | Refills: 0 | Status: SHIPPED | OUTPATIENT
Start: 2024-04-06 | End: 2024-04-09

## 2024-04-05 RX ORDER — GLUCAGON 1 MG
1 KIT INJECTION
Status: DISCONTINUED | OUTPATIENT
Start: 2024-04-05 | End: 2024-04-05 | Stop reason: HOSPADM

## 2024-04-05 RX ORDER — INSULIN ASPART 100 [IU]/ML
0-5 INJECTION, SOLUTION INTRAVENOUS; SUBCUTANEOUS
Status: DISCONTINUED | OUTPATIENT
Start: 2024-04-05 | End: 2024-04-05

## 2024-04-05 RX ORDER — INSULIN ASPART 100 [IU]/ML
0-5 INJECTION, SOLUTION INTRAVENOUS; SUBCUTANEOUS
Status: DISCONTINUED | OUTPATIENT
Start: 2024-04-05 | End: 2024-04-05 | Stop reason: HOSPADM

## 2024-04-05 RX ORDER — IBUPROFEN 200 MG
16 TABLET ORAL
Status: DISCONTINUED | OUTPATIENT
Start: 2024-04-05 | End: 2024-04-05 | Stop reason: HOSPADM

## 2024-04-05 RX ORDER — EZETIMIBE 10 MG/1
10 TABLET ORAL
Start: 2024-04-05 | End: 2025-04-05

## 2024-04-05 RX ORDER — LOSARTAN POTASSIUM 50 MG/1
50 TABLET ORAL EVERY OTHER DAY
Start: 2024-04-05

## 2024-04-05 RX ORDER — IBUPROFEN 200 MG
24 TABLET ORAL
Status: DISCONTINUED | OUTPATIENT
Start: 2024-04-05 | End: 2024-04-05 | Stop reason: HOSPADM

## 2024-04-05 RX ADMIN — METOPROLOL SUCCINATE 25 MG: 25 TABLET, EXTENDED RELEASE ORAL at 08:04

## 2024-04-05 RX ADMIN — OXYCODONE AND ACETAMINOPHEN 1 TABLET: 10; 325 TABLET ORAL at 04:04

## 2024-04-05 RX ADMIN — OXYCODONE AND ACETAMINOPHEN 1 TABLET: 10; 325 TABLET ORAL at 08:04

## 2024-04-05 RX ADMIN — ATORVASTATIN CALCIUM 80 MG: 40 TABLET, FILM COATED ORAL at 08:04

## 2024-04-05 RX ADMIN — CLOPIDOGREL BISULFATE 75 MG: 75 TABLET ORAL at 08:04

## 2024-04-05 RX ADMIN — PANTOPRAZOLE SODIUM 40 MG: 40 TABLET, DELAYED RELEASE ORAL at 05:04

## 2024-04-05 RX ADMIN — GABAPENTIN 600 MG: 300 CAPSULE ORAL at 08:04

## 2024-04-05 RX ADMIN — APIXABAN 5 MG: 5 TABLET, FILM COATED ORAL at 08:04

## 2024-04-05 NOTE — DISCHARGE SUMMARY
[facility] Medicine  Discharge Summary      Patient Name: Casie Salvador  MRN: [id number]  [facility]: [id number]  Patient Class: IP- Inpatient  Admission Date: 4/2/2024  Hospital Length of Stay: 1 days  Discharge Date and Time: 4/5/2024  8:45 AM  Attending Physician: Simon Green MD   Discharging Provider: Simon Green MD  Primary Care Provider: Adonis Carey MD  [facility] Medicine Team: [facility] MED R Simon Green MD  Primary Care Team: [facility] MED R    HPI:   Casie Salvador is a 59 year old Black woman with cigarette smoking, hypertension, dyslipidemia, diabetes mellitus type 2 (treated with insulin) with polyneuropathy, chronic systolic and diastolic congestive heart failure, coronary artery disease, history of left ventricular apical thrombus on 10/23/2023 anticoagulated on apixaban), peripheral artery disease, history of right below-knee amputation on 12/4/2018, history of left transmetatarsal amputation on 3/8/2019, history of NSAID induced gastritis and gastric ulcer on 2/23/2022, duodenal ulcers on 20/28/2022, gastric ulcer on 3/1/2024, chronic pain treated with opioids, opioid-induced constipation, depression. She lives in [facility]. She is . Her primary care physician is Dr. Adonis Carey.    She presented to [facility] Emergency Department on 4/2/2024 with fatigue and nausea associated with nonbloody nonbilious emesis and generalized abdominal pain for the past 2 days.    In the emergency department, she was initially hypertensive at 201/119. Urinalysis showed 2+ protein, 2+ ketones, >100 WBC/hpf, negative nitrite. Troponin was <0.006 ng/mL. Lactate was 2.28 mmol/L. EKG was concerning for ST segment elevations, but repeat EKG showed nonspecific T-waves. She was given dicyclomine 20 mg IV, ondansetron 4 mg IV, 1 liter of lactated Ringer's solution, morphine 4 mg IV, ceftriaxone 1 gram IV, another dose of ondansetron 4 mg IV.  She was going to be discharged with an antibiotic prescription, but repeat labs showed rise in creatinine to 1.7 mg/dL the next morning from 1.1 mg/dL on initial labs. Creatinine was 2.0 later in the day. She was admitted to Hospital Medicine Team R.    Hospital Course:   She was given ceftriaxone while in the hospital. Her blood pressures were relatively low, as low as 80/51. Her hypertension medications were held except for metoprolol. BUN and creatinine increased to 21 and 2.3. creatinine peaked at 2.4 on 4/4/2024. On 4/5/2024 it decreased to 1.5. Urine culture grew >[id number] cfu/mL E coli. She was discharged home and prescribed cephalexin. Although both the emergency provider and admitting hospitalist both say she reported no flank pain, she reported at discharge that she had been having flank pain since she came to the hospital. She was prescribed dicyclomine. She was advised to continue holding losartan and amlodipine until she followed up with her primary care physician.      Goals of Care Treatment Preferences:  Code Status: Full Code      Consults: none    Final Active Diagnoses:    Diagnosis Date Noted POA    PRINCIPAL PROBLEM:  BRENDA (acute kidney injury) [N17.9] 10/22/2023 Yes    E. coli UTI (urinary tract infection) [N39.0, B96.20] 04/03/2024 No    S/P BKA (below knee amputation), right [Z89.511] 10/22/2023 Not Applicable     Chronic    Chronic combined systolic and diastolic congestive heart failure [I50.42] 10/22/2023 Yes     Chronic    Hypertension [I10] 02/20/2022 Yes     Chronic    Constipation due to opioid therapy [K59.03, T40.2X5A] 02/20/2022 Yes     Chronic    Type 2 diabetes mellitus with circulatory disorder, with long-term current use of insulin [E11.59, Z79.4] 02/20/2022 Not Applicable     Chronic    History of transmetatarsal amputation of left foot [Z89.432] 03/08/2019 Not Applicable     Chronic    Dyslipidemia [E78.5] 11/22/2017 Yes     Chronic      Problems Resolved During this Admission:  "   Diagnosis Date Noted Date Resolved POA    Acute pyelonephritis [N10] 04/02/2024 04/03/2024 Unknown       Discharged Condition: good    Disposition: Home or Self Care    Follow Up:   Follow-up Information       Israel Acosta Jr., MD. Go to.    Specialties: General Surgery, Vascular Surgery  Why: Call tomorrow.  Contact information:  [address]  [address]  [place] [address]  [phone]               Adonis Carey MD. Go to.    Specialty: Internal Medicine  Contact information:  [address] [facility] [address]  [phone]                           Patient Instructions:      WALKER FOR HOME USE     Order Specific Question Answer Comments   Type of Walker: Rollator with brakes and/or seat    With wheels? Yes    Height: 5' 6" (1.676 m)    Weight: 77.1 kg (170 lb)    Length of need (1-99 months): 99    Does patient have medical equipment at home? walker, standard pt has a prosthetic leg / pt has a prosthetic leg / pt has a prosthetic leg   Does patient have medical equipment at home? cane, straight    Does patient have medical equipment at home? shower chair    Please check all that apply: Patient's condition impairs ambulation.    Please check all that apply: Patient is unable to safely ambulate without equipment.      Diet diabetic     Notify your health care provider if you experience any of the following:  difficulty breathing or increased cough     Notify your health care provider if you experience any of the following:  persistent dizziness, light-headedness, or visual disturbances     Notify your health care provider if you experience any of the following:  persistent nausea and vomiting or diarrhea     Activity as tolerated       Significant Diagnostic Studies:   Recent Labs   Lab 04/02/24  1126 04/03/24  0322 04/04/24  0221 04/04/24  1154 04/05/24  0506   *   < > 130* 135* 132*   K 3.8   < > 3.5 3.7 4.0   CL 98   < > 98 102 102   CO2 22*   < > 22* 16* 23   BUN 10   < > 21* 24* " 20   CREATININE 1.1   < > 2.3* 2.4* 1.5*   CALCIUM 10.0   < > 8.5* 8.6* 8.8   PROT 8.7*  --   --   --   --    BILITOT 0.9  --   --   --   --    ALKPHOS 113  --   --   --   --    ALT 9*  --   --   --   --    AST 12  --   --   --   --     < > = values in this interval not displayed.       Medications:  Reconciled Home Medications:      Medication List        START taking these medications      cephALEXin 500 MG capsule  Commonly known as: KEFLEX  Take 1 capsule (500 mg total) by mouth every 6 (six) hours. Starting on Saturday 4/6/2024. for 3 days  Start taking on: April 6, 2024     dicyclomine 10 MG capsule  Commonly known as: BENTYL  Take 1 capsule (10 mg total) by mouth 4 (four) times daily as needed (abdominal pain).            CHANGE how you take these medications      amLODIPine 10 MG tablet  Commonly known as: NORVASC  Take 1 tablet (10 mg total) by mouth once daily. Hold this until you follow up with your doctor.  What changed: additional instructions     losartan 50 MG tablet  Commonly known as: COZAAR  Take 1 tablet (50 mg total) by mouth every other day. Hold this until you follow up with your doctor.  What changed: additional instructions            CONTINUE taking these medications      aspirin 81 MG EC tablet  Commonly known as: ECOTRIN  Take 81 mg by mouth every evening.     atorvastatin 80 MG tablet  Commonly known as: LIPITOR  Take 1 tablet (80 mg total) by mouth once daily.     blood sugar diagnostic Strp  Use to test blood glucose four times daily before meals and nightly     blood-glucose meter Misc  Use as instructed     clopidogreL 75 mg tablet  Commonly known as: PLAVIX  Take 1 tablet (75 mg total) by mouth once daily.     * ELIQUIS 5 mg Tab  Generic drug: apixaban  Take 1 tablet (5 mg total) by mouth 2 (two) times daily.     * apixaban 5 mg Tab  Commonly known as: ELIQUIS  Take 1 tablet (5 mg total) by mouth 2 (two) times daily.     ergocalciferol 50,000 unit Cap  Commonly known as:  "ERGOCALCIFEROL  Take 50,000 Units by mouth every 7 days. Tuesday     ezetimibe 10 mg tablet  Commonly known as: ZETIA  Take 1 tablet (10 mg total) by mouth every 7 days. Thursday     ferrous sulfate 325 mg (65 mg iron) Tab tablet  Commonly known as: FEOSOL  Take 325 mg by mouth every 7 days. Wednesday     hydrALAZINE 10 MG tablet  Commonly known as: APRESOLINE  Take 1 tablet (10 mg total) by mouth 2 (two) times a day.     isosorbide dinitrate 10 MG tablet  Commonly known as: ISORDIL  Take 1 tablet (10 mg total) by mouth 3 (three) times daily.     lancets 30 gauge Misc  Use to test blood glucose four times daily before meals and nightly     lancing device Misc  Use to test blood glucose four times daily before meals and nightly     LANTUS SOLOSTAR U-100 INSULIN glargine 100 units/mL SubQ pen  Generic drug: insulin  Inject 30 Units into the skin every evening.     metFORMIN 1000 MG tablet  Commonly known as: GLUCOPHAGE  Take 1,000 mg by mouth every morning.     metoprolol succinate 25 MG 24 hr tablet  Commonly known as: TOPROL-XL  Take 1 tablet (25 mg total) by mouth once daily.     metoprolol tartrate 50 MG tablet  Commonly known as: LOPRESSOR  Take 50 mg by mouth every 7 days. Wednesday     oxyCODONE-acetaminophen  mg per tablet  Commonly known as: PERCOCET  Take 1 tablet by mouth every 6 (six) hours as needed for Pain.     OZEMPIC 1 mg/dose (4 mg/3 mL)  Generic drug: semaglutide  Inject 1 mg into the skin every 7 days. Tuesday     pantoprazole 40 MG tablet  Commonly known as: PROTONIX  Take 1 tablet (40 mg total) by mouth before breakfast.     pen needle, diabetic 31 gauge x 5/16" Ndle  Use to inject insulin 3 times daily     SYMBICORT 160-4.5 mcg/actuation Hfaa  Generic drug: budesonide-formoterol 160-4.5 mcg  Inhale 2 puffs into the lungs as needed (shortness of breath/ wheezing).           * This list has 2 medication(s) that are the same as other medications prescribed for you. Read the directions " carefully, and ask your doctor or other care provider to review them with you.                  Indwelling Lines/Drains at time of discharge: none    Time spent on the discharge of patient: 35 minutes         Simon Green MD  Department of Hospital Medicine  [facility] Surg

## 2024-04-05 NOTE — PLAN OF CARE
[facility]  Discharge Final Note    Primary Care Provider: Adonis Carey MD    Expected Discharge Date: 4/5/2024    Pt received her rollator at bedside.  Pt's family provided transportation home.  No other needs identified.      [facility].     Discharge Plan A and Plan B have been determined by review of patient's clinical status, future medical and therapeutic needs, and coverage/benefits for post-acute care in coordination with multidisciplinary team members.      Final Discharge Note (most recent)       Final Note - 04/05/24 1247          Final Note    Assessment Type Final Discharge Note     Anticipated Discharge Disposition Home or Self Care        Post-Acute Status    Post-Acute Authorization HME     HME Status Set-up Complete/Auth obtained     Discharge Delays None known at this time                     Important Message from Medicare             Contact Info       Israel Acosta Jr., MD   Specialty: General Surgery, Vascular Surgery    [address] [address]   Phone: [phone]       Next Steps: Go to    Instructions: Call tomorrow.    Adonis Carey MD   Specialty: Internal Medicine   Relationship: PCP - General    [address] [facility] [address]   Phone: [phone]       Next Steps: Go to          Kimber Peacock LMSW  Part-Time-  [facility]  Ext. [phone]

## 2024-04-05 NOTE — DISCHARGE INSTRUCTIONS
You had acute kidney injury. Your creatinine increased as high as 2.4. It is improving now and is 1.5 today. Your blood pressures were low. Hold losartan and amlodipine (Norvasc), which are medicines for high blood pressure, until you follow up with your doctor.    See results for creatinine below:  Recent Labs   Lab 04/02/24  1126 04/03/24  0322 04/04/24  0221 04/04/24  1154 04/05/24  0506   *   < > 130* 135* 132*   K 3.8   < > 3.5 3.7 4.0   CL 98   < > 98 102 102   CO2 22*   < > 22* 16* 23   BUN 10   < > 21* 24* 20   CREATININE 1.1   < > 2.3* 2.4* 1.5*   CALCIUM 10.0   < > 8.5* 8.6* 8.8   PROT 8.7*  --   --   --   --    BILITOT 0.9  --   --   --   --    ALKPHOS 113  --   --   --   --    ALT 9*  --   --   --   --    AST 12  --   --   --   --     < > = values in this interval not displayed.

## 2024-04-05 NOTE — PLAN OF CARE
Problem: Adult Inpatient Plan of Care  Goal: Plan of Care Review  Outcome: Met  Goal: Patient-Specific Goal (Individualized)  Outcome: Met  Goal: Absence of Hospital-Acquired Illness or Injury  Outcome: Met  Goal: Optimal Comfort and Wellbeing  Outcome: Met  Goal: Readiness for Transition of Care  Outcome: Met     Problem: Diabetes Comorbidity  Goal: Blood Glucose Level Within Targeted Range  Outcome: Met     Problem: Fluid and Electrolyte Imbalance (Acute Kidney Injury/Impairment)  Goal: Fluid and Electrolyte Balance  Outcome: Met     Problem: Oral Intake Inadequate (Acute Kidney Injury/Impairment)  Goal: Optimal Nutrition Intake  Outcome: Met     Problem: Renal Function Impairment (Acute Kidney Injury/Impairment)  Goal: Effective Renal Function  Outcome: Met     Problem: Infection  Goal: Absence of Infection Signs and Symptoms  Outcome: Met     Problem: Pain Acute  Goal: Acceptable Pain Control and Functional Ability  Outcome: Met     Problem: Fall Injury Risk  Goal: Absence of Fall and Fall-Related Injury  Outcome: Met     Problem: Fatigue  Goal: Improved Activity Tolerance  Outcome: Met     Problem: Skin Injury Risk Increased  Goal: Skin Health and Integrity  Outcome: Met

## 2024-04-05 NOTE — PLAN OF CARE
APPOINTMENT:    Patient Appointment(s) scheduled with Alfonso Hazel MD  Monday Apr 8, 2024 9:00 AM

## 2024-04-05 NOTE — PLAN OF CARE
Problem: Adult Inpatient Plan of Care  Goal: Plan of Care Review  Outcome: Ongoing, Progressing  Goal: Patient-Specific Goal (Individualized)  Outcome: Ongoing, Progressing  Goal: Absence of Hospital-Acquired Illness or Injury  Outcome: Ongoing, Progressing  Goal: Optimal Comfort and Wellbeing  Outcome: Ongoing, Progressing  Goal: Readiness for Transition of Care  Outcome: Ongoing, Progressing     Problem: Diabetes Comorbidity  Goal: Blood Glucose Level Within Targeted Range  Outcome: Ongoing, Progressing     Problem: Fluid and Electrolyte Imbalance (Acute Kidney Injury/Impairment)  Goal: Fluid and Electrolyte Balance  Outcome: Ongoing, Progressing     Problem: Oral Intake Inadequate (Acute Kidney Injury/Impairment)  Goal: Optimal Nutrition Intake  Outcome: Ongoing, Progressing     Problem: Renal Function Impairment (Acute Kidney Injury/Impairment)  Goal: Effective Renal Function  Outcome: Ongoing, Progressing     Problem: Infection  Goal: Absence of Infection Signs and Symptoms  Outcome: Ongoing, Progressing     Problem: Pain Acute  Goal: Acceptable Pain Control and Functional Ability  Outcome: Ongoing, Progressing     Problem: Fall Injury Risk  Goal: Absence of Fall and Fall-Related Injury  Outcome: Ongoing, Progressing     Problem: Fatigue  Goal: Improved Activity Tolerance  Outcome: Ongoing, Progressing     Problem: Skin Injury Risk Increased  Goal: Skin Health and Integrity  Outcome: Ongoing, Progressing

## 2024-04-13 ENCOUNTER — HOSPITAL ENCOUNTER (EMERGENCY)
Facility: HOSPITAL | Age: 59
Discharge: HOME | End: 2024-04-13
Attending: EMERGENCY MEDICINE
Payer: MEDICAID

## 2024-04-13 VITALS
SYSTOLIC BLOOD PRESSURE: 120 MMHG | HEART RATE: 92 BPM | OXYGEN SATURATION: 97 % | RESPIRATION RATE: 16 BRPM | TEMPERATURE: 98 F | HEIGHT: 66 IN | DIASTOLIC BLOOD PRESSURE: 67 MMHG | BODY MASS INDEX: 27.32 KG/M2 | WEIGHT: 170 LBS

## 2024-04-13 DIAGNOSIS — R10.12 LEFT UPPER QUADRANT ABDOMINAL PAIN: ICD-10-CM

## 2024-04-13 DIAGNOSIS — K29.70 GASTRITIS, PRESENCE OF BLEEDING UNSPECIFIED, UNSPECIFIED CHRONICITY, UNSPECIFIED GASTRITIS TYPE: Primary | ICD-10-CM

## 2024-04-13 DIAGNOSIS — R68.89 MULTIPLE COMPLAINTS: ICD-10-CM

## 2024-04-13 LAB
ALBUMIN SERPL BCP-MCNC: 3.5 G/DL (ref 3.5–5.2)
ALP SERPL-CCNC: 86 U/L (ref 55–135)
ALT SERPL W/O P-5'-P-CCNC: 8 U/L (ref 10–44)
ANION GAP SERPL CALC-SCNC: 11 MMOL/L (ref 8–16)
AST SERPL-CCNC: 16 U/L (ref 10–40)
BACTERIA #/AREA URNS AUTO: ABNORMAL /HPF
BASOPHILS # BLD AUTO: 0.04 K/UL (ref 0–0.2)
BASOPHILS NFR BLD: 0.4 % (ref 0–1.9)
BILIRUB SERPL-MCNC: 0.4 MG/DL (ref 0.1–1)
BILIRUB UR QL STRIP: NEGATIVE
BUN SERPL-MCNC: 10 MG/DL (ref 6–20)
CALCIUM SERPL-MCNC: 9.6 MG/DL (ref 8.7–10.5)
CHLORIDE SERPL-SCNC: 100 MMOL/L (ref 95–110)
CLARITY UR REFRACT.AUTO: CLEAR
CO2 SERPL-SCNC: 21 MMOL/L (ref 23–29)
COLOR UR AUTO: YELLOW
CREAT SERPL-MCNC: 1.1 MG/DL (ref 0.5–1.4)
DIFFERENTIAL METHOD BLD: ABNORMAL
EOSINOPHIL # BLD AUTO: 0.2 K/UL (ref 0–0.5)
EOSINOPHIL NFR BLD: 1.5 % (ref 0–8)
ERYTHROCYTE [DISTWIDTH] IN BLOOD BY AUTOMATED COUNT: 15.6 % (ref 11.5–14.5)
EST. GFR  (NO RACE VARIABLE): 58.2 ML/MIN/1.73 M^2
GLUCOSE SERPL-MCNC: 89 MG/DL (ref 70–110)
GLUCOSE UR QL STRIP: NEGATIVE
HCT VFR BLD AUTO: 31.7 % (ref 37–48.5)
HGB BLD-MCNC: 10.4 G/DL (ref 12–16)
HGB UR QL STRIP: NEGATIVE
IMM GRANULOCYTES # BLD AUTO: 0.02 K/UL (ref 0–0.04)
IMM GRANULOCYTES NFR BLD AUTO: 0.2 % (ref 0–0.5)
KETONES UR QL STRIP: NEGATIVE
LACTATE SERPL-SCNC: 1 MMOL/L (ref 0.5–2.2)
LEUKOCYTE ESTERASE UR QL STRIP: ABNORMAL
LIPASE SERPL-CCNC: 10 U/L (ref 4–60)
LYMPHOCYTES # BLD AUTO: 3.4 K/UL (ref 1–4.8)
LYMPHOCYTES NFR BLD: 34.8 % (ref 18–48)
MAGNESIUM SERPL-MCNC: 1.7 MG/DL (ref 1.6–2.6)
MCH RBC QN AUTO: 22.9 PG (ref 27–31)
MCHC RBC AUTO-ENTMCNC: 32.8 G/DL (ref 32–36)
MCV RBC AUTO: 70 FL (ref 82–98)
MICROSCOPIC COMMENT: ABNORMAL
MONOCYTES # BLD AUTO: 0.7 K/UL (ref 0.3–1)
MONOCYTES NFR BLD: 6.6 % (ref 4–15)
NEUTROPHILS # BLD AUTO: 5.6 K/UL (ref 1.8–7.7)
NEUTROPHILS NFR BLD: 56.5 % (ref 38–73)
NITRITE UR QL STRIP: NEGATIVE
NRBC BLD-RTO: 0 /100 WBC
PH UR STRIP: 8 [PH] (ref 5–8)
PHOSPHATE SERPL-MCNC: 3 MG/DL (ref 2.7–4.5)
PLATELET # BLD AUTO: 368 K/UL (ref 150–450)
PMV BLD AUTO: 10.8 FL (ref 9.2–12.9)
POTASSIUM SERPL-SCNC: 4.3 MMOL/L (ref 3.5–5.1)
PROT SERPL-MCNC: 7.1 G/DL (ref 6–8.4)
PROT UR QL STRIP: NEGATIVE
RBC # BLD AUTO: 4.54 M/UL (ref 4–5.4)
RBC #/AREA URNS AUTO: 2 /HPF (ref 0–4)
SODIUM SERPL-SCNC: 132 MMOL/L (ref 136–145)
SP GR UR STRIP: 1.01 (ref 1–1.03)
SQUAMOUS #/AREA URNS AUTO: 1 /HPF
TROPONIN I SERPL DL<=0.01 NG/ML-MCNC: <0.006 NG/ML (ref 0–0.03)
TROPONIN I SERPL DL<=0.01 NG/ML-MCNC: <0.006 NG/ML (ref 0–0.03)
URN SPEC COLLECT METH UR: ABNORMAL
WBC # BLD AUTO: 9.85 K/UL (ref 3.9–12.7)
WBC #/AREA URNS AUTO: 5 /HPF (ref 0–5)
YEAST UR QL AUTO: ABNORMAL

## 2024-04-13 PROCEDURE — 25500020 PHARM REV CODE 255: Performed by: EMERGENCY MEDICINE

## 2024-04-13 PROCEDURE — 63600175 PHARM REV CODE 636 W HCPCS: Performed by: EMERGENCY MEDICINE

## 2024-04-13 PROCEDURE — 81001 URINALYSIS AUTO W/SCOPE: CPT | Performed by: EMERGENCY MEDICINE

## 2024-04-13 PROCEDURE — 25000003 PHARM REV CODE 250

## 2024-04-13 PROCEDURE — 84484 ASSAY OF TROPONIN QUANT: CPT

## 2024-04-13 PROCEDURE — 83605 ASSAY OF LACTIC ACID: CPT

## 2024-04-13 PROCEDURE — 93010 ELECTROCARDIOGRAM REPORT: CPT | Mod: ,,, | Performed by: INTERNAL MEDICINE

## 2024-04-13 PROCEDURE — 63600175 PHARM REV CODE 636 W HCPCS

## 2024-04-13 PROCEDURE — 83690 ASSAY OF LIPASE: CPT

## 2024-04-13 PROCEDURE — 96374 THER/PROPH/DIAG INJ IV PUSH: CPT

## 2024-04-13 PROCEDURE — 83735 ASSAY OF MAGNESIUM: CPT

## 2024-04-13 PROCEDURE — 84100 ASSAY OF PHOSPHORUS: CPT

## 2024-04-13 PROCEDURE — 96375 TX/PRO/DX INJ NEW DRUG ADDON: CPT

## 2024-04-13 PROCEDURE — 99285 EMERGENCY DEPT VISIT HI MDM: CPT | Mod: 25

## 2024-04-13 PROCEDURE — 80053 COMPREHEN METABOLIC PANEL: CPT

## 2024-04-13 PROCEDURE — 85025 COMPLETE CBC W/AUTO DIFF WBC: CPT

## 2024-04-13 PROCEDURE — 93005 ELECTROCARDIOGRAM TRACING: CPT

## 2024-04-13 RX ORDER — POLYETHYLENE GLYCOL 3350 17 G/17G
17 POWDER, FOR SOLUTION ORAL ONCE
Status: COMPLETED | OUTPATIENT
Start: 2024-04-13 | End: 2024-04-13

## 2024-04-13 RX ORDER — FAMOTIDINE 10 MG/ML
20 INJECTION INTRAVENOUS
Status: COMPLETED | OUTPATIENT
Start: 2024-04-13 | End: 2024-04-13

## 2024-04-13 RX ORDER — ALUMINUM HYDROXIDE, MAGNESIUM HYDROXIDE, AND SIMETHICONE 1200; 120; 1200 MG/30ML; MG/30ML; MG/30ML
30 SUSPENSION ORAL ONCE
Status: COMPLETED | OUTPATIENT
Start: 2024-04-13 | End: 2024-04-13

## 2024-04-13 RX ORDER — FAMOTIDINE 10 MG/ML
20 INJECTION INTRAVENOUS
Status: DISCONTINUED | OUTPATIENT
Start: 2024-04-13 | End: 2024-04-13

## 2024-04-13 RX ORDER — DROPERIDOL 2.5 MG/ML
1.25 INJECTION, SOLUTION INTRAMUSCULAR; INTRAVENOUS
Status: COMPLETED | OUTPATIENT
Start: 2024-04-13 | End: 2024-04-13

## 2024-04-13 RX ORDER — FAMOTIDINE 20 MG/1
20 TABLET, FILM COATED ORAL 2 TIMES DAILY
Qty: 42 TABLET | Refills: 1 | Status: SHIPPED | OUTPATIENT
Start: 2024-04-13 | End: 2024-05-25

## 2024-04-13 RX ORDER — LIDOCAINE HYDROCHLORIDE 20 MG/ML
15 SOLUTION OROPHARYNGEAL ONCE
Status: COMPLETED | OUTPATIENT
Start: 2024-04-13 | End: 2024-04-13

## 2024-04-13 RX ORDER — MORPHINE SULFATE 4 MG/ML
4 INJECTION, SOLUTION INTRAMUSCULAR; INTRAVENOUS
Status: COMPLETED | OUTPATIENT
Start: 2024-04-13 | End: 2024-04-13

## 2024-04-13 RX ADMIN — IOHEXOL 100 ML: 350 INJECTION, SOLUTION INTRAVENOUS at 08:04

## 2024-04-13 RX ADMIN — ALUMINUM HYDROXIDE, MAGNESIUM HYDROXIDE, AND SIMETHICONE 30 ML: 1200; 120; 1200 SUSPENSION ORAL at 06:04

## 2024-04-13 RX ADMIN — POLYETHYLENE GLYCOL 3350 17 G: 17 POWDER, FOR SOLUTION ORAL at 06:04

## 2024-04-13 RX ADMIN — FAMOTIDINE 20 MG: 10 INJECTION, SOLUTION INTRAVENOUS at 06:04

## 2024-04-13 RX ADMIN — MORPHINE SULFATE 4 MG: 4 INJECTION INTRAVENOUS at 06:04

## 2024-04-13 RX ADMIN — DROPERIDOL 1.25 MG: 2.5 INJECTION, SOLUTION INTRAMUSCULAR; INTRAVENOUS at 06:04

## 2024-04-13 RX ADMIN — LIDOCAINE HYDROCHLORIDE 15 ML: 20 SOLUTION ORAL at 06:04

## 2024-04-13 NOTE — ED TRIAGE NOTES
Arrives to ED via POV with left sided rib pain starting this morning. Pt denies SOB. Pt states she has this pain before and believes it is related to peptic ulcers. Pt states the pain is intermittent. Pt states the pain starts after eating.

## 2024-04-13 NOTE — ED PROVIDER NOTES
Encounter Date: 4/13/2024       History     Chief Complaint   Patient presents with    Multiple complaints     Finished antibiotics for uti, chest pain and under L rib     59F HFrEF with LV thrombus on Eliquis, CAD, PAD c/b R BKA and L TMA, GERD c/b multiple gastric/duodenal ulcers, T2DM. Recent discharge on 4/5 following UTI (E.coli with fluoroquinolone intermediate sensitivity) and BRENDA. She presents with LUQ abdominal pain that began earlier today that was initially intermittent, now constant, and a 6-7/10 in intensity. She denies alleviating or aggravating factors, including food intake or positional changes. She does take oxycodone every 8 hours for chronic LLE pain, and her last bowel movement was on 4/11. Additionally, she reports worsening left foot stump pain that has been severe, constant, and with decreasing responsiveness to her pain medications. She denies radiation into her chest, shoulder, groin, or flank, fever, chills, exertional shortness of breath or chest pain, nausea, vomiting, melena, or hematochezia.       Review of patient's allergies indicates:   Allergen Reactions    Orange juice Hives    Tomato (solanum lycopersicum) Hives     Past Medical History:   Diagnosis Date    Cardiogenic shock 10/24/2023    Chronic combined systolic and diastolic congestive heart failure     Coronary artery disease     Diabetes mellitus     Encounter for blood transfusion     Gastric ulcer with hemorrhage     Heart attack     History of gastric ulcer 04/02/2022    Hypertension     NSAID induced gastritis 04/02/2022    Perforated viscus 03/14/2022    Peripheral artery disease      Past Surgical History:   Procedure Laterality Date    APPENDECTOMY      BELOW KNEE AMPUTATION OF LOWER EXTREMITY Right 2019    COLONOSCOPY N/A 02/23/2022    Procedure: COLONOSCOPY;  Surgeon: Estefania Bryant MD;  Location: [facility] ([address] [place] [address]);  Service: Endoscopy;  Laterality: N/A;  anemia, melena    CORONARY STENT PLACEMENT       ESOPHAGOGASTRODUODENOSCOPY N/A 02/23/2022    Procedure: EGD (ESOPHAGOGASTRODUODENOSCOPY);  Surgeon: Estefania Bryant MD;  Location: [facility] (2ND FLR);  Service: Endoscopy;  Laterality: N/A;  anemia    ESOPHAGOGASTRODUODENOSCOPY N/A 4/14/2022    Procedure: EGD (ESOPHAGOGASTRODUODENOSCOPY);  Surgeon: First Available Den King;  Location: [facility] (2ND FLR);  Service: Endoscopy;  Laterality: N/A;  please schedule in 8 weeks. done 2/23      EF-20    ESOPHAGOGASTRODUODENOSCOPY  4/14/2022    Procedure: ;  Surgeon: First Available Den King;  Location: [facility] (2ND FLR);  Service: Endoscopy;;    ESOPHAGOGASTRODUODENOSCOPY N/A 4/29/2022    Procedure: EGD (ESOPHAGOGASTRODUODENOSCOPY);  Surgeon: Estefania Bryant MD;  Location: [facility];  Service: Endoscopy;  Laterality: N/A;  expedite EGD per Dr Ga      states vaccinated-will bring card-GT    ESOPHAGOGASTRODUODENOSCOPY N/A 10/28/2022    Procedure: EGD (ESOPHAGOGASTRODUODENOSCOPY);  Surgeon: Estefania Bryant MD;  Location: [facility] (2ND FLR);  Service: Endoscopy;  Laterality: N/A;    FOOT AMPUTATION THROUGH METATARSAL Left 2019    TUBAL LIGATION       No family history on file.  Social History     Tobacco Use    Smoking status: Every Day     Current packs/day: 0.50     Types: Cigarettes    Smokeless tobacco: Never   Substance Use Topics    Alcohol use: No    Drug use: No     Review of Systems   Constitutional:  Negative for chills and fever.   HENT:  Negative for sore throat.    Respiratory:  Negative for cough and shortness of breath.    Cardiovascular:  Negative for chest pain, palpitations and leg swelling.   Gastrointestinal:  Positive for abdominal pain and constipation. Negative for blood in stool, diarrhea, nausea and vomiting.   Genitourinary:  Negative for dysuria, flank pain and hematuria.   Musculoskeletal:  Positive for arthralgias.   Neurological:  Negative for dizziness, syncope, weakness and light-headedness.       Physical Exam     Initial Vitals  [04/13/24 1644]   BP Pulse Resp Temp SpO2   (!) 145/71 100 20 98.2 °F (36.8 °C) 99 %      MAP       --         Physical Exam    Constitutional: She appears well-developed and well-nourished.   Eyes: EOM are normal. Pupils are equal, round, and reactive to light. No scleral icterus.   Neck: No JVD present.   Cardiovascular:  Normal rate, regular rhythm and normal heart sounds.           Intact posterior tibial artery pulse on doppler   Pulmonary/Chest: Breath sounds normal. No respiratory distress.   Abdominal: Abdomen is soft. Bowel sounds are normal. She exhibits no distension. There is no abdominal tenderness. There is no rebound and no guarding.   Musculoskeletal:      Comments: R BKA prosthesis in place. Pulses difficult to appreciate on L TMA, no stump tenderness appreciated. Stump incision clean, dry, intact.       Neurological: She is alert and oriented to person, place, and time. She has normal strength. No sensory deficit.   Skin: Skin is warm and dry.         ED Course   Procedures  Labs Reviewed - No data to display  EKG Readings: (Independently Interpreted)   NSR       Imaging Results    None          Medications - No data to display  Medical Decision Making  Pain resolved by time of my evaluation, and nontender on exam. Hemodynamically stable. Suspect possible gastritis exacerbation, abdominal cramps, and/or worsening constipation. Will obtain basic labs, lipase, troponin to rule out atypical ACS, UA to evaluate for unresolved vs. Worsening UTI vs. Nephrolithiasis. GI cocktail and miralax provided.     Amount and/or Complexity of Data Reviewed  External Data Reviewed: labs, radiology and notes.  Labs: ordered.  Radiology: ordered. Decision-making details documented in ED Course.  ECG/medicine tests: ordered and independent interpretation performed.    Risk  OTC drugs.  Prescription drug management.               ED Course as of 04/13/24 2013   Sat Apr 13, 2024   8650 EKG, independently interpreted,  normal sinus rhythm at a rate of 95 with no ischemic changes, normal axis, intervals are unremarkable. [BA]   2008 CT Abdomen Pelvis With IV Contrast Routine Oral Contrast [RH]      ED Course User Index  [BA] Jt Reece MD  [RH] Domingo Gibson MD                             Clinical Impression:  Final diagnoses:  [R68.89] Multiple complaints                 Domingo Gibson MD  Resident  04/13/24 [id number]

## 2024-04-13 NOTE — PROVIDER PROGRESS NOTES - EMERGENCY DEPT.
Encounter Date: 4/13/2024    ED Physician Progress Notes         EKG - STEMI Decision  Initial Reading: No STEMI present.

## 2024-04-14 DIAGNOSIS — K25.9 GASTRIC ULCER, UNSPECIFIED CHRONICITY, UNSPECIFIED WHETHER GASTRIC ULCER HEMORRHAGE OR PERFORATION PRESENT: Primary | ICD-10-CM

## 2024-04-14 LAB
OHS QRS DURATION: 106 MS
OHS QTC CALCULATION: 472 MS

## 2024-04-14 NOTE — DISCHARGE INSTRUCTIONS
-please follow up with your PCP within the next 7-14 days to discuss your emergency room visit    -please be sure to take all medications as prescribed

## 2024-04-19 ENCOUNTER — TELEPHONE (OUTPATIENT)
Dept: ADMINISTRATIVE | Facility: OTHER | Age: 59
End: 2024-04-19
Payer: MEDICAID

## 2024-04-19 ENCOUNTER — PATIENT MESSAGE (OUTPATIENT)
Dept: ADMINISTRATIVE | Facility: OTHER | Age: 59
End: 2024-04-19
Payer: MEDICAID

## 2024-04-22 ENCOUNTER — PATIENT MESSAGE (OUTPATIENT)
Dept: ADMINISTRATIVE | Facility: OTHER | Age: 59
End: 2024-04-22
Payer: MEDICAID

## 2024-04-22 ENCOUNTER — TELEPHONE (OUTPATIENT)
Dept: ADMINISTRATIVE | Facility: OTHER | Age: 59
End: 2024-04-22
Payer: MEDICAID

## 2024-04-22 NOTE — TELEPHONE ENCOUNTER
Unable to reach patient to discuss scheduling Diabetes Education appointment of 5-.My Chart message to be sent.

## 2024-04-30 ENCOUNTER — HOSPITAL ENCOUNTER (EMERGENCY)
Facility: HOSPITAL | Age: 59
Discharge: HOME | End: 2024-04-30
Attending: EMERGENCY MEDICINE
Payer: MEDICAID

## 2024-04-30 VITALS
OXYGEN SATURATION: 99 % | TEMPERATURE: 98 F | HEIGHT: 66 IN | DIASTOLIC BLOOD PRESSURE: 61 MMHG | BODY MASS INDEX: 26.52 KG/M2 | RESPIRATION RATE: 16 BRPM | WEIGHT: 165 LBS | SYSTOLIC BLOOD PRESSURE: 121 MMHG | HEART RATE: 84 BPM

## 2024-04-30 DIAGNOSIS — G89.29 CHRONIC FOOT PAIN, LEFT: Primary | ICD-10-CM

## 2024-04-30 DIAGNOSIS — S01.312A TEAR OF LEFT EARLOBE, INITIAL ENCOUNTER: ICD-10-CM

## 2024-04-30 DIAGNOSIS — M79.672 CHRONIC FOOT PAIN, LEFT: Primary | ICD-10-CM

## 2024-04-30 PROCEDURE — 99281 EMR DPT VST MAYX REQ PHY/QHP: CPT

## 2024-04-30 NOTE — ED PROVIDER NOTES
"Chief Complaint   Foot Pain (Pt c/o left foot pain-denies injury.  Pt also c/o left "split earlobe')      History Of Present Illness   Casie Salvador is a 59 y.o. female presenting with chronic foot pain in the remnants of her left foot.  She had a partial foot amputation some time ago.  She states it hurts when she walks, and she finds it difficult to walk.  She would like to get back to improved walking.  She thinks that she should have a BKA like she has on the right side as there is no pain there.  Additionally, yesterday morning she noted that her earlobe had split and the earring and fallen out.  She would like that repaired.      Review of patient's allergies indicates:   Allergen Reactions    Orange juice Hives    Tomato (solanum lycopersicum) Hives       No current facility-administered medications on file prior to encounter.     Current Outpatient Medications on File Prior to Encounter   Medication Sig Dispense Refill    amLODIPine (NORVASC) 10 MG tablet Take 1 tablet (10 mg total) by mouth once daily. Hold this until you follow up with your doctor.      apixaban (ELIQUIS) 5 mg Tab Take 1 tablet (5 mg total) by mouth 2 (two) times daily. 60 tablet 5    aspirin (ECOTRIN) 81 MG EC tablet Take 81 mg by mouth every evening.      atorvastatin (LIPITOR) 80 MG tablet Take 1 tablet (80 mg total) by mouth once daily. 30 tablet 11    blood sugar diagnostic Strp Use to test blood glucose four times daily before meals and nightly 200 strip 11    blood-glucose meter Misc Use as instructed 1 each 0    clopidogreL (PLAVIX) 75 mg tablet Take 1 tablet (75 mg total) by mouth once daily. 30 tablet 11    ergocalciferol (ERGOCALCIFEROL) 50,000 unit Cap Take 50,000 Units by mouth every 7 days. Tuesday      ezetimibe (ZETIA) 10 mg tablet Take 1 tablet (10 mg total) by mouth every 7 days. Thursday      famotidine (PEPCID) 20 MG tablet Take 1 tablet (20 mg total) by mouth 2 (two) times daily. 42 tablet 1    ferrous " "sulfate (FEOSOL) 325 mg (65 mg iron) Tab tablet Take 325 mg by mouth every 7 days. Wednesday      hydrALAZINE (APRESOLINE) 10 MG tablet Take 1 tablet (10 mg total) by mouth 2 (two) times a day.      isosorbide dinitrate (ISORDIL) 10 MG tablet Take 1 tablet (10 mg total) by mouth 3 (three) times daily. 90 tablet 5    lancets 30 gauge Misc Use to test blood glucose four times daily before meals and nightly 200 each 11    lancing device Misc Use to test blood glucose four times daily before meals and nightly 1 each 0    LANTUS SOLOSTAR U-100 INSULIN glargine 100 units/mL SubQ pen Inject 30 Units into the skin every evening. 9 mL 5    losartan (COZAAR) 50 MG tablet Take 1 tablet (50 mg total) by mouth every other day. Hold this until you follow up with your doctor.      metFORMIN (GLUCOPHAGE) 1000 MG tablet Take 1,000 mg by mouth every morning.      metoprolol succinate (TOPROL-XL) 25 MG 24 hr tablet Take 1 tablet (25 mg total) by mouth once daily. 30 tablet 11    metoprolol tartrate (LOPRESSOR) 50 MG tablet Take 50 mg by mouth every 7 days. Wednesday      oxyCODONE-acetaminophen (PERCOCET)  mg per tablet Take 1 tablet by mouth every 6 (six) hours as needed for Pain.      OZEMPIC 1 mg/dose (4 mg/3 mL) Inject 1 mg into the skin every 7 days. Tuesday      pantoprazole (PROTONIX) 40 MG tablet Take 1 tablet (40 mg total) by mouth before breakfast. 30 tablet 5    pen needle, diabetic 31 gauge x 5/16" Ndle Use to inject insulin 3 times daily 200 each 11    SYMBICORT 160-4.5 mcg/actuation HFAA Inhale 2 puffs into the lungs as needed (shortness of breath/ wheezing).      [DISCONTINUED] hyoscyamine (ANASPAZ,LEVSIN) 0.125 mg Tab Take 1 tablet (125 mcg total) by mouth every 6 (six) hours as needed (abdominal cramping). 30 tablet 0    [DISCONTINUED] lisinopriL (PRINIVIL,ZESTRIL) 2.5 MG tablet Take 1 tablet (2.5 mg total) by mouth once daily. 90 tablet 3       Past History   As per HPI and below:  Past Medical History: "   Diagnosis Date    Cardiogenic shock 10/24/2023    Chronic combined systolic and diastolic congestive heart failure     Coronary artery disease     Diabetes mellitus     Encounter for blood transfusion     Gastric ulcer with hemorrhage     Heart attack     History of gastric ulcer 04/02/2022    Hypertension     NSAID induced gastritis 04/02/2022    Perforated viscus 03/14/2022    Peripheral artery disease      Past Surgical History:   Procedure Laterality Date    APPENDECTOMY      BELOW KNEE AMPUTATION OF LOWER EXTREMITY Right 2019    COLONOSCOPY N/A 02/23/2022    Procedure: COLONOSCOPY;  Surgeon: Estefania Bryant MD;  Location: [facility] ([facility]);  Service: Endoscopy;  Laterality: N/A;  anemia, melena    CORONARY STENT PLACEMENT      ESOPHAGOGASTRODUODENOSCOPY N/A 02/23/2022    Procedure: EGD (ESOPHAGOGASTRODUODENOSCOPY);  Surgeon: Estefania Bryant MD;  Location: [address]);  Service: Endoscopy;  Laterality: N/A;  anemia    ESOPHAGOGASTRODUODENOSCOPY N/A 4/14/2022    Procedure: EGD (ESOPHAGOGASTRODUODENOSCOPY);  Surgeon: First Available Den King;  Location: [facility] ([address] [place] [address]);  Service: Endoscopy;  Laterality: N/A;  please schedule in 8 weeks. done 2/23      EF-20    ESOPHAGOGASTRODUODENOSCOPY  4/14/2022    Procedure: ;  Surgeon: First Available Den King;  Location: [facility] ([address] [place] [address]);  Service: Endoscopy;;    ESOPHAGOGASTRODUODENOSCOPY N/A 4/29/2022    Procedure: EGD (ESOPHAGOGASTRODUODENOSCOPY);  Surgeon: Estefania Bryant MD;  Location: [facility];  Service: Endoscopy;  Laterality: N/A;  expedite EGD per Dr Ga      states vaccinated-will bring card-GT    ESOPHAGOGASTRODUODENOSCOPY N/A 10/28/2022    Procedure: EGD (ESOPHAGOGASTRODUODENOSCOPY);  Surgeon: Estefania Bryant MD;  Location: [facility] ([address] [place] [address]);  Service: Endoscopy;  Laterality: N/A;    FOOT AMPUTATION THROUGH METATARSAL Left 2019    TUBAL LIGATION         Social History     Tobacco Use    Smoking status: Every Day     Current  "packs/day: 0.50     Types: Cigarettes    Smokeless tobacco: Never   Substance Use Topics    Alcohol use: No    Drug use: No       No family history on file.    Physical Exam     Vitals:    04/30/24 1139   BP: 121/61   Pulse: 84   Resp: 16   Temp: 98.3 °F (36.8 °C)   SpO2: 99%   Weight: 74.8 kg (165 lb)   Height: 5' 6" (1.676 m)     Appearance: No acute distress.  Skin: No rashes seen.  Good turgor.  No abrasions.  No ecchymoses.  ENT: Oropharynx clear.  Left earlobe split below piercing, all surfaces appear covered with epidermis.  Musculoskeletal: Partial left foot amputation, no signs of infection, good sensation.  Strong doppler signal to left posterior tibial artery.  Neurologic: Motor intact.  Sensation intact.  Cranial nerves intact.  Mental Status:  Alert and oriented x 3.  Appropriate, conversant.  Medications Given   Medications - No data to display    Results and Course   Labs Reviewed - No data to display    Imaging Results    None                      MDM, Impression and Plan   59 y.o. female with chronic pain of the remnants of the left foot.  No signs of infection.  Intact circulation by Doppler and capillary refill.  Will refer to podiatry.  Yearly hopeless split, but  epidermal coverage is complete -- it appears old, although the patient states that occurred yesterday.  This will need to be repaired by ENT and is done in clinic.  Will refer to ENT.       Final diagnoses:  [M79.672, G89.29] Chronic foot pain, left (Primary)  [S01.312A] Tear of left earlobe, initial encounter        ED Disposition Condition    Discharge Stable          ED Prescriptions    None       Follow-up Information       Follow up With Specialties Details Why Contact Info Additional Information    [facility] [address] Podiatry Schedule an appointment as soon as possible for a visit   [address] [facility] [address] [phone] [facility] - Main Building, 5th Floor Please park " in [facility] and use Atrium elevator    Den King  [facility] Schedule an appointment as soon as possible for a visit   [address]  [facility] [address] [phone] [facility] - [facility], 4th Floor Please park in [facility] and use Clinic elevator               Randolph Figueroa MD  04/30/24 1332       Randolph Figueroa MD  04/30/24 6502

## 2024-04-30 NOTE — ED TRIAGE NOTES
"Casie Salvador, a 59 y.o. female presents to the ED w/ complaint of left foot pain (hx of all toes amputated) and left earlobe split from earring pulled out, no bleeding    Triage note:  Chief Complaint   Patient presents with    Foot Pain     Pt c/o left foot pain-denies injury.  Pt also c/o left "split earlobe'     Review of patient's allergies indicates:   Allergen Reactions    Orange juice Hives    Tomato (solanum lycopersicum) Hives     Past Medical History:   Diagnosis Date    Cardiogenic shock 10/24/2023    Chronic combined systolic and diastolic congestive heart failure     Coronary artery disease     Diabetes mellitus     Encounter for blood transfusion     Gastric ulcer with hemorrhage     Heart attack     History of gastric ulcer 04/02/2022    Hypertension     NSAID induced gastritis 04/02/2022    Perforated viscus 03/14/2022    Peripheral artery disease          APPEARANCE: awake and alert in NAD. PAIN  5/10  SKIN: warm, dry and intact. No breakdown or bruising.  MUSCULOSKELETAL: Patient moving all extremities spontaneously, no obvious swelling or deformities noted. Ambulates independently.  RESPIRATORY: Denies shortness of breath.Respirations unlabored.   CARDIAC: Denies CP, 2+ distal pulses; no peripheral edema  ABDOMEN: S/ND/NT, Denies nausea  : voids spontaneously, denies difficulty  Neurologic: AAO x 4; follows commands equal strength in all extremities; denies numbness/tingling. Denies dizziness    "

## 2024-05-07 ENCOUNTER — TELEPHONE (OUTPATIENT)
Dept: ADMINISTRATIVE | Facility: OTHER | Age: 59
End: 2024-05-07
Payer: MEDICAID

## 2024-05-07 NOTE — TELEPHONE ENCOUNTER
CORBY with scheduled Diabetes Education appointment of 5-, and contact number provided for any questions.

## 2024-05-17 ENCOUNTER — PATIENT MESSAGE (OUTPATIENT)
Dept: ADMINISTRATIVE | Facility: OTHER | Age: 59
End: 2024-05-17
Payer: MEDICAID

## 2024-07-08 PROBLEM — N39.0 E. COLI UTI (URINARY TRACT INFECTION): Status: RESOLVED | Noted: 2024-04-03 | Resolved: 2024-07-08

## 2024-07-08 PROBLEM — N17.9 AKI (ACUTE KIDNEY INJURY): Status: RESOLVED | Noted: 2023-10-22 | Resolved: 2024-07-08

## 2024-07-08 PROBLEM — B96.20 E. COLI UTI (URINARY TRACT INFECTION): Status: RESOLVED | Noted: 2024-04-03 | Resolved: 2024-07-08

## 2024-07-13 NOTE — PROGRESS NOTES
Subjective:       Patient ID: Casie Salvador is a 59 y.o. female.    Chief Complaint: No chief complaint on file.      The patient was referred by the emergency room physician for evaluation of repair of through and through earlobe laceration that has healed and fistulized.  The injury occurred 1 month ago.  She is also having blockage in both ears with decreased hearing.    The patient is also complaining about a sore spot in the floor of her mouth whenever she wears her denture.  She feels a hard spot there.  She wears a prosthetic foot and lower leg on the right.  She had a BKA amputation is results complications diabetes.          Review of Systems     Constitutional: Negative for appetite change, chills, fatigue, fever and unexpected weight loss.      HENT: Positive for ear pain, hearing loss, mouth sores and dental problems.  Negative for ear discharge, ear infection, facial swelling, nosebleeds, postnasal drip, ringing in the ears, runny nose, sinus infection, sinus pressure, sore throat, stuffy nose, tonsil infection, trouble swallowing and voice change.  He will laceration left earlobe      Eyes:  Negative for change in eyesight, eye drainage, eye itching and photophobia.     Respiratory:  Negative for cough, shortness of breath, sleep apnea, snoring and wheezing.      Cardiovascular:  Negative for chest pain, foot swelling, irregular heartbeat and swollen veins. Peripheral artery disease    Gastrointestinal:  Negative for abdominal pain, acid reflux, constipation, diarrhea, heartburn and vomiting.     Genitourinary: Negative for difficulty urinating, sexual problems and frequent urination.     Musc: Negative for aching joints, aching muscles, back pain and neck pain.     Skin: Negative for rash.     Allergy: Negative for food allergies and seasonal allergies.     Endocrine: Negative for cold intolerance and heat intolerance.      Neurological: Negative for dizziness, headaches, light-headedness,  seizures and tremors.      Hematologic: Negative for bruises/bleeds easily and swollen glands.      Psychiatric: Positive for sleep disturbance. Negative for decreased concentration, depression and nervous/anxious.                Objective:      Physical Exam  Vitals and nursing note reviewed.   Constitutional:       General: She is awake.      Appearance: Normal appearance. She is well-developed, well-groomed and overweight.   HENT:      Head: Normocephalic.      Jaw: There is normal jaw occlusion.      Salivary Glands: Right salivary gland is not diffusely enlarged or tender. Left salivary gland is not diffusely enlarged or tender.      Right Ear: Ear canal and external ear normal. Decreased hearing noted. There is impacted cerumen. Tympanic membrane is retracted.      Left Ear: Ear canal and external ear normal. Decreased hearing noted. There is impacted cerumen. Tympanic membrane is retracted.      Ears:        Comments: Earlobes-double piercing on the right, through and through laceration that has healed on the left medially with 2nd piercing laterally     Nose: Septal deviation (to the right), mucosal edema (cyanotic, boggy inferior turbinates bilaterally) and rhinorrhea (clear mucus bilaterally) present. No nasal deformity. Rhinorrhea is clear.      Right Turbinates: Enlarged and pale.      Left Turbinates: Enlarged and pale.      Mouth/Throat:      Lips: No lesions.      Mouth: No oral lesions.      Dentition: Abnormal dentition. Has dentures (complete lower denture). Gum lesions (torus mandibularis deformity anteriorly as pictured near punctum of Troutman's duct with inflamed mucosa or the area of contact with lower denture) present.      Tongue: No lesions.      Palate: No mass and lesions.      Pharynx: Oropharynx is clear. Uvula midline.     Eyes:      General: Lids are normal. Vision grossly intact.         Right eye: No discharge.         Left eye: No discharge.      Extraocular Movements: Extraocular  movements intact.      Conjunctiva/sclera: Conjunctivae normal.      Pupils: Pupils are equal, round, and reactive to light.   Neck:      Thyroid: No thyroid mass or thyromegaly.      Trachea: Trachea normal. No tracheal deviation.   Cardiovascular:      Rate and Rhythm: Normal rate and regular rhythm.      Pulses: Normal pulses.      Heart sounds: Normal heart sounds.   Pulmonary:      Effort: Pulmonary effort is normal.      Breath sounds: Normal breath sounds. No stridor. No decreased breath sounds, wheezing, rhonchi or rales.   Abdominal:      General: Bowel sounds are normal.      Palpations: Abdomen is soft.      Tenderness: There is no abdominal tenderness.   Musculoskeletal:         General: Normal range of motion.      Cervical back: Normal range of motion. No muscular tenderness.   Lymphadenopathy:      Head:      Right side of head: No submental, submandibular, preauricular, posterior auricular or occipital adenopathy.      Left side of head: No submental, submandibular, preauricular, posterior auricular or occipital adenopathy.      Cervical: No cervical adenopathy.   Skin:     General: Skin is warm and dry.      Findings: No petechiae or rash.      Nails: There is no clubbing.   Neurological:      Mental Status: She is alert and oriented to person, place, and time.      Cranial Nerves: No cranial nerve deficit.      Sensory: No sensory deficit.      Gait: Gait normal.   Psychiatric:         Speech: Speech normal.         Behavior: Behavior normal. Behavior is cooperative.         Thought Content: Thought content normal.         Judgment: Judgment normal.       Procedure-cerumen impactions removed bilaterally using 8 [place] Islander and 10 [place] Islander suction, bayonet and irrigation.  The patient tolerated procedure well was discharged post procedure    Left earlobe laceration picture below          Assessment:       1. Laceration of left earlobe, sequela    2. Tear of left earlobe, initial encounter    3. Bilateral  impacted cerumen    4. Abnormal auditory perception of both ears    5. Nasal septal deviation    6. Torus mandibularis    7. Tenderness of mouth        Plan:       I discussed in full detail the procedure of complex repair of earlobe laceration and defect with the patient during her visit.  We discussed risks and complications as outlined on the informed consent.  I will be able to perform patient's surgery in the operating room under local anesthesia with sedation.  She will need medical clearance for the procedure.  I am scheduling her for a comprehensive auditory evaluation.  I will recheck her 1 week postoperatively.                    DISCLAIMER: This note was prepared with [employer] voice recognition transcription software. Garbled syntax, mangled pronouns, and other bizarre constructions may be attributed to that software system. While efforts were made to correct any mistakes made by this voice recognition program, some errors and/or omissions may remain in the note that were missed when the note was originally created.

## 2024-07-13 NOTE — H&P (VIEW-ONLY)
Subjective:       Patient ID: Casie Salvador is a 59 y.o. female.    Chief Complaint: No chief complaint on file.      The patient was referred by the emergency room physician for evaluation of repair of through and through earlobe laceration that has healed and fistulized.  The injury occurred 1 month ago.  She is also having blockage in both ears with decreased hearing.    The patient is also complaining about a sore spot in the floor of her mouth whenever she wears her denture.  She feels a hard spot there.  She wears a prosthetic foot and lower leg on the right.  She had a BKA amputation is results complications diabetes.          Review of Systems     Constitutional: Negative for appetite change, chills, fatigue, fever and unexpected weight loss.      HENT: Positive for ear pain, hearing loss, mouth sores and dental problems.  Negative for ear discharge, ear infection, facial swelling, nosebleeds, postnasal drip, ringing in the ears, runny nose, sinus infection, sinus pressure, sore throat, stuffy nose, tonsil infection, trouble swallowing and voice change.  He will laceration left earlobe      Eyes:  Negative for change in eyesight, eye drainage, eye itching and photophobia.     Respiratory:  Negative for cough, shortness of breath, sleep apnea, snoring and wheezing.      Cardiovascular:  Negative for chest pain, foot swelling, irregular heartbeat and swollen veins. Peripheral artery disease    Gastrointestinal:  Negative for abdominal pain, acid reflux, constipation, diarrhea, heartburn and vomiting.     Genitourinary: Negative for difficulty urinating, sexual problems and frequent urination.     Musc: Negative for aching joints, aching muscles, back pain and neck pain.     Skin: Negative for rash.     Allergy: Negative for food allergies and seasonal allergies.     Endocrine: Negative for cold intolerance and heat intolerance.      Neurological: Negative for dizziness, headaches, light-headedness,  seizures and tremors.      Hematologic: Negative for bruises/bleeds easily and swollen glands.      Psychiatric: Positive for sleep disturbance. Negative for decreased concentration, depression and nervous/anxious.                Objective:      Physical Exam  Vitals and nursing note reviewed.   Constitutional:       General: She is awake.      Appearance: Normal appearance. She is well-developed, well-groomed and overweight.   HENT:      Head: Normocephalic.      Jaw: There is normal jaw occlusion.      Salivary Glands: Right salivary gland is not diffusely enlarged or tender. Left salivary gland is not diffusely enlarged or tender.      Right Ear: Ear canal and external ear normal. Decreased hearing noted. There is impacted cerumen. Tympanic membrane is retracted.      Left Ear: Ear canal and external ear normal. Decreased hearing noted. There is impacted cerumen. Tympanic membrane is retracted.      Ears:        Comments: Earlobes-double piercing on the right, through and through laceration that has healed on the left medially with 2nd piercing laterally     Nose: Septal deviation (to the right), mucosal edema (cyanotic, boggy inferior turbinates bilaterally) and rhinorrhea (clear mucus bilaterally) present. No nasal deformity. Rhinorrhea is clear.      Right Turbinates: Enlarged and pale.      Left Turbinates: Enlarged and pale.      Mouth/Throat:      Lips: No lesions.      Mouth: No oral lesions.      Dentition: Abnormal dentition. Has dentures (complete lower denture). Gum lesions (torus mandibularis deformity anteriorly as pictured near punctum of Eden's duct with inflamed mucosa or the area of contact with lower denture) present.      Tongue: No lesions.      Palate: No mass and lesions.      Pharynx: Oropharynx is clear. Uvula midline.     Eyes:      General: Lids are normal. Vision grossly intact.         Right eye: No discharge.         Left eye: No discharge.      Extraocular Movements: Extraocular  movements intact.      Conjunctiva/sclera: Conjunctivae normal.      Pupils: Pupils are equal, round, and reactive to light.   Neck:      Thyroid: No thyroid mass or thyromegaly.      Trachea: Trachea normal. No tracheal deviation.   Cardiovascular:      Rate and Rhythm: Normal rate and regular rhythm.      Pulses: Normal pulses.      Heart sounds: Normal heart sounds.   Pulmonary:      Effort: Pulmonary effort is normal.      Breath sounds: Normal breath sounds. No stridor. No decreased breath sounds, wheezing, rhonchi or rales.   Abdominal:      General: Bowel sounds are normal.      Palpations: Abdomen is soft.      Tenderness: There is no abdominal tenderness.   Musculoskeletal:         General: Normal range of motion.      Cervical back: Normal range of motion. No muscular tenderness.   Lymphadenopathy:      Head:      Right side of head: No submental, submandibular, preauricular, posterior auricular or occipital adenopathy.      Left side of head: No submental, submandibular, preauricular, posterior auricular or occipital adenopathy.      Cervical: No cervical adenopathy.   Skin:     General: Skin is warm and dry.      Findings: No petechiae or rash.      Nails: There is no clubbing.   Neurological:      Mental Status: She is alert and oriented to person, place, and time.      Cranial Nerves: No cranial nerve deficit.      Sensory: No sensory deficit.      Gait: Gait normal.   Psychiatric:         Speech: Speech normal.         Behavior: Behavior normal. Behavior is cooperative.         Thought Content: Thought content normal.         Judgment: Judgment normal.       Procedure-cerumen impactions removed bilaterally using 8 Burmese and 10 Burmese suction, bayonet and irrigation.  The patient tolerated procedure well was discharged post procedure    Left earlobe laceration picture below          Assessment:       1. Laceration of left earlobe, sequela    2. Tear of left earlobe, initial encounter    3. Bilateral  impacted cerumen    4. Abnormal auditory perception of both ears    5. Nasal septal deviation    6. Torus mandibularis    7. Tenderness of mouth        Plan:       I discussed in full detail the procedure of complex repair of earlobe laceration and defect with the patient during her visit.  We discussed risks and complications as outlined on the informed consent.  I will be able to perform patient's surgery in the operating room under local anesthesia with sedation.  She will need medical clearance for the procedure.  I am scheduling her for a comprehensive auditory evaluation.  I will recheck her 1 week postoperatively.                    DISCLAIMER: This note was prepared with [employer] voice recognition transcription software. Garbled syntax, mangled pronouns, and other bizarre constructions may be attributed to that software system. While efforts were made to correct any mistakes made by this voice recognition program, some errors and/or omissions may remain in the note that were missed when the note was originally created.

## 2024-07-16 ENCOUNTER — TELEPHONE (OUTPATIENT)
Dept: OTOLARYNGOLOGY | Facility: CLINIC | Age: 59
End: 2024-07-16

## 2024-07-16 ENCOUNTER — OFFICE VISIT (OUTPATIENT)
Dept: OTOLARYNGOLOGY | Facility: CLINIC | Age: 59
End: 2024-07-16
Payer: MEDICAID

## 2024-07-16 VITALS
OXYGEN SATURATION: 95 % | DIASTOLIC BLOOD PRESSURE: 68 MMHG | BODY MASS INDEX: 30.62 KG/M2 | WEIGHT: 189.69 LBS | HEART RATE: 83 BPM | SYSTOLIC BLOOD PRESSURE: 161 MMHG

## 2024-07-16 DIAGNOSIS — S01.312S LACERATION OF LEFT EARLOBE, SEQUELA: Primary | ICD-10-CM

## 2024-07-16 DIAGNOSIS — H93.293 ABNORMAL AUDITORY PERCEPTION OF BOTH EARS: ICD-10-CM

## 2024-07-16 DIAGNOSIS — J34.2 NASAL SEPTAL DEVIATION: ICD-10-CM

## 2024-07-16 DIAGNOSIS — H61.23 BILATERAL IMPACTED CERUMEN: ICD-10-CM

## 2024-07-16 DIAGNOSIS — S01.312A TEAR OF LEFT EARLOBE, INITIAL ENCOUNTER: ICD-10-CM

## 2024-07-16 DIAGNOSIS — K13.79 TENDERNESS OF MOUTH: ICD-10-CM

## 2024-07-16 DIAGNOSIS — M27.0 TORUS MANDIBULARIS: ICD-10-CM

## 2024-07-16 PROCEDURE — 3008F BODY MASS INDEX DOCD: CPT | Mod: CPTII,,, | Performed by: SPECIALIST

## 2024-07-16 PROCEDURE — 3077F SYST BP >= 140 MM HG: CPT | Mod: CPTII,,, | Performed by: SPECIALIST

## 2024-07-16 PROCEDURE — 1159F MED LIST DOCD IN RCRD: CPT | Mod: CPTII,,, | Performed by: SPECIALIST

## 2024-07-16 PROCEDURE — 99999 PR PBB SHADOW E&M-EST. PATIENT-LVL V: CPT | Mod: PBBFAC,,, | Performed by: SPECIALIST

## 2024-07-16 PROCEDURE — 69209 REMOVE IMPACTED EAR WAX UNI: CPT | Mod: PBBFAC,PN | Performed by: SPECIALIST

## 2024-07-16 PROCEDURE — 99204 OFFICE O/P NEW MOD 45 MIN: CPT | Mod: 25,S$PBB,, | Performed by: SPECIALIST

## 2024-07-16 PROCEDURE — 3044F HG A1C LEVEL LT 7.0%: CPT | Mod: CPTII,,, | Performed by: SPECIALIST

## 2024-07-16 PROCEDURE — 99215 OFFICE O/P EST HI 40 MIN: CPT | Mod: PBBFAC,PN | Performed by: SPECIALIST

## 2024-07-16 PROCEDURE — 1160F RVW MEDS BY RX/DR IN RCRD: CPT | Mod: CPTII,,, | Performed by: SPECIALIST

## 2024-07-16 PROCEDURE — 3078F DIAST BP <80 MM HG: CPT | Mod: CPTII,,, | Performed by: SPECIALIST

## 2024-07-16 PROCEDURE — 4010F ACE/ARB THERAPY RXD/TAKEN: CPT | Mod: CPTII,,, | Performed by: SPECIALIST

## 2024-07-16 PROCEDURE — 69210 REMOVE IMPACTED EAR WAX UNI: CPT | Mod: S$PBB,,, | Performed by: SPECIALIST

## 2024-07-16 NOTE — PROCEDURES
"Ear Cerumen Removal    Date/Time: 7/16/2024 10:00 AM    Performed by: SHABNAM Sofia MD  Authorized by: SHABNAM Sofia MD    Time out: Immediately prior to procedure a "time out" was called to verify the correct patient, procedure, equipment, support staff and site/side marked as required.    Consent Done?:  Yes (Verbal)    Local anesthetic:  None  Location details:  Both ears  Procedure type comment:  8 Japanese suction, 10 Japanese suction, bayonet forceps and irrigation  Cerumen  Removal Results:  Cerumen completely removed  Patient tolerance:  Patient tolerated the procedure well with no immediate complications    "

## 2024-07-22 ENCOUNTER — TELEPHONE (OUTPATIENT)
Dept: OTOLARYNGOLOGY | Facility: CLINIC | Age: 59
End: 2024-07-22
Payer: MEDICAID

## 2024-07-22 NOTE — TELEPHONE ENCOUNTER
Spoke with patient to remind her that her surgery is scheduled for 08/12/2024. Patient wanted to know her arrival time. Patient informed that her arrival time as of now is 0900. Patient informed that she will get a reminder call the Friday before the surgery. Pt verbalized understanding.       ----- Message from Sneha Joiner sent at 7/22/2024  8:59 AM CDT -----  Contact: [phone]  Patient called, requested a call back from Dr Sofia's nurse in regards her surgery 08/12/24. Please call and advise. Thank you.

## 2024-08-05 ENCOUNTER — PATIENT MESSAGE (OUTPATIENT)
Dept: PREADMISSION TESTING | Facility: OTHER | Age: 59
End: 2024-08-05
Payer: MEDICAID

## 2024-08-05 NOTE — PRE-PROCEDURE INSTRUCTIONS
Information to Prepare you for your Surgery    PRE-ADMIT TESTING -  [phone]    [address]  [facility]          Your surgery has been scheduled at [facility]. We are pleased to have the opportunity to serve you. For Further Information please call [phone].    On the day of surgery please report to the Information Desk on the 1st floor.    CONTACT YOUR PHYSICIAN'S OFFICE THE DAY PRIOR TO YOUR SURGERY TO OBTAIN YOUR ARRIVAL TIME.     The evening before surgery do not eat anything after 9 p.m. ( this includes hard candy, chewing gum and mints).  You may only have GATORADE, POWERADE AND WATER  from 9 p.m. until you leave your home.   DO NOT DRINK ANY LIQUIDS ON THE WAY TO THE HOSPITAL.      Why does your anesthesiologist allow you to drink Gatorade/Powerade before surgery?  Gatorade/Powerade helps to increase your comfort before surgery and to decrease your nausea after surgery. The carbohydrates in Gatorade/Powerade help reduce your body's stress response to surgery.  If you are a diabetic-drink only water prior to surgery.    Outpatient Surgery- May allow 2 adult (18 and older) Support Persons (1 being the designated ) for all surgical/procedural patients. A breastfeeding mother will be allowed her infant and 2 adult Support Persons. No one under the age of 18 will be allowed in the building. No swapping out of visitors in the [facility].      SPECIAL MEDICATION INSTRUCTIONS: TAKE medications checked off by the Anesthesiologist on your Medication List.    Angiogram Patients: Take medications as instructed by your physician, including aspirin.     Surgery Patients:    If you take ASPIRIN - Your PHYSICIAN/SURGEON will need to inform you IF/OR when you need to stop taking aspirin prior to your surgery.     The week prior to surgery do not ot take any medications containing IBUPROFEN or NSAIDS ( Advil, Motrin, Goodys, BC, Aleve, Naproxen etc)  If you are not sure if you should take a medicine please call your surgeon's office.  Ok to take Tylenol    Do Not Wear any make-up (especially eye make-up) to surgery. Please remove any false eyelashes or eyelash extensions. If you arrive the day of surgery with makeup/eyelashes on you will be required to remove prior to surgery. (There is a risk of corneal abrasions if eye makeup/eyelash extensions are not removed)      Leave all valuables at home.   Do Not wear any jewelry or watches, including any metal in body piercings. Jewelry must be removed prior to coming to the hospital.  There is a possibility that rings that are unable to be removed may be cut off if they are on the surgical extremity.    Please remove all hair extensions, wigs, clips and any other metal accessories/ ornaments from your hair.  These items may pose a flammable/fire risk in Surgery and must be removed.    Do not shave your surgical area at least 5 days prior to your surgery. The surgical prep will be performed at the hospital according to Infection Control regulations.    Contact Lens must be removed before surgery. Either do not wear the contact lens or bring a case and solution for storage.  Please bring a container for eyeglasses or dentures as required.  Bring any paperwork your physician has provided, such as consent forms,  history and physicals, doctor's orders, etc.   Bring comfortable clothes that are loose fitting to wear upon discharge. Take into consideration the type of surgery being performed.  Maintain your diet as advised per your physician the day prior to surgery.      Adequate rest the night before surgery is advised.   Park in the Parking lot behind the hospital or in the [facility] across the street from the parking lot. Parking is complimentary.  If you will be discharged the same day as your procedure, please arrange for a responsible adult to drive you home or to accompany you if traveling by taxi.    YOU WILL NOT BE PERMITTED TO DRIVE OR TO LEAVE THE HOSPITAL ALONE AFTER SURGERY.   If you are being discharged the same day, it is strongly recommended that you arrange for someone to remain with you for the first 24 hrs following your surgery.    The Surgeon will speak to your family/visitor after your surgery regarding the outcome of your surgery and post op care.  The Surgeon may speak to you after your surgery, but there is a possibility you may not remember the details.  Please check with your family members regarding the conversation with the Surgeon.    We strongly recommend whoever is bringing you home be present for discharge instructions.  This will ensure a thorough understanding for your post op home care.          Thank you for your cooperation.  The Staff of [facility].            Bathing Instructions with Hibiclens    Shower the evening before and morning of your procedure with Chlorhexidine (Hibiclens)  do not use Chlorhexidine on your face or genitals. Do not get in your eyes.  Wash your face with water and your regular face wash/soap  Use your regular shampoo  Apply Chlorhexidine (Hibiclens) directly on your skin or on a wet washcloth and wash gently. When showering: Move away from the shower stream when applying Chlorhexidine (Hibiclens) to avoid rinsing off too soon.  Rinse thoroughly with warm water  Do not dilute Chlorhexidine (Hibiclens)   Dry off as usual, do not use any deodorant, powder, body lotions, perfume, after shave or cologne.

## 2024-08-09 ENCOUNTER — TELEPHONE (OUTPATIENT)
Dept: OTOLARYNGOLOGY | Facility: CLINIC | Age: 59
End: 2024-08-09
Payer: MEDICAID

## 2024-08-09 ENCOUNTER — ANESTHESIA EVENT (OUTPATIENT)
Dept: SURGERY | Facility: OTHER | Age: 59
End: 2024-08-09
Payer: MEDICAID

## 2024-08-12 ENCOUNTER — HOSPITAL ENCOUNTER (OUTPATIENT)
Facility: OTHER | Age: 59
Discharge: HOME | End: 2024-08-12
Attending: SPECIALIST | Admitting: SPECIALIST
Payer: MEDICAID

## 2024-08-12 ENCOUNTER — ANESTHESIA (OUTPATIENT)
Dept: SURGERY | Facility: OTHER | Age: 59
End: 2024-08-12
Payer: MEDICAID

## 2024-08-12 DIAGNOSIS — S01.312S LACERATION OF LEFT EARLOBE, SEQUELA: ICD-10-CM

## 2024-08-12 DIAGNOSIS — S01.312D LACERATION OF LEFT EARLOBE, SUBSEQUENT ENCOUNTER: Primary | ICD-10-CM

## 2024-08-12 PROBLEM — S01.312A LACERATION OF LEFT EARLOBE: Status: ACTIVE | Noted: 2024-08-12

## 2024-08-12 LAB
POCT GLUCOSE: 110 MG/DL (ref 70–110)
POCT GLUCOSE: 52 MG/DL (ref 70–110)

## 2024-08-12 PROCEDURE — 25000003 PHARM REV CODE 250: Performed by: NURSE ANESTHETIST, CERTIFIED REGISTERED

## 2024-08-12 PROCEDURE — 25000003 PHARM REV CODE 250: Performed by: SPECIALIST

## 2024-08-12 PROCEDURE — 12051 INTMD RPR FACE/MM 2.5 CM/<: CPT | Mod: ,,, | Performed by: SPECIALIST

## 2024-08-12 PROCEDURE — 36000706: Performed by: SPECIALIST

## 2024-08-12 PROCEDURE — S5010 5% DEXTROSE AND 0.45% SALINE: HCPCS | Performed by: NURSE ANESTHETIST, CERTIFIED REGISTERED

## 2024-08-12 PROCEDURE — 37000009 HC ANESTHESIA EA ADD 15 MINS: Performed by: SPECIALIST

## 2024-08-12 PROCEDURE — C1729 CATH, DRAINAGE: HCPCS | Performed by: SPECIALIST

## 2024-08-12 PROCEDURE — 37000008 HC ANESTHESIA 1ST 15 MINUTES: Performed by: SPECIALIST

## 2024-08-12 PROCEDURE — 27201423 OPTIME MED/SURG SUP & DEVICES STERILE SUPPLY: Performed by: SPECIALIST

## 2024-08-12 PROCEDURE — D9220A PRA ANESTHESIA: Mod: ANES,,, | Performed by: ANESTHESIOLOGY

## 2024-08-12 PROCEDURE — 36000707: Performed by: SPECIALIST

## 2024-08-12 PROCEDURE — 25000003 PHARM REV CODE 250: Performed by: ANESTHESIOLOGY

## 2024-08-12 PROCEDURE — 63600175 PHARM REV CODE 636 W HCPCS: Performed by: NURSE ANESTHETIST, CERTIFIED REGISTERED

## 2024-08-12 PROCEDURE — 71000015 HC POSTOP RECOV 1ST HR: Performed by: SPECIALIST

## 2024-08-12 PROCEDURE — D9220A PRA ANESTHESIA: Mod: CRNA,,, | Performed by: NURSE ANESTHETIST, CERTIFIED REGISTERED

## 2024-08-12 RX ORDER — DEXTROSE MONOHYDRATE AND SODIUM CHLORIDE 5; .45 G/100ML; G/100ML
INJECTION, SOLUTION INTRAVENOUS CONTINUOUS PRN
Status: DISCONTINUED | OUTPATIENT
Start: 2024-08-12 | End: 2024-08-12

## 2024-08-12 RX ORDER — ONDANSETRON 8 MG/1
8 TABLET, ORALLY DISINTEGRATING ORAL EVERY 6 HOURS PRN
Status: DISCONTINUED | OUTPATIENT
Start: 2024-08-12 | End: 2024-08-12 | Stop reason: HOSPADM

## 2024-08-12 RX ORDER — OXYCODONE AND ACETAMINOPHEN 10; 325 MG/1; MG/1
1 TABLET ORAL ONCE
Status: COMPLETED | OUTPATIENT
Start: 2024-08-12 | End: 2024-08-12

## 2024-08-12 RX ORDER — GLUCAGON 1 MG
1 KIT INJECTION
OUTPATIENT
Start: 2024-08-12

## 2024-08-12 RX ORDER — MIDAZOLAM HYDROCHLORIDE 1 MG/ML
INJECTION INTRAMUSCULAR; INTRAVENOUS
Status: DISCONTINUED | OUTPATIENT
Start: 2024-08-12 | End: 2024-08-12

## 2024-08-12 RX ORDER — OXYCODONE HYDROCHLORIDE 5 MG/1
5 TABLET ORAL
OUTPATIENT
Start: 2024-08-12

## 2024-08-12 RX ORDER — ONDANSETRON HYDROCHLORIDE 2 MG/ML
4 INJECTION, SOLUTION INTRAVENOUS DAILY PRN
OUTPATIENT
Start: 2024-08-12

## 2024-08-12 RX ORDER — FENTANYL CITRATE 50 UG/ML
INJECTION, SOLUTION INTRAMUSCULAR; INTRAVENOUS
Status: DISCONTINUED | OUTPATIENT
Start: 2024-08-12 | End: 2024-08-12

## 2024-08-12 RX ORDER — ACETAMINOPHEN 325 MG/1
650 TABLET ORAL EVERY 4 HOURS PRN
Status: DISCONTINUED | OUTPATIENT
Start: 2024-08-12 | End: 2024-08-12 | Stop reason: HOSPADM

## 2024-08-12 RX ORDER — ONDANSETRON HYDROCHLORIDE 2 MG/ML
INJECTION, SOLUTION INTRAVENOUS
Status: DISCONTINUED | OUTPATIENT
Start: 2024-08-12 | End: 2024-08-12

## 2024-08-12 RX ORDER — HYDROMORPHONE HYDROCHLORIDE 2 MG/ML
0.4 INJECTION, SOLUTION INTRAMUSCULAR; INTRAVENOUS; SUBCUTANEOUS EVERY 5 MIN PRN
OUTPATIENT
Start: 2024-08-12

## 2024-08-12 RX ORDER — BACITRACIN ZINC 500 UNIT/G
OINTMENT (GRAM) TOPICAL
Status: DISCONTINUED | OUTPATIENT
Start: 2024-08-12 | End: 2024-08-12 | Stop reason: HOSPADM

## 2024-08-12 RX ORDER — SODIUM CHLORIDE, SODIUM LACTATE, POTASSIUM CHLORIDE, CALCIUM CHLORIDE 600; 310; 30; 20 MG/100ML; MG/100ML; MG/100ML; MG/100ML
INJECTION, SOLUTION INTRAVENOUS CONTINUOUS
Status: DISCONTINUED | OUTPATIENT
Start: 2024-08-12 | End: 2024-08-12 | Stop reason: HOSPADM

## 2024-08-12 RX ORDER — LIDOCAINE HYDROCHLORIDE 10 MG/ML
1 INJECTION, SOLUTION EPIDURAL; INFILTRATION; INTRACAUDAL; PERINEURAL ONCE
Status: DISCONTINUED | OUTPATIENT
Start: 2024-08-12 | End: 2024-08-12 | Stop reason: HOSPADM

## 2024-08-12 RX ORDER — ACETAMINOPHEN 325 MG/1
650 TABLET ORAL EVERY 6 HOURS PRN
COMMUNITY

## 2024-08-12 RX ORDER — MEPERIDINE HYDROCHLORIDE 25 MG/ML
12.5 INJECTION INTRAMUSCULAR; INTRAVENOUS; SUBCUTANEOUS ONCE AS NEEDED
OUTPATIENT
Start: 2024-08-12 | End: 2024-08-13

## 2024-08-12 RX ORDER — DEXTROSE 50 % IN WATER (D50W) INTRAVENOUS SYRINGE
CONTINUOUS PRN
Status: DISCONTINUED | OUTPATIENT
Start: 2024-08-12 | End: 2024-08-12

## 2024-08-12 RX ORDER — SODIUM CHLORIDE 0.9 % (FLUSH) 0.9 %
3 SYRINGE (ML) INJECTION
OUTPATIENT
Start: 2024-08-12

## 2024-08-12 RX ORDER — DEXTROSE, SODIUM CHLORIDE, SODIUM LACTATE, POTASSIUM CHLORIDE, AND CALCIUM CHLORIDE 5; .6; .31; .03; .02 G/100ML; G/100ML; G/100ML; G/100ML; G/100ML
INJECTION, SOLUTION INTRAVENOUS CONTINUOUS PRN
Status: DISCONTINUED | OUTPATIENT
Start: 2024-08-12 | End: 2024-08-12

## 2024-08-12 RX ADMIN — OXYCODONE AND ACETAMINOPHEN 1 TABLET: 10; 325 TABLET ORAL at 11:08

## 2024-08-12 RX ADMIN — MIDAZOLAM HYDROCHLORIDE 1 MG: 1 INJECTION, SOLUTION INTRAMUSCULAR; INTRAVENOUS at 10:08

## 2024-08-12 RX ADMIN — FENTANYL CITRATE 50 MCG: 50 INJECTION, SOLUTION INTRAMUSCULAR; INTRAVENOUS at 10:08

## 2024-08-12 RX ADMIN — ONDANSETRON HYDROCHLORIDE 4 MG: 2 INJECTION INTRAMUSCULAR; INTRAVENOUS at 10:08

## 2024-08-12 RX ADMIN — DEXTROSE MONOHYDRATE: 25 INJECTION, SOLUTION INTRAVENOUS at 10:08

## 2024-08-12 RX ADMIN — DEXTROSE MONOHYDRATE AND SODIUM CHLORIDE: 5; .45 INJECTION, SOLUTION INTRAVENOUS at 10:08

## 2024-08-12 NOTE — ANESTHESIA POSTPROCEDURE EVALUATION
Anesthesia Post Evaluation    Patient: Casie Salvador    Procedure(s) Performed: Procedure(s) (LRB):  REPAIR, LACERATION (Left)    Final Anesthesia Type: MAC      Patient location during evaluation: [facility]  Patient participation: Yes- Able to Participate  Level of consciousness: awake and alert  Post-procedure vital signs: reviewed and stable  Pain management: adequate  Airway patency: patent    PONV status at discharge: No PONV  Anesthetic complications: no      Cardiovascular status: blood pressure returned to baseline  Respiratory status: unassisted  Hydration status: euvolemic  Follow-up not needed.              Vitals Value Taken Time   /63 08/12/24 1013   Temp 36.8 °C (98.2 °F) 08/12/24 1002   Pulse 67 08/12/24 1002   Resp 18 08/12/24 1002   SpO2 98 % 08/12/24 1002         No case tracking events are documented in the log.      Pain/Basil Score: No data recorded

## 2024-08-12 NOTE — ANESTHESIA PREPROCEDURE EVALUATION
08/12/2024  Casie Salvador is a 59 y.o., female.      Pre-op Assessment    I have reviewed the Patient Summary Reports.     I have reviewed the Nursing Notes. I have reviewed the NPO Status.   I have reviewed the Medications.     Review of Systems  Anesthesia Hx:             Denies Family Hx of Anesthesia complications.    Denies Personal Hx of Anesthesia complications.                    Social:  Former Smoker       Hematology/Oncology:  Hematology Normal   Oncology Normal                                   Cardiovascular:     Hypertension  Past MI CAD   CABG/stent   Angina CHF (EF 20-25%)   PVD hyperlipidemia    Left Ventricle: The left ventricle is normal in size. Normal wall thickness. regional wall motion abnormalities present. There is severely reduced systolic function with a visually estimated ejection fraction of 20 - 25%. There is diastolic dysfunction. .  ·  Right Ventricle: Normal right ventricular cavity size. Wall thickness is normal. Right ventricle wall motion  is normal. Systolic function is normal.  ·  IVC/SVC: Elevated venous pressure at 15 mmHg.  ·  There is a small thrombus present in the left ventricular apex                           Pulmonary:  Pulmonary Normal                       Renal/:  Chronic Renal Disease, CKD                Hepatic/GI:   PUD,               Musculoskeletal:  Musculoskeletal Normal                Neurological:  Neurology Normal                                      Endocrine:  Diabetes           Psych:  Psychiatric History  depression                Physical Exam  General: Well nourished, Cooperative, Alert and Oriented    Airway:  Mallampati: II   Mouth Opening: Normal  TM Distance: Normal  Tongue: Normal  Neck ROM: Normal ROM    Dental:  Intact, Edentulous        Anesthesia Plan  Type of Anesthesia, risks & benefits discussed:    Anesthesia Type:  MAC  Intra-op Monitoring Plan: Standard ASA Monitors  Post Op Pain Control Plan: multimodal analgesia  Induction:  IV  Informed Consent: Informed consent signed with the Patient and all parties understand the risks and agree with anesthesia plan.  All questions answered.   ASA Score: 3  Anesthesia Plan Notes: No ozempic in over 1 week    Ready For Surgery From Anesthesia Perspective.     .

## 2024-08-12 NOTE — OR NURSING
Notified Dr. Silver blood glucose per accu check was 52. Patient is asymptomatic Dr. Silver stated that they will some Dextrose 50 in IV when she gets to holding area.

## 2024-08-12 NOTE — OP NOTE
[facility] ([facility]  Operative Note    SUMMARY     Surgery Date: 8/12/2024     Surgeons and Role:     * SHABNAM Sofia MD - Primary    Assisting Surgeon: None    Pre-op Diagnosis:  Laceration of left earlobe, sequela [S01.312S]    Post-op Diagnosis:  Post-Op Diagnosis Codes:     * Laceration of left earlobe, sequela [S01.312S]    Procedure(s) (LRB):  REPAIR, LACERATION (Left)    Anesthesia: Local MAC    Description of Procedure:  The patient was placed on the operating table in spine position head turned with the left ear up.  The ear was prepped with PhisoDerm.  The patient was given some sedation intravenously and then the earlobe was infiltrated with 2 mL of 1% xylocaine with [id number] epinephrine.  A Gaffney stand was brought over the patient's face and she was sterilely draped leaving her face uncovered.  The healed laceration of the earlobe was excised with the medial segment having a v-shaped trough in the lateral segment skin trimmed in such a form that informed a V to fit in the trough.  Hemostasis was secured with bipolar cautery.  The 2 segments were sutured together with interrupted simple stitches of 5 0 Vicryl.  The skin edges were then approximated with interrupted simple stitches of 5 0 fast-absorbing plain catgut.  The wound was cleansed and dried.  Bacitracin ointment and a Band-Aid were used as a dressing.  There was no significant blood loss.  The patient tolerated the procedure well and was awake at the end of the case.  She was transferred back to the patient room waiting area.    Estimated Blood Loss:  0 mL * No values recorded between 8/12/2024 10:46 AM and 8/12/2024 11:33 AM *         Specimens:   Specimen (24h ago, onward)      None                SUMMARY     Admit Date:     Discharge Date and Time:  08/12/2024 11:36 AM    Hospital Course (synopsis of major diagnoses, care, treatment, and services provided during the course of the hospital stay):  The patient was admitted for  outpatient surgery to be done under local MAC anesthesia.  Surgery proceeded uneventfully.  The patient was returned to her room postoperatively and discharged.    Final Diagnosis: Post-Op Diagnosis Codes:     * Laceration of left earlobe, sequela [S01.312S]    Disposition: Home or Self Care    Follow Up/Patient Instructions:     Medications:  Reconciled Home Medications:      Medication List        CONTINUE taking these medications      acetaminophen 325 MG tablet  Commonly known as: TYLENOL  Take 650 mg by mouth every 6 (six) hours as needed.     amLODIPine 10 MG tablet  Commonly known as: NORVASC  Take 1 tablet (10 mg total) by mouth once daily. Hold this until you follow up with your doctor.     aspirin 81 MG EC tablet  Commonly known as: ECOTRIN  Take 81 mg by mouth every evening.     blood sugar diagnostic Strp  Use to test blood glucose four times daily before meals and nightly     blood-glucose meter Misc  Use as instructed     ergocalciferol 50,000 unit Cap  Commonly known as: ERGOCALCIFEROL  Take 50,000 Units by mouth every 7 days. Tuesday     ezetimibe 10 mg tablet  Commonly known as: ZETIA  Take 1 tablet (10 mg total) by mouth every 7 days. Thursday     ferrous sulfate 325 mg (65 mg iron) Tab tablet  Commonly known as: FEOSOL  Take 325 mg by mouth every 7 days. Wednesday     hydrALAZINE 10 MG tablet  Commonly known as: APRESOLINE  Take 1 tablet (10 mg total) by mouth 2 (two) times a day.     isosorbide dinitrate 10 MG tablet  Commonly known as: ISORDIL  Take 1 tablet (10 mg total) by mouth 3 (three) times daily.     lancets 30 gauge Misc  Use to test blood glucose four times daily before meals and nightly     lancing device Misc  Use to test blood glucose four times daily before meals and nightly     LANTUS SOLOSTAR U-100 INSULIN 100 unit/mL (3 mL) Inpn pen  Generic drug: insulin glargine U-100 (Lantus)  Inject 30 Units into the skin every evening.     losartan 50 MG tablet  Commonly known as:  "COZAAR  Take 1 tablet (50 mg total) by mouth every other day. Hold this until you follow up with your doctor.     metFORMIN 1000 MG tablet  Commonly known as: GLUCOPHAGE  Take 1,000 mg by mouth every morning.     metoprolol succinate 25 MG 24 hr tablet  Commonly known as: TOPROL-XL  Take 1 tablet (25 mg total) by mouth once daily.     metoprolol tartrate 50 MG tablet  Commonly known as: LOPRESSOR  Take 50 mg by mouth every 7 days. Wednesday     oxyCODONE-acetaminophen  mg per tablet  Commonly known as: PERCOCET  Take 1 tablet by mouth every 6 (six) hours as needed for Pain.     OZEMPIC 1 mg/dose (4 mg/3 mL)  Generic drug: semaglutide  Inject 1 mg into the skin every 7 days. Tuesday     pantoprazole 40 MG tablet  Commonly known as: PROTONIX  Take 1 tablet (40 mg total) by mouth before breakfast.     pen needle, diabetic 31 gauge x 5/16" Ndle  Use to inject insulin 3 times daily     SYMBICORT 160-4.5 mcg/actuation Hfaa  Generic drug: budesonide-formoterol 160-4.5 mcg  Inhale 2 puffs into the lungs as needed (shortness of breath/ wheezing).            STOP taking these medications      clopidogreL 75 mg tablet  Commonly known as: PLAVIX     ELIQUIS 5 mg Tab  Generic drug: apixaban            ASK your doctor about these medications      atorvastatin 80 MG tablet  Commonly known as: LIPITOR  Take 1 tablet (80 mg total) by mouth once daily.     famotidine 20 MG tablet  Commonly known as: PEPCID  Take 1 tablet (20 mg total) by mouth 2 (two) times daily.            Discharge Procedure Orders   Diet general     Lifting restrictions   Order Comments: Do not lift heavier than 10 pounds     Other restrictions (specify):     Change dressing (specify)   Order Comments: Keep wound dry for 48 hours, apply antibiotic ointment to wound 2 or 3 times daily, may leave wound open or cover loosely with a Band-Aid.     Call MD for:  temperature >100.4     Call MD for:  persistent nausea and vomiting     Call MD for:  severe " uncontrolled pain     Call MD for:  difficulty breathing, headache or visual disturbances     Call MD for:  redness, tenderness, or signs of infection (pain, swelling, redness, odor or green/yellow discharge around incision site)      Follow-up Information       SHABNAM Sofia MD Follow up.    Specialty: Otolaryngology  Contact information:  [address]  [address] [place] [address] [id number] [phone]               SHABNAM Sofia MD Follow up in 1 week(s).    Specialty: Otolaryngology  Contact information:  [address]  [address] [place] [address] [id number] [phone]

## 2024-08-12 NOTE — PLAN OF CARE
Casie Atkinsfreda Salvador has met all discharge criteria from Phase II. Vital Signs are stable, ambulating  without difficulty.Pain is now under control and tolerable for the pt.   Discharge instructions given, patient verbalized understanding. Discharged from facility via wheelchair in stable condition.

## 2024-08-12 NOTE — TRANSFER OF CARE
"Anesthesia Transfer of Care Note    Patient: Casie Salvador    Procedure(s) Performed: Procedure(s) (LRB):  REPAIR, LACERATION (Left)    Patient location: [facility]    Anesthesia Type: MAC    Transport from OR: Transported from OR on room air with adequate spontaneous ventilation    Post pain: adequate analgesia    Post assessment: no apparent anesthetic complications    Post vital signs: stable    Level of consciousness: awake    Nausea/Vomiting: no nausea/vomiting    Complications: none    Transfer of care protocol was followed      Last vitals: Visit Vitals  BP (!) 147/63   Pulse 67   Temp 36.8 °C (98.2 °F) (Oral)   Resp 18   Ht 5' 8" (1.727 m)   Wt 77.1 kg (170 lb)   SpO2 98%   Breastfeeding No   BMI 25.85 kg/m²     "

## 2024-08-13 VITALS
OXYGEN SATURATION: 100 % | HEART RATE: 72 BPM | RESPIRATION RATE: 18 BRPM | TEMPERATURE: 98 F | SYSTOLIC BLOOD PRESSURE: 119 MMHG | BODY MASS INDEX: 25.76 KG/M2 | HEIGHT: 68 IN | WEIGHT: 170 LBS | DIASTOLIC BLOOD PRESSURE: 59 MMHG

## 2024-08-18 NOTE — PROGRESS NOTES
Subjective:       Patient ID: Casie Salvador is a 59 y.o. female.    Chief Complaint: No chief complaint on file.    One week follow-up:     The patient is not having significant pain or inflammation around the surgical site.  She does have a little bit of crusting.  She has been placing some antibiotic ointment on the crusting.      Review of Systems   Constitutional:  Positive for fatigue. Negative for chills and fever.   HENT:  Negative for ear discharge and ear pain.        Objective:      Physical Exam  Constitutional:       Appearance: Normal appearance. She is normal weight.   HENT:      Head: Normocephalic.      Ears:      Comments: Left earlobe-minimal scabbing, incision healing well, no inflammation in the soft tissues.     Nose: Nose normal.      Mouth/Throat:      Mouth: Mucous membranes are moist.   Eyes:      Extraocular Movements: Extraocular movements intact.      Conjunctiva/sclera: Conjunctivae normal.      Pupils: Pupils are equal, round, and reactive to light.   Musculoskeletal:      Cervical back: Normal range of motion and neck supple.   Neurological:      Mental Status: She is alert.         Assessment:       1. Laceration of left earlobe, sequela    2. Post-operative state        Plan:       I will have the patient continue using antibiotic ointment on the incision lines as long as there is persisting crusting or scabbing.  She can continue wearing a Band-Aid on her ear at night since she sleeps on the left side.  I will recheck her in 4 weeks.  I have advised that she wait to re pierced the ear until she is 3 months postoperative.

## 2024-08-19 ENCOUNTER — OFFICE VISIT (OUTPATIENT)
Dept: OTOLARYNGOLOGY | Facility: CLINIC | Age: 59
End: 2024-08-19
Payer: MEDICAID

## 2024-08-19 VITALS
OXYGEN SATURATION: 98 % | BODY MASS INDEX: 28.21 KG/M2 | DIASTOLIC BLOOD PRESSURE: 73 MMHG | HEART RATE: 83 BPM | SYSTOLIC BLOOD PRESSURE: 183 MMHG | WEIGHT: 185.5 LBS

## 2024-08-19 DIAGNOSIS — S01.312S LACERATION OF LEFT EARLOBE, SEQUELA: Primary | ICD-10-CM

## 2024-08-19 DIAGNOSIS — Z98.890 POST-OPERATIVE STATE: ICD-10-CM

## 2024-08-19 PROCEDURE — 1159F MED LIST DOCD IN RCRD: CPT | Mod: CPTII,,, | Performed by: SPECIALIST

## 2024-08-19 PROCEDURE — 3078F DIAST BP <80 MM HG: CPT | Mod: CPTII,,, | Performed by: SPECIALIST

## 2024-08-19 PROCEDURE — 4010F ACE/ARB THERAPY RXD/TAKEN: CPT | Mod: CPTII,,, | Performed by: SPECIALIST

## 2024-08-19 PROCEDURE — 3077F SYST BP >= 140 MM HG: CPT | Mod: CPTII,,, | Performed by: SPECIALIST

## 2024-08-19 PROCEDURE — 1160F RVW MEDS BY RX/DR IN RCRD: CPT | Mod: CPTII,,, | Performed by: SPECIALIST

## 2024-08-19 PROCEDURE — 99214 OFFICE O/P EST MOD 30 MIN: CPT | Mod: PBBFAC,PN | Performed by: SPECIALIST

## 2024-08-19 PROCEDURE — 99024 POSTOP FOLLOW-UP VISIT: CPT | Mod: ,,, | Performed by: SPECIALIST

## 2024-08-19 PROCEDURE — 99999 PR PBB SHADOW E&M-EST. PATIENT-LVL IV: CPT | Mod: PBBFAC,,, | Performed by: SPECIALIST

## 2024-08-19 PROCEDURE — 3044F HG A1C LEVEL LT 7.0%: CPT | Mod: CPTII,,, | Performed by: SPECIALIST

## 2024-09-07 NOTE — PLAN OF CARE
Problem: Adult Inpatient Plan of Care  Goal: Plan of Care Review  Outcome: Ongoing, Progressing   Patient Vss and afebrile . Today she received one unit of blood and zosyn will be re  timed due to loss of IV access. Patient received a midline to upper left arm  She is currently working on drinking prep  prep for planned EGD with colonoscopy tomorrow. She has had 2 large BM of black tarry stool prior to drinking prep . OT /PT worked with patient .She has remained in bed with sitting on side of the bed this shift . Plan to be NPO after midnight    08-Sep-2024 02:50

## 2024-11-01 NOTE — ED NOTES
Assumed c/o 59 yo F presenting to the ED c/o CP x3 days. Pt arrives on unit with RLE prosthetic. Pt external catheter noted out of place. Clean linens, jesus care, and new external catheter provider. Pt assisted to comfortable position. POC reviewed. Resp even & unlabored, VSS, NAD. Bed locked & in the lowest position, rails up x2, call button in reach.   
BLOOD SUGAR 99  
Bed: [facility]5  Expected date:   Expected time:   Means of arrival:   Comments:  
Blood glucose 99  
Lunch tray delivered.  
POCT Trop 0.03  
Patient identifiers verified and correct for Yamel Salvador  LOC: The patient is awake, alert and aware of environment with an appropriate affect, the patient is oriented x 3 and speaking appropriately.   APPEARANCE: Patient appears comfortable and in no acute distress, patient is clean and well groomed.  SKIN: The skin is warm and dry, color consistent with ethnicity, patient has normal skin turgor and moist mucus membranes, skin intact, no breakdown or bruising noted.   MUSCULOSKELETAL: Patient moving all extremities spontaneously, no swelling noted.  Right BKA noted.   RESPIRATORY: Airway is open and patent, respirations are spontaneous, patient has a normal effort and rate, no accessory muscle. Pt on oxygen 2l NC  CARDIAC: Pt on cardiac monitor. Patient has a normal rate and regular rhythm, no edema noted, capillary refill < 3 seconds.   GNEURO: Pt opens eyes spontaneously, behavior appropriate to situation, follows commands, facial expression symmetrical, bilateral hand grasp equal and even, purposeful motor response noted, normal sensation in all extremities when touched with a finger.         
Poct - glucose 86  
Poct glucose 146  
Provider messaged regarding pt BPs. Awaiting orders.  
Pt care assumed.  Pt lying on hospital bed, AAO x 4.  Pt on bedside telemetry monitor.  Pt reports 9/10 pain to left foot, will refer to orders for meds.  Pt updated on plan of care, states understanding.  Call button within reach.  Will continue to monitor.   
Pt given cereal and milk as per request.   
Pt off unit to US.  
Telemetry box requested from 11th floor.  
Telemetry monitor #[id number] registered and placed on patient. Patient transport requested at this time.   
Visitor at bedside.  No distress noted.  Pt remains on  oxygen and telemetry.  Call button within reach.  No c/o or needs at this time.    
no

## 2024-12-31 ENCOUNTER — HOSPITAL ENCOUNTER (OUTPATIENT)
Facility: HOSPITAL | Age: 59
Discharge: HOME | End: 2025-01-01
Attending: EMERGENCY MEDICINE
Payer: MEDICAID

## 2024-12-31 DIAGNOSIS — E87.1 HYPONATREMIA: ICD-10-CM

## 2024-12-31 DIAGNOSIS — M79.672 LEFT FOOT PAIN: ICD-10-CM

## 2024-12-31 DIAGNOSIS — Z13.6 SCREENING FOR CARDIOVASCULAR CONDITION: ICD-10-CM

## 2024-12-31 DIAGNOSIS — E11.59 TYPE 2 DIABETES MELLITUS WITH OTHER CIRCULATORY COMPLICATION, WITH LONG-TERM CURRENT USE OF INSULIN: Chronic | ICD-10-CM

## 2024-12-31 DIAGNOSIS — N17.9 AKI (ACUTE KIDNEY INJURY): Primary | ICD-10-CM

## 2024-12-31 DIAGNOSIS — Z79.4 TYPE 2 DIABETES MELLITUS WITH OTHER CIRCULATORY COMPLICATION, WITH LONG-TERM CURRENT USE OF INSULIN: Chronic | ICD-10-CM

## 2024-12-31 DIAGNOSIS — I21.4 NSTEMI (NON-ST ELEVATED MYOCARDIAL INFARCTION): ICD-10-CM

## 2024-12-31 DIAGNOSIS — I99.8 LOWER LIMB ISCHEMIA: ICD-10-CM

## 2024-12-31 DIAGNOSIS — I50.20 HFREF (HEART FAILURE WITH REDUCED EJECTION FRACTION): ICD-10-CM

## 2024-12-31 DIAGNOSIS — R07.9 CHEST PAIN: ICD-10-CM

## 2024-12-31 PROBLEM — R10.9 ABDOMINAL PAIN: Status: ACTIVE | Noted: 2024-12-31

## 2024-12-31 PROBLEM — R10.12 LEFT UPPER QUADRANT ABDOMINAL PAIN: Status: ACTIVE | Noted: 2024-12-31

## 2024-12-31 LAB
ALBUMIN SERPL BCP-MCNC: 3.9 G/DL (ref 3.5–5.2)
ALLENS TEST: NORMAL
ALP SERPL-CCNC: 96 U/L (ref 40–150)
ALT SERPL W/O P-5'-P-CCNC: 9 U/L (ref 10–44)
ANION GAP SERPL CALC-SCNC: 13 MMOL/L (ref 8–16)
AST SERPL-CCNC: 18 U/L (ref 10–40)
BASOPHILS # BLD AUTO: 0.02 K/UL (ref 0–0.2)
BASOPHILS NFR BLD: 0.2 % (ref 0–1.9)
BILIRUB SERPL-MCNC: 0.6 MG/DL (ref 0.1–1)
BNP SERPL-MCNC: 450 PG/ML (ref 0–99)
BUN SERPL-MCNC: 49 MG/DL (ref 6–30)
BUN SERPL-MCNC: 52 MG/DL (ref 6–20)
CALCIUM SERPL-MCNC: 9 MG/DL (ref 8.7–10.5)
CHLORIDE SERPL-SCNC: 91 MMOL/L (ref 95–110)
CHLORIDE SERPL-SCNC: 92 MMOL/L (ref 95–110)
CO2 SERPL-SCNC: 24 MMOL/L (ref 23–29)
CREAT SERPL-MCNC: 2.5 MG/DL (ref 0.5–1.4)
CREAT SERPL-MCNC: 2.9 MG/DL (ref 0.5–1.4)
DIFFERENTIAL METHOD BLD: ABNORMAL
EOSINOPHIL # BLD AUTO: 0.1 K/UL (ref 0–0.5)
EOSINOPHIL NFR BLD: 1 % (ref 0–8)
ERYTHROCYTE [DISTWIDTH] IN BLOOD BY AUTOMATED COUNT: 13.6 % (ref 11.5–14.5)
EST. GFR  (NO RACE VARIABLE): 21.7 ML/MIN/1.73 M^2
GLUCOSE SERPL-MCNC: 138 MG/DL (ref 70–110)
GLUCOSE SERPL-MCNC: 142 MG/DL (ref 70–110)
HCT VFR BLD AUTO: 37 % (ref 37–48.5)
HCT VFR BLD CALC: 40 %PCV (ref 36–54)
HGB BLD-MCNC: 11.9 G/DL (ref 12–16)
IMM GRANULOCYTES # BLD AUTO: 0.02 K/UL (ref 0–0.04)
IMM GRANULOCYTES NFR BLD AUTO: 0.2 % (ref 0–0.5)
LDH SERPL L TO P-CCNC: 1.93 MMOL/L (ref 0.5–2.2)
LIPASE SERPL-CCNC: 29 U/L (ref 4–60)
LYMPHOCYTES # BLD AUTO: 3.3 K/UL (ref 1–4.8)
LYMPHOCYTES NFR BLD: 38.2 % (ref 18–48)
MAGNESIUM SERPL-MCNC: 2.1 MG/DL (ref 1.6–2.6)
MCH RBC QN AUTO: 22.3 PG (ref 27–31)
MCHC RBC AUTO-ENTMCNC: 32.2 G/DL (ref 32–36)
MCV RBC AUTO: 69 FL (ref 82–98)
MONOCYTES # BLD AUTO: 0.8 K/UL (ref 0.3–1)
MONOCYTES NFR BLD: 9 % (ref 4–15)
NEUTROPHILS # BLD AUTO: 4.4 K/UL (ref 1.8–7.7)
NEUTROPHILS NFR BLD: 51.4 % (ref 38–73)
NRBC BLD-RTO: 0 /100 WBC
OHS QRS DURATION: 108 MS
OHS QTC CALCULATION: 518 MS
PLATELET # BLD AUTO: 376 K/UL (ref 150–450)
PMV BLD AUTO: 11.2 FL (ref 9.2–12.9)
POC IONIZED CALCIUM: 1.09 MMOL/L (ref 1.06–1.42)
POC TCO2 (MEASURED): 25 MMOL/L (ref 23–29)
POCT GLUCOSE: 169 MG/DL (ref 70–110)
POTASSIUM BLD-SCNC: 3.2 MMOL/L (ref 3.5–5.1)
POTASSIUM SERPL-SCNC: 3.3 MMOL/L (ref 3.5–5.1)
PROT SERPL-MCNC: 8.1 G/DL (ref 6–8.4)
RBC # BLD AUTO: 5.34 M/UL (ref 4–5.4)
SAMPLE: ABNORMAL
SAMPLE: NORMAL
SITE: NORMAL
SODIUM BLD-SCNC: 128 MMOL/L (ref 136–145)
SODIUM SERPL-SCNC: 128 MMOL/L (ref 136–145)
TROPONIN I SERPL DL<=0.01 NG/ML-MCNC: 146 NG/L (ref 0–14)
TROPONIN I SERPL DL<=0.01 NG/ML-MCNC: 156 NG/L (ref 0–14)
WBC # BLD AUTO: 8.63 K/UL (ref 3.9–12.7)

## 2024-12-31 PROCEDURE — 63600175 PHARM REV CODE 636 W HCPCS

## 2024-12-31 PROCEDURE — 83880 ASSAY OF NATRIURETIC PEPTIDE: CPT

## 2024-12-31 PROCEDURE — G0378 HOSPITAL OBSERVATION PER HR: HCPCS

## 2024-12-31 PROCEDURE — 25000003 PHARM REV CODE 250

## 2024-12-31 PROCEDURE — 99900035 HC TECH TIME PER 15 MIN (STAT)

## 2024-12-31 PROCEDURE — 96372 THER/PROPH/DIAG INJ SC/IM: CPT

## 2024-12-31 PROCEDURE — 36415 COLL VENOUS BLD VENIPUNCTURE: CPT

## 2024-12-31 PROCEDURE — 80048 BASIC METABOLIC PNL TOTAL CA: CPT | Mod: XB

## 2024-12-31 PROCEDURE — 83735 ASSAY OF MAGNESIUM: CPT

## 2024-12-31 PROCEDURE — 85025 COMPLETE CBC W/AUTO DIFF WBC: CPT

## 2024-12-31 PROCEDURE — 80053 COMPREHEN METABOLIC PANEL: CPT

## 2024-12-31 PROCEDURE — 87040 BLOOD CULTURE FOR BACTERIA: CPT | Mod: 59

## 2024-12-31 PROCEDURE — 93005 ELECTROCARDIOGRAM TRACING: CPT

## 2024-12-31 PROCEDURE — 84484 ASSAY OF TROPONIN QUANT: CPT

## 2024-12-31 PROCEDURE — 83605 ASSAY OF LACTIC ACID: CPT

## 2024-12-31 PROCEDURE — 83690 ASSAY OF LIPASE: CPT

## 2024-12-31 RX ORDER — INSULIN ASPART 100 [IU]/ML
0-5 INJECTION, SOLUTION INTRAVENOUS; SUBCUTANEOUS
Status: DISCONTINUED | OUTPATIENT
Start: 2024-12-31 | End: 2025-01-01 | Stop reason: HOSPADM

## 2024-12-31 RX ORDER — ZOLPIDEM TARTRATE 5 MG/1
10 TABLET ORAL ONCE
Status: COMPLETED | OUTPATIENT
Start: 2024-12-31 | End: 2024-12-31

## 2024-12-31 RX ORDER — ASPIRIN 81 MG/1
81 TABLET ORAL NIGHTLY
Status: DISCONTINUED | OUTPATIENT
Start: 2024-12-31 | End: 2025-01-01 | Stop reason: HOSPADM

## 2024-12-31 RX ORDER — POTASSIUM CHLORIDE 750 MG/1
10 TABLET, EXTENDED RELEASE ORAL DAILY
COMMUNITY
Start: 2024-12-02

## 2024-12-31 RX ORDER — CLOPIDOGREL BISULFATE 75 MG/1
75 TABLET ORAL DAILY
Status: DISCONTINUED | OUTPATIENT
Start: 2025-01-01 | End: 2025-01-01 | Stop reason: HOSPADM

## 2024-12-31 RX ORDER — GLUCAGON 1 MG
1 KIT INJECTION
Status: DISCONTINUED | OUTPATIENT
Start: 2024-12-31 | End: 2025-01-01 | Stop reason: HOSPADM

## 2024-12-31 RX ORDER — ATORVASTATIN CALCIUM 40 MG/1
80 TABLET, FILM COATED ORAL DAILY
Status: DISCONTINUED | OUTPATIENT
Start: 2025-01-01 | End: 2025-01-01 | Stop reason: HOSPADM

## 2024-12-31 RX ORDER — INSULIN GLARGINE 100 [IU]/ML
10 INJECTION, SOLUTION SUBCUTANEOUS DAILY
Status: DISCONTINUED | OUTPATIENT
Start: 2024-12-31 | End: 2025-01-01 | Stop reason: HOSPADM

## 2024-12-31 RX ORDER — MORPHINE SULFATE 4 MG/ML
4 INJECTION, SOLUTION INTRAMUSCULAR; INTRAVENOUS
Status: COMPLETED | OUTPATIENT
Start: 2024-12-31 | End: 2024-12-31

## 2024-12-31 RX ORDER — SENNOSIDES 8.6 MG/1
8.6 TABLET ORAL DAILY
Status: DISCONTINUED | OUTPATIENT
Start: 2025-01-01 | End: 2025-01-01

## 2024-12-31 RX ORDER — PANTOPRAZOLE SODIUM 40 MG/1
40 TABLET, DELAYED RELEASE ORAL DAILY
Status: DISCONTINUED | OUTPATIENT
Start: 2025-01-01 | End: 2025-01-01 | Stop reason: HOSPADM

## 2024-12-31 RX ORDER — IBUPROFEN 200 MG
16 TABLET ORAL
Status: DISCONTINUED | OUTPATIENT
Start: 2024-12-31 | End: 2025-01-01 | Stop reason: HOSPADM

## 2024-12-31 RX ORDER — MONTELUKAST SODIUM 10 MG/1
10 TABLET ORAL DAILY
COMMUNITY
Start: 2024-12-02

## 2024-12-31 RX ORDER — ZOLPIDEM TARTRATE 5 MG/1
5 TABLET ORAL ONCE
Status: DISCONTINUED | OUTPATIENT
Start: 2024-12-31 | End: 2024-12-31

## 2024-12-31 RX ORDER — IBUPROFEN 200 MG
24 TABLET ORAL
Status: DISCONTINUED | OUTPATIENT
Start: 2024-12-31 | End: 2025-01-01 | Stop reason: HOSPADM

## 2024-12-31 RX ORDER — CLOPIDOGREL BISULFATE 75 MG/1
75 TABLET ORAL DAILY
COMMUNITY
Start: 2024-12-02

## 2024-12-31 RX ORDER — SODIUM CHLORIDE 0.9 % (FLUSH) 0.9 %
10 SYRINGE (ML) INJECTION EVERY 12 HOURS PRN
Status: DISCONTINUED | OUTPATIENT
Start: 2024-12-31 | End: 2025-01-01 | Stop reason: HOSPADM

## 2024-12-31 RX ORDER — POTASSIUM CHLORIDE 20 MEQ/1
20 TABLET, EXTENDED RELEASE ORAL ONCE
Status: COMPLETED | OUTPATIENT
Start: 2024-12-31 | End: 2024-12-31

## 2024-12-31 RX ORDER — NALOXONE HCL 0.4 MG/ML
0.02 VIAL (ML) INJECTION
Status: DISCONTINUED | OUTPATIENT
Start: 2024-12-31 | End: 2025-01-01 | Stop reason: HOSPADM

## 2024-12-31 RX ORDER — POTASSIUM CHLORIDE 20 MEQ/1
40 TABLET, EXTENDED RELEASE ORAL ONCE
Status: COMPLETED | OUTPATIENT
Start: 2024-12-31 | End: 2024-12-31

## 2024-12-31 RX ORDER — HEPARIN SODIUM 5000 [USP'U]/ML
5000 INJECTION, SOLUTION INTRAVENOUS; SUBCUTANEOUS EVERY 12 HOURS
Status: DISCONTINUED | OUTPATIENT
Start: 2024-12-31 | End: 2025-01-01 | Stop reason: HOSPADM

## 2024-12-31 RX ORDER — OXYCODONE AND ACETAMINOPHEN 5; 325 MG/1; MG/1
1 TABLET ORAL ONCE
Status: DISCONTINUED | OUTPATIENT
Start: 2025-01-01 | End: 2024-12-31

## 2024-12-31 RX ORDER — OXYCODONE AND ACETAMINOPHEN 5; 325 MG/1; MG/1
1 TABLET ORAL ONCE
Status: COMPLETED | OUTPATIENT
Start: 2024-12-31 | End: 2025-01-01

## 2024-12-31 RX ORDER — ONDANSETRON HYDROCHLORIDE 2 MG/ML
4 INJECTION, SOLUTION INTRAVENOUS
Status: COMPLETED | OUTPATIENT
Start: 2024-12-31 | End: 2024-12-31

## 2024-12-31 RX ORDER — FUROSEMIDE 20 MG/1
20 TABLET ORAL 2 TIMES DAILY
Status: ON HOLD | COMMUNITY
End: 2025-01-01

## 2024-12-31 RX ORDER — ZOLPIDEM TARTRATE 10 MG/1
10 TABLET ORAL NIGHTLY PRN
COMMUNITY

## 2024-12-31 RX ORDER — CARISOPRODOL 350 MG/1
350 TABLET ORAL DAILY PRN
COMMUNITY

## 2024-12-31 RX ORDER — OXYCODONE AND ACETAMINOPHEN 5; 325 MG/1; MG/1
1 TABLET ORAL EVERY 6 HOURS PRN
Status: DISCONTINUED | OUTPATIENT
Start: 2024-12-31 | End: 2025-01-01 | Stop reason: HOSPADM

## 2024-12-31 RX ORDER — POLYETHYLENE GLYCOL 3350 17 G/17G
17 POWDER, FOR SOLUTION ORAL DAILY
Status: DISCONTINUED | OUTPATIENT
Start: 2024-12-31 | End: 2025-01-01 | Stop reason: HOSPADM

## 2024-12-31 RX ADMIN — ZOLPIDEM TARTRATE 10 MG: 5 TABLET, COATED ORAL at 11:12

## 2024-12-31 RX ADMIN — SODIUM CHLORIDE, POTASSIUM CHLORIDE, SODIUM LACTATE AND CALCIUM CHLORIDE 500 ML: 600; 310; 30; 20 INJECTION, SOLUTION INTRAVENOUS at 06:12

## 2024-12-31 RX ADMIN — MORPHINE SULFATE 4 MG: 4 INJECTION INTRAVENOUS at 12:12

## 2024-12-31 RX ADMIN — POTASSIUM CHLORIDE 40 MEQ: 1500 TABLET, EXTENDED RELEASE ORAL at 06:12

## 2024-12-31 RX ADMIN — OXYCODONE HYDROCHLORIDE AND ACETAMINOPHEN 1 TABLET: 5; 325 TABLET ORAL at 06:12

## 2024-12-31 RX ADMIN — ASPIRIN 81 MG: 81 TABLET, COATED ORAL at 09:12

## 2024-12-31 RX ADMIN — POTASSIUM CHLORIDE 20 MEQ: 1500 TABLET, EXTENDED RELEASE ORAL at 12:12

## 2024-12-31 RX ADMIN — ONDANSETRON 4 MG: 2 INJECTION INTRAMUSCULAR; INTRAVENOUS at 11:12

## 2024-12-31 RX ADMIN — INSULIN GLARGINE 10 UNITS: 100 INJECTION, SOLUTION SUBCUTANEOUS at 09:12

## 2024-12-31 RX ADMIN — POLYETHYLENE GLYCOL 3350 17 G: 17 POWDER, FOR SOLUTION ORAL at 06:12

## 2024-12-31 RX ADMIN — HEPARIN SODIUM 5000 UNITS: 5000 INJECTION INTRAVENOUS; SUBCUTANEOUS at 09:12

## 2024-12-31 RX ADMIN — SODIUM CHLORIDE 250 ML: 0.9 INJECTION, SOLUTION INTRAVENOUS at 10:12

## 2024-12-31 NOTE — Clinical Note
Diagnosis: BRENDA (acute kidney injury) [[id number]]   Future Attending Provider: DAVIE ALMONTE [[id number]]   Is the patient being sent to ED Observation?: No   Special Needs:: Fall Risk [15]

## 2024-12-31 NOTE — ASSESSMENT & PLAN NOTE
-- Latest A1c 5.3, Glucose upon admission 138. Patient now eating, will start reduced Lantus dose in response and place patient on LDSSI.   -- Home DM meds         -- Ozempic 1 mg every 7 days         -- Metformin 1,000 mg QD         -- Lantus 30u daily

## 2024-12-31 NOTE — ED NOTES
Pt identifiers Casie Salvador were checked and are correct  LOC: The patient is awake, alert, aware of environment with an appropriate affect. Oriented x4, speaking appropriately  APPEARANCE: Pt rates left side abd pain times one week a 7/10  and 10/10 pain to left foot , in no acute distress, pt is clean and well groomed, clothing properly fastened  SKIN: Skin warm, dry and intact, normal skin turgor, moist mucus membranes  RESPIRATORY: Airway is open and patent, respirations are spontaneous, even and unlabored, normal effort and rate  CARDIAC: Normal rate and rhythm, no peripheral edema noted, capillary refill < 3 seconds, bilateral radial pulses 2+  Left foot cool to touch Unable to palpate or auscultate with doppler dorsalis pedis pulse   ABDOMEN: Soft, nontender, nondistended. Bowel sounds present   NEUROLOGIC: PERRL, facial expression is symmetrical, patient moving all extremities spontaneously, normal sensation in all extremities when touched with a finger.  Follows all commands appropriately  MUSCULOSKELETAL: Right BKA and partial left foot amputation noted

## 2024-12-31 NOTE — ED NOTES
I-STAT Chem-8+ Results:   Value Reference Range   Sodium 129 136-145 mmol/L   Potassium  3.2 3.5-5.1 mmol/L   Chloride 92  mmol/L   Ionized Calcium 1.09 1.06-1.42 mmol/L   CO2 (measured) 25 23-29 mmol/L   Glucose 142  mg/dL   BUN 49 6-30 mg/dL   Creatinine 2.9 0.5-1.4 mg/dL   Hematocrit 40 36-54%

## 2024-12-31 NOTE — ED TRIAGE NOTES
Pt complains of left side abd pain times one week rating pain a 7/10 and chronic left foot pain rating pain a 10/10

## 2024-12-31 NOTE — PHARMACY MED REC
"  Admission Medication History     The home medication history was taken by Kermit Gramajo.    You may go to "Admission" then "Reconcile Home Medications" tabs to review and/or act upon these items.     The home medication list has been updated by the Pharmacy department.   Please read ALL comments highlighted in yellow.   Please address this information as you see fit.    Feel free to contact us if you have any questions or require assistance.      The medications listed below were removed from the home medication list. Please reorder if appropriate:  Patient reports no longer taking the following medication(s):  ACETAMINOPHEN 325 MG  EZETIMIBE 10 MG  FAMOTIDINE 20 MG  ISOSORBIDE 10 MG    Medications listed below were obtained from: Patient/family and Analytic software- [employer]  Current Outpatient Medications on File Prior to Encounter   Medication Sig    amLODIPine (NORVASC) 10 MG tablet Take 1 tablet (10 mg total) by mouth once daily.     aspirin (ECOTRIN) 81 MG EC tablet Take 81 mg by mouth every evening.    atorvastatin (LIPITOR) 80 MG tablet Take 1 tablet (80 mg total) by mouth once daily.    blood sugar diagnostic Strp Use to test blood glucose four times daily before meals and nightly    blood-glucose meter Misc Use as instructed    clopidogreL (PLAVIX) 75 mg tablet Take 75 mg by mouth once daily.    ergocalciferol (ERGOCALCIFEROL) 50,000 unit Cap Take 50,000 Units by mouth every 7 days. Tuesday    ferrous sulfate (FEOSOL) 325 mg (65 mg iron) Tab tablet Take 325 mg by mouth once daily.    furosemide (LASIX) 20 MG tablet Take 20 mg by mouth 2 (two) times daily.    hydrALAZINE (APRESOLINE) 10 MG tablet Take 1 tablet (10 mg total) by mouth 2 (two) times a day.    lancets 30 gauge Misc Use to test blood glucose four times daily before meals and nightly    lancing device Misc Use to test blood glucose four times daily before meals and nightly    LANTUS SOLOSTAR U-100 INSULIN glargine 100 units/mL SubQ pen Inject 30 " "Units into the skin every evening.    losartan (COZAAR) 50 MG tablet Take 1 tablet (50 mg total) by mouth every other day. Hold this until you follow up with your doctor.    metFORMIN (GLUCOPHAGE) 1000 MG tablet Take 1,000 mg by mouth every morning.    metoprolol tartrate (LOPRESSOR) 50 MG tablet Take 50 mg by mouth once daily. Wednesday    montelukast (SINGULAIR) 10 mg tablet Take 10 mg by mouth once daily.    oxyCODONE-acetaminophen (PERCOCET)  mg per tablet Take 1 tablet by mouth every 6 (six) hours as needed for Pain.    OZEMPIC 1 mg/dose (4 mg/3 mL) Inject 1 mg into the skin every 7 days. Tuesday    pen needle, diabetic 31 gauge x 5/16" Ndle Use to inject insulin 3 times daily    potassium chloride SA (K-DUR,KLOR-CON M) 10 MEQ tablet Take 10 mEq by mouth once daily.    SYMBICORT 160-4.5 mcg/actuation HFAA Inhale 2 puffs into the lungs as needed (shortness of breath/ wheezing).    zolpidem (AMBIEN) 10 mg Tab Take 10 mg by mouth nightly as needed (insomnia).    carisoprodoL (SOMA) 350 MG tablet Take 350 mg by mouth daily as needed.    metoprolol succinate (TOPROL-XL) 25 MG 24 hr tablet Take 1 tablet (25 mg total) by mouth once daily. (Patient not taking: Reported on 12/31/2024)       Potential issues to be addressed PRIOR TO DISCHARGE  Please discuss with the patient barriers to adherence with medication treatment plans  Patient requires education regarding drug therapies             Kermit Gramajo  EXT [id number]                .          "

## 2024-12-31 NOTE — ED NOTES
Pt transported to room 640 A on tele box via stretcher with Vesna,PCT  Pt condition stable on leaving the ED, pt belongings are with pt and pt will notify family of room number    Burow's Advancement Flap Text: The defect edges were debeveled with a #15 scalpel blade.  Given the location of the defect and the proximity to free margins a Burow's advancement flap was deemed most appropriate.  Using a sterile surgical marker, the appropriate advancement flap was drawn incorporating the defect and placing the expected incisions within the relaxed skin tension lines where possible.    The area thus outlined was incised deep to adipose tissue with a #15 scalpel blade.  The skin margins were undermined to an appropriate distance in all directions utilizing iris scissors.

## 2024-12-31 NOTE — ED NOTES
Pt remains on cardiac monitor and pulse oximetry  Pt awaiting ultrasound test and admission to hospital

## 2024-12-31 NOTE — ASSESSMENT & PLAN NOTE
Patient does not appear volume overloaded on exam, is not showing S/Sx of HF exacerbation. However will order TTE as latest was performed over a year ago.     -- Admission , Troponin I   -- Latest TTE performed in 2023. Revels EF 20-25%, diastolic dysfunction with RWMA.   -- Patient on Losartan, Toprol, and Lasix 20 mg BID  -- Will hold GDMT and Lasix in the setting of hypotension. Will titrate as blood pressure permits.   -- Fluid restrict 2L, salt restricted diet  -- Strict I/O

## 2024-12-31 NOTE — ASSESSMENT & PLAN NOTE
Patient with known CAD s/p BMS stent placement, which is controlled Will continue ASA, Plavix, and Statin and monitor for S/Sx of angina/ACS. Continue to monitor on telemetry.

## 2024-12-31 NOTE — ED PROVIDER NOTES
Encounter Date: 12/31/2024       History     Chief Complaint   Patient presents with    Chest Pain     Feels like I have pneumonia, in bed for 5 d, vomiting, back pain, leg pain      59-year-old female with past medical history CHF, CAD, diabetes, hypertension, peripheral arterial disease, right BKA, left forefoot amputation presenting for evaluation of abdominal pain and left foot pain.  Patient reports chronic left foot pain has been ongoing for months but has worsened in the last 2-3 days.  Patient also reporting epigastric and left upper quadrant abdominal pain and nausea with dry heaving for the past week.  She denies chest pain, fevers/chills, shortness of breath, dysuria, any other symptoms.    The history is provided by the patient and medical records. No  was used.     Review of patient's allergies indicates:   Allergen Reactions    Orange juice Hives    Tomato (solanum lycopersicum) Hives     Past Medical History:   Diagnosis Date    Cardiogenic shock 10/24/2023    Chronic combined systolic and diastolic congestive heart failure     Coronary artery disease     Diabetes mellitus     Encounter for blood transfusion     Gastric ulcer with hemorrhage     Heart attack     History of gastric ulcer 04/02/2022    Hypertension     NSAID induced gastritis 04/02/2022    Perforated viscus 03/14/2022    Peripheral artery disease      Past Surgical History:   Procedure Laterality Date    APPENDECTOMY      BELOW KNEE AMPUTATION OF LOWER EXTREMITY Right 2019    COLONOSCOPY N/A 02/23/2022    Procedure: COLONOSCOPY;  Surgeon: Estefania Bryant MD;  Location: [address]);  Service: Endoscopy;  Laterality: N/A;  anemia, melena    CORONARY STENT PLACEMENT      ESOPHAGOGASTRODUODENOSCOPY N/A 02/23/2022    Procedure: EGD (ESOPHAGOGASTRODUODENOSCOPY);  Surgeon: Estefania Bryant MD;  Location: [address]);  Service: Endoscopy;  Laterality: N/A;  anemia    ESOPHAGOGASTRODUODENOSCOPY N/A  2022    Procedure: EGD (ESOPHAGOGASTRODUODENOSCOPY);  Surgeon: First Available Den King;  Location: [facility] ENDO (2ND FLR);  Service: Endoscopy;  Laterality: N/A;  please schedule in 8 weeks. done       EF-20    ESOPHAGOGASTRODUODENOSCOPY  2022    Procedure: ;  Surgeon: First Available Den King;  Location: [facility] ENDO (2ND FLR);  Service: Endoscopy;;    ESOPHAGOGASTRODUODENOSCOPY N/A 2022    Procedure: EGD (ESOPHAGOGASTRODUODENOSCOPY);  Surgeon: Estefania Bryant MD;  Location: [facility] ENDO;  Service: Endoscopy;  Laterality: N/A;  expedite EGD per Dr Ga      states vaccinated-will bring card-GT    ESOPHAGOGASTRODUODENOSCOPY N/A 10/28/2022    Procedure: EGD (ESOPHAGOGASTRODUODENOSCOPY);  Surgeon: Estefania Bryant MD;  Location: [facility] (2ND FLR);  Service: Endoscopy;  Laterality: N/A;    FOOT AMPUTATION THROUGH METATARSAL Left 2019    REPAIR OF LACERATION Left 2024    Procedure: REPAIR, LACERATION;  Surgeon: SHABNAM Sofia MD;  Location: [facility];  Service: ENT;  Laterality: Left;  Plastic Repair of Left Earlobe Laceration    TUBAL LIGATION       No family history on file.  Social History     Tobacco Use    Smoking status: Former     Current packs/day: 0.00     Types: Cigarettes     Quit date:      Years since quittin.0    Smokeless tobacco: Never   Substance Use Topics    Alcohol use: No    Drug use: No         Physical Exam     Initial Vitals [24 0937]   BP Pulse Resp Temp SpO2   (!) 82/49 84 20 98.5 °F (36.9 °C) 96 %      MAP       --         Physical Exam    Nursing note and vitals reviewed.  Constitutional:   Chronically ill appearing   Eyes: Conjunctivae and EOM are normal.   Cardiovascular:  Normal rate, regular rhythm and normal heart sounds.           Pulses:       Femoral pulses are 2+ on the right side and 1+ on the left side.  Pulmonary/Chest: Breath sounds normal. No respiratory distress.   Abdominal: Abdomen is soft. She exhibits no distension.   Mild diffuse  tenderness to palpation without guarding or rebound   Musculoskeletal:      Comments: Right BKA  Left forefoot amputation with well-healed scar  Cold left foot and ankle, PT and DP pulses intermittently palpable     Neurological: She is alert.         ED Course   Procedures  Labs Reviewed   CBC W/ AUTO DIFFERENTIAL - Abnormal       Result Value    WBC 8.63      RBC 5.34      Hemoglobin 11.9 (*)     Hematocrit 37.0      MCV 69 (*)     MCH 22.3 (*)     MCHC 32.2      RDW 13.6      Platelets 376      MPV 11.2      Immature Granulocytes 0.2      Gran # (ANC) 4.4      Immature Grans (Abs) 0.02      Lymph # 3.3      Mono # 0.8      Eos # 0.1      Baso # 0.02      nRBC 0      Gran % 51.4      Lymph % 38.2      Mono % 9.0      Eosinophil % 1.0      Basophil % 0.2      Differential Method Automated     COMPREHENSIVE METABOLIC PANEL - Abnormal    Sodium 128 (*)     Potassium 3.3 (*)     Chloride 91 (*)     CO2 24      Glucose 138 (*)     BUN 52 (*)     Creatinine 2.5 (*)     Calcium 9.0      Total Protein 8.1      Albumin 3.9      Total Bilirubin 0.6      Alkaline Phosphatase 96      AST 18      ALT 9 (*)     eGFR 21.7 (*)     Anion Gap 13     TROPONIN I HIGH SENSITIVITY - Abnormal    Troponin I High Sensitivity 156 (*)    B-TYPE NATRIURETIC PEPTIDE - Abnormal     (*)    TROPONIN I HIGH SENSITIVITY - Abnormal    Troponin I High Sensitivity 146 (*)    ISTAT PROCEDURE - Abnormal    POC Glucose 142 (*)     POC BUN 49 (*)     POC Creatinine 2.9 (*)     POC Sodium 128 (*)     POC Potassium 3.2 (*)     POC Chloride 92 (*)     POC TCO2 (MEASURED) 25      POC Ionized Calcium 1.09      POC Hematocrit 40      Sample EDOUARD     LIPASE    Lipase 29     MAGNESIUM   MAGNESIUM    Magnesium 2.1      Narrative:     ADD ON MAG PER JP HARRIS MD ORDER# [id number] @  12/31/2024    11:41    ISTAT LACTATE    POC Lactate 1.93      Sample VENOUS      Site Other      Allens Test N/A     ISTAT CHEM8   POCT GLUCOSE MONITORING CONTINUOUS     EKG  Readings: (Independently Interpreted)   Initial Reading: No STEMI. Previous EKG: Compared with most recent EKG Previous EKG Date: 4/13/24. Rhythm: Normal Sinus Rhythm. Heart Rate: 75. Conduction: LBBB. Other Findings: Prolonged QT Interval.     ECG Results              EKG 12-lead (Final result)        Collection Time Result Time QRS Duration OHS QTC Calculation    12/31/24 10:01:12 12/31/24 14:16:02 108 518                     Final result by Interface, Lab In [facility] (12/31/24 14:16:03)                   Narrative:    Test Reason : Z13.6,    Vent. Rate :  75 BPM     Atrial Rate :  75 BPM     P-R Int : 174 ms          QRS Dur : 108 ms      QT Int : 464 ms       P-R-T Axes :  74 -20 -11 degrees    QTcB Int : 518 ms    Normal sinus rhythm  Incomplete left bundle branch block  Nonspecific ST and T wave abnormality  Prolonged QT  Abnormal ECG  When compared with ECG of 13-Apr-2024 16:51,  Nonspecific T wave abnormality, worse in Inferior leads  Confirmed by Chuy Wilhelm ([id number]) on 12/31/2024 2:15:58 PM    Referred By: AAAREFERRAL SELF           Confirmed By: Chuy Wilhelm                                  Imaging Results              US Ankle/Brach Indices W/O Stress 1-2 Levels (Final result)  Result time 12/31/24 15:25:39   Procedure changed from US Ankle/Brach Indices Low Ext W/O Stress >= 3 levels     Final result by Randolph Benjamin Jr., MD (12/31/24 15:25:39)                   Impression:      See above      Electronically signed by: Randolph Cook Jr  Date:    12/31/2024  Time:    15:25               Narrative:    EXAMINATION:  US ANKLE/BRACH INDICES EXT LTD W/O STR 1-2 LEVELS    CLINICAL HISTORY:  left foot/leg pain;    TECHNIQUE:  Ankle brachial indices were obtained of the left lower extremity.  The right lower extremity was not interrogated due to below the knee amputation.    COMPARISON:  None.    FINDINGS:  Ankle brachial index on the left is 0.46 compatible with severe arterial occlusive disease at  risk of tissue loss.                                       CT Abdomen Pelvis  Without Contrast (Final result)  Result time 12/31/24 12:26:21      Final result by Lennox Sheppard MD (12/31/24 12:26:21)                   Impression:      1. No detrimental change from prior CT.  2. Redemonstration of suspected posterior gastric ulcer which appears slightly smaller from prior.  Interval mildly improved gastric wall thickening with mildly decreased perigastric inflammatory change.  No peritoneal free air to suggest perforation.  3. Cholelithiasis without acute cholecystitis.  4. Other incidental/nonemergent findings in the body of the report.      Electronically signed by: Lennox Sheppard MD  Date:    12/31/2024  Time:    12:26               Narrative:    EXAMINATION:  CT ABDOMEN PELVIS WITHOUT CONTRAST    CLINICAL HISTORY:  Epigastric pain;    TECHNIQUE:  Low dose axial images, sagittal and coronal reformations were obtained from the lung bases to the pubic symphysis.  No contrast media utilized.    COMPARISON:  CT abdomen and pelvis 04/13/2024    FINDINGS:  Lack of IV contrast limits evaluation of soft tissue and vascular structures.    Imaged lung bases show mild dependent atelectasis without consolidation or pleural effusion.  Base of the heart is enlarged similar prior with slight interval increased trace volume nonspecific pericardial fluid.  Multi-vessel coronary arterial with aortic and mitral annular calcifications noted.    Cholelithiasis.  No significant biliary ductal dilatation.    Pancreas mildly atrophic without discrete mass or adjacent inflammatory change.    Noncontrast appearance of the liver, spleen, and bilateral adrenal glands are within normal limits.    Bilateral kidneys are normal in overall size, shape and location.  Right renal vascular calcifications noted.  No radiopaque calculus seen within the urinary tract.  No hydronephrosis.  Grossly stable relatively symmetric minimal nonspecific  perinephric stranding.  Solitary simple appearing cyst at each kidney measuring up to 1.4 cm at the right renal lower pole and 1.4 cm at the left renal interpolar region.  Few scattered subcentimeter hypoattenuating renal parenchymal foci which are too small to characterize.  Ureters are normal in course and caliber.  Urinary bladder is well distended without wall thickening.  Noncontrast appearance of the uterus and bilateral adnexa are grossly within normal limits.  Pelvic phleboliths noted.  No significant volume free pelvic fluid.    Stomach is mildly distended with mostly liquid and gas contents without wall thickening or adjacent inflammatory change.  Redemonstration of posterior gastric ulcer measuring approximately 1.5 cm, previously 1.9 cm.  Interval mildly improved gastric wall thickening with mildly decreased perigastric inflammatory change.  Duodenum is within normal limits.    Appendix not identified; however, no pericecal inflammatory change.  Terminal ileum is within normal limits.  No evidence of bowel obstruction or acute bowel inflammation.  No bowel pneumatosis or portal venous gas.    No ascites, free air or lymphadenopathy by CT criteria.    Scattered moderate to advanced calcific atherosclerosis of the abdominal aorta extending into its proximal visceral and iliac branches.  Bilateral common iliac arterial stents noted.  Aorta is not aneurysmal.    Extraperitoneal soft tissues and osseous structures appear stable without acute findings.                                       X-Ray Chest AP Portable (Final result)  Result time 12/31/24 10:56:18      Final result by Israel Choi MD (12/31/24 10:56:18)                   Impression:      See above      Electronically signed by: Israel Choi MD  Date:    12/31/2024  Time:    10:56               Narrative:    EXAMINATION:  XR CHEST AP PORTABLE    CLINICAL HISTORY:  Sepsis;    TECHNIQUE:  Single frontal view of the chest was  performed.    COMPARISON:  N 10/27/2023 one    FINDINGS:  Heart size normal.  Mild opacification at the right lung base probably volume loss or small area of airspace disease.  The upper lung fields are clear.  No significant pleural effusion.                                       Medications   sodium chloride 0.9% flush 10 mL (has no administration in time range)   naloxone 0.4 mg/mL injection 0.02 mg (has no administration in time range)   glucose chewable tablet 16 g (has no administration in time range)   glucose chewable tablet 24 g (has no administration in time range)   dextrose 50% injection 12.5 g (has no administration in time range)   dextrose 50% injection 25 g (has no administration in time range)   glucagon (human recombinant) injection 1 mg (has no administration in time range)   insulin aspart U-100 pen 0-5 Units (has no administration in time range)   aspirin EC tablet 81 mg (81 mg Oral Given 12/31/24 2111)   atorvastatin tablet 80 mg (80 mg Oral Given 1/1/25 0926)   insulin glargine U-100 (Lantus) pen 10 Units (10 Units Subcutaneous Given 1/1/25 0959)   heparin (porcine) injection 5,000 Units (5,000 Units Subcutaneous Given 1/1/25 0926)   pantoprazole EC tablet 40 mg (40 mg Oral Given 1/1/25 0926)   polyethylene glycol packet 17 g (17 g Oral Given 1/1/25 0926)   oxyCODONE-acetaminophen 5-325 mg per tablet 1 tablet (1 tablet Oral Given 1/1/25 1200)   clopidogreL tablet 75 mg (75 mg Oral Given 1/1/25 0926)   lactated ringers bolus 500 mL (500 mLs Intravenous New Bag 1/1/25 1206)   senna tablet 8.6 mg (8.6 mg Oral Given 1/1/25 1200)   sodium chloride 0.9% bolus 250 mL 250 mL (0 mLs Intravenous Stopped 12/31/24 1130)   potassium chloride SA CR tablet 20 mEq (20 mEq Oral Given 12/31/24 1212)   ondansetron injection 4 mg (4 mg Intravenous Given 12/31/24 1158)   morphine injection 4 mg (4 mg Intravenous Given 12/31/24 1201)   lactated ringers bolus 500 mL (0 mLs Intravenous Stopped 12/31/24 1953)    potassium chloride SA CR tablet 40 mEq (40 mEq Oral Given 12/31/24 8431)   oxyCODONE-acetaminophen 5-325 mg per tablet 1 tablet (1 tablet Oral Given 1/1/25 0026)   zolpidem tablet 10 mg (10 mg Oral Given 12/31/24 2327)   perflutren protein-A microsphr 0.22 mg/mL IV susp (0.11 mg Intravenous Given 1/1/25 0830)     Medical Decision Making  59-year-old female with past medical history CHF, CAD, diabetes, hypertension, peripheral arterial disease, right BKA, left forefoot amputation presenting for evaluation of abdominal pain and left foot pain.    Differential diagnosis for abdominal pain includes, but is not limited to, SBO, pancreatitis, mesenteric ischemia, AAA, splenic infarct, gastritis, gastroenteritis.  Differential diagnosis for left foot pain includes, but is not limited to, neuropathy, claudication, acute limb ischemia.    On arrival in emergency department, patient's blood pressure noted to be 80/50.  Pt reporting 8/10 abdominal pain with only mild tenderness on exam. Lactic acid within normal limits, reassuring, but unable to exclude mesenteric ischemia based on normal lactic acid. However, pt with significant BRENDA today, so contrasted CT abd/pelvis deferred for non-contrast study. Left lower extremity cold to touch. Pulses intermittently palpable in left foot. Given patient's vasculopathy and cardiac history, combined with recent history of vomiting and decreased p.o. intake, suspect a low-flow state causing difficulty with pulses. Will evaluate with ankle brachial index. Admitted patient for BRENDA, CHF, NSTEMI. Deferred fluid bolus for BRENDA in setting of reduced EF and history of CHF. Deferred decisions regarding diuresis to admitting team in setting of BRENDA.    Amount and/or Complexity of Data Reviewed  Labs: ordered. Decision-making details documented in ED Course.  Radiology: ordered and independent interpretation performed. Decision-making details documented in ED Course.  ECG/medicine tests: ordered and  independent interpretation performed. Decision-making details documented in ED Course.    Risk  Prescription drug management.  Risk Details: Patient received morphine, Zofran, potassium chloride while in the Emergency Department.              Attending Attestation:   Physician Attestation Statement for Resident:  As the supervising MD   Physician Attestation Statement: I have personally seen and examined this patient.   I agree with the above history.  -:   As the supervising MD I agree with the above PE.     As the supervising MD I agree with the above treatment, course, plan, and disposition.                    ED Course as of 01/01/25 1240   Tue Dec 31, 2024   1020 ISTAT PROCEDURE(!)  Hyponatremia  BRENDA, most recent Cr 1.1 [BH]   1038 CBC auto differential(!)  Anemia, most recent 10.4 [BH]   1038 ISTAT Lactate  WNL, lowering concern for sepsis [BH]   1049 X-Ray Chest AP Portable  Independent interpretation of this chest x-ray: No effusion, consolidation, or pneumothorax [BH]   1101 Lipase  Inconsistent with pancreatitis [BH]   1102 Brain natriuretic peptide(!)  Elevated, most recent 162 [BH]   1102 Comprehensive metabolic panel(!)  Hyponatremia  Hypokalemia  BRENDA [BH]   1102 Troponin I High Sensitivity(!)  Elevated. Concerning for ACS vs decreased clearance 2/2 worsening kidney function [BH]   1258 Troponin I High Sensitivity(!)  Decreased from previous. Likely NSTEMI in setting of decreased renal clearance [BH]   1353 On re-evaluation, patient reports she is feeling much better. Abdominal pain no longer present. Left lower extremity pain present but improved [BH]      ED Course User Index  [BH] Devyn Pandya MD                           Clinical Impression:  Final diagnoses:  [Z13.6] Screening for cardiovascular condition  [M79.672] Left foot pain  [N17.9] BRENDA (acute kidney injury) (Primary)  [E87.1] Hyponatremia  [I21.4] NSTEMI (non-ST elevated myocardial infarction)          ED Disposition Condition     Observation Stable                Devyn Pandya MD  Resident  12/31/24 1916    Critical care time spent on the evaluation and treatment of severe organ dysfunction, review of pertinent labs and imaging studies, discussions with consulting providers and discussions with patient/family: 35 minutes.         Laney Dickey MD  01/01/25 5508

## 2024-12-31 NOTE — HPI
"59-year-old female with past medical history CHF, CAD s/p PCI to OM2 (2014) and LAD (2004), diabetes, hypertension, peripheral arterial disease, right BKA, left forefoot amputation, history of NSAID induced gastritis and gastric ulcer on 2/23/2022, duodenal ulcers on 20/28/2022, gastric ulcer on 3/1/2024, opioid induced constipation, presenting for evaluation of abdominal pain and left foot pain. Patient with chronic foot pain that has been worse over the past 2-3 days, she says it hurts at rest and is so bad she "can tear it off". The pain limits her ability to ambulate, says she cannot make it more than 2 ft before having to stop due to pain. She is also reporting abdominal pain and nausea with occasional vomiting over the past week. The abdominal pain and nausea began approximately 5 days prior to presentation. The pain is described as colicky, occurring every 20 minutes, worse with movement, better with rest, no radiation. This was also accompanied by nausea, she says her appetite remains unchanged however she is unable to eat due to her nausea. She reports interrupted sleep due to dry heaving, although this is questionable due to patient being a poor historian. She was a former smoker (quit last year), denies alcohol use, denies ilicit drug use. Reports compliance with all her medications. Patient states the pain was unbearable on 12/31 so she decided to present to the ED. Denies chest pain, SOB, fevers, chills, orthopnea, edema. However, she reports SALAZAR when walking small to moderate distances. She reports chronic constipation requiring chronic Exlax use, however she has not used it for 5 days due to her nausea. In the ED the patient was hypotensive at 82/49, all other vitals wnl, found to have elevated Cr of 2.5 (baseline ~1.2), , High sensitivity T 146 (down trended) , no EKG changes, unremarkable CXR and CTAP. Abdominal pain and nausea resolved with morphine and ondansetron.     "

## 2024-12-31 NOTE — ASSESSMENT & PLAN NOTE
Patient's blood pressure range in the last 24 hours was: BP  Min: 80/50  Max: 120/58.The patient's inpatient anti-hypertensive regimen is listed below:  Current Antihypertensives  Lisinopril 2.5 mg daily  Losartan 50 mg daily  Amlodipine 10 mg daily  Plan  - Will hold anti-HTN meds in the setting of hypotension. Will titrate in the setting of persistent hypertension.

## 2024-12-31 NOTE — SUBJECTIVE & OBJECTIVE
Past Medical History:   Diagnosis Date    Cardiogenic shock 10/24/2023    Chronic combined systolic and diastolic congestive heart failure     Coronary artery disease     Diabetes mellitus     Encounter for blood transfusion     Gastric ulcer with hemorrhage     Heart attack     History of gastric ulcer 04/02/2022    Hypertension     NSAID induced gastritis 04/02/2022    Perforated viscus 03/14/2022    Peripheral artery disease        Past Surgical History:   Procedure Laterality Date    APPENDECTOMY      BELOW KNEE AMPUTATION OF LOWER EXTREMITY Right 2019    COLONOSCOPY N/A 02/23/2022    Procedure: COLONOSCOPY;  Surgeon: Estefania Byrant MD;  Location: [facility] ([address] [place] [address]);  Service: Endoscopy;  Laterality: N/A;  anemia, melena    CORONARY STENT PLACEMENT      ESOPHAGOGASTRODUODENOSCOPY N/A 02/23/2022    Procedure: EGD (ESOPHAGOGASTRODUODENOSCOPY);  Surgeon: Estefania Bryant MD;  Location: [address]);  Service: Endoscopy;  Laterality: N/A;  anemia    ESOPHAGOGASTRODUODENOSCOPY N/A 4/14/2022    Procedure: EGD (ESOPHAGOGASTRODUODENOSCOPY);  Surgeon: First Available Den King;  Location: [facility] ([address] [place] [address]);  Service: Endoscopy;  Laterality: N/A;  please schedule in 8 weeks. done 2/23      EF-20    ESOPHAGOGASTRODUODENOSCOPY  4/14/2022    Procedure: ;  Surgeon: First Available Den King;  Location: [facility] ([address] [place] [address]);  Service: Endoscopy;;    ESOPHAGOGASTRODUODENOSCOPY N/A 4/29/2022    Procedure: EGD (ESOPHAGOGASTRODUODENOSCOPY);  Surgeon: Estefania Bryant MD;  Location: [facility];  Service: Endoscopy;  Laterality: N/A;  expedite EGD per Dr Ga      states vaccinated-will bring card-GT    ESOPHAGOGASTRODUODENOSCOPY N/A 10/28/2022    Procedure: EGD (ESOPHAGOGASTRODUODENOSCOPY);  Surgeon: Estefania Bryant MD;  Location: [facility] ([address] [place] [address]);  Service: Endoscopy;  Laterality: N/A;    FOOT AMPUTATION THROUGH METATARSAL Left 2019    REPAIR OF LACERATION Left 8/12/2024    Procedure: REPAIR, LACERATION;   Surgeon: SHABNAM Sofia MD;  Location: [facility];  Service: ENT;  Laterality: Left;  Plastic Repair of Left Earlobe Laceration    TUBAL LIGATION         Review of patient's allergies indicates:   Allergen Reactions    Orange juice Hives    Tomato (solanum lycopersicum) Hives       No current facility-administered medications on file prior to encounter.     Current Outpatient Medications on File Prior to Encounter   Medication Sig    amLODIPine (NORVASC) 10 MG tablet Take 1 tablet (10 mg total) by mouth once daily. Hold this until you follow up with your doctor.    aspirin (ECOTRIN) 81 MG EC tablet Take 81 mg by mouth every evening.    atorvastatin (LIPITOR) 80 MG tablet Take 1 tablet (80 mg total) by mouth once daily.    blood sugar diagnostic Strp Use to test blood glucose four times daily before meals and nightly    blood-glucose meter Misc Use as instructed    clopidogreL (PLAVIX) 75 mg tablet Take 75 mg by mouth once daily.    ergocalciferol (ERGOCALCIFEROL) 50,000 unit Cap Take 50,000 Units by mouth every 7 days. Tuesday    ferrous sulfate (FEOSOL) 325 mg (65 mg iron) Tab tablet Take 325 mg by mouth once daily.    furosemide (LASIX) 20 MG tablet Take 20 mg by mouth 2 (two) times daily.    hydrALAZINE (APRESOLINE) 10 MG tablet Take 1 tablet (10 mg total) by mouth 2 (two) times a day.    lancets 30 gauge Misc Use to test blood glucose four times daily before meals and nightly    lancing device Misc Use to test blood glucose four times daily before meals and nightly    LANTUS SOLOSTAR U-100 INSULIN glargine 100 units/mL SubQ pen Inject 30 Units into the skin every evening.    losartan (COZAAR) 50 MG tablet Take 1 tablet (50 mg total) by mouth every other day. Hold this until you follow up with your doctor.    metFORMIN (GLUCOPHAGE) 1000 MG tablet Take 1,000 mg by mouth every morning.    metoprolol tartrate (LOPRESSOR) 50 MG tablet Take 50 mg by mouth once daily. Wednesday    montelukast (SINGULAIR) 10 mg tablet  "Take 10 mg by mouth once daily.    oxyCODONE-acetaminophen (PERCOCET)  mg per tablet Take 1 tablet by mouth every 6 (six) hours as needed for Pain.    OZEMPIC 1 mg/dose (4 mg/3 mL) Inject 1 mg into the skin every 7 days. Tuesday    pen needle, diabetic 31 gauge x " Ndle Use to inject insulin 3 times daily    potassium chloride SA (K-DUR,KLOR-CON M) 10 MEQ tablet Take 10 mEq by mouth once daily.    SYMBICORT 160-4.5 mcg/actuation HFAA Inhale 2 puffs into the lungs as needed (shortness of breath/ wheezing).    zolpidem (AMBIEN) 10 mg Tab Take 10 mg by mouth nightly as needed (insomnia).    carisoprodoL (SOMA) 350 MG tablet Take 350 mg by mouth daily as needed.    metoprolol succinate (TOPROL-XL) 25 MG 24 hr tablet Take 1 tablet (25 mg total) by mouth once daily. (Patient not taking: Reported on 2024)    [DISCONTINUED] acetaminophen (TYLENOL) 325 MG tablet Take 650 mg by mouth every 6 (six) hours as needed.    [DISCONTINUED] ezetimibe (ZETIA) 10 mg tablet Take 1 tablet (10 mg total) by mouth every 7 days. Thursday    [DISCONTINUED] famotidine (PEPCID) 20 MG tablet Take 1 tablet (20 mg total) by mouth 2 (two) times daily.    [DISCONTINUED] hyoscyamine (ANASPAZ,LEVSIN) 0.125 mg Tab Take 1 tablet (125 mcg total) by mouth every 6 (six) hours as needed (abdominal cramping).    [DISCONTINUED] isosorbide dinitrate (ISORDIL) 10 MG tablet Take 1 tablet (10 mg total) by mouth 3 (three) times daily.    [DISCONTINUED] lisinopriL (PRINIVIL,ZESTRIL) 2.5 MG tablet Take 1 tablet (2.5 mg total) by mouth once daily.    [DISCONTINUED] pantoprazole (PROTONIX) 40 MG tablet Take 1 tablet (40 mg total) by mouth before breakfast.     Family History    None       Tobacco Use    Smoking status: Former     Current packs/day: 0.00     Types: Cigarettes     Quit date:      Years since quittin.0    Smokeless tobacco: Never   Substance and Sexual Activity    Alcohol use: No    Drug use: No    Sexual activity: Not Currently "     Review of Systems   All other systems reviewed and are negative.    Objective:     Vital Signs (Most Recent):  Temp: 97.7 °F (36.5 °C) (12/31/24 1529)  Pulse: 73 (12/31/24 1656)  Resp: 16 (12/31/24 1618)  BP: (!) 98/55 (12/31/24 1618)  SpO2: 99 % (12/31/24 1618) Vital Signs (24h Range):  Temp:  [97.7 °F (36.5 °C)-98.5 °F (36.9 °C)] 97.7 °F (36.5 °C)  Pulse:  [66-84] 73  Resp:  [13-21] 16  SpO2:  [96 %-100 %] 99 %  BP: ()/(49-60) 98/55     Weight: 77.1 kg (170 lb)  Body mass index is 25.85 kg/m².     Physical Exam  Vitals and nursing note reviewed.   Constitutional:       Appearance: Normal appearance.   HENT:      Head: Normocephalic and atraumatic.   Eyes:      General: No scleral icterus.  Cardiovascular:      Rate and Rhythm: Normal rate and regular rhythm.      Heart sounds: No murmur heard.     No friction rub. No gallop.   Pulmonary:      Effort: Pulmonary effort is normal.      Breath sounds: Normal breath sounds. No wheezing, rhonchi or rales.   Abdominal:      Palpations: Abdomen is soft.      Tenderness: There is no abdominal tenderness. There is no guarding or rebound.      Comments: Midline surgical scar   Musculoskeletal:      Left lower leg: No edema.      Comments: L foot forefoot amputation    R BKA with prosthetic    Skin:     Comments: L leg cool to touch   Neurological:      General: No focal deficit present.      Mental Status: She is alert and oriented to person, place, and time.                Significant Labs: All pertinent labs within the past 24 hours have been reviewed.  CBC:   Recent Labs   Lab 12/31/24  0950 12/31/24  1013   WBC 8.63  --    HGB 11.9*  --    HCT 37.0 40     --      CMP:   Recent Labs   Lab 12/31/24  0950   *   K 3.3*   CL 91*   CO2 24   *   BUN 52*   CREATININE 2.5*   CALCIUM 9.0   PROT 8.1   ALBUMIN 3.9   BILITOT 0.6   ALKPHOS 96   AST 18   ALT 9*   ANIONGAP 13     Cardiac Markers:   Recent Labs   Lab 12/31/24  0950   *        Significant Imaging: I have reviewed all pertinent imaging results/findings within the past 24 hours.

## 2025-01-01 VITALS
DIASTOLIC BLOOD PRESSURE: 57 MMHG | BODY MASS INDEX: 28.04 KG/M2 | RESPIRATION RATE: 16 BRPM | HEART RATE: 75 BPM | WEIGHT: 185 LBS | OXYGEN SATURATION: 95 % | SYSTOLIC BLOOD PRESSURE: 122 MMHG | HEIGHT: 68 IN | TEMPERATURE: 98 F

## 2025-01-01 LAB
ALBUMIN SERPL BCP-MCNC: 3.3 G/DL (ref 3.5–5.2)
ALP SERPL-CCNC: 80 U/L (ref 40–150)
ALT SERPL W/O P-5'-P-CCNC: 7 U/L (ref 10–44)
ANION GAP SERPL CALC-SCNC: 10 MMOL/L (ref 8–16)
ANION GAP SERPL CALC-SCNC: 11 MMOL/L (ref 8–16)
ANION GAP SERPL CALC-SCNC: 12 MMOL/L (ref 8–16)
ASCENDING AORTA: 3.16 CM
AST SERPL-CCNC: 15 U/L (ref 10–40)
AV AREA BY CONTINUOUS VTI: 2.2 CM2
AV INDEX (PROSTH): 0.57
AV LVOT MEAN GRADIENT: 1 MMHG
AV LVOT PEAK GRADIENT: 1 MMHG
AV MEAN GRADIENT: 1.2 MMHG
AV PEAK GRADIENT: 2.6 MMHG
AV VALVE AREA BY VELOCITY RATIO: 2.4 CM²
AV VALVE AREA: 2.2 CM2
AV VELOCITY RATIO: 0.63
BACTERIA #/AREA URNS AUTO: ABNORMAL /HPF
BASOPHILS # BLD AUTO: 0.03 K/UL (ref 0–0.2)
BASOPHILS NFR BLD: 0.4 % (ref 0–1.9)
BILIRUB SERPL-MCNC: 0.4 MG/DL (ref 0.1–1)
BILIRUB UR QL STRIP: NEGATIVE
BSA FOR ECHO PROCEDURE: 2.01 M2
BUN SERPL-MCNC: 41 MG/DL (ref 6–20)
BUN SERPL-MCNC: 48 MG/DL (ref 6–20)
BUN SERPL-MCNC: 52 MG/DL (ref 6–20)
CALCIUM SERPL-MCNC: 8.1 MG/DL (ref 8.7–10.5)
CALCIUM SERPL-MCNC: 8.4 MG/DL (ref 8.7–10.5)
CALCIUM SERPL-MCNC: 8.6 MG/DL (ref 8.7–10.5)
CHLORIDE SERPL-SCNC: 93 MMOL/L (ref 95–110)
CHLORIDE SERPL-SCNC: 95 MMOL/L (ref 95–110)
CHLORIDE SERPL-SCNC: 97 MMOL/L (ref 95–110)
CLARITY UR REFRACT.AUTO: ABNORMAL
CO2 SERPL-SCNC: 24 MMOL/L (ref 23–29)
CO2 SERPL-SCNC: 25 MMOL/L (ref 23–29)
CO2 SERPL-SCNC: 26 MMOL/L (ref 23–29)
COLOR UR AUTO: YELLOW
CREAT SERPL-MCNC: 1.9 MG/DL (ref 0.5–1.4)
CREAT SERPL-MCNC: 2.3 MG/DL (ref 0.5–1.4)
CREAT SERPL-MCNC: 2.3 MG/DL (ref 0.5–1.4)
CREAT UR-MCNC: 145 MG/DL (ref 15–325)
CREAT UR-MCNC: 145 MG/DL (ref 15–325)
CV ECHO LV RWT: 0.38 CM
DIFFERENTIAL METHOD BLD: ABNORMAL
DOP CALC AO PEAK VEL: 0.8 M/S
DOP CALC AO VTI: 12.9 CM
DOP CALC LVOT AREA: 3.8 CM2
DOP CALC LVOT DIAMETER: 2.2 CM
DOP CALC LVOT PEAK VEL: 0.5 M/S
DOP CALC LVOT STROKE VOLUME: 28.1 CM3
DOP CALCLVOT PEAK VEL VTI: 7.4 CM
E WAVE DECELERATION TIME: 134.73 MS
E/A RATIO: 0.6
E/E' RATIO: 16.33 M/S
ECHO EF ESTIMATED: 28 %
ECHO LV POSTERIOR WALL: 0.9 CM (ref 0.6–1.1)
EOSINOPHIL # BLD AUTO: 0.1 K/UL (ref 0–0.5)
EOSINOPHIL NFR BLD: 1.8 % (ref 0–8)
ERYTHROCYTE [DISTWIDTH] IN BLOOD BY AUTOMATED COUNT: 13.5 % (ref 11.5–14.5)
EST. GFR  (NO RACE VARIABLE): 24 ML/MIN/1.73 M^2
EST. GFR  (NO RACE VARIABLE): 24 ML/MIN/1.73 M^2
EST. GFR  (NO RACE VARIABLE): 30.2 ML/MIN/1.73 M^2
FERRITIN SERPL-MCNC: 114 NG/ML (ref 20–300)
FRACTIONAL SHORTENING: 12.8 % (ref 28–44)
GLUCOSE SERPL-MCNC: 120 MG/DL (ref 70–110)
GLUCOSE SERPL-MCNC: 195 MG/DL (ref 70–110)
GLUCOSE SERPL-MCNC: 197 MG/DL (ref 70–110)
GLUCOSE UR QL STRIP: NEGATIVE
HCT VFR BLD AUTO: 30.4 % (ref 37–48.5)
HGB BLD-MCNC: 9.7 G/DL (ref 12–16)
HGB UR QL STRIP: NEGATIVE
IMM GRANULOCYTES # BLD AUTO: 0.02 K/UL (ref 0–0.04)
IMM GRANULOCYTES NFR BLD AUTO: 0.3 % (ref 0–0.5)
INTERVENTRICULAR SEPTUM: 1.1 CM (ref 0.6–1.1)
IRON SERPL-MCNC: 35 UG/DL (ref 30–160)
IVRT: 154.14 MS
KETONES UR QL STRIP: NEGATIVE
LA MAJOR: 5.64 CM
LA MINOR: 5.61 CM
LA WIDTH: 2.81 CM
LEFT ATRIUM SIZE: 3.4 CM
LEFT ATRIUM VOLUME INDEX MOD: 15.7 ML/M2
LEFT ATRIUM VOLUME INDEX: 23.1 ML/M2
LEFT ATRIUM VOLUME MOD: 31.18 ML
LEFT ATRIUM VOLUME: 45.68 CM3
LEFT INTERNAL DIMENSION IN SYSTOLE: 4.1 CM (ref 2.1–4)
LEFT VENTRICLE DIASTOLIC VOLUME INDEX: 52.34 ML/M2
LEFT VENTRICLE DIASTOLIC VOLUME: 103.64 ML
LEFT VENTRICLE MASS INDEX: 83.1 G/M2
LEFT VENTRICLE SYSTOLIC VOLUME INDEX: 37.8 ML/M2
LEFT VENTRICLE SYSTOLIC VOLUME: 74.83 ML
LEFT VENTRICULAR INTERNAL DIMENSION IN DIASTOLE: 4.7 CM (ref 3.5–6)
LEFT VENTRICULAR MASS: 164.5 G
LEUKOCYTE ESTERASE UR QL STRIP: ABNORMAL
LV LATERAL E/E' RATIO: 16.33
LV SEPTAL E/E' RATIO: 16.33
LYMPHOCYTES # BLD AUTO: 3.2 K/UL (ref 1–4.8)
LYMPHOCYTES NFR BLD: 43.3 % (ref 18–48)
MAGNESIUM SERPL-MCNC: 2.2 MG/DL (ref 1.6–2.6)
MCH RBC QN AUTO: 22.7 PG (ref 27–31)
MCHC RBC AUTO-ENTMCNC: 31.9 G/DL (ref 32–36)
MCV RBC AUTO: 71 FL (ref 82–98)
MICROSCOPIC COMMENT: ABNORMAL
MONOCYTES # BLD AUTO: 0.7 K/UL (ref 0.3–1)
MONOCYTES NFR BLD: 9.8 % (ref 4–15)
MV PEAK A VEL: 0.81 M/S
MV PEAK E VEL: 0.49 M/S
NEUTROPHILS # BLD AUTO: 3.3 K/UL (ref 1.8–7.7)
NEUTROPHILS NFR BLD: 44.4 % (ref 38–73)
NITRITE UR QL STRIP: NEGATIVE
NRBC BLD-RTO: 0 /100 WBC
OHS CV RV/LV RATIO: 0.55 CM
PH UR STRIP: 5 [PH] (ref 5–8)
PHOSPHATE SERPL-MCNC: 3.6 MG/DL (ref 2.7–4.5)
PISA TR MAX VEL: 2.1 M/S
PLATELET # BLD AUTO: 243 K/UL (ref 150–450)
PMV BLD AUTO: 11.1 FL (ref 9.2–12.9)
POCT GLUCOSE: 158 MG/DL (ref 70–110)
POTASSIUM SERPL-SCNC: 3.8 MMOL/L (ref 3.5–5.1)
POTASSIUM SERPL-SCNC: 3.8 MMOL/L (ref 3.5–5.1)
POTASSIUM SERPL-SCNC: 3.9 MMOL/L (ref 3.5–5.1)
POTASSIUM UR-SCNC: 23 MMOL/L (ref 15–95)
PROT SERPL-MCNC: 6.6 G/DL (ref 6–8.4)
PROT UR QL STRIP: NEGATIVE
PROT UR-MCNC: 9 MG/DL (ref 0–15)
PROT/CREAT UR: 0.06 MG/G{CREAT} (ref 0–0.2)
RA MAJOR: 4.75 CM
RA WIDTH: 2.67 CM
RBC # BLD AUTO: 4.28 M/UL (ref 4–5.4)
RIGHT VENTRICLE DIASTOLIC BASEL DIMENSION: 2.6 CM
RV TISSUE DOPPLER FREE WALL SYSTOLIC VELOCITY 1 (APICAL 4 CHAMBER VIEW): 12.31 CM/S
SATURATED IRON: 14 % (ref 20–50)
SINUS: 3.16 CM
SODIUM SERPL-SCNC: 129 MMOL/L (ref 136–145)
SODIUM SERPL-SCNC: 131 MMOL/L (ref 136–145)
SODIUM SERPL-SCNC: 133 MMOL/L (ref 136–145)
SODIUM UR-SCNC: <10 MMOL/L (ref 20–250)
SP GR UR STRIP: 1.01 (ref 1–1.03)
SQUAMOUS #/AREA URNS AUTO: 21 /HPF
STJ: 2.46 CM
TDI LATERAL: 0.03 M/S
TDI SEPTAL: 0.03 M/S
TDI: 0.03 M/S
TOTAL IRON BINDING CAPACITY: 249 UG/DL (ref 250–450)
TR MAX PG: 18 MMHG
TRANSFERRIN SERPL-MCNC: 168 MG/DL (ref 200–375)
TRANSFERRIN SERPL-MCNC: 168 MG/DL (ref 200–375)
TRICUSPID ANNULAR PLANE SYSTOLIC EXCURSION: 1.65 CM
TV PEAK GRADIENT: 18 MMHG
URN SPEC COLLECT METH UR: ABNORMAL
UUN UR-MCNC: 697 MG/DL (ref 140–1050)
WBC # BLD AUTO: 7.32 K/UL (ref 3.9–12.7)
WBC #/AREA URNS AUTO: 25 /HPF (ref 0–5)
Z-SCORE OF LEFT VENTRICULAR DIMENSION IN END DIASTOLE: -1.96
Z-SCORE OF LEFT VENTRICULAR DIMENSION IN END SYSTOLE: 1.22

## 2025-01-01 PROCEDURE — 87088 URINE BACTERIA CULTURE: CPT

## 2025-01-01 PROCEDURE — 84466 ASSAY OF TRANSFERRIN: CPT

## 2025-01-01 PROCEDURE — 63600175 PHARM REV CODE 636 W HCPCS

## 2025-01-01 PROCEDURE — 80053 COMPREHEN METABOLIC PANEL: CPT

## 2025-01-01 PROCEDURE — 84156 ASSAY OF PROTEIN URINE: CPT

## 2025-01-01 PROCEDURE — 87086 URINE CULTURE/COLONY COUNT: CPT

## 2025-01-01 PROCEDURE — 84133 ASSAY OF URINE POTASSIUM: CPT

## 2025-01-01 PROCEDURE — 25000003 PHARM REV CODE 250

## 2025-01-01 PROCEDURE — 87186 SC STD MICRODIL/AGAR DIL: CPT

## 2025-01-01 PROCEDURE — 36415 COLL VENOUS BLD VENIPUNCTURE: CPT

## 2025-01-01 PROCEDURE — 83735 ASSAY OF MAGNESIUM: CPT

## 2025-01-01 PROCEDURE — 84100 ASSAY OF PHOSPHORUS: CPT

## 2025-01-01 PROCEDURE — 81001 URINALYSIS AUTO W/SCOPE: CPT

## 2025-01-01 PROCEDURE — 25500020 PHARM REV CODE 255

## 2025-01-01 PROCEDURE — 84300 ASSAY OF URINE SODIUM: CPT

## 2025-01-01 PROCEDURE — 84540 ASSAY OF URINE/UREA-N: CPT

## 2025-01-01 PROCEDURE — G0378 HOSPITAL OBSERVATION PER HR: HCPCS

## 2025-01-01 PROCEDURE — 82728 ASSAY OF FERRITIN: CPT

## 2025-01-01 PROCEDURE — 96372 THER/PROPH/DIAG INJ SC/IM: CPT

## 2025-01-01 PROCEDURE — 85025 COMPLETE CBC W/AUTO DIFF WBC: CPT

## 2025-01-01 PROCEDURE — 80048 BASIC METABOLIC PNL TOTAL CA: CPT | Mod: XB

## 2025-01-01 RX ORDER — SENNOSIDES 8.6 MG/1
8.6 TABLET ORAL DAILY
Status: DISCONTINUED | OUTPATIENT
Start: 2025-01-01 | End: 2025-01-01 | Stop reason: HOSPADM

## 2025-01-01 RX ORDER — LOSARTAN POTASSIUM 50 MG/1
50 TABLET ORAL EVERY OTHER DAY
Start: 2025-01-01

## 2025-01-01 RX ORDER — OXYCODONE AND ACETAMINOPHEN 10; 325 MG/1; MG/1
1 TABLET ORAL EVERY 6 HOURS PRN
Status: DISCONTINUED | OUTPATIENT
Start: 2025-01-01 | End: 2025-01-01 | Stop reason: HOSPADM

## 2025-01-01 RX ORDER — FUROSEMIDE 20 MG/1
20 TABLET ORAL 2 TIMES DAILY
Start: 2025-01-01

## 2025-01-01 RX ADMIN — SENNOSIDES 8.6 MG: 8.6 TABLET, FILM COATED ORAL at 12:01

## 2025-01-01 RX ADMIN — OXYCODONE HYDROCHLORIDE AND ACETAMINOPHEN 1 TABLET: 5; 325 TABLET ORAL at 12:01

## 2025-01-01 RX ADMIN — SODIUM CHLORIDE, POTASSIUM CHLORIDE, SODIUM LACTATE AND CALCIUM CHLORIDE 500 ML: 600; 310; 30; 20 INJECTION, SOLUTION INTRAVENOUS at 12:01

## 2025-01-01 RX ADMIN — POLYETHYLENE GLYCOL 3350 17 G: 17 POWDER, FOR SOLUTION ORAL at 09:01

## 2025-01-01 RX ADMIN — HEPARIN SODIUM 5000 UNITS: 5000 INJECTION INTRAVENOUS; SUBCUTANEOUS at 09:01

## 2025-01-01 RX ADMIN — SENNOSIDES 8.6 MG: 8.6 TABLET, FILM COATED ORAL at 09:01

## 2025-01-01 RX ADMIN — INSULIN GLARGINE 10 UNITS: 100 INJECTION, SOLUTION SUBCUTANEOUS at 09:01

## 2025-01-01 RX ADMIN — PANTOPRAZOLE SODIUM 40 MG: 40 TABLET, DELAYED RELEASE ORAL at 09:01

## 2025-01-01 RX ADMIN — CLOPIDOGREL BISULFATE 75 MG: 75 TABLET ORAL at 09:01

## 2025-01-01 RX ADMIN — ATORVASTATIN CALCIUM 80 MG: 40 TABLET, FILM COATED ORAL at 09:01

## 2025-01-01 RX ADMIN — OXYCODONE HYDROCHLORIDE AND ACETAMINOPHEN 1 TABLET: 5; 325 TABLET ORAL at 06:01

## 2025-01-01 RX ADMIN — OXYCODONE AND ACETAMINOPHEN 1 TABLET: 10; 325 TABLET ORAL at 06:01

## 2025-01-01 RX ADMIN — HUMAN ALBUMIN MICROSPHERES AND PERFLUTREN 0.11 MG: 10; .22 INJECTION, SOLUTION INTRAVENOUS at 08:01

## 2025-01-01 NOTE — SUBJECTIVE & OBJECTIVE
Interval History: NAEON. Patient states she is feeling better and is no longer nauseated or having abdominal pain.     Review of Systems   All other systems reviewed and are negative.    Objective:     Vital Signs (Most Recent):  Temp: 98.1 °F (36.7 °C) (01/01/25 1145)  Pulse: 80 (01/01/25 1145)  Resp: 18 (01/01/25 1145)  BP: (!) 122/59 (01/01/25 1145)  SpO2: 97 % (01/01/25 1145) Vital Signs (24h Range):  Temp:  [97.7 °F (36.5 °C)-98.4 °F (36.9 °C)] 98.1 °F (36.7 °C)  Pulse:  [66-98] 80  Resp:  [16-21] 18  SpO2:  [94 %-100 %] 97 %  BP: ()/(51-74) 122/59     Weight: 83.9 kg (185 lb)  Body mass index is 28.13 kg/m².  No intake or output data in the 24 hours ending 01/01/25 1156      Physical Exam  Vitals and nursing note reviewed.   Constitutional:       Appearance: Normal appearance.   HENT:      Head: Normocephalic and atraumatic.   Eyes:      General: No scleral icterus.  Cardiovascular:      Rate and Rhythm: Normal rate and regular rhythm.      Heart sounds: No murmur heard.     No friction rub. No gallop.   Pulmonary:      Effort: Pulmonary effort is normal.      Breath sounds: Normal breath sounds. No wheezing, rhonchi or rales.   Abdominal:      Palpations: Abdomen is soft.      Tenderness: There is no abdominal tenderness. There is no guarding or rebound.      Comments: Midline surgical scar   Musculoskeletal:      Left lower leg: No edema.      Comments: L foot forefoot amputation    R BKA with prosthetic    Skin:     Comments: L leg cool to touch   Neurological:      General: No focal deficit present.      Mental Status: She is alert and oriented to person, place, and time.             Significant Labs: All pertinent labs within the past 24 hours have been reviewed.  CBC:   Recent Labs   Lab 12/31/24  0950 12/31/24  1013 01/01/25  0613   WBC 8.63  --  7.32   HGB 11.9*  --  9.7*   HCT 37.0 40 30.4*     --  243     CMP:   Recent Labs   Lab 12/31/24  0950 12/31/24  2329 01/01/25  0613   *  129* 131*   K 3.3* 3.8 3.8   CL 91* 93* 95   CO2 24 24 25   * 197* 195*   BUN 52* 52* 48*   CREATININE 2.5* 2.3* 2.3*   CALCIUM 9.0 8.4* 8.1*   PROT 8.1  --  6.6   ALBUMIN 3.9  --  3.3*   BILITOT 0.6  --  0.4   ALKPHOS 96  --  80   AST 18  --  15   ALT 9*  --  7*   ANIONGAP 13 12 11       Significant Imaging: I have reviewed all pertinent imaging results/findings within the past 24 hours.

## 2025-01-01 NOTE — PROGRESS NOTES
"[facility] Medicine  Progress Note    Patient Name: Casie Salvador  MRN: [id number]  Patient Class: OP- Observation   Admission Date: 12/31/2024  Length of Stay: 0 days  Attending Physician: Martin Villegas MD  Primary Care Provider: Adonis Carey MD        Subjective     Principal Problem:Left upper quadrant abdominal pain        HPI:  59-year-old female with past medical history CHF, CAD s/p PCI to OM2 (2014) and LAD (2004), diabetes, hypertension, peripheral arterial disease, right BKA, left forefoot amputation, history of NSAID induced gastritis and gastric ulcer on 2/23/2022, duodenal ulcers on 20/28/2022, gastric ulcer on 3/1/2024, opioid induced constipation, presenting for evaluation of abdominal pain and left foot pain. Patient with chronic foot pain that has been worse over the past 2-3 days, she says it hurts at rest and is so bad she "can tear it off". The pain limits her ability to ambulate, says she cannot make it more than 2 ft before having to stop due to pain. She is also reporting abdominal pain and nausea with occasional vomiting over the past week. The abdominal pain and nausea began approximately 5 days prior to presentation. The pain is described as colicky, occurring every 20 minutes, worse with movement, better with rest, no radiation. This was also accompanied by nausea, she says her appetite remains unchanged however she is unable to eat due to her nausea. She reports interrupted sleep due to dry heaving, although this is questionable due to patient being a poor historian. She was a former smoker (quit last year), denies alcohol use, denies ilicit drug use. Reports compliance with all her medications. Patient states the pain was unbearable on 12/31 so she decided to present to the ED. Denies chest pain, SOB, fevers, chills, orthopnea, edema. However, she reports SALAZAR when walking small to moderate distances. She reports chronic constipation requiring chronic Exlax " use, however she has not used it for 5 days due to her nausea. In the ED the patient was hypotensive at 82/49, all other vitals wnl, found to have elevated Cr of 2.5 (baseline ~1.2), , High sensitivity T 146 (down trended) , no EKG changes, unremarkable CXR and CTAP. Abdominal pain and nausea resolved with morphine and ondansetron.       Overview/Hospital Course:  59 yoF w/ CAD s/p PCI OM2 in 14 and LAD 04, HFrEF (20-25%), DM2, HTN, PAD, R BKA, L forefoot amputation here for abdominal pain, and L foot pain. Admitted to  on 12/31, patient was found to have an BRENDA with a total of 750 cc IVF administered with no resolution in BRENDA on day 2 of admission. Despite this, patient was no longer nauseated and did not complain of abdominal pain.     Interval History: NAEON. Patient states she is feeling better and is no longer nauseated or having abdominal pain.     Review of Systems   All other systems reviewed and are negative.    Objective:     Vital Signs (Most Recent):  Temp: 98.1 °F (36.7 °C) (01/01/25 1145)  Pulse: 80 (01/01/25 1145)  Resp: 18 (01/01/25 1145)  BP: (!) 122/59 (01/01/25 1145)  SpO2: 97 % (01/01/25 1145) Vital Signs (24h Range):  Temp:  [97.7 °F (36.5 °C)-98.4 °F (36.9 °C)] 98.1 °F (36.7 °C)  Pulse:  [66-98] 80  Resp:  [16-21] 18  SpO2:  [94 %-100 %] 97 %  BP: ()/(51-74) 122/59     Weight: 83.9 kg (185 lb)  Body mass index is 28.13 kg/m².  No intake or output data in the 24 hours ending 01/01/25 1156      Physical Exam  Vitals and nursing note reviewed.   Constitutional:       Appearance: Normal appearance.   HENT:      Head: Normocephalic and atraumatic.   Eyes:      General: No scleral icterus.  Cardiovascular:      Rate and Rhythm: Normal rate and regular rhythm.      Heart sounds: No murmur heard.     No friction rub. No gallop.   Pulmonary:      Effort: Pulmonary effort is normal.      Breath sounds: Normal breath sounds. No wheezing, rhonchi or rales.   Abdominal:      Palpations:  Abdomen is soft.      Tenderness: There is no abdominal tenderness. There is no guarding or rebound.      Comments: Midline surgical scar   Musculoskeletal:      Left lower leg: No edema.      Comments: L foot forefoot amputation    R BKA with prosthetic    Skin:     Comments: L leg cool to touch   Neurological:      General: No focal deficit present.      Mental Status: She is alert and oriented to person, place, and time.             Significant Labs: All pertinent labs within the past 24 hours have been reviewed.  CBC:   Recent Labs   Lab 12/31/24  0950 12/31/24  1013 01/01/25  0613   WBC 8.63  --  7.32   HGB 11.9*  --  9.7*   HCT 37.0 40 30.4*     --  243     CMP:   Recent Labs   Lab 12/31/24  0950 12/31/24  2329 01/01/25  0613   * 129* 131*   K 3.3* 3.8 3.8   CL 91* 93* 95   CO2 24 24 25   * 197* 195*   BUN 52* 52* 48*   CREATININE 2.5* 2.3* 2.3*   CALCIUM 9.0 8.4* 8.1*   PROT 8.1  --  6.6   ALBUMIN 3.9  --  3.3*   BILITOT 0.6  --  0.4   ALKPHOS 96  --  80   AST 18  --  15   ALT 9*  --  7*   ANIONGAP 13 12 11       Significant Imaging: I have reviewed all pertinent imaging results/findings within the past 24 hours.    Assessment and Plan     * Left upper quadrant abdominal pain  -- Patient with known hx of PUD, this could explain her LUQ pain. H pylori negative in 2022.  -- Patient with chronic constipation, will order bowel regimen to promote gut motility  -- She takes Ozempic, is having abdominal pain with post-prandial nausea this makes gastroparesis a possible etiology for this presentation.   -- Repeat H pylori testing  -- Start Protonix 40 mg QD      HFrEF (heart failure with reduced ejection fraction)  Patient does not appear volume overloaded on exam, is not showing S/Sx of HF exacerbation. Troponin down trended in the ER, no EKG changes, patient without chest pain, sob, however does endorse SOB with moderate activity at home. TTE on this admission reveals EF 20-25 Grade III  diastolic dysfunction with severe global hypokinesis (previous TTE with only regional wall motion abnormalities). Patient will benefit from outpatient cardiology follow up for optimization of medications.     -- Admission , Troponin I   -- Latest TTE performed on 12/31 revels EF 20-25%, grade III diastolic dysfunction with severe global hypokinesis.   -- Patient on Losartan, Toprol, and Lasix 20 mg BID  -- Will hold GDMT and Lasix in the setting of hypotension. Will titrate as blood pressure permits.   -- Fluid restrict 2L, salt restricted diet  -- Strict I/O        BRENDA (acute kidney injury)  BRENDA is likely due to pre-renal azotemia due to dehydration. Baseline creatinine is  ~1.1 . Most recent creatinine and eGFR are listed below. Patient given 250 cc NS by ED.   Recent Labs     12/31/24  0950 12/31/24  2329 01/01/25  0613   CREATININE 2.5* 2.3* 2.3*   EGFRNORACEVR 21.7* 24.0* 24.0*        Plan  - Avoid nephrotoxins and renally dose meds for GFR listed above  - Monitor urine output, serial BMP, and adjust therapy as needed  - Patient with HFrEF 20-25% w/ severe global hypokinesis  - Will bolus additional 500 cc LR for likely pre-renal etiology  - Usodium <10, Ucr 145, Uprotein 9, Uprotein/cr .06 Upotassium 23  - Urine lytes further support pre-renal etiology  - 1/1 Pt continues to be Cr 2.5, will administer 500 cc LR bolus and reassess kidney function in the PM. If BRENDA resolves in recheck we will discharge the patient.     Peripheral artery disease  -- BETHEL .4 indicating severe PAD in the LLE  -- Will consult vascular surgery  -- Limb was cool to touch, not painful  -- Low c/f ALI  -- Per Vascular patient needs outpatient f/u with established Vascular surgeon for elective surgery.       Chronic pain  -- Patient takes Percocet 1-325 PRN q6 hours for pain  -- Will resume inpatient       Type 2 diabetes mellitus with circulatory disorder, with long-term current use of insulin  -- Latest A1c 5.3, Glucose  upon admission 138. Patient now eating, will start reduced Lantus dose in response and place patient on LDSSI.   -- Home DM meds         -- Ozempic 1 mg every 7 days         -- Metformin 1,000 mg QD         -- Lantus 30u daily      Coronary artery disease involving native coronary artery of native heart with angina pectoris  Patient with known CAD s/p BMS stent placement, which is controlled Will continue ASA, Plavix, and Statin and monitor for S/Sx of angina/ACS. Continue to monitor on telemetry.     Hypertension  Patient's blood pressure range in the last 24 hours was: BP  Min: 80/50  Max: 120/58.The patient's inpatient anti-hypertensive regimen is listed below:  Current Antihypertensives  Lisinopril 2.5 mg daily  Losartan 50 mg daily  Amlodipine 10 mg daily  Plan  - Will hold anti-HTN meds in the setting of hypotension. Will titrate in the setting of persistent hypertension.       VTE Risk Mitigation (From admission, onward)           Ordered     heparin (porcine) injection 5,000 Units  Every 12 hours         12/31/24 1651     Reason for No Pharmacological VTE Prophylaxis  Once        Question:  Reasons:  Answer:  Risk of Bleeding    12/31/24 1559     IP VTE HIGH RISK PATIENT  Once         12/31/24 1559     Place sequential compression device  Until discontinued         12/31/24 1559                    Discharge Planning   SOCO:      Code Status: Full Code   Medical Readiness for Discharge Date:                            Joao Gomez MD  Department of Hospital Medicine   [facility]

## 2025-01-01 NOTE — ASSESSMENT & PLAN NOTE
BRENDA is likely due to pre-renal azotemia due to dehydration. Baseline creatinine is  ~1.1 . Most recent creatinine and eGFR are listed below. Patient given 250 cc NS by ED.   Recent Labs     12/31/24  0950   CREATININE 2.5*   EGFRNORACEVR 21.7*      Plan  - Avoid nephrotoxins and renally dose meds for GFR listed above  - Monitor urine output, serial BMP, and adjust therapy as needed  - Patient with HFrEF 20-25%  - Will bolus additional 500 cc LR for likely pre-renal etiology  - Ordered Urine Sodium, Urine Protein/Cr ratio, Urine Nitrogen, Urine potassium for further characterization of the BRENDA etiology.

## 2025-01-01 NOTE — ASSESSMENT & PLAN NOTE
-- Patient with known hx of PUD, this could explain her LUQ pain. H pylori negative in 2022.  -- Patient with chronic constipation, will order bowel regimen to promote gut motility  -- She takes Ozempic, is having abdominal pain with post-prandial nausea this makes gastroparesis a possible etiology for this presentation.   -- Repeat H pylori testing  -- Start Protonix 40 mg QD

## 2025-01-01 NOTE — ASSESSMENT & PLAN NOTE
Patient does not appear volume overloaded on exam, is not showing S/Sx of HF exacerbation. Troponin down trended in the ER, no EKG changes, patient without chest pain, sob, however does endorse SOB with moderate activity at home. TTE on this admission reveals EF 20-25 Grade III diastolic dysfunction with severe global hypokinesis (previous TTE with only regional wall motion abnormalities). Patient will benefit from outpatient cardiology follow up for optimization of medications.     -- Admission , Troponin I   -- Latest TTE performed on 12/31 revels EF 20-25%, grade III diastolic dysfunction with severe global hypokinesis.   -- Patient on Losartan, Toprol, and Lasix 20 mg BID  -- Will hold GDMT and Lasix in the setting of hypotension. Will titrate as blood pressure permits.   -- Fluid restrict 2L, salt restricted diet  -- Strict I/O

## 2025-01-01 NOTE — HOSPITAL COURSE
59 yoF w/ CAD s/p PCI OM2 in 14 and LAD 04, HFrEF (20-25%), DM2, HTN, PAD, R BKA, L forefoot amputation here for abdominal pain, and L foot pain. Admitted to  on 12/31, patient was found to have an BRENDA with a total of 750 cc IVF administered with no resolution in BRENDA on day 2 of admission. Despite this, patient was no longer nauseated and did not complain of abdominal pain. TTE this admission reveals EF 20-25% with grade III diastolic dysfunction and new found severe global hypokinesis, indicating progression of HFrEF. Low c/f acute on chronic HFrEF on this admission however patient was educated on heart failure and implored to follow with [facility]. Patient to pursue outpatient vascular surgery follow up regarding her severe PAD on the LLE. Otherwise, patient is HDS, BRENDA was improving. Patient discharged with instructions to follow up with PCP to monitor Creatinine.

## 2025-01-01 NOTE — CARE UPDATE
"Vascular Surgery Note  Casie Salvador is a 58 y.o. female with CAD, DM, HTN, PVD with L fem endart, bilateral kissing iliac stents, L TMA, R BKA, hx of chronic pain at L TMA site since surgery, managed by PCP. BETHEL in 5/2023 was 0.5 and 0.59 5/2021. Establish with [facility] [place].     She presents today w/ chest pain "feels like I have pneumonia," endorses vomiting, back pain, leg pain, epigastric pain, and LUQ pain. Reportedly palpable pulses in L TMA per ED provider. Several studies and labs were ordered but among them, BETHEL today is L 0.46.     As this is stable, no acute changes including tissue loss or rest pain, recommend follow up with establish vascular surgeon. Will need to c/w DAPT, statin, smoking cessation.      "

## 2025-01-01 NOTE — ASSESSMENT & PLAN NOTE
-- BETHEL .4 indicating severe PAD in the LLE  -- Will consult vascular surgery  -- Limb was cool to touch, not painful  -- Low c/f ALI  -- Per Vascular patient needs outpatient f/u with established Vascular surgeon for elective surgery.

## 2025-01-01 NOTE — ASSESSMENT & PLAN NOTE
BRENDA is likely due to pre-renal azotemia due to dehydration. Baseline creatinine is  ~1.1 . Most recent creatinine and eGFR are listed below. Patient given 250 cc NS by ED.   Recent Labs     12/31/24  0950 12/31/24  2329 01/01/25  0613   CREATININE 2.5* 2.3* 2.3*   EGFRNORACEVR 21.7* 24.0* 24.0*        Plan  - Avoid nephrotoxins and renally dose meds for GFR listed above  - Monitor urine output, serial BMP, and adjust therapy as needed  - Patient with HFrEF 20-25% w/ severe global hypokinesis  - Will bolus additional 500 cc LR for likely pre-renal etiology  - Usodium <10, Ucr 145, Uprotein 9, Uprotein/cr .06 Upotassium 23  - Urine lytes further support pre-renal etiology  - 1/1 Pt continues to be Cr 2.5, will administer 500 cc LR bolus and reassess kidney function in the PM. If BRENDA resolves in recheck we will discharge the patient.

## 2025-01-01 NOTE — H&P
"[facility]  History & Physical    Patient Name: Casie Salvador  MRN: [id number]  Patient Class: OP- Observation  Admission Date: 12/31/2024  Attending Physician: Martin Villegas MD   Primary Care Provider: Adonis Carey MD         Patient information was obtained from patient and ER records.     Subjective:     Principal Problem:Left upper quadrant abdominal pain    Chief Complaint:   Chief Complaint   Patient presents with    Chest Pain     Feels like I have pneumonia, in bed for 5 d, vomiting, back pain, leg pain         HPI: 59-year-old female with past medical history CHF, CAD s/p PCI to OM2 (2014) and LAD (2004), diabetes, hypertension, peripheral arterial disease, right BKA, left forefoot amputation, history of NSAID induced gastritis and gastric ulcer on 2/23/2022, duodenal ulcers on 20/28/2022, gastric ulcer on 3/1/2024, opioid induced constipation, presenting for evaluation of abdominal pain and left foot pain. Patient with chronic foot pain that has been worse over the past 2-3 days, she says it hurts at rest and is so bad she "can tear it off". The pain limits her ability to ambulate, says she cannot make it more than 2 ft before having to stop due to pain. She is also reporting abdominal pain and nausea with occasional vomiting over the past week. The abdominal pain and nausea began approximately 5 days prior to presentation. The pain is described as colicky, occurring every 20 minutes, worse with movement, better with rest, no radiation. This was also accompanied by nausea, she says her appetite remains unchanged however she is unable to eat due to her nausea. She reports interrupted sleep due to dry heaving, although this is questionable due to patient being a poor historian. She was a former smoker (quit last year), denies alcohol use, denies ilicit drug use. Reports compliance with all her medications. Patient states the pain was unbearable on 12/31 so she " decided to present to the ED. Denies chest pain, SOB, fevers, chills, orthopnea, edema. She reports chronic constipation requiring chronic Exlax use, however she has not used it for 5 days due to her nausea. In the ED the patient was hypotensive at 82/49, all other vitals wnl, found to have elevated Cr of 2.5 (baseline ~1.2), , High sensitivity T 146 (down trended) , no EKG changes, unremarkable CXR and CTAP. Abdominal pain and nausea resolved with morphine and ondansetron.       Past Medical History:   Diagnosis Date    Cardiogenic shock 10/24/2023    Chronic combined systolic and diastolic congestive heart failure     Coronary artery disease     Diabetes mellitus     Encounter for blood transfusion     Gastric ulcer with hemorrhage     Heart attack     History of gastric ulcer 04/02/2022    Hypertension     NSAID induced gastritis 04/02/2022    Perforated viscus 03/14/2022    Peripheral artery disease        Past Surgical History:   Procedure Laterality Date    APPENDECTOMY      BELOW KNEE AMPUTATION OF LOWER EXTREMITY Right 2019    COLONOSCOPY N/A 02/23/2022    Procedure: COLONOSCOPY;  Surgeon: Estefania Bryant MD;  Location: [facility] ([address] [place] [address]);  Service: Endoscopy;  Laterality: N/A;  anemia, melena    CORONARY STENT PLACEMENT      ESOPHAGOGASTRODUODENOSCOPY N/A 02/23/2022    Procedure: EGD (ESOPHAGOGASTRODUODENOSCOPY);  Surgeon: Estefania Bryant MD;  Location: [facility] ([address] [place] [address]);  Service: Endoscopy;  Laterality: N/A;  anemia    ESOPHAGOGASTRODUODENOSCOPY N/A 4/14/2022    Procedure: EGD (ESOPHAGOGASTRODUODENOSCOPY);  Surgeon: First Available Den King;  Location: [facility] ([address] [place] [address]);  Service: Endoscopy;  Laterality: N/A;  please schedule in 8 weeks. done 2/23      EF-20    ESOPHAGOGASTRODUODENOSCOPY  4/14/2022    Procedure: ;  Surgeon: First Available Den King;  Location: [facility] ([address] [place] [address]);  Service: Endoscopy;;    ESOPHAGOGASTRODUODENOSCOPY N/A 4/29/2022    Procedure: EGD  (ESOPHAGOGASTRODUODENOSCOPY);  Surgeon: Estefania Bryant MD;  Location: [facility] ENDO;  Service: Endoscopy;  Laterality: N/A;  expedite EGD per Dr Ga      states vaccinated-will bring card-GT    ESOPHAGOGASTRODUODENOSCOPY N/A 10/28/2022    Procedure: EGD (ESOPHAGOGASTRODUODENOSCOPY);  Surgeon: Estefania Bryant MD;  Location: [facility] ENDO (2ND FLR);  Service: Endoscopy;  Laterality: N/A;    FOOT AMPUTATION THROUGH METATARSAL Left 2019    REPAIR OF LACERATION Left 8/12/2024    Procedure: REPAIR, LACERATION;  Surgeon: SHABNAM Sofia MD;  Location: [facility] OR;  Service: ENT;  Laterality: Left;  Plastic Repair of Left Earlobe Laceration    TUBAL LIGATION         Review of patient's allergies indicates:   Allergen Reactions    Orange juice Hives    Tomato (solanum lycopersicum) Hives       No current facility-administered medications on file prior to encounter.     Current Outpatient Medications on File Prior to Encounter   Medication Sig    amLODIPine (NORVASC) 10 MG tablet Take 1 tablet (10 mg total) by mouth once daily. Hold this until you follow up with your doctor.    aspirin (ECOTRIN) 81 MG EC tablet Take 81 mg by mouth every evening.    atorvastatin (LIPITOR) 80 MG tablet Take 1 tablet (80 mg total) by mouth once daily.    blood sugar diagnostic Strp Use to test blood glucose four times daily before meals and nightly    blood-glucose meter Misc Use as instructed    clopidogreL (PLAVIX) 75 mg tablet Take 75 mg by mouth once daily.    ergocalciferol (ERGOCALCIFEROL) 50,000 unit Cap Take 50,000 Units by mouth every 7 days. Tuesday    ferrous sulfate (FEOSOL) 325 mg (65 mg iron) Tab tablet Take 325 mg by mouth once daily.    furosemide (LASIX) 20 MG tablet Take 20 mg by mouth 2 (two) times daily.    hydrALAZINE (APRESOLINE) 10 MG tablet Take 1 tablet (10 mg total) by mouth 2 (two) times a day.    lancets 30 gauge Misc Use to test blood glucose four times daily before meals and nightly    lancing device Misc Use to  "test blood glucose four times daily before meals and nightly    LANTUS SOLOSTAR U-100 INSULIN glargine 100 units/mL SubQ pen Inject 30 Units into the skin every evening.    losartan (COZAAR) 50 MG tablet Take 1 tablet (50 mg total) by mouth every other day. Hold this until you follow up with your doctor.    metFORMIN (GLUCOPHAGE) 1000 MG tablet Take 1,000 mg by mouth every morning.    metoprolol tartrate (LOPRESSOR) 50 MG tablet Take 50 mg by mouth once daily. Wednesday    montelukast (SINGULAIR) 10 mg tablet Take 10 mg by mouth once daily.    oxyCODONE-acetaminophen (PERCOCET)  mg per tablet Take 1 tablet by mouth every 6 (six) hours as needed for Pain.    OZEMPIC 1 mg/dose (4 mg/3 mL) Inject 1 mg into the skin every 7 days. Tuesday    pen needle, diabetic 31 gauge x 5/16" Ndle Use to inject insulin 3 times daily    potassium chloride SA (K-DUR,KLOR-CON M) 10 MEQ tablet Take 10 mEq by mouth once daily.    SYMBICORT 160-4.5 mcg/actuation HFAA Inhale 2 puffs into the lungs as needed (shortness of breath/ wheezing).    zolpidem (AMBIEN) 10 mg Tab Take 10 mg by mouth nightly as needed (insomnia).    carisoprodoL (SOMA) 350 MG tablet Take 350 mg by mouth daily as needed.    metoprolol succinate (TOPROL-XL) 25 MG 24 hr tablet Take 1 tablet (25 mg total) by mouth once daily. (Patient not taking: Reported on 12/31/2024)    [DISCONTINUED] acetaminophen (TYLENOL) 325 MG tablet Take 650 mg by mouth every 6 (six) hours as needed.    [DISCONTINUED] ezetimibe (ZETIA) 10 mg tablet Take 1 tablet (10 mg total) by mouth every 7 days. Thursday    [DISCONTINUED] famotidine (PEPCID) 20 MG tablet Take 1 tablet (20 mg total) by mouth 2 (two) times daily.    [DISCONTINUED] hyoscyamine (ANASPAZ,LEVSIN) 0.125 mg Tab Take 1 tablet (125 mcg total) by mouth every 6 (six) hours as needed (abdominal cramping).    [DISCONTINUED] isosorbide dinitrate (ISORDIL) 10 MG tablet Take 1 tablet (10 mg total) by mouth 3 (three) times daily.    " [DISCONTINUED] lisinopriL (PRINIVIL,ZESTRIL) 2.5 MG tablet Take 1 tablet (2.5 mg total) by mouth once daily.    [DISCONTINUED] pantoprazole (PROTONIX) 40 MG tablet Take 1 tablet (40 mg total) by mouth before breakfast.     Family History    None       Tobacco Use    Smoking status: Former     Current packs/day: 0.00     Types: Cigarettes     Quit date:      Years since quittin.0    Smokeless tobacco: Never   Substance and Sexual Activity    Alcohol use: No    Drug use: No    Sexual activity: Not Currently     Review of Systems   All other systems reviewed and are negative.    Objective:     Vital Signs (Most Recent):  Temp: 97.7 °F (36.5 °C) (24 1529)  Pulse: 73 (24 1656)  Resp: 16 (24 1618)  BP: (!) 98/55 (24 1618)  SpO2: 99 % (24) Vital Signs (24h Range):  Temp:  [97.7 °F (36.5 °C)-98.5 °F (36.9 °C)] 97.7 °F (36.5 °C)  Pulse:  [66-84] 73  Resp:  [13-21] 16  SpO2:  [96 %-100 %] 99 %  BP: ()/(49-60) 98/55     Weight: 77.1 kg (170 lb)  Body mass index is 25.85 kg/m².     Physical Exam  Vitals and nursing note reviewed.   Constitutional:       Appearance: Normal appearance.   HENT:      Head: Normocephalic and atraumatic.   Eyes:      General: No scleral icterus.  Cardiovascular:      Rate and Rhythm: Normal rate and regular rhythm.      Heart sounds: No murmur heard.     No friction rub. No gallop.   Pulmonary:      Effort: Pulmonary effort is normal.      Breath sounds: Normal breath sounds. No wheezing, rhonchi or rales.   Abdominal:      Palpations: Abdomen is soft.      Tenderness: There is no abdominal tenderness. There is no guarding or rebound.      Comments: Midline surgical scar   Musculoskeletal:      Left lower leg: No edema.      Comments: L foot forefoot amputation    R BKA with prosthetic    Skin:     Comments: L leg cool to touch   Neurological:      General: No focal deficit present.      Mental Status: She is alert and oriented to person, place, and  time.                Significant Labs: All pertinent labs within the past 24 hours have been reviewed.  CBC:   Recent Labs   Lab 12/31/24  0950 12/31/24  1013   WBC 8.63  --    HGB 11.9*  --    HCT 37.0 40     --      CMP:   Recent Labs   Lab 12/31/24  0950   *   K 3.3*   CL 91*   CO2 24   *   BUN 52*   CREATININE 2.5*   CALCIUM 9.0   PROT 8.1   ALBUMIN 3.9   BILITOT 0.6   ALKPHOS 96   AST 18   ALT 9*   ANIONGAP 13     Cardiac Markers:   Recent Labs   Lab 12/31/24  0950   *       Significant Imaging: I have reviewed all pertinent imaging results/findings within the past 24 hours.  Assessment/Plan:     * Left upper quadrant abdominal pain  -- Patient with known hx of PUD, this could explain her LUQ pain. H pylori negative in 2022.  -- Patient with chronic constipation, will order bowel regimen to promote gut motility  -- She takes Ozempic, is having abdominal pain with post-prandial nausea this makes gastroparesis a possible etiology for this presentation.   -- Repeat H pylori testing  -- Start Protonix 40 mg QD      HFrEF (heart failure with reduced ejection fraction)  Patient does not appear volume overloaded on exam, is not showing S/Sx of HF exacerbation. However will order TTE as latest was performed over a year ago.     -- Admission , Troponin I   -- Latest TTE performed in 2023. Revels EF 20-25%, diastolic dysfunction with RWMA.   -- Patient on Losartan, Toprol, and Lasix 20 mg BID  -- Will hold GDMT and Lasix in the setting of hypotension. Will titrate as blood pressure permits.   -- Fluid restrict 2L, salt restricted diet  -- Strict I/O        BRENDA (acute kidney injury)  BRENDA is likely due to pre-renal azotemia due to dehydration. Baseline creatinine is  ~1.1 . Most recent creatinine and eGFR are listed below. Patient given 250 cc NS by ED.   Recent Labs     12/31/24  0950   CREATININE 2.5*   EGFRNORACEVR 21.7*      Plan  - Avoid nephrotoxins and renally dose meds for  GFR listed above  - Monitor urine output, serial BMP, and adjust therapy as needed  - Patient with HFrEF 20-25%  - Will bolus additional 500 cc LR for likely pre-renal etiology  - Ordered Urine Sodium, Urine Protein/Cr ratio, Urine Nitrogen, Urine potassium for further characterization of the BRENDA etiology.      Peripheral artery disease  -- BETHEL .4 indicating severe PAD in the LLE  -- Will consult vascular surgery  -- Limb was cool to touch, not painful  -- Low c/f ALI      Chronic pain  -- Patient takes Percocet 1-325 PRN q6 hours for pain  -- Will resume inpatient       Type 2 diabetes mellitus with circulatory disorder, with long-term current use of insulin  -- Latest A1c 5.3, Glucose upon admission 138. Patient now eating, will start reduced Lantus dose in response and place patient on LDSSI.   -- Home DM meds         -- Ozempic 1 mg every 7 days         -- Metformin 1,000 mg QD         -- Lantus 30u daily      Coronary artery disease involving native coronary artery of native heart with angina pectoris  Patient with known CAD s/p BMS stent placement, which is controlled Will continue ASA, Plavix, and Statin and monitor for S/Sx of angina/ACS. Continue to monitor on telemetry.     Hypertension  Patient's blood pressure range in the last 24 hours was: BP  Min: 80/50  Max: 120/58.The patient's inpatient anti-hypertensive regimen is listed below:  Current Antihypertensives  Lisinopril 2.5 mg daily  Losartan 50 mg daily  Amlodipine 10 mg daily  Plan  - Will hold anti-HTN meds in the setting of hypotension. Will titrate in the setting of persistent hypertension.       VTE Risk Mitigation (From admission, onward)           Ordered     heparin (porcine) injection 5,000 Units  Every 12 hours         12/31/24 1651     Reason for No Pharmacological VTE Prophylaxis  Once        Question:  Reasons:  Answer:  Risk of Bleeding    12/31/24 1559     IP VTE HIGH RISK PATIENT  Once         12/31/24 1552     Place sequential  compression device  Until discontinued         12/31/24 1559                           On 12/31/2024, patient should be placed in hospital observation services under my care in collaboration with Dr. Villegas.         Joao Gomez MD  Department of Hospital Medicine  [facility]

## 2025-01-01 NOTE — ASSESSMENT & PLAN NOTE
-- BETHEL .4 indicating severe PAD in the LLE  -- Will consult vascular surgery  -- Limb was cool to touch, not painful  -- Low c/f ALI

## 2025-01-02 NOTE — DISCHARGE SUMMARY
"[facility] Medicine  Discharge Summary      Patient Name: Casie Salvador  MRN: [id number]  SHELLIE: [id number]  Patient Class: OP- Observation  Admission Date: 12/31/2024  Hospital Length of Stay: 0 days  Discharge Date and Time:  01/01/2025 6:21 PM  Attending Physician: Martin Villegas MD   Discharging Provider: Martin Villegas MD  Primary Care Provider: Adonis Carey MD  [facility] Medicine Team: [place] [id number] Martin Villegas MD  Primary Care Team: [facility] 5    HPI:   59-year-old female with past medical history CHF, CAD s/p PCI to OM2 (2014) and LAD (2004), diabetes, hypertension, peripheral arterial disease, right BKA, left forefoot amputation, history of NSAID induced gastritis and gastric ulcer on 2/23/2022, duodenal ulcers on 20/28/2022, gastric ulcer on 3/1/2024, opioid induced constipation, presenting for evaluation of abdominal pain and left foot pain. Patient with chronic foot pain that has been worse over the past 2-3 days, she says it hurts at rest and is so bad she "can tear it off". The pain limits her ability to ambulate, says she cannot make it more than 2 ft before having to stop due to pain. She is also reporting abdominal pain and nausea with occasional vomiting over the past week. The abdominal pain and nausea began approximately 5 days prior to presentation. The pain is described as colicky, occurring every 20 minutes, worse with movement, better with rest, no radiation. This was also accompanied by nausea, she says her appetite remains unchanged however she is unable to eat due to her nausea. She reports interrupted sleep due to dry heaving, although this is questionable due to patient being a poor historian. She was a former smoker (quit last year), denies alcohol use, denies ilicit drug use. Reports compliance with all her medications. Patient states the pain was unbearable on 12/31 so she decided to present to the ED. Denies chest pain, SOB, fevers, chills, " orthopnea, edema. However, she reports SALAZAR when walking small to moderate distances. She reports chronic constipation requiring chronic Exlax use, however she has not used it for 5 days due to her nausea. In the ED the patient was hypotensive at 82/49, all other vitals wnl, found to have elevated Cr of 2.5 (baseline ~1.2), , High sensitivity T 146 (down trended) , no EKG changes, unremarkable CXR and CTAP. Abdominal pain and nausea resolved with morphine and ondansetron.       * No surgery found *      Hospital Course:   59 yoF w/ CAD s/p PCI OM2 in 14 and LAD 04, HFrEF (20-25%), DM2, HTN, PAD, R BKA, L forefoot amputation here for abdominal pain, and L foot pain. Admitted to  on 12/31, patient was found to have an BRENDA with a total of 750 cc IVF administered with no resolution in BRENDA on day 2 of admission. Despite this, patient was no longer nauseated and did not complain of abdominal pain. TTE this admission reveals EF 20-25% with grade III diastolic dysfunction and new found severe global hypokinesis, indicating progression of HFrEF. Low c/f acute on chronic HFrEF on this admission however patient was educated on heart failure and implored to follow with [facility]. Patient to pursue outpatient vascular surgery follow up regarding her severe PAD on the LLE. Otherwise, patient is HDS, BRENDA was improving. Patient discharged with instructions to follow up with PCP to monitor Creatinine.     Goals of Care Treatment Preferences:  Code Status: Full Code      SDOH Screening:  The patient was screened for utility difficulties, food insecurity, transport difficulties, housing insecurity, and interpersonal safety and there were no concerns identified this admission.     Consults:       Final Active Diagnoses:    Diagnosis Date Noted POA    PRINCIPAL PROBLEM:  Left upper quadrant abdominal pain [R10.12] 12/31/2024 Unknown    HFrEF (heart failure with reduced ejection fraction) [I50.20] 12/31/2024 Unknown    BRENDA (acute  kidney injury) [N17.9] 10/22/2023 Yes    Peripheral artery disease [I73.9] 04/02/2022 Yes     Chronic    Hypertension [I10] 02/20/2022 Yes     Chronic    Type 2 diabetes mellitus with circulatory disorder, with long-term current use of insulin [E11.59, Z79.4] 02/20/2022 Not Applicable     Chronic    Chronic pain [G89.29] 02/20/2022 Yes     Chronic      Problems Resolved During this Admission:       Discharged Condition: stable    Disposition:     Follow Up:   Follow-up Information       [id number] in 1 week(s).    Specialty: Cardiology  Contact information:  [address]  [facility] [address] [phone]  Additional information:  Cardiology Services Clin[facility] - Main Building, [address]rd Floor      Please park in [facility] and use [facility] elevator                         Patient Instructions:      BASIC METABOLIC PANEL   Standing Status: Future Standing Exp. Date: 04/01/26     Order Specific Question Answer Comments   Send normal result to authorizing provider's In Basket if patient is active on MyChart: Yes      Ambulatory referral/consult to Internal Medicine   Standing Status: Future   Referral Priority: Routine Referral Type: Consultation   Referral Reason: Specialty Services Required   Requested Specialty: Internal Medicine   Number of Visits Requested: 1     Ambulatory referral/consult to [facility]   Standing Status: Future   Referral Priority: Routine Referral Type: Consultation   Referral Reason: Specialty Services Required   Requested Specialty: Cardiology   Number of Visits Requested: 1     Ambulatory referral/consult to Vascular Surgery   Standing Status: Future   Referral Priority: Routine Referral Type: Consultation   Referral Reason: Specialty Services Required   Requested Specialty: Vascular Surgery   Number of Visits Requested: 1       Significant Diagnostic Studies: Labs: All labs within the past 24 hours have been  reviewed    Pending Diagnostic Studies:       None           Medications:  Reconciled Home Medications:      Medication List        CHANGE how you take these medications      amLODIPine 10 MG tablet  Commonly known as: NORVASC  Take 1 tablet (10 mg total) by mouth once daily. Hold this until you follow up with your doctor.  What changed: additional instructions     furosemide 20 MG tablet  Commonly known as: LASIX  Take 1 tablet (20 mg total) by mouth 2 (two) times daily. HOLD until follow up with your doctor  What changed: additional instructions            CONTINUE taking these medications      aspirin 81 MG EC tablet  Commonly known as: ECOTRIN  Take 81 mg by mouth every evening.     atorvastatin 80 MG tablet  Commonly known as: LIPITOR  Take 1 tablet (80 mg total) by mouth once daily.     blood sugar diagnostic Strp  Use to test blood glucose four times daily before meals and nightly     blood-glucose meter Misc  Use as instructed     carisoprodoL 350 MG tablet  Commonly known as: SOMA  Take 350 mg by mouth daily as needed.     clopidogreL 75 mg tablet  Commonly known as: PLAVIX  Take 75 mg by mouth once daily.     ergocalciferol 50,000 unit Cap  Commonly known as: ERGOCALCIFEROL  Take 50,000 Units by mouth every 7 days. Tuesday     ferrous sulfate 325 mg (65 mg iron) Tab tablet  Commonly known as: FEOSOL  Take 325 mg by mouth once daily.     hydrALAZINE 10 MG tablet  Commonly known as: APRESOLINE  Take 1 tablet (10 mg total) by mouth 2 (two) times a day.     lancets 30 gauge Misc  Use to test blood glucose four times daily before meals and nightly     lancing device Misc  Use to test blood glucose four times daily before meals and nightly     LANTUS SOLOSTAR U-100 INSULIN 100 unit/mL (3 mL) Inpn pen  Generic drug: insulin glargine U-100 (Lantus)  Inject 30 Units into the skin every evening.     losartan 50 MG tablet  Commonly known as: COZAAR  Take 1 tablet (50 mg total) by mouth every other day. Hold this  "until you follow up with your doctor.     metFORMIN 1000 MG tablet  Commonly known as: GLUCOPHAGE  Take 1,000 mg by mouth every morning.     metoprolol succinate 25 MG 24 hr tablet  Commonly known as: TOPROL-XL  Take 1 tablet (25 mg total) by mouth once daily.     metoprolol tartrate 50 MG tablet  Commonly known as: LOPRESSOR  Take 50 mg by mouth once daily. Wednesday     montelukast 10 mg tablet  Commonly known as: SINGULAIR  Take 10 mg by mouth once daily.     oxyCODONE-acetaminophen  mg per tablet  Commonly known as: PERCOCET  Take 1 tablet by mouth every 6 (six) hours as needed for Pain.     OZEMPIC 1 mg/dose (4 mg/3 mL)  Generic drug: semaglutide  Inject 1 mg into the skin every 7 days. Tuesday     pen needle, diabetic 31 gauge x 5/16" Ndle  Use to inject insulin 3 times daily     potassium chloride SA 10 MEQ tablet  Commonly known as: K-DUR,KLOR-CON M  Take 10 mEq by mouth once daily.     SYMBICORT 160-4.5 mcg/actuation Hfaa  Generic drug: budesonide-formoterol 160-4.5 mcg  Inhale 2 puffs into the lungs as needed (shortness of breath/ wheezing).     zolpidem 10 mg Tab  Commonly known as: AMBIEN  Take 10 mg by mouth nightly as needed (insomnia).              Indwelling Lines/Drains at time of discharge:   Lines/Drains/Airways       None                   Time spent on the discharge of patient: 35 minutes         Martin Villegas MD  Department of Hospital Medicine  [facility]  "

## 2025-01-02 NOTE — PLAN OF CARE
Patient discharged. Discharge instructions explained, verbalized understanding. IV removed, catheter tip intact. Patient waiting on medications and transportation. Will continue to monitor patient.   Problem: Adult Inpatient Plan of Care  Goal: Plan of Care Review  1/1/2025 1831 by Ellie Ralph LPN  Outcome: Met  1/1/2025 1705 by Ellie Ralph LPN  Outcome: Progressing  Goal: Patient-Specific Goal (Individualized)  1/1/2025 1831 by Ellie Ralph LPN  Outcome: Met  1/1/2025 1705 by Ellie Ralph LPN  Outcome: Progressing  Goal: Absence of Hospital-Acquired Illness or Injury  1/1/2025 1831 by Ellie Ralph LPN  Outcome: Met  1/1/2025 1705 by Ellie Ralph LPN  Outcome: Progressing  Goal: Optimal Comfort and Wellbeing  1/1/2025 1831 by Ellie Ralph LPN  Outcome: Met  1/1/2025 1705 by Ellie Ralph LPN  Outcome: Progressing  Goal: Readiness for Transition of Care  1/1/2025 1831 by Ellie Ralph LPN  Outcome: Met  1/1/2025 1705 by Ellie Ralph LPN  Outcome: Progressing     Problem: Diabetes Comorbidity  Goal: Blood Glucose Level Within Targeted Range  1/1/2025 1831 by Ellie Ralph LPN  Outcome: Met  1/1/2025 1705 by Ellie Ralph LPN  Outcome: Progressing     Problem: Acute Kidney Injury/Impairment  Goal: Fluid and Electrolyte Balance  1/1/2025 1831 by Ellie Ralph LPN  Outcome: Met  1/1/2025 1705 by Ellie Ralph LPN  Outcome: Progressing  Goal: Improved Oral Intake  1/1/2025 1831 by Ellie Ralph LPN  Outcome: Met  1/1/2025 1705 by Ellie Ralph LPN  Outcome: Progressing  Goal: Effective Renal Function  1/1/2025 1831 by Ellie Ralph LPN  Outcome: Met  1/1/2025 1705 by Ellie Ralph LPN  Outcome: Progressing     Problem: Wound  Goal: Optimal Coping  1/1/2025 1831 by Ellie Ralph LPN  Outcome: Met  1/1/2025 1705 by Ellie Ralph LPN  Outcome:  Progressing  Goal: Optimal Functional Ability  1/1/2025 1831 by Ellie Ralph LPN  Outcome: Met  1/1/2025 1705 by Ellie Ralph LPN  Outcome: Progressing  Goal: Absence of Infection Signs and Symptoms  1/1/2025 1831 by Ellie Ralph LPN  Outcome: Met  1/1/2025 1705 by Ellie Ralph LPN  Outcome: Progressing  Goal: Improved Oral Intake  1/1/2025 1831 by Ellie Ralph LPN  Outcome: Met  1/1/2025 1705 by Ellie Ralph LPN  Outcome: Progressing  Goal: Optimal Pain Control and Function  1/1/2025 1831 by Ellie Ralph LPN  Outcome: Met  1/1/2025 1705 by Ellie Ralph LPN  Outcome: Progressing  Goal: Skin Health and Integrity  1/1/2025 1831 by Ellie Ralph LPN  Outcome: Met  1/1/2025 1705 by Ellie Ralph LPN  Outcome: Progressing  Goal: Optimal Wound Healing  1/1/2025 1831 by Ellie Ralph LPN  Outcome: Met  1/1/2025 1705 by Ellie Ralph LPN  Outcome: Progressing     Problem: Fall Injury Risk  Goal: Absence of Fall and Fall-Related Injury  1/1/2025 1831 by Ellie Ralph LPN  Outcome: Met  1/1/2025 1705 by Ellie Ralph LPN  Outcome: Progressing     Problem: Skin Injury Risk Increased  Goal: Skin Health and Integrity  1/1/2025 1831 by Ellie Ralph LPN  Outcome: Met  1/1/2025 1705 by Ellie Ralph LPN  Outcome: Progressing

## 2025-01-03 LAB — BACTERIA UR CULT: ABNORMAL

## 2025-01-04 LAB
BACTERIA BLD CULT: NORMAL

## 2025-01-08 ENCOUNTER — TELEPHONE (OUTPATIENT)
Dept: CARDIOLOGY | Facility: CLINIC | Age: 60
End: 2025-01-08
Payer: MEDICAID

## 2025-01-08 NOTE — TELEPHONE ENCOUNTER
[facility]    Attempted to call pt to complete Phone enrollment to program. Unable to reach pt at listed phone numbers.  Was able to leave message on voicemail encouraging pt to return call with [facility] phone number. Left voice mail and contact information.

## 2025-03-07 NOTE — Clinical Note
Diagnosis: Fatigue [075340]   Future Attending Provider: SHANE LIZAMA [[id number]]   Is the patient being sent to ED Observation?: Yes   Patient was notified per Dr. Polo that labs continue to be great.                   ----- Message from Saravanan Polo MD sent at 3/7/2025  5:54 AM CST -----  Lab work continues to be great

## 2025-05-03 NOTE — SUBJECTIVE & OBJECTIVE
Interval History: NAEON. Afebrile. HDS. Pain controlled. NGT with 600cc out overnight. Plan for repeat UGI next week    Medications:  Continuous Infusions:   TPN ADULT CENTRAL LINE CUSTOM 60 mL/hr at 03/18/22 2210    TPN ADULT CENTRAL LINE CUSTOM       Scheduled Meds:   fat emulsion 20%  250 mL Intravenous Daily    fat emulsion 20%  250 mL Intravenous Daily    heparin (porcine)  5,000 Units Subcutaneous Q8H    ipratropium  2 puff Inhalation BID    LIDOcaine (PF) 10 mg/ml (1%)  1 mL Other Once    methocarbamol (ROBAXIN) IVPB  500 mg Intravenous Q8H    metoprolol  5 mg Intravenous Q6H    pantoprazole  40 mg Intravenous BID    sodium chloride 0.9%  10 mL Intravenous Q6H     PRN Meds:sodium chloride 0.9%, dextrose 10%, glucagon (human recombinant), HYDROmorphone, insulin aspart U-100, melatonin, ondansetron, ondansetron, sodium chloride 0.9%, Flushing PICC Protocol **AND** sodium chloride 0.9% **AND** sodium chloride 0.9%     Review of patient's allergies indicates:   Allergen Reactions    Orange juice Hives    Tomato (solanum lycopersicum) Hives     Objective:     Vital Signs (Most Recent):  Temp: 98.1 °F (36.7 °C) (03/19/22 0354)  Pulse: 70 (03/19/22 0354)  Resp: 18 (03/19/22 0830)  BP: (!) 166/71 (03/19/22 0354)  SpO2: 96 % (03/19/22 0354) Vital Signs (24h Range):  Temp:  [98 °F (36.7 °C)-98.4 °F (36.9 °C)] 98.1 °F (36.7 °C)  Pulse:  [63-77] 70  Resp:  [17-22] 18  SpO2:  [94 %-98 %] 96 %  BP: (112-166)/(63-72) 166/71     Weight: 77 kg (169 lb 12.1 oz)  Body mass index is 23.68 kg/m².    Intake/Output - Last 3 Shifts         03/17 0700  03/18 0659 03/18 0700  03/19 0659 03/19 0700 03/20 0659    P.O.       I.V. (mL/kg) 1229.7 (16)      IV Piggyback 1034.8      Total Intake(mL/kg) 2264.5 (29.4)      Urine (mL/kg/hr) 1050 (0.6) 350 (0.2)     Drains 800 600     Other       Stool 0      Total Output 1850 950     Net +414.5 -950            Stool Occurrence 0 x              Physical Exam  Vitals and nursing note reviewed.    Constitutional:       General: She is not in acute distress.     Appearance: She is not diaphoretic.      Comments: 3L O2 via NC  NGT to PARVEEN   HENT:      Head: Normocephalic and atraumatic.      Mouth/Throat:      Mouth: Mucous membranes are moist.      Pharynx: Oropharynx is clear.   Eyes:      Extraocular Movements: Extraocular movements intact.      Conjunctiva/sclera: Conjunctivae normal.   Cardiovascular:      Rate and Rhythm: Normal rate.   Pulmonary:      Effort: Pulmonary effort is normal. No respiratory distress.   Abdominal:      General: There is no distension.      Palpations: Abdomen is soft.      Tenderness: There is no guarding or rebound.      Comments: Incision is clean, dry, and intact without drainage, erythema, or increased warmth. Appropriately TTP.   Musculoskeletal:         General: No deformity.      Cervical back: Normal range of motion.   Skin:     General: Skin is warm and dry.   Neurological:      Mental Status: She is alert and oriented to person, place, and time.       Significant Labs:  I have reviewed all pertinent lab results within the past 24 hours.  CBC:   Recent Labs   Lab 03/19/22  0346   WBC 19.00*   RBC 3.66*   HGB 9.3*   HCT 28.8*      MCV 79*   MCH 25.4*   MCHC 32.3       CMP:   Recent Labs   Lab 03/14/22  0204 03/14/22  1644 03/19/22  0346      < > 265*   CALCIUM 9.5   < > 9.4   ALBUMIN 3.3*  --   --    PROT 8.2  --   --    *   < > 133*   K 4.3   < > 4.2   CO2 21*   < > 25   CL 93*   < > 99   BUN 20   < > 9   CREATININE 1.9*   < > 0.8   ALKPHOS 142*  --   --    ALT 17  --   --    AST 14  --   --    BILITOT 0.3  --   --     < > = values in this interval not displayed.         Significant Diagnostics:  I have reviewed all pertinent imaging results/findings within the past 24 hours.   Yes

## (undated) DEVICE — ELECTRODE REM PLYHSV RETURN 9

## (undated) DEVICE — SPONGE KITTNER 1/4X 5/8 L STRL

## (undated) DEVICE — TAPE CURAD ELAS FOAM 3INX5.5YD

## (undated) DEVICE — SUT 3-0 12-18IN SILK

## (undated) DEVICE — TOWEL OR XRAY WHITE 17X26IN

## (undated) DEVICE — UNDERGLOVE BIOGEL PI SZ 6.5 LF

## (undated) DEVICE — BLADE SURG STAINLESS STEEL #15

## (undated) DEVICE — SUT ETHILON 4-0 BLK PS-4-18

## (undated) DEVICE — DRAPE INSTR MAGNETIC 10X16IN

## (undated) DEVICE — TIP YANKAUERS BULB NO VENT

## (undated) DEVICE — DRESSING TELFA N ADH 3X8

## (undated) DEVICE — GOWN NONREINF SET-IN SLV XL

## (undated) DEVICE — SUT CHROMIC 3-0 SH 27IN GUT

## (undated) DEVICE — BLADE 4 INCH EDGE UN-INS

## (undated) DEVICE — SEE MEDLINE ITEM 156902

## (undated) DEVICE — BANDAGE BULKEE II 2.25INX3YD

## (undated) DEVICE — POSITIONER HEAD DONUT 9IN FOAM

## (undated) DEVICE — APPLIER LIGACLIP SM 9.38IN

## (undated) DEVICE — DRESSING GZ SURGICOUNT 4X8

## (undated) DEVICE — SUT VICRYL 4-0 27 SH

## (undated) DEVICE — ELECTRODE BLD EXT INSUL 1

## (undated) DEVICE — TOWEL OR DISP STRL BLUE 4/PK

## (undated) DEVICE — SYR 10CC LUER LOCK

## (undated) DEVICE — SOL POVIDONE SCRUB IODINE 4 OZ

## (undated) DEVICE — POSITIONER IV ARMBOARD FOAM

## (undated) DEVICE — SUT VICRYL 3-0 27 SH

## (undated) DEVICE — DRAPE ABDOMINAL TIBURON 14X11

## (undated) DEVICE — DRAPE INCISE IOBAN 2 23X17IN

## (undated) DEVICE — GLOVE BIOGEL SKINSENSE PI 7.5

## (undated) DEVICE — DRESSING TRANS 4X4 TEGADERM

## (undated) DEVICE — GLOVE BIOGEL SKINSENSE PI 6.5

## (undated) DEVICE — CLOSURE SKIN STERI STRIP 1/2X4

## (undated) DEVICE — SUT ETHILON 5-0 PS-2 18IN

## (undated) DEVICE — SUT ETHILON 5-0 P3 18IN BLK

## (undated) DEVICE — SOL NORMAL USPCA 0.9%

## (undated) DEVICE — CONTAINER SPEC OR STRL 4.5OZ

## (undated) DEVICE — GLOVE BIOGEL SKINSENSE PI 8.0

## (undated) DEVICE — SUT 4-0 12-18IN SILK BLACK

## (undated) DEVICE — Device

## (undated) DEVICE — NDL HYPO REG 25G X 1 1/2

## (undated) DEVICE — SUT 1 48IN PDS II VIO MONO

## (undated) DEVICE — POSITIONER HEEL FOAM CONVOLTD

## (undated) DEVICE — UNDERGLOVES BIOGEL PI SZ 7 LF

## (undated) DEVICE — STAPLER SKIN ROTATING HEAD

## (undated) DEVICE — SOL BETADINE 5%

## (undated) DEVICE — SPONGE BULKEE II ABSRB 6X6.75

## (undated) DEVICE — DRAPE T THYROID STERILE

## (undated) DEVICE — FORCEP BIPOLAR ADSON 1MM TIP

## (undated) DEVICE — TRAY DRY SKIN SCRUB PREP

## (undated) DEVICE — DRAIN PENRS SIL STRL .25X18IN

## (undated) DEVICE — GLOVE BIOGEL SKINSENSE PI 7.0